# Patient Record
Sex: MALE | Race: BLACK OR AFRICAN AMERICAN | NOT HISPANIC OR LATINO | Employment: OTHER | URBAN - METROPOLITAN AREA
[De-identification: names, ages, dates, MRNs, and addresses within clinical notes are randomized per-mention and may not be internally consistent; named-entity substitution may affect disease eponyms.]

---

## 2017-03-11 ENCOUNTER — HOSPITAL ENCOUNTER (EMERGENCY)
Facility: HOSPITAL | Age: 52
Discharge: HOME | End: 2017-03-11
Attending: EMERGENCY MEDICINE
Payer: MEDICARE

## 2017-03-11 VITALS
RESPIRATION RATE: 16 BRPM | WEIGHT: 185 LBS | OXYGEN SATURATION: 99 % | DIASTOLIC BLOOD PRESSURE: 87 MMHG | SYSTOLIC BLOOD PRESSURE: 165 MMHG | TEMPERATURE: 98 F | HEIGHT: 69 IN | HEART RATE: 78 BPM | BODY MASS INDEX: 27.4 KG/M2

## 2017-03-11 DIAGNOSIS — L03.011 PARONYCHIA, RIGHT: Primary | ICD-10-CM

## 2017-03-11 PROCEDURE — 10060 I&D ABSCESS SIMPLE/SINGLE: CPT | Mod: F6

## 2017-03-11 PROCEDURE — 25000003 PHARM REV CODE 250: Performed by: PHYSICIAN ASSISTANT

## 2017-03-11 PROCEDURE — 99283 EMERGENCY DEPT VISIT LOW MDM: CPT | Mod: 25

## 2017-03-11 RX ORDER — DOXYCYCLINE 100 MG/1
100 CAPSULE ORAL 2 TIMES DAILY
Qty: 20 CAPSULE | Refills: 0 | Status: SHIPPED | OUTPATIENT
Start: 2017-03-11 | End: 2017-03-21

## 2017-03-11 RX ORDER — LIDOCAINE HYDROCHLORIDE 10 MG/ML
INJECTION, SOLUTION EPIDURAL; INFILTRATION; INTRACAUDAL; PERINEURAL
Status: DISCONTINUED
Start: 2017-03-11 | End: 2017-03-11 | Stop reason: HOSPADM

## 2017-03-11 RX ORDER — LIDOCAINE HYDROCHLORIDE 10 MG/ML
10 INJECTION, SOLUTION EPIDURAL; INFILTRATION; INTRACAUDAL; PERINEURAL
Status: COMPLETED | OUTPATIENT
Start: 2017-03-11 | End: 2017-03-11

## 2017-03-11 RX ADMIN — LIDOCAINE HYDROCHLORIDE 100 MG: 10 INJECTION, SOLUTION EPIDURAL; INFILTRATION; INTRACAUDAL; PERINEURAL at 07:03

## 2017-03-11 NOTE — ED AVS SNAPSHOT
[facility] [address] [place] [address]               Hernan Badillo   3/11/2017  6:29 PM   ED    Description:  Male : 1965   Department:  [facility]           Your Care was Coordinated By:     Provider Role From To    Chet Emanuel MD Attending Provider 17 --    Suzanne Stout PA-C Physician Assistant 17 --      Reason for Visit     paronychia           Diagnoses this Visit        Comments    Paronychia, right    -  Primary       ED Disposition     ED Disposition Condition Comment    Discharge             To Do List           Follow-up Information     Follow up with Twyla Dodd MD In 1 week.    Specialty:  Family Medicine    Contact information:    [facility]  [facility] [id number] [phone]          Follow up with [facility].    Specialty:  Emergency Medicine    Why:  As needed    Contact information:    [address] [place] [id number] [phone]       These Medications        Disp Refills Start End    doxycycline (VIBRAMYCIN) 100 MG Cap 20 capsule 0 3/11/2017 3/21/2017    Take 1 capsule (100 mg total) by mouth 2 (two) times daily. - Oral    Pharmacy: [place] [address] [address]. Ph #: [phone]         [facility]     [facility] -  Assistance  Registered nurses in the [facility] provide clinical advisement, health education, appointment booking, and other advisory services.  Call for this free service at [phone].             Medications           Message regarding Medications     Verify the changes and/or additions to your medication regime listed below are the same as discussed with your clinician today.  If any of these changes or additions are incorrect, please notify your healthcare provider.        START taking these NEW medications        Refills    doxycycline  "(VIBRAMYCIN) 100 MG Cap 0    Sig: Take 1 capsule (100 mg total) by mouth 2 (two) times daily.    Class: Print    Route: Oral      These medications were administered today        Dose Freq    lidocaine (PF) 10 mg/ml (1%) injection 100 mg 10 mL ED 1 Time    Sig: 10 mLs (100 mg total) by Infiltration route ED 1 Time.    Class: Normal    Route: Infiltration    lidocaine (PF) 10 mg/ml (1%) 10 mg/mL (1 %) injection      Notes to Pharmacy: Created by cabinet override           Verify that the below list of medications is an accurate representation of the medications you are currently taking.  If none reported, the list may be blank. If incorrect, please contact your healthcare provider. Carry this list with you in case of emergency.           Current Medications     albuterol-ipratropium 2.5mg-0.5mg/3mL (DUO-NEB) 0.5 mg-3 mg(2.5 mg base)/3 mL nebulizer solution Take 3 mLs by nebulization every 6 (six) hours as needed for Wheezing or Shortness of Breath.    doxycycline (VIBRAMYCIN) 100 MG Cap Take 1 capsule (100 mg total) by mouth 2 (two) times daily.    insulin glargine (LANTUS) 100 unit/mL injection Inject 20 Units into the skin every evening.    lidocaine (PF) 10 mg/ml (1%) 10 mg/mL (1 %) injection     lisinopril (PRINIVIL,ZESTRIL) 20 MG tablet Take 1 tablet (20 mg total) by mouth once daily.    nystatin-triamcinolone (MYCOLOG II) cream Apply topically 2 (two) times daily. Apply thin film to perigenital daily           Clinical Reference Information           Your Vitals Were     BP Pulse Temp Resp Height Weight    192/106 88 98.4 °F (36.9 °C) (Oral) 20 5' 9" (1.753 m) 83.9 kg (185 lb)    SpO2 BMI             96% 27.32 kg/m2         Allergies as of 3/11/2017     No Known Allergies      Immunizations Administered on Date of Encounter - 3/11/2017     None      ED Micro, Lab, POCT     None      ED Imaging Orders     None      Discharge References/Attachments     PARONYCHIA OF THE FINGER OR TOE (ENGLISH)      [facility] " Sign-Up     Activating your [facility] account is as easy as 1-2-3!     1) Visit [email or web address], select Sign Up Now, enter this activation code and your date of birth, then select Next.  [email or web address]  Expires: 4/25/2017  7:36 PM      2) Create a username and password to use when you visit [facility] in the future and select a security question in case you lose your password and select Next.    3) Enter your e-mail address and click Sign Up!    Additional Information  If you have questions, please e-mail [email or web address] or call [phone] to talk to our [facility] staff. Remember, [facility] is NOT to be used for urgent needs. For medical emergencies, dial 911.          [facility] complies with applicable Federal civil rights laws and does not discriminate on the basis of race, color, national origin, age, disability, or sex.        Language Assistance Services     ATTENTION: Language assistance services are available, free of charge. Please call [phone].      ATENCIÓN: Si habla español, tiene a schulte disposición servicios gratuitos de asistencia lingüística. Llame al [phone].     CHÚ Ý: N?u b?n nói Ti?ng Vi?t, có các d?ch v? h? tr? ngôn ng? mi?n phí dành cho b?n. G?i s? [phone].

## 2017-03-12 NOTE — ED PROVIDER NOTES
"Encounter Date: 3/11/2017       History     Chief Complaint   Patient presents with    paronychia     Review of patient's allergies indicates:  No Known Allergies  HPI Comments: Patient is a 51 year old male with complaint of "finger infection". Patient reports PMH significant for COPD, hypertension and DM-2. He reports two week history of swelling and drainage from right fourth digit. He reports he tried to drain it by poking a hole and reports pus expressed. He reports associated tenderness. He reports moderate and constant symptoms. He denied fever, chills, nausea, vomiting or redness to finger.     The history is provided by the patient.     Past Medical History:   Diagnosis Date    COPD (chronic obstructive pulmonary disease)     Diabetes mellitus, type 2     Hypertension      Past Surgical History:   Procedure Laterality Date    DENTAL SURGERY      HERNIA REPAIR      left inguinal     Family History   Problem Relation Age of Onset    Heart attack Father 80    Heart disease Father     Drug abuse Father     Cancer Maternal Grandmother      colon     Social History   Substance Use Topics    Smoking status: Current Every Day Smoker     Packs/day: 2.00     Years: 30.00     Types: Cigarettes    Smokeless tobacco: Never Used    Alcohol use 0.6 oz/week     1 Shots of liquor per week      Comment: 1/2 pint 2 x per week      Review of Systems   Constitutional: Negative for chills and fever.   HENT: Negative for congestion and sore throat.    Respiratory: Negative for cough and shortness of breath.    Cardiovascular: Negative for chest pain.   Gastrointestinal: Negative for abdominal pain, diarrhea, nausea and vomiting.   Genitourinary: Negative for dysuria.   Musculoskeletal: Negative for back pain.   Skin: Positive for wound. Negative for color change and rash.   Neurological: Negative for dizziness, weakness and headaches.   Hematological: Does not bruise/bleed easily.       Physical Exam   Initial Vitals "   BP Pulse Resp Temp SpO2   03/11/17 1800 03/11/17 1800 03/11/17 1800 03/11/17 1800 03/11/17 1800   192/106 88 20 98.4 °F (36.9 °C) 96 %     Physical Exam    Nursing note and vitals reviewed.  Constitutional: He appears well-developed and well-nourished.   HENT:   Head: Normocephalic and atraumatic.   Right Ear: External ear normal.   Left Ear: External ear normal.   Nose: Nose normal.   Eyes: Conjunctivae are normal. Right eye exhibits no discharge. Left eye exhibits no discharge. No scleral icterus.   Neck: Normal range of motion. Neck supple.   Cardiovascular: Normal rate, regular rhythm and normal heart sounds. Exam reveals no gallop and no friction rub.    No murmur heard.  Pulmonary/Chest: Breath sounds normal. He has no wheezes. He has no rhonchi. He has no rales.   Abdominal: Soft. Bowel sounds are normal. He exhibits no distension. There is no tenderness.   Musculoskeletal: Normal range of motion.        Right hand: He exhibits tenderness. He exhibits normal range of motion, no bony tenderness and normal capillary refill. Normal sensation noted. Normal strength noted.   Neurological: He is oriented to person, place, and time.   Skin: Skin is warm and dry. No rash noted. There is erythema.   Erythema, tenderness and induration noted to the nail bed on the right fourth digit. No cellulitic changes noted. No warmth. Full ROM.         ED Course   I & D - Incision and Drainage  Date/Time: 3/11/2017 7:43 PM  Performed by: XIOMARA HWITE  Authorized by: JOSE LUIS STAUFFER   Type: abscess  Body area: upper extremity  Location details: right index finger  Anesthesia: digital block    Anesthesia:  Anesthesia: digital block  Local Anesthetic: lidocaine 1% without epinephrine   Anesthetic total: 7 mL  Scalpel size: 11  Incision type: single straight  Complexity: simple  Drainage: pus  Drainage amount: moderate  Patient tolerance: Patient tolerated the procedure well with no immediate  complications        Labs Reviewed - No data to display          Medical Decision Making:   History:   I obtained history from: someone other than patient.  Old Medical Records: I decided to obtain old medical records.       APC / Resident Notes:   This is an emergent evaluation of a 51 year old male with complaint of paronychia to right fourth digit. He reports attempted drainage 2-3 times. Patient denied fever. I&D performed, see procedure note. Patient tolerated well. Patient was given instructions on wound care. Antibiotics given. Follow up with primary care provider. Return precautions given. All questions answered. Case was discussed with Dr. Emanuel who has evaluated the patient and is in agreement with the plan of care.            Attending Attestation:     Physician Attestation Statement for NP/PA:   I have conducted a face to face encounter with this patient in addition to the NP/PA, due to Medical Complexity    Other NP/PA Attestation Additions:      Medical Decision Making: I provided a face to face evaluation of this patient.  I discussed the patient's care with Advanced Practice Clinician.  I reviewed their note and agree with the history, physical, assessment, diagnosis, treatment, and discharge plan provided by the Advanced Practice Clinician. My overall impression is paronychia.  The patient has been instructed to follow up with their physician or the one provided as well as specific return precautions.                    ED Course     Clinical Impression:   The encounter diagnosis was Paronychia, right.    Disposition:   Disposition: Discharged  Condition: Stable       Suzanne Stout PA-C  03/11/17 2128       Chet Emanuel MD  03/11/17 3053

## 2017-06-13 ENCOUNTER — HOSPITAL ENCOUNTER (EMERGENCY)
Facility: HOSPITAL | Age: 52
Discharge: HOME | End: 2017-06-13
Attending: EMERGENCY MEDICINE
Payer: MEDICARE

## 2017-06-13 VITALS
HEIGHT: 69 IN | HEART RATE: 85 BPM | DIASTOLIC BLOOD PRESSURE: 104 MMHG | RESPIRATION RATE: 16 BRPM | BODY MASS INDEX: 25.92 KG/M2 | TEMPERATURE: 98 F | OXYGEN SATURATION: 95 % | SYSTOLIC BLOOD PRESSURE: 173 MMHG | WEIGHT: 175 LBS

## 2017-06-13 DIAGNOSIS — M54.50 ACUTE RIGHT-SIDED LOW BACK PAIN WITHOUT SCIATICA: Primary | ICD-10-CM

## 2017-06-13 PROCEDURE — 99284 EMERGENCY DEPT VISIT MOD MDM: CPT | Mod: 25

## 2017-06-13 PROCEDURE — 96372 THER/PROPH/DIAG INJ SC/IM: CPT

## 2017-06-13 PROCEDURE — 63600175 PHARM REV CODE 636 W HCPCS: Performed by: NURSE PRACTITIONER

## 2017-06-13 RX ORDER — ORPHENADRINE CITRATE 30 MG/ML
60 INJECTION INTRAMUSCULAR; INTRAVENOUS
Status: COMPLETED | OUTPATIENT
Start: 2017-06-13 | End: 2017-06-13

## 2017-06-13 RX ORDER — DICLOFENAC SODIUM 50 MG/1
50 TABLET, DELAYED RELEASE ORAL 2 TIMES DAILY
Qty: 30 TABLET | Refills: 0 | Status: SHIPPED | OUTPATIENT
Start: 2017-06-13 | End: 2018-08-01

## 2017-06-13 RX ORDER — METHOCARBAMOL 750 MG/1
1500 TABLET, FILM COATED ORAL 3 TIMES DAILY
Qty: 30 TABLET | Refills: 0 | Status: SHIPPED | OUTPATIENT
Start: 2017-06-13 | End: 2017-06-18

## 2017-06-13 RX ADMIN — ORPHENADRINE CITRATE 60 MG: 30 INJECTION INTRAMUSCULAR; INTRAVENOUS at 08:06

## 2017-06-14 NOTE — ED NOTES
C/o lower back pain after a lina slipped on a car he was working on PTA able to ambulate well denies change in bowel or bladder. Alert calm family at beside. Aware to notify nurse of needs or concerns

## 2017-06-15 NOTE — ED PROVIDER NOTES
Encounter Date: 6/13/2017       History     Chief Complaint   Patient presents with    Back Pain     pt reports lower back pain, jacked slipped and pipe from car fell      Review of patient's allergies indicates:  No Known Allergies  Hernan Badillo is a 51 year old male with pmh COPD, DM, HTN presenting to the ED from [facility] with c/o right lower back pain. The patient states he was working on a vehicle when a muffler pipe fell onto his back. He has had no lower extremity weakness, saddle anesthesia, bowel/bladder incontinence. He was evaluated at urgent care and advised to come to the ED to r/o vertebral fracture or other acute injury. He received Toradol at urgent care.       The history is provided by the patient.     Past Medical History:   Diagnosis Date    COPD (chronic obstructive pulmonary disease)     Diabetes mellitus, type 2     Hypertension      Past Surgical History:   Procedure Laterality Date    DENTAL SURGERY      HERNIA REPAIR      left inguinal     Family History   Problem Relation Age of Onset    Heart attack Father 80    Heart disease Father     Drug abuse Father     Cancer Maternal Grandmother      colon     Social History   Substance Use Topics    Smoking status: Current Every Day Smoker     Packs/day: 2.00     Years: 30.00     Types: Cigarettes    Smokeless tobacco: Never Used    Alcohol use 0.6 oz/week     1 Shots of liquor per week      Comment: 1/2 pint 2 x per week      Review of Systems   Constitutional: Negative.    HENT: Negative.    Respiratory: Negative.    Cardiovascular: Negative.    Gastrointestinal: Negative.    Genitourinary: Negative.    Musculoskeletal: Positive for back pain.   Skin: Negative.    Neurological: Negative.        Physical Exam     Initial Vitals [06/13/17 1957]   BP Pulse Resp Temp SpO2   (!) 186/105 101 16 98 °F (36.7 °C) 95 %     Physical Exam    Nursing note and vitals reviewed.  Constitutional: He appears well-developed and  well-nourished. He is not diaphoretic. No distress.   HENT:   Head: Normocephalic and atraumatic.   Eyes: Conjunctivae are normal.   Neck: Normal range of motion.   Cardiovascular: Normal rate and regular rhythm.   Pulmonary/Chest: Breath sounds normal.   Musculoskeletal:        Lumbar back: He exhibits decreased range of motion, tenderness, swelling (right lower back) and pain.        Back:    Neurological: He is alert and oriented to person, place, and time. He has normal strength. No sensory deficit. Gait normal.   Skin: Skin is warm and dry. Capillary refill takes less than 2 seconds.         ED Course   Procedures  Labs Reviewed - No data to display                APC / Resident Notes:   Hernan Badillo is a 51 year old male presenting to the ED with c/o lower back pain. CT of lumbar spine performed with no acute findings. Patient was given IM Norflex with some relief of pain. I do not suspect vertebral fracture/subluxation or cauda equina. He was provided with prescriptions for NSAIDs and muscle relaxants at time of discharge and advised to follow up with PCP. I discussed specific return precautions with the patient and he verbalized understanding. Based on my clinical evaluation, I do not appreciate any immediate, emergent, or life threatening condition or etiology that warrants additional workup today and feel that the patient can be discharged with close follow up care.            Attending Attestation:     Physician Attestation Statement for NP/PA:   I have conducted a face to face encounter with this patient in addition to the NP/PA, due to Medical Complexity    Other NP/PA Attestation Additions:      Medical Decision Making: I provided a face to face evaluation of this patient.  I discussed the patient's care with Advanced Practice Clinician.  I reviewed their note and agree with the history, physical, assessment, diagnosis, treatment, and discharge plan provided by the Advanced Practice Clinician. My overall  impression is Right low back pain and swelling.  The patient has been instructed to follow up with their physician or the one provided as well as specific return precautions.                    ED Course   Comment By Time   IMPRESSION:  No acute findings.  Thank you for allowing us to participate in the care of your patient.  Dictated and Authenticated by: Lj Olson MD  06/13/2017 9:57 PM Central Time ([place] & [place]) Chet Emanuel MD 06/13 4247     Clinical Impression:   The encounter diagnosis was Acute right-sided low back pain without sciatica.    Disposition:   Condition: Stable       Mallory Santiago NP  06/14/17 0963       Chet Emanuel MD  06/17/17 0657

## 2017-06-26 ENCOUNTER — HOSPITAL ENCOUNTER (EMERGENCY)
Facility: HOSPITAL | Age: 52
Discharge: HOME | End: 2017-06-26
Attending: EMERGENCY MEDICINE
Payer: MEDICARE

## 2017-06-26 VITALS
WEIGHT: 175 LBS | HEART RATE: 89 BPM | TEMPERATURE: 98 F | DIASTOLIC BLOOD PRESSURE: 122 MMHG | OXYGEN SATURATION: 97 % | RESPIRATION RATE: 16 BRPM | HEIGHT: 69 IN | SYSTOLIC BLOOD PRESSURE: 196 MMHG | BODY MASS INDEX: 25.92 KG/M2

## 2017-06-26 DIAGNOSIS — M54.16 LUMBAR RADICULOPATHY, ACUTE: ICD-10-CM

## 2017-06-26 DIAGNOSIS — S30.0XXA TRAUMATIC HEMATOMA OF LOWER BACK, INITIAL ENCOUNTER: Primary | ICD-10-CM

## 2017-06-26 PROCEDURE — 99283 EMERGENCY DEPT VISIT LOW MDM: CPT | Mod: 25

## 2017-06-26 PROCEDURE — 96372 THER/PROPH/DIAG INJ SC/IM: CPT

## 2017-06-26 PROCEDURE — 63600175 PHARM REV CODE 636 W HCPCS: Performed by: EMERGENCY MEDICINE

## 2017-06-26 RX ORDER — KETOROLAC TROMETHAMINE 30 MG/ML
30 INJECTION, SOLUTION INTRAMUSCULAR; INTRAVENOUS
Status: COMPLETED | OUTPATIENT
Start: 2017-06-26 | End: 2017-06-26

## 2017-06-26 RX ORDER — TRAMADOL HYDROCHLORIDE 50 MG/1
50 TABLET ORAL EVERY 6 HOURS PRN
Qty: 20 TABLET | Refills: 0 | Status: SHIPPED | OUTPATIENT
Start: 2017-06-26 | End: 2017-06-28

## 2017-06-26 RX ADMIN — KETOROLAC TROMETHAMINE 30 MG: 30 INJECTION, SOLUTION INTRAMUSCULAR at 02:06

## 2017-06-26 NOTE — ED NOTES
Pt presents with complaints of swelling in right lower side of back. Pt says his daughter kicked him there 3 weeks ago, he came here and area swollen and painful. No relief with pain meds he was sent home with. Area tender to touch. Pt alert and oriented at this time

## 2017-06-26 NOTE — ED PROVIDER NOTES
Encounter Date: 6/26/2017       History     Chief Complaint   Patient presents with    Back Pain     Lower back pain x 3 weeks      Patient is a 51-year-old male who presents to emergency department for evaluation of bilateral low back pain on the right greater than left.  The patient was hit with a pipe in the lower back several weeks ago and came to this emergency department where he had a CT of the lumbar spine showing no acute fracture, but does have mild neuroforaminal stenosis and retrolisthesis of the lower lumbar spine.  Today he was playing with his 3-year-old granddaughter who accidentally kicked him in the back.  He has no focal weakness or numbness incontinence fevers but does have recurrence of the low back pain.  Voltaren gel and Robaxin are not working tonight.          Review of patient's allergies indicates:  No Known Allergies  Past Medical History:   Diagnosis Date    COPD (chronic obstructive pulmonary disease)     Diabetes mellitus, type 2     Hypertension      Past Surgical History:   Procedure Laterality Date    DENTAL SURGERY      HERNIA REPAIR      left inguinal     Family History   Problem Relation Age of Onset    Heart attack Father 80    Heart disease Father     Drug abuse Father     Cancer Maternal Grandmother      colon     Social History   Substance Use Topics    Smoking status: Current Every Day Smoker     Packs/day: 2.00     Years: 30.00     Types: Cigarettes    Smokeless tobacco: Never Used    Alcohol use 0.6 oz/week     1 Shots of liquor per week      Comment: 1/2 pint 2 x per week      Review of Systems   Constitutional: Negative for fever.   Genitourinary: Negative for difficulty urinating, dysuria, flank pain and hematuria.   Musculoskeletal: Positive for back pain. Negative for arthralgias, myalgias, neck pain and neck stiffness.   Skin: Negative for color change, rash and wound.   Neurological: Negative for weakness and numbness.   Psychiatric/Behavioral: The  patient is not nervous/anxious.        Physical Exam     Initial Vitals [06/26/17 0114]   BP Pulse Resp Temp SpO2   (!) 196/122 89 16 98.1 °F (36.7 °C) 97 %      MAP       146.67         Physical Exam    Nursing note and vitals reviewed.  Constitutional: He appears well-developed and well-nourished. No distress.   HENT:   Head: Normocephalic and atraumatic.   Abdominal: There is no tenderness.   Musculoskeletal: He exhibits tenderness (ttp w/ small hematoma over the right lower lumbar region. also mild ttp in the left lower lumbar region.  mild ttp in the right lumbar causes pain to radiate to the right buttocks. ).   Neurological: He is alert and oriented to person, place, and time. He has normal strength. No sensory deficit.   Skin: No rash noted.         ED Course   Procedures  Labs Reviewed - No data to display          Medical Decision Making:   Patient appears to have a hematoma on the right lumbosacral region.  This is likely the cause of his pain.  I reviewed his CT which shows retrolisthesis and mild neuroforaminal stenosis which is also potentially causing his pain.  He needs to follow-up with regular physician and I will give him a referral to Dr. Aguilar.  He does have uncontrolled hypertension here and I'm concerned that antiinflammatories will  I will given him a few pills of ultram.                    ED Course     Clinical Impression:   The primary encounter diagnosis was Traumatic hematoma of lower back, initial encounter. A diagnosis of Lumbar radiculopathy, acute was also pertinent to this visit.                           Quinn Sarmiento MD  06/26/17 5311

## 2017-06-28 ENCOUNTER — HOSPITAL ENCOUNTER (OUTPATIENT)
Dept: RADIOLOGY | Facility: CLINIC | Age: 52
Discharge: HOME | End: 2017-06-28
Attending: FAMILY MEDICINE
Payer: MEDICARE

## 2017-06-28 ENCOUNTER — OFFICE VISIT (OUTPATIENT)
Dept: FAMILY MEDICINE | Facility: CLINIC | Age: 52
End: 2017-06-28
Payer: MEDICARE

## 2017-06-28 ENCOUNTER — DOCUMENTATION ONLY (OUTPATIENT)
Dept: FAMILY MEDICINE | Facility: CLINIC | Age: 52
End: 2017-06-28

## 2017-06-28 ENCOUNTER — TELEPHONE (OUTPATIENT)
Dept: NEUROSURGERY | Facility: CLINIC | Age: 52
End: 2017-06-28

## 2017-06-28 VITALS
BODY MASS INDEX: 26.58 KG/M2 | WEIGHT: 179.44 LBS | SYSTOLIC BLOOD PRESSURE: 184 MMHG | HEART RATE: 88 BPM | HEIGHT: 69 IN | TEMPERATURE: 99 F | DIASTOLIC BLOOD PRESSURE: 98 MMHG

## 2017-06-28 DIAGNOSIS — S30.0XXD TRAUMATIC HEMATOMA OF LOWER BACK, SUBSEQUENT ENCOUNTER: ICD-10-CM

## 2017-06-28 DIAGNOSIS — M43.16 SPONDYLOLISTHESIS OF LUMBAR REGION: ICD-10-CM

## 2017-06-28 DIAGNOSIS — M51.36 DDD (DEGENERATIVE DISC DISEASE), LUMBAR: ICD-10-CM

## 2017-06-28 DIAGNOSIS — I10 ESSENTIAL HYPERTENSION: Primary | ICD-10-CM

## 2017-06-28 DIAGNOSIS — R60.9 SOFT TISSUE SWELLING OF BACK: ICD-10-CM

## 2017-06-28 DIAGNOSIS — E11.8 TYPE 2 DIABETES MELLITUS WITH COMPLICATION, WITHOUT LONG-TERM CURRENT USE OF INSULIN: ICD-10-CM

## 2017-06-28 PROCEDURE — 3046F HEMOGLOBIN A1C LEVEL >9.0%: CPT | Mod: ,,, | Performed by: PHYSICIAN ASSISTANT

## 2017-06-28 PROCEDURE — 4010F ACE/ARB THERAPY RXD/TAKEN: CPT | Mod: ,,, | Performed by: PHYSICIAN ASSISTANT

## 2017-06-28 PROCEDURE — 76705 ECHO EXAM OF ABDOMEN: CPT | Mod: TC,PO

## 2017-06-28 PROCEDURE — 99999 PR PBB SHADOW E&M-EST. PATIENT-LVL IV: CPT | Mod: PBBFAC,,, | Performed by: PHYSICIAN ASSISTANT

## 2017-06-28 PROCEDURE — 99214 OFFICE O/P EST MOD 30 MIN: CPT | Mod: S$PBB,,, | Performed by: PHYSICIAN ASSISTANT

## 2017-06-28 PROCEDURE — 76705 ECHO EXAM OF ABDOMEN: CPT | Mod: 26,,, | Performed by: RADIOLOGY

## 2017-06-28 RX ORDER — ACETAMINOPHEN AND CODEINE PHOSPHATE 300; 30 MG/1; MG/1
1 TABLET ORAL EVERY 6 HOURS PRN
Qty: 28 TABLET | Refills: 0 | Status: ON HOLD | OUTPATIENT
Start: 2017-06-28 | End: 2017-07-06 | Stop reason: HOSPADM

## 2017-06-28 RX ORDER — METFORMIN HYDROCHLORIDE 500 MG/1
500 TABLET ORAL 2 TIMES DAILY WITH MEALS
Qty: 60 TABLET | Refills: 0 | Status: SHIPPED | OUTPATIENT
Start: 2017-06-28 | End: 2019-08-30 | Stop reason: SDUPTHER

## 2017-06-28 RX ORDER — LISINOPRIL 20 MG/1
20 TABLET ORAL DAILY
Qty: 30 TABLET | Refills: 2 | Status: ON HOLD | OUTPATIENT
Start: 2017-06-28 | End: 2020-06-01

## 2017-06-28 NOTE — PROGRESS NOTES
Pre-Visit Chart Review  For Appointment Scheduled on 06/28/2017      Health Maintenance Due   Topic Date Due    Eye Exam  10/15/1975    TETANUS VACCINE  10/15/1983    Urine Microalbumin  03/24/2015    Colonoscopy  10/15/2015    Hemoglobin A1c  01/06/2017    Foot Exam  07/06/2017

## 2017-06-28 NOTE — PROGRESS NOTES
"Subjective:       Patient ID: Hernan Badillo is a 51 y.o. male.    Chief Complaint: No chief complaint on file.    Patient with uncontrolled hypertension and uncontrolled diabetes secondary to non compliance presents for evaluation of low back pain.  He has been to ER X 2 in the past 2-3 weeks for same pain.  Pain began when he was working on a vehicle and a muffler pipe fell onto his back.  He tried the medications given in ER but they made him feel "woozy" so he discontinued them.   The pain in located in the right lower back and does not radiate.  He is having swelling in the area of the pain.  He states that the swelling is unchanged since the injury.  He denies fever, abdominal pain, bowel or bladder incontinence.        Review of Systems   Constitutional: Negative for appetite change, chills, diaphoresis, fatigue and fever.   Gastrointestinal: Negative for abdominal pain, diarrhea and nausea.   Genitourinary: Negative for difficulty urinating and enuresis.   Musculoskeletal: Positive for arthralgias, back pain, gait problem and myalgias.   Skin: Negative for rash and wound.   Neurological: Negative for dizziness, light-headedness and headaches.       Objective:      Physical Exam   Constitutional: He appears well-developed and well-nourished. He is cooperative. No distress.   Eyes: Conjunctivae and EOM are normal. No scleral icterus.   Neck: Carotid bruit is not present.   Cardiovascular: Normal rate, regular rhythm and normal heart sounds.    Pulmonary/Chest: Effort normal and breath sounds normal.   Abdominal: Soft. Bowel sounds are normal. There is no tenderness.   Musculoskeletal:        Lumbar back: He exhibits decreased range of motion, tenderness and swelling.        Back:         Right lower leg: He exhibits no edema.        Left lower leg: He exhibits no edema.   Neurological: He is alert.   Vitals reviewed.      Assessment:       1. Essential hypertension    2. Type 2 diabetes mellitus with complication, " without long-term current use of insulin    3. Soft tissue swelling of back    4. Traumatic hematoma of lower back, subsequent encounter    5. DDD (degenerative disc disease), lumbar    6. Spondylolisthesis of lumbar region        Plan:       Diagnoses and all orders for this visit:    Essential hypertension  -     CBC auto differential; Future  -     Lipid panel; Future   resume  lisinopril (PRINIVIL,ZESTRIL) 20 MG tablet; Take 1 tablet (20 mg total) by mouth once daily.  1 week follow up    Type 2 diabetes mellitus with complication, without long-term current use of insulin  -     Comprehensive metabolic panel; Future  -     Hemoglobin A1c; Future  -     Lipid panel; Future   metformin (GLUCOPHAGE) 500 MG tablet; Take 1 tablet (500 mg total) by mouth 2 (two) times daily with meals.  Further recommendations will be made based on results   likely start Basaglar qhs  Soft tissue swelling of back  -     US Soft Tissue Lower Back; Future    Traumatic hematoma of lower back, subsequent encounter  -     US Soft Tissue Lower Back; Future    -     acetaminophen-codeine 300-30mg (TYLENOL #3) 300-30 mg Tab; Take 1 tablet by mouth every 6 (six) hours as needed.  -      Further recommendations will be made based on results '  DDD (degenerative disc disease), lumbar  -     Ambulatory Referral to [facility]    Spondylolisthesis of lumbar region  -     Ambulatory Referral to [facility]    Patient readiness: nonacceptance and barriers:uncleared at this visit what his barriers are     During the course of the visit the patient was educated and counseled about the following:     Diabetes:  Labs: fasting blood sugar, fasting lipid panel, hemoglobin A1C and start metformin and further recommendations will be made based on results.  Hypertension:   Medication: resume lisinopril.    Goals: Diabetes: Maintain Hemoglobin A1C below 7 and Hypertension: Reduce Blood Pressure    Did patient meet goals/outcomes: No    The  following self management tools provided: declined    Patient Instructions (the written plan) was given to the patient/family.     Time spent with patient: 45 minutes

## 2017-06-28 NOTE — TELEPHONE ENCOUNTER
Called pt to discuss below message. Appt date, time, and location given. Pt verbalized understanding.         ----- Message from Kristina Esteban sent at 6/28/2017 10:37 AM CDT -----  Pt was seen today and needs an appt for Soft tissue swelling of back  Traumatic hematoma of lower back, subsequent encounter  DDD (degenerative disc disease), lumbar  Spondylolisthesis of lumbar region  Please call him @ [phone]  Thanks !

## 2017-06-29 ENCOUNTER — TELEPHONE (OUTPATIENT)
Dept: FAMILY MEDICINE | Facility: CLINIC | Age: 52
End: 2017-06-29

## 2017-06-29 DIAGNOSIS — T14.8XXA HEMATOMA: Primary | ICD-10-CM

## 2017-06-29 NOTE — TELEPHONE ENCOUNTER
Please let patient know the ultrasound showed a large fluid filled area on the back (hematoma or seroma)   Lets have him see general surgery to see if they will drain the area   His diabetes is significantly uncontrolled- start the pills as given and refer to diabetic ed & follow up PCP/team

## 2017-06-29 NOTE — TELEPHONE ENCOUNTER
Reviewed with patient; voices understanding. Scheduled General Surgery,  7/5/17; has scheduled PCP team appointment he will keep.  Need diabetic education referral (7/24/17)

## 2017-07-05 ENCOUNTER — OFFICE VISIT (OUTPATIENT)
Dept: SURGERY | Facility: CLINIC | Age: 52
End: 2017-07-05
Payer: MEDICARE

## 2017-07-05 ENCOUNTER — ANESTHESIA EVENT (OUTPATIENT)
Dept: SURGERY | Facility: HOSPITAL | Age: 52
End: 2017-07-05
Payer: MEDICARE

## 2017-07-05 ENCOUNTER — TELEPHONE (OUTPATIENT)
Dept: SURGERY | Facility: CLINIC | Age: 52
End: 2017-07-05

## 2017-07-05 VITALS
HEIGHT: 69 IN | WEIGHT: 179.69 LBS | BODY MASS INDEX: 26.62 KG/M2 | SYSTOLIC BLOOD PRESSURE: 180 MMHG | TEMPERATURE: 98 F | DIASTOLIC BLOOD PRESSURE: 97 MMHG | HEART RATE: 80 BPM

## 2017-07-05 DIAGNOSIS — T14.8XXA HEMATOMA: Primary | ICD-10-CM

## 2017-07-05 PROCEDURE — 99203 OFFICE O/P NEW LOW 30 MIN: CPT | Mod: S$PBB,,, | Performed by: SURGERY

## 2017-07-05 PROCEDURE — 99213 OFFICE O/P EST LOW 20 MIN: CPT | Mod: PBBFAC,PO | Performed by: SURGERY

## 2017-07-05 PROCEDURE — 99999 PR PBB SHADOW E&M-EST. PATIENT-LVL III: CPT | Mod: PBBFAC,,, | Performed by: SURGERY

## 2017-07-05 RX ORDER — SODIUM CHLORIDE 9 MG/ML
INJECTION, SOLUTION INTRAVENOUS CONTINUOUS
Status: CANCELLED | OUTPATIENT
Start: 2017-07-05

## 2017-07-05 NOTE — TELEPHONE ENCOUNTER
----- Message from Mariya Ritchie sent at 7/5/2017 10:42 AM CDT -----  Contact: Patient  Patient states that he is to call about his appointment for tomorrow's procedure.  And it seems he was just at an appointment today.   Please call the patient back at [phone].  Thank you

## 2017-07-05 NOTE — LETTER
July 6, 2017      Coy Perez MD  [address]  [place] [address]           [facility] - General Surgery  [address]  [place] [address]  Phone: [phone]          Patient: Hernan Badillo   MR Number: [id number]   YOB: 1965   Date of Visit: 7/5/2017       Dear Dr. Coy Perez:    Thank you for referring Hernan Badillo to me for evaluation. Attached you will find relevant portions of my assessment and plan of care.    If you have questions, please do not hesitate to call me. I look forward to following Hernan Badillo along with you.    Sincerely,    Jozef Newman MD    Enclosure  CC:  No Recipients    If you would like to receive this communication electronically, please contact [email or web address] or [phone] to request more information on [employer] access.    For providers and/or their staff who would like to refer a patient to [facility], please contact us through our one-stop-shop provider referral line, [facility], at [phone].    If you feel you have received this communication in error or would no longer like to receive these types of communications, please e-mail [email or web address]

## 2017-07-06 ENCOUNTER — ANESTHESIA (OUTPATIENT)
Dept: SURGERY | Facility: HOSPITAL | Age: 52
End: 2017-07-06
Payer: MEDICARE

## 2017-07-06 ENCOUNTER — SURGERY (OUTPATIENT)
Age: 52
End: 2017-07-06

## 2017-07-06 ENCOUNTER — HOSPITAL ENCOUNTER (OUTPATIENT)
Facility: HOSPITAL | Age: 52
Discharge: HOME | End: 2017-07-06
Attending: SURGERY | Admitting: SURGERY
Payer: MEDICARE

## 2017-07-06 VITALS
HEART RATE: 85 BPM | WEIGHT: 179 LBS | TEMPERATURE: 99 F | BODY MASS INDEX: 26.51 KG/M2 | SYSTOLIC BLOOD PRESSURE: 172 MMHG | HEIGHT: 69 IN | OXYGEN SATURATION: 92 % | DIASTOLIC BLOOD PRESSURE: 91 MMHG | RESPIRATION RATE: 16 BRPM

## 2017-07-06 DIAGNOSIS — T14.8XXA HEMATOMA: Primary | ICD-10-CM

## 2017-07-06 PROCEDURE — 99900103 DSU ONLY-NO CHARGE-INITIAL HR (STAT): Performed by: SURGERY

## 2017-07-06 PROCEDURE — 37000009 HC ANESTHESIA EA ADD 15 MINS: Performed by: SURGERY

## 2017-07-06 PROCEDURE — C1729 CATH, DRAINAGE: HCPCS | Performed by: SURGERY

## 2017-07-06 PROCEDURE — 25000003 PHARM REV CODE 250: Performed by: ANESTHESIOLOGY

## 2017-07-06 PROCEDURE — 99900104 DSU ONLY-NO CHARGE-EA ADD'L HR (STAT): Performed by: SURGERY

## 2017-07-06 PROCEDURE — D9220A PRA ANESTHESIA: Mod: ANES,,, | Performed by: ANESTHESIOLOGY

## 2017-07-06 PROCEDURE — 71000033 HC RECOVERY, INTIAL HOUR: Performed by: SURGERY

## 2017-07-06 PROCEDURE — 63600175 PHARM REV CODE 636 W HCPCS: Performed by: ANESTHESIOLOGY

## 2017-07-06 PROCEDURE — 63600175 PHARM REV CODE 636 W HCPCS: Performed by: NURSE ANESTHETIST, CERTIFIED REGISTERED

## 2017-07-06 PROCEDURE — 37000008 HC ANESTHESIA 1ST 15 MINUTES: Performed by: SURGERY

## 2017-07-06 PROCEDURE — 36000705 HC OR TIME LEV I EA ADD 15 MIN: Performed by: SURGERY

## 2017-07-06 PROCEDURE — 63600175 PHARM REV CODE 636 W HCPCS: Performed by: SURGERY

## 2017-07-06 PROCEDURE — D9220A PRA ANESTHESIA: Mod: CRNA,,, | Performed by: NURSE ANESTHETIST, CERTIFIED REGISTERED

## 2017-07-06 PROCEDURE — 25000003 PHARM REV CODE 250: Performed by: SURGERY

## 2017-07-06 PROCEDURE — 25000003 PHARM REV CODE 250: Performed by: NURSE ANESTHETIST, CERTIFIED REGISTERED

## 2017-07-06 PROCEDURE — 10140 I&D HMTMA SEROMA/FLUID COLLJ: CPT | Mod: ,,, | Performed by: SURGERY

## 2017-07-06 PROCEDURE — 71000015 HC POSTOP RECOV 1ST HR: Performed by: SURGERY

## 2017-07-06 PROCEDURE — 36000704 HC OR TIME LEV I 1ST 15 MIN: Performed by: SURGERY

## 2017-07-06 PROCEDURE — 71000039 HC RECOVERY, EACH ADD'L HOUR: Performed by: SURGERY

## 2017-07-06 RX ORDER — ONDANSETRON 2 MG/ML
4 INJECTION INTRAMUSCULAR; INTRAVENOUS ONCE AS NEEDED
Status: DISCONTINUED | OUTPATIENT
Start: 2017-07-06 | End: 2017-07-06 | Stop reason: HOSPADM

## 2017-07-06 RX ORDER — KETOROLAC TROMETHAMINE 30 MG/ML
INJECTION, SOLUTION INTRAMUSCULAR; INTRAVENOUS
Status: DISCONTINUED | OUTPATIENT
Start: 2017-07-06 | End: 2017-07-06

## 2017-07-06 RX ORDER — FENTANYL CITRATE 50 UG/ML
25 INJECTION, SOLUTION INTRAMUSCULAR; INTRAVENOUS EVERY 5 MIN PRN
Status: DISCONTINUED | OUTPATIENT
Start: 2017-07-06 | End: 2017-07-06 | Stop reason: HOSPADM

## 2017-07-06 RX ORDER — SODIUM CHLORIDE, SODIUM LACTATE, POTASSIUM CHLORIDE, CALCIUM CHLORIDE 600; 310; 30; 20 MG/100ML; MG/100ML; MG/100ML; MG/100ML
1000 INJECTION, SOLUTION INTRAVENOUS ONCE
Status: DISCONTINUED | OUTPATIENT
Start: 2017-07-06 | End: 2017-07-06 | Stop reason: HOSPADM

## 2017-07-06 RX ORDER — SODIUM CHLORIDE, SODIUM LACTATE, POTASSIUM CHLORIDE, CALCIUM CHLORIDE 600; 310; 30; 20 MG/100ML; MG/100ML; MG/100ML; MG/100ML
10 INJECTION, SOLUTION INTRAVENOUS CONTINUOUS
Status: DISCONTINUED | OUTPATIENT
Start: 2017-07-07 | End: 2017-07-06 | Stop reason: HOSPADM

## 2017-07-06 RX ORDER — MIDAZOLAM HYDROCHLORIDE 1 MG/ML
INJECTION, SOLUTION INTRAMUSCULAR; INTRAVENOUS
Status: DISCONTINUED | OUTPATIENT
Start: 2017-07-06 | End: 2017-07-06

## 2017-07-06 RX ORDER — SODIUM CHLORIDE 9 MG/ML
INJECTION, SOLUTION INTRAVENOUS CONTINUOUS
Status: DISCONTINUED | OUTPATIENT
Start: 2017-07-06 | End: 2017-07-06

## 2017-07-06 RX ORDER — SODIUM CHLORIDE 9 MG/ML
INJECTION, SOLUTION INTRAVENOUS CONTINUOUS
Status: DISCONTINUED | OUTPATIENT
Start: 2017-07-06 | End: 2017-07-06 | Stop reason: HOSPADM

## 2017-07-06 RX ORDER — FENTANYL CITRATE 50 UG/ML
INJECTION, SOLUTION INTRAMUSCULAR; INTRAVENOUS
Status: DISCONTINUED | OUTPATIENT
Start: 2017-07-06 | End: 2017-07-06

## 2017-07-06 RX ORDER — DEXAMETHASONE SODIUM PHOSPHATE 4 MG/ML
INJECTION, SOLUTION INTRA-ARTICULAR; INTRALESIONAL; INTRAMUSCULAR; INTRAVENOUS; SOFT TISSUE
Status: DISCONTINUED | OUTPATIENT
Start: 2017-07-06 | End: 2017-07-06

## 2017-07-06 RX ORDER — BUPIVACAINE HYDROCHLORIDE AND EPINEPHRINE 2.5; 5 MG/ML; UG/ML
INJECTION, SOLUTION EPIDURAL; INFILTRATION; INTRACAUDAL; PERINEURAL
Status: DISCONTINUED | OUTPATIENT
Start: 2017-07-06 | End: 2017-07-06 | Stop reason: HOSPADM

## 2017-07-06 RX ORDER — LABETALOL HYDROCHLORIDE 5 MG/ML
10 INJECTION, SOLUTION INTRAVENOUS ONCE
Status: COMPLETED | OUTPATIENT
Start: 2017-07-06 | End: 2017-07-06

## 2017-07-06 RX ORDER — LIDOCAINE HYDROCHLORIDE 10 MG/ML
1 INJECTION, SOLUTION EPIDURAL; INFILTRATION; INTRACAUDAL; PERINEURAL
Status: DISCONTINUED | OUTPATIENT
Start: 2017-07-06 | End: 2017-07-06 | Stop reason: HOSPADM

## 2017-07-06 RX ORDER — CEFAZOLIN SODIUM 2 G/50ML
2 SOLUTION INTRAVENOUS
Status: COMPLETED | OUTPATIENT
Start: 2017-07-06 | End: 2017-07-06

## 2017-07-06 RX ORDER — HYDROCODONE BITARTRATE AND ACETAMINOPHEN 5; 325 MG/1; MG/1
1 TABLET ORAL EVERY 4 HOURS PRN
Status: DISCONTINUED | OUTPATIENT
Start: 2017-07-06 | End: 2017-07-06 | Stop reason: HOSPADM

## 2017-07-06 RX ORDER — LIDOCAINE HCL/PF 100 MG/5ML
SYRINGE (ML) INTRAVENOUS
Status: DISCONTINUED | OUTPATIENT
Start: 2017-07-06 | End: 2017-07-06

## 2017-07-06 RX ORDER — HYDROCODONE BITARTRATE AND ACETAMINOPHEN 7.5; 325 MG/1; MG/1
1 TABLET ORAL EVERY 4 HOURS PRN
Qty: 40 TABLET | Refills: 0 | Status: SHIPPED | OUTPATIENT
Start: 2017-07-06 | End: 2017-07-16

## 2017-07-06 RX ORDER — HYDRALAZINE HYDROCHLORIDE 20 MG/ML
10 INJECTION INTRAMUSCULAR; INTRAVENOUS ONCE
Status: COMPLETED | OUTPATIENT
Start: 2017-07-06 | End: 2017-07-06

## 2017-07-06 RX ORDER — PROPOFOL 10 MG/ML
VIAL (ML) INTRAVENOUS
Status: DISCONTINUED | OUTPATIENT
Start: 2017-07-06 | End: 2017-07-06

## 2017-07-06 RX ORDER — ONDANSETRON HYDROCHLORIDE 2 MG/ML
INJECTION, SOLUTION INTRAMUSCULAR; INTRAVENOUS
Status: DISCONTINUED | OUTPATIENT
Start: 2017-07-06 | End: 2017-07-06

## 2017-07-06 RX ADMIN — FENTANYL CITRATE 100 MCG: 50 INJECTION INTRAMUSCULAR; INTRAVENOUS at 11:07

## 2017-07-06 RX ADMIN — LIDOCAINE HYDROCHLORIDE 10 MG: 10 INJECTION, SOLUTION EPIDURAL; INFILTRATION; INTRACAUDAL; PERINEURAL at 09:07

## 2017-07-06 RX ADMIN — SODIUM CHLORIDE, SODIUM LACTATE, POTASSIUM CHLORIDE, AND CALCIUM CHLORIDE: .6; .31; .03; .02 INJECTION, SOLUTION INTRAVENOUS at 11:07

## 2017-07-06 RX ADMIN — HYDROCODONE BITARTRATE AND ACETAMINOPHEN 1 TABLET: 5; 325 TABLET ORAL at 01:07

## 2017-07-06 RX ADMIN — SODIUM CHLORIDE, SODIUM LACTATE, POTASSIUM CHLORIDE, AND CALCIUM CHLORIDE 10 ML/HR: .6; .31; .03; .02 INJECTION, SOLUTION INTRAVENOUS at 09:07

## 2017-07-06 RX ADMIN — LIDOCAINE HYDROCHLORIDE 50 MG: 20 INJECTION, SOLUTION INTRAVENOUS at 11:07

## 2017-07-06 RX ADMIN — CEFAZOLIN SODIUM 2 G: 2 SOLUTION INTRAVENOUS at 11:07

## 2017-07-06 RX ADMIN — ONDANSETRON 4 MG: 2 INJECTION, SOLUTION INTRAMUSCULAR; INTRAVENOUS at 11:07

## 2017-07-06 RX ADMIN — KETOROLAC TROMETHAMINE 30 MG: 30 INJECTION, SOLUTION INTRAMUSCULAR; INTRAVENOUS at 12:07

## 2017-07-06 RX ADMIN — BUPIVACAINE HYDROCHLORIDE AND EPINEPHRINE BITARTRATE 30 ML: 2.5; .0091 INJECTION, SOLUTION EPIDURAL; INFILTRATION; INTRACAUDAL; PERINEURAL at 12:07

## 2017-07-06 RX ADMIN — LABETALOL HYDROCHLORIDE 10 MG: 5 INJECTION, SOLUTION INTRAVENOUS at 10:07

## 2017-07-06 RX ADMIN — HYDRALAZINE HYDROCHLORIDE 10 MG: 20 INJECTION INTRAMUSCULAR; INTRAVENOUS at 01:07

## 2017-07-06 RX ADMIN — MIDAZOLAM 2 MG: 1 INJECTION INTRAMUSCULAR; INTRAVENOUS at 11:07

## 2017-07-06 RX ADMIN — PROPOFOL 150 MG: 10 INJECTION, EMULSION INTRAVENOUS at 11:07

## 2017-07-06 RX ADMIN — DEXAMETHASONE SODIUM PHOSPHATE 4 MG: 4 INJECTION, SOLUTION INTRAMUSCULAR; INTRAVENOUS at 11:07

## 2017-07-06 NOTE — PLAN OF CARE
Vss, rina po fluids, adequate pain control, ambulates easily.  Back dressing clean dry intact, bret drain charged, minimal serosang drainage.States ready to go home.  Discharged from facility with family.
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
## 2017-07-06 NOTE — OP NOTE
Patient: Hernan Badillo     Date of Procedure: 7/6/2017    Procedure: Drainage of back hematoma    Surgeon: Jozef Gonzalez MD    Assistant: None    Pre-op Diagnosis: Hematoma [T14.8]     Post-op Diagnosis: Hematoma [T14.8]    Findings: hematoma/seroma    Specimen: none    EBL: 10 cc    Complications: None      Procedure in detail:    After appropriate consent was obtained, consent forms signed and questions answered, the operative site was marked and the patient was taken to the operating room.  The patient was positioned and general anesthesia was induced. Pre-operative antibiotic was administered. Time out procedure was then performed with the members of the surgical team. The operative field was then prepped and draped in the usual fashion.     A skin incision was made and the fluid collections entered. Loculations were broken up. Cavity was irrigated. 1/4 inch EZ was placed and secured. Incision was closed with 3-0 vicryl and 4-0 monocryl.    Steri-Strips and dressings were  applied.  The patient was then awakened, extubated, and transferred to the recovery room in stable condition.  The procedure was tolerated without complication.

## 2017-07-06 NOTE — INTERVAL H&P NOTE
The patient has been examined and the H&P has been reviewed:    I concur with the findings and no changes have occurred since H&P was written.    Anesthesia/Surgery risks, benefits and alternative options discussed and understood by patient/family.          Active Hospital Problems    Diagnosis  POA    Hematoma [T14.8]  Yes      Resolved Hospital Problems    Diagnosis Date Resolved POA   No resolved problems to display.

## 2017-07-06 NOTE — TRANSFER OF CARE
"Anesthesia Transfer of Care Note    Patient: Hernna Badillo    Procedure(s) Performed: Procedure(s) (LRB):  INCISION-DRAINAGE-HEMATOMA (Right)    Patient location: PACU    Anesthesia Type: general    Transport from OR: Transported from OR on 2-3 L/min O2 by NC with adequate spontaneous ventilation    Post pain: adequate analgesia    Post assessment: no apparent anesthetic complications and tolerated procedure well    Post vital signs: stable    Level of consciousness: awake, alert and confused (confused after versed administration in pre op)    Nausea/Vomiting: no nausea/vomiting    Complications: none    Transfer of care protocol was followed      Last vitals:   Visit Vitals  BP (!) 178/101   Pulse 78   Temp 36.8 °C (98.2 °F) (Other (see comments))   Resp 16   Ht 5' 9" (1.753 m)   Wt 81.2 kg (179 lb)   SpO2 95%   BMI 26.43 kg/m²     "

## 2017-07-06 NOTE — ANESTHESIA PREPROCEDURE EVALUATION
07/06/2017  Hernan Badillo is a 51 y.o., male.    Anesthesia Evaluation    I have reviewed the Patient Summary Reports.    I have reviewed the Nursing Notes.   I have reviewed the Medications.     Review of Systems  Anesthesia Hx:  No problems with previous Anesthesia    Social:  Smoker    Hematology/Oncology:  Hematology Normal   Oncology Normal     EENT/Dental:EENT/Dental Normal   Cardiovascular:   Hypertension, poorly controlled    Pulmonary:   COPD, moderate    Renal/:  Renal/ Normal     Hepatic/GI:  Hepatic/GI Normal    Musculoskeletal:  Musculoskeletal Normal    Neurological:  Neurology Normal    Endocrine:   Diabetes, type 2    Dermatological:  Skin Normal    Psych:  Psychiatric Normal           Physical Exam  General:  Well nourished    Airway/Jaw/Neck:  Airway Findings: Mouth Opening: Normal Tongue: Normal  General Airway Assessment: Adult  Mallampati: II  TM Distance: Normal, at least 6 cm        Eyes/Ears/Nose:  EYES/EARS/NOSE FINDINGS: Normal   Dental:  DENTAL FINDINGS: Normal   Chest/Lungs:  Chest/Lungs Findings: Normal Respiratory Rate, Decreased Breath Sounds Bilateral     Heart/Vascular:  Heart Findings: Rate: Normal  Rhythm: Regular Rhythm  Sounds: Normal  Heart murmur: negative    Abdomen:  Abdomen Findings: Normal    Musculoskeletal:  Musculoskeletal Findings: Normal    Mental Status:  Mental Status Findings: Normal        Anesthesia Plan  Type of Anesthesia, risks & benefits discussed:  Anesthesia Type:  general  Patient's Preference:   Intra-op Monitoring Plan: standard ASA monitors  Intra-op Monitoring Plan Comments:   Post Op Pain Control Plan: IV/PO Opioids PRN  Post Op Pain Control Plan Comments:   Induction:   IV  Beta Blocker:  Patient is not currently on a Beta-Blocker (No further documentation required).       Informed Consent: Patient understands risks and agrees with Anesthesia  plan.  Questions answered. Anesthesia consent signed with patient.  ASA Score: 3     Day of Surgery Review of History & Physical:    H&P update referred to the surgeon.         Ready For Surgery From Anesthesia Perspective.

## 2017-07-06 NOTE — DISCHARGE SUMMARY
Discharge Summary  General Surgery      Admit Date: 7/6/2017    Discharge Date :7/6/2017    Attending Physician: Jozef Newman     Discharge Physician: Jozef Newman    Discharged Condition: good    Discharge Diagnosis: Hematoma [T14.8]    Treatments/Procedures: Procedure(s) (LRB):  INCISION-DRAINAGE-HEMATOMA (Right)    Hospital Course: Uneventful; Discharged home from Recovery    Significant Diagnostic Studies: none    Disposition: Home or Self Care    Diet: Regular    Follow up: Office 10-14 days    Activity: No heavy lifting till seen in office.    Patient Instructions:   Current Discharge Medication List      START taking these medications    Details   hydrocodone-acetaminophen 7.5-325mg (NORCO) 7.5-325 mg per tablet Take 1 tablet by mouth every 4 (four) hours as needed for Pain.  Qty: 40 tablet, Refills: 0         CONTINUE these medications which have NOT CHANGED    Details   lisinopril (PRINIVIL,ZESTRIL) 20 MG tablet Take 1 tablet (20 mg total) by mouth once daily.  Qty: 30 tablet, Refills: 2      metformin (GLUCOPHAGE) 500 MG tablet Take 1 tablet (500 mg total) by mouth 2 (two) times daily with meals.  Qty: 60 tablet, Refills: 0      albuterol-ipratropium 2.5mg-0.5mg/3mL (DUO-NEB) 0.5 mg-3 mg(2.5 mg base)/3 mL nebulizer solution Take 3 mLs by nebulization every 6 (six) hours as needed for Wheezing or Shortness of Breath.  Qty: 25 vial, Refills: 3      diclofenac (VOLTAREN) 50 MG EC tablet Take 1 tablet (50 mg total) by mouth 2 (two) times daily.  Qty: 30 tablet, Refills: 0      nystatin-triamcinolone (MYCOLOG II) cream Apply topically 2 (two) times daily. Apply thin film to perigenital daily  Qty: 15 g, Refills: 1    Associated Diagnoses: Rash of genital area         STOP taking these medications       acetaminophen-codeine 300-30mg (TYLENOL #3) 300-30 mg Tab Comments:   Reason for Stopping:                 Discharge Procedure Orders  Diet general     Activity as tolerated     Call MD for:  temperature  >100.4     Call MD for:  persistent nausea and vomiting     Call MD for:  severe uncontrolled pain     Remove dressing in 48 hours

## 2017-07-06 NOTE — DISCHARGE INSTRUCTIONS
General Post Operative Instructions:    · Do not drink alcoholic beverages including beer for 24 hours or as long as you are on pain medicine.  · Do not drive a motor vehicle, operate machinery or power tools, or sign legal papers for 24 hours or as long as you are on pain medication.  · You may experience light-headedness, dizziness and sleepiness following surgery.  Please do not stay alone.  A responsible adult should be with you for this 24 hour period.  · Go home and rest.  · Progress slowly to a normal diet unless instructed.  Otherwise, begin with liquids, soup and crackers, advance to solid foods.  · Certain anesthetics and pain medications may produce nausea and vomiting.  If nausea becomes a problem, call your doctor.  · A nurse will be calling you sometime after surgery. Do not be alarmed. This is our way of finding out how you are doing.  · Several times every hour while you are awake, take 2-3 deep breaths and cough.  If you have had abdominal surgery, support your stomach with a pillow firmly. This will prevent pneumonia.  · Several times every hour while you are awake, pump and flex your feet 5-6 times and do foot circles. This will help prevent blood clots.  · Call your doctor for severe pain, bleeding, fever, or signs of infection (pain, swelling, redness, foul odor, drainage).      Discharge Instructions: After Your Surgery/Procedure  Youve just had surgery. During surgery you were given medicine called anesthesia to keep you relaxed and free of pain. After surgery you may have some pain or nausea. This is common. Here are some tips for feeling better and getting well after surgery.     Stay on schedule with your medication.   Going home  Your doctor or nurse will show you how to take care of yourself when you go home. He or she will also answer your questions. Have an adult family member or friend drive you home.      For your safety we recommend these precaution for the first 24 hours after your  "procedure:  · Do not drive or use heavy equipment.  · Do not make important decisions or sign legal papers.  · Do not drink alcohol.  · Have someone stay with you, if needed. He or she can watch for problems and help keep you safe.  · Your concentration, balance, coordination, and judgement may be impaired for many hours after anesthesia.  Use caution when ambulating or standing up.     · You may feel weak and "washed out" after anesthesia and surgery.      Subtle residual effects of general anesthesia or sedation with regional / local anesthesia can last more than 24 hours.  Rest for the remainder of the day or longer if your Doctor/Surgeon has advised you to do so.  Although you may feel normal within the first 24 hours, your reflexes and mental ability may be impaired without you realizing it.  You may feel dizzy, lightheaded or sleepy for 24 hours or longer.      Be sure to go to all follow-up visits with your doctor. And rest after your surgery for as long as your doctor tells you to.  Coping with pain  If you have pain after surgery, pain medicine will help you feel better. Take it as told, before pain becomes severe. Also, ask your doctor or pharmacist about other ways to control pain. This might be with heat, ice, or relaxation. And follow any other instructions your surgeon or nurse gives you.  Tips for taking pain medicine  To get the best relief possible, remember these points:  · Pain medicines can upset your stomach. Taking them with a little food may help.  · Most pain relievers taken by mouth need at least 20 to 30 minutes to start to work.  · Taking medicine on a schedule can help you remember to take it. Try to time your medicine so that you can take it before starting an activity. This might be before you get dressed, go for a walk, or sit down for dinner.  · Constipation is a common side effect of pain medicines. Call your doctor before taking any medicines such as laxatives or stool softeners to " help ease constipation. Also ask if you should skip any foods. Drinking lots of fluids and eating foods such as fruits and vegetables that are high in fiber can also help. Remember, do not take laxatives unless your surgeon has prescribed them.  · Drinking alcohol and taking pain medicine can cause dizziness and slow your breathing. It can even be deadly. Do not drink alcohol while taking pain medicine.  · Pain medicine can make you react more slowly to things. Do not drive or run machinery while taking pain medicine.  Your health care provider may tell you to take acetaminophen to help ease your pain. Ask him or her how much you are supposed to take each day. Acetaminophen or other pain relievers may interact with your prescription medicines or other over-the-counter (OTC) drugs. Some prescription medicines have acetaminophen and other ingredients. Using both prescription and OTC acetaminophen for pain can cause you to overdose. Read the labels on your OTC medicines with care. This will help you to clearly know the list of ingredients, how much to take, and any warnings. It may also help you not take too much acetaminophen. If you have questions or do not understand the information, ask your pharmacist or health care provider to explain it to you before you take the OTC medicine.  Managing nausea  Some people have an upset stomach after surgery. This is often because of anesthesia, pain, or pain medicine, or the stress of surgery. These tips will help you handle nausea and eat healthy foods as you get better. If you were on a special food plan before surgery, ask your doctor if you should follow it while you get better. These tips may help:  · Do not push yourself to eat. Your body will tell you when to eat and how much.  · Start off with clear liquids and soup. They are easier to digest.  · Next try semi-solid foods, such as mashed potatoes, applesauce, and gelatin, as you feel ready.  · Slowly move to solid  foods. Dont eat fatty, rich, or spicy foods at first.  · Do not force yourself to have 3 large meals a day. Instead eat smaller amounts more often.  · Take pain medicines with a small amount of solid food, such as crackers or toast, to avoid nausea.     Call your surgeon if  · You still have pain an hour after taking medicine. The medicine may not be strong enough.  · You feel too sleepy, dizzy, or groggy. The medicine may be too strong.  · You have side effects like nausea, vomiting, or skin changes, such as rash, itching, or hives.       If you have obstructive sleep apnea  You were given anesthesia medicine during surgery to keep you comfortable and free of pain. After surgery, you may have more apnea spells because of this medicine and other medicines you were given. The spells may last longer than usual.   At home:  · Keep using the continuous positive airway pressure (CPAP) device when you sleep. Unless your health care provider tells you not to, use it when you sleep, day or night. CPAP is a common device used to treat obstructive sleep apnea.  · Talk with your provider before taking any pain medicine, muscle relaxants, or sedatives. Your provider will tell you about the possible dangers of taking these medicines.  © 3491-4883 [employer]. [address] [place] [address], [place] [address]. All rights reserved. This information is not intended as a substitute for professional medical care. Always follow your healthcare professional's instructions.      Using Opioids for Pain Management     Your doctor has given instructions for you to take an opioid.  This is a drug for bad pain.  It helps control pain without causing bleeding and kidney problems.  Common opioid names are morphine, hydromorphone, oxycodone, and methadone. These drugs are called narcotics.    There are several safety concerns you need to know.     · It is against the law to give or sell this drug to another person.  You must keep this  medicine safely locked.    · You may have side effects from taking this medication.  These include nausea, itching, sweating, sleepiness, a change in your ability to breathe, and depression.  · Do not take alcohol or sleeping pills opioids.    · Long-term opoid use may no longer giver you relief from pain.  It can cause you stomach pain, mental anxiety, and headaches.  Long-term opoid use can potentially lead to unlawful street drug abuse and reduce your ability to stay employed.    · Your body may become opioid tolerant if you need to take more to get relief.    · You must stop taking opioids if you begin having more pain as a result of the medicine.    · Opioid withdrawal occurs when you have to stop taking the drug.  It can cause you to have nausea, vomiting, diarrhea, stomach pain, anxiety, and dilated pupils in your eyes. This condition means you are opioid dependent.    · Addiction is a drug induced brain disease. It means there are changes in how your brain is working.  Children, teens, and young adults under 25 years old are more likely to get addicted to opioids.      · Addiction can happen with repeated opioid use.  It does not happen with short-term use of two weeks or less.       For more information, please speak with your doctor or pharmacist.      We hope your stay was comfortable as you heal now, mend and rest.    For we have enjoyed taking care of you by giving your our best.    And as you get better, by regaining your health and strength;   We count it as a privilege to have served you and hope your time at [facility] was well spent.      Thank  You!!!

## 2017-07-10 DIAGNOSIS — E11.9 TYPE 2 DIABETES, HBA1C GOAL < 7%: Primary | ICD-10-CM

## 2017-07-11 ENCOUNTER — DOCUMENTATION ONLY (OUTPATIENT)
Dept: FAMILY MEDICINE | Facility: CLINIC | Age: 52
End: 2017-07-11

## 2017-07-11 NOTE — PROGRESS NOTES
Pre-Visit Chart Review  For Appointment Scheduled on     7/12/2017  Health Maintenance Due   Topic Date Due    Eye Exam  10/15/1975    TETANUS VACCINE  10/15/1983    Colonoscopy  10/15/2015    Foot Exam  07/06/2017

## 2017-07-12 ENCOUNTER — OFFICE VISIT (OUTPATIENT)
Dept: FAMILY MEDICINE | Facility: CLINIC | Age: 52
End: 2017-07-12
Payer: MEDICARE

## 2017-07-12 VITALS
TEMPERATURE: 98 F | WEIGHT: 177.69 LBS | BODY MASS INDEX: 26.32 KG/M2 | HEIGHT: 69 IN | HEART RATE: 86 BPM | DIASTOLIC BLOOD PRESSURE: 110 MMHG | SYSTOLIC BLOOD PRESSURE: 180 MMHG

## 2017-07-12 DIAGNOSIS — Z91.199 NON-COMPLIANCE: ICD-10-CM

## 2017-07-12 DIAGNOSIS — F10.10 ALCOHOL ABUSE: ICD-10-CM

## 2017-07-12 DIAGNOSIS — S21.201D: ICD-10-CM

## 2017-07-12 DIAGNOSIS — I10 HYPERTENSION, UNCONTROLLED: Primary | ICD-10-CM

## 2017-07-12 PROCEDURE — 4010F ACE/ARB THERAPY RXD/TAKEN: CPT | Mod: ,,, | Performed by: PHYSICIAN ASSISTANT

## 2017-07-12 PROCEDURE — 99214 OFFICE O/P EST MOD 30 MIN: CPT | Mod: S$PBB,,, | Performed by: PHYSICIAN ASSISTANT

## 2017-07-12 PROCEDURE — 3046F HEMOGLOBIN A1C LEVEL >9.0%: CPT | Mod: ,,, | Performed by: PHYSICIAN ASSISTANT

## 2017-07-12 PROCEDURE — 99999 PR PBB SHADOW E&M-EST. PATIENT-LVL III: CPT | Mod: PBBFAC,,, | Performed by: PHYSICIAN ASSISTANT

## 2017-07-12 PROCEDURE — 99213 OFFICE O/P EST LOW 20 MIN: CPT | Mod: PBBFAC,PO | Performed by: PHYSICIAN ASSISTANT

## 2017-07-12 RX ORDER — CLONIDINE HYDROCHLORIDE 0.1 MG/1
0.1 TABLET ORAL
Status: COMPLETED | OUTPATIENT
Start: 2017-07-12 | End: 2017-07-12

## 2017-07-12 RX ADMIN — CLONIDINE HYDROCHLORIDE 0.1 MG: 0.1 TABLET ORAL at 09:07

## 2017-07-12 NOTE — PROGRESS NOTES
Subjective:       Patient ID: Hernan Badillo is a 51 y.o. male.    Chief Complaint: Follow-up    HPI   Patient is a 51 year old AA male presenting to the clinic for 1 week f/u from [facility] for several uncontrolled conditions for which patient has not done anything about. Patient was given refill on his lisinopril for which he reports he has not been taking & has been drinking. He reports he is drinking to help with his pain from recent I & D of hematoma per Dr. Newman. He still has a drain in place which is draining bright red blood. He does not have any follow-up scheduled. He reports hydrocodone pain medication is not working.     He also was found to have A1C 9.4. He is not taking metformin. He is not taking any of his medications except his pain medication occasionally .  Review of Systems   Constitutional: Negative for activity change, appetite change, chills, fatigue and fever.   HENT: Negative for congestion, ear pain, postnasal drip, rhinorrhea and sinus pressure.    Respiratory: Negative for cough, shortness of breath and wheezing.    Cardiovascular: Negative for chest pain and palpitations.   Gastrointestinal: Negative for abdominal pain, constipation, diarrhea, nausea and vomiting.   Genitourinary: Negative for frequency, hematuria and urgency.   Musculoskeletal: Negative for back pain and gait problem.   Skin: Positive for wound. Negative for rash.   Neurological: Negative for syncope, weakness and headaches.   Psychiatric/Behavioral: Negative for agitation and confusion.       Objective:      Physical Exam   Constitutional: Vital signs are normal. He appears well-developed and well-nourished. No distress.   Eyes: Lids are normal.   Cardiovascular: Normal rate, regular rhythm, S1 normal, S2 normal and normal heart sounds.  Exam reveals no gallop.    No murmur heard.  Pulses:       Radial pulses are 2+ on the right side, and 2+ on the left side.   <2sec cap refill fingers bilat     Pulmonary/Chest:  Effort normal and breath sounds normal. No respiratory distress. He has no wheezes. He has no rhonchi.   Skin: Skin is warm and dry. He is not diaphoretic.   Appropriate skin turgor   Psychiatric: He has a normal mood and affect. His speech is normal and behavior is normal. Judgment and thought content normal. Cognition and memory are normal.       Assessment:       1. Hypertension, uncontrolled    2. Wound, open, back, right, subsequent encounter    3. Uncontrolled type 2 diabetes mellitus with complication, without long-term current use of insulin    4. Non-compliance    5. Alcohol abuse        Plan:   Hernan was seen today for follow-up.    Diagnoses and all orders for this visit:    Hypertension, uncontrolled  -     cloNIDine tablet 0.1 mg; Take 1 tablet (0.1 mg total) by mouth one time.  Directed to start Lisinopril 20mg daily as he is not currently taking  2 week f/u extedneed scheduled       Wound, open, back, right, subsequent encounter  -     Ambulatory consult to General Surgery    Uncontrolled type 2 diabetes mellitus with complication, without long-term current use of insulin  -     Ambulatory Referral to Diabetes Education  He needs to take his metformin. We had a long discussion regarding effects of uncontrolled diabetes.  He states he will be compliant.    Non-compliance    Alcohol abuse    Patient readiness: acceptance and barriers:none    During the course of the visit the patient was educated and counseled about the following:     Diabetes:  Discussed general issues about diabetes pathophysiology and management.  Restarted metformin; see  medication orders.  Diabetes educator referral.  Hypertension:   Medication: continue lisinopril 20mg daily.    Goals: Diabetes: Maintain Hemoglobin A1C below 7 and Hypertension: Reduce Blood Pressure    Did patient meet goals/outcomes: No    The following self management tools provided: declined    Patient Instructions (the written plan) was given to the  patient/family.     Time spent with patient: 30 minutes     2 week f/u extended.

## 2017-07-13 ENCOUNTER — OFFICE VISIT (OUTPATIENT)
Dept: SURGERY | Facility: CLINIC | Age: 52
End: 2017-07-13
Payer: MEDICARE

## 2017-07-13 VITALS — BODY MASS INDEX: 26.57 KG/M2 | TEMPERATURE: 98 F | WEIGHT: 179.88 LBS

## 2017-07-13 DIAGNOSIS — T14.8XXA HEMATOMA: Primary | ICD-10-CM

## 2017-07-13 PROCEDURE — 99999 PR PBB SHADOW E&M-EST. PATIENT-LVL II: CPT | Mod: PBBFAC,,, | Performed by: SURGERY

## 2017-07-13 PROCEDURE — 99024 POSTOP FOLLOW-UP VISIT: CPT | Mod: ,,, | Performed by: SURGERY

## 2017-07-13 PROCEDURE — 99212 OFFICE O/P EST SF 10 MIN: CPT | Mod: PBBFAC,PO | Performed by: SURGERY

## 2017-07-13 NOTE — LETTER
July 13, 2017      Twyla Dodd MD  [address]  [place] [address]           [facility] - General Surgery  [address]  [place] [address]  Phone: [phone]          Patient: Hernan Badillo   MR Number: [id number]   YOB: 1965   Date of Visit: 7/13/2017       Dear Dr. Twyla Dodd:    Thank you for referring Hernan Badillo to me for evaluation. Attached you will find relevant portions of my assessment and plan of care.    If you have questions, please do not hesitate to call me. I look forward to following Hernan Badillo along with you.    Sincerely,    Jozef Newman MD    Enclosure  CC:  No Recipients    If you would like to receive this communication electronically, please contact [email or web address] or [phone] to request more information on [facility] access.    For providers and/or their staff who would like to refer a patient to [facility], please contact us through our one-stop-shop provider referral line, [facility] , at [phone].    If you feel you have received this communication in error or would no longer like to receive these types of communications, please e-mail [email or web address]

## 2017-07-13 NOTE — PROGRESS NOTES
POST-OP NOTE    PROCEDURE: Incision and drainage of flank hematoma.    COMPLAINTS: None.    EXAM: Incision well approximated. No drainage. No infection. Still > 100 cc of serous fluid / day drainage.      IMPRESSION: Doing well.    PLAN: FU next week for drain removal.

## 2017-07-19 ENCOUNTER — OFFICE VISIT (OUTPATIENT)
Dept: SURGERY | Facility: CLINIC | Age: 52
End: 2017-07-19
Payer: MEDICARE

## 2017-07-19 DIAGNOSIS — T14.8XXA HEMATOMA: Primary | ICD-10-CM

## 2017-07-19 PROCEDURE — 99024 POSTOP FOLLOW-UP VISIT: CPT | Mod: ,,, | Performed by: SURGERY

## 2017-07-19 PROCEDURE — 99999 PR PBB SHADOW E&M-EST. PATIENT-LVL I: CPT | Mod: PBBFAC,,, | Performed by: SURGERY

## 2017-07-19 PROCEDURE — 99211 OFF/OP EST MAY X REQ PHY/QHP: CPT | Mod: PBBFAC,PO | Performed by: SURGERY

## 2017-07-19 NOTE — PROGRESS NOTES
POST-OP NOTE    PROCEDURE: Drainage of right back hematoma    COMPLAINTS: None.    EXAM: Incision well approximated. No drainage. No infection.  Minimal EZ output and last 18 hours.      IMPRESSION: Seroma resolving.    PLAN: FU as needed.  Remove EZ.

## 2017-07-25 ENCOUNTER — DOCUMENTATION ONLY (OUTPATIENT)
Dept: FAMILY MEDICINE | Facility: CLINIC | Age: 52
End: 2017-07-25

## 2017-07-25 NOTE — PROGRESS NOTES
Pre-Visit Chart Review  For Appointment Scheduled on 07/26/17    Health Maintenance Due   Topic Date Due    Eye Exam  10/15/1975    TETANUS VACCINE  10/15/1983    Colonoscopy  10/15/2015    Foot Exam  07/06/2017

## 2017-07-26 ENCOUNTER — OFFICE VISIT (OUTPATIENT)
Dept: SURGERY | Facility: CLINIC | Age: 52
End: 2017-07-26
Payer: MEDICARE

## 2017-07-26 VITALS — BODY MASS INDEX: 20.9 KG/M2 | WEIGHT: 141.56 LBS

## 2017-07-26 DIAGNOSIS — T14.8XXA HEMATOMA: Primary | ICD-10-CM

## 2017-07-26 PROCEDURE — 99212 OFFICE O/P EST SF 10 MIN: CPT | Mod: PBBFAC,PO | Performed by: SURGERY

## 2017-07-26 PROCEDURE — 99999 PR PBB SHADOW E&M-EST. PATIENT-LVL II: CPT | Mod: PBBFAC,,, | Performed by: SURGERY

## 2017-07-26 PROCEDURE — 99024 POSTOP FOLLOW-UP VISIT: CPT | Mod: ,,, | Performed by: SURGERY

## 2017-08-02 ENCOUNTER — HOSPITAL ENCOUNTER (OUTPATIENT)
Dept: RADIOLOGY | Facility: HOSPITAL | Age: 52
Discharge: HOME | End: 2017-08-02
Attending: SURGERY
Payer: MEDICARE

## 2017-08-02 DIAGNOSIS — Z98.890 S/P EVACUATION OF HEMATOMA: Primary | ICD-10-CM

## 2017-08-02 DIAGNOSIS — Z98.890 S/P EVACUATION OF HEMATOMA: ICD-10-CM

## 2017-08-02 PROCEDURE — 25500020 PHARM REV CODE 255

## 2017-08-02 PROCEDURE — 74178 CT ABD&PLV WO CNTR FLWD CNTR: CPT | Mod: TC

## 2017-08-02 PROCEDURE — 25500020 PHARM REV CODE 255: Performed by: SURGERY

## 2017-08-02 PROCEDURE — 74178 CT ABD&PLV WO CNTR FLWD CNTR: CPT | Mod: 26,,, | Performed by: RADIOLOGY

## 2017-08-02 RX ADMIN — IOHEXOL 75 ML: 350 INJECTION, SOLUTION INTRAVENOUS at 07:08

## 2017-08-02 RX ADMIN — IOHEXOL 30 ML: 350 INJECTION, SOLUTION INTRAVENOUS at 07:08

## 2017-11-20 ENCOUNTER — OFFICE VISIT (OUTPATIENT)
Dept: SURGERY | Facility: CLINIC | Age: 52
End: 2017-11-20
Payer: MEDICARE

## 2017-11-20 VITALS
HEART RATE: 99 BPM | DIASTOLIC BLOOD PRESSURE: 111 MMHG | BODY MASS INDEX: 25.65 KG/M2 | SYSTOLIC BLOOD PRESSURE: 189 MMHG | HEIGHT: 69 IN | WEIGHT: 173.19 LBS | TEMPERATURE: 100 F

## 2017-11-20 DIAGNOSIS — T79.2XXA SEROMA, POST-TRAUMATIC: Primary | ICD-10-CM

## 2017-11-20 PROCEDURE — 99999 PR PBB SHADOW E&M-EST. PATIENT-LVL III: CPT | Mod: PBBFAC,,, | Performed by: SURGERY

## 2017-11-20 PROCEDURE — 99213 OFFICE O/P EST LOW 20 MIN: CPT | Mod: S$PBB,,, | Performed by: SURGERY

## 2017-11-20 PROCEDURE — 99213 OFFICE O/P EST LOW 20 MIN: CPT | Mod: PBBFAC,PO | Performed by: SURGERY

## 2017-11-20 NOTE — PROGRESS NOTES
Subjective:       Patient ID: Hernan Badillo is a 52 y.o. male.    Chief Complaint: Lymphadenopathy (Swelling on rt side)      HPI 52-year-old male known to me from her previous drainage of an abdominal wall hematoma.  That was done back in July.  He presents today as he has a few times in the past with complaints of swelling.  There is some swelling at the site.  It is much better than prior to the drainage.  There is no evidence of infection.  He denies any fever or chills.    Past Medical History:   Diagnosis Date    COPD (chronic obstructive pulmonary disease)     Diabetes mellitus, type 2     Hypertension      Past Surgical History:   Procedure Laterality Date    DENTAL SURGERY      HERNIA REPAIR      left inguinal    incision and drainiage           Current Outpatient Prescriptions:     albuterol-ipratropium 2.5mg-0.5mg/3mL (DUO-NEB) 0.5 mg-3 mg(2.5 mg base)/3 mL nebulizer solution, Take 3 mLs by nebulization every 6 (six) hours as needed for Wheezing or Shortness of Breath., Disp: 25 vial, Rfl: 3    diclofenac (VOLTAREN) 50 MG EC tablet, Take 1 tablet (50 mg total) by mouth 2 (two) times daily., Disp: 30 tablet, Rfl: 0    lisinopril (PRINIVIL,ZESTRIL) 20 MG tablet, Take 1 tablet (20 mg total) by mouth once daily., Disp: 30 tablet, Rfl: 2    metformin (GLUCOPHAGE) 500 MG tablet, Take 1 tablet (500 mg total) by mouth 2 (two) times daily with meals., Disp: 60 tablet, Rfl: 0    nystatin-triamcinolone (MYCOLOG II) cream, Apply topically 2 (two) times daily. Apply thin film to perigenital daily, Disp: 15 g, Rfl: 1    Review of patient's allergies indicates:  No Known Allergies    Family History   Problem Relation Age of Onset    Heart attack Father 80    Heart disease Father     Drug abuse Father     Cancer Maternal Grandmother      colon     Social History     Social History    Marital status: Single     Spouse name: N/A    Number of children: 1    Years of education: N/A     Occupational History     demolition      Social History Main Topics    Smoking status: Current Every Day Smoker     Packs/day: 2.00     Years: 30.00     Types: Cigarettes    Smokeless tobacco: Never Used    Alcohol use 0.6 oz/week     1 Shots of liquor per week      Comment: 1/2 pint 2 x per week     Drug use: No    Sexual activity: Yes     Partners: Female     Birth control/ protection: None      Comment: No history of STDs.     Other Topics Concern    Not on file     Social History Narrative    The patient does not exercise regularly ().    Rates diet as fair.    He is satisfied with weight.                   Review of Systems   Constitutional: Negative for chills, fatigue, fever and unexpected weight change.   HENT: Negative for congestion, sore throat, trouble swallowing and voice change.    Eyes: Negative for redness and visual disturbance.   Respiratory: Negative for cough, shortness of breath and wheezing.    Cardiovascular: Negative for chest pain and palpitations.   Gastrointestinal: Negative for abdominal pain, blood in stool, nausea and vomiting.        Flank pain   Genitourinary: Negative for dysuria, frequency, hematuria and urgency.   Musculoskeletal: Negative for arthralgias, myalgias and neck pain.   Skin: Negative for rash and wound.   Allergic/Immunologic: Negative.    Neurological: Negative for tremors, seizures, weakness and headaches.   Hematological: Does not bruise/bleed easily.   Psychiatric/Behavioral: Negative for confusion and dysphoric mood. The patient is not nervous/anxious.      Objective:     Physical Exam   Constitutional: He is oriented to person, place, and time. He appears well-developed and well-nourished. No distress.   HENT:   Head: Normocephalic and atraumatic.   Eyes: Conjunctivae and EOM are normal. Pupils are equal, round, and reactive to light.   Neck: Normal range of motion. Neck supple.   Cardiovascular: Normal rate, regular rhythm, normal heart sounds and intact distal pulses.    No  murmur heard.  Pulmonary/Chest: Effort normal and breath sounds normal. No respiratory distress. He has no wheezes. He has no rales.   Abdominal: Soft. Bowel sounds are normal. He exhibits no distension. There is no tenderness. No hernia.   The right posterior flank just above the pelvis there is an area of swelling.  It is slightly larger than the left side.  There is no evidence of infection.  There is no cellulitis.  The site is minimally tender.   Musculoskeletal: Normal range of motion. He exhibits no edema or tenderness.   Neurological: He is alert and oriented to person, place, and time. No cranial nerve deficit.   Skin: Skin is warm and dry. No rash noted. No erythema.   Psychiatric: He has a normal mood and affect. His behavior is normal. Judgment normal.     Assessment:     Encounter Diagnosis   Name Primary?    Seroma, post-traumatic Yes       Plan:      1.  We'll order ultrasound to evaluate for drainable fluid collection.

## 2017-11-22 ENCOUNTER — HOSPITAL ENCOUNTER (OUTPATIENT)
Dept: RADIOLOGY | Facility: HOSPITAL | Age: 52
Discharge: HOME | End: 2017-11-22
Attending: SURGERY
Payer: MEDICARE

## 2017-11-22 DIAGNOSIS — T79.2XXA SEROMA, POST-TRAUMATIC: ICD-10-CM

## 2017-11-22 PROCEDURE — 76705 ECHO EXAM OF ABDOMEN: CPT | Mod: 26,,, | Performed by: RADIOLOGY

## 2017-11-22 PROCEDURE — 76705 ECHO EXAM OF ABDOMEN: CPT | Mod: TC

## 2018-05-16 ENCOUNTER — HOSPITAL ENCOUNTER (EMERGENCY)
Facility: HOSPITAL | Age: 53
Discharge: HOME | End: 2018-05-16
Attending: EMERGENCY MEDICINE
Payer: MEDICARE

## 2018-05-16 VITALS
RESPIRATION RATE: 18 BRPM | SYSTOLIC BLOOD PRESSURE: 165 MMHG | HEART RATE: 97 BPM | BODY MASS INDEX: 25.44 KG/M2 | TEMPERATURE: 99 F | HEIGHT: 69 IN | OXYGEN SATURATION: 91 % | WEIGHT: 171.75 LBS | DIASTOLIC BLOOD PRESSURE: 95 MMHG

## 2018-05-16 DIAGNOSIS — J18.9 PNEUMONIA OF RIGHT LOWER LOBE DUE TO INFECTIOUS ORGANISM: Primary | ICD-10-CM

## 2018-05-16 DIAGNOSIS — R05.9 COUGH: ICD-10-CM

## 2018-05-16 PROCEDURE — 94640 AIRWAY INHALATION TREATMENT: CPT

## 2018-05-16 PROCEDURE — 25000003 PHARM REV CODE 250: Performed by: EMERGENCY MEDICINE

## 2018-05-16 PROCEDURE — 25000242 PHARM REV CODE 250 ALT 637 W/ HCPCS: Performed by: EMERGENCY MEDICINE

## 2018-05-16 PROCEDURE — 94761 N-INVAS EAR/PLS OXIMETRY MLT: CPT

## 2018-05-16 PROCEDURE — 99284 EMERGENCY DEPT VISIT MOD MDM: CPT | Mod: 25

## 2018-05-16 RX ORDER — IPRATROPIUM BROMIDE AND ALBUTEROL SULFATE 2.5; .5 MG/3ML; MG/3ML
3 SOLUTION RESPIRATORY (INHALATION)
Status: COMPLETED | OUTPATIENT
Start: 2018-05-16 | End: 2018-05-16

## 2018-05-16 RX ORDER — MOXIFLOXACIN HYDROCHLORIDE 400 MG/1
400 TABLET ORAL
Status: COMPLETED | OUTPATIENT
Start: 2018-05-16 | End: 2018-05-16

## 2018-05-16 RX ORDER — MOXIFLOXACIN HYDROCHLORIDE 400 MG/1
TABLET ORAL
Status: DISCONTINUED
Start: 2018-05-16 | End: 2018-05-16 | Stop reason: HOSPADM

## 2018-05-16 RX ORDER — LEVOFLOXACIN 500 MG/1
500 TABLET, FILM COATED ORAL DAILY
Qty: 5 TABLET | Refills: 0 | Status: SHIPPED | OUTPATIENT
Start: 2018-05-16 | End: 2018-05-21

## 2018-05-16 RX ADMIN — IPRATROPIUM BROMIDE AND ALBUTEROL SULFATE 3 ML: .5; 3 SOLUTION RESPIRATORY (INHALATION) at 06:05

## 2018-05-16 RX ADMIN — MOXIFLOXACIN HYDROCHLORIDE 400 MG: 400 TABLET, FILM COATED ORAL at 07:05

## 2018-05-16 NOTE — ED NOTES
Pt presents to ER for evaluation of chest congestion. Pt states he has been coughing up clear phlegm x2 days. Coarse lung sounds suyapa, 95% on RA.  Denies fever or SOB.

## 2018-05-16 NOTE — ED PROVIDER NOTES
Encounter Date: 5/16/2018       History     Chief Complaint   Patient presents with    Chest Congestion     Started 2 days ago      Chief complaint:  Cough    HPI:  52-year-old male with a history of COPD presents with a 3 day history of a productive cough.  He denies any fever or chest pain.  He continues to smoke 2 packs of cigarettes per day.  Past medical history is also significant for hypertension and diabetes.          Review of patient's allergies indicates:  No Known Allergies  Past Medical History:   Diagnosis Date    COPD (chronic obstructive pulmonary disease)     Diabetes mellitus, type 2     Hypertension      Past Surgical History:   Procedure Laterality Date    DENTAL SURGERY      HERNIA REPAIR      left inguinal    incision and drainiage       Family History   Problem Relation Age of Onset    Heart attack Father 80    Heart disease Father     Drug abuse Father     Cancer Maternal Grandmother         colon     Social History   Substance Use Topics    Smoking status: Current Every Day Smoker     Packs/day: 2.00     Years: 30.00     Types: Cigarettes    Smokeless tobacco: Never Used    Alcohol use 0.6 oz/week     1 Shots of liquor per week      Comment: 1/2 pint 2 x per week      Review of Systems   Constitutional: Negative for activity change, appetite change, chills, fatigue and fever.   Eyes: Negative for visual disturbance.   Respiratory: Positive for cough. Negative for apnea and shortness of breath.    Cardiovascular: Negative for chest pain and palpitations.   Gastrointestinal: Negative for abdominal distention and abdominal pain.   Genitourinary: Negative for difficulty urinating.   Musculoskeletal: Negative for neck pain.   Skin: Negative for pallor and rash.   Neurological: Negative for headaches.   Hematological: Does not bruise/bleed easily.   Psychiatric/Behavioral: Negative for agitation.       Physical Exam     Initial Vitals [05/16/18 0541]   BP Pulse Resp Temp SpO2   (!)  198/101 95 (!) 24 98.8 °F (37.1 °C) 95 %      MAP       133.33         Physical Exam    Nursing note and vitals reviewed.  Constitutional: He appears well-developed and well-nourished.   HENT:   Head: Normocephalic and atraumatic.   Eyes: Conjunctivae are normal.   Neck: Normal range of motion. Neck supple.   Cardiovascular: Normal rate, regular rhythm and normal heart sounds. Exam reveals no gallop and no friction rub.    No murmur heard.  Pulmonary/Chest: Breath sounds normal. No respiratory distress. He has no wheezes. He has no rhonchi. He has no rales.   Abdominal: Soft. He exhibits no distension. There is no tenderness.   Musculoskeletal: Normal range of motion.   Neurological: He is alert and oriented to person, place, and time.   Skin: Skin is warm and dry.   Psychiatric: He has a normal mood and affect.         ED Course   Procedures  Labs Reviewed - No data to display          Medical Decision Making:   Independently Interpreted Test(s):   I have ordered and independently interpreted X-rays - see summary below.       <> Summary of X-Ray Reading(s): Chest x-ray independently interpreted by me demonstrates a right lower lobe infiltrate.  ED Management:  52-year-old male with a history of COPD who continues to smoke presents with a 3 day history of productive cough.  X-ray reveals a right lower lobe infiltrate consistent with pneumonia.  He will be treated initially with moxifloxacin here and given a prescription for Levaquin.  He has no hypoxia or tachypnea and does not mandate admission at this time.                      Clinical Impression:   The primary encounter diagnosis was Pneumonia of right lower lobe due to infectious organism. A diagnosis of Cough was also pertinent to this visit.                           Joao Mead III, MD  05/16/18 0690

## 2018-08-01 ENCOUNTER — OFFICE VISIT (OUTPATIENT)
Dept: SURGERY | Facility: CLINIC | Age: 53
End: 2018-08-01
Payer: MEDICARE

## 2018-08-01 VITALS
HEART RATE: 79 BPM | BODY MASS INDEX: 26.48 KG/M2 | HEIGHT: 69 IN | DIASTOLIC BLOOD PRESSURE: 94 MMHG | WEIGHT: 178.81 LBS | SYSTOLIC BLOOD PRESSURE: 162 MMHG

## 2018-08-01 DIAGNOSIS — R10.84 GENERALIZED ABDOMINAL PAIN: ICD-10-CM

## 2018-08-01 DIAGNOSIS — R10.9 ABDOMINAL PAIN, UNSPECIFIED ABDOMINAL LOCATION: Primary | ICD-10-CM

## 2018-08-01 PROCEDURE — 99213 OFFICE O/P EST LOW 20 MIN: CPT | Mod: S$PBB,,, | Performed by: SURGERY

## 2018-08-01 PROCEDURE — 99213 OFFICE O/P EST LOW 20 MIN: CPT | Mod: PBBFAC,PO | Performed by: SURGERY

## 2018-08-01 PROCEDURE — 99999 PR PBB SHADOW E&M-EST. PATIENT-LVL III: CPT | Mod: PBBFAC,,, | Performed by: SURGERY

## 2018-08-01 NOTE — PROGRESS NOTES
Subjective:       Patient ID: Hernan Badillo is a 52 y.o. male.    Chief Complaint: Pain (right side pain, swelling)      HPI patient known to me from a previous drainage of a right posterior flank hematoma.  He complains of some swelling in the area. He says he is not having much pain. On follow-up last year we ultrasound of the area and there was no fluid collection.  There is some enlargement of the side of his back however.  He has not had any fever or chills.  There is no evidence of infection.    Past Medical History:   Diagnosis Date    COPD (chronic obstructive pulmonary disease)     Diabetes mellitus, type 2     Hypertension      Past Surgical History:   Procedure Laterality Date    DENTAL SURGERY      HERNIA REPAIR      left inguinal    incision and drainiage           Current Outpatient Prescriptions:     albuterol-ipratropium 2.5mg-0.5mg/3mL (DUO-NEB) 0.5 mg-3 mg(2.5 mg base)/3 mL nebulizer solution, Take 3 mLs by nebulization every 6 (six) hours as needed for Wheezing or Shortness of Breath., Disp: 25 vial, Rfl: 3    lisinopril (PRINIVIL,ZESTRIL) 20 MG tablet, Take 1 tablet (20 mg total) by mouth once daily., Disp: 30 tablet, Rfl: 2    metformin (GLUCOPHAGE) 500 MG tablet, Take 1 tablet (500 mg total) by mouth 2 (two) times daily with meals., Disp: 60 tablet, Rfl: 0    nystatin-triamcinolone (MYCOLOG II) cream, Apply topically 2 (two) times daily. Apply thin film to perigenital daily, Disp: 15 g, Rfl: 1    Review of patient's allergies indicates:  No Known Allergies    Family History   Problem Relation Age of Onset    Heart attack Father 80    Heart disease Father     Drug abuse Father     Cancer Maternal Grandmother         colon     Social History     Social History    Marital status: Single     Spouse name: N/A    Number of children: 1    Years of education: N/A     Occupational History    demolition      Social History Main Topics    Smoking status: Current Every Day Smoker      Packs/day: 2.00     Years: 30.00     Types: Cigarettes    Smokeless tobacco: Never Used    Alcohol use 0.6 oz/week     1 Shots of liquor per week      Comment: 1/2 pint 2 x per week     Drug use: No    Sexual activity: Yes     Partners: Female     Birth control/ protection: None      Comment: No history of STDs.     Other Topics Concern    Not on file     Social History Narrative    The patient does not exercise regularly ().    Rates diet as fair.    He is satisfied with weight.                   Review of Systems   Constitutional: Negative for chills, fatigue, fever and unexpected weight change.   HENT: Negative for congestion, sore throat, trouble swallowing and voice change.    Eyes: Negative for redness and visual disturbance.   Respiratory: Negative for cough, shortness of breath and wheezing.    Cardiovascular: Negative for chest pain and palpitations.   Gastrointestinal: Negative for abdominal pain, blood in stool, nausea and vomiting.   Genitourinary: Negative for dysuria, frequency, hematuria and urgency.   Musculoskeletal: Negative for arthralgias, myalgias and neck pain.        Swelling at right posterior flank   Skin: Negative for rash and wound.   Allergic/Immunologic: Negative.    Neurological: Negative for tremors, seizures, weakness and headaches.   Hematological: Does not bruise/bleed easily.   Psychiatric/Behavioral: Negative for confusion and dysphoric mood. The patient is not nervous/anxious.      Objective:     Physical Exam   Constitutional: He is oriented to person, place, and time. He appears well-developed and well-nourished. No distress.   HENT:   Head: Normocephalic and atraumatic.   Eyes: Conjunctivae and EOM are normal. Pupils are equal, round, and reactive to light. No scleral icterus.   Neck: Normal range of motion. Neck supple.   Cardiovascular: Normal rate, regular rhythm, normal heart sounds and intact distal pulses.    No murmur heard.  Pulmonary/Chest: Effort normal and  breath sounds normal. No respiratory distress. He has no wheezes. He has no rales.   Abdominal: Soft. Bowel sounds are normal. He exhibits no distension. There is no tenderness. No hernia.   At the right posterior flank there is some swelling.  There is no fluctuant mass.  There is no erythema.  The area is not particularly tender.   Musculoskeletal: Normal range of motion. He exhibits no edema or tenderness.   Neurological: He is alert and oriented to person, place, and time. No cranial nerve deficit.   Patient appears lethargic.  He has trouble keeping his eyes open during the exam and interview.   Skin: Skin is warm and dry. No rash noted. No erythema.   Psychiatric: He has a normal mood and affect. His behavior is normal. Judgment normal.     Assessment:     Encounter Diagnoses   Name Primary?    Abdominal pain, unspecified abdominal location Yes    Generalized abdominal pain        Plan:      1.  Will repeat CT scan to evaluate the nature of the enlargement of his right posterior flank.  If there is no undrained fluid collection did not think there is anything to do surgically at this time.  He says he is not having any significant pain.

## 2018-08-08 ENCOUNTER — HOSPITAL ENCOUNTER (OUTPATIENT)
Dept: RADIOLOGY | Facility: HOSPITAL | Age: 53
Discharge: HOME | End: 2018-08-08
Attending: SURGERY
Payer: MEDICARE

## 2018-08-08 DIAGNOSIS — R10.9 ABDOMINAL PAIN, UNSPECIFIED ABDOMINAL LOCATION: ICD-10-CM

## 2018-08-08 DIAGNOSIS — R10.84 GENERALIZED ABDOMINAL PAIN: ICD-10-CM

## 2018-08-08 PROCEDURE — 74177 CT ABD & PELVIS W/CONTRAST: CPT | Mod: TC

## 2018-08-08 PROCEDURE — 74177 CT ABD & PELVIS W/CONTRAST: CPT | Mod: 26,,, | Performed by: RADIOLOGY

## 2018-08-08 PROCEDURE — 25500020 PHARM REV CODE 255

## 2018-08-08 RX ORDER — SODIUM CHLORIDE 9 MG/ML
INJECTION, SOLUTION INTRAVENOUS
Status: DISCONTINUED
Start: 2018-08-08 | End: 2018-08-09 | Stop reason: HOSPADM

## 2018-08-08 RX ADMIN — IOHEXOL 30 ML: 350 INJECTION, SOLUTION INTRAVENOUS at 04:08

## 2018-08-08 RX ADMIN — IOHEXOL 75 ML: 350 INJECTION, SOLUTION INTRAVENOUS at 04:08

## 2018-08-14 NOTE — ANESTHESIA POSTPROCEDURE EVALUATION
"Anesthesia Post Evaluation    Patient: Hernan Badillo    Procedure(s) Performed: Procedure(s) (LRB):  INCISION-DRAINAGE-HEMATOMA (Right)    Final Anesthesia Type: general  Patient location during evaluation: PACU  Patient participation: Yes- Able to Participate  Level of consciousness: awake and alert  Post-procedure vital signs: reviewed and stable  Pain management: adequate  Airway patency: patent  PONV status at discharge: No PONV  Anesthetic complications: no      Cardiovascular status: hemodynamically stable  Respiratory status: unassisted and room air  Hydration status: euvolemic  Follow-up not needed.        Visit Vitals  BP (!) 172/91   Pulse 85   Temp 37.1 °C (98.8 °F) (Temporal)   Resp 16   Ht 5' 9" (1.753 m)   Wt 81.2 kg (179 lb)   SpO2 (!) 92%   BMI 26.43 kg/m²       Pain/Juan Antonio Score: Pain Assessment Performed: Yes (7/6/2017  1:36 PM)  Presence of Pain: complains of pain/discomfort (7/6/2017  1:36 PM)  Pain Rating Prior to Med Admin: 5 (7/6/2017  1:30 PM)  Juan Antonio Score: 10 (7/6/2017  1:36 PM)      "
The patient is a 55y Female complaining of pain, breast.

## 2018-08-21 ENCOUNTER — TELEPHONE (OUTPATIENT)
Dept: SURGERY | Facility: CLINIC | Age: 53
End: 2018-08-21

## 2018-08-21 NOTE — TELEPHONE ENCOUNTER
----- Message from Ana Tate sent at 8/21/2018 11:18 AM CDT -----  Contact: mother Mariann Chou [phone]  She is calling again to get the results of the CT scan that was performed on 8/8/18.  Thank you!

## 2018-08-21 NOTE — TELEPHONE ENCOUNTER
Pt mother stopped by office to get information on mri results advised mother that dr baez will return to clinic on Wednesday and will read results and I will call pt with findings

## 2018-11-26 ENCOUNTER — CLINICAL SUPPORT (OUTPATIENT)
Dept: URGENT CARE | Facility: CLINIC | Age: 53
End: 2018-11-26
Payer: MEDICARE

## 2018-11-26 VITALS
BODY MASS INDEX: 26.81 KG/M2 | RESPIRATION RATE: 18 BRPM | HEIGHT: 69 IN | HEART RATE: 89 BPM | TEMPERATURE: 97 F | WEIGHT: 181 LBS | OXYGEN SATURATION: 94 % | SYSTOLIC BLOOD PRESSURE: 185 MMHG | DIASTOLIC BLOOD PRESSURE: 104 MMHG

## 2018-11-26 DIAGNOSIS — R05.9 COUGH: Primary | ICD-10-CM

## 2018-11-26 DIAGNOSIS — J20.9 ACUTE BRONCHITIS, UNSPECIFIED ORGANISM: ICD-10-CM

## 2018-11-26 PROCEDURE — 96372 THER/PROPH/DIAG INJ SC/IM: CPT | Mod: S$GLB,,, | Performed by: NURSE PRACTITIONER

## 2018-11-26 PROCEDURE — 71046 X-RAY EXAM CHEST 2 VIEWS: CPT | Mod: S$GLB,,, | Performed by: NURSE PRACTITIONER

## 2018-11-26 PROCEDURE — 99214 OFFICE O/P EST MOD 30 MIN: CPT | Mod: 25,S$GLB,, | Performed by: NURSE PRACTITIONER

## 2018-11-26 RX ORDER — DEXAMETHASONE SODIUM PHOSPHATE 4 MG/ML
8 INJECTION, SOLUTION INTRA-ARTICULAR; INTRALESIONAL; INTRAMUSCULAR; INTRAVENOUS; SOFT TISSUE ONCE
Status: COMPLETED | OUTPATIENT
Start: 2018-11-26 | End: 2018-11-26

## 2018-11-26 RX ORDER — PREDNISONE 20 MG/1
40 TABLET ORAL DAILY
Qty: 10 TABLET | Refills: 0 | Status: SHIPPED | OUTPATIENT
Start: 2018-11-26 | End: 2018-12-01

## 2018-11-26 RX ORDER — LEVOFLOXACIN 750 MG/1
750 TABLET ORAL DAILY
Qty: 7 TABLET | Refills: 0 | Status: SHIPPED | OUTPATIENT
Start: 2018-11-26 | End: 2018-12-03

## 2018-11-26 RX ADMIN — DEXAMETHASONE SODIUM PHOSPHATE 8 MG: 4 INJECTION, SOLUTION INTRA-ARTICULAR; INTRALESIONAL; INTRAMUSCULAR; INTRAVENOUS; SOFT TISSUE at 08:11

## 2018-11-27 NOTE — PATIENT INSTRUCTIONS
COPD Flare    You have had a flare-up of your COPD.  COPD, or chronic obstructive pulmonary disease, is a common lung disease. It causes your airways to become irritated and narrower. This makes it harder for you to breathe. Emphysema and chronic bronchitis are both types of COPD. This is a chronic condition, which means you always have it. Sometimes it gets worse. When this happens, it is called a flare-up.  Symptoms of COPD  People with COPD may have symptoms most of the time. In a flare-up, your symptoms get worse. These symptoms may mean you are having a flare-up:  · Shortness of breath, shallow or rapid breathing, or wheezing that gets worse  · Lung infection  · Cough that gets worse  · More mucus, thicker mucus or mucus of a different color  · Tiredness, decreased energy, or trouble doing your usual activities  · Fever  · Chest tightness  · Your symptoms dont get better even when you use your usual medicines, inhalers, and nebulizer  · Trouble talking  · You feel confused  Causes of flare-ups  Unfortunately, a flare-up can happen even though you did everything right, and you followed your doctors instructions. Some causes of flare-ups are:  · Smoking or secondhand smoke  · Colds, the flu, or respiratory infections  · Air pollution  · Sudden change in the weather  · Dust, irritating chemicals, or strong fumes  · Not taking your medicines as prescribed  Home care  Here are some things you can do at home to treat a flare-up:  · Try not to panic. This makes it harder to breathe, and keeps you from doing the right things.  · Dont smoke or be around others who are smoking.  · Try to drink more fluids than usual during a flare-up, unless your doctor has told you not to because of heart and kidney problems. More fluids can help loosen the mucus.  · Use your inhalers and nebulizer, if you have one, as you have been told to.  · If you were given antibiotics, take them until they are used up or your doctor tells you  to stop. Its important to finish the antibiotics, even though you feel better. This will make sure the infection has cleared.  · If you were given prednisone or another steroid, finish it even if you feel better.  Preventing a flare-up  Even though flare-ups happen, the best way to treat one is to prevent it before it starts. Here are some pointers:  · Dont smoke or be around others who are smoking.  · Take your medicines as you have been told.  · Talk with your doctor about getting a flu shot every year. Also find out if you need a pneumonia shot.  · If there is a weather advisory warning to stay indoors, try to stay inside when possible.  · Try to eat healthy and get plenty of sleep.  · Try to avoid things that usually set you off, like dust, chemical fumes, hairsprays, or strong perfumes.  Follow-up care  Follow up with your healthcare provider, or as advised.  If a culture was done, you will be told if your treatment needs to be changed. You can call as directed for the results.  If X-rays were done, you will be notified of any new findings that may affect your care.  Call 911  Call 911 if any of these occur:  · You have trouble breathing  · You feel confused or its difficult to wake you up  · You faint or lose consciousness  · You have a rapid heart rate  · You have new pain in your chest, arm, shoulder, neck or upper back  When to seek medical advice  Call your healthcare provider right away if any of these occur:  · Wheezing or shortness of breath gets worse  · You need to use your inhalers more often than usual without relief  · Fever of 100.4°F (38ºC) or higher, or as directed by your healthcare provider  · Coughing up lots of dark-colored or bloody mucus (sputum)  · Chest pain with each breath  · You do not start to get better within 24 hours  · Swelling of your ankles gets worse  · Dizziness or weakness  Date Last Reviewed: 9/1/2016  © 2225-4328 [employer]. [address] [facility],  [place] [address]. All rights reserved. This information is not intended as a substitute for professional medical care. Always follow your healthcare professional's instructions.        What Is Acute Bronchitis?  Acute bronchitis is when the airways in your lungs (bronchial tubes) become red and swollen (inflamed). It is usually caused by a viral infection. But it can also occur because of a bacteria or allergen. Symptoms include a cough that produces yellow or greenish mucus and can last for days or sometimes weeks.  Inside healthy lungs    Air travels in and out of the lungs through the airways. The linings of these airways produce sticky mucus. This mucus traps particles that enter the lungs. Tiny structures called cilia then sweep the particles out of the airways.     Healthy airway: Airways are normally open. Air moves in and out easily.      Healthy cilia: Tiny, hairlike cilia sweep mucus and particles up and out of the airways.   Lungs with bronchitis  Bronchitis often occurs with a cold or the flu virus. The airways become inflamed (red and swollen). There is a deep hacking cough from the extra mucus. Other symptoms may include:  · Wheezing  · Chest discomfort  · Shortness of breath  · Mild fever  A second infection, this time due to bacteria, may then occur. And airways irritated by allergens or smoke are more likely to get infected.        Inflamed airway: Inflammation and extra mucus narrow the airway, causing shortness of breath.      Impaired cilia: Extra mucus impairs cilia, causing congestion and wheezing. Smoking makes the problem worse.   Making a diagnosis  A physical exam, health history, and certain tests help your healthcare provider make the diagnosis.  Health history  Your healthcare provider will ask you about your symptoms.  The exam  Your provider listens to your chest for signs of congestion. He or she may also check your ears, nose, and throat.  Possible tests  · A sputum test for  bacteria. This requires a sample of mucus from your lungs.  · A nasal or throat swab. This tests to see if you have a bacterial infection.  · A chest X-ray. This is done if your healthcare provider thinks you have pneumonia.  · Tests to check for an underlying condition. Other tests may be done to check for things such as allergies, asthma, or COPD (chronic obstructive pulmonary disease). You may need to see a specialist for more lung function testing.  Treating a cough  The main treatment for bronchitis is easing symptoms. Avoiding smoke, allergens, and other things that trigger coughing can often help. If the infection is bacterial, you may be given antibiotics. During the illness, it's important to get plenty of sleep. To ease symptoms:  · Dont smoke. Also avoid secondhand smoke.  · Use a humidifier. Or try breathing in steam from a hot shower. This may help loosen mucus.  · Drink a lot of water and juice. They can soothe the throat and may help thin mucus.  · Sit up or use extra pillows when in bed. This helps to lessen coughing and congestion.  · Ask your provider about using medicine. Ask about using cough medicine, pain and fever medicine, or a decongestant.  Antibiotics  Most cases of bronchitis are caused by cold or flu viruses. They dont need antibiotics to treat them, even if your mucus is thick and green or yellow. Antibiotics dont treat viral illness and antibiotics have not been shown to have any benefit in cases of acute bronchitis. Taking antibiotics when they are not needed increases your risk of getting an infection later that is antibiotic-resistant. Antibiotics can also cause severe cases of diarrhea that require other antibiotics to treat.  It is important that you accept your healthcare provider's opinion to not use antibiotics. Your provider will prescribe antibiotics if the infection is caused by bacteria. If they are prescribed:  · Take all of the medicine. Take the medicine until it is  used up, even if symptoms have improved. If you dont, the bronchitis may come back.  · Take the medicines as directed. For instance, some medicines should be taken with food.  · Ask about side effects. Ask your provider or pharmacist what side effects are common, and what to do about them.  Follow-up care  You should see your provider again in 2 to 3 weeks. By this time, symptoms should have improved. An infection that lasts longer may mean you have a more serious problem.  Prevention  · Avoid tobacco smoke. If you smoke, quit. Stay away from smoky places. Ask friends and family not to smoke around you, or in your home or car.  · Get checked for allergies.  · Ask your provider about getting a yearly flu shot. Also ask about pneumococcal or pneumonia shots.  · Wash your hands often. This helps reduce the chance of picking up viruses that cause colds and flu.  Call your healthcare provider if:  · Symptoms worsen, or you have new symptoms  · Breathing problems worsen or  become severe  · Symptoms dont get better within a week, or within 3 days of taking antibiotics   Date Last Reviewed: 2/1/2017  © 8034-4285 [employer]. [address] [place] [address], [place] [address]. All rights reserved. This information is not intended as a substitute for professional medical care. Always follow your healthcare professional's instructions.

## 2018-11-27 NOTE — PROGRESS NOTES
"Subjective:       Patient ID: Hernan Badillo is a 53 y.o. male.    Vitals:  height is 5' 9" (1.753 m) and weight is 82.1 kg (181 lb). His temperature is 97.1 °F (36.2 °C). His blood pressure is 185/104 (abnormal) and his pulse is 89. His respiration is 18 and oxygen saturation is 94 (abnormal)%.     Chief Complaint: Sinus pressure, Sinus drainage, cough x1wk.    Complains of productive cough with green sputum. Denies fever, chills or shortness of breath.     Constitution: Negative for chills, sweating, fatigue and fever.   HENT: Negative for ear pain, congestion, sinus pain, sinus pressure, sore throat and voice change.    Neck: Negative for painful lymph nodes.   Eyes: Negative for eye redness.   Respiratory: Positive for cough, sputum production and COPD. Negative for chest tightness, bloody sputum, shortness of breath, stridor, wheezing and asthma.    Gastrointestinal: Negative for nausea and vomiting.   Musculoskeletal: Negative for muscle ache.   Skin: Negative for rash.   Allergic/Immunologic: Negative for seasonal allergies and asthma.   Hematologic/Lymphatic: Negative for swollen lymph nodes.       Objective:      Physical Exam   Constitutional: He is oriented to person, place, and time. He appears well-developed and well-nourished. He is cooperative.  Non-toxic appearance. He does not appear ill. No distress.   HENT:   Head: Normocephalic and atraumatic.   Right Ear: Hearing, tympanic membrane, external ear and ear canal normal.   Left Ear: Hearing, tympanic membrane, external ear and ear canal normal.   Nose: Nose normal. No mucosal edema, rhinorrhea or nasal deformity. No epistaxis. Right sinus exhibits no maxillary sinus tenderness and no frontal sinus tenderness. Left sinus exhibits no maxillary sinus tenderness and no frontal sinus tenderness.   Mouth/Throat: Uvula is midline, oropharynx is clear and moist and mucous membranes are normal. No trismus in the jaw. Normal dentition. No uvula swelling. No " posterior oropharyngeal erythema.   Eyes: Conjunctivae, EOM and lids are normal. Pupils are equal, round, and reactive to light. No scleral icterus.   Sclera clear bilat   Neck: Trachea normal, normal range of motion, full passive range of motion without pain and phonation normal. Neck supple.   Cardiovascular: Normal rate, regular rhythm, normal heart sounds, intact distal pulses and normal pulses.   Pulmonary/Chest: Effort normal. No respiratory distress. He has wheezes. He exhibits no tenderness.   Abdominal: Soft. Normal appearance and bowel sounds are normal. He exhibits no distension. There is no tenderness.   Musculoskeletal: Normal range of motion. He exhibits no edema or deformity.   Neurological: He is alert and oriented to person, place, and time. He exhibits normal muscle tone. Coordination normal.   Skin: Skin is warm, dry and intact. Capillary refill takes less than 2 seconds. He is not diaphoretic. No pallor.   Psychiatric: He has a normal mood and affect. His speech is normal and behavior is normal. Judgment and thought content normal. Cognition and memory are normal.   Nursing note and vitals reviewed.      Assessment:       1. Cough    2. Acute bronchitis, unspecified organism        Plan:       CXR negative for infiltrate. Rx Levaquin and Prednisone. No hypoxia or respiratory distress.   Cough  -     XR CHEST PA AND LATERAL; Future; Expected date: 11/26/2018  -     levoFLOXacin (LEVAQUIN) 750 MG tablet; Take 1 tablet (750 mg total) by mouth once daily. for 7 days  Dispense: 7 tablet; Refill: 0  -     predniSONE (DELTASONE) 20 MG tablet; Take 2 tablets (40 mg total) by mouth once daily. for 5 days  Dispense: 10 tablet; Refill: 0    Acute bronchitis, unspecified organism  -     levoFLOXacin (LEVAQUIN) 750 MG tablet; Take 1 tablet (750 mg total) by mouth once daily. for 7 days  Dispense: 7 tablet; Refill: 0  -     predniSONE (DELTASONE) 20 MG tablet; Take 2 tablets (40 mg total) by mouth once daily.  for 5 days  Dispense: 10 tablet; Refill: 0

## 2019-05-14 DIAGNOSIS — Z12.11 COLON CANCER SCREENING: ICD-10-CM

## 2019-05-15 ENCOUNTER — HOSPITAL ENCOUNTER (EMERGENCY)
Facility: HOSPITAL | Age: 54
Discharge: HOME | End: 2019-05-15
Attending: EMERGENCY MEDICINE
Payer: MEDICARE

## 2019-05-15 VITALS
SYSTOLIC BLOOD PRESSURE: 152 MMHG | RESPIRATION RATE: 18 BRPM | HEART RATE: 89 BPM | BODY MASS INDEX: 26.81 KG/M2 | OXYGEN SATURATION: 92 % | WEIGHT: 181 LBS | TEMPERATURE: 99 F | DIASTOLIC BLOOD PRESSURE: 84 MMHG | HEIGHT: 69 IN

## 2019-05-15 DIAGNOSIS — J44.1 COPD EXACERBATION: Primary | ICD-10-CM

## 2019-05-15 DIAGNOSIS — R06.02 SOB (SHORTNESS OF BREATH): ICD-10-CM

## 2019-05-15 PROCEDURE — 99284 EMERGENCY DEPT VISIT MOD MDM: CPT | Mod: 25

## 2019-05-15 PROCEDURE — 25000242 PHARM REV CODE 250 ALT 637 W/ HCPCS: Performed by: EMERGENCY MEDICINE

## 2019-05-15 PROCEDURE — 94640 AIRWAY INHALATION TREATMENT: CPT

## 2019-05-15 PROCEDURE — 63600175 PHARM REV CODE 636 W HCPCS: Performed by: EMERGENCY MEDICINE

## 2019-05-15 PROCEDURE — 96372 THER/PROPH/DIAG INJ SC/IM: CPT

## 2019-05-15 RX ORDER — IPRATROPIUM BROMIDE AND ALBUTEROL SULFATE 2.5; .5 MG/3ML; MG/3ML
3 SOLUTION RESPIRATORY (INHALATION)
Status: COMPLETED | OUTPATIENT
Start: 2019-05-15 | End: 2019-05-15

## 2019-05-15 RX ORDER — TRIAMCINOLONE ACETONIDE 40 MG/ML
80 INJECTION, SUSPENSION INTRA-ARTICULAR; INTRAMUSCULAR
Status: COMPLETED | OUTPATIENT
Start: 2019-05-15 | End: 2019-05-15

## 2019-05-15 RX ADMIN — IPRATROPIUM BROMIDE AND ALBUTEROL SULFATE 3 ML: .5; 3 SOLUTION RESPIRATORY (INHALATION) at 01:05

## 2019-05-15 RX ADMIN — TRIAMCINOLONE ACETONIDE 80 MG: 40 INJECTION, SUSPENSION INTRA-ARTICULAR; INTRAMUSCULAR at 02:05

## 2019-05-15 NOTE — ED PROVIDER NOTES
"Encounter Date: 5/15/2019    SCRIBE #1 NOTE: I, Quinn Rubio, am scribing for, and in the presence of, Dr. Razo.       History     Chief Complaint   Patient presents with    Cough     productive cough with green sputum for several weeks     Time seen by provider: 1:08 PM on 05/15/2019      Hernan Badillo is a 53 y.o. male with a PMHx of COPD, HTN, and T2DM who presents to the ED for a productive cough that has been ongoing for months, but worsened 2 days ago. The patient states that he has COPD, so he typically has a productive cough. He states that the color of the mucous started to change 2 days ago into a greenish colored mucous. He states that he has some SOB associated with this cough. He does admit that he has been using his breathing treatments x 4 and his inhaler since onset. Patient does admit that he has been feeling warm, suggesting that "I got a fever." He denies chest pain, abdominal pain, headache, or any other complaint at this time. The patient has a PSHx of hernia repair and dental surgery. Patient does admit that he still "smokes like a train."     The history is provided by the patient.     Review of patient's allergies indicates:  No Known Allergies  Past Medical History:   Diagnosis Date    COPD (chronic obstructive pulmonary disease)     Diabetes mellitus, type 2     Hypertension      Past Surgical History:   Procedure Laterality Date    DENTAL SURGERY      HERNIA REPAIR      left inguinal    incision and drainiage      INCISION-DRAINAGE-HEMATOMA Right 7/6/2017    Performed by Jozef Newman MD at [facility] OR     Family History   Problem Relation Age of Onset    Heart attack Father 80    Heart disease Father     Drug abuse Father     Cancer Maternal Grandmother         colon     Social History     Tobacco Use    Smoking status: Current Every Day Smoker     Packs/day: 2.00     Years: 30.00     Pack years: 60.00     Types: Cigarettes    Smokeless tobacco: Never Used   Substance Use " Topics    Alcohol use: Yes     Alcohol/week: 0.6 oz     Types: 1 Shots of liquor per week     Comment: 1/2 pint 2 x per week     Drug use: No     Review of Systems   Constitutional: Positive for fever.   HENT: Negative for congestion.    Eyes: Negative for visual disturbance.   Respiratory: Positive for cough and shortness of breath.    Cardiovascular: Negative for chest pain.   Gastrointestinal: Negative for abdominal pain.   Genitourinary: Negative for dysuria.   Musculoskeletal: Negative for myalgias.   Skin: Negative for rash.   Neurological: Negative for headaches.       Physical Exam     Initial Vitals [05/15/19 1221]   BP Pulse Resp Temp SpO2   (!) 151/91 98 20 98.9 °F (37.2 °C) 95 %      MAP       --         Physical Exam    Nursing note and vitals reviewed.  Constitutional: He appears well-developed and well-nourished. He is not diaphoretic. No distress.   HENT:   Head: Normocephalic and atraumatic.   Eyes: EOM are normal. Pupils are equal, round, and reactive to light.   Neck: Normal range of motion. Neck supple.   Cardiovascular: Normal rate, regular rhythm, normal heart sounds and intact distal pulses. Exam reveals no gallop and no friction rub.    No murmur heard.  Pulmonary/Chest: No respiratory distress. He has wheezes. He has no rhonchi. He has no rales.   expiratory wheezing and decreased breathe sounds in all lung fields.   Abdominal: Soft. Bowel sounds are normal. There is no tenderness. There is no rebound and no guarding.   Musculoskeletal: Normal range of motion.   Neurological: He is alert and oriented to person, place, and time.   Skin: Skin is warm.   Psychiatric: He has a normal mood and affect. His behavior is normal. Judgment and thought content normal.         ED Course   Procedures  Labs Reviewed - No data to display       Imaging Results          X-Ray Chest PA And Lateral (Final result)  Result time 05/15/19 13:24:31    Final result by Luli Peterson MD (05/15/19 13:24:31)                  Impression:      No acute cardiopulmonary disease or significant change compared to the prior exam.      Electronically signed by: Luli Peterson MD  Date:    05/15/2019  Time:    13:24             Narrative:    EXAMINATION:  XR CHEST PA AND LATERAL    CLINICAL HISTORY:  Shortness of breath    TECHNIQUE:  PA and lateral views of the chest were performed.    COMPARISON:  5/16/2018    FINDINGS:  The heart is not enlarged.  There is mild chronic appearing interstitial change.  Symmetrical bibasilar nodular densities can be attributed to the nipple shadows.  There is mild hyperinflation the lungs.  The heart is not enlarged the lungs are clear of infiltrate.  There is no pleural effusion                                 Medical Decision Making:   History:   Old Medical Records: I decided to obtain old medical records.  Initial Assessment:   53-year-old male presented with a cough and shortness of breath.  Differential Diagnosis:   My initial differential diagnosis includes pneumonia, bronchitis, and COPD   Clinical Tests:   Radiological Study: Ordered and Reviewed  ED Management:  The patient was emergently evaluated in the emergency department, his evaluation was significant for a middle-age male with abnormal lung sounds.  The patient's chest x-ray showed no acute abnormalities per my independent interpretation.  The patient was treated with a respiratory nebulizer treatment, with improvement in his symptoms. The etiology of his symptoms is likely a COPD exacerbation.  The patient was also treated with a Kenalog injection, and is stable for discharge to home.  He is to continue his home medications as previously prescribed and he is referred to primary care for follow-up.            Scribe Attestation:   Scribe #1: I performed the above scribed service and the documentation accurately describes the services I performed. I attest to the accuracy of the note.        I, Dr. Michele Razo, personally performed  the services described in this documentation. All medical record entries made by the scribe were at my direction and in my presence.  I have reviewed the chart and agree that the record reflects my personal performance and is accurate and complete. Michele Razo MD.  2:42 PM 05/15/2019          Clinical Impression:       ICD-10-CM ICD-9-CM   1. COPD exacerbation J44.1 491.21   2. SOB (shortness of breath) R06.02 786.05         Disposition:   Disposition: Discharged  Condition: Stable                        Michele Razo MD  05/15/19 144

## 2019-07-30 ENCOUNTER — HOSPITAL ENCOUNTER (EMERGENCY)
Facility: HOSPITAL | Age: 54
Discharge: HOME | End: 2019-07-30
Attending: EMERGENCY MEDICINE
Payer: MEDICARE

## 2019-07-30 VITALS
OXYGEN SATURATION: 95 % | DIASTOLIC BLOOD PRESSURE: 108 MMHG | WEIGHT: 181 LBS | TEMPERATURE: 98 F | RESPIRATION RATE: 18 BRPM | SYSTOLIC BLOOD PRESSURE: 185 MMHG | BODY MASS INDEX: 26.81 KG/M2 | HEART RATE: 89 BPM | HEIGHT: 69 IN

## 2019-07-30 DIAGNOSIS — R05.9 COUGH: ICD-10-CM

## 2019-07-30 DIAGNOSIS — J44.1 ACUTE EXACERBATION OF CHRONIC OBSTRUCTIVE PULMONARY DISEASE (COPD): Primary | ICD-10-CM

## 2019-07-30 PROCEDURE — 99284 EMERGENCY DEPT VISIT MOD MDM: CPT | Mod: 25

## 2019-07-30 PROCEDURE — 25000242 PHARM REV CODE 250 ALT 637 W/ HCPCS: Performed by: PHYSICIAN ASSISTANT

## 2019-07-30 PROCEDURE — 94761 N-INVAS EAR/PLS OXIMETRY MLT: CPT

## 2019-07-30 PROCEDURE — 94640 AIRWAY INHALATION TREATMENT: CPT

## 2019-07-30 RX ORDER — MOXIFLOXACIN HYDROCHLORIDE 400 MG/1
400 TABLET ORAL DAILY
Qty: 7 TABLET | Refills: 0 | Status: SHIPPED | OUTPATIENT
Start: 2019-07-30 | End: 2019-08-06

## 2019-07-30 RX ORDER — IPRATROPIUM BROMIDE AND ALBUTEROL SULFATE 2.5; .5 MG/3ML; MG/3ML
3 SOLUTION RESPIRATORY (INHALATION)
Status: COMPLETED | OUTPATIENT
Start: 2019-07-30 | End: 2019-07-30

## 2019-07-30 RX ORDER — PREDNISONE 20 MG/1
40 TABLET ORAL DAILY
Qty: 10 TABLET | Refills: 0 | Status: SHIPPED | OUTPATIENT
Start: 2019-07-30 | End: 2019-08-04

## 2019-07-30 RX ORDER — ALBUTEROL SULFATE 90 UG/1
1-2 AEROSOL, METERED RESPIRATORY (INHALATION) EVERY 6 HOURS PRN
Qty: 1 INHALER | Refills: 0 | Status: SHIPPED | OUTPATIENT
Start: 2019-07-30 | End: 2019-08-30 | Stop reason: SDUPTHER

## 2019-07-30 RX ADMIN — IPRATROPIUM BROMIDE AND ALBUTEROL SULFATE 3 ML: .5; 3 SOLUTION RESPIRATORY (INHALATION) at 01:07

## 2019-07-30 NOTE — DISCHARGE INSTRUCTIONS
Take medications as prescribed.  Stop smoking.  Follow up with your primary care provider.  For worsening symptoms, chest pain, shortness of breath, increased abdominal pain, high grade fever, stroke or stroke like symptoms, immediately go to the nearest Emergency Room or call 911 as soon as possible.

## 2019-07-30 NOTE — ED PROVIDER NOTES
"Encounter Date: 7/30/2019    SCRIBE #1 NOTE: I, Angela Chaudhary, am scribing for, and in the presence of, Suzanne Stout PA-C.       History     Chief Complaint   Patient presents with    Cough     x 2 days     Nasal Congestion         Time seen by provider: 1:07 PM on 07/30/2019    Hernan Badillo is a 53 y.o. male who presents to the ED with an onset of intermittent, productive coughing and runny nose that began two days ago. The pt says, "I have a bad cold." He further endorses a sore throat and feeling "hot then cold". He took a cough medicine regimen in an attempt to improve his Sx, but he was unsuccessful. He says, "I always get like this when I act a fool. I was drinking under a fan" prior to onset of symptoms. "Usually, a steroid shot regimen, cough medicine, and abx improve my Sx." The patient denies nausea, vomiting, sick contact, or any other symptoms at this time. He has no cardiopulmonary PSHx noted. He also has a PMHx of COPD, HTN, and DM II. No known drug allergies are noted. He is a 2 ppd smoker.         The history is provided by the patient.     Review of patient's allergies indicates:  No Known Allergies  Past Medical History:   Diagnosis Date    COPD (chronic obstructive pulmonary disease)     Diabetes mellitus, type 2     Hypertension      Past Surgical History:   Procedure Laterality Date    DENTAL SURGERY      HERNIA REPAIR      left inguinal    incision and drainiage      INCISION-DRAINAGE-HEMATOMA Right 7/6/2017    Performed by Jozef Newman MD at [facility] OR     Family History   Problem Relation Age of Onset    Heart attack Father 80    Heart disease Father     Drug abuse Father     Cancer Maternal Grandmother         colon     Social History     Tobacco Use    Smoking status: Current Every Day Smoker     Packs/day: 2.00     Years: 30.00     Pack years: 60.00     Types: Cigarettes    Smokeless tobacco: Never Used   Substance Use Topics    Alcohol use: Yes     " Alcohol/week: 0.6 oz     Types: 1 Shots of liquor per week     Comment: 1/2 pint 2 x per week     Drug use: No     Review of Systems   Constitutional: Positive for fever. Negative for activity change, appetite change and chills.   HENT: Positive for sore throat. Negative for congestion and rhinorrhea.         Positive for runny nose.    Eyes: Negative for redness and visual disturbance.   Respiratory: Positive for cough (productive). Negative for chest tightness and shortness of breath.    Cardiovascular: Negative for chest pain.   Gastrointestinal: Negative for abdominal pain, diarrhea, nausea and vomiting.   Genitourinary: Negative for dysuria and frequency.   Musculoskeletal: Negative for back pain, neck pain and neck stiffness.   Skin: Negative for rash.   Neurological: Negative for dizziness, syncope, numbness and headaches.       Physical Exam     Initial Vitals [07/30/19 1240]   BP Pulse Resp Temp SpO2   (!) 173/104 91 20 98.4 °F (36.9 °C) 97 %      MAP       --         Physical Exam    Nursing note and vitals reviewed.  Constitutional: He appears well-developed and well-nourished. He is cooperative.  Non-toxic appearance. He does not have a sickly appearance.   HENT:   Head: Normocephalic and atraumatic.   Right Ear: Tympanic membrane and external ear normal.   Left Ear: Tympanic membrane and external ear normal.   Nose: Nose normal.   Mouth/Throat: Oropharynx is clear and moist.   Eyes: Conjunctivae and lids are normal. Pupils are equal, round, and reactive to light.   Neck: Normal range of motion and full passive range of motion without pain. Neck supple.   Cardiovascular: Normal rate, regular rhythm and normal heart sounds. Exam reveals no gallop and no friction rub.    No murmur heard.  Pulmonary/Chest: He has wheezes. He has no rhonchi. He has no rales.   Faint, expiratory wheezes and bilateral lung bases.    Abdominal: Soft. Normal appearance. There is no tenderness. There is no rigidity, no rebound  and no guarding.   Neurological: He is alert.   Skin: Skin is warm, dry and intact. No rash noted.         ED Course   Procedures  Labs Reviewed - No data to display       Imaging Results          X-Ray Chest PA And Lateral (Final result)  Result time 07/30/19 13:22:16    Final result by Iván Coronado MD (07/30/19 13:22:16)                 Narrative:    EXAMINATION:  XR CHEST PA AND LATERAL    CLINICAL HISTORY:  Cough    TECHNIQUE:  PA and lateral views of the chest were performed.    COMPARISON:  05/15/2019    FINDINGS:  Lungs are clear.Normal cardiomediastinal silhouette.Normal pulmonary vascular distribution.No pleural effusion or pneumothorax.No acute osseous abnormality.      Electronically signed by: Iván Coronado  Date:    07/30/2019  Time:    13:22                               Medical Decision Making:   History:   Old Medical Records: I decided to obtain old medical records.  Independently Interpreted Test(s):   I have ordered and independently interpreted X-rays - see prior notes.  Clinical Tests:   Radiological Study: Ordered and Reviewed       APC / Resident Notes:   Urgent evaluation of a 53 year old male with complaint of congestion, rhinorrhea, cough and sore throat. No abdominal pain, nausea or vomiting.  Vital signs are stable.  Patient is afebrile.  Abdomen is soft and nontender.  There is no rebound, rigidity or distention.  I doubt intra-abdominal process.  Bilateral TMs with no erythema, retraction or perforation.  There is no mastoid tenderness.  There is no movement tenderness to bilateral ears.  No tonsillar swelling or exudate noted.  Uvula is midline.  No concern for ludwigs angina. Expiratory wheezing with no increased work of breathing. He smokes 2 packs a day. Suspect acute COPD exacerbation. Due to risk of decompensation will treat for acute COPD exacerbation.  Discussed results with patient. Return precautions given. Patient is to follow up with their primary care provider.  Case was discussed with Dr. Mcguire who is in agreement with the plan of care. All questions answered.          Scribe Attestation:   Scribe #1: I performed the above scribed service and the documentation accurately describes the services I performed. I attest to the accuracy of the note.    I, Suzanne Stout PA-C, personally performed the services described in this documentation. All medical record entries made by the scribe were at my direction and in my presence.  I have reviewed the chart and agree that the record reflects my personal performance and is accurate and complete. Suzanne Stout PA-C.  4:29 PM 07/30/2019             Clinical Impression:       ICD-10-CM ICD-9-CM   1. Acute exacerbation of chronic obstructive pulmonary disease (COPD) J44.1 491.21   2. Cough R05 786.2         Disposition:   Disposition: Discharged  Condition: Stable                        Suzanne Stout PA-C  07/30/19 0378

## 2019-07-30 NOTE — CARE UPDATE
07/30/19 1333   Patient Assessment/Suction   Level of Consciousness (AVPU) alert   Respiratory Effort Normal;Unlabored   All Lung Fields Breath Sounds diminished   Cough Type good;nonproductive   PRE-TX-O2   O2 Device (Oxygen Therapy) room air   SpO2 95 %   Pulse Oximetry Type Intermittent   $ Pulse Oximetry - Multiple Charge Pulse Oximetry - Multiple   Pulse 85   Resp 18   Aerosol Therapy   $ Aerosol Therapy Charges Aerosol Treatment   Respiratory Treatment Status (SVN) given   Treatment Route (SVN) mask   Patient Position (SVN) sitting in chair   Post Treatment Assessment (SVN) breath sounds unchanged   Signs of Intolerance (SVN) none   Breath Sounds Post-Respiratory Treatment   Post-treatment Heart Rate (beats/min) 86   Post-treatment Resp Rate (breaths/min) 18

## 2019-08-27 ENCOUNTER — OFFICE VISIT (OUTPATIENT)
Dept: FAMILY MEDICINE | Facility: CLINIC | Age: 54
End: 2019-08-27
Payer: MEDICARE

## 2019-08-27 ENCOUNTER — LAB VISIT (OUTPATIENT)
Dept: LAB | Facility: HOSPITAL | Age: 54
End: 2019-08-27
Attending: NURSE PRACTITIONER
Payer: MEDICARE

## 2019-08-27 VITALS
HEIGHT: 69 IN | OXYGEN SATURATION: 96 % | RESPIRATION RATE: 18 BRPM | TEMPERATURE: 99 F | BODY MASS INDEX: 26.48 KG/M2 | SYSTOLIC BLOOD PRESSURE: 182 MMHG | HEART RATE: 87 BPM | DIASTOLIC BLOOD PRESSURE: 100 MMHG | WEIGHT: 178.81 LBS

## 2019-08-27 DIAGNOSIS — E08.65 DIABETES MELLITUS DUE TO UNDERLYING CONDITION, UNCONTROLLED, WITH HYPERGLYCEMIA: Primary | ICD-10-CM

## 2019-08-27 DIAGNOSIS — E08.65 DIABETES MELLITUS DUE TO UNDERLYING CONDITION, UNCONTROLLED, WITH HYPERGLYCEMIA: ICD-10-CM

## 2019-08-27 DIAGNOSIS — I10 UNCONTROLLED HYPERTENSION: ICD-10-CM

## 2019-08-27 DIAGNOSIS — Z72.0 TOBACCO ABUSE: ICD-10-CM

## 2019-08-27 LAB
ANION GAP SERPL CALC-SCNC: 7 MMOL/L (ref 8–16)
BASOPHILS # BLD AUTO: 0.01 K/UL (ref 0–0.2)
BASOPHILS NFR BLD: 0.2 % (ref 0–1.9)
BUN SERPL-MCNC: 8 MG/DL (ref 6–20)
CALCIUM SERPL-MCNC: 9.1 MG/DL (ref 8.7–10.5)
CHLORIDE SERPL-SCNC: 102 MMOL/L (ref 95–110)
CO2 SERPL-SCNC: 28 MMOL/L (ref 23–29)
CREAT SERPL-MCNC: 0.9 MG/DL (ref 0.5–1.4)
DIFFERENTIAL METHOD: ABNORMAL
EOSINOPHIL # BLD AUTO: 0.1 K/UL (ref 0–0.5)
EOSINOPHIL NFR BLD: 1.1 % (ref 0–8)
ERYTHROCYTE [DISTWIDTH] IN BLOOD BY AUTOMATED COUNT: 14.3 % (ref 11.5–14.5)
EST. GFR  (AFRICAN AMERICAN): >60 ML/MIN/1.73 M^2
EST. GFR  (NON AFRICAN AMERICAN): >60 ML/MIN/1.73 M^2
ESTIMATED AVG GLUCOSE: 166 MG/DL (ref 68–131)
GLUCOSE SERPL-MCNC: 185 MG/DL (ref 70–110)
HBA1C MFR BLD HPLC: 7.4 % (ref 4–5.6)
HCT VFR BLD AUTO: 38.2 % (ref 40–54)
HGB BLD-MCNC: 12.2 G/DL (ref 14–18)
IMM GRANULOCYTES # BLD AUTO: 0.01 K/UL (ref 0–0.04)
IMM GRANULOCYTES NFR BLD AUTO: 0.2 % (ref 0–0.5)
LYMPHOCYTES # BLD AUTO: 1.4 K/UL (ref 1–4.8)
LYMPHOCYTES NFR BLD: 31.1 % (ref 18–48)
MCH RBC QN AUTO: 30.1 PG (ref 27–31)
MCHC RBC AUTO-ENTMCNC: 31.9 G/DL (ref 32–36)
MCV RBC AUTO: 94 FL (ref 82–98)
MONOCYTES # BLD AUTO: 0.3 K/UL (ref 0.3–1)
MONOCYTES NFR BLD: 7.4 % (ref 4–15)
NEUTROPHILS # BLD AUTO: 2.7 K/UL (ref 1.8–7.7)
NEUTROPHILS NFR BLD: 60 % (ref 38–73)
NRBC BLD-RTO: 0 /100 WBC
PLATELET # BLD AUTO: 172 K/UL (ref 150–350)
PMV BLD AUTO: 11.1 FL (ref 9.2–12.9)
POTASSIUM SERPL-SCNC: 3.7 MMOL/L (ref 3.5–5.1)
RBC # BLD AUTO: 4.05 M/UL (ref 4.6–6.2)
SODIUM SERPL-SCNC: 137 MMOL/L (ref 136–145)
WBC # BLD AUTO: 4.44 K/UL (ref 3.9–12.7)

## 2019-08-27 PROCEDURE — 99214 PR OFFICE/OUTPT VISIT, EST, LEVL IV, 30-39 MIN: ICD-10-PCS | Mod: S$PBB,,, | Performed by: NURSE PRACTITIONER

## 2019-08-27 PROCEDURE — 85025 COMPLETE CBC W/AUTO DIFF WBC: CPT

## 2019-08-27 PROCEDURE — 99215 OFFICE O/P EST HI 40 MIN: CPT | Mod: PBBFAC,PO | Performed by: NURSE PRACTITIONER

## 2019-08-27 PROCEDURE — 99214 OFFICE O/P EST MOD 30 MIN: CPT | Mod: S$PBB,,, | Performed by: NURSE PRACTITIONER

## 2019-08-27 PROCEDURE — 83036 HEMOGLOBIN GLYCOSYLATED A1C: CPT

## 2019-08-27 PROCEDURE — 99999 PR PBB SHADOW E&M-EST. PATIENT-LVL V: CPT | Mod: PBBFAC,,, | Performed by: NURSE PRACTITIONER

## 2019-08-27 PROCEDURE — 99999 PR PBB SHADOW E&M-EST. PATIENT-LVL V: ICD-10-PCS | Mod: PBBFAC,,, | Performed by: NURSE PRACTITIONER

## 2019-08-27 PROCEDURE — 36415 COLL VENOUS BLD VENIPUNCTURE: CPT | Mod: PO

## 2019-08-27 PROCEDURE — 80048 BASIC METABOLIC PNL TOTAL CA: CPT

## 2019-08-27 RX ORDER — AMLODIPINE BESYLATE 5 MG/1
5 TABLET ORAL DAILY
Qty: 30 TABLET | Refills: 11 | Status: SHIPPED | OUTPATIENT
Start: 2019-08-27 | End: 2019-08-30

## 2019-08-27 NOTE — PATIENT INSTRUCTIONS
Established High Blood Pressure    High blood pressure (hypertension) is a chronic disease. Often, healthcare providers dont know what causes it. But it can be caused by certain health conditions and medicines.  If you have high blood pressure, you may not have any symptoms. If you do have symptoms, they may include headache, dizziness, changes in your vision, chest pain, and shortness of breath. But even without symptoms, high blood pressure thats not treated raises your risk for heart attack and stroke. High blood pressure is a serious health risk and shouldnt be ignored.  A blood pressure reading is made up of two numbers: a higher number over a lower number. The top number is the systolic pressure. The bottom number is the diastolic pressure. A normal blood pressure is a systolic pressure of  less than 120 over a diastolic pressure of less than 80. You will see your blood pressure readings written together. For example, a person with a systolic pressure of 188 and a diastolic pressure of 78 will have 118/78 written in the medical record.  High blood pressure is when either the top number is 140 or higher, or the bottom number is 90 or higher. This must be the result when taking your blood pressure a number of times. The blood pressures between normal and high are called prehypertension.  Home care  If you have high blood pressure, you should do what is listed below to lower your blood pressure. If you are taking medicines for high blood pressure, these methods may reduce or end your need for medicines in the future.  · Begin a weight-loss program if you are overweight.  · Cut back on how much salt you get in your diet. Heres how to do this:  ¨ Dont eat foods that have a lot of salt. These include olives, pickles, smoked meats, and salted potato chips.  ¨ Dont add salt to your food at the table.  ¨ Use only small amounts of salt when cooking.  · Start an exercise program. Talk with your healthcare  provider about the type of exercise program that would be best for you. It doesn't have to be hard. Even brisk walking for 20 minutes 3 times a week is a good form of exercise.  · Dont take medicines that stimulate the heart. This includes many over-the-counter cold and sinus decongestant pills and sprays, as well as diet pills. Check the warnings about hypertension on the label. Before buying any over-the-counter medicines or supplements, always ask the pharmacist about the product's potential interaction with your high blood pressure and your high blood pressure medicines.  · Stimulants such as amphetamine or cocaine could be deadly for someone with high blood pressure. Never take these.  · Limit how much caffeine you get in your diet. Switch to caffeine-free products.  · Stop smoking. If you are a long-time smoker, this can be hard. Talk to your healthcare provider about medicines and nicotine replacement options to help you. Also, enroll in a stop-smoking program to make it more likely that you will quit for good.  · Learn how to handle stress. This is an important part of any program to lower blood pressure. Learn about relaxation methods like meditation, yoga, or biofeedback.  · If your provider prescribed medicines, take them exactly as directed. Missing doses may cause your blood pressure get out of control.  · If you miss a dose or doses, check with your healthcare provider or pharmacist about what to do.  · Consider buying an automatic blood pressure machine. Ask your provider for a recommendation. You can get one of these at most pharmacies.     The American Heart Association recommends the following guidelines for home blood pressure monitoring:  · Don't smoke or drink coffee for 30 minutes before taking your blood pressure.  · Go to the bathroom before the test.  · Relax for 5 minutes before taking the measurement.  · Sit with your back supported (don't sit on a couch or soft chair); keep your feet on  the floor uncrossed. Place your arm on a solid flat surface (like a table) with the upper part of the arm at heart level. Place the middle of the cuff directly above the eye of the elbow. Check the monitor's instruction manual for an illustration.  · Take multiple readings. When you measure, take 2 to 3 readings one minute apart and record all of the results.  · Take your blood pressure at the same time every day, or as your healthcare provider recommends.  · Record the date, time, and blood pressure reading.  · Take the record with you to your next medical appointment. If your blood pressure monitor has a built-in memory, simply take the monitor with you to your next appointment.  · Call your provider if you have several high readings. Don't be frightened by a single high blood pressure reading, but if you get several high readings, check in with your healthcare provider.  · Note: When blood pressure reaches a systolic (top number) of 180 or higher OR diastolic (bottom number) of 110 or higher, seek emergency medical treatment.  Follow-up care  You will need to see your healthcare provider regularly. This is to check your blood pressure and to make changes to your medicines. Make a follow-up appointment as directed. Bring the record of your home blood pressure readings to the appointment.  When to seek medical advice  Call your healthcare provider right away if any of these occur:  · Blood pressure reaches a systolic (upper number) of 180 or higher OR a diastolic (bottom number) of 110 or higher  · Chest pain or shortness of breath  · Severe headache  · Throbbing or rushing sound in the ears  · Nosebleed  · Sudden severe pain in your belly (abdomen)  · Extreme drowsiness, confusion, or fainting  · Dizziness or spinning sensation (vertigo)  · Weakness of an arm or leg or one side of the face  · You have problems speaking or seeing   Date Last Reviewed: 12/1/2016  © 4644-0031 [employer]. [address] [place] [address]  [place] [address]. All rights reserved. This information is not intended as a substitute for professional medical care. Always follow your healthcare professional's instructions.

## 2019-08-27 NOTE — PROGRESS NOTES
"Subjective:       Patient ID: Hernan Badillo is a 53 y.o. male.    Chief Complaint: Hypertension    Patient who is new to me presents for hypertension. He is trying to get control of his life since quitting ETOH.     Hypertension   This is a recurrent problem. The current episode started more than 1 year ago. The problem has been rapidly worsening since onset. The problem is uncontrolled. Associated symptoms include shortness of breath (sometimes COPD). Pertinent negatives include no anxiety, chest pain, headaches, palpitations or peripheral edema. There are no associated agents to hypertension. Risk factors for coronary artery disease include male gender and smoking/tobacco exposure (currently smokes "like a train" 2 packs a day). Past treatments include nothing. Improvement on treatment: unsure of when he last took BP medications.     Review of Systems   Constitutional: Negative for chills, fatigue and fever.   HENT: Negative for congestion, ear pain, sinus pressure and sore throat.    Respiratory: Positive for shortness of breath (sometimes COPD). Negative for wheezing.    Cardiovascular: Negative for chest pain, palpitations and leg swelling.   Gastrointestinal: Negative for abdominal distention and abdominal pain.   Genitourinary: Negative for dysuria and flank pain.   Musculoskeletal: Positive for myalgias (aches all over).   Skin: Negative for color change and pallor.   Neurological: Negative for light-headedness, numbness and headaches.       Objective:      Physical Exam   Constitutional: He is oriented to person, place, and time. He appears well-developed and well-nourished.   HENT:   Head: Normocephalic and atraumatic.   Right Ear: External ear normal.   Left Ear: External ear normal.   Eyes: Pupils are equal, round, and reactive to light. Conjunctivae and EOM are normal.   Neck: Normal range of motion. Neck supple.   Cardiovascular: Normal rate and regular rhythm.   Pulmonary/Chest: Effort normal and breath " sounds normal.   Abdominal: Soft. Bowel sounds are normal.   Musculoskeletal: Normal range of motion.   Neurological: He is alert and oriented to person, place, and time.   Skin: Skin is warm and dry.   Nursing note and vitals reviewed.      Assessment:       1. Diabetes mellitus due to underlying condition, uncontrolled, with hyperglycemia    2. Uncontrolled hypertension    3. Tobacco abuse        Plan:       Hernan was seen today for hypertension.    Diagnoses and all orders for this visit:    Diabetes mellitus due to underlying condition, uncontrolled, with hyperglycemia  -     Basic metabolic panel; Future  -     Hemoglobin A1c; Future  -     amLODIPine (NORVASC) 5 MG tablet; Take 1 tablet (5 mg total) by mouth once daily.  -     CBC auto differential; Future    Uncontrolled hypertension  -     Basic metabolic panel; Future  -     Hemoglobin A1c; Future  -     CBC auto differential; Future    Tobacco abuse  -     Ambulatory referral to Smoking Cessation Program      Spoke with patient to keep follow up appointment. Discussed worsening signs/symptoms and when to return to clinic or go to ED.   Patient expresses understanding and agrees with treatment plan.

## 2019-08-30 ENCOUNTER — OFFICE VISIT (OUTPATIENT)
Dept: FAMILY MEDICINE | Facility: CLINIC | Age: 54
End: 2019-08-30
Payer: MEDICARE

## 2019-08-30 VITALS
SYSTOLIC BLOOD PRESSURE: 170 MMHG | HEIGHT: 69 IN | DIASTOLIC BLOOD PRESSURE: 96 MMHG | HEART RATE: 74 BPM | BODY MASS INDEX: 26.22 KG/M2 | TEMPERATURE: 98 F | WEIGHT: 177 LBS

## 2019-08-30 DIAGNOSIS — Z13.220 NEED FOR LIPID SCREENING: Primary | ICD-10-CM

## 2019-08-30 DIAGNOSIS — E08.65 DIABETES MELLITUS DUE TO UNDERLYING CONDITION, UNCONTROLLED, WITH HYPERGLYCEMIA: ICD-10-CM

## 2019-08-30 DIAGNOSIS — E08.8 DIABETES MELLITUS DUE TO UNDERLYING CONDITION WITH COMPLICATION, UNSPECIFIED WHETHER LONG TERM INSULIN USE: ICD-10-CM

## 2019-08-30 DIAGNOSIS — R60.9 SOFT TISSUE SWELLING OF BACK: ICD-10-CM

## 2019-08-30 DIAGNOSIS — I10 ESSENTIAL HYPERTENSION: ICD-10-CM

## 2019-08-30 DIAGNOSIS — J44.9 CHRONIC OBSTRUCTIVE PULMONARY DISEASE, UNSPECIFIED COPD TYPE: ICD-10-CM

## 2019-08-30 PROCEDURE — 99213 OFFICE O/P EST LOW 20 MIN: CPT | Mod: PBBFAC,PO | Performed by: NURSE PRACTITIONER

## 2019-08-30 PROCEDURE — 99214 OFFICE O/P EST MOD 30 MIN: CPT | Mod: S$PBB,,, | Performed by: NURSE PRACTITIONER

## 2019-08-30 PROCEDURE — 99999 PR PBB SHADOW E&M-EST. PATIENT-LVL III: CPT | Mod: PBBFAC,,, | Performed by: NURSE PRACTITIONER

## 2019-08-30 PROCEDURE — 99999 PR PBB SHADOW E&M-EST. PATIENT-LVL III: ICD-10-PCS | Mod: PBBFAC,,, | Performed by: NURSE PRACTITIONER

## 2019-08-30 PROCEDURE — 99214 PR OFFICE/OUTPT VISIT, EST, LEVL IV, 30-39 MIN: ICD-10-PCS | Mod: S$PBB,,, | Performed by: NURSE PRACTITIONER

## 2019-08-30 RX ORDER — ALBUTEROL SULFATE 90 UG/1
1-2 AEROSOL, METERED RESPIRATORY (INHALATION) EVERY 6 HOURS PRN
Qty: 1 INHALER | Refills: 0 | Status: SHIPPED | OUTPATIENT
Start: 2019-08-30 | End: 2020-08-29

## 2019-08-30 RX ORDER — AMLODIPINE BESYLATE 10 MG/1
10 TABLET ORAL DAILY
Qty: 30 TABLET | Refills: 11 | Status: SHIPPED | OUTPATIENT
Start: 2019-08-30 | End: 2020-10-02

## 2019-08-30 RX ORDER — METFORMIN HYDROCHLORIDE 500 MG/1
500 TABLET ORAL 2 TIMES DAILY WITH MEALS
Qty: 60 TABLET | Refills: 2 | Status: SHIPPED | OUTPATIENT
Start: 2019-08-30 | End: 2020-10-02

## 2019-08-30 NOTE — PATIENT INSTRUCTIONS
Diet: Diabetes  Food is an important tool that you can use to control diabetes and stay healthy. Eating well-balanced meals in the correct amounts will help you control your blood glucose levels and prevent low blood sugar reactions. It will also help you reduce the health risks of diabetes. There is no one specific diet that is right for everyone with diabetes. But there are general guidelines to follow. A registered dietitian (RD) will create a tailored diet approach thats just right for you. He or she will also help you plan healthy meals and snacks. If you have any questions, call your dietitian for advice.     Guidelines for success  Talk with your healthcare provider before starting a diabetes diet or weight loss program. If you haven't talked with a dietitian yet, ask your provider for a referral. The following guidelines can help you succeed:  · Select foods from the 6 food groups below. Your dietitian will help you find food choices within each group. He or she will also show you serving sizes and how many servings you can have at each meal.  ¨ Grains, beans, and starchy vegetables  ¨ Vegetables  ¨ Fruit  ¨ Milk or yogurt  ¨ Meat, poultry, fish, or tofu  ¨ Healthy fats  · Check your blood sugar levels as directed by your provider. Take any medicine as prescribed by your provider.  · Learn to read food labels and pick the right portion sizes.  · Eat only the amount of food in your meal plan. Eat about the same amount of food at regular times each day. Dont skip meals. Eat meals 4 to 5 hours apart, with snacks in between.  · Limit alcohol. It raises blood sugar levels. Drink water or calorie-free diet drinks that use safe sweeteners.  · Eat less fat to help lower your risk of heart disease. Use nonfat or low-fat dairy products and lean meats. Avoid fried foods. Use cooking oils that are unsaturated, such as olive, canola, or peanut oil.  · Talk with your dietitian about safe sugar substitutes.  · Avoid  added salt. It can contribute to high blood pressure, which can cause heart disease. People with diabetes already have a risk of high blood pressure and heart disease.  · Stay at a healthy weight. If you need to lose weight, cut down on your portion sizes. But dont skip meals. Exercise is an important part of any weight management program. Talk with your provider about an exercise program thats right for you.  · For more information about the best diet plan for you, talk with a registered dietitian (RD). To find an RD in your area, contact:  ¨ Academy of Nutrition and Dietetics www.eatright.org  ¨ The American Diabetes Association 009-223-4327 www.diabetes.org  Date Last Reviewed: 8/1/2016 © 2000-2017 [employer]. [address] [place] [address], [place] [address]. All rights reserved. This information is not intended as a substitute for professional medical care. Always follow your healthcare professional's instructions.        Established High Blood Pressure    High blood pressure (hypertension) is a chronic disease. Often, healthcare providers dont know what causes it. But it can be caused by certain health conditions and medicines.  If you have high blood pressure, you may not have any symptoms. If you do have symptoms, they may include headache, dizziness, changes in your vision, chest pain, and shortness of breath. But even without symptoms, high blood pressure thats not treated raises your risk for heart attack and stroke. High blood pressure is a serious health risk and shouldnt be ignored.  A blood pressure reading is made up of two numbers: a higher number over a lower number. The top number is the systolic pressure. The bottom number is the diastolic pressure. A normal blood pressure is a systolic pressure of  less than 120 over a diastolic pressure of less than 80. You will see your blood pressure readings written together. For example, a person with a systolic pressure of 188 and a diastolic  pressure of 78 will have 118/78 written in the medical record.  High blood pressure is when either the top number is 140 or higher, or the bottom number is 90 or higher. This must be the result when taking your blood pressure a number of times. The blood pressures between normal and high are called prehypertension.  Home care  If you have high blood pressure, you should do what is listed below to lower your blood pressure. If you are taking medicines for high blood pressure, these methods may reduce or end your need for medicines in the future.  · Begin a weight-loss program if you are overweight.  · Cut back on how much salt you get in your diet. Heres how to do this:  ¨ Dont eat foods that have a lot of salt. These include olives, pickles, smoked meats, and salted potato chips.  ¨ Dont add salt to your food at the table.  ¨ Use only small amounts of salt when cooking.  · Start an exercise program. Talk with your healthcare provider about the type of exercise program that would be best for you. It doesn't have to be hard. Even brisk walking for 20 minutes 3 times a week is a good form of exercise.  · Dont take medicines that stimulate the heart. This includes many over-the-counter cold and sinus decongestant pills and sprays, as well as diet pills. Check the warnings about hypertension on the label. Before buying any over-the-counter medicines or supplements, always ask the pharmacist about the product's potential interaction with your high blood pressure and your high blood pressure medicines.  · Stimulants such as amphetamine or cocaine could be deadly for someone with high blood pressure. Never take these.  · Limit how much caffeine you get in your diet. Switch to caffeine-free products.  · Stop smoking. If you are a long-time smoker, this can be hard. Talk to your healthcare provider about medicines and nicotine replacement options to help you. Also, enroll in a stop-smoking program to make it more likely  that you will quit for good.  · Learn how to handle stress. This is an important part of any program to lower blood pressure. Learn about relaxation methods like meditation, yoga, or biofeedback.  · If your provider prescribed medicines, take them exactly as directed. Missing doses may cause your blood pressure get out of control.  · If you miss a dose or doses, check with your healthcare provider or pharmacist about what to do.  · Consider buying an automatic blood pressure machine. Ask your provider for a recommendation. You can get one of these at most pharmacies.     The American Heart Association recommends the following guidelines for home blood pressure monitoring:  · Don't smoke or drink coffee for 30 minutes before taking your blood pressure.  · Go to the bathroom before the test.  · Relax for 5 minutes before taking the measurement.  · Sit with your back supported (don't sit on a couch or soft chair); keep your feet on the floor uncrossed. Place your arm on a solid flat surface (like a table) with the upper part of the arm at heart level. Place the middle of the cuff directly above the eye of the elbow. Check the monitor's instruction manual for an illustration.  · Take multiple readings. When you measure, take 2 to 3 readings one minute apart and record all of the results.  · Take your blood pressure at the same time every day, or as your healthcare provider recommends.  · Record the date, time, and blood pressure reading.  · Take the record with you to your next medical appointment. If your blood pressure monitor has a built-in memory, simply take the monitor with you to your next appointment.  · Call your provider if you have several high readings. Don't be frightened by a single high blood pressure reading, but if you get several high readings, check in with your healthcare provider.  · Note: When blood pressure reaches a systolic (top number) of 180 or higher OR diastolic (bottom number) of 110 or  higher, seek emergency medical treatment.  Follow-up care  You will need to see your healthcare provider regularly. This is to check your blood pressure and to make changes to your medicines. Make a follow-up appointment as directed. Bring the record of your home blood pressure readings to the appointment.  When to seek medical advice  Call your healthcare provider right away if any of these occur:  · Blood pressure reaches a systolic (upper number) of 180 or higher OR a diastolic (bottom number) of 110 or higher  · Chest pain or shortness of breath  · Severe headache  · Throbbing or rushing sound in the ears  · Nosebleed  · Sudden severe pain in your belly (abdomen)  · Extreme drowsiness, confusion, or fainting  · Dizziness or spinning sensation (vertigo)  · Weakness of an arm or leg or one side of the face  · You have problems speaking or seeing   Date Last Reviewed: 12/1/2016  © 3601-9420 [employer]. [address] [place] [address], [place] [address]. All rights reserved. This information is not intended as a substitute for professional medical care. Always follow your healthcare professional's instructions.

## 2019-09-04 ENCOUNTER — TELEPHONE (OUTPATIENT)
Dept: SMOKING CESSATION | Facility: CLINIC | Age: 54
End: 2019-09-04

## 2019-09-04 ENCOUNTER — HOSPITAL ENCOUNTER (OUTPATIENT)
Dept: RADIOLOGY | Facility: CLINIC | Age: 54
Discharge: HOME | End: 2019-09-04
Attending: NURSE PRACTITIONER
Payer: MEDICARE

## 2019-09-04 DIAGNOSIS — R60.9 SOFT TISSUE SWELLING OF BACK: ICD-10-CM

## 2019-09-04 PROCEDURE — 76705 US SOFT TISSUE LOWER BACK: ICD-10-PCS | Mod: 26,,, | Performed by: RADIOLOGY

## 2019-09-04 PROCEDURE — 76705 ECHO EXAM OF ABDOMEN: CPT | Mod: TC,PO

## 2019-09-04 PROCEDURE — 76705 ECHO EXAM OF ABDOMEN: CPT | Mod: 26,,, | Performed by: RADIOLOGY

## 2019-09-04 NOTE — TELEPHONE ENCOUNTER
Patient had multiple appointments at the clinic and he showed as present for tobacco cessation intake, however he did not come back for this appointment. I called to give him an opportunity to reschedule and he has rescheduled for 9/10/2019 at 2:00 pm.

## 2019-09-05 NOTE — PROGRESS NOTES
Please call the patient and let him know that the soft tissue fluid has not increased from the previous CT scan. I can refer him to surgery if he likes. Thanks.

## 2019-09-10 ENCOUNTER — TELEPHONE (OUTPATIENT)
Dept: SMOKING CESSATION | Facility: CLINIC | Age: 54
End: 2019-09-10

## 2019-09-10 NOTE — TELEPHONE ENCOUNTER
I called patient today to offer him an opportunity to reschedule the tobacco cessation appointment he missed today. He has agreed to reschedule for 9/18/2019 at 9:00 am.

## 2019-10-14 ENCOUNTER — HOSPITAL ENCOUNTER (EMERGENCY)
Facility: HOSPITAL | Age: 54
Discharge: HOME | End: 2019-10-14
Attending: EMERGENCY MEDICINE | Admitting: HOSPITALIST
Payer: MEDICARE

## 2019-10-14 VITALS
RESPIRATION RATE: 100 BRPM | BODY MASS INDEX: 26.22 KG/M2 | HEART RATE: 103 BPM | SYSTOLIC BLOOD PRESSURE: 185 MMHG | TEMPERATURE: 98 F | HEIGHT: 69 IN | DIASTOLIC BLOOD PRESSURE: 98 MMHG | OXYGEN SATURATION: 96 % | WEIGHT: 177 LBS

## 2019-10-14 DIAGNOSIS — J20.9 ACUTE BRONCHITIS, UNSPECIFIED ORGANISM: Primary | ICD-10-CM

## 2019-10-14 DIAGNOSIS — R05.9 COUGH: ICD-10-CM

## 2019-10-14 LAB
ALBUMIN SERPL BCP-MCNC: 3.4 G/DL (ref 3.5–5.2)
ALP SERPL-CCNC: 69 U/L (ref 55–135)
ALT SERPL W/O P-5'-P-CCNC: 12 U/L (ref 10–44)
ANION GAP SERPL CALC-SCNC: 10 MMOL/L (ref 8–16)
AST SERPL-CCNC: 14 U/L (ref 10–40)
BASOPHILS # BLD AUTO: 0.01 K/UL (ref 0–0.2)
BASOPHILS NFR BLD: 0.1 % (ref 0–1.9)
BILIRUB SERPL-MCNC: 1.7 MG/DL (ref 0.1–1)
BUN SERPL-MCNC: 9 MG/DL (ref 6–20)
CALCIUM SERPL-MCNC: 9.6 MG/DL (ref 8.7–10.5)
CHLORIDE SERPL-SCNC: 98 MMOL/L (ref 95–110)
CO2 SERPL-SCNC: 28 MMOL/L (ref 23–29)
CREAT SERPL-MCNC: 0.9 MG/DL (ref 0.5–1.4)
DIFFERENTIAL METHOD: ABNORMAL
EOSINOPHIL # BLD AUTO: 0 K/UL (ref 0–0.5)
EOSINOPHIL NFR BLD: 0.3 % (ref 0–8)
ERYTHROCYTE [DISTWIDTH] IN BLOOD BY AUTOMATED COUNT: 14 % (ref 11.5–14.5)
EST. GFR  (AFRICAN AMERICAN): >60 ML/MIN/1.73 M^2
EST. GFR  (NON AFRICAN AMERICAN): >60 ML/MIN/1.73 M^2
GLUCOSE SERPL-MCNC: 247 MG/DL (ref 70–110)
HCT VFR BLD AUTO: 40.2 % (ref 40–54)
HGB BLD-MCNC: 13.2 G/DL (ref 14–18)
IMM GRANULOCYTES # BLD AUTO: 0.06 K/UL (ref 0–0.04)
INFLUENZA A, MOLECULAR: NEGATIVE
INFLUENZA B, MOLECULAR: NEGATIVE
LACTATE SERPL-SCNC: 1.2 MMOL/L (ref 0.5–2.2)
LYMPHOCYTES # BLD AUTO: 1.1 K/UL (ref 1–4.8)
LYMPHOCYTES NFR BLD: 9.2 % (ref 18–48)
MCH RBC QN AUTO: 31.1 PG (ref 27–31)
MCHC RBC AUTO-ENTMCNC: 32.8 G/DL (ref 32–36)
MCV RBC AUTO: 95 FL (ref 82–98)
MONOCYTES # BLD AUTO: 0.9 K/UL (ref 0.3–1)
MONOCYTES NFR BLD: 7.6 % (ref 4–15)
NEUTROPHILS # BLD AUTO: 9.4 K/UL (ref 1.8–7.7)
NEUTROPHILS NFR BLD: 82.3 % (ref 38–73)
NRBC BLD-RTO: 0 /100 WBC
PLATELET # BLD AUTO: 153 K/UL (ref 150–350)
PMV BLD AUTO: 10.8 FL (ref 9.2–12.9)
POTASSIUM SERPL-SCNC: 3.9 MMOL/L (ref 3.5–5.1)
PROT SERPL-MCNC: 7.5 G/DL (ref 6–8.4)
RBC # BLD AUTO: 4.24 M/UL (ref 4.6–6.2)
SODIUM SERPL-SCNC: 136 MMOL/L (ref 136–145)
SPECIMEN SOURCE: NORMAL
WBC # BLD AUTO: 11.43 K/UL (ref 3.9–12.7)

## 2019-10-14 PROCEDURE — 25000003 PHARM REV CODE 250: Performed by: EMERGENCY MEDICINE

## 2019-10-14 PROCEDURE — 63600175 PHARM REV CODE 636 W HCPCS: Performed by: EMERGENCY MEDICINE

## 2019-10-14 PROCEDURE — 87502 INFLUENZA DNA AMP PROBE: CPT

## 2019-10-14 PROCEDURE — 99285 EMERGENCY DEPT VISIT HI MDM: CPT | Mod: 25

## 2019-10-14 PROCEDURE — 25000242 PHARM REV CODE 250 ALT 637 W/ HCPCS: Performed by: EMERGENCY MEDICINE

## 2019-10-14 PROCEDURE — 94640 AIRWAY INHALATION TREATMENT: CPT

## 2019-10-14 PROCEDURE — 83605 ASSAY OF LACTIC ACID: CPT

## 2019-10-14 PROCEDURE — 85025 COMPLETE CBC W/AUTO DIFF WBC: CPT

## 2019-10-14 PROCEDURE — 80053 COMPREHEN METABOLIC PANEL: CPT

## 2019-10-14 PROCEDURE — 96374 THER/PROPH/DIAG INJ IV PUSH: CPT

## 2019-10-14 RX ORDER — DOXYCYCLINE 100 MG/1
100 CAPSULE ORAL 2 TIMES DAILY
Qty: 20 CAPSULE | Refills: 0 | Status: SHIPPED | OUTPATIENT
Start: 2019-10-14 | End: 2019-10-24

## 2019-10-14 RX ORDER — PREDNISONE 20 MG/1
40 TABLET ORAL DAILY
Qty: 10 TABLET | Refills: 0 | Status: SHIPPED | OUTPATIENT
Start: 2019-10-14 | End: 2019-10-19

## 2019-10-14 RX ORDER — DOXYCYCLINE HYCLATE 100 MG
100 TABLET ORAL
Status: COMPLETED | OUTPATIENT
Start: 2019-10-14 | End: 2019-10-14

## 2019-10-14 RX ORDER — METHYLPREDNISOLONE SOD SUCC 125 MG
125 VIAL (EA) INJECTION
Status: COMPLETED | OUTPATIENT
Start: 2019-10-14 | End: 2019-10-14

## 2019-10-14 RX ORDER — ALBUTEROL SULFATE 2.5 MG/.5ML
5 SOLUTION RESPIRATORY (INHALATION)
Status: COMPLETED | OUTPATIENT
Start: 2019-10-14 | End: 2019-10-14

## 2019-10-14 RX ORDER — IPRATROPIUM BROMIDE 0.5 MG/2.5ML
0.5 SOLUTION RESPIRATORY (INHALATION) ONCE
Status: COMPLETED | OUTPATIENT
Start: 2019-10-14 | End: 2019-10-14

## 2019-10-14 RX ADMIN — METHYLPREDNISOLONE SODIUM SUCCINATE 125 MG: 125 INJECTION, POWDER, FOR SOLUTION INTRAMUSCULAR; INTRAVENOUS at 09:10

## 2019-10-14 RX ADMIN — DOXYCYCLINE HYCLATE 100 MG: 100 TABLET, COATED ORAL at 09:10

## 2019-10-14 RX ADMIN — IPRATROPIUM BROMIDE 0.5 MG: 0.5 SOLUTION RESPIRATORY (INHALATION) at 08:10

## 2019-10-14 RX ADMIN — ALBUTEROL SULFATE 5 MG: 2.5 SOLUTION RESPIRATORY (INHALATION) at 08:10

## 2019-10-14 NOTE — ED NOTES
Pt awake alert oriented reports productive cough for the past 4 days, denies n/v/d denies abd pain

## 2019-10-14 NOTE — ED PROVIDER NOTES
Encounter Date: 10/14/2019    SCRIBE #1 NOTE: ICaryn, am scribing for, and in the presence of, Dr. Emanuel.       History     Chief Complaint   Patient presents with    Shortness of Breath     worsening over several days     Cough       Time seen by provider: 7:50 AM on 10/14/2019    Hernan Badillo is a 53 y.o. male with a PMHx of COPD, HTN and DM II who presents to the ED with c/o a productive cough for 4 days. He c/o left lower rib pain from coughing and some SOB. He has no other complaints at this time.  He denies fevers and chills. PSHx includes hernia repair and dental surgery. Patient is a smoker. NKDA.     The history is provided by the patient.     Review of patient's allergies indicates:  No Known Allergies  Past Medical History:   Diagnosis Date    COPD (chronic obstructive pulmonary disease)     Diabetes mellitus, type 2     Hypertension      Past Surgical History:   Procedure Laterality Date    DENTAL SURGERY      HERNIA REPAIR      left inguinal    incision and drainiage       Family History   Problem Relation Age of Onset    Heart attack Father 80    Heart disease Father     Drug abuse Father     Cancer Maternal Grandmother         colon     Social History     Tobacco Use    Smoking status: Current Every Day Smoker     Packs/day: 2.00     Years: 30.00     Pack years: 60.00     Types: Cigarettes    Smokeless tobacco: Never Used   Substance Use Topics    Alcohol use: Yes     Alcohol/week: 1.0 standard drinks     Types: 1 Shots of liquor per week     Comment: 1/2 pint 2 x per week     Drug use: No     Review of Systems   Constitutional: Negative for chills and fever.   HENT: Negative for sore throat.    Respiratory: Positive for cough (productive) and shortness of breath.    Cardiovascular: Positive for chest pain (left lower).   Gastrointestinal: Negative for nausea.   Endocrine: Negative for polyuria.   Genitourinary: Negative for flank pain.   Musculoskeletal: Negative for back  pain.   Skin: Negative for rash.   Neurological: Negative for weakness.   All other systems reviewed and are negative.      Physical Exam     Initial Vitals [10/14/19 0724]   BP Pulse Resp Temp SpO2   (!) 154/81 102 (!) 24 98.1 °F (36.7 °C) (!) 91 %      MAP       --         Physical Exam    Nursing note and vitals reviewed.  Constitutional: He appears well-developed and well-nourished. He appears lethargic. He is not diaphoretic.  Non-toxic appearance. He does not have a sickly appearance. He appears ill. No distress.   HENT:   Head: Normocephalic and atraumatic.   Eyes: EOM are normal.   Neck: Normal range of motion. Neck supple. Normal range of motion present. No neck rigidity.   Cardiovascular: Normal rate, regular rhythm and normal heart sounds. Exam reveals no gallop and no friction rub.    No murmur heard.  Pulmonary/Chest: No respiratory distress. He has wheezes. He has no rhonchi. He has no rales. He exhibits tenderness.   Wheezing is audible without stethoscope. Left sided chest tenderness.    Abdominal: Soft. He exhibits no distension. There is no tenderness. There is no rebound.   Musculoskeletal: Normal range of motion.   Neurological: He is oriented to person, place, and time. He appears lethargic.   Skin: Skin is warm and dry. No rash noted.   Psychiatric: He has a normal mood and affect. His behavior is normal. Judgment and thought content normal.         ED Course   Procedures  Labs Reviewed   CBC W/ AUTO DIFFERENTIAL - Abnormal; Notable for the following components:       Result Value    RBC 4.24 (*)     Hemoglobin 13.2 (*)     Mean Corpuscular Hemoglobin 31.1 (*)     Gran # (ANC) 9.4 (*)     Immature Grans (Abs) 0.06 (*)     Gran% 82.3 (*)     Lymph% 9.2 (*)     All other components within normal limits   COMPREHENSIVE METABOLIC PANEL - Abnormal; Notable for the following components:    Glucose 247 (*)     Albumin 3.4 (*)     Total Bilirubin 1.7 (*)     All other components within normal limits    INFLUENZA A & B BY MOLECULAR   CULTURE, BLOOD   CULTURE, BLOOD   LACTIC ACID, PLASMA          Imaging Results          X-Ray Chest PA And Lateral (Final result)  Result time 10/14/19 07:52:47    Final result by Iván Coronado MD (10/14/19 07:52:47)                 Impression:      No airspace disease to suggest pneumonia.      Electronically signed by: Iván Coronado  Date:    10/14/2019  Time:    07:52             Narrative:    EXAMINATION:  XR CHEST PA AND LATERAL    CLINICAL HISTORY:  Cough    TECHNIQUE:  PA and lateral views of the chest were performed.    COMPARISON:  07/30/2019    FINDINGS:  Lungs are clear.Normal cardiomediastinal silhouette.Normal pulmonary vascular distribution.No pleural effusion or pneumothorax.Mild multilevel degenerative changes in the spine.                                 Medical Decision Making:   History:   Old Medical Records: I decided to obtain old medical records.  Clinical Tests:   Lab Tests: Ordered and Reviewed  Radiological Study: Ordered and Reviewed            Scribe Attestation:   Scribe #1: I performed the above scribed service and the documentation accurately describes the services I performed. I attest to the accuracy of the note.        I, Dr. Emanuel, personally performed the services described in this documentation. All medical record entries made by the scribe were at my direction and in my presence.  I have reviewed the chart and agree that the record reflects my personal performance and is accurate and complete.10:02 AM 10/14/2019         ED Course as of Oct 14 0933   Mon Oct 14, 2019   0730 BP(!): 154/81 [EF]   0731 Temp: 98.1 °F (36.7 °C) [EF]   0731 Temp src: Oral [EF]   0731 Pulse: 102 [EF]   0731 Resp(!): 24 [EF]   0731 SpO2(!): 91 % [EF]   0802 X-Ray Chest PA And Lateral [EF]   0826 WBC: 11.43 [EF]   0826 Hemoglobin(!): 13.2 [EF]   0826 Platelets: 153 [EF]   0906 Lactate, Iraj: 1.2 [EF]   0916 Influenza A, Molecular: Negative [EF]   0916 Influenza B,  Molecular: Negative [EF]   0932 Initially ill-appearing and short of breath on arrival but this improved greatly after albuterol nebs and Atrovent.  Chest x-ray does not demonstrate any pneumonia.  Patient is eager to be discharged home and did well ambulating around the emergency room.    [EF]      ED Course User Index  [EF] Chet Emanuel MD     Clinical Impression:       ICD-10-CM ICD-9-CM   1. Cough R05 786.2         Disposition:   Disposition: Discharged  Condition: Stable         53-year-old male with a history of COPD presents to the ER with 4 days of a productive cough wheezing and shortness of breath.  Initially slightly ill appearing and somewhat lethargic on arrival but this improved significantly in the emergency room after nebulizer treatments.  Patient is requesting to be discharged.  He was ambulated around the emergency room without any difficulty although his oxygen sats did drop into the upper 80s.  He never showed any signs of distress. Patient will be started on antibiotics and steroids.  He has nebulizers at home.  Return to the ER if symptoms worsen or change.  No distress on discharge.               Chet Emanuel MD  10/14/19 5725

## 2020-01-06 ENCOUNTER — DOCUMENTATION ONLY (OUTPATIENT)
Dept: FAMILY MEDICINE | Facility: CLINIC | Age: 55
End: 2020-01-06

## 2020-01-06 NOTE — PROGRESS NOTES
Pre-Visit Chart Review  For Appointment Scheduled on 1/6/2020    Health Maintenance Due   Topic Date Due    TETANUS VACCINE  10/15/1983    Colonoscopy  10/15/2015    Hemoglobin A1c  11/27/2019

## 2020-05-30 ENCOUNTER — HOSPITAL ENCOUNTER (OUTPATIENT)
Facility: HOSPITAL | Age: 55
Discharge: HOME | End: 2020-06-02
Attending: EMERGENCY MEDICINE | Admitting: INTERNAL MEDICINE
Payer: MEDICARE

## 2020-05-30 DIAGNOSIS — R07.9 CHEST PAIN: ICD-10-CM

## 2020-05-30 DIAGNOSIS — R10.84 GENERALIZED ABDOMINAL PAIN: ICD-10-CM

## 2020-05-30 DIAGNOSIS — Z91.89 AT RISK FOR CORONARY ARTERY DISEASE: ICD-10-CM

## 2020-05-30 DIAGNOSIS — R10.13 EPIGASTRIC PAIN: Primary | ICD-10-CM

## 2020-05-30 DIAGNOSIS — I20.9 ANGINA PECTORIS, UNSPECIFIED: ICD-10-CM

## 2020-05-30 DIAGNOSIS — J18.9 PNEUMONIA OF RIGHT LOWER LOBE DUE TO INFECTIOUS ORGANISM: ICD-10-CM

## 2020-05-30 PROBLEM — F10.21 HISTORY OF ALCOHOLISM: Status: ACTIVE | Noted: 2020-05-30

## 2020-05-30 PROBLEM — Z72.0 TOBACCO ABUSE: Status: ACTIVE | Noted: 2020-05-30

## 2020-05-30 PROBLEM — Z91.199 NONCOMPLIANCE: Status: ACTIVE | Noted: 2020-05-30

## 2020-05-30 LAB
ALBUMIN SERPL BCP-MCNC: 3.7 G/DL (ref 3.5–5.2)
ALP SERPL-CCNC: 78 U/L (ref 55–135)
ALT SERPL W/O P-5'-P-CCNC: 12 U/L (ref 10–44)
AMPHET+METHAMPHET UR QL: NEGATIVE
ANION GAP SERPL CALC-SCNC: 12 MMOL/L (ref 8–16)
AST SERPL-CCNC: 16 U/L (ref 10–40)
BARBITURATES UR QL SCN>200 NG/ML: NEGATIVE
BASOPHILS # BLD AUTO: 0.03 K/UL (ref 0–0.2)
BASOPHILS NFR BLD: 0.5 % (ref 0–1.9)
BENZODIAZ UR QL SCN>200 NG/ML: NEGATIVE
BILIRUB SERPL-MCNC: 0.7 MG/DL (ref 0.1–1)
BILIRUB UR QL STRIP: NEGATIVE
BNP SERPL-MCNC: 112 PG/ML (ref 0–99)
BUN SERPL-MCNC: 10 MG/DL (ref 6–20)
BZE UR QL SCN: NEGATIVE
CALCIUM SERPL-MCNC: 9.7 MG/DL (ref 8.7–10.5)
CANNABINOIDS UR QL SCN: NEGATIVE
CHLORIDE SERPL-SCNC: 97 MMOL/L (ref 95–110)
CLARITY UR: CLEAR
CO2 SERPL-SCNC: 29 MMOL/L (ref 23–29)
COLOR UR: YELLOW
CREAT SERPL-MCNC: 1 MG/DL (ref 0.5–1.4)
CREAT UR-MCNC: 219.8 MG/DL (ref 23–375)
DIFFERENTIAL METHOD: ABNORMAL
EOSINOPHIL # BLD AUTO: 0.1 K/UL (ref 0–0.5)
EOSINOPHIL NFR BLD: 2.1 % (ref 0–8)
ERYTHROCYTE [DISTWIDTH] IN BLOOD BY AUTOMATED COUNT: 13.3 % (ref 11.5–14.5)
EST. GFR  (AFRICAN AMERICAN): >60 ML/MIN/1.73 M^2
EST. GFR  (NON AFRICAN AMERICAN): >60 ML/MIN/1.73 M^2
ETHANOL SERPL-MCNC: <10 MG/DL
GLUCOSE SERPL-MCNC: 166 MG/DL (ref 70–110)
GLUCOSE UR QL STRIP: NEGATIVE
HCT VFR BLD AUTO: 42.3 % (ref 40–54)
HGB BLD-MCNC: 13.7 G/DL (ref 14–18)
HGB UR QL STRIP: NEGATIVE
IMM GRANULOCYTES # BLD AUTO: 0.02 K/UL (ref 0–0.04)
IMM GRANULOCYTES NFR BLD AUTO: 0.3 % (ref 0–0.5)
KETONES UR QL STRIP: NEGATIVE
LEUKOCYTE ESTERASE UR QL STRIP: NEGATIVE
LIPASE SERPL-CCNC: 15 U/L (ref 4–60)
LYMPHOCYTES # BLD AUTO: 1.1 K/UL (ref 1–4.8)
LYMPHOCYTES NFR BLD: 18.2 % (ref 18–48)
MCH RBC QN AUTO: 29.3 PG (ref 27–31)
MCHC RBC AUTO-ENTMCNC: 32.4 G/DL (ref 32–36)
MCV RBC AUTO: 90 FL (ref 82–98)
METHADONE UR QL SCN>300 NG/ML: NEGATIVE
MONOCYTES # BLD AUTO: 0.3 K/UL (ref 0.3–1)
MONOCYTES NFR BLD: 5.3 % (ref 4–15)
NEUTROPHILS # BLD AUTO: 4.6 K/UL (ref 1.8–7.7)
NEUTROPHILS NFR BLD: 73.6 % (ref 38–73)
NITRITE UR QL STRIP: NEGATIVE
NRBC BLD-RTO: 0 /100 WBC
OPIATES UR QL SCN: NEGATIVE
PCP UR QL SCN>25 NG/ML: NEGATIVE
PH UR STRIP: 6 [PH] (ref 5–8)
PLATELET # BLD AUTO: 181 K/UL (ref 150–350)
PMV BLD AUTO: 11.3 FL (ref 9.2–12.9)
POTASSIUM SERPL-SCNC: 3.6 MMOL/L (ref 3.5–5.1)
PROT SERPL-MCNC: 8 G/DL (ref 6–8.4)
PROT UR QL STRIP: NEGATIVE
RBC # BLD AUTO: 4.68 M/UL (ref 4.6–6.2)
SARS-COV-2 RDRP RESP QL NAA+PROBE: NEGATIVE
SODIUM SERPL-SCNC: 138 MMOL/L (ref 136–145)
SP GR UR STRIP: 1.02 (ref 1–1.03)
TOXICOLOGY INFORMATION: NORMAL
TROPONIN I SERPL DL<=0.01 NG/ML-MCNC: 0.02 NG/ML (ref 0–0.03)
TROPONIN I SERPL DL<=0.01 NG/ML-MCNC: 0.04 NG/ML (ref 0–0.03)
URN SPEC COLLECT METH UR: NORMAL
UROBILINOGEN UR STRIP-ACNC: NEGATIVE EU/DL
WBC # BLD AUTO: 6.26 K/UL (ref 3.9–12.7)

## 2020-05-30 PROCEDURE — 63600175 PHARM REV CODE 636 W HCPCS: Performed by: NURSE PRACTITIONER

## 2020-05-30 PROCEDURE — 63600175 PHARM REV CODE 636 W HCPCS: Performed by: EMERGENCY MEDICINE

## 2020-05-30 PROCEDURE — 96365 THER/PROPH/DIAG IV INF INIT: CPT | Mod: 59 | Performed by: EMERGENCY MEDICINE

## 2020-05-30 PROCEDURE — 85025 COMPLETE CBC W/AUTO DIFF WBC: CPT

## 2020-05-30 PROCEDURE — 80053 COMPREHEN METABOLIC PANEL: CPT

## 2020-05-30 PROCEDURE — 25500020 PHARM REV CODE 255

## 2020-05-30 PROCEDURE — 80307 DRUG TEST PRSMV CHEM ANLYZR: CPT

## 2020-05-30 PROCEDURE — G0378 HOSPITAL OBSERVATION PER HR: HCPCS

## 2020-05-30 PROCEDURE — 99900035 HC TECH TIME PER 15 MIN (STAT)

## 2020-05-30 PROCEDURE — 81003 URINALYSIS AUTO W/O SCOPE: CPT | Mod: 59

## 2020-05-30 PROCEDURE — 83690 ASSAY OF LIPASE: CPT

## 2020-05-30 PROCEDURE — 99285 EMERGENCY DEPT VISIT HI MDM: CPT | Mod: 25

## 2020-05-30 PROCEDURE — 25000003 PHARM REV CODE 250: Performed by: NURSE PRACTITIONER

## 2020-05-30 PROCEDURE — 84484 ASSAY OF TROPONIN QUANT: CPT | Mod: 91

## 2020-05-30 PROCEDURE — 25000003 PHARM REV CODE 250: Performed by: EMERGENCY MEDICINE

## 2020-05-30 PROCEDURE — C9113 INJ PANTOPRAZOLE SODIUM, VIA: HCPCS | Performed by: NURSE PRACTITIONER

## 2020-05-30 PROCEDURE — 83880 ASSAY OF NATRIURETIC PEPTIDE: CPT

## 2020-05-30 PROCEDURE — 84484 ASSAY OF TROPONIN QUANT: CPT

## 2020-05-30 PROCEDURE — 36415 COLL VENOUS BLD VENIPUNCTURE: CPT

## 2020-05-30 PROCEDURE — U0002 COVID-19 LAB TEST NON-CDC: HCPCS

## 2020-05-30 PROCEDURE — 96375 TX/PRO/DX INJ NEW DRUG ADDON: CPT | Performed by: EMERGENCY MEDICINE

## 2020-05-30 PROCEDURE — 80320 DRUG SCREEN QUANTALCOHOLS: CPT

## 2020-05-30 PROCEDURE — 93005 ELECTROCARDIOGRAM TRACING: CPT

## 2020-05-30 PROCEDURE — 94761 N-INVAS EAR/PLS OXIMETRY MLT: CPT

## 2020-05-30 PROCEDURE — 96367 TX/PROPH/DG ADDL SEQ IV INF: CPT | Performed by: EMERGENCY MEDICINE

## 2020-05-30 RX ORDER — INSULIN ASPART 100 [IU]/ML
0-5 INJECTION, SOLUTION INTRAVENOUS; SUBCUTANEOUS EVERY 6 HOURS
Status: DISCONTINUED | OUTPATIENT
Start: 2020-05-31 | End: 2020-06-02 | Stop reason: HOSPADM

## 2020-05-30 RX ORDER — AMLODIPINE BESYLATE 5 MG/1
10 TABLET ORAL DAILY
Status: DISCONTINUED | OUTPATIENT
Start: 2020-05-31 | End: 2020-06-02 | Stop reason: HOSPADM

## 2020-05-30 RX ORDER — MAG HYDROX/ALUMINUM HYD/SIMETH 200-200-20
30 SUSPENSION, ORAL (FINAL DOSE FORM) ORAL
Status: COMPLETED | OUTPATIENT
Start: 2020-05-30 | End: 2020-05-30

## 2020-05-30 RX ORDER — POTASSIUM CHLORIDE 20 MEQ/15ML
40 SOLUTION ORAL
Status: DISCONTINUED | OUTPATIENT
Start: 2020-05-30 | End: 2020-06-02 | Stop reason: HOSPADM

## 2020-05-30 RX ORDER — GLUCAGON 1 MG
1 KIT INJECTION
Status: DISCONTINUED | OUTPATIENT
Start: 2020-05-30 | End: 2020-06-02 | Stop reason: HOSPADM

## 2020-05-30 RX ORDER — LIDOCAINE HYDROCHLORIDE 20 MG/ML
10 SOLUTION OROPHARYNGEAL
Status: COMPLETED | OUTPATIENT
Start: 2020-05-30 | End: 2020-05-30

## 2020-05-30 RX ORDER — IBUPROFEN 200 MG
16 TABLET ORAL
Status: DISCONTINUED | OUTPATIENT
Start: 2020-05-30 | End: 2020-06-02 | Stop reason: HOSPADM

## 2020-05-30 RX ORDER — LANOLIN ALCOHOL/MO/W.PET/CERES
800 CREAM (GRAM) TOPICAL
Status: DISCONTINUED | OUTPATIENT
Start: 2020-05-30 | End: 2020-06-02 | Stop reason: HOSPADM

## 2020-05-30 RX ORDER — IBUPROFEN 200 MG
24 TABLET ORAL
Status: DISCONTINUED | OUTPATIENT
Start: 2020-05-30 | End: 2020-06-02 | Stop reason: HOSPADM

## 2020-05-30 RX ORDER — PANTOPRAZOLE SODIUM 40 MG/10ML
40 INJECTION, POWDER, LYOPHILIZED, FOR SOLUTION INTRAVENOUS DAILY
Status: DISCONTINUED | OUTPATIENT
Start: 2020-05-30 | End: 2020-06-02 | Stop reason: HOSPADM

## 2020-05-30 RX ORDER — LISINOPRIL 10 MG/1
20 TABLET ORAL DAILY
Status: DISCONTINUED | OUTPATIENT
Start: 2020-05-31 | End: 2020-06-02 | Stop reason: HOSPADM

## 2020-05-30 RX ORDER — SUCRALFATE 1 G/1
1 TABLET ORAL
Status: DISCONTINUED | OUTPATIENT
Start: 2020-05-30 | End: 2020-06-02 | Stop reason: HOSPADM

## 2020-05-30 RX ORDER — MORPHINE SULFATE 2 MG/ML
2 INJECTION, SOLUTION INTRAMUSCULAR; INTRAVENOUS EVERY 4 HOURS PRN
Status: DISCONTINUED | OUTPATIENT
Start: 2020-05-30 | End: 2020-05-31

## 2020-05-30 RX ORDER — ACETAMINOPHEN 325 MG/1
650 TABLET ORAL EVERY 4 HOURS PRN
Status: DISCONTINUED | OUTPATIENT
Start: 2020-05-30 | End: 2020-06-02 | Stop reason: HOSPADM

## 2020-05-30 RX ORDER — ONDANSETRON 2 MG/ML
4 INJECTION INTRAMUSCULAR; INTRAVENOUS EVERY 6 HOURS PRN
Status: DISCONTINUED | OUTPATIENT
Start: 2020-05-30 | End: 2020-06-02 | Stop reason: HOSPADM

## 2020-05-30 RX ORDER — IPRATROPIUM BROMIDE AND ALBUTEROL SULFATE 2.5; .5 MG/3ML; MG/3ML
3 SOLUTION RESPIRATORY (INHALATION) EVERY 6 HOURS PRN
Status: DISCONTINUED | OUTPATIENT
Start: 2020-05-30 | End: 2020-06-02 | Stop reason: HOSPADM

## 2020-05-30 RX ORDER — SODIUM CHLORIDE 0.9 % (FLUSH) 0.9 %
10 SYRINGE (ML) INJECTION
Status: DISCONTINUED | OUTPATIENT
Start: 2020-05-30 | End: 2020-06-02 | Stop reason: HOSPADM

## 2020-05-30 RX ADMIN — MORPHINE SULFATE 2 MG: 2 INJECTION, SOLUTION INTRAMUSCULAR; INTRAVENOUS at 10:05

## 2020-05-30 RX ADMIN — ALUMINUM HYDROXIDE, MAGNESIUM HYDROXIDE, AND SIMETHICONE 30 ML: 200; 200; 20 SUSPENSION ORAL at 08:05

## 2020-05-30 RX ADMIN — SUCRALFATE 1 G: 1 TABLET ORAL at 10:05

## 2020-05-30 RX ADMIN — PANTOPRAZOLE SODIUM 40 MG: 40 INJECTION, POWDER, LYOPHILIZED, FOR SOLUTION INTRAVENOUS at 10:05

## 2020-05-30 RX ADMIN — CEFTRIAXONE 1 G: 1 INJECTION, SOLUTION INTRAVENOUS at 08:05

## 2020-05-30 RX ADMIN — AZITHROMYCIN MONOHYDRATE 500 MG: 500 INJECTION, POWDER, LYOPHILIZED, FOR SOLUTION INTRAVENOUS at 08:05

## 2020-05-30 RX ADMIN — IOHEXOL 100 ML: 350 INJECTION, SOLUTION INTRAVENOUS at 07:05

## 2020-05-30 RX ADMIN — LIDOCAINE HYDROCHLORIDE 10 ML: 20 SOLUTION ORAL; TOPICAL at 08:05

## 2020-05-30 NOTE — ED PROVIDER NOTES
Encounter Date: 5/30/2020    SCRIBE #1 NOTE: I, Quinn Rubio, am scribing for, and in the presence of, Dr. Chaudhary.       History     Chief Complaint   Patient presents with    Chest Pain     started 2 days ago     Time seen by provider: 5:12 PM on 05/30/2020      Hernan Badillo is a 54 y.o. male with a PMHx of HTN, COPD, and T2DM who presents to the ED for multiple complaints including abdominal pain and chest pain that has been ongoing for 3 days. The patient is a poor historian. He states that he is having diffuse abdominal pain and right sided chest pain. The patient report that this abdominal pain is more of an achy cramping pain. He reports that this pain has been intermittent. Patient states he was an alcoholic but his last alcoholic beverage was 4 months ago. He denies SOB, fever, acute cough, vomiting, diarrhea, nausea, or any other complaints at this time. The patient has a PSHx of hernia repair, incision and drainage, and dental surgery.       The history is provided by the patient.     Review of patient's allergies indicates:  No Known Allergies  Past Medical History:   Diagnosis Date    COPD (chronic obstructive pulmonary disease)     Diabetes mellitus, type 2     Hypertension      Past Surgical History:   Procedure Laterality Date    DENTAL SURGERY      HERNIA REPAIR      left inguinal    incision and drainiage       Family History   Problem Relation Age of Onset    Heart attack Father 80    Heart disease Father     Drug abuse Father     Cancer Maternal Grandmother         colon     Social History     Tobacco Use    Smoking status: Current Every Day Smoker     Packs/day: 2.00     Years: 30.00     Pack years: 60.00     Types: Cigarettes    Smokeless tobacco: Never Used   Substance Use Topics    Alcohol use: Yes     Alcohol/week: 1.0 standard drinks     Types: 1 Shots of liquor per week     Comment: 1/2 pint 2 x per week - quit approximately 5 months ago    Drug use: No     Review of Systems    Constitutional: Negative for activity change, diaphoresis and fever.   HENT: Negative for drooling, rhinorrhea, sore throat and trouble swallowing.    Eyes: Negative for pain and visual disturbance.   Respiratory: Negative for cough, shortness of breath and stridor.    Cardiovascular: Positive for chest pain. Negative for leg swelling.   Gastrointestinal: Positive for abdominal pain. Negative for abdominal distention, constipation, diarrhea, nausea and vomiting.   Genitourinary: Negative for discharge, dysuria and hematuria.   Musculoskeletal: Negative for gait problem.   Skin: Negative for rash.   Neurological: Negative for seizures, facial asymmetry and headaches.   Psychiatric/Behavioral: Negative for hallucinations and suicidal ideas.       Physical Exam     Initial Vitals   BP Pulse Resp Temp SpO2   05/30/20 1639 05/30/20 1710 05/30/20 1639 05/30/20 1639 05/30/20 1639   (!) 175/121 69 (!) 21 97.9 °F (36.6 °C) 97 %      MAP       --                Physical Exam    Nursing note and vitals reviewed.  Constitutional: He appears well-developed. No distress.   HENT:   Head: Normocephalic and atraumatic.   Nose: Nose normal.   Eyes: EOM are normal.   Neck: Neck supple. No tracheal deviation present. No JVD present.   Cardiovascular: Normal rate, regular rhythm, normal heart sounds and intact distal pulses. Exam reveals no gallop and no friction rub.    No murmur heard.  Pulmonary/Chest: Breath sounds normal. No respiratory distress. He has no wheezes. He has no rhonchi. He has no rales.   Abdominal: Soft. Bowel sounds are normal. There is tenderness.   Musculoskeletal: Normal range of motion.   Neurological: He is alert and oriented to person, place, and time. No cranial nerve deficit.   Skin: Skin is warm and dry. Capillary refill takes less than 2 seconds. No rash noted.   Psychiatric: He has a normal mood and affect.         ED Course   Procedures  Labs Reviewed   CBC W/ AUTO DIFFERENTIAL - Abnormal; Notable for  the following components:       Result Value    Hemoglobin 13.7 (*)     Gran% 73.6 (*)     All other components within normal limits   COMPREHENSIVE METABOLIC PANEL - Abnormal; Notable for the following components:    Glucose 166 (*)     All other components within normal limits   LIPASE   URINALYSIS, REFLEX TO URINE CULTURE    Narrative:     Preferred Collection Type->Urine, Clean Catch   DRUG SCREEN PANEL, URINE EMERGENCY    Narrative:     Preferred Collection Type->Urine, Clean Catch   ALCOHOL,MEDICAL (ETHANOL)   TROPONIN I   SARS-COV-2 RNA AMPLIFICATION, QUAL          Imaging Results          X-Ray Chest AP Portable (Final result)  Result time 05/30/20 20:23:27    Final result by Jeremiah Manning MD (05/30/20 20:23:27)                 Impression:      No acute intrathoracic process.    Questionable 1.3 cm nodule in the right lung base.      Electronically signed by: Jeremiah Manning MD  Date:    05/30/2020  Time:    20:23             Narrative:    EXAMINATION:  XR CHEST AP PORTABLE    CLINICAL HISTORY:  Chest pain, unspecified    TECHNIQUE:  Single frontal view of the chest was performed.    COMPARISON:  10/14/2019.    FINDINGS:  Monitoring EKG leads are present.  The trachea is unremarkable.  The cardiomediastinal silhouette is within normal limits.  The hilar structures are unremarkable.  The hemidiaphragms are within normal limits.  There is no evidence of free air beneath the hemidiaphragms.  There are no pleural effusions.  There is no evidence of a pneumothorax.  There is no evidence of pneumomediastinum.  There is a questionable 1.3 cm nodule in the right lung base.  The osseous structures demonstrate degenerative changes.                               CT Abdomen Pelvis With Contrast (Final result)  Result time 05/31/20 12:31:32    Final result by Iván Coronado MD (05/31/20 12:31:32)                 Impression:      1. Airways disease or atypical infection in the right lower lobe.  2. Borderline prostate  enlargement.  Correlate with PSA.  3. Urinary bladder wall thickening is presumably from under distension.  Cystitis or outlet obstruction are also possible in the appropriate clinical setting.      Electronically signed by: Iván Coronado  Date:    05/31/2020  Time:    12:31             Narrative:    EXAMINATION:  CT ABDOMEN PELVIS WITH CONTRAST    CLINICAL HISTORY:  Infection, abdomen-pelvis;    TECHNIQUE:  Low dose axial images, sagittal and coronal reformations were obtained from the lung bases to the pubic symphysis following the IV administration of 100 mL of Omnipaque 350 .  Oral contrast was not given.    COMPARISON:  None.    FINDINGS:  Scattered clusters of micro nodular and reticular opacities in the right lower lobe.  Normal size heart.  Decompressed gallbladder.    There are two adjacent subcentimeter hypodense lesions in the right renal midpole cortex, too small to characterize.  There is pancreas atrophy.  Remaining solid abdominal organs are unremarkable.    There is no enteric contrast which limits bowel assessment.  Stomach is mildly distended.  No dilated bowel loops.  Normal appendix.  Mild degree of stool in the right colon.    Atherosclerosis prostate 5 cm with heterogeneous attenuation.  There is wall thickening of the urinary bladder which is not distended.    Degenerative disc disease mild at L4-5 and moderate at L5-S1.  L5-S1 facet degenerative changes.                                 Medical Decision Making:   History:   Old Medical Records: I decided to obtain old medical records.  Clinical Tests:   Lab Tests: Ordered and Reviewed  Radiological Study: Reviewed and Ordered  Medical Tests: Ordered and Reviewed            Scribe Attestation:   Scribe #1: I performed the above scribed service and the documentation accurately describes the services I performed. I attest to the accuracy of the note.      Attending Attestation:     Physician Attestation for Scribe:    I, Dr. Alpesh Chaudhary,  personally performed the services described in this documentation.   All medical record entries made by the scribe were at my direction and in my presence.   I have reviewed the chart and agree that the record is accurate and complete.   Alpesh Chaudhary MD  8:48 PM 05/31/2020     DISCLAIMER: This note was prepared with [employer] voice recognition transcription software. Garbled syntax, mangled pronouns, and other bizarre constructions may be attributed to that software system.          ED Course as of May 31 1306   Sat May 30, 2020   1708 53 y.o. male with a PMHx of COPD, HTN and DM II pw multiple complaints but mostly chest pain and diffuse abdominal pain.  Hypertensive and borderline tachypnea otherwise reassuring vital signs.  Chronically ill-appearing.  Patient with diffuse abdominal tenderness on exam.  Lungs diminished bilaterally.  EKG with normal sinus rhythm and LVH.  No STEMI.  Will get CT abdomen and cardiac workup.    [BD]   1710 EKG normal sinus rhythm with rate of 76.  LVH.  Nonspecific ST changes.  No STEMI.    [BD]   1936 IMPRESSION:  1. Findings at the right lung base favor mild infectious/inflammatory acute airspace disease.  2. Negative for acute abdominopelvic pathology.  Thank you for allowing us to participate in the care of your patient.  Dictated and Authenticated by: Braulio Wilkes MD  05/30/2020 7:10 PM Central Time ([place] & [place])    [BD]   2017 HEART Score For Major Cardiac Events  History (slightly-highly suspicious) = 1/2 pts  EKG: (NL, Nonspec, Sig ST changes) = 1/2pts  Age: (<45, 45-65, >65) = 1/2pts  RF (HTN, High Chol, DM, Cig, FH, Obesity): (0, 1-2, 3+) = 2/2pts  Troponin: (nl, 1-3x nl limit, >3x nl) = 0/2pts    TOTAL PTS: 5    Low (0-3pts) = risk of MACE 0.9-1.7% - supports immediate discharge  Moderate (4-7pts) = risk of MACE 12-16.6% - supports admission for clinical observation  High (8-10pts) = MACE 50-65% - supports early invasive strategies       [BD]   2045 Labs reassuring with negative troponin.  CT showed no acute abdominal pathology but possible right lower lobe pneumonia.  Will treat with broad-spectrum antibiotics and admit for further workup and management of chest pain as patient has heart score of 5.    [BD]      ED Course User Index  [BD] Alpesh Chaudhary MD                Clinical Impression:       ICD-10-CM ICD-9-CM   1. Chest pain R07.9 786.50   2. Pneumonia of right lower lobe due to infectious organism J18.1 486             ED Disposition Condition    Observation                           Alpesh Chaudhary MD  05/31/20 9511

## 2020-05-31 PROBLEM — R10.13 EPIGASTRIC PAIN: Status: ACTIVE | Noted: 2020-05-30

## 2020-05-31 LAB
ALBUMIN SERPL BCP-MCNC: 3.3 G/DL (ref 3.5–5.2)
ALP SERPL-CCNC: 74 U/L (ref 55–135)
ALT SERPL W/O P-5'-P-CCNC: 10 U/L (ref 10–44)
ANION GAP SERPL CALC-SCNC: 7 MMOL/L (ref 8–16)
AST SERPL-CCNC: 12 U/L (ref 10–40)
BASOPHILS # BLD AUTO: 0.02 K/UL (ref 0–0.2)
BASOPHILS NFR BLD: 0.3 % (ref 0–1.9)
BILIRUB SERPL-MCNC: 0.6 MG/DL (ref 0.1–1)
BUN SERPL-MCNC: 10 MG/DL (ref 6–20)
CALCIUM SERPL-MCNC: 9.2 MG/DL (ref 8.7–10.5)
CHLORIDE SERPL-SCNC: 100 MMOL/L (ref 95–110)
CHOLEST SERPL-MCNC: 147 MG/DL (ref 120–199)
CHOLEST/HDLC SERPL: 4.3 {RATIO} (ref 2–5)
CO2 SERPL-SCNC: 29 MMOL/L (ref 23–29)
CREAT SERPL-MCNC: 1 MG/DL (ref 0.5–1.4)
DIFFERENTIAL METHOD: ABNORMAL
EOSINOPHIL # BLD AUTO: 0.1 K/UL (ref 0–0.5)
EOSINOPHIL NFR BLD: 2.3 % (ref 0–8)
ERYTHROCYTE [DISTWIDTH] IN BLOOD BY AUTOMATED COUNT: 13.2 % (ref 11.5–14.5)
EST. GFR  (AFRICAN AMERICAN): >60 ML/MIN/1.73 M^2
EST. GFR  (NON AFRICAN AMERICAN): >60 ML/MIN/1.73 M^2
ESTIMATED AVG GLUCOSE: 180 MG/DL (ref 68–131)
GLUCOSE SERPL-MCNC: 181 MG/DL (ref 70–110)
HBA1C MFR BLD HPLC: 7.9 % (ref 4–5.6)
HCT VFR BLD AUTO: 40.2 % (ref 40–54)
HDLC SERPL-MCNC: 34 MG/DL (ref 40–75)
HDLC SERPL: 23.1 % (ref 20–50)
HGB BLD-MCNC: 13 G/DL (ref 14–18)
IMM GRANULOCYTES # BLD AUTO: 0.01 K/UL (ref 0–0.04)
IMM GRANULOCYTES NFR BLD AUTO: 0.2 % (ref 0–0.5)
LDLC SERPL CALC-MCNC: 85.8 MG/DL (ref 63–159)
LIPASE SERPL-CCNC: 27 U/L (ref 4–60)
LYMPHOCYTES # BLD AUTO: 1.6 K/UL (ref 1–4.8)
LYMPHOCYTES NFR BLD: 25.4 % (ref 18–48)
MAGNESIUM SERPL-MCNC: 1.6 MG/DL (ref 1.6–2.6)
MCH RBC QN AUTO: 29.5 PG (ref 27–31)
MCHC RBC AUTO-ENTMCNC: 32.3 G/DL (ref 32–36)
MCV RBC AUTO: 91 FL (ref 82–98)
MONOCYTES # BLD AUTO: 0.4 K/UL (ref 0.3–1)
MONOCYTES NFR BLD: 5.6 % (ref 4–15)
NEUTROPHILS # BLD AUTO: 4.1 K/UL (ref 1.8–7.7)
NEUTROPHILS NFR BLD: 66.2 % (ref 38–73)
NONHDLC SERPL-MCNC: 113 MG/DL
NRBC BLD-RTO: 0 /100 WBC
PLATELET # BLD AUTO: 169 K/UL (ref 150–350)
PMV BLD AUTO: 10.9 FL (ref 9.2–12.9)
POCT GLUCOSE: 109 MG/DL (ref 70–110)
POCT GLUCOSE: 99 MG/DL (ref 70–110)
POTASSIUM SERPL-SCNC: 3.5 MMOL/L (ref 3.5–5.1)
PROT SERPL-MCNC: 7.1 G/DL (ref 6–8.4)
RBC # BLD AUTO: 4.41 M/UL (ref 4.6–6.2)
SODIUM SERPL-SCNC: 136 MMOL/L (ref 136–145)
TRIGL SERPL-MCNC: 136 MG/DL (ref 30–150)
TROPONIN I SERPL DL<=0.01 NG/ML-MCNC: 0.02 NG/ML (ref 0–0.03)
TSH SERPL DL<=0.005 MIU/L-ACNC: 1.05 UIU/ML (ref 0.4–4)
WBC # BLD AUTO: 6.21 K/UL (ref 3.9–12.7)

## 2020-05-31 PROCEDURE — 80053 COMPREHEN METABOLIC PANEL: CPT

## 2020-05-31 PROCEDURE — 94640 AIRWAY INHALATION TREATMENT: CPT

## 2020-05-31 PROCEDURE — 99204 PR OFFICE/OUTPT VISIT, NEW, LEVL IV, 45-59 MIN: ICD-10-PCS | Mod: ,,, | Performed by: INTERNAL MEDICINE

## 2020-05-31 PROCEDURE — 93005 ELECTROCARDIOGRAM TRACING: CPT

## 2020-05-31 PROCEDURE — 94761 N-INVAS EAR/PLS OXIMETRY MLT: CPT

## 2020-05-31 PROCEDURE — 83036 HEMOGLOBIN GLYCOSYLATED A1C: CPT

## 2020-05-31 PROCEDURE — G0378 HOSPITAL OBSERVATION PER HR: HCPCS

## 2020-05-31 PROCEDURE — 84443 ASSAY THYROID STIM HORMONE: CPT

## 2020-05-31 PROCEDURE — 63600175 PHARM REV CODE 636 W HCPCS: Performed by: NURSE PRACTITIONER

## 2020-05-31 PROCEDURE — C9113 INJ PANTOPRAZOLE SODIUM, VIA: HCPCS | Performed by: NURSE PRACTITIONER

## 2020-05-31 PROCEDURE — 36415 COLL VENOUS BLD VENIPUNCTURE: CPT

## 2020-05-31 PROCEDURE — 85025 COMPLETE CBC W/AUTO DIFF WBC: CPT

## 2020-05-31 PROCEDURE — 96376 TX/PRO/DX INJ SAME DRUG ADON: CPT | Performed by: EMERGENCY MEDICINE

## 2020-05-31 PROCEDURE — 80061 LIPID PANEL: CPT

## 2020-05-31 PROCEDURE — 84484 ASSAY OF TROPONIN QUANT: CPT

## 2020-05-31 PROCEDURE — 99900035 HC TECH TIME PER 15 MIN (STAT)

## 2020-05-31 PROCEDURE — 83690 ASSAY OF LIPASE: CPT

## 2020-05-31 PROCEDURE — 99204 OFFICE O/P NEW MOD 45 MIN: CPT | Mod: ,,, | Performed by: INTERNAL MEDICINE

## 2020-05-31 PROCEDURE — 25000003 PHARM REV CODE 250: Performed by: NURSE PRACTITIONER

## 2020-05-31 PROCEDURE — 83735 ASSAY OF MAGNESIUM: CPT

## 2020-05-31 PROCEDURE — 25000242 PHARM REV CODE 250 ALT 637 W/ HCPCS: Performed by: NURSE PRACTITIONER

## 2020-05-31 RX ORDER — IPRATROPIUM BROMIDE AND ALBUTEROL SULFATE 2.5; .5 MG/3ML; MG/3ML
3 SOLUTION RESPIRATORY (INHALATION) EVERY 6 HOURS
Status: DISCONTINUED | OUTPATIENT
Start: 2020-05-31 | End: 2020-06-02 | Stop reason: HOSPADM

## 2020-05-31 RX ADMIN — SUCRALFATE 1 G: 1 TABLET ORAL at 05:05

## 2020-05-31 RX ADMIN — PANTOPRAZOLE SODIUM 40 MG: 40 INJECTION, POWDER, LYOPHILIZED, FOR SOLUTION INTRAVENOUS at 08:05

## 2020-05-31 RX ADMIN — IPRATROPIUM BROMIDE AND ALBUTEROL SULFATE 3 ML: .5; 2.5 SOLUTION RESPIRATORY (INHALATION) at 07:05

## 2020-05-31 RX ADMIN — SUCRALFATE 1 G: 1 TABLET ORAL at 08:05

## 2020-05-31 RX ADMIN — AZITHROMYCIN MONOHYDRATE 500 MG: 500 INJECTION, POWDER, LYOPHILIZED, FOR SOLUTION INTRAVENOUS at 10:05

## 2020-05-31 RX ADMIN — ACETAMINOPHEN 650 MG: 325 TABLET ORAL at 08:05

## 2020-05-31 RX ADMIN — CEFTRIAXONE 1 G: 1 INJECTION, SOLUTION INTRAVENOUS at 10:05

## 2020-05-31 RX ADMIN — SUCRALFATE 1 G: 1 TABLET ORAL at 11:05

## 2020-05-31 NOTE — ASSESSMENT & PLAN NOTE
Chronic, uncontrolled.  Latest blood pressure and vitals reviewed-   Temp:  [97.2 °F (36.2 °C)-98.6 °F (37 °C)]   Pulse:  [69-88]   Resp:  [18-21]   BP: (108-182)/()   SpO2:  [94 %-100 %] .   Home meds for hypertension were reviewed and noted below. Hospital anti-hypertensive changes were made as shown below.  Hypertension Medications             amLODIPine (NORVASC) 10 MG tablet Take 1 tablet (10 mg total) by mouth once daily. Start medication September 3    lisinopril (PRINIVIL,ZESTRIL) 20 MG tablet Take 1 tablet (20 mg total) by mouth once daily.      Hospital Medications             amLODIPine tablet 10 mg Starting on 5/31/2020. 10 mg, Oral, Daily    lisinopriL tablet 20 mg Starting on 5/31/2020. 20 mg, Oral, Daily        Will treat with PRN antihypertensives, as needed, to maintain BP less than 180/110 or if patient becomes symptomatic. Will continue home medication regimen as prescribed, patient educated on importance of medication compliance.  Continuous telemetry monitoring.

## 2020-05-31 NOTE — HPI
"Hernan Badillo is a 54-year-old male with a past medical history of COPD, non-insulin-dependent type 2 diabetes, former alcoholism, and hypertension who presents to the emergency room tonight with chest pain and abdominal pain.  Patient states pain began approximately 1 week ago, has been increasing in severity, not relieved by over-the-counter Tums.  Patient states chest pain is located to the mid anterior chest wall, described as burning.  Patient states abdominal pain is located to the entire abdomen, described as burning, worse with eating.  Patient denies nausea, vomiting, or diarrhea.  Patient states he experienced this pain in the past, prompting him to stop alcohol use approximately 5 months ago.  Patient also endorses intermittent shortness of breath with productive cough with green sputum production.  Denies orthopnea or dyspnea on exertion.  Patient states he is noncompliant with his home medication, states he will take his blood pressure medicine "every once in a while" and states he did take a blood pressure pill this morning.  Patient denies history of MI - states he has never followed with a cardiologist or gastroenterologist prior.  Patient states he has never had an EGD or colonoscopy before.  In the emergency room workup revealing right lower lobe pneumonia, patient was placed on IV antibiotic therapy.  Patient placed in observation on 05/30/2020 at approximately 8:00 p.m..  Patient states he resides at home with his girlfriend, denies use of supplemental oxygen or ambulatory assist devices.  "

## 2020-05-31 NOTE — PROGRESS NOTES
"[facility] Hospital Medicine  Progress Note    Patient Name: Hernan Badillo  MRN: [id number]  Patient Class: OP- Observation   Admission Date: 5/30/2020  Length of Stay: 0 days  Attending Physician: Percy Smiley MD  Primary Care Provider: Primary Doctor No        Subjective:     Principal Problem:Community acquired pneumonia of right lower lobe of lung        HPI:  Hernan Badillo is a 54-year-old male with a past medical history of COPD, non-insulin-dependent type 2 diabetes, former alcoholism, and hypertension who presents to the emergency room tonight with chest pain and abdominal pain.  Patient states pain began approximately 1 week ago, has been increasing in severity, not relieved by over-the-counter Tums.  Patient states chest pain is located to the mid anterior chest wall, described as burning.  Patient states abdominal pain is located to the entire abdomen, described as burning, worse with eating.  Patient denies nausea, vomiting, or diarrhea.  Patient states he experienced this pain in the past, prompting him to stop alcohol use approximately 5 months ago.  Patient also endorses intermittent shortness of breath with productive cough with green sputum production.  Denies orthopnea or dyspnea on exertion.  Patient states he is noncompliant with his home medication, states he will take his blood pressure medicine "every once in a while" and states he did take a blood pressure pill this morning.  Patient denies history of MI - states he has never followed with a cardiologist or gastroenterologist prior.  Patient states he has never had an EGD or colonoscopy before.  In the emergency room workup revealing right lower lobe pneumonia, patient was placed on IV antibiotic therapy.  Patient placed in observation on 05/30/2020 at approximately 8:00 p.m..  Patient states he resides at home with his girlfriend, denies use of supplemental oxygen or ambulatory assist devices.    Overview/Hospital " "Course:  No notes on file    Interval History: no acute events overnight. Pt still experiencing int cp. He describes it as right ant cw location, radiates to his midsternum and across left chest, assoc with epigastric burning and sob, worsens with laying down, improves with sitting up and pain medication, mod severity. He denies palpitations. He denies having cardiologist or stress test in the past. Pt is very poor historian and noncompliant. He states he may not stay in the hospital because he does not want to wait to have any test done and wants everything done today. I advised pt that he just came into the hospital last night and will need to stay a couple days to get further testing and monitoring. Pt states "I will stay for now but I might change my mind later". Pt updated on plan of care including cardiology consult and plan for EGD tomorrow. I offered to call pts family with update and pt declined stating he would let them know.     Review of Systems   Constitutional: Negative for chills, fatigue and fever.   HENT: Negative for congestion and trouble swallowing.    Eyes: Negative for photophobia and visual disturbance.   Respiratory: Positive for cough and shortness of breath.    Cardiovascular: Positive for chest pain. Negative for palpitations.   Gastrointestinal: Positive for abdominal pain. Negative for diarrhea, nausea and vomiting.   Genitourinary: Negative for difficulty urinating and dysuria.   Musculoskeletal: Negative for arthralgias and gait problem.   Skin: Negative for rash and wound.   Neurological: Negative for dizziness, weakness and light-headedness.   Psychiatric/Behavioral: Negative for agitation and confusion.   All other systems reviewed and are negative.    Objective:     Vital Signs (Most Recent):  Temp: 98.6 °F (37 °C) (05/31/20 0718)  Pulse: 82 (05/31/20 0718)  Resp: 18 (05/31/20 0718)  BP: 108/75 (05/31/20 0718)  SpO2: 98 % (05/31/20 0746) Vital Signs (24h Range):  Temp:  [97.2 °F " (36.2 °C)-98.6 °F (37 °C)] 98.6 °F (37 °C)  Pulse:  [69-88] 82  Resp:  [18-21] 18  SpO2:  [94 %-100 %] 98 %  BP: (108-182)/() 108/75     Weight: 81.2 kg (179 lb 0.2 oz)  Body mass index is 26.44 kg/m².  No intake or output data in the 24 hours ending 05/31/20 1223   Physical Exam   Constitutional: He is oriented to person, place, and time. He appears well-developed and well-nourished. No distress.   HENT:   Head: Normocephalic and atraumatic.   Right Ear: External ear normal.   Left Ear: External ear normal.   Mouth/Throat: Oropharynx is clear and moist.   Eyes: Pupils are equal, round, and reactive to light. Conjunctivae and EOM are normal.   Neck: Normal range of motion. Neck supple. No JVD present.   Cardiovascular: Normal rate, regular rhythm, normal heart sounds and intact distal pulses.   No murmur heard.  Pulmonary/Chest: Effort normal and breath sounds normal. No stridor. No respiratory distress. He has no wheezes. He has no rales.   +wet cough   Abdominal: Soft. Bowel sounds are normal. He exhibits no distension and no mass. There is tenderness. There is no guarding.   Mild epigastric tenderness   Musculoskeletal: Normal range of motion. He exhibits no edema.   Neurological: He is alert and oriented to person, place, and time. No sensory deficit.   Skin: Skin is warm and dry. Capillary refill takes 2 to 3 seconds. No rash noted. He is not diaphoretic. No erythema.   Psychiatric: He has a normal mood and affect. His behavior is normal. Thought content normal.   Nursing note and vitals reviewed.      Significant Labs:   CBC:   Recent Labs   Lab 05/30/20  1733 05/31/20  0446   WBC 6.26 6.21   HGB 13.7* 13.0*   HCT 42.3 40.2    169     CMP:   Recent Labs   Lab 05/30/20  1733 05/31/20  0446    136   K 3.6 3.5   CL 97 100   CO2 29 29   * 181*   BUN 10 10   CREATININE 1.0 1.0   CALCIUM 9.7 9.2   PROT 8.0 7.1   ALBUMIN 3.7 3.3*   BILITOT 0.7 0.6   ALKPHOS 78 74   AST 16 12   ALT 12 10    ANIONGAP 12 7*   EGFRNONAA >60 >60     Cardiac Markers:   Recent Labs   Lab 05/30/20  2250   *     All pertinent labs within the past 24 hours have been reviewed.    Significant Imaging: I have reviewed all pertinent imaging results/findings within the past 24 hours.      Assessment/Plan:      * Community acquired pneumonia of right lower lobe of lung  IV Rocephin and azithromycin  Cultures obtained and pending    Chest pain  Patient with chest pain and abdominal pain, burning in characteristic.    Initial troponin positive, trended and improved.    EKG reviewed - no acute ST elevation, PVCs noted  Continuous telemetry monitoring.    Patient was given GI cocktail in emergency room, states made his pain worse.    Will utilize p.r.n. IV morphine and sublingual nitro for pain control.   Heart score 5 upon admission.  Cardiology consulted        Noncompliance  Patient educated on the importance compliance of medication regimen and regular doctor appts and health screenings      History of alcoholism  States his last alcoholic intake was approximately 4 months ago.  Will monitor for DTs      Epigastric pain  Previous etoh use - last reported drink was approx 4 months ago  Possible PUD  No reported hematemesis or melena  GI consulted - plan is for possible EGD tomorrow, clear liquid diet today  Start PPI          Tobacco abuse  Assistance with smoking cessation was offered, including:  []  Medications  [x]  Counseling  []  Printed Information on Smoking Cessation  []  Referral to a Smoking Cessation Program    Patient was counseled regarding smoking for 3-10 minutes.          COPD (chronic obstructive pulmonary disease)  Patient with a history of COPD, will utilize doing nebs as needed.  Utilize supplemental oxygen as needed to maintain SpO2 greater than 90%.  Patient on IV azithromycin and Rocephin for right lower lobe pneumonia.      Diabetes type 2, uncontrolled  Lab Results   Component Value Date    LABA1C  9.8 (H) 07/06/2016    HGBA1C 7.4 (H) 08/27/2019     Chronic, uncontrolled.  Patient states he is not compliant with his metformin at home.  Will utilize low-dose sliding scale insulin upon admission to the hospital, Accu-Cheks.  Check HgbA1c      Uncontrolled hypertension  Chronic, uncontrolled.  Latest blood pressure and vitals reviewed-   Temp:  [97.2 °F (36.2 °C)-98.6 °F (37 °C)]   Pulse:  [69-88]   Resp:  [18-21]   BP: (108-182)/()   SpO2:  [94 %-100 %] .   Home meds for hypertension were reviewed and noted below. Hospital anti-hypertensive changes were made as shown below.  Hypertension Medications             amLODIPine (NORVASC) 10 MG tablet Take 1 tablet (10 mg total) by mouth once daily. Start medication September 3    lisinopril (PRINIVIL,ZESTRIL) 20 MG tablet Take 1 tablet (20 mg total) by mouth once daily.      Hospital Medications             amLODIPine tablet 10 mg Starting on 5/31/2020. 10 mg, Oral, Daily    lisinopriL tablet 20 mg Starting on 5/31/2020. 20 mg, Oral, Daily        Will treat with PRN antihypertensives, as needed, to maintain BP less than 180/110 or if patient becomes symptomatic. Will continue home medication regimen as prescribed, patient educated on importance of medication compliance.  Continuous telemetry monitoring.            VTE Risk Mitigation (From admission, onward)         Ordered     IP VTE LOW RISK PATIENT  Once      05/30/20 2236     Place sequential compression device  Until discontinued      05/30/20 2236     Place MINOR hose  Until discontinued      05/30/20 2236                      Christine Dotson NP  Department of Hospital Medicine   [facility]

## 2020-05-31 NOTE — ASSESSMENT & PLAN NOTE
Previous etoh use - last reported drink was approx 4 months ago  Possible PUD  No reported hematemesis or melena  GI consulted - plan is for possible EGD tomorrow, clear liquid diet today  Start PPI

## 2020-05-31 NOTE — ASSESSMENT & PLAN NOTE
Patient with a history of COPD, will utilize doing nebs as needed.  Utilize supplemental oxygen as needed to maintain SpO2 greater than 90%.  Patient on IV azithromycin and Rocephin for right lower lobe pneumonia.

## 2020-05-31 NOTE — ASSESSMENT & PLAN NOTE
Assistance with smoking cessation was offered, including:  []  Medications  [x]  Counseling  []  Printed Information on Smoking Cessation  []  Referral to a Smoking Cessation Program    Patient was counseled regarding smoking for 3-10 minutes.

## 2020-05-31 NOTE — CONSULTS
CC: abdominal pain    HPI 54 y.o. male with COPD, DMII, HTN who has diffuse intermittent crampy abdominal pain ongoing for several years that is worsened with alcohol and smoking. He denies fevers, chills. He states this pain has been ongoing for several years. He denies hematemesis, melena or hematochezia. He has never had endoscopy prior. No history of colonoscopy as well. He denies NSAID use. He denies constipation or diarrhea. He is currently admitted for pneumonia. He is COVID-19 negative. Hemodynamically stable since admission. H/H on admission 13.7/42.3 which has been close to baseline since August 2019.     Medical records reviewed. Additional history supplemented by nursing.     Past Medical History:   Diagnosis Date    COPD (chronic obstructive pulmonary disease)     Diabetes mellitus, type 2     Hypertension      Past Surgical History:   Procedure Laterality Date    DENTAL SURGERY      HERNIA REPAIR      left inguinal    incision and drainiage       Social History  Social History     Tobacco Use    Smoking status: Current Every Day Smoker     Packs/day: 2.00     Years: 30.00     Pack years: 60.00     Types: Cigarettes    Smokeless tobacco: Never Used   Substance Use Topics    Alcohol use: Yes     Alcohol/week: 1.0 standard drinks     Types: 1 Shots of liquor per week     Comment: 1/2 pint 2 x per week - quit approximately 5 months ago    Drug use: No     Family History   Problem Relation Age of Onset    Heart attack Father 80    Heart disease Father     Drug abuse Father     Cancer Maternal Grandmother         colon     Review of Systems  General ROS: negative for chills, fever or weight loss  Psychological ROS: negative for hallucination, depression or suicidal ideation  Ophthalmic ROS: negative for blurry vision, photophobia or eye pain  ENT ROS: negative for epistaxis, sore throat or rhinorrhea  Respiratory ROS: no cough, shortness of breath, or wheezing  Cardiovascular ROS: no chest pain  "or dyspnea on exertion  Gastrointestinal ROS: +abdominal pain, intermittent change in bowel habits with constipation at times, no black/bloody stools  Genito-Urinary ROS: no dysuria, trouble voiding, or hematuria  Musculoskeletal ROS: negative for gait disturbance or muscular weakness  Neurological ROS: no syncope or seizures; no ataxia  Dermatological ROS: negative for pruritis, rash and jaundice    Physical Examination  /75   Pulse 82   Temp 98.6 °F (37 °C)   Resp 18   Ht 5' 9" (1.753 m)   Wt 81.2 kg (179 lb 0.2 oz)   SpO2 98%   BMI 26.44 kg/m²   General appearance: alert, cooperative, no distress  HENT: Normocephalic, atraumatic, neck symmetrical, no nasal discharge   Eyes: conjunctivae/corneas clear, PERRL, EOM's intact  Lungs: clear to auscultation bilaterally, symmetric chest wall expansion bilaterally  Heart: regular rate and rhythm without rub; no displacement of the PMI   Abdomen: soft, mild tenderness diffusely but no rebound or guarding, bowel sounds normoactive; no organomegaly  Extremities: extremities symmetric; no clubbing, cyanosis, or edema  Integument: Skin color, texture, turgor normal; no rashes; hair distrubution normal  Neurologic: Alert and oriented X 3, normal strength, normal coordination and gait  Psychiatric: no pressured speech; normal affect; no evidence of impaired cognition     Labs:  Lab Results   Component Value Date    WBC 6.21 05/31/2020    HGB 13.0 (L) 05/31/2020    HCT 40.2 05/31/2020    MCV 91 05/31/2020     05/31/2020     Imaging:  CXR: No acute intrathoracic process.    Questionable 1.3 cm nodule in the right lung base.    CT A/P:  -Airways disease or atypical infection in the right lower lobe  -Borderline prostate enlargement  -Urinary bladder wall thickening    Independently reviewed    Assessment:   54 y.o. male with COPD, DMII, HTN who has diffuse intermittent crampy abdominal pain ongoing for several years that is worsened with alcohol and smoking. H/H " on admission 13.7/42.3 which has been close to baseline since August 2019. No signs of GI blood loss, but with borderline anemia need evaluation of persistent pain. Will also check lipase to evaluate for any underlying pancreatic inflammation.     Plan:   -Diet as tolerates now  -NPO after midnight for EGD tomorrow  -Tylenol as needed for abdominal pain   -Miralax 17 g PO daily for management of constipation  -Patient needs colonoscopy for screening purposes which can be performed as an outpatient   -Discussed with patient at bedside plan and discussed with primary team    Terrell May MD  [facility]  [address] [place] [address]  Office: [phone]  Fax: [phone]

## 2020-05-31 NOTE — SUBJECTIVE & OBJECTIVE
"Interval History: no acute events overnight. Pt still experiencing int cp. He describes it as right ant cw location, radiates to his midsternum and across left chest, assoc with epigastric burning and sob, worsens with laying down, improves with sitting up and pain medication, mod severity. He denies palpitations. He denies having cardiologist or stress test in the past. Pt is very poor historian and noncompliant. He states he may not stay in the hospital because he does not want to wait to have any test done and wants everything done today. I advised pt that he just came into the hospital last night and will need to stay a couple days to get further testing and monitoring. Pt states "I will stay for now but I might change my mind later". Pt updated on plan of care including cardiology consult and plan for EGD tomorrow. I offered to call pts family with update and pt declined stating he would let them know.     Review of Systems   Constitutional: Negative for chills, fatigue and fever.   HENT: Negative for congestion and trouble swallowing.    Eyes: Negative for photophobia and visual disturbance.   Respiratory: Positive for cough and shortness of breath.    Cardiovascular: Positive for chest pain. Negative for palpitations.   Gastrointestinal: Positive for abdominal pain. Negative for diarrhea, nausea and vomiting.   Genitourinary: Negative for difficulty urinating and dysuria.   Musculoskeletal: Negative for arthralgias and gait problem.   Skin: Negative for rash and wound.   Neurological: Negative for dizziness, weakness and light-headedness.   Psychiatric/Behavioral: Negative for agitation and confusion.   All other systems reviewed and are negative.    Objective:     Vital Signs (Most Recent):  Temp: 98.6 °F (37 °C) (05/31/20 0718)  Pulse: 82 (05/31/20 0718)  Resp: 18 (05/31/20 0718)  BP: 108/75 (05/31/20 0718)  SpO2: 98 % (05/31/20 0746) Vital Signs (24h Range):  Temp:  [97.2 °F (36.2 °C)-98.6 °F (37 °C)] 98.6 " °F (37 °C)  Pulse:  [69-88] 82  Resp:  [18-21] 18  SpO2:  [94 %-100 %] 98 %  BP: (108-182)/() 108/75     Weight: 81.2 kg (179 lb 0.2 oz)  Body mass index is 26.44 kg/m².  No intake or output data in the 24 hours ending 05/31/20 1223   Physical Exam   Constitutional: He is oriented to person, place, and time. He appears well-developed and well-nourished. No distress.   HENT:   Head: Normocephalic and atraumatic.   Right Ear: External ear normal.   Left Ear: External ear normal.   Mouth/Throat: Oropharynx is clear and moist.   Eyes: Pupils are equal, round, and reactive to light. Conjunctivae and EOM are normal.   Neck: Normal range of motion. Neck supple. No JVD present.   Cardiovascular: Normal rate, regular rhythm, normal heart sounds and intact distal pulses.   No murmur heard.  Pulmonary/Chest: Effort normal and breath sounds normal. No stridor. No respiratory distress. He has no wheezes. He has no rales.   +wet cough   Abdominal: Soft. Bowel sounds are normal. He exhibits no distension and no mass. There is tenderness. There is no guarding.   Mild epigastric tenderness   Musculoskeletal: Normal range of motion. He exhibits no edema.   Neurological: He is alert and oriented to person, place, and time. No sensory deficit.   Skin: Skin is warm and dry. Capillary refill takes 2 to 3 seconds. No rash noted. He is not diaphoretic. No erythema.   Psychiatric: He has a normal mood and affect. His behavior is normal. Thought content normal.   Nursing note and vitals reviewed.      Significant Labs:   CBC:   Recent Labs   Lab 05/30/20  1733 05/31/20  0446   WBC 6.26 6.21   HGB 13.7* 13.0*   HCT 42.3 40.2    169     CMP:   Recent Labs   Lab 05/30/20  1733 05/31/20  0446    136   K 3.6 3.5   CL 97 100   CO2 29 29   * 181*   BUN 10 10   CREATININE 1.0 1.0   CALCIUM 9.7 9.2   PROT 8.0 7.1   ALBUMIN 3.7 3.3*   BILITOT 0.7 0.6   ALKPHOS 78 74   AST 16 12   ALT 12 10   ANIONGAP 12 7*   EGFRNONAA >60 >60      Cardiac Markers:   Recent Labs   Lab 05/30/20  2250   *     All pertinent labs within the past 24 hours have been reviewed.    Significant Imaging: I have reviewed all pertinent imaging results/findings within the past 24 hours.

## 2020-05-31 NOTE — PLAN OF CARE
Pt resting quietly at this time. Able to make needs known. Cont b/b. Complaint of pain x1. Medicated. Bed low and locked. Call light in reach.

## 2020-05-31 NOTE — RESPIRATORY THERAPY
05/30/20 8924   Patient Assessment/Suction   Level of Consciousness (AVPU) alert   Respiratory Effort Unlabored   Expansion/Accessory Muscles/Retractions no use of accessory muscles   All Lung Fields Breath Sounds diminished   Rhythm/Pattern, Respiratory unlabored   PRE-TX-O2   O2 Device (Oxygen Therapy) room air   SpO2 98 %   Pulse Oximetry Type Intermittent   $ Pulse Oximetry - Multiple Charge Pulse Oximetry - Multiple   Aerosol Therapy   $ Aerosol Therapy Charges PRN treatment not required

## 2020-05-31 NOTE — ASSESSMENT & PLAN NOTE
Patient with generalized abdominal pain, described as burning. Given patient's history of alcoholism, patient may be experiencing pain s/t PUD, states he has not had a EGD or colonoscopy in the past. No reports of hematemesis or melana, VS stable, CBC stable. Will consult GI as patient has suspicion for low promise of compliance with outpatient follow-up.

## 2020-05-31 NOTE — UM SECONDARY REVIEW
Physician Advisor External    Level of Care Issue     to ehr for admit review. Outpt/obs appropriate for 5/30/2020 and 5/31/2020 per ehr md review.

## 2020-05-31 NOTE — SUBJECTIVE & OBJECTIVE
Past Medical History:   Diagnosis Date    COPD (chronic obstructive pulmonary disease)     Diabetes mellitus, type 2     Hypertension        Past Surgical History:   Procedure Laterality Date    DENTAL SURGERY      HERNIA REPAIR      left inguinal    incision and drainiage         Review of patient's allergies indicates:  No Known Allergies    No current facility-administered medications on file prior to encounter.      Current Outpatient Medications on File Prior to Encounter   Medication Sig    albuterol (PROVENTIL/VENTOLIN HFA) 90 mcg/actuation inhaler Inhale 1-2 puffs into the lungs every 6 (six) hours as needed for Wheezing or Shortness of Breath. Rescue    albuterol-ipratropium 2.5mg-0.5mg/3mL (DUO-NEB) 0.5 mg-3 mg(2.5 mg base)/3 mL nebulizer solution Take 3 mLs by nebulization every 6 (six) hours as needed for Wheezing or Shortness of Breath.    amLODIPine (NORVASC) 10 MG tablet Take 1 tablet (10 mg total) by mouth once daily. Start medication September 3    lisinopril (PRINIVIL,ZESTRIL) 20 MG tablet Take 1 tablet (20 mg total) by mouth once daily.    metFORMIN (GLUCOPHAGE) 500 MG tablet Take 1 tablet (500 mg total) by mouth 2 (two) times daily with meals.    nystatin-triamcinolone (MYCOLOG II) cream Apply topically 2 (two) times daily. Apply thin film to perigenital daily     Family History     Problem Relation (Age of Onset)    Cancer Maternal Grandmother    Drug abuse Father    Heart attack Father (80)    Heart disease Father        Tobacco Use    Smoking status: Current Every Day Smoker     Packs/day: 2.00     Years: 30.00     Pack years: 60.00     Types: Cigarettes    Smokeless tobacco: Never Used   Substance and Sexual Activity    Alcohol use: Yes     Alcohol/week: 1.0 standard drinks     Types: 1 Shots of liquor per week     Comment: 1/2 pint 2 x per week     Drug use: No    Sexual activity: Yes     Partners: Female     Birth control/protection: None     Comment: No history of STDs.      Review of Systems   Constitutional: Negative for activity change, appetite change, chills, fatigue and fever.   HENT: Negative for congestion, sinus pressure, sinus pain and sore throat.    Eyes: Negative for photophobia and visual disturbance.   Respiratory: Positive for cough and shortness of breath. Negative for choking, chest tightness and wheezing.    Cardiovascular: Positive for chest pain. Negative for palpitations and leg swelling.   Gastrointestinal: Positive for abdominal distention and abdominal pain. Negative for blood in stool, constipation, diarrhea, nausea and vomiting.   Genitourinary: Negative for difficulty urinating, dysuria, flank pain and hematuria.   Musculoskeletal: Negative for back pain, gait problem and joint swelling.   Skin: Negative for rash and wound.   Neurological: Negative for dizziness, syncope, weakness, light-headedness, numbness and headaches.   Psychiatric/Behavioral: Positive for sleep disturbance. Negative for confusion. The patient is not nervous/anxious.      Objective:     Vital Signs (Most Recent):  Temp: 97.9 °F (36.6 °C) (05/30/20 1639)  Pulse: 69 (05/30/20 1710)  Resp: (!) 21 (05/30/20 1639)  BP: (!) 182/107 (05/30/20 1710)  SpO2: 96 % (05/30/20 1710) Vital Signs (24h Range):  Temp:  [97.9 °F (36.6 °C)] 97.9 °F (36.6 °C)  Pulse:  [69] 69  Resp:  [21] 21  SpO2:  [96 %-97 %] 96 %  BP: (175-182)/(107-121) 182/107     Weight: 79.4 kg (175 lb)  Body mass index is 25.84 kg/m².    Physical Exam   Constitutional: He is oriented to person, place, and time. He appears well-developed and well-nourished. No distress.   HENT:   Head: Normocephalic and atraumatic.   Poor dentition   Eyes: Pupils are equal, round, and reactive to light. EOM are normal. Right eye exhibits no discharge. Left eye exhibits no discharge.   Neck: Normal range of motion. No JVD present.   Cardiovascular: Normal rate, regular rhythm, normal heart sounds and intact distal pulses.   No murmur  heard.  Pulmonary/Chest: Effort normal and breath sounds normal. No respiratory distress. He has no wheezes. He has no rales. He exhibits no tenderness.   Patient on room air during my initial interview and physical exam, patient in no acute respiratory distress.   Abdominal: Soft. Bowel sounds are normal. He exhibits no distension. There is tenderness. There is no rebound and no guarding.   Generalized abdominal tenderness noted upon palpation of anterior abdominal wall.   Genitourinary:   Genitourinary Comments: Exam Deferred      Musculoskeletal: Normal range of motion. He exhibits no edema, tenderness or deformity.   Neurological: He is alert and oriented to person, place, and time. No cranial nerve deficit or sensory deficit.   Skin: Skin is warm and dry. Capillary refill takes 2 to 3 seconds. No rash noted. He is not diaphoretic. No erythema.   Psychiatric: He has a normal mood and affect. His behavior is normal. Judgment and thought content normal.   Nursing note and vitals reviewed.        CRANIAL NERVES     CN III, IV, VI   Pupils are equal, round, and reactive to light.  Extraocular motions are normal.        Significant Labs:   BMP:   Recent Labs   Lab 05/30/20  1733   *      K 3.6   CL 97   CO2 29   BUN 10   CREATININE 1.0   CALCIUM 9.7     CBC:   Recent Labs   Lab 05/30/20  1733   WBC 6.26   HGB 13.7*   HCT 42.3        CMP:   Recent Labs   Lab 05/30/20  1733      K 3.6   CL 97   CO2 29   *   BUN 10   CREATININE 1.0   CALCIUM 9.7   PROT 8.0   ALBUMIN 3.7   BILITOT 0.7   ALKPHOS 78   AST 16   ALT 12   ANIONGAP 12   EGFRNONAA >60     Lipase:   Recent Labs   Lab 05/30/20  1733   LIPASE 15     Troponin:   Recent Labs   Lab 05/30/20  1733   TROPONINI 0.020     Urine Studies:   Recent Labs   Lab 05/30/20 1942   COLORU Yellow   APPEARANCEUA Clear   PHUR 6.0   SPECGRAV 1.025   PROTEINUA Negative   GLUCUA Negative   KETONESU Negative   BILIRUBINUA Negative   OCCULTUA Negative    NITRITE Negative   UROBILINOGEN Negative   LEUKOCYTESUR Negative     UDS - Negative    COVID - Negative     Alcohol - <10    All pertinent labs within the past 24 hours have been reviewed.    Significant Imaging: I have reviewed all pertinent imaging results/findings within the past 24 hours.     Chest Xray:  Impression       No acute intrathoracic process.    Questionable 1.3 cm nodule in the right lung base.     CT Abd Pelvis with Contrast:  IMPRESSION: 1. Findings at the right lung base favor mild infectious/inflammatory acute airspace disease. 2. Negative for acute abdominopelvic pathology    EKG performed on 05/30/2020, impression as below:  Normal sinus rhythm  Moderate voltage criteria for LVH, may be normal variant  Borderline Abnormal ECG  When compared with ECG of 11-OCT-2016 09:40,  T wave inversion now evident in Inferior leads

## 2020-05-31 NOTE — ASSESSMENT & PLAN NOTE
Patient with chest pain and abdominal pain, burning in characteristic.  Initial troponin negative, will trend.  EKG not indicative of acute ischemic event.  Continuous telemetry monitoring.  Patient was given GI cocktail in emergency room, states made his pain worse.  Will utilize p.r.n. IV morphine and sublingual nitro for pain control.  Repeat EKG in the morning. Heart score 5 upon admission.

## 2020-05-31 NOTE — ASSESSMENT & PLAN NOTE
Noted on chest xray performed in the ER, patient does not meet septic criteria upon admission hospital.  Sputum culture pending.  Patient was initiated on IV Rocephin and azithromycin in emergency room, will continue upon admission hospital.  Continuous telemetry monitoring.  Utilize supplemental oxygen as needed to maintain SpO2 greater than 90%.

## 2020-05-31 NOTE — RESPIRATORY THERAPY
05/31/20 0746   Patient Assessment/Suction   Level of Consciousness (AVPU) alert   All Lung Fields Breath Sounds clear;diminished   PRE-TX-O2   O2 Device (Oxygen Therapy) room air   SpO2 98 %   Pulse Oximetry Type Intermittent   $ Pulse Oximetry - Multiple Charge Pulse Oximetry - Multiple   Aerosol Therapy   $ Aerosol Therapy Charges PRN treatment not required

## 2020-05-31 NOTE — RESPIRATORY THERAPY
05/31/20 1300   Patient Assessment/Suction   Level of Consciousness (AVPU) alert   All Lung Fields Breath Sounds clear   PRE-TX-O2   O2 Device (Oxygen Therapy) room air   SpO2 98 %   Aerosol Therapy   $ Aerosol Therapy Charges Refused

## 2020-05-31 NOTE — ASSESSMENT & PLAN NOTE
Patient with chest pain and abdominal pain, burning in characteristic.    Initial troponin positive, trended and improved.    EKG reviewed - no acute ST elevation, PVCs noted  Continuous telemetry monitoring.    Patient was given GI cocktail in emergency room, states made his pain worse.    Will utilize p.r.n. IV morphine and sublingual nitro for pain control.   Heart score 5 upon admission.  Cardiology consulted

## 2020-05-31 NOTE — NURSING
Notified Dr. Quintanilla of GI consult. Stated pt can have a clear liquid diet and tentative plan is EGD tomorrow.

## 2020-05-31 NOTE — PLAN OF CARE
POC reviewed with patient and patient's family, understanding verbalized. AAOX4.  Room air. Glucose monitoring/coverage ACHS as needed. Clear liquid diet.Telemetry monitoring maintained. VS stable throughout shift. Safety maintained. Will continue to monitor.

## 2020-05-31 NOTE — H&P
"[facility] Medicine  History & Physical    Patient Name: Hernan Badillo  MRN: [id number]  Admission Date: 5/30/2020  Attending Physician: Percy Smiley MD   Primary Care Provider: Primary Doctor No         Patient information was obtained from patient, past medical records and ER records.     Subjective:     Principal Problem:Community acquired pneumonia of right lower lobe of lung    Chief Complaint:   Chief Complaint   Patient presents with    Chest Pain     started 2 days ago        HPI: Hernan Badillo is a 54-year-old male with a past medical history of COPD, non-insulin-dependent type 2 diabetes, former alcoholism, and hypertension who presents to the emergency room tonight with chest pain and abdominal pain.  Patient states pain began approximately 1 week ago, has been increasing in severity, not relieved by over-the-counter Tums.  Patient states chest pain is located to the mid anterior chest wall, described as burning.  Patient states abdominal pain is located to the entire abdomen, described as burning, worse with eating.  Patient denies nausea, vomiting, or diarrhea.  Patient states he experienced this pain in the past, prompting him to stop alcohol use approximately 5 months ago.  Patient also endorses intermittent shortness of breath with productive cough with green sputum production.  Denies orthopnea or dyspnea on exertion.  Patient states he is noncompliant with his home medication, states he will take his blood pressure medicine "every once in a while" and states he did take a blood pressure pill this morning.  Patient denies history of MI - states he has never followed with a cardiologist or gastroenterologist prior.  Patient states he has never had an EGD or colonoscopy before.  In the emergency room workup revealing right lower lobe pneumonia, patient was placed on IV antibiotic therapy.  Patient placed in observation on 05/30/2020 at approximately 8:00 p.m..  Patient " states he resides at home with his girlfriend, denies use of supplemental oxygen or ambulatory assist devices.    Past Medical History:   Diagnosis Date    COPD (chronic obstructive pulmonary disease)     Diabetes mellitus, type 2     Hypertension        Past Surgical History:   Procedure Laterality Date    DENTAL SURGERY      HERNIA REPAIR      left inguinal    incision and drainiage         Review of patient's allergies indicates:  No Known Allergies    No current facility-administered medications on file prior to encounter.      Current Outpatient Medications on File Prior to Encounter   Medication Sig    albuterol (PROVENTIL/VENTOLIN HFA) 90 mcg/actuation inhaler Inhale 1-2 puffs into the lungs every 6 (six) hours as needed for Wheezing or Shortness of Breath. Rescue    albuterol-ipratropium 2.5mg-0.5mg/3mL (DUO-NEB) 0.5 mg-3 mg(2.5 mg base)/3 mL nebulizer solution Take 3 mLs by nebulization every 6 (six) hours as needed for Wheezing or Shortness of Breath.    amLODIPine (NORVASC) 10 MG tablet Take 1 tablet (10 mg total) by mouth once daily. Start medication September 3    lisinopril (PRINIVIL,ZESTRIL) 20 MG tablet Take 1 tablet (20 mg total) by mouth once daily.    metFORMIN (GLUCOPHAGE) 500 MG tablet Take 1 tablet (500 mg total) by mouth 2 (two) times daily with meals.    nystatin-triamcinolone (MYCOLOG II) cream Apply topically 2 (two) times daily. Apply thin film to perigenital daily     Family History     Problem Relation (Age of Onset)    Cancer Maternal Grandmother    Drug abuse Father    Heart attack Father (80)    Heart disease Father        Tobacco Use    Smoking status: Current Every Day Smoker     Packs/day: 2.00     Years: 30.00     Pack years: 60.00     Types: Cigarettes    Smokeless tobacco: Never Used   Substance and Sexual Activity    Alcohol use: Yes     Alcohol/week: 1.0 standard drinks     Types: 1 Shots of liquor per week     Comment: 1/2 pint 2 x per week     Drug use: No     Sexual activity: Yes     Partners: Female     Birth control/protection: None     Comment: No history of STDs.     Review of Systems   Constitutional: Negative for activity change, appetite change, chills, fatigue and fever.   HENT: Negative for congestion, sinus pressure, sinus pain and sore throat.    Eyes: Negative for photophobia and visual disturbance.   Respiratory: Positive for cough and shortness of breath. Negative for choking, chest tightness and wheezing.    Cardiovascular: Positive for chest pain. Negative for palpitations and leg swelling.   Gastrointestinal: Positive for abdominal distention and abdominal pain. Negative for blood in stool, constipation, diarrhea, nausea and vomiting.   Genitourinary: Negative for difficulty urinating, dysuria, flank pain and hematuria.   Musculoskeletal: Negative for back pain, gait problem and joint swelling.   Skin: Negative for rash and wound.   Neurological: Negative for dizziness, syncope, weakness, light-headedness, numbness and headaches.   Psychiatric/Behavioral: Positive for sleep disturbance. Negative for confusion. The patient is not nervous/anxious.      Objective:     Vital Signs (Most Recent):  Temp: 97.9 °F (36.6 °C) (05/30/20 1639)  Pulse: 69 (05/30/20 1710)  Resp: (!) 21 (05/30/20 1639)  BP: (!) 182/107 (05/30/20 1710)  SpO2: 96 % (05/30/20 1710) Vital Signs (24h Range):  Temp:  [97.9 °F (36.6 °C)] 97.9 °F (36.6 °C)  Pulse:  [69] 69  Resp:  [21] 21  SpO2:  [96 %-97 %] 96 %  BP: (175-182)/(107-121) 182/107     Weight: 79.4 kg (175 lb)  Body mass index is 25.84 kg/m².    Physical Exam   Constitutional: He is oriented to person, place, and time. He appears well-developed and well-nourished. No distress.   HENT:   Head: Normocephalic and atraumatic.   Poor dentition   Eyes: Pupils are equal, round, and reactive to light. EOM are normal. Right eye exhibits no discharge. Left eye exhibits no discharge.   Neck: Normal range of motion. No JVD present.    Cardiovascular: Normal rate, regular rhythm, normal heart sounds and intact distal pulses.   No murmur heard.  Pulmonary/Chest: Effort normal and breath sounds normal. No respiratory distress. He has no wheezes. He has no rales. He exhibits no tenderness.   Patient on room air during my initial interview and physical exam, patient in no acute respiratory distress.   Abdominal: Soft. Bowel sounds are normal. He exhibits no distension. There is tenderness. There is no rebound and no guarding.   Generalized abdominal tenderness noted upon palpation of anterior abdominal wall.   Genitourinary:   Genitourinary Comments: Exam Deferred      Musculoskeletal: Normal range of motion. He exhibits no edema, tenderness or deformity.   Neurological: He is alert and oriented to person, place, and time. No cranial nerve deficit or sensory deficit.   Skin: Skin is warm and dry. Capillary refill takes 2 to 3 seconds. No rash noted. He is not diaphoretic. No erythema.   Psychiatric: He has a normal mood and affect. His behavior is normal. Judgment and thought content normal.   Nursing note and vitals reviewed.        CRANIAL NERVES     CN III, IV, VI   Pupils are equal, round, and reactive to light.  Extraocular motions are normal.        Significant Labs:   BMP:   Recent Labs   Lab 05/30/20  1733   *      K 3.6   CL 97   CO2 29   BUN 10   CREATININE 1.0   CALCIUM 9.7     CBC:   Recent Labs   Lab 05/30/20  1733   WBC 6.26   HGB 13.7*   HCT 42.3        CMP:   Recent Labs   Lab 05/30/20  1733      K 3.6   CL 97   CO2 29   *   BUN 10   CREATININE 1.0   CALCIUM 9.7   PROT 8.0   ALBUMIN 3.7   BILITOT 0.7   ALKPHOS 78   AST 16   ALT 12   ANIONGAP 12   EGFRNONAA >60     Lipase:   Recent Labs   Lab 05/30/20  1733   LIPASE 15     Troponin:   Recent Labs   Lab 05/30/20  1733   TROPONINI 0.020     Urine Studies:   Recent Labs   Lab 05/30/20 1942   COLORU Yellow   APPEARANCEUA Clear   PHUR 6.0   SPECGRAV 1.025    PROTEINUA Negative   GLUCUA Negative   KETONESU Negative   BILIRUBINUA Negative   OCCULTUA Negative   NITRITE Negative   UROBILINOGEN Negative   LEUKOCYTESUR Negative     UDS - Negative    COVID - Negative     Alcohol - <10    All pertinent labs within the past 24 hours have been reviewed.    Significant Imaging: I have reviewed all pertinent imaging results/findings within the past 24 hours.     Chest Xray:  Impression       No acute intrathoracic process.    Questionable 1.3 cm nodule in the right lung base.     CT Abd Pelvis with Contrast:  IMPRESSION: 1. Findings at the right lung base favor mild infectious/inflammatory acute airspace disease. 2. Negative for acute abdominopelvic pathology    EKG performed on 05/30/2020, impression as below:  Normal sinus rhythm  Moderate voltage criteria for LVH, may be normal variant  Borderline Abnormal ECG  When compared with ECG of 11-OCT-2016 09:40,  T wave inversion now evident in Inferior leads      Assessment/Plan:     * Community acquired pneumonia of right lower lobe of lung  Noted on chest xray performed in the ER, patient does not meet septic criteria upon admission hospital.  Sputum culture pending.  Patient was initiated on IV Rocephin and azithromycin in emergency room, will continue upon admission hospital.  Continuous telemetry monitoring.  Utilize supplemental oxygen as needed to maintain SpO2 greater than 90%.      Chest pain  Patient with chest pain and abdominal pain, burning in characteristic.  Initial troponin negative, will trend.  EKG not indicative of acute ischemic event.  Continuous telemetry monitoring.  Patient was given GI cocktail in emergency room, states made his pain worse.  Will utilize p.r.n. IV morphine and sublingual nitro for pain control.  Repeat EKG in the morning. Heart score 5 upon admission.        Generalized abdominal pain  Patient with generalized abdominal pain, described as burning. Given patient's history of alcoholism,  patient may be experiencing pain s/t PUD, states he has not had a EGD or colonoscopy in the past. No reports of hematemesis or melana, VS stable, CBC stable. Will consult GI as patient has suspicion for low promise of compliance with outpatient follow-up.          Noncompliance  Patient educated on the importance compliance of medication regimen.      History of alcoholism  States his last alcoholic intake was approximately 5 months ago.      Tobacco abuse  Assistance with smoking cessation was offered, including:  []  Medications  [x]  Counseling  []  Printed Information on Smoking Cessation  []  Referral to a Smoking Cessation Program    Patient was counseled regarding smoking for 3-10 minutes.          COPD (chronic obstructive pulmonary disease)  Patient with a history of COPD, will utilize doing nebs as needed.  Utilize supplemental oxygen as needed to maintain SpO2 greater than 90%.  Patient on IV azithromycin and Rocephin for right lower lobe pneumonia.      Diabetes type 2, uncontrolled  Lab Results   Component Value Date    LABA1C 9.8 (H) 07/06/2016    HGBA1C 7.4 (H) 08/27/2019     Chronic, uncontrolled.  Patient states he is not compliant with his metformin at home.  Will utilize low-dose sliding scale insulin upon admission to the hospital, Accu-Cheks.      Uncontrolled hypertension  Chronic, uncontrolled.  Latest blood pressure and vitals reviewed-   Temp:  [97.8 °F (36.6 °C)-97.9 °F (36.6 °C)]   Pulse:  [69-88]   Resp:  [18-21]   BP: (175-182)/()   SpO2:  [96 %-100 %] .   Home meds for hypertension were reviewed and noted below. Hospital anti-hypertensive changes were made as shown below.  Hypertension Medications             amLODIPine (NORVASC) 10 MG tablet Take 1 tablet (10 mg total) by mouth once daily. Start medication September 3    lisinopril (PRINIVIL,ZESTRIL) 20 MG tablet Take 1 tablet (20 mg total) by mouth once daily.      Hospital Medications             amLODIPine tablet 10 mg  Starting on 5/31/2020. 10 mg, Oral, Daily    lisinopriL tablet 20 mg Starting on 5/31/2020. 20 mg, Oral, Daily        Will treat with PRN antihypertensives, as needed, to maintain BP less than 180/110 or if patient becomes symptomatic. Will continue home medication regimen as prescribed, patient educated on importance of medication compliance.  Continuous telemetry monitoring.            VTE Risk Mitigation (From admission, onward)         Ordered     IP VTE LOW RISK PATIENT  Once      05/30/20 2236     Place sequential compression device  Until discontinued      05/30/20 2236     Place MINOR hose  Until discontinued      05/30/20 2236               Time spent seeing patient( greater than 1/2 spent in direct contact) : 65 minutes     Erin Baron NP  Department of Hospital Medicine   [facility]

## 2020-05-31 NOTE — ASSESSMENT & PLAN NOTE
Patient educated on the importance compliance of medication regimen and regular doctor appts and health screenings

## 2020-05-31 NOTE — ASSESSMENT & PLAN NOTE
Chronic, uncontrolled.  Latest blood pressure and vitals reviewed-   Temp:  [97.8 °F (36.6 °C)-97.9 °F (36.6 °C)]   Pulse:  [69-88]   Resp:  [18-21]   BP: (175-182)/()   SpO2:  [96 %-100 %] .   Home meds for hypertension were reviewed and noted below. Hospital anti-hypertensive changes were made as shown below.  Hypertension Medications             amLODIPine (NORVASC) 10 MG tablet Take 1 tablet (10 mg total) by mouth once daily. Start medication September 3    lisinopril (PRINIVIL,ZESTRIL) 20 MG tablet Take 1 tablet (20 mg total) by mouth once daily.      Hospital Medications             amLODIPine tablet 10 mg Starting on 5/31/2020. 10 mg, Oral, Daily    lisinopriL tablet 20 mg Starting on 5/31/2020. 20 mg, Oral, Daily        Will treat with PRN antihypertensives, as needed, to maintain BP less than 180/110 or if patient becomes symptomatic. Will continue home medication regimen as prescribed, patient educated on importance of medication compliance.  Continuous telemetry monitoring.

## 2020-05-31 NOTE — ASSESSMENT & PLAN NOTE
Lab Results   Component Value Date    LABA1C 9.8 (H) 07/06/2016    HGBA1C 7.4 (H) 08/27/2019     Chronic, uncontrolled.  Patient states he is not compliant with his metformin at home.  Will utilize low-dose sliding scale insulin upon admission to the hospital, Accu-Cheks.  Check HgbA1c

## 2020-05-31 NOTE — ASSESSMENT & PLAN NOTE
Lab Results   Component Value Date    LABA1C 9.8 (H) 07/06/2016    HGBA1C 7.4 (H) 08/27/2019     Chronic, uncontrolled.  Patient states he is not compliant with his metformin at home.  Will utilize low-dose sliding scale insulin upon admission to the hospital, Accu-Cheks.

## 2020-06-01 ENCOUNTER — ANESTHESIA (OUTPATIENT)
Dept: ENDOSCOPY | Facility: HOSPITAL | Age: 55
End: 2020-06-01
Payer: MEDICARE

## 2020-06-01 ENCOUNTER — ANESTHESIA EVENT (OUTPATIENT)
Dept: ENDOSCOPY | Facility: HOSPITAL | Age: 55
End: 2020-06-01
Payer: MEDICARE

## 2020-06-01 PROBLEM — F17.200 TOBACCO DEPENDENCY: Status: ACTIVE | Noted: 2020-05-30

## 2020-06-01 PROBLEM — R10.84 GENERALIZED ABDOMINAL PAIN: Status: ACTIVE | Noted: 2020-06-01

## 2020-06-01 LAB
ALBUMIN SERPL BCP-MCNC: 3.2 G/DL (ref 3.5–5.2)
ALP SERPL-CCNC: 64 U/L (ref 55–135)
ALT SERPL W/O P-5'-P-CCNC: 10 U/L (ref 10–44)
ANION GAP SERPL CALC-SCNC: 9 MMOL/L (ref 8–16)
AST SERPL-CCNC: 11 U/L (ref 10–40)
BASOPHILS # BLD AUTO: 0.01 K/UL (ref 0–0.2)
BASOPHILS NFR BLD: 0.3 % (ref 0–1.9)
BILIRUB SERPL-MCNC: 0.6 MG/DL (ref 0.1–1)
BUN SERPL-MCNC: 15 MG/DL (ref 6–20)
CALCIUM SERPL-MCNC: 8.7 MG/DL (ref 8.7–10.5)
CHLORIDE SERPL-SCNC: 101 MMOL/L (ref 95–110)
CO2 SERPL-SCNC: 28 MMOL/L (ref 23–29)
CREAT SERPL-MCNC: 1 MG/DL (ref 0.5–1.4)
DIFFERENTIAL METHOD: ABNORMAL
EOSINOPHIL # BLD AUTO: 0.2 K/UL (ref 0–0.5)
EOSINOPHIL NFR BLD: 4.4 % (ref 0–8)
ERYTHROCYTE [DISTWIDTH] IN BLOOD BY AUTOMATED COUNT: 13.3 % (ref 11.5–14.5)
EST. GFR  (AFRICAN AMERICAN): >60 ML/MIN/1.73 M^2
EST. GFR  (NON AFRICAN AMERICAN): >60 ML/MIN/1.73 M^2
GLUCOSE SERPL-MCNC: 98 MG/DL (ref 70–110)
HCT VFR BLD AUTO: 38.5 % (ref 40–54)
HGB BLD-MCNC: 12.3 G/DL (ref 14–18)
IMM GRANULOCYTES # BLD AUTO: 0.01 K/UL (ref 0–0.04)
IMM GRANULOCYTES NFR BLD AUTO: 0.3 % (ref 0–0.5)
LYMPHOCYTES # BLD AUTO: 1.6 K/UL (ref 1–4.8)
LYMPHOCYTES NFR BLD: 43.6 % (ref 18–48)
MAGNESIUM SERPL-MCNC: 1.6 MG/DL (ref 1.6–2.6)
MCH RBC QN AUTO: 29.2 PG (ref 27–31)
MCHC RBC AUTO-ENTMCNC: 31.9 G/DL (ref 32–36)
MCV RBC AUTO: 91 FL (ref 82–98)
MONOCYTES # BLD AUTO: 0.3 K/UL (ref 0.3–1)
MONOCYTES NFR BLD: 8.6 % (ref 4–15)
NEUTROPHILS # BLD AUTO: 1.5 K/UL (ref 1.8–7.7)
NEUTROPHILS NFR BLD: 42.8 % (ref 38–73)
NRBC BLD-RTO: 0 /100 WBC
PLATELET # BLD AUTO: 159 K/UL (ref 150–350)
PMV BLD AUTO: 11 FL (ref 9.2–12.9)
POCT GLUCOSE: 101 MG/DL (ref 70–110)
POCT GLUCOSE: 158 MG/DL (ref 70–110)
POCT GLUCOSE: 192 MG/DL (ref 70–110)
POCT GLUCOSE: 99 MG/DL (ref 70–110)
POTASSIUM SERPL-SCNC: 3.4 MMOL/L (ref 3.5–5.1)
PROT SERPL-MCNC: 6.7 G/DL (ref 6–8.4)
RBC # BLD AUTO: 4.21 M/UL (ref 4.6–6.2)
SODIUM SERPL-SCNC: 138 MMOL/L (ref 136–145)
WBC # BLD AUTO: 3.6 K/UL (ref 3.9–12.7)

## 2020-06-01 PROCEDURE — 96376 TX/PRO/DX INJ SAME DRUG ADON: CPT | Mod: 59 | Performed by: EMERGENCY MEDICINE

## 2020-06-01 PROCEDURE — 85025 COMPLETE CBC W/AUTO DIFF WBC: CPT

## 2020-06-01 PROCEDURE — 99900035 HC TECH TIME PER 15 MIN (STAT)

## 2020-06-01 PROCEDURE — 37000009 HC ANESTHESIA EA ADD 15 MINS: Performed by: INTERNAL MEDICINE

## 2020-06-01 PROCEDURE — 88342 IMHCHEM/IMCYTCHM 1ST ANTB: CPT | Performed by: PATHOLOGY

## 2020-06-01 PROCEDURE — 88305 TISSUE EXAM BY PATHOLOGIST: ICD-10-PCS | Mod: 26,,, | Performed by: PATHOLOGY

## 2020-06-01 PROCEDURE — D9220A PRA ANESTHESIA: ICD-10-PCS | Mod: ANES,,, | Performed by: ANESTHESIOLOGY

## 2020-06-01 PROCEDURE — 27201012 HC FORCEPS, HOT/COLD, DISP: Performed by: INTERNAL MEDICINE

## 2020-06-01 PROCEDURE — 25000003 PHARM REV CODE 250: Performed by: NURSE PRACTITIONER

## 2020-06-01 PROCEDURE — 25000003 PHARM REV CODE 250: Performed by: NURSE ANESTHETIST, CERTIFIED REGISTERED

## 2020-06-01 PROCEDURE — G0378 HOSPITAL OBSERVATION PER HR: HCPCS

## 2020-06-01 PROCEDURE — 83735 ASSAY OF MAGNESIUM: CPT

## 2020-06-01 PROCEDURE — 88342 CHG IMMUNOCYTOCHEMISTRY: ICD-10-PCS | Mod: 26,,, | Performed by: PATHOLOGY

## 2020-06-01 PROCEDURE — D9220A PRA ANESTHESIA: ICD-10-PCS | Mod: CRNA,,, | Performed by: NURSE ANESTHETIST, CERTIFIED REGISTERED

## 2020-06-01 PROCEDURE — 80053 COMPREHEN METABOLIC PANEL: CPT

## 2020-06-01 PROCEDURE — C9113 INJ PANTOPRAZOLE SODIUM, VIA: HCPCS | Performed by: NURSE PRACTITIONER

## 2020-06-01 PROCEDURE — 94761 N-INVAS EAR/PLS OXIMETRY MLT: CPT

## 2020-06-01 PROCEDURE — 96374 THER/PROPH/DIAG INJ IV PUSH: CPT | Mod: 59 | Performed by: EMERGENCY MEDICINE

## 2020-06-01 PROCEDURE — 43239 EGD BIOPSY SINGLE/MULTIPLE: CPT | Mod: ,,, | Performed by: INTERNAL MEDICINE

## 2020-06-01 PROCEDURE — 63700000 PHARM REV CODE 250 ALT 637 W/O HCPCS: Performed by: NURSE PRACTITIONER

## 2020-06-01 PROCEDURE — 96375 TX/PRO/DX INJ NEW DRUG ADDON: CPT | Mod: 59 | Performed by: EMERGENCY MEDICINE

## 2020-06-01 PROCEDURE — 88305 TISSUE EXAM BY PATHOLOGIST: CPT | Mod: 26,,, | Performed by: PATHOLOGY

## 2020-06-01 PROCEDURE — D9220A PRA ANESTHESIA: Mod: ANES,,, | Performed by: ANESTHESIOLOGY

## 2020-06-01 PROCEDURE — 88305 TISSUE EXAM BY PATHOLOGIST: CPT | Performed by: PATHOLOGY

## 2020-06-01 PROCEDURE — 88342 IMHCHEM/IMCYTCHM 1ST ANTB: CPT | Mod: 26,,, | Performed by: PATHOLOGY

## 2020-06-01 PROCEDURE — 25000242 PHARM REV CODE 250 ALT 637 W/ HCPCS: Performed by: NURSE PRACTITIONER

## 2020-06-01 PROCEDURE — 37000008 HC ANESTHESIA 1ST 15 MINUTES: Performed by: INTERNAL MEDICINE

## 2020-06-01 PROCEDURE — 63600175 PHARM REV CODE 636 W HCPCS: Performed by: NURSE PRACTITIONER

## 2020-06-01 PROCEDURE — D9220A PRA ANESTHESIA: Mod: CRNA,,, | Performed by: NURSE ANESTHETIST, CERTIFIED REGISTERED

## 2020-06-01 PROCEDURE — 43239 PR EGD, FLEX, W/BIOPSY, SGL/MULTI: ICD-10-PCS | Mod: ,,, | Performed by: INTERNAL MEDICINE

## 2020-06-01 PROCEDURE — 43239 EGD BIOPSY SINGLE/MULTIPLE: CPT | Performed by: INTERNAL MEDICINE

## 2020-06-01 PROCEDURE — 36415 COLL VENOUS BLD VENIPUNCTURE: CPT

## 2020-06-01 PROCEDURE — 63600175 PHARM REV CODE 636 W HCPCS: Performed by: NURSE ANESTHETIST, CERTIFIED REGISTERED

## 2020-06-01 PROCEDURE — 94640 AIRWAY INHALATION TREATMENT: CPT

## 2020-06-01 RX ORDER — SODIUM CHLORIDE 9 MG/ML
INJECTION, SOLUTION INTRAVENOUS CONTINUOUS PRN
Status: DISCONTINUED | OUTPATIENT
Start: 2020-06-01 | End: 2020-06-01

## 2020-06-01 RX ORDER — POTASSIUM CHLORIDE 7.45 MG/ML
10 INJECTION INTRAVENOUS
Status: DISCONTINUED | OUTPATIENT
Start: 2020-06-01 | End: 2020-06-01

## 2020-06-01 RX ORDER — AZITHROMYCIN 500 MG/1
500 TABLET, FILM COATED ORAL DAILY
Qty: 3 TABLET | Refills: 0 | Status: SHIPPED | OUTPATIENT
Start: 2020-06-01 | End: 2020-06-09

## 2020-06-01 RX ORDER — PROPOFOL 10 MG/ML
VIAL (ML) INTRAVENOUS
Status: DISCONTINUED | OUTPATIENT
Start: 2020-06-01 | End: 2020-06-01

## 2020-06-01 RX ORDER — LIDOCAINE HCL/PF 100 MG/5ML
SYRINGE (ML) INTRAVENOUS
Status: DISCONTINUED | OUTPATIENT
Start: 2020-06-01 | End: 2020-06-01

## 2020-06-01 RX ORDER — GLYCOPYRROLATE 0.2 MG/ML
INJECTION INTRAMUSCULAR; INTRAVENOUS
Status: DISCONTINUED | OUTPATIENT
Start: 2020-06-01 | End: 2020-06-01

## 2020-06-01 RX ADMIN — CEFTRIAXONE 1 G: 1 INJECTION, SOLUTION INTRAVENOUS at 11:06

## 2020-06-01 RX ADMIN — SODIUM CHLORIDE: 0.9 INJECTION, SOLUTION INTRAVENOUS at 09:06

## 2020-06-01 RX ADMIN — IPRATROPIUM BROMIDE AND ALBUTEROL SULFATE 3 ML: .5; 2.5 SOLUTION RESPIRATORY (INHALATION) at 08:06

## 2020-06-01 RX ADMIN — PROPOFOL 100 MG: 10 INJECTION, EMULSION INTRAVENOUS at 09:06

## 2020-06-01 RX ADMIN — POTASSIUM CHLORIDE 10 MEQ: 7.46 INJECTION, SOLUTION INTRAVENOUS at 06:06

## 2020-06-01 RX ADMIN — POTASSIUM CHLORIDE 40 MEQ: 20 SOLUTION ORAL at 12:06

## 2020-06-01 RX ADMIN — AZITHROMYCIN MONOHYDRATE 500 MG: 500 INJECTION, POWDER, LYOPHILIZED, FOR SOLUTION INTRAVENOUS at 09:06

## 2020-06-01 RX ADMIN — SUCRALFATE 1 G: 1 TABLET ORAL at 09:06

## 2020-06-01 RX ADMIN — SUCRALFATE 1 G: 1 TABLET ORAL at 04:06

## 2020-06-01 RX ADMIN — AMLODIPINE BESYLATE 10 MG: 5 TABLET ORAL at 11:06

## 2020-06-01 RX ADMIN — GLYCOPYRROLATE 0.2 MG: 0.2 INJECTION, SOLUTION INTRAMUSCULAR; INTRAVENOUS at 09:06

## 2020-06-01 RX ADMIN — PANTOPRAZOLE SODIUM 40 MG: 40 INJECTION, POWDER, LYOPHILIZED, FOR SOLUTION INTRAVENOUS at 10:06

## 2020-06-01 RX ADMIN — POTASSIUM CHLORIDE 40 MEQ: 20 SOLUTION ORAL at 02:06

## 2020-06-01 RX ADMIN — LIDOCAINE HYDROCHLORIDE 100 MG: 20 INJECTION INTRAVENOUS at 09:06

## 2020-06-01 RX ADMIN — SUCRALFATE 1 G: 1 TABLET ORAL at 10:06

## 2020-06-01 NOTE — PLAN OF CARE
Problem: Adult Inpatient Plan of Care  Goal: Plan of Care Review  Outcome: Ongoing, Progressing   NAD noted. POC reviewed w pt and they verbalized understanding. Tele- SR    Pt free from falls, Pt ambulates to the bathroom. Bed in low position, side rails up x 2. Call light in reach, bed alarm on and wheels locked. Will continue to monitor.   Pt sched for stress test in am

## 2020-06-01 NOTE — [employer] PATIENT INSTRUCTIONS
Discharge Summary/Instructions after an Endoscopic Procedure  Patient Name: Hernan Badillo  Patient MRN: [id number]  Patient YOB: 1965  Monday, June 1, 2020  Terrell May MD  RESTRICTIONS:  During your procedure today, you received medications for sedation.  These   medications may affect your judgment, balance and coordination.  Therefore,   for 24 hours, you have the following restrictions:   - DO NOT drive a car, operate machinery, make legal/financial decisions,   sign important papers or drink alcohol.    ACTIVITY:  Today: no heavy lifting, straining or running due to procedural   sedation/anesthesia.  The following day: return to full activity including work.  DIET:  Eat and drink normally unless instructed otherwise.     TREATMENT FOR COMMON SIDE EFFECTS:  - Mild abdominal pain, nausea, belching, bloating or excessive gas:  rest,   eat lightly and use a heating pad.  - Sore Throat: treat with throat lozenges and/or gargle with warm salt   water.  - Because air was used during the procedure, expelling large amounts of air   from your rectum or belching is normal.  - If a bowel prep was taken, you may not have a bowel movement for 1-3 days.    This is normal.  SYMPTOMS TO WATCH FOR AND REPORT TO YOUR PHYSICIAN:  1. Abdominal pain or bloating, other than gas cramps.  2. Chest pain.  3. Back pain.  4. Signs of infection such as: chills or fever occurring within 24 hours   after the procedure.  5. Rectal bleeding, which would show as bright red, maroon, or black stools.   (A tablespoon of blood from the rectum is not serious, especially if   hemorrhoids are present.)  6. Vomiting.  7. Weakness or dizziness.  GO DIRECTLY TO THE NEAREST EMERGENCY ROOM IF YOU HAVE ANY OF THE FOLLOWING:      Difficulty breathing              Chills and/or fever over 101 F   Persistent vomiting and/or vomiting blood   Severe abdominal pain   Severe chest pain   Black, tarry stools   Bleeding- more than one tablespoon   Any  other symptom or condition that you feel may need urgent attention  Your doctor recommends these additional instructions:  If any biopsies were taken, your doctors clinic will contact you in 1 to 2   weeks with any results.  - Return patient to [facility] for [facility] care.   - Advance diet as tolerated.   - Continue present medications, including Protonix 40 mg PO daily  - Await pathology results.   - Procedure was aborted due to patient's oxygen desaturation, therefore   repeat upper endoscopy in 2 months.  For questions, problems or results please call your physician - Terrell May MD at Work:  [phone].  [facility], EMERGENCY ROOM PHONE NUMBER: [phone]  IF A COMPLICATION OR EMERGENCY SITUATION ARISES AND YOU ARE UNABLE TO REACH   YOUR PHYSICIAN - GO DIRECTLY TO THE EMERGENCY ROOM.  Terrell May MD  6/1/2020 9:48:19 AM  This report has been verified and signed electronically.  [employer]

## 2020-06-01 NOTE — PLAN OF CARE
POC updated and reviewed with pt, monitor lab results, vitals Q4hr, NPO after midnight maintained, EGD scheduled for today, maintain IV access, PRN tylenol administered for back pain, antibiotics administered per order,blood glucose monitoring, weigh daily, strict I&Os, safety maintained throughout shift, pt remain free from falls and injury, bed low with wheels locked, bed alarm activated, call light in reach, will continue to monitor.

## 2020-06-01 NOTE — ASSESSMENT & PLAN NOTE
Lab Results   Component Value Date    LABA1C 9.8 (H) 07/06/2016    HGBA1C 7.9 (H) 05/31/2020     Chronic, uncontrolled.  Patient states he is not compliant with his metformin at home.  Will utilize low-dose sliding scale insulin upon admission to the hospital, Accu-Cheks.  Check HgbA1c

## 2020-06-01 NOTE — CARE UPDATE
06/01/20 0700   Patient Assessment/Suction   Level of Consciousness (AVPU) alert   Respiratory Effort Normal;Unlabored   Expansion/Accessory Muscles/Retractions expansion symmetric;no retractions;no use of accessory muscles   All Lung Fields Breath Sounds clear;equal bilaterally   Rhythm/Pattern, Respiratory depth regular;pattern regular;unlabored   Cough Frequency infrequent   Cough Type good;nonproductive   PRE-TX-O2   O2 Device (Oxygen Therapy) room air   SpO2 96 %   Pulse Oximetry Type Intermittent   $ Pulse Oximetry - Multiple Charge Pulse Oximetry - Multiple   Pulse 69   Resp 18   Positioning HOB elevated 30 degrees   Aerosol Therapy   $ Aerosol Therapy Charges Refused   Patient resting comfortably, no distress noted. Will continue to monitor

## 2020-06-01 NOTE — PROGRESS NOTES
"[facility] Medicine  Progress Note    Patient Name: Hernan Badillo  MRN: [id number]  Patient Class: OP- Observation   Admission Date: 5/30/2020  Length of Stay: 0 days  Attending Physician: Gil Baer MD  Primary Care Provider: Primary Doctor No        Subjective:     Principal Problem:Community acquired pneumonia of right lower lobe of lung        HPI:  Hernan Badillo is a 54-year-old male with a past medical history of COPD, non-insulin-dependent type 2 diabetes, former alcoholism, and hypertension who presents to the emergency room tonight with chest pain and abdominal pain.  Patient states pain began approximately 1 week ago, has been increasing in severity, not relieved by over-the-counter Tums.  Patient states chest pain is located to the mid anterior chest wall, described as burning.  Patient states abdominal pain is located to the entire abdomen, described as burning, worse with eating.  Patient denies nausea, vomiting, or diarrhea.  Patient states he experienced this pain in the past, prompting him to stop alcohol use approximately 5 months ago.  Patient also endorses intermittent shortness of breath with productive cough with green sputum production.  Denies orthopnea or dyspnea on exertion.  Patient states he is noncompliant with his home medication, states he will take his blood pressure medicine "every once in a while" and states he did take a blood pressure pill this morning.  Patient denies history of MI - states he has never followed with a cardiologist or gastroenterologist prior.  Patient states he has never had an EGD or colonoscopy before.  In the emergency room workup revealing right lower lobe pneumonia, patient was placed on IV antibiotic therapy.  Patient placed in observation on 05/30/2020 at approximately 8:00 p.m..  Patient states he resides at home with his girlfriend, denies use of supplemental oxygen or ambulatory assist devices.    Overview/Hospital " Course:  Pt admitted for chest pain and epigastric pain. Initial troponin elevated and trended with resolution. Pts epigastric pain did not improve with GI cocktail and required IV morphine for relief. Pt experienced int chest pain while in hospital and cardiology consulted. Pt placed on clear liquid diet and EGD to be done 6/1/20. Pt with history of noncompliance. He is status post upper GI on 06/01/2020 which demonstrated A non-obstructing Schatzki ring was found in the lower third of the esophagus. Patchy moderately erythematous mucosa without bleeding was found in the gastric antrum.     Interval History: no acute events overnight.  He is status post upper GI this morning which demonstrated A non-obstructing Schatzki ring was found in the lower third of the esophagus. Patchy moderately erythematous mucosa without bleeding was found in the gastric antrum.   He presently complains of right lower back pain I have a big bump back there, .  It is painful.  That is why can in the hospital  Denies shortness of breath.  Reports intermittent chest pain.  Denies presently      Review of Systems   Constitutional: Negative for chills, fatigue and fever.   HENT: Negative for congestion and trouble swallowing.    Eyes: Negative for photophobia and visual disturbance.   Respiratory: Positive for cough. Negative for shortness of breath.    Cardiovascular: Positive for chest pain. Negative for palpitations.   Gastrointestinal: Positive for abdominal pain. Negative for diarrhea, nausea and vomiting.   Genitourinary: Negative for difficulty urinating and dysuria.   Musculoskeletal: Positive for arthralgias and back pain. Negative for gait problem.   Skin: Negative for rash and wound.   Neurological: Negative for dizziness, weakness and light-headedness.   Psychiatric/Behavioral: Positive for agitation. Negative for confusion.   All other systems reviewed and are negative.    Objective:     Vital Signs (Most Recent):  Temp: 98.5  °F (36.9 °C) (06/01/20 1112)  Pulse: 79 (06/01/20 1112)  Resp: 20 (06/01/20 1112)  BP: (!) 160/96 (06/01/20 1112)  SpO2: 96 % (06/01/20 1112) Vital Signs (24h Range):  Temp:  [96.5 °F (35.8 °C)-98.5 °F (36.9 °C)] 98.5 °F (36.9 °C)  Pulse:  [] 79  Resp:  [16-22] 20  SpO2:  [93 %-98 %] 96 %  BP: (129-181)/(74-98) 160/96     Weight: 79.8 kg (176 lb)  Body mass index is 25.99 kg/m².    Intake/Output Summary (Last 24 hours) at 6/1/2020 1223  Last data filed at 6/1/2020 0940  Gross per 24 hour   Intake 640 ml   Output 251 ml   Net 389 ml      Physical Exam   Constitutional: He is oriented to person, place, and time. He appears well-developed and well-nourished. No distress.   HENT:   Head: Normocephalic and atraumatic.   Right Ear: External ear normal.   Left Ear: External ear normal.   Mouth/Throat: Oropharynx is clear and moist.   Eyes: Pupils are equal, round, and reactive to light. Conjunctivae and EOM are normal.   Neck: Normal range of motion. Neck supple. No JVD present.   Cardiovascular: Normal rate, regular rhythm, normal heart sounds and intact distal pulses.   No murmur heard.  Pulmonary/Chest: Effort normal and breath sounds normal. No stridor. No respiratory distress. He has no wheezes.   Abdominal: Soft. Bowel sounds are normal. He exhibits no distension and no mass. There is tenderness. There is no guarding.   Mild epigastric tenderness   Musculoskeletal: Normal range of motion. He exhibits no edema.   Neurological: He is alert and oriented to person, place, and time. No sensory deficit.   Skin: Skin is warm and dry. Capillary refill takes 2 to 3 seconds. No rash noted. He is not diaphoretic. No erythema.   Soft tissue swelling to right lower back with palpation mild tenderness   Psychiatric: He has a normal mood and affect. His behavior is normal. Thought content normal.   Nursing note and vitals reviewed.      Significant Labs:   CBC:   Recent Labs   Lab 05/30/20  1733 05/31/20  0446 06/01/20  0510    WBC 6.26 6.21 3.60*   HGB 13.7* 13.0* 12.3*   HCT 42.3 40.2 38.5*    169 159     CMP:   Recent Labs   Lab 05/30/20  1733 05/31/20  0446 06/01/20  0510    136 138   K 3.6 3.5 3.4*   CL 97 100 101   CO2 29 29 28   * 181* 98   BUN 10 10 15   CREATININE 1.0 1.0 1.0   CALCIUM 9.7 9.2 8.7   PROT 8.0 7.1 6.7   ALBUMIN 3.7 3.3* 3.2*   BILITOT 0.7 0.6 0.6   ALKPHOS 78 74 64   AST 16 12 11   ALT 12 10 10   ANIONGAP 12 7* 9   EGFRNONAA >60 >60 >60     Cardiac Markers:   Recent Labs   Lab 05/30/20  2250   *     All pertinent labs within the past 24 hours have been reviewed.    Significant Imaging: I have reviewed all pertinent imaging results/findings within the past 24 hours.      Assessment/Plan:      * Community acquired pneumonia of right lower lobe of lung  IV Rocephin and azithromycin  Cultures obtained and pending    Noncompliance  Patient educated on the importance compliance of medication regimen and regular doctor appts and health screenings      History of alcoholism  States his last alcoholic intake was approximately 4 months ago.  Will monitor for DTs      Epigastric pain  Previous etoh use - last reported drink was approx 4 months ago  Possible PUD  No reported hematemesis or melena  GI consulted - plan is for possible EGD tomorrow, clear liquid diet today  Start PPI          Tobacco dependency  Assistance with smoking cessation was offered, including:  [x]  Medications  [x]  Counseling  []  Printed Information on Smoking Cessation  []  Referral to a Smoking Cessation Program    Patient was counseled regarding smoking for 3-10 minutes.          Chest pain  Patient with chest pain and abdominal pain, burning in characteristic.    Initial troponin positive, trended and improved.    EKG reviewed - no acute ST elevation, PVCs noted  Continuous telemetry monitoring.    Patient was given GI cocktail in emergency room, states made his pain worse.    Will utilize p.r.n. IV morphine and  sublingual nitro for pain control.   Heart score 5 upon admission.  Cardiology consulted        Hematoma  Right lower back. Previous soft tissue US. Required I&D in the past    COPD (chronic obstructive pulmonary disease)  Patient with a history of COPD, will utilize doing nebs as needed.  Utilize supplemental oxygen as needed to maintain SpO2 greater than 90%.  Patient on IV azithromycin and Rocephin for right lower lobe pneumonia.      Diabetes type 2, uncontrolled  Lab Results   Component Value Date    LABA1C 9.8 (H) 07/06/2016    HGBA1C 7.9 (H) 05/31/2020     Chronic, uncontrolled.  Patient states he is not compliant with his metformin at home.  Will utilize low-dose sliding scale insulin upon admission to the hospital, Accu-Cheks.  Check HgbA1c      Hypertension associated with diabetes  Chronic, uncontrolled.  Latest blood pressure and vitals reviewed-   Temp:  [96.5 °F (35.8 °C)-98.5 °F (36.9 °C)]   Pulse:  []   Resp:  [16-22]   BP: (129-181)/(74-98)   SpO2:  [93 %-98 %] .   Home meds for hypertension were reviewed and noted below. Hospital anti-hypertensive changes were made as shown below.  Hypertension Medications             amLODIPine (NORVASC) 10 MG tablet Take 1 tablet (10 mg total) by mouth once daily. Start medication September 3    lisinopril (PRINIVIL,ZESTRIL) 20 MG tablet Take 1 tablet (20 mg total) by mouth once daily.      Hospital Medications             amLODIPine tablet 10 mg Starting on 5/31/2020. 10 mg, Oral, Daily    lisinopriL tablet 20 mg Starting on 5/31/2020. 20 mg, Oral, Daily        Will treat with PRN antihypertensives, as needed, to maintain BP less than 180/110 or if patient becomes symptomatic. Will continue home medication regimen as prescribed, patient educated on importance of medication compliance.  Continuous telemetry monitoring.            VTE Risk Mitigation (From admission, onward)         Ordered     IP VTE LOW RISK PATIENT  Once      05/30/20 0866     Place  sequential compression device  Until discontinued      05/30/20 2236     Place MINOR hose  Until discontinued      05/30/20 2236                      Christine Oliveira NP  Department of Hospital Medicine   [facility]

## 2020-06-01 NOTE — SUBJECTIVE & OBJECTIVE
Interval History: no acute events overnight.  He is status post upper GI this morning which demonstrated A non-obstructing Schatzki ring was found in the lower third of the esophagus. Patchy moderately erythematous mucosa without bleeding was found in the gastric antrum.   He presently complains of right lower back pain I have a big bump back there, .  It is painful.  That is why can in the hospital  Denies shortness of breath.  Reports intermittent chest pain.  Denies presently      Review of Systems   Constitutional: Negative for chills, fatigue and fever.   HENT: Negative for congestion and trouble swallowing.    Eyes: Negative for photophobia and visual disturbance.   Respiratory: Positive for cough. Negative for shortness of breath.    Cardiovascular: Positive for chest pain. Negative for palpitations.   Gastrointestinal: Positive for abdominal pain. Negative for diarrhea, nausea and vomiting.   Genitourinary: Negative for difficulty urinating and dysuria.   Musculoskeletal: Positive for arthralgias and back pain. Negative for gait problem.   Skin: Negative for rash and wound.   Neurological: Negative for dizziness, weakness and light-headedness.   Psychiatric/Behavioral: Positive for agitation. Negative for confusion.   All other systems reviewed and are negative.    Objective:     Vital Signs (Most Recent):  Temp: 98.5 °F (36.9 °C) (06/01/20 1112)  Pulse: 79 (06/01/20 1112)  Resp: 20 (06/01/20 1112)  BP: (!) 160/96 (06/01/20 1112)  SpO2: 96 % (06/01/20 1112) Vital Signs (24h Range):  Temp:  [96.5 °F (35.8 °C)-98.5 °F (36.9 °C)] 98.5 °F (36.9 °C)  Pulse:  [] 79  Resp:  [16-22] 20  SpO2:  [93 %-98 %] 96 %  BP: (129-181)/(74-98) 160/96     Weight: 79.8 kg (176 lb)  Body mass index is 25.99 kg/m².    Intake/Output Summary (Last 24 hours) at 6/1/2020 1223  Last data filed at 6/1/2020 0940  Gross per 24 hour   Intake 640 ml   Output 251 ml   Net 389 ml      Physical Exam   Constitutional: He is oriented to  person, place, and time. He appears well-developed and well-nourished. No distress.   HENT:   Head: Normocephalic and atraumatic.   Right Ear: External ear normal.   Left Ear: External ear normal.   Mouth/Throat: Oropharynx is clear and moist.   Eyes: Pupils are equal, round, and reactive to light. Conjunctivae and EOM are normal.   Neck: Normal range of motion. Neck supple. No JVD present.   Cardiovascular: Normal rate, regular rhythm, normal heart sounds and intact distal pulses.   No murmur heard.  Pulmonary/Chest: Effort normal and breath sounds normal. No stridor. No respiratory distress. He has no wheezes.   Abdominal: Soft. Bowel sounds are normal. He exhibits no distension and no mass. There is tenderness. There is no guarding.   Mild epigastric tenderness   Musculoskeletal: Normal range of motion. He exhibits no edema.   Neurological: He is alert and oriented to person, place, and time. No sensory deficit.   Skin: Skin is warm and dry. Capillary refill takes 2 to 3 seconds. No rash noted. He is not diaphoretic. No erythema.   Soft tissue swelling to right lower back with palpation mild tenderness   Psychiatric: He has a normal mood and affect. His behavior is normal. Thought content normal.   Nursing note and vitals reviewed.      Significant Labs:   CBC:   Recent Labs   Lab 05/30/20  1733 05/31/20  0446 06/01/20  0510   WBC 6.26 6.21 3.60*   HGB 13.7* 13.0* 12.3*   HCT 42.3 40.2 38.5*    169 159     CMP:   Recent Labs   Lab 05/30/20  1733 05/31/20  0446 06/01/20  0510    136 138   K 3.6 3.5 3.4*   CL 97 100 101   CO2 29 29 28   * 181* 98   BUN 10 10 15   CREATININE 1.0 1.0 1.0   CALCIUM 9.7 9.2 8.7   PROT 8.0 7.1 6.7   ALBUMIN 3.7 3.3* 3.2*   BILITOT 0.7 0.6 0.6   ALKPHOS 78 74 64   AST 16 12 11   ALT 12 10 10   ANIONGAP 12 7* 9   EGFRNONAA >60 >60 >60     Cardiac Markers:   Recent Labs   Lab 05/30/20  2250   *     All pertinent labs within the past 24 hours have been  reviewed.    Significant Imaging: I have reviewed all pertinent imaging results/findings within the past 24 hours.

## 2020-06-01 NOTE — UM SECONDARY REVIEW
Physician Advisor External    Level of Care Issue    6/1 @ 1:28pm Case submitted online to EHR for review. Kv  6/1 @ 3:29pm Per voicemail from Dr Recinos EHR 6/1 recommendation is non acute services. [email or web address]

## 2020-06-01 NOTE — PLAN OF CARE
CM attempted to complete discharge assessment with pt bedside. Pt currently out of room for EGD. CM will try again at later time.     06/01/20 0969   Discharge Assessment   Assessment Type Discharge Planning Assessment

## 2020-06-01 NOTE — ASSESSMENT & PLAN NOTE
Chronic, uncontrolled.  Latest blood pressure and vitals reviewed-   Temp:  [96.5 °F (35.8 °C)-98.5 °F (36.9 °C)]   Pulse:  []   Resp:  [16-22]   BP: (129-181)/(74-98)   SpO2:  [93 %-98 %] .   Home meds for hypertension were reviewed and noted below. Hospital anti-hypertensive changes were made as shown below.  Hypertension Medications             amLODIPine (NORVASC) 10 MG tablet Take 1 tablet (10 mg total) by mouth once daily. Start medication September 3    lisinopril (PRINIVIL,ZESTRIL) 20 MG tablet Take 1 tablet (20 mg total) by mouth once daily.      Hospital Medications             amLODIPine tablet 10 mg Starting on 5/31/2020. 10 mg, Oral, Daily    lisinopriL tablet 20 mg Starting on 5/31/2020. 20 mg, Oral, Daily        Will treat with PRN antihypertensives, as needed, to maintain BP less than 180/110 or if patient becomes symptomatic. Will continue home medication regimen as prescribed, patient educated on importance of medication compliance.  Continuous telemetry monitoring.

## 2020-06-01 NOTE — ANESTHESIA PREPROCEDURE EVALUATION
06/01/2020  Hernan Badillo is a 54 y.o., male.    Anesthesia Evaluation    I have reviewed the Patient Summary Reports.    I have reviewed the Nursing Notes. I have reviewed the NPO Status.   I have reviewed the Medications.     Review of Systems  Social:  Smoker Smoking Status: Current Every Day Smoker - 60 pack years  Smokeless Tobacco Status: Never Used  Alcohol use: Yes; 1.0 standard drinks per week  Drug use: No       Cardiovascular:   Hypertension    Pulmonary:   Pneumonia COPD, moderate    Endocrine:   Diabetes, poorly controlled, type 2        Physical Exam  General:  Well nourished    Airway/Jaw/Neck:  Airway Findings: Mouth Opening: Normal Tongue: Normal  General Airway Assessment: Adult, Good  Mallampati: II  Improves to II with phonation.  TM Distance: 4-6 cm      Dental:  Dental Findings: In tact   Chest/Lungs:  Chest/Lungs Findings: Clear to auscultation, Normal Respiratory Rate     Heart/Vascular:  Heart Findings: Rate: Normal  Rhythm: Regular Rhythm  Sounds: Normal  Heart murmur: negative       Mental Status:  Mental Status Findings:  Cooperative, Alert and Oriented         Anesthesia Plan  Type of Anesthesia, risks & benefits discussed:  Anesthesia Type:  general  Patient's Preference:   Intra-op Monitoring Plan: standard ASA monitors  Intra-op Monitoring Plan Comments:   Post Op Pain Control Plan:   Post Op Pain Control Plan Comments:   Induction:   IV  Beta Blocker:  Patient is not currently on a Beta-Blocker (No further documentation required).       Informed Consent: Patient understands risks and agrees with Anesthesia plan.  Questions answered. Anesthesia consent signed with patient.  ASA Score: 4     Day of Surgery Review of History & Physical: I have interviewed and examined the patient. I have reviewed the patient's H&P dated:  There are no significant changes.  H&P update referred to the  surgeon.  H&P completed by Anesthesiologist.       Ready For Surgery From Anesthesia Perspective.

## 2020-06-01 NOTE — RESPIRATORY THERAPY
05/31/20 1917   Patient Assessment/Suction   Level of Consciousness (AVPU) alert   Respiratory Effort Unlabored   Expansion/Accessory Muscles/Retractions no use of accessory muscles   All Lung Fields Breath Sounds clear   Rhythm/Pattern, Respiratory unlabored   Cough Frequency no cough   PRE-TX-O2   O2 Device (Oxygen Therapy) room air   SpO2 (!) 94 %   Pulse Oximetry Type Intermittent   $ Pulse Oximetry - Multiple Charge Pulse Oximetry - Multiple   Pulse 74   Resp 16   Aerosol Therapy   $ Aerosol Therapy Charges Aerosol Treatment   Respiratory Treatment Status (SVN) given   Treatment Route (SVN) mask   Patient Position (SVN) HOB elevated   Post Treatment Assessment (SVN) breath sounds unchanged   Signs of Intolerance (SVN) none   Breath Sounds Post-Respiratory Treatment   Throughout All Fields Post-Treatment All Fields   Throughout All Fields Post-Treatment no change   Post-treatment Heart Rate (beats/min) 75   Post-treatment Resp Rate (breaths/min) 16

## 2020-06-01 NOTE — OR NURSING
Patient returned to his room via wheelchair, Patient reluctantly got into his bed, Report given to Dianelys

## 2020-06-01 NOTE — TRANSFER OF CARE
"Anesthesia Transfer of Care Note    Patient: Hernan Badillo    Procedure(s) Performed: Procedure(s) (LRB):  EGD (ESOPHAGOGASTRODUODENOSCOPY) (N/A)    Patient location: GI    Anesthesia Type: general    Transport from OR: Transported from OR on room air with adequate spontaneous ventilation    Post pain: adequate analgesia    Post assessment: no apparent anesthetic complications    Post vital signs: stable    Level of consciousness: awake    Nausea/Vomiting: no nausea/vomiting    Complications: none    Transfer of care protocol was followed      Last vitals:   Visit Vitals  BP (!) 154/98 (BP Location: Right arm, Patient Position: Lying)   Pulse 87   Temp 36.7 °C (98.1 °F) (Skin)   Resp 16   Ht 5' 9" (1.753 m)   Wt 79.8 kg (176 lb)   SpO2 96%   BMI 25.99 kg/m²     "

## 2020-06-01 NOTE — ANESTHESIA POSTPROCEDURE EVALUATION
Anesthesia Post Evaluation    Patient: Hernan Badillo    Procedure(s) Performed: Procedure(s) (LRB):  EGD (ESOPHAGOGASTRODUODENOSCOPY) (N/A)    Final Anesthesia Type: general    Patient location during evaluation: PACU  Patient participation: Yes- Able to Participate  Level of consciousness: awake and alert and oriented  Post-procedure vital signs: reviewed and stable  Pain management: adequate  Airway patency: patent    PONV status at discharge: No PONV  Anesthetic complications: no      Cardiovascular status: blood pressure returned to baseline  Respiratory status: unassisted, spontaneous ventilation and room air  Hydration status: euvolemic  Follow-up not needed.          Vitals Value Taken Time   /79 6/1/2020 10:09 AM   Temp 36.7 °C (98.1 °F) 6/1/2020  9:55 AM   Pulse 88 6/1/2020 10:14 AM   Resp 18 6/1/2020 10:00 AM   SpO2 95 % 6/1/2020 10:14 AM   Vitals shown include unvalidated device data.      Event Time     Out of Recovery 06/01/2020 10:27:50          Pain/Juan Antonio Score: Pain Rating Prior to Med Admin: 0 (6/1/2020 10:00 AM)  Pain Rating Post Med Admin: 6 (5/31/2020  9:20 PM)  Juan Antonio Score: 10 (6/1/2020 10:00 AM)

## 2020-06-02 VITALS
HEIGHT: 69 IN | BODY MASS INDEX: 26.07 KG/M2 | WEIGHT: 176 LBS | OXYGEN SATURATION: 95 % | TEMPERATURE: 98 F | SYSTOLIC BLOOD PRESSURE: 142 MMHG | RESPIRATION RATE: 16 BRPM | DIASTOLIC BLOOD PRESSURE: 89 MMHG | HEART RATE: 72 BPM

## 2020-06-02 LAB
ALBUMIN SERPL BCP-MCNC: 3.2 G/DL (ref 3.5–5.2)
ALP SERPL-CCNC: 65 U/L (ref 55–135)
ALT SERPL W/O P-5'-P-CCNC: 11 U/L (ref 10–44)
ANION GAP SERPL CALC-SCNC: 7 MMOL/L (ref 8–16)
AST SERPL-CCNC: 14 U/L (ref 10–40)
BASOPHILS # BLD AUTO: 0.02 K/UL (ref 0–0.2)
BASOPHILS NFR BLD: 0.5 % (ref 0–1.9)
BILIRUB SERPL-MCNC: 0.5 MG/DL (ref 0.1–1)
BUN SERPL-MCNC: 17 MG/DL (ref 6–20)
CALCIUM SERPL-MCNC: 8.8 MG/DL (ref 8.7–10.5)
CHLORIDE SERPL-SCNC: 104 MMOL/L (ref 95–110)
CO2 SERPL-SCNC: 29 MMOL/L (ref 23–29)
CREAT SERPL-MCNC: 1 MG/DL (ref 0.5–1.4)
DIFFERENTIAL METHOD: ABNORMAL
EOSINOPHIL # BLD AUTO: 0.2 K/UL (ref 0–0.5)
EOSINOPHIL NFR BLD: 4.1 % (ref 0–8)
ERYTHROCYTE [DISTWIDTH] IN BLOOD BY AUTOMATED COUNT: 13.2 % (ref 11.5–14.5)
EST. GFR  (AFRICAN AMERICAN): >60 ML/MIN/1.73 M^2
EST. GFR  (NON AFRICAN AMERICAN): >60 ML/MIN/1.73 M^2
GLUCOSE SERPL-MCNC: 120 MG/DL (ref 70–110)
HCT VFR BLD AUTO: 36.7 % (ref 40–54)
HGB BLD-MCNC: 11.8 G/DL (ref 14–18)
IMM GRANULOCYTES # BLD AUTO: 0 K/UL (ref 0–0.04)
IMM GRANULOCYTES NFR BLD AUTO: 0 % (ref 0–0.5)
LYMPHOCYTES # BLD AUTO: 1.3 K/UL (ref 1–4.8)
LYMPHOCYTES NFR BLD: 33.8 % (ref 18–48)
MAGNESIUM SERPL-MCNC: 1.7 MG/DL (ref 1.6–2.6)
MCH RBC QN AUTO: 29.6 PG (ref 27–31)
MCHC RBC AUTO-ENTMCNC: 32.2 G/DL (ref 32–36)
MCV RBC AUTO: 92 FL (ref 82–98)
MONOCYTES # BLD AUTO: 0.3 K/UL (ref 0.3–1)
MONOCYTES NFR BLD: 8.1 % (ref 4–15)
NEUTROPHILS # BLD AUTO: 2.1 K/UL (ref 1.8–7.7)
NEUTROPHILS NFR BLD: 53.5 % (ref 38–73)
NRBC BLD-RTO: 0 /100 WBC
PLATELET # BLD AUTO: 161 K/UL (ref 150–350)
PMV BLD AUTO: 10.7 FL (ref 9.2–12.9)
POCT GLUCOSE: 112 MG/DL (ref 70–110)
POTASSIUM SERPL-SCNC: 4.5 MMOL/L (ref 3.5–5.1)
PROT SERPL-MCNC: 6.9 G/DL (ref 6–8.4)
RBC # BLD AUTO: 3.99 M/UL (ref 4.6–6.2)
SODIUM SERPL-SCNC: 140 MMOL/L (ref 136–145)
WBC # BLD AUTO: 3.94 K/UL (ref 3.9–12.7)

## 2020-06-02 PROCEDURE — C9113 INJ PANTOPRAZOLE SODIUM, VIA: HCPCS | Performed by: NURSE PRACTITIONER

## 2020-06-02 PROCEDURE — 63600175 PHARM REV CODE 636 W HCPCS: Performed by: NURSE PRACTITIONER

## 2020-06-02 PROCEDURE — 25000242 PHARM REV CODE 250 ALT 637 W/ HCPCS: Performed by: NURSE PRACTITIONER

## 2020-06-02 PROCEDURE — 83735 ASSAY OF MAGNESIUM: CPT

## 2020-06-02 PROCEDURE — 99214 PR OFFICE/OUTPT VISIT, EST, LEVL IV, 30-39 MIN: ICD-10-PCS | Mod: ,,, | Performed by: INTERNAL MEDICINE

## 2020-06-02 PROCEDURE — 96376 TX/PRO/DX INJ SAME DRUG ADON: CPT | Mod: 59 | Performed by: EMERGENCY MEDICINE

## 2020-06-02 PROCEDURE — G0378 HOSPITAL OBSERVATION PER HR: HCPCS

## 2020-06-02 PROCEDURE — 80053 COMPREHEN METABOLIC PANEL: CPT

## 2020-06-02 PROCEDURE — 85025 COMPLETE CBC W/AUTO DIFF WBC: CPT

## 2020-06-02 PROCEDURE — 99214 OFFICE O/P EST MOD 30 MIN: CPT | Mod: ,,, | Performed by: INTERNAL MEDICINE

## 2020-06-02 PROCEDURE — 94761 N-INVAS EAR/PLS OXIMETRY MLT: CPT

## 2020-06-02 PROCEDURE — 94640 AIRWAY INHALATION TREATMENT: CPT

## 2020-06-02 PROCEDURE — 25000003 PHARM REV CODE 250: Performed by: NURSE PRACTITIONER

## 2020-06-02 PROCEDURE — 36415 COLL VENOUS BLD VENIPUNCTURE: CPT

## 2020-06-02 RX ORDER — TALC
6 POWDER (GRAM) TOPICAL NIGHTLY PRN
Status: DISCONTINUED | OUTPATIENT
Start: 2020-06-02 | End: 2020-06-02 | Stop reason: HOSPADM

## 2020-06-02 RX ADMIN — IPRATROPIUM BROMIDE AND ALBUTEROL SULFATE 3 ML: .5; 2.5 SOLUTION RESPIRATORY (INHALATION) at 12:06

## 2020-06-02 RX ADMIN — SUCRALFATE 1 G: 1 TABLET ORAL at 05:06

## 2020-06-02 RX ADMIN — AMLODIPINE BESYLATE 10 MG: 5 TABLET ORAL at 08:06

## 2020-06-02 RX ADMIN — Medication 6 MG: at 12:06

## 2020-06-02 RX ADMIN — IPRATROPIUM BROMIDE AND ALBUTEROL SULFATE 3 ML: .5; 2.5 SOLUTION RESPIRATORY (INHALATION) at 06:06

## 2020-06-02 RX ADMIN — PANTOPRAZOLE SODIUM 40 MG: 40 INJECTION, POWDER, LYOPHILIZED, FOR SOLUTION INTRAVENOUS at 08:06

## 2020-06-02 RX ADMIN — LISINOPRIL 20 MG: 10 TABLET ORAL at 08:06

## 2020-06-02 NOTE — PROGRESS NOTES
"[facility] Note    CC: Abdominal pain    HPI 54 y.o. male with COPD, DMII, HTN who has diffuse intermittent crampy abdominal pain ongoing for several years that is worsened with alcohol and smoking. He denies fevers, chills. He states this pain has been ongoing for several years. He denies hematemesis, melena or hematochezia. He has never had endoscopy prior. No history of colonoscopy as well. He denies NSAID use. He denies constipation or diarrhea. He is currently admitted for pneumonia. He is COVID-19 negative. Hemodynamically stable since admission. H/H on admission 13.7/42.3 which has been close to baseline since August 2019.     INTERVAL HISTORY:  Patient tolerating PO.  Abdominal pain improved.  Notes from EGD with Dr. May reviewed.  No SOB observed today.    Past Medical History:   Diagnosis Date    COPD (chronic obstructive pulmonary disease)     Diabetes mellitus, type 2     Hypertension          Review of Systems  General ROS: negative for - chills, fever or weight loss  Cardiovascular ROS: no chest pain or dyspnea on exertion  Gastrointestinal ROS: as above.    Physical Examination  BP (!) 142/89 (BP Location: Right arm, Patient Position: Lying)   Pulse 72   Temp 97.6 °F (36.4 °C) (Oral)   Resp 16   Ht 5' 9" (1.753 m)   Wt 79.8 kg (176 lb)   SpO2 95%   BMI 25.99 kg/m²   General appearance: alert, cooperative, no distress  HENT: Normocephalic, atraumatic, neck symmetrical, no nasal discharge, sclera anicteric   Lungs: clear to auscultation bilaterally, symmetric chest wall expansion bilaterally  Heart: regular rate and rhythm without rub; no displacement of the PMI   Abdomen: soft, NT  Extremities: extremities symmetric; no clubbing, cyanosis, or edema  Neurologic: Alert and oriented X 3, no sensory or motor neurologic deficits      Labs:  Lab Results   Component Value Date    WBC 3.94 06/02/2020    HGB 11.8 (L) 06/02/2020    HCT 36.7 (L) 06/02/2020    MCV 92 06/02/2020    "  06/02/2020         CMP  Sodium   Date Value Ref Range Status   06/02/2020 140 136 - 145 mmol/L Final     Potassium   Date Value Ref Range Status   06/02/2020 4.5 3.5 - 5.1 mmol/L Final     Chloride   Date Value Ref Range Status   06/02/2020 104 95 - 110 mmol/L Final     CO2   Date Value Ref Range Status   06/02/2020 29 23 - 29 mmol/L Final     Glucose   Date Value Ref Range Status   06/02/2020 120 (H) 70 - 110 mg/dL Final     BUN, Bld   Date Value Ref Range Status   06/02/2020 17 6 - 20 mg/dL Final     Creatinine   Date Value Ref Range Status   06/02/2020 1.0 0.5 - 1.4 mg/dL Final     Calcium   Date Value Ref Range Status   06/02/2020 8.8 8.7 - 10.5 mg/dL Final     Total Protein   Date Value Ref Range Status   06/02/2020 6.9 6.0 - 8.4 g/dL Final     Albumin   Date Value Ref Range Status   06/02/2020 3.2 (L) 3.5 - 5.2 g/dL Final     Total Bilirubin   Date Value Ref Range Status   06/02/2020 0.5 0.1 - 1.0 mg/dL Final     Comment:     For infants and newborns, interpretation of results should be based  on gestational age, weight and in agreement with clinical  observations.  Premature Infant recommended reference ranges:  Up to 24 hours.............<8.0 mg/dL  Up to 48 hours............<12.0 mg/dL  3-5 days..................<15.0 mg/dL  6-29 days.................<15.0 mg/dL       Alkaline Phosphatase   Date Value Ref Range Status   06/02/2020 65 55 - 135 U/L Final     AST   Date Value Ref Range Status   06/02/2020 14 10 - 40 U/L Final     ALT   Date Value Ref Range Status   06/02/2020 11 10 - 44 U/L Final     Anion Gap   Date Value Ref Range Status   06/02/2020 7 (L) 8 - 16 mmol/L Final     eGFR if    Date Value Ref Range Status   06/02/2020 >60 >60 mL/min/1.73 m^2 Final     eGFR if non    Date Value Ref Range Status   06/02/2020 >60 >60 mL/min/1.73 m^2 Final     Comment:     Calculation used to obtain the estimated glomerular filtration  rate (eGFR) is the CKD-EPI equation.               Assessment:   1.  Abdominal pain  2.  COPD  3.  DM2  4.  HTN    Plan:  1.  Continue current medications  2.  Avoid NSAIDs  3.  OK to discharge home with outpatient GI follow up with Dr. May for repeat EGD as previously recommended.  GI to sign off.  Call for questions.    Natalio Najera MD  [facility]  [address], [address]  [place] [address]  Office: [phone]  Fax: [phone]

## 2020-06-02 NOTE — PLAN OF CARE
Patient alert and oriented resting in bed. NAD. Denies pain or SOB. VSS. Urinal at bedside. NPO after MN. Bed alarm active. Plan of care reviewed with patient. Verbalizes understanding.Call light in reach. Pt free from fall or injury. Will monitor.

## 2020-06-02 NOTE — RESPIRATORY THERAPY
06/01/20 2028   Patient Assessment/Suction   Level of Consciousness (AVPU) alert   Respiratory Effort Unlabored;Normal   Expansion/Accessory Muscles/Retractions no use of accessory muscles;no retractions;expansion symmetric   All Lung Fields Breath Sounds Anterior:;clear;equal bilaterally   Rhythm/Pattern, Respiratory unlabored;pattern regular;depth regular   Cough Frequency infrequent   PRE-TX-O2   O2 Device (Oxygen Therapy) room air   SpO2 98 %   Pulse Oximetry Type Intermittent   $ Pulse Oximetry - Multiple Charge Pulse Oximetry - Multiple   Pulse 67   Resp 16   Positioning Left side   Aerosol Therapy   $ Aerosol Therapy Charges Aerosol Treatment   Daily Review of Necessity (SVN) completed   Respiratory Treatment Status (SVN) given   Treatment Route (SVN) mask   Patient Position (SVN) HOB elevated   Post Treatment Assessment (SVN) breath sounds unchanged   Signs of Intolerance (SVN) none   Breath Sounds Post-Respiratory Treatment   Throughout All Fields Post-Treatment All Fields   Throughout All Fields Post-Treatment no change   Post-treatment Heart Rate (beats/min) 72   Post-treatment Resp Rate (breaths/min) 16

## 2020-06-02 NOTE — PLAN OF CARE
CM met with pt bedside to complete discharge assessment. Pt verified information as correct on facesheet. Pt denies POA/LW. Pt reports living at listed address with his son and SO, Gia Jimenez. Denies having PCP. Pharm is [facility]. Pt denies any dme/hd/hh. Pt reports being independent with all ADLs. DC plan is home with no needs. CM following to assist in any DC needs.      06/01/20 1500   Discharge Assessment   Assessment Type Discharge Planning Assessment   Confirmed/corrected address and phone number on facesheet? Yes   Assessment information obtained from? Patient   Communicated expected length of stay with patient/caregiver yes   Prior to hospitilization cognitive status: Alert/Oriented   Prior to hospitalization functional status: Independent   Current cognitive status: Alert/Oriented   Current Functional Status: Independent   Lives With child(annamarie), adult   Able to Return to Prior Arrangements yes   Is patient able to care for self after discharge? Yes   Patient's perception of discharge disposition home or selfcare   Readmission Within the Last 30 Days no previous admission in last 30 days   Patient currently being followed by outpatient case management? No   Patient currently receives any other outside agency services? No   Equipment Currently Used at Home none   Do you have any problems affording any of your prescribed medications? No   Is the patient taking medications as prescribed? yes   Does the patient have transportation home? Yes   Transportation Anticipated family or friend will provide   Does the patient receive services at the [facility]? No   Discharge Plan A Home with family   Discharge Plan B Home   DME Needed Upon Discharge  none   Patient/Family in Agreement with Plan yes

## 2020-06-02 NOTE — NURSING
Notified cardiac stress test will not be performed today by per cardiology nurse. [facility] nuclear medicine will not be in until afternoon and test was cancelled per Dr. Escobar. Will continue to monitor.

## 2020-06-02 NOTE — RESPIRATORY THERAPY
06/02/20 0652   Patient Assessment/Suction   Level of Consciousness (AVPU) alert   Respiratory Effort Normal;Unlabored   Expansion/Accessory Muscles/Retractions no use of accessory muscles;no retractions;expansion symmetric   All Lung Fields Breath Sounds Anterior:;clear;equal bilaterally   Rhythm/Pattern, Respiratory unlabored;pattern regular;depth regular   Cough Frequency infrequent   Cough Type nonproductive;good   PRE-TX-O2   O2 Device (Oxygen Therapy) room air   SpO2 98 %   Pulse Oximetry Type Intermittent   $ Pulse Oximetry - Multiple Charge Pulse Oximetry - Multiple   Pulse 75   Resp 18   Aerosol Therapy   $ Aerosol Therapy Charges Aerosol Treatment   Respiratory Treatment Status (SVN) given   Treatment Route (SVN) mask   Patient Position (SVN) HOB elevated   Post Treatment Assessment (SVN) breath sounds unchanged   Signs of Intolerance (SVN) none   Breath Sounds Post-Respiratory Treatment   Throughout All Fields Post-Treatment All Fields   Throughout All Fields Post-Treatment no change   Post-treatment Heart Rate (beats/min) 72   Post-treatment Resp Rate (breaths/min) 17

## 2020-06-02 NOTE — PLAN OF CARE
06/02/20 1409   Final Note   Assessment Type Final Discharge Note   Anticipated Discharge Disposition Home   Hospital Follow Up  Appt(s) scheduled? Yes

## 2020-06-02 NOTE — DISCHARGE INSTRUCTIONS
Schedule appointment for outpatient Cardiac Stress Test  Call [facility] Stress Test scheduling at [phone]    Thank you for choosing [facility] for your medical care. The primary doctor who is taking care of you at the time of your discharge is Gil Baer MD.     You were admitted to the hospital with Community acquired pneumonia of right lower lobe of lung.     Please note your discharge instructions, including diet/activity restrictions, follow-up appointments, and medication changes.  If you have any questions about your medical issues, prescriptions, or any other questions, please feel free to contact the [facility] Medicine Dept at [phone] and we will help.    If you are previously with Home health, outpatient PT/OT or under a therapy program, you are cleared to return to those programs.    Please direct all long term medication refills and follow up to your primary care provider, Primary Doctor No. Thank you again for letting us take care of your health care needs.    Please note the following discharge instructions per your discharging physician-  Eulogio FALLON

## 2020-06-02 NOTE — RESPIRATORY THERAPY
Spoke with patient about smoking cessation with him stating that he quit already. No Nicotine patch needed.

## 2020-06-03 NOTE — DISCHARGE SUMMARY
"[facility] Hospital Medicine  Discharge Summary      Patient Name: Hernan Badillo  MRN: [id number]  Admission Date: 5/30/2020  Hospital Length of Stay: 0 days  Discharge Date and Time: 6/2/2020 11:45 AM  Attending Physician: Vivienne att. providers found   Discharging Provider: Christine Oliveira NP  Primary Care Provider: Primary Doctor Vivienne      HPI:   Hernan Badillo is a 54-year-old male with a past medical history of COPD, non-insulin-dependent type 2 diabetes, former alcoholism, and hypertension who presents to the emergency room tonight with chest pain and abdominal pain.  Patient states pain began approximately 1 week ago, has been increasing in severity, not relieved by over-the-counter Tums.  Patient states chest pain is located to the mid anterior chest wall, described as burning.  Patient states abdominal pain is located to the entire abdomen, described as burning, worse with eating.  Patient denies nausea, vomiting, or diarrhea.  Patient states he experienced this pain in the past, prompting him to stop alcohol use approximately 5 months ago.  Patient also endorses intermittent shortness of breath with productive cough with green sputum production.  Denies orthopnea or dyspnea on exertion.  Patient states he is noncompliant with his home medication, states he will take his blood pressure medicine "every once in a while" and states he did take a blood pressure pill this morning.  Patient denies history of MI - states he has never followed with a cardiologist or gastroenterologist prior.  Patient states he has never had an EGD or colonoscopy before.  In the emergency room workup revealing right lower lobe pneumonia, patient was placed on IV antibiotic therapy.  Patient placed in observation on 05/30/2020 at approximately 8:00 p.m..  Patient states he resides at home with his girlfriend, denies use of supplemental oxygen or ambulatory assist devices.    Procedure(s) (LRB):  EGD " (ESOPHAGOGASTRODUODENOSCOPY) (N/A)      Hospital Course:   Pt admitted for chest pain and epigastric pain. Initial troponin elevated and trended with resolution. Pts epigastric pain did not improve with GI cocktail and required IV morphine for relief. Pt experienced int chest pain while in hospital and cardiology consulted. Pt placed on clear liquid diet and EGD to be done 6/1/20. Pt with history of noncompliance. He is status post upper GI on 06/01/2020 which demonstrated A non-obstructing Schatzki ring was found in the lower third of the esophagus. Patchy moderately erythematous mucosa without bleeding was found in the gastric antrum. Cardiology recommended cardiac stress test. Due to issues with NM, his stress test was scheduled to be completed outpatient. He reported issues with chronic swelling to his right lower back. US soft tissue demonstrated unchanged fluid collection from 2017. He is stable for DC from GI and cards standpoint.   PE. Chest CTA heart RRR. Ambulating in room without complaints.        Consults:   Consults (From admission, onward)        Status Ordering Provider     Inpatient consult to Gastroenterology  Once     Provider:  Terrell May MD    Completed NATIVIDAD KRISHNAMURTHY new Assessment & Plan notes have been filed under this hospital service since the last note was generated.  Service: Hospital Medicine    Final Active Diagnoses:    Diagnosis Date Noted POA    PRINCIPAL PROBLEM:  Community acquired pneumonia of right lower lobe of lung [J18.1] 05/30/2020 Yes    Generalized abdominal pain [R10.84] 06/01/2020 Yes    Chest pain [R07.9] 05/30/2020 Yes    Tobacco dependency [F17.200] 05/30/2020 Yes    Epigastric pain [R10.13] 05/30/2020 Yes    History of alcoholism [F10.21] 05/30/2020 Not Applicable    Noncompliance [Z91.19] 05/30/2020 Not Applicable    Hematoma [T14.8XXA] 07/06/2017 Yes    COPD (chronic obstructive pulmonary disease) [J44.9]  Yes    Diabetes type 2,  uncontrolled [E11.65] 03/24/2014 Yes     Chronic    Hypertension associated with diabetes [E11.59, I10] 03/24/2014 Yes      Problems Resolved During this Admission:       Discharged Condition: stable    Disposition: Home or Self Care    Follow Up:  Follow-up Information     [facility] In 1 week.    Contact information:  [address]   [place] [address]  [phone]             Adrien Escobar MD In 1 week.    Specialties:  Cardiovascular Disease, Cardiology, INTERVENTIONAL CARDIOLOGY  Why:  Cardiac stress test  Contact information:  [address] [facility]  [address]  [place] [address]  [phone]             Quinn Aguilar MD.    Specialty:  Pain Medicine  Why:  pain management   Contact information:  [address] [place] [address]  [address]  [place] [address]  [phone]                 Patient Instructions:      NM Myocardial Perfusion Spect Multi Exer   Standing Status: Future Standing Exp. Date: 06/02/21     Order Specific Question Answer Comments   May the Radiologist modify the order per protocol to meet the clinical needs of the patient? Yes    Will a Cardiologist read this study? No      Diet diabetic     Diet Cardiac     Notify your health care provider if you experience any of the following:  increased confusion or weakness     Notify your health care provider if you experience any of the following:  redness, tenderness, or signs of infection (pain, swelling, redness, odor or green/yellow discharge around incision site)     Notify your health care provider if you experience any of the following:  severe uncontrolled pain     Notify your health care provider if you experience any of the following:  temperature >100.4     Notify your health care provider if you experience any of the following:  worsening rash     Activity as tolerated       Significant Diagnostic Studies: Labs:   BMP:   Recent Labs   Lab 06/02/20  0536   *      K 4.5      CO2 29   BUN 17   CREATININE 1.0   CALCIUM  8.8   MG 1.7    and CMP   Recent Labs   Lab 06/02/20  0536      K 4.5      CO2 29   *   BUN 17   CREATININE 1.0   CALCIUM 8.8   PROT 6.9   ALBUMIN 3.2*   BILITOT 0.5   ALKPHOS 65   AST 14   ALT 11   ANIONGAP 7*   ESTGFRAFRICA >60   EGFRNONAA >60       Pending Diagnostic Studies:     Procedure Component Value Units Date/Time    Specimen to Pathology, Surgery Gastrointestinal tract [[id number]] Collected:  06/01/20 0942    Order Status:  Sent Lab Status:  In process Updated:  06/01/20 1111         Medications:  Reconciled Home Medications:      Medication List      START taking these medications    azithromycin 500 MG tablet  Commonly known as:  ZITHROMAX  Take 1 tablet (500 mg total) by mouth once daily.        CONTINUE taking these medications    albuterol 90 mcg/actuation inhaler  Commonly known as:  PROVENTIL/VENTOLIN HFA  Inhale 1-2 puffs into the lungs every 6 (six) hours as needed for Wheezing or Shortness of Breath. Rescue     albuterol-ipratropium 2.5 mg-0.5 mg/3 mL nebulizer solution  Commonly known as:  DUO-NEB  Take 3 mLs by nebulization every 6 (six) hours as needed for Wheezing or Shortness of Breath.     amLODIPine 10 MG tablet  Commonly known as:  NORVASC  Take 1 tablet (10 mg total) by mouth once daily. Start medication September 3     metFORMIN 500 MG tablet  Commonly known as:  GLUCOPHAGE  Take 1 tablet (500 mg total) by mouth 2 (two) times daily with meals.     nystatin-triamcinolone cream  Commonly known as:  MYCOLOG II  Apply topically 2 (two) times daily. Apply thin film to perigenital daily        STOP taking these medications    lisinopriL 20 MG tablet  Commonly known as:  PRINIVIL,ZESTRIL            Indwelling Lines/Drains at time of discharge:   Lines/Drains/Airways     None                 Time spent on the discharge of patient: 60 minutes  Patient was seen and examined on the date of discharge and determined to be suitable for discharge.         Christine Oliveira,  NP  Department of Hospital Medicine  [facility]

## 2020-06-05 LAB
FINAL PATHOLOGIC DIAGNOSIS: NORMAL
GROSS: NORMAL
MICROSCOPIC EXAM: NORMAL

## 2020-06-08 LAB — POCT GLUCOSE: 142 MG/DL (ref 70–110)

## 2020-06-09 ENCOUNTER — OFFICE VISIT (OUTPATIENT)
Dept: FAMILY MEDICINE | Facility: CLINIC | Age: 55
End: 2020-06-09
Payer: MEDICARE

## 2020-06-09 VITALS
DIASTOLIC BLOOD PRESSURE: 82 MMHG | SYSTOLIC BLOOD PRESSURE: 136 MMHG | BODY MASS INDEX: 26.32 KG/M2 | TEMPERATURE: 98 F | HEART RATE: 95 BPM | HEIGHT: 69 IN | OXYGEN SATURATION: 95 % | WEIGHT: 177.69 LBS

## 2020-06-09 DIAGNOSIS — G47.00 INSOMNIA, UNSPECIFIED TYPE: ICD-10-CM

## 2020-06-09 DIAGNOSIS — K22.2 SCHATZKI'S RING: Primary | ICD-10-CM

## 2020-06-09 DIAGNOSIS — R07.9 CHEST PAIN, UNSPECIFIED TYPE: ICD-10-CM

## 2020-06-09 PROCEDURE — 99999 PR PBB SHADOW E&M-EST. PATIENT-LVL III: ICD-10-PCS | Mod: PBBFAC,,, | Performed by: NURSE PRACTITIONER

## 2020-06-09 PROCEDURE — 99999 PR PBB SHADOW E&M-EST. PATIENT-LVL III: CPT | Mod: PBBFAC,,, | Performed by: NURSE PRACTITIONER

## 2020-06-09 PROCEDURE — 99213 OFFICE O/P EST LOW 20 MIN: CPT | Mod: PBBFAC,PO | Performed by: NURSE PRACTITIONER

## 2020-06-09 PROCEDURE — 99214 OFFICE O/P EST MOD 30 MIN: CPT | Mod: S$PBB,,, | Performed by: NURSE PRACTITIONER

## 2020-06-09 PROCEDURE — 99214 PR OFFICE/OUTPT VISIT, EST, LEVL IV, 30-39 MIN: ICD-10-PCS | Mod: S$PBB,,, | Performed by: NURSE PRACTITIONER

## 2020-06-09 RX ORDER — AMITRIPTYLINE HYDROCHLORIDE 10 MG/1
20 TABLET, FILM COATED ORAL NIGHTLY PRN
Qty: 60 TABLET | Refills: 0 | Status: SHIPPED | OUTPATIENT
Start: 2020-06-09 | End: 2022-12-15

## 2020-06-11 RX ORDER — PANTOPRAZOLE SODIUM 40 MG/1
40 TABLET, DELAYED RELEASE ORAL DAILY
Qty: 30 TABLET | Refills: 11 | Status: SHIPPED | OUTPATIENT
Start: 2020-06-11 | End: 2022-07-25 | Stop reason: SDUPTHER

## 2020-06-11 NOTE — TELEPHONE ENCOUNTER
----- Message from Deanna Haskins LPN sent at 6/5/2020 11:57 AM CDT -----      ----- Message -----  From: Terrell May MD  Sent: 6/5/2020   8:25 AM CDT  To: [facility] Staff    Please notify patient that biopsies returned inflammation of his stomach but no bacteria. He should continue taking the Protonix 40 mg daily and we will see him for repeat EGD for evaluation. He will also need to be scheduled for colonoscopy for screening purposes.

## 2020-06-11 NOTE — TELEPHONE ENCOUNTER
Spoke with patient he was notified of results and recommendations. He decline to scheduled EGD/CSCOPE at this time and states that he will call back.

## 2020-06-14 ENCOUNTER — NURSE TRIAGE (OUTPATIENT)
Dept: ADMINISTRATIVE | Facility: CLINIC | Age: 55
End: 2020-06-14

## 2020-06-14 NOTE — TELEPHONE ENCOUNTER
Post Procedural Symptom Tracker. Pt had an office visit on 6/9/20. Per symptom tracker protocol, no contact made. No follow up needed.    Reason for Disposition   Caller has already spoken with the PCP and has no further questions.     OV 6/9/20    Additional Information   Negative: Caller is angry or rude (e.g., hangs up, verbally abusive, yelling)   Negative: Caller hangs up    Protocols used: NO CONTACT OR DUPLICATE CONTACT CALL-A-AH

## 2020-06-17 ENCOUNTER — OFFICE VISIT (OUTPATIENT)
Dept: PAIN MEDICINE | Facility: CLINIC | Age: 55
End: 2020-06-17
Payer: MEDICARE

## 2020-06-17 VITALS
DIASTOLIC BLOOD PRESSURE: 99 MMHG | HEART RATE: 80 BPM | WEIGHT: 177 LBS | BODY MASS INDEX: 26.22 KG/M2 | SYSTOLIC BLOOD PRESSURE: 162 MMHG | HEIGHT: 69 IN

## 2020-06-17 DIAGNOSIS — M47.896 OTHER SPONDYLOSIS, LUMBAR REGION: ICD-10-CM

## 2020-06-17 DIAGNOSIS — M51.36 DDD (DEGENERATIVE DISC DISEASE), LUMBAR: ICD-10-CM

## 2020-06-17 DIAGNOSIS — M79.10 MYALGIA: Primary | ICD-10-CM

## 2020-06-17 PROCEDURE — 99204 OFFICE O/P NEW MOD 45 MIN: CPT | Mod: S$PBB,,, | Performed by: ANESTHESIOLOGY

## 2020-06-17 PROCEDURE — 99999 PR PBB SHADOW E&M-EST. PATIENT-LVL III: ICD-10-PCS | Mod: PBBFAC,,, | Performed by: ANESTHESIOLOGY

## 2020-06-17 PROCEDURE — 99204 PR OFFICE/OUTPT VISIT, NEW, LEVL IV, 45-59 MIN: ICD-10-PCS | Mod: S$PBB,,, | Performed by: ANESTHESIOLOGY

## 2020-06-17 PROCEDURE — 99999 PR PBB SHADOW E&M-EST. PATIENT-LVL III: CPT | Mod: PBBFAC,,, | Performed by: ANESTHESIOLOGY

## 2020-06-17 PROCEDURE — 99213 OFFICE O/P EST LOW 20 MIN: CPT | Mod: PBBFAC,PN | Performed by: ANESTHESIOLOGY
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
## 2020-07-20 DIAGNOSIS — I20.89 STABLE ANGINA PECTORIS: Primary | ICD-10-CM

## 2020-09-05 NOTE — PROGRESS NOTES
Pt arrives to ED with the complaint of a headache, non-productive cough and abdominal pain x1 week.  Denies chest pain.  Pt currently on menstrual cycle.     This dictation has been generated using Modal Fluency Dictation some phonetic errors may occur. Please contact author for clarification if needed.     Problem List Items Addressed This Visit     Chest pain    Relevant Orders    Ambulatory referral/consult to Cardiology      Other Visit Diagnoses     Schatzki's ring    -  Primary    Relevant Orders    Ambulatory referral/consult to Gastroenterology    Insomnia, unspecified type        Relevant Medications    amitriptyline (ELAVIL) 10 MG tablet          Orders Placed This Encounter    Ambulatory referral/consult to Cardiology    Ambulatory referral/consult to Gastroenterology    amitriptyline (ELAVIL) 10 MG tablet     Chest pain needs to follow-up with cardiology for outpatient stress testing.  Schatzki's ring some dysphagia follow-up with gastroenterology  Insomnia amitriptyline 10-20 mg.  Avoid caffeine.  Sleep hygiene.    Follow up in about 3 months (around 9/9/2020).    ________________________________________________________________  ________________________________________________________________      Chief Complaint   Patient presents with    Hospital Follow Up     no sleep     History of present illness  This 54 y.o. presents today for complaint of following up from hospital.  Recent admit May 30th for chest pain and GI issues.  EGD noted ring.  Troponin trended normal.  Discharged recommended outpatient follow-up for stress testing.  Has not seen cardiology.  Patient did not see gastroenterology.  He does have some dysphagia issues.  Notes difficulty swallowing feels them getting stuff canis esophagus.  Admission Date: 5/30/2020  Hospital Length of Stay: 0 days  Discharge Date and Time: 6/2/2020 11:45 AM  Attending Physician: No att. providers found   Discharging Provider: Christine Oliveira NP  Primary Care Provider: Primary Doctor No        HPI:   Hernan Badillo is a 54-year-old male with a past medical history of COPD, non-insulin-dependent type 2  "diabetes, former alcoholism, and hypertension who presents to the emergency room tonight with chest pain and abdominal pain.  Patient states pain began approximately 1 week ago, has been increasing in severity, not relieved by over-the-counter Tums.  Patient states chest pain is located to the mid anterior chest wall, described as burning.  Patient states abdominal pain is located to the entire abdomen, described as burning, worse with eating.  Patient denies nausea, vomiting, or diarrhea.  Patient states he experienced this pain in the past, prompting him to stop alcohol use approximately 5 months ago.  Patient also endorses intermittent shortness of breath with productive cough with green sputum production.  Denies orthopnea or dyspnea on exertion.  Patient states he is noncompliant with his home medication, states he will take his blood pressure medicine "every once in a while" and states he did take a blood pressure pill this morning.  Patient denies history of MI - states he has never followed with a cardiologist or gastroenterologist prior.  Patient states he has never had an EGD or colonoscopy before.  In the emergency room workup revealing right lower lobe pneumonia, patient was placed on IV antibiotic therapy.  Patient placed in observation on 05/30/2020 at approximately 8:00 p.m..  Patient states he resides at home with his girlfriend, denies use of supplemental oxygen or ambulatory assist devices.     Procedure(s) (LRB):  EGD (ESOPHAGOGASTRODUODENOSCOPY) (N/A)       Hospital Course:   Pt admitted for chest pain and epigastric pain. Initial troponin elevated and trended with resolution. Pts epigastric pain did not improve with GI cocktail and required IV morphine for relief. Pt experienced int chest pain while in hospital and cardiology consulted. Pt placed on clear liquid diet and EGD to be done 6/1/20. Pt with history of noncompliance. He is status post upper GI on 06/01/2020 which demonstrated A " non-obstructing Schatzki ring was found in the lower third of the esophagus. Patchy moderately erythematous mucosa without bleeding was found in the gastric antrum. Cardiology recommended cardiac stress test. Due to issues with NM, his stress test was scheduled to be completed outpatient. He reported issues with chronic swelling to his right lower back. US soft tissue demonstrated unchanged fluid collection from 2017. He is stable for DC from GI and cards standpoint.     Limited review of systems negative  Past medical social surgical history reviewed.  Patient new to me.  Follows with in the clinic  Past Medical History:   Diagnosis Date    COPD (chronic obstructive pulmonary disease)     Diabetes mellitus, type 2     Hypertension        Past Surgical History:   Procedure Laterality Date    DENTAL SURGERY      ESOPHAGOGASTRODUODENOSCOPY N/A 6/1/2020    Procedure: EGD (ESOPHAGOGASTRODUODENOSCOPY);  Surgeon: Terrell May MD;  Location: [facility];  Service: Endoscopy;  Laterality: N/A;    HERNIA REPAIR      left inguinal    incision and drainiage         Family History   Problem Relation Age of Onset    Heart attack Father 80    Heart disease Father     Drug abuse Father     Cancer Maternal Grandmother         colon       Social History     Socioeconomic History    Marital status: Single     Spouse name: Not on file    Number of children: 1    Years of education: Not on file    Highest education level: Not on file   Occupational History    Occupation: demolition   Social Needs    Financial resource strain: Not on file    Food insecurity:     Worry: Not on file     Inability: Not on file    Transportation needs:     Medical: Not on file     Non-medical: Not on file   Tobacco Use    Smoking status: Current Every Day Smoker     Packs/day: 2.00     Years: 30.00     Pack years: 60.00     Types: Cigarettes    Smokeless tobacco: Never Used   Substance and Sexual Activity    Alcohol use: Yes      Alcohol/week: 1.0 standard drinks     Types: 1 Shots of liquor per week     Comment: 1/2 pint 2 x per week - quit approximately 5 months ago    Drug use: No    Sexual activity: Yes     Partners: Female     Birth control/protection: None     Comment: No history of STDs.   Lifestyle    Physical activity:     Days per week: Not on file     Minutes per session: Not on file    Stress: Not on file   Relationships    Social connections:     Talks on phone: Not on file     Gets together: Not on file     Attends Spiritism service: Not on file     Active member of club or organization: Not on file     Attends meetings of clubs or organizations: Not on file     Relationship status: Not on file   Other Topics Concern    Not on file   Social History Narrative    The patient does not exercise regularly ().    Rates diet as fair.    He is satisfied with weight.               Current Outpatient Medications   Medication Sig Dispense Refill    albuterol (PROVENTIL/VENTOLIN HFA) 90 mcg/actuation inhaler Inhale 1-2 puffs into the lungs every 6 (six) hours as needed for Wheezing or Shortness of Breath. Rescue 1 Inhaler 0    amLODIPine (NORVASC) 10 MG tablet Take 1 tablet (10 mg total) by mouth once daily. Start medication September 3 30 tablet 11    metFORMIN (GLUCOPHAGE) 500 MG tablet Take 1 tablet (500 mg total) by mouth 2 (two) times daily with meals. 60 tablet 2    albuterol-ipratropium 2.5mg-0.5mg/3mL (DUO-NEB) 0.5 mg-3 mg(2.5 mg base)/3 mL nebulizer solution Take 3 mLs by nebulization every 6 (six) hours as needed for Wheezing or Shortness of Breath. 25 vial 3    amitriptyline (ELAVIL) 10 MG tablet Take 2 tablets (20 mg total) by mouth nightly as needed for Insomnia. 60 tablet 0     No current facility-administered medications for this visit.        Review of patient's allergies indicates:  No Known Allergies    Physical examination  Vitals Reviewed  Gen. Well-dressed well-nourished   Skin warm dry and intact.  No  rashes noted.  Chest.  Respirations are even unlabored.     Neuro. Awake alert oriented x4.  Normal judgment and cognition noted.  Extremities no clubbing cyanosis or edema noted.   Psych.  Unusual affect.    Call or return to clinic prn if these symptoms worsen or fail to improve as anticipated.    In excessive of 25 minutes spent with patient with greater than 50% of time dedicated to education on symptoms, diagnosis, treatments, and coordination of care.\

## 2021-11-07 ENCOUNTER — OFFICE VISIT (OUTPATIENT)
Dept: URGENT CARE | Facility: CLINIC | Age: 56
End: 2021-11-07
Payer: MEDICARE

## 2021-11-07 VITALS
BODY MASS INDEX: 25.48 KG/M2 | DIASTOLIC BLOOD PRESSURE: 90 MMHG | SYSTOLIC BLOOD PRESSURE: 151 MMHG | HEART RATE: 86 BPM | WEIGHT: 172 LBS | OXYGEN SATURATION: 93 % | HEIGHT: 69 IN | RESPIRATION RATE: 20 BRPM | TEMPERATURE: 97 F

## 2021-11-07 DIAGNOSIS — Z87.09 HISTORY OF COPD: ICD-10-CM

## 2021-11-07 DIAGNOSIS — J06.9 VIRAL URI: ICD-10-CM

## 2021-11-07 DIAGNOSIS — R05.9 COUGH: Primary | ICD-10-CM

## 2021-11-07 DIAGNOSIS — R09.81 NASAL CONGESTION: ICD-10-CM

## 2021-11-07 LAB
CTP QC/QA: YES
SARS-COV-2 RDRP RESP QL NAA+PROBE: NEGATIVE

## 2021-11-07 PROCEDURE — 99214 OFFICE O/P EST MOD 30 MIN: CPT | Mod: S$GLB,,, | Performed by: PHYSICIAN ASSISTANT

## 2021-11-07 PROCEDURE — U0002 COVID-19 LAB TEST NON-CDC: HCPCS | Mod: QW,CR,S$GLB, | Performed by: PHYSICIAN ASSISTANT

## 2021-11-07 PROCEDURE — 99214 PR OFFICE/OUTPT VISIT, EST, LEVL IV, 30-39 MIN: ICD-10-PCS | Mod: S$GLB,,, | Performed by: PHYSICIAN ASSISTANT

## 2021-11-07 PROCEDURE — U0002: ICD-10-PCS | Mod: QW,CR,S$GLB, | Performed by: PHYSICIAN ASSISTANT

## 2021-11-07 RX ORDER — BENZONATATE 100 MG/1
100 CAPSULE ORAL 3 TIMES DAILY PRN
Qty: 20 CAPSULE | Refills: 0 | Status: SHIPPED | OUTPATIENT
Start: 2021-11-07 | End: 2022-12-15 | Stop reason: ALTCHOICE

## 2021-11-07 RX ORDER — MOMETASONE FUROATE 50 UG/1
2 SPRAY, METERED NASAL DAILY
Qty: 17 G | Refills: 0 | Status: SHIPPED | OUTPATIENT
Start: 2021-11-07 | End: 2022-12-15

## 2021-11-07 RX ORDER — PREDNISONE 50 MG/1
50 TABLET ORAL DAILY
Qty: 5 TABLET | Refills: 0 | Status: SHIPPED | OUTPATIENT
Start: 2021-11-07 | End: 2021-11-12

## 2021-11-07 RX ORDER — DEXAMETHASONE SODIUM PHOSPHATE 4 MG/ML
4 INJECTION, SOLUTION INTRA-ARTICULAR; INTRALESIONAL; INTRAMUSCULAR; INTRAVENOUS; SOFT TISSUE
Status: DISCONTINUED | OUTPATIENT
Start: 2021-11-07 | End: 2023-09-28

## 2022-02-24 ENCOUNTER — OFFICE VISIT (OUTPATIENT)
Dept: URGENT CARE | Facility: CLINIC | Age: 57
End: 2022-02-24
Payer: MEDICARE

## 2022-02-24 VITALS
SYSTOLIC BLOOD PRESSURE: 176 MMHG | OXYGEN SATURATION: 93 % | RESPIRATION RATE: 20 BRPM | BODY MASS INDEX: 25.77 KG/M2 | HEART RATE: 83 BPM | WEIGHT: 174 LBS | DIASTOLIC BLOOD PRESSURE: 92 MMHG | HEIGHT: 69 IN | TEMPERATURE: 98 F

## 2022-02-24 DIAGNOSIS — R05.9 COUGH: Primary | ICD-10-CM

## 2022-02-24 DIAGNOSIS — J44.1 ACUTE EXACERBATION OF CHRONIC OBSTRUCTIVE PULMONARY DISEASE (COPD): ICD-10-CM

## 2022-02-24 LAB
CTP QC/QA: YES
CTP QC/QA: YES
FLUAV AG NPH QL: NEGATIVE
FLUBV AG NPH QL: NEGATIVE
SARS-COV-2 AG RESP QL IA.RAPID: NEGATIVE

## 2022-02-24 PROCEDURE — 71046 X-RAY EXAM CHEST 2 VIEWS: CPT | Mod: S$GLB,,, | Performed by: RADIOLOGY

## 2022-02-24 PROCEDURE — 71046 XR CHEST PA AND LATERAL: ICD-10-PCS | Mod: S$GLB,,, | Performed by: RADIOLOGY

## 2022-02-24 PROCEDURE — 87804 INFLUENZA ASSAY W/OPTIC: CPT | Mod: QW,ICN,, | Performed by: PHYSICIAN ASSISTANT

## 2022-02-24 PROCEDURE — 99214 PR OFFICE/OUTPT VISIT, EST, LEVL IV, 30-39 MIN: ICD-10-PCS | Mod: 25,CR,S$GLB,ICN | Performed by: PHYSICIAN ASSISTANT

## 2022-02-24 PROCEDURE — 87811 SARS-COV-2 COVID19 W/OPTIC: CPT | Mod: CR,QW,S$GLB,ICN | Performed by: PHYSICIAN ASSISTANT

## 2022-02-24 PROCEDURE — 96372 PR INJECTION,THERAP/PROPH/DIAG2ST, IM OR SUBCUT: ICD-10-PCS | Mod: S$GLB,ICN,, | Performed by: PHYSICIAN ASSISTANT

## 2022-02-24 PROCEDURE — 87804 POCT INFLUENZA A/B: ICD-10-PCS | Mod: QW,ICN,, | Performed by: PHYSICIAN ASSISTANT

## 2022-02-24 PROCEDURE — 87811 SARS CORONAVIRUS 2 ANTIGEN POCT, MANUAL READ: ICD-10-PCS | Mod: CR,QW,S$GLB,ICN | Performed by: PHYSICIAN ASSISTANT

## 2022-02-24 PROCEDURE — 96372 THER/PROPH/DIAG INJ SC/IM: CPT | Mod: S$GLB,ICN,, | Performed by: PHYSICIAN ASSISTANT

## 2022-02-24 PROCEDURE — 99214 OFFICE O/P EST MOD 30 MIN: CPT | Mod: 25,CR,S$GLB,ICN | Performed by: PHYSICIAN ASSISTANT

## 2022-02-24 RX ORDER — BENZONATATE 100 MG/1
100 CAPSULE ORAL 3 TIMES DAILY PRN
Qty: 15 CAPSULE | Refills: 0 | Status: SHIPPED | OUTPATIENT
Start: 2022-02-24 | End: 2022-03-06

## 2022-02-24 RX ORDER — DEXAMETHASONE SODIUM PHOSPHATE 4 MG/ML
8 INJECTION, SOLUTION INTRA-ARTICULAR; INTRALESIONAL; INTRAMUSCULAR; INTRAVENOUS; SOFT TISSUE
Status: COMPLETED | OUTPATIENT
Start: 2022-02-24 | End: 2022-02-24

## 2022-02-24 RX ORDER — DEXAMETHASONE SODIUM PHOSPHATE 4 MG/ML
8 INJECTION, SOLUTION INTRA-ARTICULAR; INTRALESIONAL; INTRAMUSCULAR; INTRAVENOUS; SOFT TISSUE
Status: DISCONTINUED | OUTPATIENT
Start: 2022-02-24 | End: 2022-02-24

## 2022-02-24 RX ORDER — ALBUTEROL SULFATE 90 UG/1
2 AEROSOL, METERED RESPIRATORY (INHALATION) EVERY 6 HOURS PRN
Qty: 18 G | Refills: 0 | Status: SHIPPED | OUTPATIENT
Start: 2022-02-24 | End: 2022-07-25 | Stop reason: SDUPTHER

## 2022-02-24 RX ADMIN — DEXAMETHASONE SODIUM PHOSPHATE 8 MG: 4 INJECTION, SOLUTION INTRA-ARTICULAR; INTRALESIONAL; INTRAMUSCULAR; INTRAVENOUS; SOFT TISSUE at 01:02

## 2022-02-24 NOTE — PROGRESS NOTES
"Subjective:       Patient ID: Hernan Badillo is a 56 y.o. male.    Vitals:  height is 5' 9" (1.753 m) and weight is 78.9 kg (174 lb). His oral temperature is 97.8 °F (36.6 °C). His blood pressure is 176/92 (abnormal) and his pulse is 83. His respiration is 20 and oxygen saturation is 93% (abnormal).     Chief Complaint: Sinus Problem    Patient is a 56-year-old male who presents with cough and wheezing for the past few days.  He has past medical history significant for COPD, hypertension, diabetes and tobacco dependency.  He reports he smokes "like a train, all day".  He has inhalers at home that he does not use regularly.  He denies fever.  He reports coughing up white sputum.  He denies chest pain. He denied any recent sick contact.      Constitution: Negative for chills and fever.   HENT: Negative for ear pain, ear discharge, congestion, sinus pain, sinus pressure and sore throat.    Neck: Negative for neck stiffness.   Cardiovascular: Negative for chest pain, leg swelling and palpitations.   Eyes: Negative for eye discharge.   Respiratory: Positive for cough, sputum production, COPD, shortness of breath and wheezing. Negative for chest tightness, bloody sputum and stridor.    Gastrointestinal: Negative for abdominal pain, nausea, vomiting and diarrhea.   Genitourinary: Negative for dysuria and frequency.   Skin: Negative for rash.       Objective:      Physical Exam   Constitutional: He is oriented to person, place, and time. He appears well-developed. He is cooperative.  Non-toxic appearance. He does not appear ill. No distress.   HENT:   Head: Normocephalic and atraumatic.   Ears:   Right Ear: Tympanic membrane, external ear and ear canal normal.   Left Ear: Tympanic membrane, external ear and ear canal normal.   Nose: Nose normal. No rhinorrhea.   Mouth/Throat: Uvula is midline, oropharynx is clear and moist and mucous membranes are normal. No trismus in the jaw.   Eyes: Conjunctivae and lids are normal. No " scleral icterus.   Neck: Phonation normal.   Cardiovascular: Normal rate, regular rhythm and normal heart sounds.   Pulmonary/Chest: Effort normal. No respiratory distress. He has no decreased breath sounds. He has wheezes. He has no rhonchi.   Faint expiratory wheezing in lung bases. No increased work of breathing.     Comments: Faint expiratory wheezing in lung bases. No increased work of breathing.     Abdominal: Normal appearance.   Musculoskeletal: Normal range of motion.         General: No deformity. Normal range of motion.   Neurological: He is alert and oriented to person, place, and time. He exhibits normal muscle tone. Coordination normal.   Skin: Skin is warm, dry, intact, not diaphoretic and not pale.   Psychiatric: His speech is normal and behavior is normal. Judgment and thought content normal.   Nursing note and vitals reviewed.        Assessment:       1. Cough    2. Acute exacerbation of chronic obstructive pulmonary disease (COPD)          Plan:         Cough  -     SARS Coronavirus 2 Antigen, POCT Manual Read  -     POCT Influenza A/B  -     XR CHEST PA AND LATERAL; Future; Expected date: 02/24/2022    Acute exacerbation of chronic obstructive pulmonary disease (COPD)  -     albuterol (VENTOLIN HFA) 90 mcg/actuation inhaler; Inhale 2 puffs into the lungs every 6 (six) hours as needed for Wheezing. Rescue  Dispense: 18 g; Refill: 0  -     benzonatate (TESSALON) 100 MG capsule; Take 1 capsule (100 mg total) by mouth 3 (three) times daily as needed for Cough.  Dispense: 15 capsule; Refill: 0  -     dexamethasone injection 8 mg    Other orders  -     Discontinue: dexamethasone injection 8 mg           Medical Decision Making:   History:   Old Medical Records: I decided to obtain old medical records.  Clinical Tests:   Lab Tests: Ordered and Reviewed  Radiological Study: Ordered and Reviewed  Urgent Care Management:  Urgent evaluation of a 56-year-old male who presents with cough and wheezing.  He  reports smoking a significant amount of cigarettes a day.  Oxygen saturation is noted to be 93%.  He denies chest pain.  No lower extremity swelling.  He reports once this happened in the past he gets a steroid shot with significant improvement.  Viral testing is negative.  He has some expiratory wheezing with no increased work of breathing.  Chest x-ray shows no sign of pneumonia.  Discussed his decreased oxygen saturation.  He voiced understanding of the content the further workup.  Will give 1 dose of IM steroids.  Discussed this may elevate his blood sugar and blood pressure and recommend he take his medications as prescribed.  Recommend  that he follow up very closely with his primary care provider. Return precautions given.    Additional MDM:     Heart Failure Score:   COPD = Yes

## 2022-02-24 NOTE — PROGRESS NOTES
"Subjective:       Patient ID: Hernan Badillo is a 56 y.o. male.    Vitals:  height is 5' 9" (1.753 m) and weight is 78.9 kg (174 lb). His oral temperature is 97.8 °F (36.6 °C). His blood pressure is 176/92 (abnormal) and his pulse is 83. His respiration is 20 and oxygen saturation is 93% (abnormal).     Chief Complaint: Sinus Problem    Sinus Problem  This is a new problem. The current episode started in the past 7 days. The problem has been gradually worsening since onset. Associated symptoms include coughing, shortness of breath and sneezing. (Runny nose  Wheezing  ) Treatments tried: OTC cough suppressant. The treatment provided no relief.       Respiratory: Positive for cough and shortness of breath.    Allergic/Immunologic: Positive for sneezing.       Objective:      Physical Exam      Assessment:       No diagnosis found.      Plan:         There are no diagnoses linked to this encounter.               "

## 2022-02-24 NOTE — PATIENT INSTRUCTIONS
You need to stop smoking. Use your inhalers as prescribed. Steroids can temporarily increase your blood pressure and blood sugar. Make sure you take your medications as prescribed. You need to follow up with your primary care provider for management of your chronic lung disease. Your oxygen level was low today and this needs further evaluation.

## 2022-07-25 ENCOUNTER — OFFICE VISIT (OUTPATIENT)
Dept: FAMILY MEDICINE | Facility: CLINIC | Age: 57
End: 2022-07-25
Payer: MEDICARE

## 2022-07-25 VITALS
TEMPERATURE: 99 F | WEIGHT: 169.06 LBS | HEART RATE: 83 BPM | HEIGHT: 69 IN | BODY MASS INDEX: 25.04 KG/M2 | OXYGEN SATURATION: 95 % | DIASTOLIC BLOOD PRESSURE: 80 MMHG | SYSTOLIC BLOOD PRESSURE: 130 MMHG

## 2022-07-25 DIAGNOSIS — E11.65 UNCONTROLLED TYPE 2 DIABETES MELLITUS WITH HYPERGLYCEMIA: ICD-10-CM

## 2022-07-25 DIAGNOSIS — E78.5 HYPERLIPIDEMIA, UNSPECIFIED HYPERLIPIDEMIA TYPE: ICD-10-CM

## 2022-07-25 DIAGNOSIS — I15.2 HYPERTENSION ASSOCIATED WITH DIABETES: Primary | ICD-10-CM

## 2022-07-25 DIAGNOSIS — E66.3 OVERWEIGHT (BMI 25.0-29.9): ICD-10-CM

## 2022-07-25 DIAGNOSIS — E11.59 HYPERTENSION ASSOCIATED WITH DIABETES: Primary | ICD-10-CM

## 2022-07-25 DIAGNOSIS — J44.1 ACUTE EXACERBATION OF CHRONIC OBSTRUCTIVE PULMONARY DISEASE (COPD): ICD-10-CM

## 2022-07-25 DIAGNOSIS — E11.69 HYPERLIPIDEMIA ASSOCIATED WITH TYPE 2 DIABETES MELLITUS: ICD-10-CM

## 2022-07-25 DIAGNOSIS — E78.5 HYPERLIPIDEMIA ASSOCIATED WITH TYPE 2 DIABETES MELLITUS: ICD-10-CM

## 2022-07-25 DIAGNOSIS — K21.9 GASTROESOPHAGEAL REFLUX DISEASE WITHOUT ESOPHAGITIS: ICD-10-CM

## 2022-07-25 DIAGNOSIS — Z87.891 PERSONAL HISTORY OF NICOTINE DEPENDENCE: ICD-10-CM

## 2022-07-25 PROCEDURE — 99999 PR PBB SHADOW E&M-EST. PATIENT-LVL IV: CPT | Mod: PBBFAC,,, | Performed by: NURSE PRACTITIONER

## 2022-07-25 PROCEDURE — 99999 PR PBB SHADOW E&M-EST. PATIENT-LVL IV: ICD-10-PCS | Mod: PBBFAC,,, | Performed by: NURSE PRACTITIONER

## 2022-07-25 PROCEDURE — 99214 OFFICE O/P EST MOD 30 MIN: CPT | Mod: PBBFAC,PO | Performed by: NURSE PRACTITIONER

## 2022-07-25 PROCEDURE — 99214 PR OFFICE/OUTPT VISIT, EST, LEVL IV, 30-39 MIN: ICD-10-PCS | Mod: S$PBB,,, | Performed by: NURSE PRACTITIONER

## 2022-07-25 PROCEDURE — 99214 OFFICE O/P EST MOD 30 MIN: CPT | Mod: S$PBB,,, | Performed by: NURSE PRACTITIONER

## 2022-07-25 RX ORDER — AMLODIPINE BESYLATE 10 MG/1
10 TABLET ORAL DAILY
Qty: 30 TABLET | Refills: 11 | Status: SHIPPED | OUTPATIENT
Start: 2022-07-25 | End: 2022-07-25

## 2022-07-25 RX ORDER — ALBUTEROL SULFATE 90 UG/1
2 AEROSOL, METERED RESPIRATORY (INHALATION) EVERY 6 HOURS PRN
Qty: 18 G | Refills: 0 | Status: SHIPPED | OUTPATIENT
Start: 2022-07-25 | End: 2023-09-02

## 2022-07-25 RX ORDER — PANTOPRAZOLE SODIUM 40 MG/1
40 TABLET, DELAYED RELEASE ORAL DAILY
Qty: 30 TABLET | Refills: 11 | Status: SHIPPED | OUTPATIENT
Start: 2022-07-25 | End: 2023-09-20 | Stop reason: SDUPTHER

## 2022-07-25 RX ORDER — AMLODIPINE BESYLATE 10 MG/1
10 TABLET ORAL DAILY
Qty: 30 TABLET | Refills: 11 | Status: SHIPPED | OUTPATIENT
Start: 2022-07-25 | End: 2023-09-20 | Stop reason: SDUPTHER

## 2022-07-25 RX ORDER — IPRATROPIUM BROMIDE AND ALBUTEROL SULFATE 2.5; .5 MG/3ML; MG/3ML
3 SOLUTION RESPIRATORY (INHALATION) EVERY 6 HOURS PRN
Qty: 25 EACH | Refills: 3 | OUTPATIENT
Start: 2022-07-25 | End: 2024-04-02

## 2022-07-25 RX ORDER — METFORMIN HYDROCHLORIDE 500 MG/1
500 TABLET ORAL 2 TIMES DAILY WITH MEALS
Qty: 30 TABLET | Refills: 2 | Status: SHIPPED | OUTPATIENT
Start: 2022-07-25 | End: 2023-11-01

## 2022-07-25 NOTE — PROGRESS NOTES
Subjective:       Patient ID: Hernan Badillo is a 56 y.o. male.    Chief Complaint: Medication Refill    HPI    Patient presents today for chronic conditions follow up.He is new to me. He is due for labs and medication refills.    6/2020 Pain Medicine  for low back pain  6/2020 Fortino Garcia NP for hospital follow up  Past Medical History:   Diagnosis Date    COPD (chronic obstructive pulmonary disease)     Diabetes mellitus, type 2     Hypertension        Review of patient's allergies indicates:  No Known Allergies      Current Outpatient Medications:     benzonatate (TESSALON) 100 MG capsule, Take 1 capsule (100 mg total) by mouth 3 (three) times daily as needed for Cough., Disp: 20 capsule, Rfl: 0    mometasone (NASONEX) 50 mcg/actuation nasal spray, 2 sprays by Nasal route once daily., Disp: 17 g, Rfl: 0    albuterol (VENTOLIN HFA) 90 mcg/actuation inhaler, Inhale 2 puffs into the lungs every 6 (six) hours as needed for Wheezing. Rescue, Disp: 18 g, Rfl: 0    albuterol-ipratropium (DUO-NEB) 2.5 mg-0.5 mg/3 mL nebulizer solution, Take 3 mLs by nebulization every 6 (six) hours as needed for Wheezing or Shortness of Breath., Disp: 25 each, Rfl: 3    amitriptyline (ELAVIL) 10 MG tablet, Take 2 tablets (20 mg total) by mouth nightly as needed for Insomnia., Disp: 60 tablet, Rfl: 0    amLODIPine (NORVASC) 10 MG tablet, Take 1 tablet (10 mg total) by mouth once daily., Disp: 30 tablet, Rfl: 11    LIDOcaine (LIDODERM) 5 %, Place 1 patch onto the skin once daily. Remove & Discard patch within 12 hours or as directed by MD for 7 days, Disp: 15 patch, Rfl: 0    metFORMIN (GLUCOPHAGE) 500 MG tablet, Take 1 tablet (500 mg total) by mouth 2 (two) times daily with meals., Disp: 30 tablet, Rfl: 2    orphenadrine (NORFLEX) 100 mg tablet, Take 1 tablet (100 mg total) by mouth 2 (two) times daily as needed for Muscle spasms., Disp: 10 tablet, Rfl: 0    pantoprazole (PROTONIX) 40 MG tablet, Take 1 tablet (40 mg  "total) by mouth once daily., Disp: 30 tablet, Rfl: 11    Current Facility-Administered Medications:     dexamethasone injection 4 mg, 4 mg, Intramuscular, 1 time in Clinic/HOD, Aiyana Muhammad PA-C    Review of Systems   Constitutional: Negative for unexpected weight change.   HENT: Negative for trouble swallowing.    Eyes: Negative for visual disturbance.   Respiratory: Negative for shortness of breath.    Cardiovascular: Negative for chest pain, palpitations and leg swelling.   Gastrointestinal: Negative for blood in stool.   Genitourinary: Negative for hematuria.   Skin: Negative for rash.   Allergic/Immunologic: Negative for immunocompromised state.   Neurological: Negative for headaches.   Hematological: Does not bruise/bleed easily.   Psychiatric/Behavioral: Negative for agitation. The patient is not nervous/anxious.        Objective:      /80 (BP Location: Right arm, Patient Position: Sitting, BP Method: Small (Manual))   Pulse 83   Temp 98.5 °F (36.9 °C) (Oral)   Ht 5' 9" (1.753 m)   Wt 76.7 kg (169 lb 1.5 oz)   SpO2 95%   BMI 24.97 kg/m²   Physical Exam  Constitutional:       Appearance: He is well-developed.   Eyes:      Conjunctiva/sclera: Conjunctivae normal.      Pupils: Pupils are equal, round, and reactive to light.   Cardiovascular:      Rate and Rhythm: Normal rate and regular rhythm.      Heart sounds: Normal heart sounds.   Pulmonary:      Effort: Pulmonary effort is normal.      Breath sounds: Normal breath sounds.   Abdominal:      General: Bowel sounds are normal.      Palpations: Abdomen is soft.   Musculoskeletal:         General: Normal range of motion.      Cervical back: Normal range of motion and neck supple.   Skin:     General: Skin is warm and dry.   Neurological:      Mental Status: He is alert and oriented to person, place, and time.   Psychiatric:         Behavior: Behavior normal.         Thought Content: Thought content normal.         Judgment: Judgment normal. "         Assessment:       1. Hypertension associated with diabetes    2. Uncontrolled type 2 diabetes mellitus with hyperglycemia    3. Acute exacerbation of chronic obstructive pulmonary disease (COPD)    4. Gastroesophageal reflux disease without esophagitis    5. Hyperlipidemia associated with type 2 diabetes mellitus    6. Hyperlipidemia, unspecified hyperlipidemia type    7. Personal history of nicotine dependence     8. Overweight (BMI 25.0-29.9)        Plan:       Hypertension associated with diabetes  -     COMPREHENSIVE METABOLIC PANEL; Future; Expected date: 07/25/2022  -     amLODIPine (NORVASC) 10 MG tablet; Take 1 tablet (10 mg total) by mouth once daily.  Dispense: 30 tablet; Refill: 11  Stable reading today, continue medication  Low sodium diet  BP Readings from Last 3 Encounters:       07/25/22 130/80   07/24/22 (!) 180/102     Type 2 diabetes mellitus with hyperglycemia  -     metFORMIN (GLUCOPHAGE) 500 MG tablet; Take 1 tablet (500 mg total) by mouth 2 (two) times daily with meals.  Dispense: 30 tablet; Refill: 2  -     Hemoglobin A1C; Future; Expected date: 07/25/2022  Stable, continue medication  Follow the ADA diet  Hemoglobin A1C   Date Value Ref Range Status   07/26/2022 6.6 (H) 4.0 - 5.6 % Final     Comment:     ADA Screening Guidelines:  5.7-6.4%  Consistent with prediabetes  >or=6.5%  Consistent with diabetes    High levels of fetal hemoglobin interfere with the HbA1C  assay. Heterozygous hemoglobin variants (HbS, HgC, etc)do  not significantly interfere with this assay.   However, presence of multiple variants may affect accuracy.     05/31/2020 7.9 (H) 4.0 - 5.6 % Final     Comment:     ADA Screening Guidelines:  5.7-6.4%  Consistent with prediabetes  >or=6.5%  Consistent with diabetes  High levels of fetal hemoglobin interfere with the HbA1C  assay. Heterozygous hemoglobin variants (HbS, HgC, etc)do  not significantly interfere with this assay.   However, presence of multiple variants  "may affect accuracy.     08/27/2019 7.4 (H) 4.0 - 5.6 % Final     Comment:     ADA Screening Guidelines:  5.7-6.4%  Consistent with prediabetes  >or=6.5%  Consistent with diabetes  High levels of fetal hemoglobin interfere with the HbA1C  assay. Heterozygous hemoglobin variants (HbS, HgC, etc)do  not significantly interfere with this assay.   However, presence of multiple variants may affect accuracy.       Acute exacerbation of chronic obstructive pulmonary disease (COPD)  -     albuterol (VENTOLIN HFA) 90 mcg/actuation inhaler; Inhale 2 puffs into the lungs every 6 (six) hours as needed for Wheezing. Rescue  Dispense: 18 g; Refill: 0  -     albuterol-ipratropium (DUO-NEB) 2.5 mg-0.5 mg/3 mL nebulizer solution; Take 3 mLs by nebulization every 6 (six) hours as needed for Wheezing or Shortness of Breath.  Dispense: 25 each; Refill: 3  -     CT Chest Lung Screening Low Dose; Future; Expected date: 07/25/2022    Gastroesophageal reflux disease without esophagitis  -     pantoprazole (PROTONIX) 40 MG tablet; Take 1 tablet (40 mg total) by mouth once daily.  Dispense: 30 tablet; Refill: 11  Stable, continue medication  Hyperlipidemia associated with type 2 diabetes mellitus  -     Lipid Panel; Future; Expected date: 07/25/2022  Stable, continue management  Personal history of nicotine dependence   -     CT Chest Lung Screening Low Dose; Future; Expected date: 07/25/2022    Overweight (BMI 25.0-29.9)  Counseled patient on his ideal body weight, health consequences of being obese and current recommendations including weekly exercise and a heart healthy diet.  Current BMI is:Estimated body mass index is 24.97 kg/m² as calculated from the following:    Height as of this encounter: 5' 9" (1.753 m).    Weight as of this encounter: 76.7 kg (169 lb 1.5 oz)..  Patient is aware that ideal BMI < 25 or Weight in (lb) to have BMI = 25: 168.9.              Patient readiness: acceptance and barriers:none    During the course of the " visit the patient was educated and counseled about the following:     Diabetes:  Discussed general issues about diabetes pathophysiology and management.  Addressed ADA diet.  Suggested low cholesterol diet.  Encouraged aerobic exercise.  Hypertension:   Dietary sodium restriction.  Regular aerobic exercise.    Goals: Diabetes: Maintain Hemoglobin A1C below 7 and Hypertension: Reduce Blood Pressure    Did patient meet goals/outcomes: Yes    The following self management tools provided: declined    Patient Instructions (the written plan) was given to the patient/family.     Time spent with patient: 15 minutes    Barriers to medications present (no )    Adverse reactions to current medications (no)    Over the counter medications reviewed (Yes)

## 2022-07-26 ENCOUNTER — HOSPITAL ENCOUNTER (EMERGENCY)
Facility: HOSPITAL | Age: 57
Discharge: HOME | End: 2022-07-27
Attending: EMERGENCY MEDICINE
Payer: MEDICARE

## 2022-07-26 ENCOUNTER — LAB VISIT (OUTPATIENT)
Dept: LAB | Facility: HOSPITAL | Age: 57
End: 2022-07-26
Attending: NURSE PRACTITIONER
Payer: MEDICARE

## 2022-07-26 DIAGNOSIS — E78.5 HYPERLIPIDEMIA, UNSPECIFIED HYPERLIPIDEMIA TYPE: ICD-10-CM

## 2022-07-26 DIAGNOSIS — E11.65 UNCONTROLLED TYPE 2 DIABETES MELLITUS WITH HYPERGLYCEMIA: ICD-10-CM

## 2022-07-26 DIAGNOSIS — M54.2 NECK PAIN ON RIGHT SIDE: ICD-10-CM

## 2022-07-26 DIAGNOSIS — I15.2 HYPERTENSION ASSOCIATED WITH DIABETES: ICD-10-CM

## 2022-07-26 DIAGNOSIS — E11.59 HYPERTENSION ASSOCIATED WITH DIABETES: ICD-10-CM

## 2022-07-26 LAB
ALBUMIN SERPL BCP-MCNC: 3.6 G/DL (ref 3.5–5.2)
ALP SERPL-CCNC: 62 U/L (ref 55–135)
ALT SERPL W/O P-5'-P-CCNC: 10 U/L (ref 10–44)
ANION GAP SERPL CALC-SCNC: 7 MMOL/L (ref 8–16)
AST SERPL-CCNC: 13 U/L (ref 10–40)
BILIRUB SERPL-MCNC: 0.6 MG/DL (ref 0.1–1)
BUN SERPL-MCNC: 7 MG/DL (ref 6–20)
CALCIUM SERPL-MCNC: 9.1 MG/DL (ref 8.7–10.5)
CHLORIDE SERPL-SCNC: 101 MMOL/L (ref 95–110)
CHOLEST SERPL-MCNC: 149 MG/DL (ref 120–199)
CHOLEST/HDLC SERPL: 3.3 {RATIO} (ref 2–5)
CO2 SERPL-SCNC: 28 MMOL/L (ref 23–29)
CREAT SERPL-MCNC: 0.8 MG/DL (ref 0.5–1.4)
EST. GFR  (AFRICAN AMERICAN): >60 ML/MIN/1.73 M^2
EST. GFR  (NON AFRICAN AMERICAN): >60 ML/MIN/1.73 M^2
ESTIMATED AVG GLUCOSE: 143 MG/DL (ref 68–131)
GLUCOSE SERPL-MCNC: 139 MG/DL (ref 70–110)
HBA1C MFR BLD: 6.6 % (ref 4–5.6)
HDLC SERPL-MCNC: 45 MG/DL (ref 40–75)
HDLC SERPL: 30.2 % (ref 20–50)
LDLC SERPL CALC-MCNC: 91.6 MG/DL (ref 63–159)
NONHDLC SERPL-MCNC: 104 MG/DL
POTASSIUM SERPL-SCNC: 3.6 MMOL/L (ref 3.5–5.1)
PROT SERPL-MCNC: 7.2 G/DL (ref 6–8.4)
SODIUM SERPL-SCNC: 136 MMOL/L (ref 136–145)
TRIGL SERPL-MCNC: 62 MG/DL (ref 30–150)

## 2022-07-26 PROCEDURE — 80053 COMPREHEN METABOLIC PANEL: CPT | Performed by: NURSE PRACTITIONER

## 2022-07-26 PROCEDURE — 36415 COLL VENOUS BLD VENIPUNCTURE: CPT | Mod: PO | Performed by: NURSE PRACTITIONER

## 2022-07-26 PROCEDURE — 99284 EMERGENCY DEPT VISIT MOD MDM: CPT | Mod: 25

## 2022-07-26 PROCEDURE — 80061 LIPID PANEL: CPT | Performed by: NURSE PRACTITIONER

## 2022-07-26 PROCEDURE — 83036 HEMOGLOBIN GLYCOSYLATED A1C: CPT | Performed by: NURSE PRACTITIONER

## 2022-07-26 RX ORDER — LIDOCAINE 50 MG/G
1 PATCH TOPICAL
Status: DISCONTINUED | OUTPATIENT
Start: 2022-07-27 | End: 2022-07-27 | Stop reason: HOSPADM

## 2022-07-27 VITALS
WEIGHT: 170 LBS | OXYGEN SATURATION: 95 % | SYSTOLIC BLOOD PRESSURE: 158 MMHG | HEART RATE: 75 BPM | DIASTOLIC BLOOD PRESSURE: 89 MMHG | RESPIRATION RATE: 16 BRPM | TEMPERATURE: 98 F | BODY MASS INDEX: 25.1 KG/M2

## 2022-07-27 PROCEDURE — 25000003 PHARM REV CODE 250: Performed by: EMERGENCY MEDICINE

## 2022-07-27 RX ORDER — LIDOCAINE 50 MG/G
1 PATCH TOPICAL DAILY
Qty: 15 PATCH | Refills: 0 | Status: SHIPPED | OUTPATIENT
Start: 2022-07-27 | End: 2022-08-03

## 2022-07-27 RX ORDER — ORPHENADRINE CITRATE 100 MG/1
100 TABLET, EXTENDED RELEASE ORAL 2 TIMES DAILY PRN
Qty: 10 TABLET | Refills: 0 | Status: SHIPPED | OUTPATIENT
Start: 2022-07-27 | End: 2022-08-01

## 2022-07-27 RX ADMIN — LIDOCAINE 1 PATCH: 50 PATCH TOPICAL at 12:07

## 2022-07-27 NOTE — ED PROVIDER NOTES
Encounter Date: 7/26/2022    SCRIBE #1 NOTE: I, Prabhaaileen Sanchez, am scribing for, and in the presence of, Lauro Mcnally MD.       History     Chief Complaint   Patient presents with    Neck Pain     No injury noted. Started 3 days - worse yesterday      Time seen by provider: 11:53 PM on 07/26/2022    Hernan Badillo is a 56 y.o. male who presents to the ED with an onset of neck pain, neck stiffness, and headache. The patient thought it might have something to do with sleeping wrong but he is not sure. He reports he noticed it upon awakening 3 days ago. He states his pain is worse with turning his head and has trouble getting out of bed. He took Tylenol with some relief. The patient denies injury or any other symptoms at this time. He states he did not drink alcohol today. The patient has a PMHx of diabetes, HTN, and COPD. He has a PSHx of hernia repair.      The history is provided by the patient.     Review of patient's allergies indicates:  No Known Allergies  Past Medical History:   Diagnosis Date    COPD (chronic obstructive pulmonary disease)     Diabetes mellitus, type 2     Hypertension      Past Surgical History:   Procedure Laterality Date    DENTAL SURGERY      ESOPHAGOGASTRODUODENOSCOPY N/A 6/1/2020    Procedure: EGD (ESOPHAGOGASTRODUODENOSCOPY);  Surgeon: Terrell May MD;  Location: [facility];  Service: Endoscopy;  Laterality: N/A;    HERNIA REPAIR      left inguinal    incision and drainiage       Family History   Problem Relation Age of Onset    Heart attack Father 80    Heart disease Father     Drug abuse Father     Cancer Maternal Grandmother         colon     Social History     Tobacco Use    Smoking status: Current Every Day Smoker     Packs/day: 2.00     Years: 30.00     Pack years: 60.00     Types: Cigarettes    Smokeless tobacco: Never Used   Substance Use Topics    Alcohol use: Yes     Alcohol/week: 1.0 standard drink     Types: 1 Shots of liquor per week     Comment: 1/2  pint 2 x per week - quit approximately 5 months ago    Drug use: No     Review of Systems   Constitutional: Negative for fever.   HENT: Negative for congestion.    Eyes: Negative for visual disturbance.   Respiratory: Negative for wheezing.    Cardiovascular: Negative for chest pain.   Gastrointestinal: Negative for abdominal pain.   Genitourinary: Negative for dysuria.   Musculoskeletal: Positive for neck pain and neck stiffness. Negative for joint swelling.   Skin: Negative for rash.   Neurological: Positive for headaches. Negative for syncope.   Hematological: Does not bruise/bleed easily.   Psychiatric/Behavioral: Negative for confusion.       Physical Exam     Initial Vitals   BP Pulse Resp Temp SpO2   07/26/22 2321 07/26/22 2321 07/26/22 2321 07/26/22 2321 07/26/22 2323   (!) 184/100 84 20 97.7 °F (36.5 °C) 100 %      MAP       --                Physical Exam    Nursing note and vitals reviewed.  Constitutional: Vital signs are normal. He appears well-nourished.   HENT:   Head: Normocephalic and atraumatic.   Eyes: Conjunctivae and EOM are normal.   Neck: Neck supple. No thyroid mass present.   The patient has tenderness upon palpation of right proximal trapezius that is worse with head rotation.   Normal range of motion.  Cardiovascular: Normal rate, regular rhythm, normal heart sounds and intact distal pulses. Exam reveals no gallop and no friction rub.    No murmur heard.  Pulmonary/Chest: Breath sounds normal. He has no wheezes. He has no rhonchi. He has no rales.   Abdominal: Abdomen is soft. Bowel sounds are normal. There is no abdominal tenderness.   Musculoskeletal:      Cervical back: Normal range of motion and neck supple.     Neurological: He is alert and oriented to person, place, and time. He has normal strength. No cranial nerve deficit or sensory deficit.   Skin: Skin is warm and dry. No rash noted. No erythema.   Psychiatric: He has a normal mood and affect. His speech is normal. Cognition and  memory are normal.         ED Course   Procedures  Labs Reviewed - No data to display       Imaging Results          X-Ray Cervical Spine AP And Lateral (In process)                  Medications - No data to display  Medical Decision Making:   History:   Old Medical Records: I decided to obtain old medical records.  Independently Interpreted Test(s):   I have ordered and independently interpreted X-rays - see prior notes.  Clinical Tests:   Radiological Study: Ordered and Reviewed  ED Management:  This patient was emergently assessed shortly after arrival.  Initial vital signs are stable.  The patient has bilateral intact upper extremity distal pulses.  There is no bruit or overlying cutaneous findings in the patient's area of pain which the right lateral neck that worsens with palpation and rotation.  Radiographs of the cervical spine due indicate some degenerative changes.  Patient provided a Lidoderm patch and took Tylenol prior to arrival.  On reassessment, he is seen sleeping comfortably and is educated about supportive care for suspected musculoskeletal pain.  I have low suspicion is symptoms are associated with thromboembolic disease, dissection of the great vessel, atypical ACS, CNS infection, including diskitis or epidural abscess, other sepsis.  He will be discharged with multiple medications for pain control at home but asked to follow-up with a primary care doctor as soon as possible. He is asked to return to the emergency department immediately for any new, concerning, or worsening symptoms including worsening pain.  Patient was agreeable with this plan and was discharged stable condition.          Scribe Attestation:   Scribe #1: I performed the above scribed service and the documentation accurately describes the services I performed. I attest to the accuracy of the note.               I, Dr. Lauro Mcnally, personally performed the services described in this documentation. All medical record entries  made by the scribe were at my direction and in my presence.  I have reviewed the chart and agree that the record reflects my personal performance and is accurate and complete. Lauro Mcnally MD.  5:33 AM 07/27/2022    Clinical Impression:   Final diagnoses:  [M54.2] Neck pain on right side          ED Disposition Condition    Discharge Stable        ED Prescriptions     Medication Sig Dispense Start Date End Date Auth. Provider    orphenadrine (NORFLEX) 100 mg tablet Take 1 tablet (100 mg total) by mouth 2 (two) times daily as needed for Muscle spasms. 10 tablet 7/27/2022 8/1/2022 Lauro Mcnally MD    LIDOcaine (LIDODERM) 5 % Place 1 patch onto the skin once daily. Remove & Discard patch within 12 hours or as directed by MD for 7 days 15 patch 7/27/2022 8/3/2022 Lauro Mcnally MD        Follow-up Information     Follow up With Specialties Details Why Contact Info Additional Information    [facility] Schedule an appointment as soon as possible for a visit   [address]  [facility] [address]  [phone] [facility] [address]    [employer] [facility] Primary [facility] Schedule an appointment as soon as possible for a visit   [address] [address]  [phone]            Lauro Mcnally MD  07/27/22 0537

## 2022-07-29 ENCOUNTER — TELEPHONE (OUTPATIENT)
Dept: FAMILY MEDICINE | Facility: CLINIC | Age: 57
End: 2022-07-29
Payer: MEDICARE

## 2022-08-11 ENCOUNTER — HOSPITAL ENCOUNTER (EMERGENCY)
Facility: HOSPITAL | Age: 57
Discharge: HOME | End: 2022-08-11
Attending: EMERGENCY MEDICINE
Payer: MEDICARE

## 2022-08-11 VITALS
BODY MASS INDEX: 25.18 KG/M2 | TEMPERATURE: 99 F | WEIGHT: 170 LBS | HEIGHT: 69 IN | DIASTOLIC BLOOD PRESSURE: 87 MMHG | HEART RATE: 79 BPM | OXYGEN SATURATION: 96 % | SYSTOLIC BLOOD PRESSURE: 139 MMHG | RESPIRATION RATE: 20 BRPM

## 2022-08-11 DIAGNOSIS — U07.1 COVID-19 VIRUS DETECTED: ICD-10-CM

## 2022-08-11 DIAGNOSIS — U07.1 COVID: Primary | ICD-10-CM

## 2022-08-11 DIAGNOSIS — R06.02 SOB (SHORTNESS OF BREATH): ICD-10-CM

## 2022-08-11 LAB — SARS-COV-2 RDRP RESP QL NAA+PROBE: POSITIVE

## 2022-08-11 PROCEDURE — 99284 EMERGENCY DEPT VISIT MOD MDM: CPT | Mod: 25

## 2022-08-11 PROCEDURE — U0002 COVID-19 LAB TEST NON-CDC: HCPCS | Performed by: EMERGENCY MEDICINE

## 2022-08-11 PROCEDURE — 96372 THER/PROPH/DIAG INJ SC/IM: CPT | Performed by: EMERGENCY MEDICINE

## 2022-08-11 PROCEDURE — 63600175 PHARM REV CODE 636 W HCPCS: Performed by: EMERGENCY MEDICINE

## 2022-08-11 RX ORDER — TRIAMCINOLONE ACETONIDE 40 MG/ML
80 INJECTION, SUSPENSION INTRA-ARTICULAR; INTRAMUSCULAR
Status: COMPLETED | OUTPATIENT
Start: 2022-08-11 | End: 2022-08-11

## 2022-08-11 RX ADMIN — TRIAMCINOLONE ACETONIDE 80 MG: 40 INJECTION, SUSPENSION INTRA-ARTICULAR; INTRAMUSCULAR at 07:08

## 2022-08-11 NOTE — ED PROVIDER NOTES
Encounter Date: 8/11/2022    SCRIBE #1 NOTE: IKateryna, am scribing for, and in the presence of, Michele Razo MD.       History     Chief Complaint   Patient presents with    Cough     Cough with congestion x 3 days      Time seen by provider: 7:14 AM on 08/11/2022    Hernan Badillo is a 56 y.o. male with a PMHx of DM II, HTN and COPD who presents to the ED for evaluation of general malaise with accompanying productive cough (green phlegm) that onset 1 week ago.  The cough presents with episodic SOB and constant chest congestion.  Patient has done a nebulizer Tx at home this morning as well as utilize his rescue albuterol inhaler with mild improvement to Sx.  He expresses great relief for similar Sx in the past with steroid injections.  There is no CP, abdominal pain, body aches, sore throat, HA, or any other symptoms at this time.  No pulmonary PSHx.  He is a current everyday cigarette smoker.      The history is provided by the patient.     Review of patient's allergies indicates:  No Known Allergies  Past Medical History:   Diagnosis Date    COPD (chronic obstructive pulmonary disease)     Diabetes mellitus, type 2     Hypertension      Past Surgical History:   Procedure Laterality Date    DENTAL SURGERY      ESOPHAGOGASTRODUODENOSCOPY N/A 6/1/2020    Procedure: EGD (ESOPHAGOGASTRODUODENOSCOPY);  Surgeon: Terrell May MD;  Location: [facility];  Service: Endoscopy;  Laterality: N/A;    HERNIA REPAIR      left inguinal    incision and drainiage       Family History   Problem Relation Age of Onset    Heart attack Father 80    Heart disease Father     Drug abuse Father     Cancer Maternal Grandmother         colon     Social History     Tobacco Use    Smoking status: Current Every Day Smoker     Packs/day: 2.00     Years: 30.00     Pack years: 60.00     Types: Cigarettes    Smokeless tobacco: Never Used   Substance Use Topics    Alcohol use: Yes     Alcohol/week: 1.0 standard drink      Types: 1 Shots of liquor per week     Comment: 1/2 pint 2 x per week - quit approximately 5 months ago    Drug use: No     Review of Systems   Constitutional: Negative for fever.   HENT: Positive for congestion (chest). Negative for sore throat.    Respiratory: Positive for cough and shortness of breath.    Cardiovascular: Negative for chest pain.   Gastrointestinal: Negative for abdominal pain and nausea.   Genitourinary: Negative for dysuria.   Musculoskeletal: Negative for back pain and myalgias.   Skin: Negative for rash.   Neurological: Negative for weakness and headaches.   Hematological: Does not bruise/bleed easily.       Physical Exam     Initial Vitals [08/11/22 0643]   BP Pulse Resp Temp SpO2   (!) 134/92 74 20 98.5 °F (36.9 °C) 95 %      MAP       --         Physical Exam    Nursing note and vitals reviewed.  Constitutional: He appears well-developed and well-nourished. He is not diaphoretic. No distress.   HENT:   Head: Normocephalic and atraumatic.   Eyes: EOM are normal. Pupils are equal, round, and reactive to light.   Neck: Neck supple.   Normal range of motion.  Cardiovascular: Normal rate, regular rhythm, normal heart sounds and intact distal pulses. Exam reveals no gallop and no friction rub.    No murmur heard.  Pulmonary/Chest: Tachypnea noted. No respiratory distress. He has wheezes (expiratory). He has no rhonchi. He has no rales.   Abdominal: Abdomen is soft. Bowel sounds are normal. There is no abdominal tenderness. There is no rebound and no guarding.   Musculoskeletal:         General: Normal range of motion.      Cervical back: Normal range of motion and neck supple.     Neurological: He is alert and oriented to person, place, and time.   Skin: Skin is warm.   Psychiatric: He has a normal mood and affect. His behavior is normal. Judgment and thought content normal.         ED Course   Procedures  Labs Reviewed   SARS-COV-2 RNA AMPLIFICATION, QUAL - Abnormal; Notable for the following  components:       Result Value    SARS-CoV-2 RNA, Amplification, Qual Positive (*)     All other components within normal limits          Imaging Results          X-Ray Chest 1 View (Final result)  Result time 08/11/22 08:09:05    Final result by Alicia Martin MD (08/11/22 08:09:05)                 Impression:      There is no evidence acute pulmonary disease.      Electronically signed by: Alicia Martin MD  Date:    08/11/2022  Time:    08:09             Narrative:    EXAMINATION:  XR CHEST 1 VIEW    CLINICAL HISTORY:  Shortness of breath    TECHNIQUE:  Single frontal view of the chest was performed.    COMPARISON:  None    FINDINGS:  There is no pneumothorax, pleural effusion or focal consolidation.  The cardiac silhouette is unchanged from prior exam.  The osseous structures are unremarkable.                                 Medications   triamcinolone acetonide injection 80 mg (80 mg Intramuscular Given 8/11/22 0751)     Medical Decision Making:   History:   Old Medical Records: I decided to obtain old medical records.  Initial Assessment:   56-year-old male presented with multiple complaints.  Differential Diagnosis:   Initial differential diagnosis included but not limited to COPD exacerbation, pneumonia, and viral illness.  Independently Interpreted Test(s):   I have ordered and independently interpreted X-rays - see prior notes.  Clinical Tests:   Lab Tests: Ordered and Reviewed  Radiological Study: Ordered and Reviewed  ED Management:  The patient was emergently evaluated in the emergency department, his evaluation was significant for a middle-age male with mild wheezing noted.  The patient's chest x-ray showed no acute abnormalities.  The patient was treated with a dose of parental Kenalog.  The patient's COVID test was noted to be positive.  This is likely the etiology of his symptoms.  The patient's symptoms have been going on for approximately 1 week.  The patient is not.  Patient is stable for  discharge to home.  He will be discharged home take over-the-counter medications as needed for symptomatic relief.  He is referred to primary care for follow-up.          Scribe Attestation:   Scribe #1: I performed the above scribed service and the documentation accurately describes the services I performed. I attest to the accuracy of the note.              I, Dr. Michele Razo, personally performed the services described in this documentation. All medical record entries made by the scribe were at my direction and in my presence.  I have reviewed the chart and agree that the record reflects my personal performance and is accurate and complete. Michele Razo MD.  3:57 PM 08/11/2022      Clinical Impression:   Final diagnoses:  [R06.02] SOB (shortness of breath)  [U07.1] COVID (Primary)          ED Disposition Condition    Discharge Stable        ED Prescriptions     None        Follow-up Information     Follow up With Specialties Details Why Contact [facility]  Schedule an appointment as soon as possible for a visit   [address] [place] [address]  [phone]             Michele Razo MD  08/11/22 5350

## 2022-10-07 ENCOUNTER — OFFICE VISIT (OUTPATIENT)
Dept: FAMILY MEDICINE | Facility: CLINIC | Age: 57
End: 2022-10-07
Payer: MEDICARE

## 2022-10-07 VITALS
WEIGHT: 166.69 LBS | TEMPERATURE: 98 F | BODY MASS INDEX: 24.69 KG/M2 | HEIGHT: 69 IN | SYSTOLIC BLOOD PRESSURE: 140 MMHG | HEART RATE: 82 BPM | OXYGEN SATURATION: 95 % | DIASTOLIC BLOOD PRESSURE: 82 MMHG

## 2022-10-07 DIAGNOSIS — I73.9 INTERMITTENT CLAUDICATION: Primary | ICD-10-CM

## 2022-10-07 DIAGNOSIS — R52 PAIN AGGRAVATED BY WALKING: ICD-10-CM

## 2022-10-07 PROCEDURE — 99214 OFFICE O/P EST MOD 30 MIN: CPT | Mod: PBBFAC,PO | Performed by: NURSE PRACTITIONER

## 2022-10-07 PROCEDURE — 99213 PR OFFICE/OUTPT VISIT, EST, LEVL III, 20-29 MIN: ICD-10-PCS | Mod: S$PBB,,, | Performed by: NURSE PRACTITIONER

## 2022-10-07 PROCEDURE — 99999 PR PBB SHADOW E&M-EST. PATIENT-LVL IV: ICD-10-PCS | Mod: PBBFAC,,, | Performed by: NURSE PRACTITIONER

## 2022-10-07 PROCEDURE — 99999 PR PBB SHADOW E&M-EST. PATIENT-LVL IV: CPT | Mod: PBBFAC,,, | Performed by: NURSE PRACTITIONER

## 2022-10-07 PROCEDURE — 99213 OFFICE O/P EST LOW 20 MIN: CPT | Mod: S$PBB,,, | Performed by: NURSE PRACTITIONER

## 2022-10-07 NOTE — PROGRESS NOTES
Subjective:       Patient ID: Hernan Badillo is a 56 y.o. male.    Chief Complaint: Referral    Leg Pain   The incident occurred more than 1 week ago. The incident occurred at home. There was no injury mechanism. The pain is present in the right leg and left leg. The quality of the pain is described as aching. The pain is at a severity of 10/10. Associated symptoms include muscle weakness. Pertinent negatives include no numbness or tingling. He reports no foreign bodies present. The symptoms are aggravated by movement.         8/11/22 ER: COVID-hx of COPD-triamcinolone injection    7/26/22  ER: Neck Pain-orphenadrine and lidocaine  6/1/20 endoscope Schatzki ring  Past Medical History:   Diagnosis Date    COPD (chronic obstructive pulmonary disease)     Diabetes mellitus, type 2     Hypertension        Review of patient's allergies indicates:  No Known Allergies      Current Outpatient Medications:     albuterol (VENTOLIN HFA) 90 mcg/actuation inhaler, Inhale 2 puffs into the lungs every 6 (six) hours as needed for Wheezing. Rescue, Disp: 18 g, Rfl: 0    albuterol-ipratropium (DUO-NEB) 2.5 mg-0.5 mg/3 mL nebulizer solution, Take 3 mLs by nebulization every 6 (six) hours as needed for Wheezing or Shortness of Breath., Disp: 25 each, Rfl: 3    amLODIPine (NORVASC) 10 MG tablet, Take 1 tablet (10 mg total) by mouth once daily., Disp: 30 tablet, Rfl: 11    benzonatate (TESSALON) 100 MG capsule, Take 1 capsule (100 mg total) by mouth 3 (three) times daily as needed for Cough., Disp: 20 capsule, Rfl: 0    metFORMIN (GLUCOPHAGE) 500 MG tablet, Take 1 tablet (500 mg total) by mouth 2 (two) times daily with meals., Disp: 30 tablet, Rfl: 2    mometasone (NASONEX) 50 mcg/actuation nasal spray, 2 sprays by Nasal route once daily., Disp: 17 g, Rfl: 0    pantoprazole (PROTONIX) 40 MG tablet, Take 1 tablet (40 mg total) by mouth once daily., Disp: 30 tablet, Rfl: 11    amitriptyline (ELAVIL) 10 MG tablet, Take 2 tablets (20 mg total)  "by mouth nightly as needed for Insomnia., Disp: 60 tablet, Rfl: 0    Current Facility-Administered Medications:     dexamethasone injection 4 mg, 4 mg, Intramuscular, 1 time in Clinic/HOD, Aiyana Muhammad PA-C    Review of Systems   Constitutional:  Negative for unexpected weight change.   Eyes:  Negative for visual disturbance.   Respiratory:  Negative for shortness of breath.    Cardiovascular:  Negative for chest pain, palpitations and leg swelling.   Genitourinary:  Negative for hematuria.   Skin:  Negative for rash.   Neurological:  Negative for tingling, numbness and headaches.   Psychiatric/Behavioral:  Negative for agitation. The patient is not nervous/anxious.      Objective:      BP (!) 140/82   Pulse 82   Temp 98.2 °F (36.8 °C) (Oral)   Ht 5' 9" (1.753 m)   Wt 75.6 kg (166 lb 10.7 oz)   SpO2 95%   BMI 24.61 kg/m²   Physical Exam  Constitutional:       Appearance: He is well-developed.   Eyes:      Conjunctiva/sclera: Conjunctivae normal.      Pupils: Pupils are equal, round, and reactive to light.   Cardiovascular:      Rate and Rhythm: Normal rate and regular rhythm.      Heart sounds: Normal heart sounds.   Pulmonary:      Effort: Pulmonary effort is normal.   Musculoskeletal:         General: Normal range of motion.   Neurological:      Mental Status: He is alert and oriented to person, place, and time.   Psychiatric:         Behavior: Behavior normal.         Thought Content: Thought content normal.         Judgment: Judgment normal.       Assessment:       1. Intermittent claudication    2. Pain aggravated by walking        Plan:       Intermittent claudication  -    US Lower Extremity Arteries Bilateral; Future; Expected date: 10/07/2022      Pain aggravated by walking  -      US Lower Extremity Arteries Bilateral; Future; Expected date: 10/07/2022  -       Time spent with patient: 20 minutes    Patient with be reevaluated in 6 months or sooner prn    Greater than 50% of this visit was " spent counseling as described in above documentation:Yes

## 2022-10-11 ENCOUNTER — HOSPITAL ENCOUNTER (OUTPATIENT)
Dept: RADIOLOGY | Facility: HOSPITAL | Age: 57
Discharge: HOME | End: 2022-10-11
Attending: NURSE PRACTITIONER
Payer: MEDICARE

## 2022-10-11 DIAGNOSIS — R52 PAIN AGGRAVATED BY WALKING: ICD-10-CM

## 2022-10-11 DIAGNOSIS — I73.9 INTERMITTENT CLAUDICATION: ICD-10-CM

## 2022-10-11 PROCEDURE — 93922 US ARTERIAL LOWER EXTREMITY BILAT WITH ABI (XPD): ICD-10-PCS | Mod: 26,,, | Performed by: RADIOLOGY

## 2022-10-11 PROCEDURE — 93925 US ARTERIAL LOWER EXTREMITY BILAT WITH ABI (XPD): ICD-10-PCS | Mod: 26,,, | Performed by: RADIOLOGY

## 2022-10-11 PROCEDURE — 93925 LOWER EXTREMITY STUDY: CPT | Mod: TC

## 2022-10-11 PROCEDURE — 93922 UPR/L XTREMITY ART 2 LEVELS: CPT | Mod: 26,,, | Performed by: RADIOLOGY

## 2022-10-11 PROCEDURE — 93925 LOWER EXTREMITY STUDY: CPT | Mod: 26,,, | Performed by: RADIOLOGY

## 2022-10-13 DIAGNOSIS — I73.9 INTERMITTENT CLAUDICATION: Primary | ICD-10-CM

## 2022-10-13 DIAGNOSIS — R93.89 ULTRASOUND SCAN ABNORMAL: ICD-10-CM

## 2022-10-14 ENCOUNTER — TELEPHONE (OUTPATIENT)
Dept: FAMILY MEDICINE | Facility: CLINIC | Age: 57
End: 2022-10-14

## 2022-10-14 NOTE — TELEPHONE ENCOUNTER
----- Message from Giovanna Narvaez NP sent at 10/13/2022  5:52 PM CDT -----  Please inform patient that the US shows moderate peripheral and arteries vascular disease-this is wants causing his leg pain. he needs to see a vascular MD ASAP-referral placed

## 2022-10-24 ENCOUNTER — TELEPHONE (OUTPATIENT)
Dept: FAMILY MEDICINE | Facility: CLINIC | Age: 57
End: 2022-10-24
Payer: MEDICARE

## 2022-10-24 NOTE — TELEPHONE ENCOUNTER
----- Message from Jordyn Ramsey sent at 10/21/2022  1:44 PM CDT -----  Who Called:dr. Estrada office       What is the reqeust in detail: is requesting referral , clinicals , last office visit notes , imaging reports test results to complete jv scheduling . Please advise     This can be sent via fax [phone]       Can the clinic reply by [facility]?no       Best Call Back Number:[phone]      Additional Information:

## 2022-11-22 ENCOUNTER — HOSPITAL ENCOUNTER (INPATIENT)
Facility: HOSPITAL | Age: 57
LOS: 3 days | Discharge: NURSING, REHAB OR LONG-TERM CARE | DRG: 064 | End: 2022-11-25
Attending: PSYCHIATRY & NEUROLOGY | Admitting: PSYCHIATRY & NEUROLOGY
Payer: MEDICARE

## 2022-11-22 ENCOUNTER — HOSPITAL ENCOUNTER (EMERGENCY)
Facility: HOSPITAL | Age: 57
Discharge: STILL ADMITTED OR ADMINISTRATIVE | End: 2022-11-22
Attending: EMERGENCY MEDICINE
Payer: MEDICARE

## 2022-11-22 VITALS
HEART RATE: 81 BPM | WEIGHT: 166 LBS | RESPIRATION RATE: 16 BRPM | HEIGHT: 69 IN | DIASTOLIC BLOOD PRESSURE: 81 MMHG | OXYGEN SATURATION: 96 % | SYSTOLIC BLOOD PRESSURE: 145 MMHG | TEMPERATURE: 98 F | BODY MASS INDEX: 24.59 KG/M2

## 2022-11-22 DIAGNOSIS — I61.9 ICH (INTRACEREBRAL HEMORRHAGE): ICD-10-CM

## 2022-11-22 DIAGNOSIS — I61.3 PONTINE HEMORRHAGE: Primary | ICD-10-CM

## 2022-11-22 DIAGNOSIS — I63.9 STROKE: ICD-10-CM

## 2022-11-22 DIAGNOSIS — R09.02 HYPOXIA: ICD-10-CM

## 2022-11-22 DIAGNOSIS — I61.3 PONTINE HEMORRHAGE: ICD-10-CM

## 2022-11-22 PROBLEM — G93.6 VASOGENIC BRAIN EDEMA: Status: ACTIVE | Noted: 2022-11-22

## 2022-11-22 PROBLEM — H91.93 HEARING DIFFICULTY OF BOTH EARS: Status: ACTIVE | Noted: 2022-11-22

## 2022-11-22 LAB
ABO + RH BLD: NORMAL
ALBUMIN SERPL BCP-MCNC: 3.6 G/DL (ref 3.5–5.2)
ALP SERPL-CCNC: 63 U/L (ref 55–135)
ALT SERPL W/O P-5'-P-CCNC: 12 U/L (ref 10–44)
AMPHET+METHAMPHET UR QL: NEGATIVE
ANION GAP SERPL CALC-SCNC: 11 MMOL/L (ref 8–16)
APTT BLDCRRT: 29.2 SEC (ref 21–32)
AST SERPL-CCNC: 14 U/L (ref 10–40)
BARBITURATES UR QL SCN>200 NG/ML: NEGATIVE
BASOPHILS # BLD AUTO: 0.02 K/UL (ref 0–0.2)
BASOPHILS NFR BLD: 0.3 % (ref 0–1.9)
BENZODIAZ UR QL SCN>200 NG/ML: NEGATIVE
BILIRUB SERPL-MCNC: 0.8 MG/DL (ref 0.1–1)
BILIRUB UR QL STRIP: NEGATIVE
BLD GP AB SCN CELLS X3 SERPL QL: NORMAL
BNP SERPL-MCNC: 69 PG/ML (ref 0–99)
BUN SERPL-MCNC: 11 MG/DL (ref 6–20)
BZE UR QL SCN: NEGATIVE
CALCIUM SERPL-MCNC: 9.2 MG/DL (ref 8.7–10.5)
CANNABINOIDS UR QL SCN: NEGATIVE
CHLORIDE SERPL-SCNC: 103 MMOL/L (ref 95–110)
CHOLEST SERPL-MCNC: 144 MG/DL (ref 120–199)
CHOLEST/HDLC SERPL: 3.5 {RATIO} (ref 2–5)
CLARITY UR REFRACT.AUTO: CLEAR
CO2 SERPL-SCNC: 24 MMOL/L (ref 23–29)
COLOR UR AUTO: YELLOW
CREAT SERPL-MCNC: 0.9 MG/DL (ref 0.5–1.4)
CREAT UR-MCNC: 193.6 MG/DL (ref 23–375)
DIFFERENTIAL METHOD: ABNORMAL
EOSINOPHIL # BLD AUTO: 0.1 K/UL (ref 0–0.5)
EOSINOPHIL NFR BLD: 1.5 % (ref 0–8)
ERYTHROCYTE [DISTWIDTH] IN BLOOD BY AUTOMATED COUNT: 13.3 % (ref 11.5–14.5)
EST. GFR  (NO RACE VARIABLE): >60 ML/MIN/1.73 M^2
ESTIMATED AVG GLUCOSE: 143 MG/DL (ref 68–131)
GLUCOSE SERPL-MCNC: 155 MG/DL (ref 70–110)
GLUCOSE UR QL STRIP: NEGATIVE
HBA1C MFR BLD: 6.6 % (ref 4–5.6)
HCT VFR BLD AUTO: 38.2 % (ref 40–54)
HCV AB SERPL QL IA: NORMAL
HDLC SERPL-MCNC: 41 MG/DL (ref 40–75)
HDLC SERPL: 28.5 % (ref 20–50)
HGB BLD-MCNC: 12.3 G/DL (ref 14–18)
HGB UR QL STRIP: ABNORMAL
HIV 1+2 AB+HIV1 P24 AG SERPL QL IA: NORMAL
HYALINE CASTS UR QL AUTO: 1 /LPF
IMM GRANULOCYTES # BLD AUTO: 0.02 K/UL (ref 0–0.04)
IMM GRANULOCYTES NFR BLD AUTO: 0.3 % (ref 0–0.5)
INR PPP: 1 (ref 0.8–1.2)
INR PPP: 1.1 (ref 0.8–1.2)
KETONES UR QL STRIP: NEGATIVE
LDLC SERPL CALC-MCNC: 92 MG/DL (ref 63–159)
LEUKOCYTE ESTERASE UR QL STRIP: ABNORMAL
LYMPHOCYTES # BLD AUTO: 2.6 K/UL (ref 1–4.8)
LYMPHOCYTES NFR BLD: 38.4 % (ref 18–48)
MCH RBC QN AUTO: 30.3 PG (ref 27–31)
MCHC RBC AUTO-ENTMCNC: 32.2 G/DL (ref 32–36)
MCV RBC AUTO: 94 FL (ref 82–98)
METHADONE UR QL SCN>300 NG/ML: NEGATIVE
MICROSCOPIC COMMENT: ABNORMAL
MONOCYTES # BLD AUTO: 0.6 K/UL (ref 0.3–1)
MONOCYTES NFR BLD: 8.3 % (ref 4–15)
NEUTROPHILS # BLD AUTO: 3.4 K/UL (ref 1.8–7.7)
NEUTROPHILS NFR BLD: 51.2 % (ref 38–73)
NITRITE UR QL STRIP: NEGATIVE
NONHDLC SERPL-MCNC: 103 MG/DL
NRBC BLD-RTO: 0 /100 WBC
OPIATES UR QL SCN: NEGATIVE
PCP UR QL SCN>25 NG/ML: NEGATIVE
PH UR STRIP: 7 [PH] (ref 5–8)
PLATELET # BLD AUTO: 179 K/UL (ref 150–450)
PMV BLD AUTO: 10.7 FL (ref 9.2–12.9)
POC PTINR: 1.1 (ref 0.9–1.2)
POC PTWBT: 13.5 SEC (ref 9.7–14.3)
POCT GLUCOSE: 141 MG/DL (ref 70–110)
POCT GLUCOSE: 171 MG/DL (ref 70–110)
POTASSIUM SERPL-SCNC: 3.4 MMOL/L (ref 3.5–5.1)
PROT SERPL-MCNC: 7.1 G/DL (ref 6–8.4)
PROT UR QL STRIP: NEGATIVE
PROTHROMBIN TIME: 10.9 SEC (ref 9–12.5)
PROTHROMBIN TIME: 11 SEC (ref 9–12.5)
RBC # BLD AUTO: 4.06 M/UL (ref 4.6–6.2)
RBC #/AREA URNS AUTO: >100 /HPF (ref 0–4)
SAMPLE: NORMAL
SODIUM SERPL-SCNC: 138 MMOL/L (ref 136–145)
SP GR UR STRIP: 1.01 (ref 1–1.03)
SQUAMOUS #/AREA URNS AUTO: 0 /HPF
TOXICOLOGY INFORMATION: NORMAL
TRIGL SERPL-MCNC: 55 MG/DL (ref 30–150)
TSH SERPL DL<=0.005 MIU/L-ACNC: 0.54 UIU/ML (ref 0.4–4)
URN SPEC COLLECT METH UR: ABNORMAL
WBC # BLD AUTO: 6.64 K/UL (ref 3.9–12.7)
WBC #/AREA URNS AUTO: 6 /HPF (ref 0–5)

## 2022-11-22 PROCEDURE — 99291 CRITICAL CARE FIRST HOUR: CPT | Mod: ,,,

## 2022-11-22 PROCEDURE — 25000003 PHARM REV CODE 250

## 2022-11-22 PROCEDURE — 93005 ELECTROCARDIOGRAM TRACING: CPT

## 2022-11-22 PROCEDURE — 99900035 HC TECH TIME PER 15 MIN (STAT)

## 2022-11-22 PROCEDURE — 99291 CRITICAL CARE FIRST HOUR: CPT | Mod: 25

## 2022-11-22 PROCEDURE — 36415 COLL VENOUS BLD VENIPUNCTURE: CPT | Performed by: EMERGENCY MEDICINE

## 2022-11-22 PROCEDURE — 93010 ELECTROCARDIOGRAM REPORT: CPT | Mod: ,,, | Performed by: SPECIALIST

## 2022-11-22 PROCEDURE — 85730 THROMBOPLASTIN TIME PARTIAL: CPT

## 2022-11-22 PROCEDURE — 94640 AIRWAY INHALATION TREATMENT: CPT

## 2022-11-22 PROCEDURE — 81001 URINALYSIS AUTO W/SCOPE: CPT

## 2022-11-22 PROCEDURE — 83880 ASSAY OF NATRIURETIC PEPTIDE: CPT | Performed by: EMERGENCY MEDICINE

## 2022-11-22 PROCEDURE — 87389 HIV-1 AG W/HIV-1&-2 AB AG IA: CPT | Performed by: EMERGENCY MEDICINE

## 2022-11-22 PROCEDURE — 25500020 PHARM REV CODE 255

## 2022-11-22 PROCEDURE — 20000000 HC ICU ROOM

## 2022-11-22 PROCEDURE — 86901 BLOOD TYPING SEROLOGIC RH(D): CPT

## 2022-11-22 PROCEDURE — 63600175 PHARM REV CODE 636 W HCPCS: Performed by: EMERGENCY MEDICINE

## 2022-11-22 PROCEDURE — 36415 COLL VENOUS BLD VENIPUNCTURE: CPT | Performed by: PSYCHIATRY & NEUROLOGY

## 2022-11-22 PROCEDURE — 80307 DRUG TEST PRSMV CHEM ANLYZR: CPT | Performed by: EMERGENCY MEDICINE

## 2022-11-22 PROCEDURE — G0508 CRIT CARE TELEHEA CONSULT 60: HCPCS | Mod: 95,,, | Performed by: STUDENT IN AN ORGANIZED HEALTH CARE EDUCATION/TRAINING PROGRAM

## 2022-11-22 PROCEDURE — 83036 HEMOGLOBIN GLYCOSYLATED A1C: CPT

## 2022-11-22 PROCEDURE — 85610 PROTHROMBIN TIME: CPT | Performed by: EMERGENCY MEDICINE

## 2022-11-22 PROCEDURE — 94761 N-INVAS EAR/PLS OXIMETRY MLT: CPT

## 2022-11-22 PROCEDURE — 99291 PR CRITICAL CARE, E/M 30-74 MINUTES: ICD-10-PCS | Mod: ,,,

## 2022-11-22 PROCEDURE — 99223 1ST HOSP IP/OBS HIGH 75: CPT | Mod: GC,,, | Performed by: PSYCHIATRY & NEUROLOGY

## 2022-11-22 PROCEDURE — 94760 N-INVAS EAR/PLS OXIMETRY 1: CPT

## 2022-11-22 PROCEDURE — 93010 EKG 12-LEAD: ICD-10-PCS | Mod: ,,, | Performed by: SPECIALIST

## 2022-11-22 PROCEDURE — 85610 PROTHROMBIN TIME: CPT | Mod: 91

## 2022-11-22 PROCEDURE — G0508 PR CRITICAL CARE TELEHLTH INITIAL CONSULT 60MIN: ICD-10-PCS | Mod: 95,,, | Performed by: STUDENT IN AN ORGANIZED HEALTH CARE EDUCATION/TRAINING PROGRAM

## 2022-11-22 PROCEDURE — S4991 NICOTINE PATCH NONLEGEND: HCPCS

## 2022-11-22 PROCEDURE — 96375 TX/PRO/DX INJ NEW DRUG ADDON: CPT

## 2022-11-22 PROCEDURE — 80053 COMPREHEN METABOLIC PANEL: CPT | Performed by: EMERGENCY MEDICINE

## 2022-11-22 PROCEDURE — 85025 COMPLETE CBC W/AUTO DIFF WBC: CPT | Performed by: EMERGENCY MEDICINE

## 2022-11-22 PROCEDURE — 27000221 HC OXYGEN, UP TO 24 HOURS

## 2022-11-22 PROCEDURE — 99292 CRITICAL CARE ADDL 30 MIN: CPT | Mod: 25

## 2022-11-22 PROCEDURE — 36416 COLLJ CAPILLARY BLOOD SPEC: CPT

## 2022-11-22 PROCEDURE — 84443 ASSAY THYROID STIM HORMONE: CPT

## 2022-11-22 PROCEDURE — 80061 LIPID PANEL: CPT

## 2022-11-22 PROCEDURE — 63600175 PHARM REV CODE 636 W HCPCS: Performed by: PSYCHIATRY & NEUROLOGY

## 2022-11-22 PROCEDURE — 85610 PROTHROMBIN TIME: CPT

## 2022-11-22 PROCEDURE — 86803 HEPATITIS C AB TEST: CPT | Performed by: EMERGENCY MEDICINE

## 2022-11-22 PROCEDURE — 96374 THER/PROPH/DIAG INJ IV PUSH: CPT

## 2022-11-22 PROCEDURE — 99223 PR INITIAL HOSPITAL CARE,LEVL III: ICD-10-PCS | Mod: GC,,, | Performed by: PSYCHIATRY & NEUROLOGY

## 2022-11-22 PROCEDURE — 25000003 PHARM REV CODE 250: Performed by: EMERGENCY MEDICINE

## 2022-11-22 PROCEDURE — 25000242 PHARM REV CODE 250 ALT 637 W/ HCPCS

## 2022-11-22 RX ORDER — HYDRALAZINE HYDROCHLORIDE 20 MG/ML
10 INJECTION INTRAMUSCULAR; INTRAVENOUS EVERY 4 HOURS PRN
Status: DISCONTINUED | OUTPATIENT
Start: 2022-11-22 | End: 2022-11-23

## 2022-11-22 RX ORDER — INSULIN ASPART 100 [IU]/ML
0-5 INJECTION, SOLUTION INTRAVENOUS; SUBCUTANEOUS EVERY 6 HOURS PRN
Status: DISCONTINUED | OUTPATIENT
Start: 2022-11-22 | End: 2022-11-22

## 2022-11-22 RX ORDER — LABETALOL HYDROCHLORIDE 5 MG/ML
INJECTION, SOLUTION INTRAVENOUS
Status: COMPLETED
Start: 2022-11-22 | End: 2022-11-22

## 2022-11-22 RX ORDER — BENZONATATE 100 MG/1
100 CAPSULE ORAL 3 TIMES DAILY PRN
Status: DISCONTINUED | OUTPATIENT
Start: 2022-11-22 | End: 2022-11-25 | Stop reason: HOSPADM

## 2022-11-22 RX ORDER — AMOXICILLIN 250 MG
1 CAPSULE ORAL 2 TIMES DAILY
Status: DISCONTINUED | OUTPATIENT
Start: 2022-11-22 | End: 2022-11-25 | Stop reason: HOSPADM

## 2022-11-22 RX ORDER — NICARDIPINE HYDROCHLORIDE 0.2 MG/ML
INJECTION INTRAVENOUS
Status: COMPLETED
Start: 2022-11-22 | End: 2022-11-22

## 2022-11-22 RX ORDER — IPRATROPIUM BROMIDE AND ALBUTEROL SULFATE 2.5; .5 MG/3ML; MG/3ML
3 SOLUTION RESPIRATORY (INHALATION) EVERY 4 HOURS PRN
Status: DISCONTINUED | OUTPATIENT
Start: 2022-11-22 | End: 2022-11-24

## 2022-11-22 RX ORDER — NICARDIPINE HYDROCHLORIDE 0.2 MG/ML
0-15 INJECTION INTRAVENOUS CONTINUOUS
Status: DISCONTINUED | OUTPATIENT
Start: 2022-11-22 | End: 2022-11-23

## 2022-11-22 RX ORDER — LABETALOL HCL 20 MG/4 ML
10 SYRINGE (ML) INTRAVENOUS EVERY 6 HOURS PRN
Status: DISCONTINUED | OUTPATIENT
Start: 2022-11-22 | End: 2022-11-25 | Stop reason: HOSPADM

## 2022-11-22 RX ORDER — METOCLOPRAMIDE HYDROCHLORIDE 5 MG/ML
10 INJECTION INTRAMUSCULAR; INTRAVENOUS
Status: COMPLETED | OUTPATIENT
Start: 2022-11-22 | End: 2022-11-22

## 2022-11-22 RX ORDER — INSULIN ASPART 100 [IU]/ML
1-10 INJECTION, SOLUTION INTRAVENOUS; SUBCUTANEOUS EVERY 6 HOURS PRN
Status: DISCONTINUED | OUTPATIENT
Start: 2022-11-22 | End: 2022-11-23

## 2022-11-22 RX ORDER — NICARDIPINE HYDROCHLORIDE 0.2 MG/ML
5 INJECTION INTRAVENOUS CONTINUOUS
Status: DISCONTINUED | OUTPATIENT
Start: 2022-11-22 | End: 2022-11-22 | Stop reason: HOSPADM

## 2022-11-22 RX ORDER — AMLODIPINE BESYLATE 10 MG/1
10 TABLET ORAL DAILY
Status: DISCONTINUED | OUTPATIENT
Start: 2022-11-22 | End: 2022-11-25 | Stop reason: HOSPADM

## 2022-11-22 RX ORDER — LABETALOL HYDROCHLORIDE 5 MG/ML
20 INJECTION, SOLUTION INTRAVENOUS
Status: COMPLETED | OUTPATIENT
Start: 2022-11-22 | End: 2022-11-22

## 2022-11-22 RX ORDER — IBUPROFEN 200 MG
1 TABLET ORAL DAILY
Status: DISCONTINUED | OUTPATIENT
Start: 2022-11-22 | End: 2022-11-23

## 2022-11-22 RX ORDER — GLUCAGON 1 MG
1 KIT INJECTION
Status: DISCONTINUED | OUTPATIENT
Start: 2022-11-22 | End: 2022-11-25 | Stop reason: HOSPADM

## 2022-11-22 RX ORDER — SODIUM CHLORIDE 0.9 % (FLUSH) 0.9 %
10 SYRINGE (ML) INJECTION
Status: DISCONTINUED | OUTPATIENT
Start: 2022-11-22 | End: 2022-11-25 | Stop reason: HOSPADM

## 2022-11-22 RX ADMIN — NICARDIPINE HYDROCHLORIDE 5 MG/HR: 0.2 INJECTION, SOLUTION INTRAVENOUS at 04:11

## 2022-11-22 RX ADMIN — NICARDIPINE HYDROCHLORIDE 5 MG/HR: 0.2 INJECTION, SOLUTION INTRAVENOUS at 11:11

## 2022-11-22 RX ADMIN — INSULIN ASPART 2 UNITS: 100 INJECTION, SOLUTION INTRAVENOUS; SUBCUTANEOUS at 06:11

## 2022-11-22 RX ADMIN — LABETALOL HYDROCHLORIDE 20 MG: 5 INJECTION INTRAVENOUS at 03:11

## 2022-11-22 RX ADMIN — NICARDIPINE HYDROCHLORIDE 5 MG/HR: 0.2 INJECTION, SOLUTION INTRAVENOUS at 03:11

## 2022-11-22 RX ADMIN — NICARDIPINE HYDROCHLORIDE 5 MG/HR: 0.2 INJECTION, SOLUTION INTRAVENOUS at 09:11

## 2022-11-22 RX ADMIN — METOCLOPRAMIDE 10 MG: 5 INJECTION, SOLUTION INTRAMUSCULAR; INTRAVENOUS at 03:11

## 2022-11-22 RX ADMIN — IPRATROPIUM BROMIDE AND ALBUTEROL SULFATE 3 ML: 2.5; .5 SOLUTION RESPIRATORY (INHALATION) at 11:11

## 2022-11-22 RX ADMIN — NICARDIPINE HYDROCHLORIDE 10 MG/HR: 0.2 INJECTION, SOLUTION INTRAVENOUS at 09:11

## 2022-11-22 RX ADMIN — LABETALOL HYDROCHLORIDE 20 MG: 5 INJECTION, SOLUTION INTRAVENOUS at 03:11

## 2022-11-22 RX ADMIN — AMLODIPINE BESYLATE 10 MG: 10 TABLET ORAL at 11:11

## 2022-11-22 RX ADMIN — NICARDIPINE HYDROCHLORIDE 7.5 MG/HR: 0.2 INJECTION, SOLUTION INTRAVENOUS at 05:11

## 2022-11-22 RX ADMIN — Medication 1 PATCH: at 11:11

## 2022-11-22 NOTE — SUBJECTIVE & OBJECTIVE
Past Medical History:   Diagnosis Date    COPD (chronic obstructive pulmonary disease)     Diabetes mellitus, type 2     Hypertension      Past Surgical History:   Procedure Laterality Date    DENTAL SURGERY      ESOPHAGOGASTRODUODENOSCOPY N/A 6/1/2020    Procedure: EGD (ESOPHAGOGASTRODUODENOSCOPY);  Surgeon: Terrell May MD;  Location: [facility];  Service: Endoscopy;  Laterality: N/A;    HERNIA REPAIR      left inguinal    incision and drainiage        Current Facility-Administered Medications on File Prior to Encounter   Medication Dose Route Frequency Provider Last Rate Last Admin    [COMPLETED] labetaloL injection 20 mg  20 mg Intravenous ED 1 Time Joao Mead III, MD   20 mg at 11/22/22 0348    [COMPLETED] metoclopramide HCl injection 10 mg  10 mg Intravenous ED 1 Time Joao Mead III, MD   10 mg at 11/22/22 0350    [DISCONTINUED] iohexoL (OMNIPAQUE 350) 350 mg iodine/mL injection             [DISCONTINUED] niCARdipine 40 mg/200 mL (0.2 mg/mL) infusion  5 mg/hr Intravenous Continuous Joao Mead III, MD 25 mL/hr at 11/22/22 0924 5 mg/hr at 11/22/22 0924     Current Outpatient Medications on File Prior to Encounter   Medication Sig Dispense Refill    albuterol (VENTOLIN HFA) 90 mcg/actuation inhaler Inhale 2 puffs into the lungs every 6 (six) hours as needed for Wheezing. Rescue 18 g 0    albuterol-ipratropium (DUO-NEB) 2.5 mg-0.5 mg/3 mL nebulizer solution Take 3 mLs by nebulization every 6 (six) hours as needed for Wheezing or Shortness of Breath. 25 each 3    amitriptyline (ELAVIL) 10 MG tablet Take 2 tablets (20 mg total) by mouth nightly as needed for Insomnia. 60 tablet 0    amLODIPine (NORVASC) 10 MG tablet Take 1 tablet (10 mg total) by mouth once daily. 30 tablet 11    benzonatate (TESSALON) 100 MG capsule Take 1 capsule (100 mg total) by mouth 3 (three) times daily as needed for Cough. 20 capsule 0    metFORMIN (GLUCOPHAGE) 500 MG tablet Take 1 tablet (500 mg total) by mouth 2  (two) times daily with meals. 30 tablet 2    mometasone (NASONEX) 50 mcg/actuation nasal spray 2 sprays by Nasal route once daily. 17 g 0    pantoprazole (PROTONIX) 40 MG tablet Take 1 tablet (40 mg total) by mouth once daily. 30 tablet 11      Allergies: Patient has no known allergies.    Family History   Problem Relation Age of Onset    Heart attack Father 80    Heart disease Father     Drug abuse Father     Cancer Maternal Grandmother         colon     Social History     Tobacco Use    Smoking status: Every Day     Packs/day: 2.00     Years: 30.00     Pack years: 60.00     Types: Cigarettes    Smokeless tobacco: Never   Substance Use Topics    Alcohol use: Yes     Alcohol/week: 1.0 standard drink     Types: 1 Shots of liquor per week     Comment: 1/2 pint 2 x per week - quit approximately 5 months ago    Drug use: No     Review of Systems   Constitutional:  Negative for chills and fever.   HENT:  Positive for hearing loss (R sided). Negative for voice change.    Eyes:  Negative for photophobia and visual disturbance.   Respiratory:  Negative for cough and shortness of breath.    Cardiovascular:  Negative for chest pain and palpitations.   Gastrointestinal:  Negative for abdominal pain, nausea and vomiting.   Genitourinary:  Negative for difficulty urinating and dysuria.   Skin:  Negative for rash and wound.   Neurological:  Positive for weakness (rsw) and numbness (R sided). Negative for speech difficulty.   Psychiatric/Behavioral:  Negative for agitation and behavioral problems.    Objective:     Vitals:    Temp: 97.4 °F (36.3 °C)  Pulse: 75  BP: (!) 146/75  MAP (mmHg): 106  Resp: (!) 21  SpO2: (!) 92 %  O2 Device (Oxygen Therapy): nasal cannula    Temp  Min: 97.4 °F (36.3 °C)  Max: 98.7 °F (37.1 °C)  Pulse  Min: 69  Max: 84  BP  Min: 122/69  Max: 229/107  MAP (mmHg)  Min: 89  Max: 156  Resp  Min: 16  Max: 21  SpO2  Min: 87 %  Max: 100 %    No intake/output data recorded.         Physical Exam    Constitutional:  "Well-nourished, well-developed. Speaking very loudly and has obvious difficulty hearing questions being asked, but responds appropriately and intermittently humorously during exam.   No obvious distress. Patient lying in bed comfortably w/ NC in place.     Eyes: Clear conjunctiva. Anicteric. No discharge.   Lids without lesions.    HEENT: Normocephalic, atraumatic.   Nose and external ears atraumatic.   Mucus membranes are moist.     Cardio: Regular rate and rhythm on telemetry monitor    Respiratory: Regular effort, no respiratory distress. Wheezing throughout bilateral lung fields.     GI:Soft, non-distended, non-tender.    Skin: No erythema, rash, or wounds to exposed skin.     Neuro: E4 V5 M6    Awake, alert, and oriented to self, time, place, and situation. Patient is answering all questions appropriately and following all commands briskly when requests are voice loudly.   No facial droop. EOMI sans mild weakness of L eye adduction.     R hearing greatly diminished. Reportedly states loudly " it sounds like an airplane. Like an airplane taking off."     Motor:   No pronator drift  WILD spontaneously and against gravity L >R   RUE: 5/5  LUE: 5/5  RLE: 5/5  LLE: 5/5    Sensory:  Sensation grossly intact to LUE, LLE, and bilateral face. Diminished sensation to RUE and RLE.       Today I personally reviewed pertinent medications, lines/drains/airways, imaging, cardiology results, laboratory results, notably: CTH, SBP, CXR   "

## 2022-11-22 NOTE — ASSESSMENT & PLAN NOTE
-SBP goal <160   -cardene gtt, wean as able  -PRN labetalol, hydralzine  -restart home amlodipine

## 2022-11-22 NOTE — CONSULTS
[facility]  Vascular Neurology  Comprehensive Stroke Center  TeleVascular Neurology Acute Consultation Note      Consult to Telemedicine - Acute Stroke  Consult performed by: Twyla Temple MD  Consult ordered by: Alysia Mead III, MD        Consulting Provider: ALYSIA MEAD III  Current Providers  No providers found    Patient Location:  [facility] EMERGENCY DEPARTMENT Emergency Department  Spoke hospital nurse at bedside with patient assisting consultant.     Patient information was obtained from patient, spouse/SO, past medical records, and primary team.         Assessment/Plan:       Diagnoses:   * Left-sided nontraumatic intracerebral hemorrhage of brainstem  56 yo male w/ PMHx of HTN, PAD, DM, Tobacco abuse, COPD who presents w/ sudden onset ringing, dysarthria and RSW. Patient woke up to go to the bathroom at around 2:55 AM and when he returned he started to complain of the above mentioned deficits to his wife.   Denies previous episodes such as this.   Wife is at bedside providing information. No reported antiplatelets nor AC.     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   Not a tPA/TNK candidate.   Recommend aggressive BP control as below w/ low threshold to intubate.     Will require transfer to [facility] for VN, NCC and NSGY evaluation and treatment.   Suspect HTNsive etiology based on initial assessment and imaging, pending further w/u.     Antithrombotics for secondary stroke prevention: Antiplatelets: None: Intracerebral Hemorrhage    Statins for secondary stroke prevention and hyperlipidemia, if present:   Statins: None: Reason: ICH    Aggressive risk factor modification: HTN, Smoking, DM, Diet, Exercise     Rehab efforts: The patient has been evaluated by a stroke team provider and the therapy needs have been fully considered based off the presenting complaints and exam findings. The following therapy evaluations are needed: PT evaluate and treat, OT  evaluate and treat, SLP evaluate and treat, PM&R evaluate for appropriate placement    Diagnostics ordered/pending: CT scan of head without contrast to asses brain parenchyma, MRI head without contrast to assess brain parenchyma, TTE to assess cardiac function/status , TSH to assess thyroid function    VTE prophylaxis: None: Reason for No Pharmacological VTE Prophylaxis: ICH    BP parameters: ICH: SBP <140  SBP <160            STROKE DOCUMENTATION     Acute Stroke Times:   Acute Stroke Times   Last Known Normal Date: 11/22/22  Last Known Normal Time: 0255  Symptom Onset Date: 11/22/22  Symptom Onset Time: 0255  Stroke Team Called Date: 11/22/22  Stroke Team Called Time: 0342  Stroke Team Arrival Date: 11/22/22  Stroke Team Arrival Time: 0349  CT Interpretation Time: 0350  Thrombolytic Therapy Recommended: No    NIH Scale:  1a. Level of Consciousness: 0-->Alert, keenly responsive  1b. LOC Questions: 2-->Answers neither question correctly  1c. LOC Commands: 2-->Performs neither task correctly  2. Best Gaze: 0-->Normal  3. Visual: 0-->No visual loss  4. Facial Palsy: 0-->Normal symmetrical movements  5a. Motor Arm, Left: 1-->Drift, limb holds 90 (or 45) degrees, but drifts down before full 10 seconds, does not hit bed or other support  5b. Motor Arm, Right: 2-->Some effort against gravity, limb cannot get to or maintain (if cued) 90 (or 45) degrees, drifts down to bed, but has some effort against gravity  6a. Motor Leg, Left: 2-->Some effort against gravity, leg falls to bed by 5 secs, but has some effort against gravity  6b. Motor Leg, Right: 2-->Some effort against gravity, leg falls to bed by 5 secs, but has some effort against gravity  7. Limb Ataxia: 0-->Absent  8. Sensory: 0-->Normal, no sensory loss  9. Best Language: 2-->Severe aphasia, all communication is through fragmentary expression, great need for inference, questioning, and guessing by the listener. Range of information that can be exchanged is limited,  "listener carries burden of. . . (see row details)  10. Dysarthria: 2-->Severe dysarthria, patients speech is so slurred as to be unintelligible in the absence of or out of proportion to any dysphasia, or is mute/anarthric  11. Extinction and Inattention (formerly Neglect): 0-->No abnormality  Total (NIH Stroke Scale): 15     Modified Shannon Score: 0  Newberry Coma Scale:12   ABCD2 Score:    [id number] Score:   HAS -BLED Score:   ICH Score:   Hunt & Cho Classification:       Blood pressure (!) 205/104, pulse 70, resp. rate 20, height 5' 9" (1.753 m), weight 75.3 kg (166 lb), SpO2 (!) 92 %.  Eligible for thrombolytic therapy?: No  Thrombolytic therapy recomended: Thrombolytic therapy not recommended due to Hemorrhage on CT   Possible Interventional Revascularization Candidate?  ICH    Disposition Recommendation: transfer to [facility] by  air  stat    Subjective:     History of Present Illness:  56 yo male w/ PMHx of HTN, PAD, DM, Tobacco abuse, COPD who presents w/ sudden onset ringing, dysarthria and RSW. Patient woke up to go to the bathroom at around 2:55 AM and when he returned he started to complain of the above mentioned deficits to his wife.   Denies previous episodes such as this.   Wife is at bedside providing information. No reported antiplatelets nor AC.       Woke up with symptoms?: no    Recent bleeding noted: no  Does the patient take any Blood Thinners? no  Medications: No relevant medications      Past Medical History: hypertension, diabetes, hyperlipidemia and PAD    Past Surgical History: no major surgeries within the last 2 weeks    Family History: no relevant history    Social History: smoker (active)    Allergies: No Known Allergies No known drug allergies    Review of Systems   Constitutional: Positive for activity change. Negative for diaphoresis and fatigue.   HENT: Positive for trouble swallowing and voice change.    Eyes: Positive for visual disturbance. Negative for pain. " "  Respiratory: Negative.  Negative for shortness of breath.    Cardiovascular: Negative.    Gastrointestinal: Negative.  Negative for nausea and vomiting.   Endocrine: Negative.    Genitourinary: Negative.    Musculoskeletal: Positive for gait problem.   Skin: Negative.    Allergic/Immunologic: Negative.    Neurological: Positive for speech difficulty and weakness.   Hematological: Negative.    Psychiatric/Behavioral: Negative.      Objective:   Vitals: Blood pressure (!) 229/107, pulse 70, resp. rate 20, height 5' 9" (1.753 m), weight 75.3 kg (166 lb), SpO2 (!) 92 %.     CT READ: Yes  Abnormal CT Ponto-medullar junction ICJ, L>R.     Physical Exam  Constitutional:       Appearance: Normal appearance. He is obese. He is ill-appearing.   HENT:      Head: Atraumatic.   Eyes:      Extraocular Movements: Extraocular movements intact.   Pulmonary:      Effort: Pulmonary effort is normal.   Musculoskeletal:      Cervical back: Normal range of motion.   Neurological:      Mental Status: He is disoriented.      Motor: Weakness present.      Comments: Severely dysarthric, unable to answer questions bit able to follow some commands, although difficulty w/ audio on the monitor at the requesting site  Able to move all 4 extremities spontaneously, but noted RSW when compared to the L.              Recommended the emergency room physician to have a brief discussion with the patient and/or family if available regarding the  risks and benefits of treatment, and to briefly document the occurrence of that discussion in his clinical encounter note.     The attending portion of this evaluation, treatment, and documentation was performed per Twyla Temple MD via audiovisual.    Billing code:  (moderate to severe stroke, large areas of edema, some mimics)      This patient has a critical neurological condition/illness, with high morbidity and mortality.  There is a high probability for acute neurological change leading to clinical " and possibly life-threatening deterioration requiring highest level of physician preparedness for urgent intervention.  Care was coordinated with other physicians involved in the patient's care.  Radiologic studies and laboratory data were reviewed and interpreted, and plan of care was re-assessed based on the results.  Diagnosis, treatment options and prognosis may have been discussed with the patient and/or family members or caregiver.  Further advanced medical management and further evaluation is warranted for his care.    In your opinion, this was a: Tier 1 Van Negative    Consult End Time: 4:10 AM     Twyla Temple MD  Comprehensive Stroke Center  Vascular Neurology   [facility]

## 2022-11-22 NOTE — HPI
Hernan Badilol is a pleasant 58 yo male with PMHx of HTN, PAD, DM, smoking, COPD presenting as a transfer for ponto-medullary, posterior ICH L>R identified on CTH after symptoms of sudden onset ringing, dysarthria and RSW that began at 02:55 on 11/22/22. Patient woke up to go to the bathroom at around 2:55 AM and when he returned, he complained of the above deficits to his wife. He denies previous episodes. No reported antiplatelets nor AC. Telestroke called, was considered not a tPA/TNK candidate. Recommend aggressive BP control, and transfer to [facility] for neurocritical care needs as well as assessment by ANDRA, PANCHITO.

## 2022-11-22 NOTE — ED NOTES
Pt easier to arouse at this time. Pt speech is clearer at this time. Pt states he slid off of the bed and hit his head PTA. Pt and family updated on POC

## 2022-11-22 NOTE — HPI
56 yo male w/ PMHx of HTN, PAD, DM, Tobacco abuse, COPD who presents w/ sudden onset ringing, dysarthria and RSW. Patient woke up to go to the bathroom at around 2:55 AM and when he returned he started to complain of the above mentioned deficits to his wife.   Denies previous episodes such as this.   Wife is at bedside providing information. No reported antiplatelets nor AC.

## 2022-11-22 NOTE — HPI
Mr. Badillo is a 56 y/o m w/ pmhx of HTN, PAD, DM, Tobacco abuse, and COPD who presents as transfer for acute pontine IPH.     He initially presented to the OSH w/ sudden onset ringing in his R ear, dysarthria, RSW that started after he got up approximately 2:55 AM to use the restroom. At OSH Cth was performed 0350 revealing an acute pontine IPH without significant mass effect or IVH.     No AC/AP reported, no reversal at OSH.

## 2022-11-22 NOTE — SUBJECTIVE & OBJECTIVE
"  Woke up with symptoms?: no    Recent bleeding noted: no  Does the patient take any Blood Thinners? no  Medications: No relevant medications      Past Medical History: hypertension, diabetes, hyperlipidemia and PAD    Past Surgical History: no major surgeries within the last 2 weeks    Family History: no relevant history    Social History: smoker (active)    Allergies: No Known Allergies No known drug allergies    Review of Systems   Constitutional: Positive for activity change. Negative for diaphoresis and fatigue.   HENT: Positive for trouble swallowing and voice change.    Eyes: Positive for visual disturbance. Negative for pain.   Respiratory: Negative.  Negative for shortness of breath.    Cardiovascular: Negative.    Gastrointestinal: Negative.  Negative for nausea and vomiting.   Endocrine: Negative.    Genitourinary: Negative.    Musculoskeletal: Positive for gait problem.   Skin: Negative.    Allergic/Immunologic: Negative.    Neurological: Positive for speech difficulty and weakness.   Hematological: Negative.    Psychiatric/Behavioral: Negative.      Objective:   Vitals: Blood pressure (!) 229/107, pulse 70, resp. rate 20, height 5' 9" (1.753 m), weight 75.3 kg (166 lb), SpO2 (!) 92 %.     CT READ: Yes  Abnormal CT Ponto-medullar junction ICJ, L>R.     Physical Exam  Constitutional:       Appearance: Normal appearance. He is obese. He is ill-appearing.   HENT:      Head: Atraumatic.   Eyes:      Extraocular Movements: Extraocular movements intact.   Pulmonary:      Effort: Pulmonary effort is normal.   Musculoskeletal:      Cervical back: Normal range of motion.   Neurological:      Mental Status: He is disoriented.      Motor: Weakness present.      Comments: Severely dysarthric, unable to answer questions bit able to follow some commands, although difficulty w/ audio on the monitor at the requesting site  Able to move all 4 extremities spontaneously, but noted RSW when compared to the L.            "

## 2022-11-22 NOTE — CONSULTS
Inpatient consult to Physical Medicine Rehab  Consult performed by: Viky Queen NP  Consult ordered by: Austin Arenas MD  Reason for consult: Assess rehab needs    Reviewed patient history and current admission.  Rehab team following.  Full consult to follow.    YAIR Dolan, FNP-C  Physical Medicine & Rehabilitation   11/22/2022

## 2022-11-22 NOTE — ASSESSMENT & PLAN NOTE
56 yo male with PMHx of HTN, PAD, DM, smoking, COPD presenting as a transfer for ponto-medullary, posterior ICH L>R identified on CTH after symptoms of sudden onset ringing, dysarthria and RSW that began at 02:55 on 11/22/22. 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 However, a cavernoma is also on the differential given that patient's symptoms are less severe than would otherwise be expected from a finding of this size on imaging.        Antithrombotics for secondary stroke prevention: Antiplatelets: Aspirin: 81 mg daily  Clopidogrel: 75 mg daily hold in the setting of likely bleed    Statins for secondary stroke prevention and hyperlipidemia, if present:   Statins: None: Reason: in the setting of likely bleed at the moment    Aggressive risk factor modification: HTN, Smoking, DM     Rehab efforts: The patient has been evaluated by a stroke team provider and the therapy needs have been fully considered based off the presenting complaints and exam findings. The following therapy evaluations are needed: PT evaluate and treat, OT evaluate and treat, SLP evaluate and treat    Diagnostics ordered/pending: CT scan of head without contrast to asses brain parenchyma 6 hour interval    VTE prophylaxis: Mechanical prophylaxis: Place SCDs, can begin heparin after 24 hrs    BP parameters: ICH: SBP <140

## 2022-11-22 NOTE — H&P
[facility]  Neurocritical Care  History & Physical    Admit Date: 11/22/2022  Service Date: 11/22/2022  Length of Stay: 0    Subjective:     Chief Complaint: Left-sided nontraumatic intracerebral hemorrhage of brainstem    History of Present Illness: Mr. Badillo is a 58 y/o m w/ pmhx of HTN, PAD, DM, Tobacco abuse, and COPD who presents as transfer for acute pontine IPH.     He initially presented to the OSH w/ sudden onset ringing in his R ear, dysarthria, RSW that started after he got up approximately 2:55 AM to use the restroom. At OSH Cth was performed 0350 revealing an acute pontine IPH without significant mass effect or IVH.     No AC/AP reported, no reversal at OSH.       Past Medical History:   Diagnosis Date    COPD (chronic obstructive pulmonary disease)     Diabetes mellitus, type 2     Hypertension      Past Surgical History:   Procedure Laterality Date    DENTAL SURGERY      ESOPHAGOGASTRODUODENOSCOPY N/A 6/1/2020    Procedure: EGD (ESOPHAGOGASTRODUODENOSCOPY);  Surgeon: Terrell May MD;  Location: [facility];  Service: Endoscopy;  Laterality: N/A;    HERNIA REPAIR      left inguinal    incision and drainiage        Current Facility-Administered Medications on File Prior to Encounter   Medication Dose Route Frequency Provider Last Rate Last Admin    [COMPLETED] labetaloL injection 20 mg  20 mg Intravenous ED 1 Time Joao Mead III, MD   20 mg at 11/22/22 0348    [COMPLETED] metoclopramide HCl injection 10 mg  10 mg Intravenous ED 1 Time Joao Mead III, MD   10 mg at 11/22/22 0350    [DISCONTINUED] iohexoL (OMNIPAQUE 350) 350 mg iodine/mL injection             [DISCONTINUED] niCARdipine 40 mg/200 mL (0.2 mg/mL) infusion  5 mg/hr Intravenous Continuous Joao Mead III, MD 25 mL/hr at 11/22/22 0924 5 mg/hr at 11/22/22 0924     Current Outpatient Medications on File Prior to Encounter   Medication Sig Dispense Refill    albuterol (VENTOLIN HFA) 90  mcg/actuation inhaler Inhale 2 puffs into the lungs every 6 (six) hours as needed for Wheezing. Rescue 18 g 0    albuterol-ipratropium (DUO-NEB) 2.5 mg-0.5 mg/3 mL nebulizer solution Take 3 mLs by nebulization every 6 (six) hours as needed for Wheezing or Shortness of Breath. 25 each 3    amitriptyline (ELAVIL) 10 MG tablet Take 2 tablets (20 mg total) by mouth nightly as needed for Insomnia. 60 tablet 0    amLODIPine (NORVASC) 10 MG tablet Take 1 tablet (10 mg total) by mouth once daily. 30 tablet 11    benzonatate (TESSALON) 100 MG capsule Take 1 capsule (100 mg total) by mouth 3 (three) times daily as needed for Cough. 20 capsule 0    metFORMIN (GLUCOPHAGE) 500 MG tablet Take 1 tablet (500 mg total) by mouth 2 (two) times daily with meals. 30 tablet 2    mometasone (NASONEX) 50 mcg/actuation nasal spray 2 sprays by Nasal route once daily. 17 g 0    pantoprazole (PROTONIX) 40 MG tablet Take 1 tablet (40 mg total) by mouth once daily. 30 tablet 11      Allergies: Patient has no known allergies.    Family History   Problem Relation Age of Onset    Heart attack Father 80    Heart disease Father     Drug abuse Father     Cancer Maternal Grandmother         colon     Social History     Tobacco Use    Smoking status: Every Day     Packs/day: 2.00     Years: 30.00     Pack years: 60.00     Types: Cigarettes    Smokeless tobacco: Never   Substance Use Topics    Alcohol use: Yes     Alcohol/week: 1.0 standard drink     Types: 1 Shots of liquor per week     Comment: 1/2 pint 2 x per week - quit approximately 5 months ago    Drug use: No     Review of Systems   Constitutional:  Negative for chills and fever.   HENT:  Positive for hearing loss (R sided). Negative for voice change.    Eyes:  Negative for photophobia and visual disturbance.   Respiratory:  Negative for cough and shortness of breath.    Cardiovascular:  Negative for chest pain and palpitations.   Gastrointestinal:  Negative for abdominal pain,  "nausea and vomiting.   Genitourinary:  Negative for difficulty urinating and dysuria.   Skin:  Negative for rash and wound.   Neurological:  Positive for weakness (rsw) and numbness (R sided). Negative for speech difficulty.   Psychiatric/Behavioral:  Negative for agitation and behavioral problems.    Objective:     Vitals:    Temp: 97.4 °F (36.3 °C)  Pulse: 75  BP: (!) 146/75  MAP (mmHg): 106  Resp: (!) 21  SpO2: (!) 92 %  O2 Device (Oxygen Therapy): nasal cannula    Temp  Min: 97.4 °F (36.3 °C)  Max: 98.7 °F (37.1 °C)  Pulse  Min: 69  Max: 84  BP  Min: 122/69  Max: 229/107  MAP (mmHg)  Min: 89  Max: 156  Resp  Min: 16  Max: 21  SpO2  Min: 87 %  Max: 100 %    No intake/output data recorded.         Physical Exam    Constitutional: Well-nourished, well-developed. Speaking very loudly and has obvious difficulty hearing questions being asked, but responds appropriately and intermittently humorously during exam.   No obvious distress. Patient lying in bed comfortably w/ NC in place.     Eyes: Clear conjunctiva. Anicteric. No discharge.   Lids without lesions.    HEENT: Normocephalic, atraumatic.   Nose and external ears atraumatic.   Mucus membranes are moist.     Cardio: Regular rate and rhythm on telemetry monitor    Respiratory: Regular effort, no respiratory distress. Wheezing throughout bilateral lung fields.     GI:Soft, non-distended, non-tender.    Skin: No erythema, rash, or wounds to exposed skin.     Neuro: E4 V5 M6    Awake, alert, and oriented to self, time, place, and situation. Patient is answering all questions appropriately and following all commands briskly when requests are voice loudly.   No facial droop. EOMI sans mild weakness of L eye adduction.     R hearing greatly diminished. Reportedly states loudly " it sounds like an airplane. Like an airplane taking off."     Motor:   No pronator drift  WILD spontaneously and against gravity L >R   RUE: 5/5  LUE: 5/5  RLE: 5/5  LLE: " 5/5    Sensory:  Sensation grossly intact to LUE, LLE, and bilateral face. Diminished sensation to RUE and RLE.       Today I personally reviewed pertinent medications, lines/drains/airways, imaging, cardiology results, laboratory results, notably: CTH, SBP, CXR     Assessment/Plan:     Neuro  * Left-sided nontraumatic intracerebral hemorrhage of brainstem  Patient is a 57 y.o. [unfilled] w/ pmhx of HTN, PAD, DM, tobacco use, COPD who presents as transfer for L pontine ICH. Initially presented w/ sudden onset ringing dysarthria and RSW approximately 0255 11/22. CTH initially revealed 9.1mm x 15.6 mm pontine hemorrhage likely hypertensive etiology given location, patient history, and document SBP of 207 at OSH prior to starting cardene gtt.     -Admit to NCC for closer monitoring   -Hourly Neuro checks and VS  -Vascular Neurology   -Denies any AC/AP use    -ICH Score: 1  -SBP < 160   - cardene gtt, wean as able   -PRN labetalol and hydralazine  -SCDs, hold DVT chemoppx in setting of acute bleed  -Repeat CTH at 1100 11/22 revealed stable hyperdense central and left paramedian pontine ICH   -EKG and Echo pending   -A1c, TSH, lipid panel pending    -aPTT, PT/INR pending  -Daily CBC, CMP, mag, phos  -PT/OT/SLP      Vasogenic brain edema  -secondary to primary problem  -q1hr neuro checks     ENT  Hearing difficulty of both ears  -likely secondary to primary problem  -OT     Pulmonary  COPD (chronic obstructive pulmonary disease)  -PRN duo nebs  -O2 sat goal >92%    Cardiac/Vascular  Hypertension associated with diabetes  -SBP goal <160   -cardene gtt, wean as able  -PRN labetalol, hydralzine  -restart home amlodipine     Endocrine  Type 2 diabetes mellitus  -Hemoglobin A1c pending  -SSI     Other  Tobacco dependency  -PRN nicotine patch   -Counseling on cessation importance         The patient is being Prophylaxed for:  Venous Thromboembolism with: Mechanical  Stress Ulcer with: Not Applicable   Ventilator Pneumonia with:  not applicable    Activity Orders          Turn patient starting at 11/22 1200    Elevate HOB starting at 11/22 1032    Diet NPO: NPO starting at 11/22 1032        Full Code     Critical condition in that Patient has a condition that poses threat to life and bodily function: Acute pontine hemorrhage, vasogenic edema, HTN requiring cardene gtt     60 minutes of Critical care time was spent personally by me on the following activities: development of treatment plan with patient or surrogate and bedside caregivers, discussions with consultants, evaluation of patient's response to treatment, examination of patient, ordering and performing treatments and interventions, ordering and review of laboratory studies, ordering and review of radiographic studies, pulse oximetry, antibiotic titration if applicable, vasopressor titration if applicable, re-evaluation of patient's condition. This critical care time did not overlap with that of any other provider or involve time for any procedures. There is high probability for acute neurological change leading to clinical and possibly life-threatening deterioration requiring highest level of physician preparedness for urgent intervention.      Rocio Olson PA-C  Neurocritical Care  [facility]

## 2022-11-22 NOTE — NURSING
Patient arrived to [facility] from [facility] by [id number]    Report received from:  RN    Type of stroke/diagnosis:  L ICH    Thrombectomy start and end time N/A    Current symptoms: slurred/dysarthric, disoriented to year, LUE &LLE-spont & no drift, RUE & RLE- spont movement, drift.     Skin assessment done: Y    Wounds noted: None    *If wounds noted, was Wound Care consulted? Y/N    Awad Completed? Pending    Patient Belongings on Admit: None    NCC notified: JANELLE Chan    BS:[id number]

## 2022-11-22 NOTE — ASSESSMENT & PLAN NOTE
Patient is a 57 y.o. [unfilled] w/ pmhx of HTN, PAD, DM, tobacco use, COPD who presents as transfer for L pontine ICH. Initially presented w/ sudden onset ringing dysarthria and RSW approximately 0255 11/22. CTH initially revealed 9.1mm x 15.6 mm pontine hemorrhage likely hypertensive etiology given location, patient history, and document SBP of 207 at OSH prior to starting cardene gtt.     -Admit to NCC for closer monitoring   -Hourly Neuro checks and VS  -Vascular Neurology   -Denies any AC/AP use    -ICH Score: 1  -SBP < 160   - cardene gtt, wean as able   -PRN labetalol and hydralazine  -SCDs, hold DVT chemoppx in setting of acute bleed  -Repeat CTH at 1100 11/22 revealed stable hyperdense central and left paramedian pontine ICH   -EKG and Echo pending   -A1c, TSH, lipid panel pending    -aPTT, PT/INR pending  -Daily CBC, CMP, mag, phos  -PT/OT/SLP

## 2022-11-22 NOTE — CONSULTS
[facility]  Vascular Neurology  [facility]  Consult Note    Inpatient consult to Vascular (Stroke) Neurology  Consult performed by: Guillermo Barnhart MD  Consult ordered by: Austin Arenas MD        Assessment/Plan:     Patient is a 57 y.o. year old male with:    * Left-sided nontraumatic intracerebral hemorrhage of brainstem  56 yo male with PMHx of HTN, PAD, DM, smoking, COPD presenting as a transfer for ponto-medullary, posterior ICH L>R identified on CTH after symptoms of sudden onset ringing, dysarthria and RSW that began at 02:55 on 11/22/22. 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 However, a cavernoma is also on the differential given that patient's symptoms are less severe than would otherwise be expected from a finding of this size on imaging.        Antithrombotics for secondary stroke prevention: Antiplatelets: Aspirin: 81 mg daily  Clopidogrel: 75 mg daily hold in the setting of likely bleed    Statins for secondary stroke prevention and hyperlipidemia, if present:   Statins: None: Reason: in the setting of likely bleed at the moment    Aggressive risk factor modification: HTN, Smoking, DM     Rehab efforts: The patient has been evaluated by a stroke team provider and the therapy needs have been fully considered based off the presenting complaints and exam findings. The following therapy evaluations are needed: PT evaluate and treat, OT evaluate and treat, SLP evaluate and treat    Diagnostics ordered/pending: CT scan of head without contrast to asses brain parenchyma 6 hour interval    VTE prophylaxis: Mechanical prophylaxis: Place SCDs, can begin heparin after 24 hrs    BP parameters: ICH: SBP <140        Tobacco dependency  Since age of 15, 3 PPD    COPD (chronic obstructive pulmonary disease)  History of.    Type 2 diabetes mellitus  Recent Labs     11/22/22  1040    HGBA1C 6.6*     Chronic, sliding scale as needed    Hypertension associated with diabetes  BP Readings from Last 5 Encounters:   11/22/22 (!) 156/81   11/22/22 (!) 145/81   10/07/22 (!) 140/82   08/11/22 139/87   07/27/22 (!) 158/89     On amlodipine 10mg QD    Tight BP control: SBP < 140 in setting of ICH        STROKE DOCUMENTATION          NIH Scale:  1a. Level of Consciousness: 0-->Alert, keenly responsive  1b. LOC Questions: 0-->Answers both questions correctly  1c. LOC Commands: 0-->Performs both tasks correctly  2. Best Gaze: 1-->Partial gaze palsy, gaze is abnormal in one or both eyes, but forced deviation or total gaze paresis is not present  3. Visual: 0-->No visual loss  4. Facial Palsy: 1-->Minor paralysis (flattened nasolabial fold, asymmetry on smiling)  5a. Motor Arm, Left: 0-->No drift, limb holds 90 (or 45) degrees for full 10 secs  5b. Motor Arm, Right: 2-->Some effort against gravity, limb cannot get to or maintain (if cued) 90 (or 45) degrees, drifts down to bed, but has some effort against gravity  6a. Motor Leg, Left: 0-->No drift, leg holds 30 degree position for full 5 secs  6b. Motor Leg, Right: 3-->No effort against gravity, leg falls to bed immediately  7. Limb Ataxia: 0-->Absent  8. Sensory: 1-->Mild-to-moderate sensory loss, patient feels pinprick is less sharp or is dull on the affected side, or there is a loss of superficial pain with pinprick, but patient is aware of being touched  9. Best Language: 0-->No aphasia, normal  10. Dysarthria: 1-->Mild-to-moderate dysarthria, patient slurs at least some words and, at worst, can be understood with some difficulty  11. Extinction and Inattention (formerly Neglect): 0-->No abnormality  Total (NIH Stroke Scale): 9    Modified [place]    [place] Coma Scale:    ABCD2 Score:    [id number] Score:   HAS -BLED Score:   ICH Score:   Hunt & Cho Classification:       Thrombolysis Candidate? No, CT findings (ICH, SAH, Large core infarct) , Recent  intracranial hemorrhage     Delays to Thrombolysis?  Not Applicable    Interventional Revascularization Candidate?   Is the patient eligible for mechanical endovascular reperfusion (KAMRON)?  No; at this time symptoms not suggestive of large vessel occlusion    Delays to Thrombectomy? Not Applicable    Hemorrhagic change of an Ischemic Stroke: Does this patient have an ischemic stroke with hemorrhagic changes? No     Subjective:     History of Present Illness:  Hernan Badillo is a pleasant 56 yo male with PMHx of HTN, PAD, DM, smoking, COPD presenting as a transfer for ponto-medullary, posterior ICH L>R identified on CTH after symptoms of sudden onset ringing, dysarthria and RSW that began at 02:55 on 11/22/22. Patient woke up to go to the bathroom at around 2:55 AM and when he returned, he complained of the above deficits to his wife. He denies previous episodes. No reported antiplatelets nor AC. Telestroke called, was considered not a tPA/TNK candidate. Recommend aggressive BP control, and transfer to [facility] for neurocritical care needs as well as assessment by ANDRA, PANCHITO.          Past Medical History:   Diagnosis Date    COPD (chronic obstructive pulmonary disease)     Diabetes mellitus, type 2     Hypertension      Past Surgical History:   Procedure Laterality Date    DENTAL SURGERY      ESOPHAGOGASTRODUODENOSCOPY N/A 6/1/2020    Procedure: EGD (ESOPHAGOGASTRODUODENOSCOPY);  Surgeon: Terrell May MD;  Location: [facility];  Service: Endoscopy;  Laterality: N/A;    HERNIA REPAIR      left inguinal    incision and drainiage       Family History   Problem Relation Age of Onset    Heart attack Father 80    Heart disease Father     Drug abuse Father     Cancer Maternal Grandmother         colon     Social History     Tobacco Use    Smoking status: Every Day     Packs/day: 2.00     Years: 30.00     Pack years: 60.00     Types: Cigarettes    Smokeless tobacco: Never   Substance Use Topics    Alcohol use: Yes      Alcohol/week: 1.0 standard drink     Types: 1 Shots of liquor per week     Comment: 1/2 pint 2 x per week - quit approximately 5 months ago    Drug use: No     Review of patient's allergies indicates:  No Known Allergies    Medications: I have reviewed the current medication administration record.    Facility-Administered Medications Prior to Admission   Medication Dose Route Frequency Provider Last Rate Last Admin    dexamethasone injection 4 mg  4 mg Intramuscular 1 time in Clinic/HOD Aiyana Muhammad PA-C         Medications Prior to Admission   Medication Sig Dispense Refill Last Dose    albuterol (VENTOLIN HFA) 90 mcg/actuation inhaler Inhale 2 puffs into the lungs every 6 (six) hours as needed for Wheezing. Rescue 18 g 0     albuterol-ipratropium (DUO-NEB) 2.5 mg-0.5 mg/3 mL nebulizer solution Take 3 mLs by nebulization every 6 (six) hours as needed for Wheezing or Shortness of Breath. 25 each 3     amitriptyline (ELAVIL) 10 MG tablet Take 2 tablets (20 mg total) by mouth nightly as needed for Insomnia. 60 tablet 0     amLODIPine (NORVASC) 10 MG tablet Take 1 tablet (10 mg total) by mouth once daily. 30 tablet 11     benzonatate (TESSALON) 100 MG capsule Take 1 capsule (100 mg total) by mouth 3 (three) times daily as needed for Cough. 20 capsule 0     metFORMIN (GLUCOPHAGE) 500 MG tablet Take 1 tablet (500 mg total) by mouth 2 (two) times daily with meals. 30 tablet 2     mometasone (NASONEX) 50 mcg/actuation nasal spray 2 sprays by Nasal route once daily. 17 g 0     pantoprazole (PROTONIX) 40 MG tablet Take 1 tablet (40 mg total) by mouth once daily. 30 tablet 11        Review of Systems   Constitutional:  Positive for activity change. Negative for diaphoresis and fatigue.   HENT:  Positive for trouble swallowing.    Eyes:  Negative for pain.   Respiratory: Negative.  Negative for shortness of breath.    Cardiovascular: Negative.    Gastrointestinal: Negative.  Negative for nausea and vomiting.    Endocrine: Negative.    Genitourinary: Negative.    Musculoskeletal:  Positive for gait problem.   Skin: Negative.    Allergic/Immunologic: Negative.    Neurological:  Positive for speech difficulty and weakness.   Hematological: Negative.    Psychiatric/Behavioral: Negative.     Objective:     Vital Signs (Most Recent):  Temp: 97.4 °F (36.3 °C) (11/22/22 1115)  Pulse: 70 (11/22/22 1205)  Resp: 17 (11/22/22 1205)  BP: (!) 156/81 (11/22/22 1205)  SpO2: (!) 94 % (11/22/22 1205)    Vital Signs Range (Last 24H):  Temp:  [97.4 °F (36.3 °C)-98.7 °F (37.1 °C)]   Pulse:  [67-84]   Resp:  [15-21]   BP: (122-229)/()   SpO2:  [87 %-100 %]     Physical Exam  Constitutional:       Appearance: Normal appearance. He is obese. He is ill-appearing.   HENT:      Head: Atraumatic.      Mouth/Throat:      Mouth: Mucous membranes are moist.   Eyes:      Extraocular Movements: Extraocular movements intact.   Cardiovascular:      Rate and Rhythm: Normal rate.   Pulmonary:      Effort: Pulmonary effort is normal.   Abdominal:      General: Abdomen is flat.      Tenderness: There is no abdominal tenderness.   Musculoskeletal:      Cervical back: Normal range of motion.   Skin:     General: Skin is warm.   Neurological:      Motor: Weakness present.       Neurological Exam:   LOC: alert  Attention Span: Good   Language: No aphasia  Articulation: Dysarthria  Orientation: Person, Place, Time   Visual Fields: Full  EOM (CN III, IV, VI): Horizontal gaze paralysis (L eye does not move past midline on L gaze)  Pupils (CN II, III): PERRL  Facial Sensation (CN V): Normal  VII: R lower facial palsy  Reflexes: 2+ throughout  Motor: Arm left  Normal 5/5  Leg left  Normal 5/5  Arm right  Paresis: 3/5  Leg right Paresis: 1/5  Cerebellum: No evidence of appendicular or axial ataxia  Sensation: numbness on RUE & RLE  Tone: Normal tone throughout      Laboratory:  CMP:   Recent Labs   Lab 11/22/22  0335   CALCIUM 9.2   ALBUMIN 3.6   PROT 7.1   NA  138   K 3.4*   CO2 24      BUN 11   CREATININE 0.9   ALKPHOS 63   ALT 12   AST 14   BILITOT 0.8     CBC:   Recent Labs   Lab 11/22/22  0335   WBC 6.64   RBC 4.06*   HGB 12.3*   HCT 38.2*      MCV 94   MCH 30.3   MCHC 32.2     Lipid Panel:   Recent Labs   Lab 11/22/22  1045   CHOL 144   LDLCALC 92.0   HDL 41   TRIG 55     Coagulation:   Recent Labs   Lab 11/22/22  1045   INR 1.1   APTT 29.2     Hgb A1C:   Recent Labs   Lab 11/22/22  1045   HGBA1C 6.6*     TSH:   Recent Labs   Lab 11/22/22  1045   TSH 0.536       Diagnostic Results:      Brain imaging:  CT 11/22/2022 03:50  1. Acute pontine intraparenchymal hemorrhage.  No significant mass effect or intraventricular hemorrhage.  2.  Mild generalized cerebral volume loss and scattered supratentorial white matter hypoattenuation, nonspecific and may reflect sequelae of chronic microvascular ischemic change.  3. Bilateral basal ganglia and right paramedian pontine chronic lacunar infarcts versus perivascular spaces.    CTH 11/22/2022 11:44  Allowing for distortion by motion artifact relatively stable hyperdense collection within the central and left paramedian alf concerning for acute pontine hemorrhage.  No definite acute extra-axial hemorrhage allowing for limitation by artifacts.  Clinical correlation and further evaluation with MRI recommended if patient compatible     Small regions of hypoattenuation left caudate and bilateral basal ganglia most compatible with remote lacunar-type infarcts.              Guillermo Barnhart MD  [facility]  Department of Vascular Neurology   [facility]

## 2022-11-22 NOTE — ASSESSMENT & PLAN NOTE
58 yo male w/ PMHx of HTN, PAD, DM, Tobacco abuse, COPD who presents w/ sudden onset ringing, dysarthria and RSW. Patient woke up to go to the bathroom at around 2:55 AM and when he returned he started to complain of the above mentioned deficits to his wife.   Denies previous episodes such as this.   Wife is at bedside providing information. No reported antiplatelets nor AC.     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   Not a tPA/TNK candidate.   Recommend aggressive BP control as below w/ low threshold to intubate.     Will require transfer to [facility] for VN, NCC and NSGY evaluation and treatment.   Suspect HTNsive etiology based on initial assessment and imaging, pending further w/u.     Antithrombotics for secondary stroke prevention: Antiplatelets: None: Intracerebral Hemorrhage    Statins for secondary stroke prevention and hyperlipidemia, if present:   Statins: None: Reason: ICH    Aggressive risk factor modification: HTN, Smoking, DM, Diet, Exercise     Rehab efforts: The patient has been evaluated by a stroke team provider and the therapy needs have been fully considered based off the presenting complaints and exam findings. The following therapy evaluations are needed: PT evaluate and treat, OT evaluate and treat, SLP evaluate and treat, PM&R evaluate for appropriate placement    Diagnostics ordered/pending: CT scan of head without contrast to asses brain parenchyma, MRI head without contrast to assess brain parenchyma, TTE to assess cardiac function/status , TSH to assess thyroid function    VTE prophylaxis: None: Reason for No Pharmacological VTE Prophylaxis: ICH    BP parameters: ICH: SBP <140  SBP <160

## 2022-11-22 NOTE — ED TRIAGE NOTES
Hernan Badillo is here with right sided weakness and slurred speech that started @ 0245. Tried to stand at home with EMS arrival and fell to right side  and hit head on hightstand.

## 2022-11-22 NOTE — ED PROVIDER NOTES
"Encounter Date: 11/22/2022       History     Chief Complaint   Patient presents with    Extremity Weakness     Right sided weakens and slurred speech at 0245     Chief complaint:  Slurred speech and weakness    HPI:  57-year-old male presents via ambulance with abrupt onset of slurred speech and right-sided weakness that began 30 minutes prior to arrival.  Family reports that he complained of "swelling in his face".  Due to his slurred speech further history is limited.  Past medical history is significant for peripheral vascular disease, COPD, diabetes and hypertension.    Review of patient's allergies indicates:  No Known Allergies  Past Medical History:   Diagnosis Date    COPD (chronic obstructive pulmonary disease)     Diabetes mellitus, type 2     Hypertension      Past Surgical History:   Procedure Laterality Date    DENTAL SURGERY      ESOPHAGOGASTRODUODENOSCOPY N/A 6/1/2020    Procedure: EGD (ESOPHAGOGASTRODUODENOSCOPY);  Surgeon: Terrell May MD;  Location: [facility];  Service: Endoscopy;  Laterality: N/A;    HERNIA REPAIR      left inguinal    incision and drainiage       Family History   Problem Relation Age of Onset    Heart attack Father 80    Heart disease Father     Drug abuse Father     Cancer Maternal Grandmother         colon     Social History     Tobacco Use    Smoking status: Every Day     Packs/day: 2.00     Years: 30.00     Pack years: 60.00     Types: Cigarettes    Smokeless tobacco: Never   Substance Use Topics    Alcohol use: Yes     Alcohol/week: 1.0 standard drink     Types: 1 Shots of liquor per week     Comment: 1/2 pint 2 x per week - quit approximately 5 months ago    Drug use: No     Review of Systems   Unable to perform ROS: Intubated     Physical Exam     Initial Vitals   BP Pulse Resp Temp SpO2   -- -- -- -- --      MAP       --         Physical Exam    Nursing note and vitals reviewed.  Constitutional: He appears well-developed and well-nourished.   HENT:   Head: " Normocephalic and atraumatic.   Eyes: Conjunctivae are normal.   Neck: Neck supple.   Normal range of motion.  Cardiovascular:  Normal rate, regular rhythm and normal heart sounds.     Exam reveals no gallop and no friction rub.       No murmur heard.  Pulmonary/Chest: Breath sounds normal. No respiratory distress. He has no wheezes. He has no rhonchi. He has no rales.   Abdominal: Abdomen is soft. He exhibits no distension. There is no abdominal tenderness.   Musculoskeletal:         General: Normal range of motion.      Cervical back: Normal range of motion and neck supple.     Neurological:   Confused with slurred speech.  Right upper extremity paralysis  GCS 14   Skin: Skin is warm and dry. Capillary refill takes less than 2 seconds.   Psychiatric: He has a normal mood and affect.       ED Course   Critical Care    Date/Time: 11/22/2022 4:18 AM  Performed by: Joao Mead III, MD  Authorized by: Joao Mead III, MD   Direct patient critical care time: 90 minutes  Total critical care time (exclusive of procedural time) : 90 minutes  Critical care was necessary to treat or prevent imminent or life-threatening deterioration of the following conditions: CNS failure or compromise.  Critical care was time spent personally by me on the following activities: discussions with consultants, evaluation of patient's response to treatment, examination of patient, obtaining history from patient or surrogate, ordering and performing treatments and interventions, ordering and review of laboratory studies, ordering and review of radiographic studies, pulse oximetry, re-evaluation of patient's condition and review of old charts.      Labs Reviewed   CBC W/ AUTO DIFFERENTIAL   COMPREHENSIVE METABOLIC PANEL   PROTIME-INR   HIV 1 / 2 ANTIBODY   HEPATITIS C ANTIBODY   DRUG SCREEN PANEL, URINE EMERGENCY   POCT GLUCOSE, HAND-HELD DEVICE   POCT PROTIME-INR     EKG Readings: (Independently Interpreted)   Rhythm: Normal Sinus  Rhythm. Heart Rate: 79. Ectopy: No Ectopy. Conduction: Normal. ST Segments: Normal ST Segments. ST Segment Depression: II, III, AVF, V6 and V5. T Waves: Normal. Axis: Normal. Clinical Impression: Normal Sinus Rhythm Other Impression: LVH     Imaging Results    None          Medications   labetaloL injection 20 mg (has no administration in time range)   metoclopramide HCl injection 10 mg (has no administration in time range)   labetaloL (NORMODYNE,TRANDATE) 5 mg/mL injection (has no administration in time range)   iohexoL (OMNIPAQUE 350) 350 mg iodine/mL injection (has no administration in time range)     Medical Decision Making:   ED Management:  57-year-old male presents with abrupt onset of confusion, slurred speech and right-sided weakness.  He is found to have a pontine bleed.  Pressure is managed with a Cardene drip.  He will be transferred to [facility].  GCS is 14.  He is currently adequately protecting his airway.           ED Course as of 11/22/22 1907 Tue Nov 22, 2022   0751 Pontine hemorrhage, on Cardene drip.  Signed over from Dr. Mead awaiting placement at [facility].  I did check with the transfer center they are expecting a bed to become available shortly. [EF]   0824 Examined at the bedside.  The patient is doing well his blood pressure is well controlled but he still has a dysarthria.  According to the patient and his family his extremity weakness is improving.  Still pending transfer to [place]. [EF]      ED Course User Index  [EF] Chet Emanuel MD                 Clinical Impression:   Final diagnoses:  [I63.9] Stroke               Joao Mead III, MD  11/22/22 1908

## 2022-11-22 NOTE — SUBJECTIVE & OBJECTIVE
Past Medical History:   Diagnosis Date    COPD (chronic obstructive pulmonary disease)     Diabetes mellitus, type 2     Hypertension      Past Surgical History:   Procedure Laterality Date    DENTAL SURGERY      ESOPHAGOGASTRODUODENOSCOPY N/A 6/1/2020    Procedure: EGD (ESOPHAGOGASTRODUODENOSCOPY);  Surgeon: Terrell May MD;  Location: [facility];  Service: Endoscopy;  Laterality: N/A;    HERNIA REPAIR      left inguinal    incision and drainiage       Family History   Problem Relation Age of Onset    Heart attack Father 80    Heart disease Father     Drug abuse Father     Cancer Maternal Grandmother         colon     Social History     Tobacco Use    Smoking status: Every Day     Packs/day: 2.00     Years: 30.00     Pack years: 60.00     Types: Cigarettes    Smokeless tobacco: Never   Substance Use Topics    Alcohol use: Yes     Alcohol/week: 1.0 standard drink     Types: 1 Shots of liquor per week     Comment: 1/2 pint 2 x per week - quit approximately 5 months ago    Drug use: No     Review of patient's allergies indicates:  No Known Allergies    Medications: I have reviewed the current medication administration record.    Facility-Administered Medications Prior to Admission   Medication Dose Route Frequency Provider Last Rate Last Admin    dexamethasone injection 4 mg  4 mg Intramuscular 1 time in Clinic/HOD Aiyana Muhammad PA-C         Medications Prior to Admission   Medication Sig Dispense Refill Last Dose    albuterol (VENTOLIN HFA) 90 mcg/actuation inhaler Inhale 2 puffs into the lungs every 6 (six) hours as needed for Wheezing. Rescue 18 g 0     albuterol-ipratropium (DUO-NEB) 2.5 mg-0.5 mg/3 mL nebulizer solution Take 3 mLs by nebulization every 6 (six) hours as needed for Wheezing or Shortness of Breath. 25 each 3     amitriptyline (ELAVIL) 10 MG tablet Take 2 tablets (20 mg total) by mouth nightly as needed for Insomnia. 60 tablet 0     amLODIPine (NORVASC) 10 MG tablet Take 1 tablet (10  mg total) by mouth once daily. 30 tablet 11     benzonatate (TESSALON) 100 MG capsule Take 1 capsule (100 mg total) by mouth 3 (three) times daily as needed for Cough. 20 capsule 0     metFORMIN (GLUCOPHAGE) 500 MG tablet Take 1 tablet (500 mg total) by mouth 2 (two) times daily with meals. 30 tablet 2     mometasone (NASONEX) 50 mcg/actuation nasal spray 2 sprays by Nasal route once daily. 17 g 0     pantoprazole (PROTONIX) 40 MG tablet Take 1 tablet (40 mg total) by mouth once daily. 30 tablet 11        Review of Systems   Constitutional:  Positive for activity change. Negative for diaphoresis and fatigue.   HENT:  Positive for trouble swallowing.    Eyes:  Negative for pain.   Respiratory: Negative.  Negative for shortness of breath.    Cardiovascular: Negative.    Gastrointestinal: Negative.  Negative for nausea and vomiting.   Endocrine: Negative.    Genitourinary: Negative.    Musculoskeletal:  Positive for gait problem.   Skin: Negative.    Allergic/Immunologic: Negative.    Neurological:  Positive for speech difficulty and weakness.   Hematological: Negative.    Psychiatric/Behavioral: Negative.     Objective:     Vital Signs (Most Recent):  Temp: 97.4 °F (36.3 °C) (11/22/22 1115)  Pulse: 70 (11/22/22 1205)  Resp: 17 (11/22/22 1205)  BP: (!) 156/81 (11/22/22 1205)  SpO2: (!) 94 % (11/22/22 1205)    Vital Signs Range (Last 24H):  Temp:  [97.4 °F (36.3 °C)-98.7 °F (37.1 °C)]   Pulse:  [67-84]   Resp:  [15-21]   BP: (122-229)/()   SpO2:  [87 %-100 %]     Physical Exam  Constitutional:       Appearance: Normal appearance. He is obese. He is ill-appearing.   HENT:      Head: Atraumatic.      Mouth/Throat:      Mouth: Mucous membranes are moist.   Eyes:      Extraocular Movements: Extraocular movements intact.   Cardiovascular:      Rate and Rhythm: Normal rate.   Pulmonary:      Effort: Pulmonary effort is normal.   Abdominal:      General: Abdomen is flat.      Tenderness: There is no abdominal tenderness.    Musculoskeletal:      Cervical back: Normal range of motion.   Skin:     General: Skin is warm.   Neurological:      Motor: Weakness present.       Neurological Exam:   LOC: alert  Attention Span: Good   Language: No aphasia  Articulation: Dysarthria  Orientation: Person, Place, Time   Visual Fields: Full  EOM (CN III, IV, VI): Horizontal gaze paralysis (L eye does not move past midline on L gaze)  Pupils (CN II, III): PERRL  Facial Sensation (CN V): Normal  VII: R lower facial palsy  Reflexes: 2+ throughout  Motor: Arm left  Normal 5/5  Leg left  Normal 5/5  Arm right  Paresis: 3/5  Leg right Paresis: 1/5  Cerebellum: No evidence of appendicular or axial ataxia  Sensation: numbness on RUE & RLE  Tone: Normal tone throughout      Laboratory:  CMP:   Recent Labs   Lab 11/22/22  0335   CALCIUM 9.2   ALBUMIN 3.6   PROT 7.1      K 3.4*   CO2 24      BUN 11   CREATININE 0.9   ALKPHOS 63   ALT 12   AST 14   BILITOT 0.8     CBC:   Recent Labs   Lab 11/22/22  0335   WBC 6.64   RBC 4.06*   HGB 12.3*   HCT 38.2*      MCV 94   MCH 30.3   MCHC 32.2     Lipid Panel:   Recent Labs   Lab 11/22/22  1045   CHOL 144   LDLCALC 92.0   HDL 41   TRIG 55     Coagulation:   Recent Labs   Lab 11/22/22  1045   INR 1.1   APTT 29.2     Hgb A1C:   Recent Labs   Lab 11/22/22  1045   HGBA1C 6.6*     TSH:   Recent Labs   Lab 11/22/22  1045   TSH 0.536       Diagnostic Results:      Brain imaging:  [facility] 11/22/2022 03:50  1. Acute pontine intraparenchymal hemorrhage.  No significant mass effect or intraventricular hemorrhage.  2.  Mild generalized cerebral volume loss and scattered supratentorial white matter hypoattenuation, nonspecific and may reflect sequelae of chronic microvascular ischemic change.  3. Bilateral basal ganglia and right paramedian pontine chronic lacunar infarcts versus perivascular spaces.    [facility] 11/22/2022 11:44  Allowing for distortion by motion artifact relatively stable hyperdense collection within the  central and left paramedian alf concerning for acute pontine hemorrhage.  No definite acute extra-axial hemorrhage allowing for limitation by artifacts.  Clinical correlation and further evaluation with MRI recommended if patient compatible     Small regions of hypoattenuation left caudate and bilateral basal ganglia most compatible with remote lacunar-type infarcts.

## 2022-11-22 NOTE — PLAN OF CARE
[facility] Care Plan    POC reviewed with Hernan Badillo and family at 1400. Pt and family verbalized understanding. Questions and concerns addressed. No acute events today. Pt progressing toward goals. Will continue to monitor. See below and flowsheets for full assessment and VS info.   - CTH completed   - Cardene gtt titrated           Is this a stroke patient? yes- Stroke booklet reviewed with patient and family, risk factors identified for patient and stroke booklet remains at bedside for ongoing education.     Neuro:  Joseph Coma Scale  Best Eye Response: 4-->(E4) spontaneous  Best Motor Response: 6-->(M6) obeys commands  Best Verbal Response: 5-->(V5) oriented  Joseph Coma Scale Score: 15  Assessment Qualifiers: patient not sedated/intubated        24 hr Temp:  [97.4 °F (36.3 °C)-98.7 °F (37.1 °C)]     CV:   Rhythm: normal sinus rhythm  BP goals:   SBP < 160  MAP > 65    Resp:   O2 Device (Oxygen Therapy): nasal cannula       Plan: N/A    GI/:     Diet/Nutrition Received: NPO  Last Bowel Movement: 11/21/22  Voiding Characteristics: external catheter    Intake/Output Summary (Last 24 hours) at 11/22/2022 1622  Last data filed at 11/22/2022 1505  Gross per 24 hour   Intake 54.81 ml   Output 350 ml   Net -295.19 ml          Labs/Accuchecks:  Recent Labs   Lab 11/22/22  0335   WBC 6.64   RBC 4.06*   HGB 12.3*   HCT 38.2*         Recent Labs   Lab 11/22/22  0335      K 3.4*   CO2 24      BUN 11   CREATININE 0.9   ALKPHOS 63   ALT 12   AST 14   BILITOT 0.8      Recent Labs   Lab 11/22/22  1045   INR 1.1   APTT 29.2    No results for input(s): CPK, CPKMB, TROPONINI, MB in the last 168 hours.    Electrolytes: N/A - electrolytes WDL  Accuchecks: Q6H    Gtts:   niCARdipine 2.5 mg/hr (11/22/22 1124)       LDA/Wounds:  Lines/Drains/Airways       Drain  Duration             Male External Urinary Catheter 11/22/22 1115 <1 day              Peripheral Intravenous Line  Duration                  Peripheral IV -  Single Lumen 11/22/22 18 G Anterior;Left Forearm <1 day         Peripheral IV - Single Lumen 11/22/22 18 G Anterior;Proximal;Right Forearm <1 day                  Wounds: No  Wound care consulted: No

## 2022-11-22 NOTE — ED NOTES
Latest Reference Range & Units 11/22/22 03:38   Sample  unknown   POC PTWBT 9.7 - 14.3 sec 13.5   POC PTINR 0.9 - 1.2  1.1     Results reported to Dr. Mead

## 2022-11-22 NOTE — ASSESSMENT & PLAN NOTE
BP Readings from Last 5 Encounters:   11/22/22 (!) 156/81   11/22/22 (!) 145/81   10/07/22 (!) 140/82   08/11/22 139/87   07/27/22 (!) 158/89     On amlodipine 10mg QD    Tight BP control: SBP < 140 in setting of ICH

## 2022-11-23 LAB
ANION GAP SERPL CALC-SCNC: 13 MMOL/L (ref 8–16)
ASCENDING AORTA: 3.43 CM
AV INDEX (PROSTH): 0.69
AV MEAN GRADIENT: 4 MMHG
AV PEAK GRADIENT: 7 MMHG
AV VALVE AREA: 2.25 CM2
AV VELOCITY RATIO: 0.8
BASOPHILS # BLD AUTO: 0.01 K/UL (ref 0–0.2)
BASOPHILS NFR BLD: 0.1 % (ref 0–1.9)
BSA FOR ECHO PROCEDURE: 1.91 M2
BUN SERPL-MCNC: 7 MG/DL (ref 6–20)
CALCIUM SERPL-MCNC: 8.9 MG/DL (ref 8.7–10.5)
CHLORIDE SERPL-SCNC: 103 MMOL/L (ref 95–110)
CO2 SERPL-SCNC: 24 MMOL/L (ref 23–29)
CREAT SERPL-MCNC: 0.7 MG/DL (ref 0.5–1.4)
CV ECHO LV RWT: 0.71 CM
DIFFERENTIAL METHOD: ABNORMAL
DOP CALC AO PEAK VEL: 1.34 M/S
DOP CALC AO VTI: 22.51 CM
DOP CALC LVOT AREA: 3.3 CM2
DOP CALC LVOT DIAMETER: 2.04 CM
DOP CALC LVOT PEAK VEL: 1.07 M/S
DOP CALC LVOT STROKE VOLUME: 50.67 CM3
DOP CALCLVOT PEAK VEL VTI: 15.51 CM
E/E' RATIO: 7.69 M/S
ECHO LV POSTERIOR WALL: 1.41 CM (ref 0.6–1.1)
EJECTION FRACTION: 45 %
EOSINOPHIL # BLD AUTO: 0.1 K/UL (ref 0–0.5)
EOSINOPHIL NFR BLD: 0.7 % (ref 0–8)
ERYTHROCYTE [DISTWIDTH] IN BLOOD BY AUTOMATED COUNT: 13.2 % (ref 11.5–14.5)
EST. GFR  (NO RACE VARIABLE): >60 ML/MIN/1.73 M^2
FRACTIONAL SHORTENING: 21 % (ref 28–44)
GLUCOSE SERPL-MCNC: 123 MG/DL (ref 70–110)
HCT VFR BLD AUTO: 38.2 % (ref 40–54)
HGB BLD-MCNC: 12.5 G/DL (ref 14–18)
IMM GRANULOCYTES # BLD AUTO: 0.02 K/UL (ref 0–0.04)
IMM GRANULOCYTES NFR BLD AUTO: 0.3 % (ref 0–0.5)
INTERVENTRICULAR SEPTUM: 1.34 CM (ref 0.6–1.1)
LA MAJOR: 5.17 CM
LA MINOR: 4.89 CM
LA WIDTH: 2.7 CM
LEFT ATRIUM SIZE: 3.62 CM
LEFT ATRIUM VOLUME INDEX: 21.9 ML/M2
LEFT ATRIUM VOLUME: 41.76 CM3
LEFT INTERNAL DIMENSION IN SYSTOLE: 3.13 CM (ref 2.1–4)
LEFT VENTRICLE DIASTOLIC VOLUME INDEX: 35.65 ML/M2
LEFT VENTRICLE DIASTOLIC VOLUME: 68.09 ML
LEFT VENTRICLE MASS INDEX: 104 G/M2
LEFT VENTRICLE SYSTOLIC VOLUME INDEX: 20.3 ML/M2
LEFT VENTRICLE SYSTOLIC VOLUME: 38.77 ML
LEFT VENTRICULAR INTERNAL DIMENSION IN DIASTOLE: 3.95 CM (ref 3.5–6)
LEFT VENTRICULAR MASS: 199.56 G
LV LATERAL E/E' RATIO: 7.14 M/S
LV SEPTAL E/E' RATIO: 8.33 M/S
LYMPHOCYTES # BLD AUTO: 1.3 K/UL (ref 1–4.8)
LYMPHOCYTES NFR BLD: 18.5 % (ref 18–48)
MAGNESIUM SERPL-MCNC: 1.6 MG/DL (ref 1.6–2.6)
MCH RBC QN AUTO: 30.4 PG (ref 27–31)
MCHC RBC AUTO-ENTMCNC: 32.7 G/DL (ref 32–36)
MCV RBC AUTO: 93 FL (ref 82–98)
MONOCYTES # BLD AUTO: 0.4 K/UL (ref 0.3–1)
MONOCYTES NFR BLD: 5.3 % (ref 4–15)
MV PEAK E VEL: 0.5 M/S
NEUTROPHILS # BLD AUTO: 5.2 K/UL (ref 1.8–7.7)
NEUTROPHILS NFR BLD: 75.1 % (ref 38–73)
NRBC BLD-RTO: 0 /100 WBC
PHOSPHATE SERPL-MCNC: 3.1 MG/DL (ref 2.7–4.5)
PLATELET # BLD AUTO: 163 K/UL (ref 150–450)
PMV BLD AUTO: 10.6 FL (ref 9.2–12.9)
POCT GLUCOSE: 159 MG/DL (ref 70–110)
POTASSIUM SERPL-SCNC: 3.3 MMOL/L (ref 3.5–5.1)
QEF: 45 %
RA MAJOR: 4.58 CM
RA PRESSURE: 3 MMHG
RA WIDTH: 3.26 CM
RBC # BLD AUTO: 4.11 M/UL (ref 4.6–6.2)
RIGHT VENTRICULAR END-DIASTOLIC DIMENSION: 3.46 CM
SINUS: 3.39 CM
SODIUM SERPL-SCNC: 140 MMOL/L (ref 136–145)
STJ: 3.33 CM
TDI LATERAL: 0.07 M/S
TDI SEPTAL: 0.06 M/S
TDI: 0.07 M/S
TRICUSPID ANNULAR PLANE SYSTOLIC EXCURSION: 2.34 CM
WBC # BLD AUTO: 6.92 K/UL (ref 3.9–12.7)

## 2022-11-23 PROCEDURE — 97162 PT EVAL MOD COMPLEX 30 MIN: CPT

## 2022-11-23 PROCEDURE — 84100 ASSAY OF PHOSPHORUS: CPT

## 2022-11-23 PROCEDURE — 63600175 PHARM REV CODE 636 W HCPCS

## 2022-11-23 PROCEDURE — 97166 OT EVAL MOD COMPLEX 45 MIN: CPT

## 2022-11-23 PROCEDURE — 83735 ASSAY OF MAGNESIUM: CPT

## 2022-11-23 PROCEDURE — 63600175 PHARM REV CODE 636 W HCPCS: Performed by: PSYCHIATRY & NEUROLOGY

## 2022-11-23 PROCEDURE — S4991 NICOTINE PATCH NONLEGEND: HCPCS

## 2022-11-23 PROCEDURE — 99233 SBSQ HOSP IP/OBS HIGH 50: CPT | Mod: FS,,,

## 2022-11-23 PROCEDURE — 94761 N-INVAS EAR/PLS OXIMETRY MLT: CPT

## 2022-11-23 PROCEDURE — 11000001 HC ACUTE MED/SURG PRIVATE ROOM

## 2022-11-23 PROCEDURE — 94799 UNLISTED PULMONARY SVC/PX: CPT

## 2022-11-23 PROCEDURE — 80048 BASIC METABOLIC PNL TOTAL CA: CPT

## 2022-11-23 PROCEDURE — 97535 SELF CARE MNGMENT TRAINING: CPT

## 2022-11-23 PROCEDURE — 25000003 PHARM REV CODE 250

## 2022-11-23 PROCEDURE — 25000003 PHARM REV CODE 250: Performed by: PHYSICIAN ASSISTANT

## 2022-11-23 PROCEDURE — 99900035 HC TECH TIME PER 15 MIN (STAT)

## 2022-11-23 PROCEDURE — 97112 NEUROMUSCULAR REEDUCATION: CPT

## 2022-11-23 PROCEDURE — 99233 PR SUBSEQUENT HOSPITAL CARE,LEVL III: ICD-10-PCS | Mod: ,,, | Performed by: STUDENT IN AN ORGANIZED HEALTH CARE EDUCATION/TRAINING PROGRAM

## 2022-11-23 PROCEDURE — 99233 PR SUBSEQUENT HOSPITAL CARE,LEVL III: ICD-10-PCS | Mod: FS,,,

## 2022-11-23 PROCEDURE — 97530 THERAPEUTIC ACTIVITIES: CPT

## 2022-11-23 PROCEDURE — 92610 EVALUATE SWALLOWING FUNCTION: CPT

## 2022-11-23 PROCEDURE — 99233 SBSQ HOSP IP/OBS HIGH 50: CPT | Mod: ,,, | Performed by: STUDENT IN AN ORGANIZED HEALTH CARE EDUCATION/TRAINING PROGRAM

## 2022-11-23 PROCEDURE — 85025 COMPLETE CBC W/AUTO DIFF WBC: CPT

## 2022-11-23 PROCEDURE — 27000221 HC OXYGEN, UP TO 24 HOURS

## 2022-11-23 RX ORDER — HYDRALAZINE HYDROCHLORIDE 20 MG/ML
10 INJECTION INTRAMUSCULAR; INTRAVENOUS EVERY 4 HOURS PRN
Status: DISCONTINUED | OUTPATIENT
Start: 2022-11-23 | End: 2022-11-25 | Stop reason: HOSPADM

## 2022-11-23 RX ORDER — MUPIROCIN 20 MG/G
OINTMENT TOPICAL 2 TIMES DAILY
Status: DISCONTINUED | OUTPATIENT
Start: 2022-11-23 | End: 2022-11-23

## 2022-11-23 RX ORDER — SODIUM,POTASSIUM PHOSPHATES 280-250MG
2 POWDER IN PACKET (EA) ORAL
Status: DISCONTINUED | OUTPATIENT
Start: 2022-11-23 | End: 2022-11-25 | Stop reason: HOSPADM

## 2022-11-23 RX ORDER — ENOXAPARIN SODIUM 100 MG/ML
40 INJECTION SUBCUTANEOUS EVERY 24 HOURS
Status: DISCONTINUED | OUTPATIENT
Start: 2022-11-23 | End: 2022-11-25 | Stop reason: HOSPADM

## 2022-11-23 RX ORDER — LANOLIN ALCOHOL/MO/W.PET/CERES
800 CREAM (GRAM) TOPICAL
Status: DISCONTINUED | OUTPATIENT
Start: 2022-11-23 | End: 2022-11-25 | Stop reason: HOSPADM

## 2022-11-23 RX ORDER — LISINOPRIL 5 MG/1
10 TABLET ORAL DAILY
Status: DISCONTINUED | OUTPATIENT
Start: 2022-11-23 | End: 2022-11-25 | Stop reason: HOSPADM

## 2022-11-23 RX ORDER — INSULIN ASPART 100 [IU]/ML
1-10 INJECTION, SOLUTION INTRAVENOUS; SUBCUTANEOUS
Status: DISCONTINUED | OUTPATIENT
Start: 2022-11-23 | End: 2022-11-25 | Stop reason: HOSPADM

## 2022-11-23 RX ORDER — IBUPROFEN 200 MG
1 TABLET ORAL DAILY
Status: DISCONTINUED | OUTPATIENT
Start: 2022-11-23 | End: 2022-11-25 | Stop reason: HOSPADM

## 2022-11-23 RX ADMIN — LISINOPRIL 10 MG: 5 TABLET ORAL at 11:11

## 2022-11-23 RX ADMIN — ENOXAPARIN SODIUM 40 MG: 100 INJECTION SUBCUTANEOUS at 05:11

## 2022-11-23 RX ADMIN — POTASSIUM BICARBONATE 35 MEQ: 391 TABLET, EFFERVESCENT ORAL at 05:11

## 2022-11-23 RX ADMIN — AMLODIPINE BESYLATE 10 MG: 10 TABLET ORAL at 08:11

## 2022-11-23 RX ADMIN — Medication 1 PATCH: at 08:11

## 2022-11-23 RX ADMIN — POTASSIUM BICARBONATE 35 MEQ: 391 TABLET, EFFERVESCENT ORAL at 11:11

## 2022-11-23 RX ADMIN — MUPIROCIN: 20 OINTMENT TOPICAL at 08:11

## 2022-11-23 RX ADMIN — NICOTINE 1 PATCH: 14 PATCH, EXTENDED RELEASE TRANSDERMAL at 10:11

## 2022-11-23 RX ADMIN — INSULIN ASPART 1 UNITS: 100 INJECTION, SOLUTION INTRAVENOUS; SUBCUTANEOUS at 09:11

## 2022-11-23 RX ADMIN — NICARDIPINE HYDROCHLORIDE 7.5 MG/HR: 0.2 INJECTION, SOLUTION INTRAVENOUS at 02:11

## 2022-11-23 NOTE — PT/OT/SLP EVAL
"Physical Therapy Co-Evaluation/Treatment    Patient Name:  Hernan Badillo   MRN:  [id number]    Recommendations:     Discharge Recommendations:  rehabilitation facility   Discharge Equipment Recommendations:  (TBD at next level of care)   Barriers to discharge: Inaccessible home and Decreased caregiver support    Assessment:     Hernan Badillo is a 57 y.o. male admitted with a medical diagnosis of Left-sided nontraumatic intracerebral hemorrhage of brainstem.  He presents with the following impairments/functional limitations:  weakness, impaired endurance, impaired self care skills, impaired sensation, impaired functional mobility, gait instability, impaired balance, impaired cognition, decreased coordination, decreased safety awareness, abnormal tone. Pt presents motivated and eager to participate in therapy. Pt verbalizing excitement to get OOB stating, "I have been waiting for y'all." Pt presents with poor insight, impulsivity, BLE ataxic movements, decreased command following, poor safety awareness, and easy distractibility. Pt requiring min-CGA assist for bed mobility at this time and Masood with transfers. Pt able to ambulate ~2 lateral steps ea direction with modA x 2 persons and HHA. Pt demo'ing decreased activity tolerance, gait deficits, decreased coordination, impaired proprioception, and impaired postural stability. Further ambulation deferred 2* poor standing balance and decreased command following to maintain pt safety. Pt unsafe to return home at this time 2* inaccessible home, increased risk of falls, level of assist required, and decreased caregiver support. Given pt's age, prior level of independence, high level of motivation, and potential for progress pt an excellent candidate for inpatient rehabilitation. Pt most appropriate for inpatient rehabilitation due to requiring intensive multidisciplinary care that is best be provided in that setting. Pt would continue to benefit from skilled acute PT in order to " "address current deficits and progress functional mobility.     Rehab Prognosis: Good; patient would benefit from acute skilled PT services to address these deficits and reach maximum level of function.    Recent Surgery: * No surgery found *      Plan:     During this hospitalization, patient to be seen 4 x/week to address the identified rehab impairments via gait training, therapeutic activities, therapeutic exercises, neuromuscular re-education and progress toward the following goals:    Plan of Care Expires:  12/22/22    Subjective     Chief Complaint: Wanting to get OOB  Patient/Family Comments/goals: "My whole body moves perfectly."  Pain/Comfort:  Pain Rating 1: 0/10    Patients cultural, spiritual, [employer] conflicts given the current situation: no    Living Environment:  Pt lives with son and brother in [facility] with 3 SHYLA and B HR. Home has a WIS. Pt drives, but does not work. Pt reports 1 recent fall (Mon.) when he fell OOB at home.   Prior to admission, patients level of function was independent with all ADLs and mobility.  Equipment used at home: none.  DME owned (not currently used): none.  Upon discharge, patient will have assistance from family, but limited.    Objective:     Communicated with RN prior to session.  Patient found HOB elevated with bed alarm, pulse ox (continuous), blood pressure cuff, SCD, telemetry, peripheral IV  upon PT entry to room.    General Precautions: Standard, fall   Orthopedic Precautions:N/A   Braces: N/A  Respiratory Status: Nasal cannula, flow 2 L/min    Exams:  Cognitive Exam:  Patient is oriented to Person, Place, Time, and Situation  Postural Exam:  Patient presented with the following abnormalities:    -       Rounded shoulders  -       Forward head  -       Posterior pelvic tilt  Sensation:    -       Impaired  light/touch RLE  RLE ROM: WFL  RLE Strength: WFL except hip flexion 4-/5  LLE ROM: WFL  LLE Strength: WFL    Functional Mobility:  Bed Mobility:     Supine to " Sit: minimum assistance with HOB elevated  Verbal cues for technique  Sit to Supine: contact guard assistance with HOB flat  Verbal cues for technique  Transfers:     Sit to Stand:  minimum assistance with hand-held assist   Pt with increased trunk and cervical flexion, narrow JILLIAN, and decreased hip extension  Gait:   Pt able to take 2 lateral steps ea dir with moderate assistance of 2 persons and hand-held assist  Pt with decreased pierce, impaired weight shifting ability, decreased step length, BLE ataxia (R>L), increased trunk flexion, and decreased foot-floor clearance.   Verbal cues for symptom assessment, upright posture, proper weight shifts, and increasing step length  Balance:  Static sitting: CGA at EOB  Dynamic sitting: Masood at EOB as demo'd during UE and LE testing  Static standing: Masood with HHA  Dynamic standing: modA x 2 with HHA      AM-PAC 6 CLICK MOBILITY  Total Score:14       Treatment & Education:  Pt educated on PT role, goals of session, and POC. Pt verbalized understanding.  Pt educated on current deficits and maintenance of safety due to current impairments. Pt verbalized understanding.  Answered all questions from pt/pt's brother within PT scope.     RN notified of pt position, level of assist required, and session outcomes.    Patient left HOB elevated with all lines intact, call button in reach, bed alarm on, RN notified, and brother present.    GOALS:   Multidisciplinary Problems       Physical Therapy Goals          Problem: Physical Therapy    Goal Priority Disciplines Outcome Goal Variances Interventions   Physical Therapy Goal     PT, PT/OT Ongoing, Progressing     Description: Goals to be met by: 2022     Patient will increase functional independence with mobility by performin. Supine to sit with [place]  2. Sit to stand transfer with Stand-by Assistance  3. Gait  x 25 feet with Minimal Assistance using LRAD.   4. Ascend/descend 3 stair with bilateral Handrails  Minimal Assistance using LRAD.   5. Lower extremity exercise program x15 reps per handout, with supervision                         History:     Past Medical History:   Diagnosis Date    COPD (chronic obstructive pulmonary disease)     Diabetes mellitus, type 2     Hypertension        Past Surgical History:   Procedure Laterality Date    DENTAL SURGERY      ESOPHAGOGASTRODUODENOSCOPY N/A 6/1/2020    Procedure: EGD (ESOPHAGOGASTRODUODENOSCOPY);  Surgeon: Terrell May MD;  Location: [facility];  Service: Endoscopy;  Laterality: N/A;    HERNIA REPAIR      left inguinal    incision and drainiage         Time Tracking:     PT Received On: 11/23/22  PT Start Time: 1036     PT Stop Time: 1106  PT Total Time (min): 30 min     Billable Minutes: Evaluation 15 and Neuromuscular Re-education 15  co-evaluation/treatment performed this date due to need for 2 skilled therapists in order to ensure pt safety, accomodate for pt activity tolerance, and maximize functional potential      11/23/2022

## 2022-11-23 NOTE — PLAN OF CARE
[facility] Care Plan    POC reviewed with Hernan Badillo and family at 0400. Pt verbalized and family understanding. Questions and concerns addressed. No acute events overnight. Pt progressing toward goals. Will continue to monitor. See below and flowsheets for full assessment and VS info.     - Cardene gtt titrated to maintain SBP <160  - High flow NC applied for desatting during the night between 85-87.        Is this a stroke patient? yes- Stroke booklet reviewed with patient and family, risk factors identified for patient and stroke booklet remains at bedside for ongoing education.     Neuro:  [place] Coma Scale  Best Eye Response: 4-->(E4) spontaneous  Best Motor Response: 6-->(M6) obeys commands  Best Verbal Response: 5-->(V5) oriented  Joseph Coma Scale Score: 15  Assessment Qualifiers: patient not sedated/intubated  Pupil PERRLA: yes     24hr Temp:  [97.4 °F (36.3 °C)-98.3 °F (36.8 °C)]     CV:   Rhythm: normal sinus rhythm  BP goals:   SBP < 160  MAP > 65    Resp:   O2 Device (Oxygen Therapy): High Flow nasal Cannula       Plan: N/A    GI/:     Diet/Nutrition Received: NPO, sips of water  Last Bowel Movement: 11/21/22  Voiding Characteristics: external catheter    Intake/Output Summary (Last 24 hours) at 11/23/2022 0624  Last data filed at 11/23/2022 0605  Gross per 24 hour   Intake 910.85 ml   Output 975 ml   Net -64.15 ml     Unmeasured Output  Stool Occurrence: 0    Labs/Accuchecks:  Recent Labs   Lab 11/23/22  0329   WBC 6.92   RBC 4.11*   HGB 12.5*   HCT 38.2*         Recent Labs   Lab 11/22/22  0335 11/23/22  0329    140   K 3.4* 3.3*   CO2 24 24    103   BUN 11 7   CREATININE 0.9 0.7   ALKPHOS 63  --    ALT 12  --    AST 14  --    BILITOT 0.8  --       Recent Labs   Lab 11/22/22  1045   INR 1.1   APTT 29.2    No results for input(s): CPK, CPKMB, TROPONINI, MB in the last 168 hours.    Electrolytes: No replacement orders  Accuchecks: Q6H    Gtts:   niCARdipine 7.5 mg/hr (11/23/22 0204)        LDA/Wounds:  Lines/Drains/Airways       Drain  Duration             Male External Urinary Catheter 11/22/22 1115 <1 day              Peripheral Intravenous Line  Duration                  Peripheral IV - Single Lumen 11/22/22 18 G Anterior;Left Forearm 1 day         Peripheral IV - Single Lumen 11/22/22 18 G Anterior;Proximal;Right Forearm 1 day                  Wounds: No  Wound care consulted: No

## 2022-11-23 NOTE — SUBJECTIVE & OBJECTIVE
Neurologic Chief Complaint: Left Pontine ICH     Subjective:     Interval History: Patient is seen for follow-up neurological assessment and treatment recommendations:     Patient in NCC. Neuro exam is stable; still with dysarthria, mild right sided weakness and numbness. Echo with EF 45%, no LAE. Etiology likely HTN. Dispo recommendations from therapy for IPR. Patient currently off of Cardene, once stable without IV anti-HTN medications may step down to floor. Possible step down this evening pending BP    HPI, Past Medical, Family, and Social History remains the same as documented in the initial encounter.     Review of Systems   Constitutional:  Negative for fever.   HENT:  Negative for trouble swallowing.    Eyes:  Negative for visual disturbance.   Respiratory:  Negative for cough.    Cardiovascular:  Negative for leg swelling.   Gastrointestinal:  Negative for vomiting.   Neurological:  Positive for speech difficulty, weakness and numbness. Negative for facial asymmetry and headaches.   Psychiatric/Behavioral:  Negative for agitation.    Scheduled Meds:   amLODIPine  10 mg Oral Daily    enoxaparin  40 mg Subcutaneous Daily    lisinopriL  10 mg Oral Daily    nicotine  1 patch Transdermal Daily    senna-docusate 8.6-50 mg  1 tablet Oral BID     Continuous Infusions:   niCARdipine Stopped (11/23/22 1205)     PRN Meds:albuterol-ipratropium, benzonatate, dextrose 10%, dextrose 10%, glucagon (human recombinant), hydrALAZINE, insulin aspart U-100, labetalol, magnesium oxide, magnesium oxide, potassium bicarbonate, potassium bicarbonate, potassium bicarbonate, potassium, sodium phosphates, potassium, sodium phosphates, potassium, sodium phosphates, sodium chloride 0.9%    Objective:     Vital Signs (Most Recent):  Temp: 98.2 °F (36.8 °C) (11/23/22 1105)  Pulse: 77 (11/23/22 1405)  Resp: 16 (11/23/22 1405)  BP: (!) 162/77 (11/23/22 1405)  SpO2: 99 % (11/23/22 1405)  BP Location: Right arm    Vital Signs Range (Last  "24H):  Temp:  [98 °F (36.7 °C)-98.2 °F (36.8 °C)]   Pulse:  [71-94]   Resp:  [15-36]   BP: (132-175)/(63-90)   SpO2:  [88 %-99 %]   BP Location: Right arm    Physical Exam  Vitals reviewed.   Constitutional:       General: He is not in acute distress.     Appearance: Normal appearance.   HENT:      Head: Normocephalic and atraumatic.      Mouth/Throat:      Mouth: Mucous membranes are moist.   Eyes:      Extraocular Movements: Extraocular movements intact.      Pupils: Pupils are equal, round, and reactive to light.   Cardiovascular:      Rate and Rhythm: Normal rate.   Pulmonary:      Effort: Pulmonary effort is normal. No respiratory distress.   Skin:     General: Skin is warm and dry.   Neurological:      Mental Status: He is alert and oriented to person, place, and time.      GCS: GCS eye subscore is 4. GCS verbal subscore is 5. GCS motor subscore is 6.      Cranial Nerves: Dysarthria present. No facial asymmetry.      Sensory: Sensory deficit present.      Motor: Weakness present.   Psychiatric:         Mood and Affect: Mood normal.         Behavior: Behavior normal.       Neurological Exam:   LOC: alert  Attention Span: Good   Language: No aphasia  Articulation: Dysarthria  Orientation: Person, Place, Time   Facial Movement (CN VII): Symmetric facial expression    Motor: Arm left  Normal 5/5  Leg left  Normal 5/5  Arm right  Paresis: 4/5  Leg right Paresis: 4/5  Sensation: Decreased sensation on right side; patient reports arm and leg have "tight" sensation    Laboratory:  CMP:   Recent Labs   Lab 11/23/22  0329   CALCIUM 8.9      K 3.3*   CO2 24      BUN 7   CREATININE 0.7     BMP:   Recent Labs   Lab 11/23/22  0329      K 3.3*      CO2 24   BUN 7   CREATININE 0.7   CALCIUM 8.9     CBC:   Recent Labs   Lab 11/23/22  0329   WBC 6.92   RBC 4.11*   HGB 12.5*   HCT 38.2*      MCV 93   MCH 30.4   MCHC 32.7     Lipid Panel:   Recent Labs   Lab 11/22/22  1045   CHOL 144   LDLCALC 92.0 "   HDL 41   TRIG 55     Coagulation:   Recent Labs   Lab 11/22/22  1045   INR 1.1   APTT 29.2     Platelet Aggregation Study: No results for input(s): PLTAGG, PLTAGINTERP, PLTAGREGLACO, ADPPLTAGGREG in the last 168 hours.  Hgb A1C:   Recent Labs   Lab 11/22/22  1045   HGBA1C 6.6*     TSH:   Recent Labs   Lab 11/22/22  1045   TSH 0.536       Diagnostic Results     Brain Imaging   CTH Without Contrast 11/22/2022  -Allowing for distortion by motion artifact relatively stable hyperdense collection within the central and left paramedian alf concerning for acute pontine hemorrhage.  No definite acute extra-axial hemorrhage allowing for limitation by artifacts.  Clinical correlation and further evaluation with MRI recommended if patient compatible   -Small regions of hypoattenuation left caudate and bilateral basal ganglia most compatible with remote lacunar-type infarcts.     CTH Without Contrast 11/22/2022  1. Acute pontine intraparenchymal hemorrhage.  No significant mass effect or intraventricular hemorrhage.  2.  Mild generalized cerebral volume loss and scattered supratentorial white matter hypoattenuation, nonspecific and may reflect sequelae of chronic microvascular ischemic change.  3. Bilateral basal ganglia and right paramedian pontine chronic lacunar infarcts versus perivascular spaces.    Vessel Imaging   None    Cardiac Imaging   TTE 11/23/2022  The left ventricle is normal in size with concentric hypertrophy and mildly decreased systolic function.  The estimated ejection fraction is 45%.  Grade I left ventricular diastolic dysfunction.  Normal right ventricular size with mildly reduced right ventricular systolic function.  The quantitatively derived ejection fraction is 45%.  Normal central venous pressure (3 mmHg).

## 2022-11-23 NOTE — PT/OT/SLP EVAL
"Occupational Therapy  Co Evaluation    Name: Hernan Badillo  MRN: [id number]  Admitting Diagnosis:  Left-sided nontraumatic intracerebral hemorrhage of brainstem  Recent Surgery: * No surgery found *      Recommendations:     Discharge Recommendations: rehabilitation facility  Discharge Equipment Recommendations:  walker, rolling, wheelchair, bedside commode  Barriers to discharge:  None  Co-evaluation/treatment performed due to patient's multiple deficits requiring two skilled therapists to appropriately and safely assess patient's strength and endurance while facilitating functional tasks in addition to accommodating for patient's activity tolerance.     Assessment:     Hernan Badillo is a 57 y.o. male with a medical diagnosis of Left-sided nontraumatic intracerebral hemorrhage of brainstem.  He presents with [place]q, confusion, requires redirection to stay on task, poor safety, poor insight, impulsive, sweetly uses humor during session. Performance deficits affecting function: weakness, gait instability, decreased upper extremity function, impaired endurance, impaired balance, impaired self care skills, impaired functional mobility, decreased coordination, impaired cognition, decreased safety awareness, impaired sensation.  Follows one step commands 50% of time affected by Alutiiq and need for frequent redirection.     Rehab Prognosis: Good; patient would benefit from acute skilled OT services to address these deficits and reach maximum level of function.       Plan:     Patient to be seen 4 x/week to address the above listed problems via self-care/home management, therapeutic activities, therapeutic exercises, neuromuscular re-education  Plan of Care Expires: 12/23/22  Plan of Care Reviewed with: patient, sibling    Subjective     Chief Complaint: "I can move everything" "My right arm and leg are tingly."   Patient/Family Comments/goals: not stated    Occupational Profile:  Living Environment: [facility], 3 SHY[address], bilateral hand railing, " "tub/shower  Previous level of function: independent in home and community including driving.   Equipment Used at Home:  none  Assistance upon Discharge: son 13 year old and brother    Pain/Comfort:  Pain Rating 1: 0/10  Pain Rating Post-Intervention 1: 0/10    Patients cultural, spiritual, Jewish conflicts given the current situation: no    Objective:     Communicated with: RN prior to session.  Patient found HOB elevated with bed alarm, blood pressure cuff, pulse ox (continuous), SCD, telemetry, peripheral IV upon OT entry to room.    General Precautions: Standard, fall   Orthopedic Precautions:N/A   Braces: N/A  Respiratory Status: Room air    Occupational Performance:    Bed Mobility:    Patient completed Supine to Sit with minimum assistance  Patient completed Sit to Supine with contact guard assistance  EOB sit with close CGA for safety, patient very impulsive with movements.    Functional Mobility/Transfers:  Patient completed Sit <> Stand Transfer with minimum assistance  with  hand-held assist   Functional Mobility: very impulsive, stomping right foot to increase sensation, ataxic movement in BLE, unable to safely side step mod (A) of two    Activities of Daily Living:  Feeding:  contact guard assistance cues to initiate, spillage with left UE  Grooming: contact guard assistance cues for safety HOB elevated  Upper Body Dressing: moderate assistance to redirect, don, sequence  Lower Body Dressing: maximal assistance with socks "I wear slippers at homE"   Toileting: contact guard assistance with urinal    Cognitive/Visual Perceptual:  Cognitive/Psychosocial Skills:     -       Oriented to: Person, Place, Time, and Situation   -       Follows Commands/attention:Easily distracted  -       Memory: No Deficits noted  -       Safety awareness/insight to disability: impaired     Physical Exam:  Upper Extremity Range of Motion:     -       Right Upper Extremity: WFL  -       Left Upper Extremity: WFL  Upper " Extremity Strength:    -       Right Upper Extremity: WFL  -       Left Upper Extremity: WFL    AMPAC 6 Click ADL:  AMPAC Total Score: 15    Treatment & Education:  Pt educated on role of OT, POC, and goals for therapy.    POC was dicussed with patient/caregiver, who was included in its development and is in agreement with the identified goals and treatment plan.   Patient and family aware of patient's deficits and therapy progression.   Time provided for therapeutic counseling and discussion of health disposition.   Educated on importance of EOB/OOB mobility, maintaining routine, sitting up in chair, and maximizing independence with ADLs during admission   Pt completed ADLs and functional mobility for treatment session as noted above   Pt/caregiver verbalized understanding and expressed no further concerns/questions.        Patient left HOB elevated with all lines intact, call button in reach, bed alarm on, RN notified, and family present    GOALS:   Multidisciplinary Problems       Occupational Therapy Goals          Problem: Occupational Therapy    Goal Priority Disciplines Outcome Interventions   Occupational Therapy Goal     OT, PT/OT Ongoing, Progressing    Description: Goals to be met by: 12/23/22     Patient will increase functional independence with ADLs by performing:    Feeding with Set-up Assistance.  UE Dressing with Contact Guard Assistance.  LE Dressing with Minimal Assistance.  Grooming while standing at sink with Minimal Assistance.  Toileting from bedside commode with Minimal Assistance for hygiene and clothing management.   Toilet transfer to bedside commode with Moderate Assistance.                         History:     Past Medical History:   Diagnosis Date    COPD (chronic obstructive pulmonary disease)     Diabetes mellitus, type 2     Hypertension          Past Surgical History:   Procedure Laterality Date    DENTAL SURGERY      ESOPHAGOGASTRODUODENOSCOPY N/A 6/1/2020    Procedure: EGD  (ESOPHAGOGASTRODUODENOSCOPY);  Surgeon: Terrell May MD;  [facility];  Service: Endoscopy;  Laterality: N/A;    HERNIA REPAIR      left inguinal    incision and drainiage         Time Tracking:     OT Date of Treatment: 11/23/22  OT Start Time: 1035  OT Stop Time: 1106  OT Total Time (min): 31 min    Billable Minutes:Evaluation 5  Self Care/Home Management 13  Therapeutic Activity 13    11/23/2022

## 2022-11-23 NOTE — PLAN OF CARE
Recommendations     1. Continue current mechanical soft diet with diabetic restrictions- encourage adequate PO intake.   2. If PO intake <50%, add Boost Glucose Control TID.   3. RD following.     Goals: Will meet % EEN/EPN by next RD f/u.  Nutrition Goal Status: new  Communication of RD Recs:  (POC)    Nydia Guidry, Registration Eligible, Provisional LDN

## 2022-11-23 NOTE — PLAN OF CARE
Dental soft diet with thin liquids recommended  Problem: SLP  Goal: SLP Goal  Description: Goals due 11/30  1.  Tolerate dental soft diet with thin liquids with no s/s of aspiration  2.  Participate in speech language evaluation  Outcome: Ongoing, Progressing

## 2022-11-23 NOTE — HOSPITAL COURSE
11/23/2022 Patient in NCC. Neuro exam is stable; still with dysarthria, mild right sided weakness and numbness. Echo with EF 45%, no LAE. Etiology likely HTN. Dispo recommendations from therapy for IPR. Patient currently off of Cardene, once stable without IV anti-HTN medications may step down to floor. Possible step down this evening pending BP.     11/24/2022 - no acute events.  Patient drowsy.  Some shortness of breath will schedule breathing treatments.  Chest x-ray with subtly increased interstitial opacities    11/25/2022 -  had complaints overnight of headache and diplopia.  Was given magnesium for headache management with resolution of complaints.  This morning no complaints.  Awaiting IPR placement

## 2022-11-23 NOTE — ASSESSMENT & PLAN NOTE
56 yo male with PMHx of HTN, PAD, DM, smoking, COPD presenting as a transfer for ponto-medullary, posterior ICH L>R identified on CTH after symptoms of sudden onset ringing, dysarthria and RSW that began at 02:55 on 11/22/22. 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 However, a cavernoma is also on the differential given that patient's symptoms are less severe than would otherwise be expected from a finding of this size on imaging.    Patient in NCC. Neuro exam is stable; still with dysarthria, mild right sided weakness and numbness. Echo with EF 45%, no LAE. Etiology likely HTN. Dispo recommendations from therapy for IPR. Patient currently off of Cardene, once stable without IV anti-HTN medications for minimum of six hours patient may step down to floor. Possible step down this evening pending BP      Antithrombotics for secondary stroke prevention: Antiplatelets: Aspirin: 81 mg daily  Clopidogrel: 75 mg daily hold in the setting of likely bleed    Statins for secondary stroke prevention and hyperlipidemia, if present:   Statins: None: Reason: in the setting of likely bleed at the moment    Aggressive risk factor modification: HTN, Smoking, DM     Rehab efforts: The patient has been evaluated by a stroke team provider and the therapy needs have been fully considered based off the presenting complaints and exam findings. The following therapy evaluations are needed: PT evaluate and treat, OT evaluate and treat, SLP evaluate and treat    Diagnostics ordered/pending: None    VTE prophylaxis: Enoxaparin 40 mg SQ every 24 hours  Mechanical prophylaxis: Place SCDs     BP parameters: ICH: SBP <140

## 2022-11-23 NOTE — PLAN OF CARE
Problem: Physical Therapy  Goal: Physical Therapy Goal  Description: Goals to be met by: 2022     Patient will increase functional independence with mobility by performin. Supine to sit with [place]  2. Sit to stand transfer with Stand-by Assistance  3. Gait  x 25 feet with Minimal Assistance using LRAD.   4. Ascend/descend 3 stair with bilateral Handrails Minimal Assistance using LRAD.   5. Lower extremity exercise program x15 reps per handout, with supervision    Outcome: Ongoing, Progressing   Goals established following initial evaluation.    Eleanor Sung, SPT  2022

## 2022-11-23 NOTE — PROGRESS NOTES
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 However, a cavernoma is also on the differential given that patient's symptoms are less severe than would otherwise be expected from a finding of this size on imaging.    Patient in NCC. Neuro exam is stable; still with dysarthria, mild right sided weakness and numbness. Echo with EF 45%, no LAE. Etiology likely HTN. Dispo recommendations from therapy for IPR. Patient currently off of Cardene, once stable without IV anti-HTN medications for minimum of six hours patient may step down to floor. Possible step down this evening pending BP      Antithrombotics for secondary stroke prevention: Antiplatelets: Aspirin: 81 mg daily  Clopidogrel: 75 mg daily hold in the setting of likely bleed    Statins for secondary stroke prevention and hyperlipidemia, if present:   Statins: None: Reason: in the setting of likely bleed at the moment    Aggressive risk factor modification: HTN, Smoking, DM     Rehab efforts: The patient has been evaluated by a stroke team provider and the therapy needs have been fully considered based off the presenting complaints and exam findings. The following therapy evaluations are needed: PT evaluate and treat, OT evaluate and treat, SLP evaluate and treat    Diagnostics ordered/pending: None    VTE prophylaxis: Enoxaparin 40 mg SQ every 24 hours  Mechanical prophylaxis: Place SCDs     BP parameters: ICH: SBP <140        Tobacco dependency  Since age of 15, 3  PPD  Counseled on importance of smoking cessation moving forward    COPD (chronic obstructive pulmonary disease)  History of.    Type 2 diabetes mellitus  A1c 6.6  SSI  Inpatient BG Goal 140-180    Primary hypertension  -Cardene off  -Amlodipine 10, Lisinopril 10     Tight BP control: SBP < 140 in setting of ICH           11/23/2022 Patient in [facility]. Neuro exam is stable; still with dysarthria, mild right sided weakness and numbness. Echo with EF 45%, no LAE. Etiology likely HTN. Dispo recommendations from therapy for IPR. Patient currently off of Cardene, once stable without IV anti-HTN medications may step down to floor. Possible step down this evening pending BP.         STROKE DOCUMENTATION        NIH Scale:  1a. Level of Consciousness: 0-->Alert, keenly responsive  1b. LOC Questions: 0-->Answers both questions correctly  1c. LOC Commands: 0-->Performs both tasks correctly  2. Best Gaze: 1-->Partial gaze palsy, gaze is abnormal in one or both eyes, but forced deviation or total gaze paresis is not present  3. Visual: 0-->No visual loss  4. Facial Palsy: 0-->Normal symmetrical movements  5a. Motor Arm, Left: 0-->No drift, limb holds 90 (or 45) degrees for full 10 secs  5b. Motor Arm, Right: 1-->Drift, limb holds 90 (or 45) degrees, but drifts down before full 10 secs, does not hit bed or other support  6a. Motor Leg, Left: 0-->No drift, leg holds 30 degree position for full 5 secs  6b. Motor Leg, Right: 1-->Drift, leg falls by the end of the 5-sec period but does not hit bed  7. Limb Ataxia: 0-->Absent  8. Sensory: 1-->Mild-to-moderate sensory loss, patient feels pinprick is less sharp or is dull on the affected side, or there is a loss of superficial pain with pinprick, but patient is aware of being touched  9. Best Language: 0-->No aphasia, normal  10. Dysarthria: 1-->Mild-to-moderate dysarthria, patient slurs at least some words and, at worst, can be understood with some difficulty  11. Extinction and  Inattention (formerly Neglect): 0-->No abnormality  Total (NIH Stroke Scale): 5       Modified Yasmani    Joseph Coma Scale:    ABCD2 Score:    [id number] Score:   HAS -BLED Score:   ICH Score:1  Hunt & Cho Classification:      Hemorrhagic change of an Ischemic Stroke: Does this patient have an ischemic stroke with hemorrhagic changes? No     Neurologic Chief Complaint: Left Pontine ICH     Subjective:     Interval History: Patient is seen for follow-up neurological assessment and treatment recommendations:     Patient in NCC. Neuro exam is stable; still with dysarthria, mild right sided weakness and numbness. Echo with EF 45%, no LAE. Etiology likely HTN. Dispo recommendations from therapy for IPR. Patient currently off of Cardene, once stable without IV anti-HTN medications may step down to floor. Possible step down this evening pending BP    HPI, Past Medical, Family, and Social History remains the same as documented in the initial encounter.     Review of Systems   Constitutional:  Negative for fever.   HENT:  Negative for trouble swallowing.    Eyes:  Negative for visual disturbance.   Respiratory:  Negative for cough.    Cardiovascular:  Negative for leg swelling.   Gastrointestinal:  Negative for vomiting.   Neurological:  Positive for speech difficulty, weakness and numbness. Negative for facial asymmetry and headaches.   Psychiatric/Behavioral:  Negative for agitation.    Scheduled Meds:   amLODIPine  10 mg Oral Daily    enoxaparin  40 mg Subcutaneous Daily    lisinopriL  10 mg Oral Daily    nicotine  1 patch Transdermal Daily    senna-docusate 8.6-50 mg  1 tablet Oral BID     Continuous Infusions:   niCARdipine Stopped (11/23/22 1205)     PRN Meds:albuterol-ipratropium, benzonatate, dextrose 10%, dextrose 10%, glucagon (human recombinant), hydrALAZINE, insulin aspart U-100, labetalol, magnesium oxide, magnesium oxide, potassium bicarbonate, potassium bicarbonate, potassium bicarbonate, potassium,  "sodium phosphates, potassium, sodium phosphates, potassium, sodium phosphates, sodium chloride 0.9%    Objective:     Vital Signs (Most Recent):  Temp: 98.2 °F (36.8 °C) (11/23/22 1105)  Pulse: 77 (11/23/22 1405)  Resp: 16 (11/23/22 1405)  BP: (!) 162/77 (11/23/22 1405)  SpO2: 99 % (11/23/22 1405)  BP Location: Right arm    Vital Signs Range (Last 24H):  Temp:  [98 °F (36.7 °C)-98.2 °F (36.8 °C)]   Pulse:  [71-94]   Resp:  [15-36]   BP: (132-175)/(63-90)   SpO2:  [88 %-99 %]   BP Location: Right arm    Physical Exam  Vitals reviewed.   Constitutional:       General: He is not in acute distress.     Appearance: Normal appearance.   HENT:      Head: Normocephalic and atraumatic.      Mouth/Throat:      Mouth: Mucous membranes are moist.   Eyes:      Extraocular Movements: Extraocular movements intact.      Pupils: Pupils are equal, round, and reactive to light.   Cardiovascular:      Rate and Rhythm: Normal rate.   Pulmonary:      Effort: Pulmonary effort is normal. No respiratory distress.   Skin:     General: Skin is warm and dry.   Neurological:      Mental Status: He is alert and oriented to person, place, and time.      GCS: GCS eye subscore is 4. GCS verbal subscore is 5. GCS motor subscore is 6.      Cranial Nerves: Dysarthria present. No facial asymmetry.      Sensory: Sensory deficit present.      Motor: Weakness present.   Psychiatric:         Mood and Affect: Mood normal.         Behavior: Behavior normal.       Neurological Exam:   LOC: alert  Attention Span: Good   Language: No aphasia  Articulation: Dysarthria  Orientation: Person, Place, Time   Facial Movement (CN VII): Symmetric facial expression    Motor: Arm left  Normal 5/5  Leg left  Normal 5/5  Arm right  Paresis: 4/5  Leg right Paresis: 4/5  Sensation: Decreased sensation on right side; patient reports arm and leg have "tight" sensation    Laboratory:  CMP:   Recent Labs   Lab 11/23/22  0329   CALCIUM 8.9      K 3.3*   CO2 24      BUN " 7   CREATININE 0.7     BMP:   Recent Labs   Lab 11/23/22  0329      K 3.3*      CO2 24   BUN 7   CREATININE 0.7   CALCIUM 8.9     CBC:   Recent Labs   Lab 11/23/22  0329   WBC 6.92   RBC 4.11*   HGB 12.5*   HCT 38.2*      MCV 93   MCH 30.4   MCHC 32.7     Lipid Panel:   Recent Labs   Lab 11/22/22  1045   CHOL 144   LDLCALC 92.0   HDL 41   TRIG 55     Coagulation:   Recent Labs   Lab 11/22/22  1045   INR 1.1   APTT 29.2     Platelet Aggregation Study: No results for input(s): PLTAGG, PLTAGINTERP, PLTAGREGLACO, ADPPLTAGGREG in the last 168 hours.  Hgb A1C:   Recent Labs   Lab 11/22/22  1045   HGBA1C 6.6*     TSH:   Recent Labs   Lab 11/22/22  1045   TSH 0.536       Diagnostic Results     Brain Imaging   CTH Without Contrast 11/22/2022  -Allowing for distortion by motion artifact relatively stable hyperdense collection within the central and left paramedian alf concerning for acute pontine hemorrhage.  No definite acute extra-axial hemorrhage allowing for limitation by artifacts.  Clinical correlation and further evaluation with MRI recommended if patient compatible   -Small regions of hypoattenuation left caudate and bilateral basal ganglia most compatible with remote lacunar-type infarcts.     CTH Without Contrast 11/22/2022  1. Acute pontine intraparenchymal hemorrhage.  No significant mass effect or intraventricular hemorrhage.  2.  Mild generalized cerebral volume loss and scattered supratentorial white matter hypoattenuation, nonspecific and may reflect sequelae of chronic microvascular ischemic change.  3. Bilateral basal ganglia and right paramedian pontine chronic lacunar infarcts versus perivascular spaces.    Vessel Imaging   None    Cardiac Imaging   TTE 11/23/2022   The left ventricle is normal in size with concentric hypertrophy and mildly decreased systolic function.   The estimated ejection fraction is 45%.   Grade I left ventricular diastolic dysfunction.   Normal right  ventricular size with mildly reduced right ventricular systolic function.   The quantitatively derived ejection fraction is 45%.   Normal central venous pressure (3 mmHg).      Dang Isaac PA-C  [facility]  Department of Vascular Neurology   [facility] - Neuro Critical Care

## 2022-11-23 NOTE — CONSULTS
"[facility] - Neuro Critical Care  Adult Nutrition  Consult Note    SUMMARY     Recommendations    1. Continue current mechanical soft diet with diabetic restrictions- encourage adequate PO intake.   2. If PO intake <50%, add Boost Glucose Control TID.   3. RD following.    Goals: Will meet % EEN/EPN by next RD f/u.  Nutrition Goal Status: new  Communication of RD Recs:  (POC)    Assessment and Plan    No nutrition dxn at this time.    Reason for Assessment    Reason For Assessment: consult  Diagnosis:  (Left-sided nontraumatic intracerebral hemorrhage of brainstem)  Relevant Medical History: HTN, T2DM, COPD  Interdisciplinary Rounds: did not attend  General Information Comments: RD consulted to assess dietary needs. Unable to speak with pt 2/2 being unarousable to stimuli during RD visit attempts. Noted pt on mechanical soft diet with diabetic restrictions- unsure PO intake at this time. No issues with n/v/v/d. Wt on 8/11/22 170# but wt seems to have remained stable at 166# x 1 month. No s/s of malnutrition at this time- pt appears well-nourished. Will perform NFPE at next RD f/u if warranted. LBM 11/21.  Nutrition Discharge Planning: Diabetic diet with texture per SLP    Nutrition Risk Screen    Nutrition Risk Screen: dysphagia or difficulty swallowing    Nutrition/Diet History    Spiritual, Cultural Beliefs, Yarsani Practices, Values that Affect Care: no  Food Allergies: NKFA  Factors Affecting Nutritional Intake: None identified at this time    Anthropometrics    Temp: 98.1 °F (36.7 °C)  Height: 5' 9" (175.3 cm)  Height (inches): 69 in  Weight Method: Bed Scale  Weight: 75.3 kg (166 lb 0.1 oz)  Weight (lb): 166.01 lb  Ideal Body Weight (IBW), Male: 160 lb  % Ideal Body Weight, Male (lb): 103.76 %  BMI (Calculated): 24.5  BMI Grade: 18.5-24.9 - normal     Lab/Procedures/Meds    Pertinent Labs Comments: Glucose 123, A1C 6.6, Potassium  Pertinent Medications Comments: amlodipine, " senna-docusate    Estimated/Assessed Needs    Weight Used For Calorie Calculations: 75.3 kg (166 lb 0.1 oz)  Energy Calorie Requirements (kcal): 1883 kcals  Energy Need Method: Kcal/kg (25 kcal/kg)  Protein Requirements: 75 g  Weight Used For Protein Calculations: 75.3 kg (166 lb 0.1 oz)  Fluid Requirements (mL): 1 ml or fluid per MD  Estimated Fluid Requirement Method: RDA Method  RDA Method (mL): 1883  CHO Requirement: 235 g    Nutrition Prescription Ordered    Current Diet Order: Mechanical soft with DM restrictions    Evaluation of Received Nutrient/Fluid Intake    I/O: -64.2 ml since admit  Tolerance: tolerating    Nutrition Risk    Level of Risk/Frequency of Follow-up:  (1 time/week)     Monitor and Evaluation    Food and Nutrient Intake: energy intake, food and beverage intake  Food and Nutrient Adminstration: diet order  Knowledge/Beliefs/Attitudes: food and nutrition knowledge/skill, beliefs and attitudes  Physical Activity and Function: nutrition-related ADLs and IADLs  Anthropometric Measurements: height/length, weight, weight change, body mass index  Biochemical Data, Medical Tests and Procedures: electrolyte and renal panel, gastrointestinal profile, glucose/endocrine profile, inflammatory profile, lipid profile  Nutrition-Focused Physical Findings: overall appearance     Nutrition Follow-Up    RD Follow-up?: Yes    Nydia Guidry, Registration Eligible, Provisional LDN

## 2022-11-23 NOTE — PLAN OF CARE
11/23/22 1551   Post-Acute Status   Post-Acute Authorization Placement   Post-Acute Placement Status Referrals Sent  (rehab)     SW met with Pt and Pt brother at bedside. Discussed therapy recs for rehab and provided list. Addressed questions and reported need to send referrals to obtain accepting options. Pt and Pt brother agreeable and will review the list for preferences asap. Pt stated he had been to [facility] (Pt brother said not as Pt) and that is the preference. They also requested SSI disability assistance.    SW sent referrals via [facility] to [facility], [facility], [facility], and [facility].    Sent SSI referral via email.     Josie Salinas LCSW  Neurocritical Care   [facility]  [id number]

## 2022-11-23 NOTE — PLAN OF CARE
Eval and POC in place.   Problem: Occupational Therapy  Goal: Occupational Therapy Goal  Description: Goals to be met by: 12/23/22     Patient will increase functional independence with ADLs by performing:    Feeding with Set-up Assistance.  UE Dressing with Contact Guard Assistance.  LE Dressing with Minimal Assistance.  Grooming while standing at sink with Minimal Assistance.  Toileting from bedside commode with Minimal Assistance for hygiene and clothing management.   Toilet transfer to bedside commode with Moderate Assistance.    Outcome: Ongoing, Progressing

## 2022-11-23 NOTE — NURSING
Pt desatting between 85-87; notified JANELLE Talley and Desi RT; Pt put on high flow NC until 0700.

## 2022-11-23 NOTE — PT/OT/SLP EVAL
"Speech Language Pathology Evaluation  Bedside Swallow    Patient Name:  Hernan Badillo   MRN:  [id number]  Admitting Diagnosis: Left-sided nontraumatic intracerebral hemorrhage of brainstem    Recommendations:                 General Recommendations:  Dysphagia therapy and Cognitive-linguistic evaluation  Diet recommendations:  Dental Soft, Thin   Aspiration Precautions: Assistance with meals, HOB to 90 degrees, Meds crushed in puree, Small bites/sips, Standard aspiration precautions, and Wear oxygen during intake   General Precautions: Standard, aspiration, fall  Communication strategies:  none    History:     Past Medical History:   Diagnosis Date    COPD (chronic obstructive pulmonary disease)     Diabetes mellitus, type 2     Hypertension        Past Surgical History:   Procedure Laterality Date    DENTAL SURGERY      ESOPHAGOGASTRODUODENOSCOPY N/A 6/1/2020    Procedure: EGD (ESOPHAGOGASTRODUODENOSCOPY);  Surgeon: Terrell May MD;  Location: [facility];  Service: Endoscopy;  Laterality: N/A;    HERNIA REPAIR      left inguinal    incision and drainiage         Social History: Patient lives with family.    Prior diet: regular with thin; crushes meds      Subjective     "I can swallow' per pt  "He coughs throughout the day" per family  Patient goals: eat     Pain/Comfort:  Pain Rating 1: 0/10  Pain Rating Post-Intervention 1: 0/10    Respiratory Status: nasal cannula    Objective:     Oral Musculature Evaluation  Oral Musculature: right weakness  Dentition: scattered dentition  Secretion Management: adequate  Mucosal Quality: good  Mandibular Strength and Mobility: WFL  Oral Labial Strength and Mobility: WFL  Lingual Strength and Mobility: WFL  Velar Elevation: WFL  Buccal Strength and Mobility: WFL  Volitional Cough: fair-strong  Volitional Swallow: no delay  Voice Prior to PO Intake: wfl    Bedside Swallow Eval:   Consistencies Assessed:  Thin liquids 2 teaspoons water then self fed 1/2 cup water and 4 ounces of " jucie via stra  Puree 3 teaspoons pudding  Solids self fed 2 cracker      Oral Phase:   WFL    Pharyngeal Phase:   NO coughing noted on bolus'of thin liquid or pureed.  Pt. Noted to have coughing when swallowing one bite of a cracker(x1) and attempted to wash down with a water.  Pt. With cough prior to any po intake with chronic cough reported through out the day.  No change in vocal quality noted following the swallow.  Pt. Noted to have tendency to take multiple swallows and reported that he only drinks with a straw at home.  Compensatory Strategies  na    Treatment: education provided to pt. And family re safe swallow strategies and aspiration precautions .  Recommend that ALL meds be crushed in puree bolus and do not take crushed meds with liquids.     Assessment:     Hernan Badillo is a 57 y.o. male with an SLP diagnosis of Dysphagia.  .    Goals:   Multidisciplinary Problems       SLP Goals          Problem: SLP    Goal Priority Disciplines Outcome   SLP Goal     SLP Ongoing, Progressing   Description: Goals due 11/30  1.  Tolerate dental soft diet with thin liquids with no s/s of aspiration  2.  Participate in speech language evaluation                       Plan:     Patient to be seen:  4 x/week   Plan of Care expires:  12/21/22  Plan of Care reviewed with:  patient, family   SLP Follow-Up:  Yes       Discharge recommendations:  rehabilitation facility   Barriers to Discharge:  None    Time Tracking:     SLP Treatment Date:   11/23/22  Speech Start Time:  0720  Speech Stop Time:  0739     Speech Total Time (min):  19 min    Billable Minutes: Eval Swallow and Oral Function 10 and Self Care/Home Management Training 9    11/23/2022

## 2022-11-24 LAB
ANION GAP SERPL CALC-SCNC: 8 MMOL/L (ref 8–16)
BASOPHILS # BLD AUTO: 0.01 K/UL (ref 0–0.2)
BASOPHILS NFR BLD: 0.2 % (ref 0–1.9)
BUN SERPL-MCNC: 10 MG/DL (ref 6–20)
CALCIUM SERPL-MCNC: 9.5 MG/DL (ref 8.7–10.5)
CHLORIDE SERPL-SCNC: 103 MMOL/L (ref 95–110)
CO2 SERPL-SCNC: 25 MMOL/L (ref 23–29)
CREAT SERPL-MCNC: 0.7 MG/DL (ref 0.5–1.4)
DIFFERENTIAL METHOD: ABNORMAL
EOSINOPHIL # BLD AUTO: 0.1 K/UL (ref 0–0.5)
EOSINOPHIL NFR BLD: 1.8 % (ref 0–8)
ERYTHROCYTE [DISTWIDTH] IN BLOOD BY AUTOMATED COUNT: 13.3 % (ref 11.5–14.5)
EST. GFR  (NO RACE VARIABLE): >60 ML/MIN/1.73 M^2
GLUCOSE SERPL-MCNC: 92 MG/DL (ref 70–110)
HCT VFR BLD AUTO: 39.4 % (ref 40–54)
HGB BLD-MCNC: 12.5 G/DL (ref 14–18)
IMM GRANULOCYTES # BLD AUTO: 0.02 K/UL (ref 0–0.04)
IMM GRANULOCYTES NFR BLD AUTO: 0.4 % (ref 0–0.5)
LYMPHOCYTES # BLD AUTO: 1.2 K/UL (ref 1–4.8)
LYMPHOCYTES NFR BLD: 22.7 % (ref 18–48)
MAGNESIUM SERPL-MCNC: 1.8 MG/DL (ref 1.6–2.6)
MCH RBC QN AUTO: 30.3 PG (ref 27–31)
MCHC RBC AUTO-ENTMCNC: 31.7 G/DL (ref 32–36)
MCV RBC AUTO: 95 FL (ref 82–98)
MONOCYTES # BLD AUTO: 0.4 K/UL (ref 0.3–1)
MONOCYTES NFR BLD: 7.3 % (ref 4–15)
NEUTROPHILS # BLD AUTO: 3.7 K/UL (ref 1.8–7.7)
NEUTROPHILS NFR BLD: 67.6 % (ref 38–73)
NRBC BLD-RTO: 0 /100 WBC
PHOSPHATE SERPL-MCNC: 2.9 MG/DL (ref 2.7–4.5)
PLATELET # BLD AUTO: 169 K/UL (ref 150–450)
PMV BLD AUTO: 11.1 FL (ref 9.2–12.9)
POCT GLUCOSE: 141 MG/DL (ref 70–110)
POCT GLUCOSE: 173 MG/DL (ref 70–110)
POCT GLUCOSE: 83 MG/DL (ref 70–110)
POCT GLUCOSE: 94 MG/DL (ref 70–110)
POTASSIUM SERPL-SCNC: 4.3 MMOL/L (ref 3.5–5.1)
RBC # BLD AUTO: 4.13 M/UL (ref 4.6–6.2)
SODIUM SERPL-SCNC: 136 MMOL/L (ref 136–145)
WBC # BLD AUTO: 5.46 K/UL (ref 3.9–12.7)

## 2022-11-24 PROCEDURE — 36415 COLL VENOUS BLD VENIPUNCTURE: CPT

## 2022-11-24 PROCEDURE — 25000242 PHARM REV CODE 250 ALT 637 W/ HCPCS: Performed by: STUDENT IN AN ORGANIZED HEALTH CARE EDUCATION/TRAINING PROGRAM

## 2022-11-24 PROCEDURE — 85025 COMPLETE CBC W/AUTO DIFF WBC: CPT

## 2022-11-24 PROCEDURE — 99233 SBSQ HOSP IP/OBS HIGH 50: CPT | Mod: GC,,, | Performed by: PSYCHIATRY & NEUROLOGY

## 2022-11-24 PROCEDURE — 83735 ASSAY OF MAGNESIUM: CPT

## 2022-11-24 PROCEDURE — 84100 ASSAY OF PHOSPHORUS: CPT

## 2022-11-24 PROCEDURE — 25000003 PHARM REV CODE 250

## 2022-11-24 PROCEDURE — 80048 BASIC METABOLIC PNL TOTAL CA: CPT

## 2022-11-24 PROCEDURE — 94799 UNLISTED PULMONARY SVC/PX: CPT

## 2022-11-24 PROCEDURE — 94667 MNPJ CHEST WALL 1ST: CPT

## 2022-11-24 PROCEDURE — 94761 N-INVAS EAR/PLS OXIMETRY MLT: CPT

## 2022-11-24 PROCEDURE — 27200966 HC CLOSED SUCTION SYSTEM

## 2022-11-24 PROCEDURE — 94640 AIRWAY INHALATION TREATMENT: CPT

## 2022-11-24 PROCEDURE — 63600175 PHARM REV CODE 636 W HCPCS

## 2022-11-24 PROCEDURE — 27000221 HC OXYGEN, UP TO 24 HOURS

## 2022-11-24 PROCEDURE — 63600175 PHARM REV CODE 636 W HCPCS: Performed by: PHYSICIAN ASSISTANT

## 2022-11-24 PROCEDURE — 99900035 HC TECH TIME PER 15 MIN (STAT)

## 2022-11-24 PROCEDURE — 11000001 HC ACUTE MED/SURG PRIVATE ROOM

## 2022-11-24 PROCEDURE — 25000003 PHARM REV CODE 250: Performed by: PHYSICIAN ASSISTANT

## 2022-11-24 PROCEDURE — S4991 NICOTINE PATCH NONLEGEND: HCPCS

## 2022-11-24 PROCEDURE — 99233 PR SUBSEQUENT HOSPITAL CARE,LEVL III: ICD-10-PCS | Mod: GC,,, | Performed by: PSYCHIATRY & NEUROLOGY

## 2022-11-24 RX ORDER — MAGNESIUM SULFATE 1 G/100ML
1 INJECTION INTRAVENOUS ONCE
Status: COMPLETED | OUTPATIENT
Start: 2022-11-24 | End: 2022-11-24

## 2022-11-24 RX ORDER — IPRATROPIUM BROMIDE AND ALBUTEROL SULFATE 2.5; .5 MG/3ML; MG/3ML
3 SOLUTION RESPIRATORY (INHALATION)
Status: DISCONTINUED | OUTPATIENT
Start: 2022-11-24 | End: 2022-11-25 | Stop reason: HOSPADM

## 2022-11-24 RX ADMIN — LISINOPRIL 10 MG: 5 TABLET ORAL at 08:11

## 2022-11-24 RX ADMIN — MAGNESIUM SULFATE HEPTAHYDRATE 1 G: 500 INJECTION, SOLUTION INTRAMUSCULAR; INTRAVENOUS at 10:11

## 2022-11-24 RX ADMIN — NICOTINE 1 PATCH: 14 PATCH, EXTENDED RELEASE TRANSDERMAL at 09:11

## 2022-11-24 RX ADMIN — AMLODIPINE BESYLATE 10 MG: 10 TABLET ORAL at 08:11

## 2022-11-24 RX ADMIN — ENOXAPARIN SODIUM 40 MG: 100 INJECTION SUBCUTANEOUS at 05:11

## 2022-11-24 RX ADMIN — IPRATROPIUM BROMIDE AND ALBUTEROL SULFATE 3 ML: 2.5; .5 SOLUTION RESPIRATORY (INHALATION) at 07:11

## 2022-11-24 RX ADMIN — SENNOSIDES AND DOCUSATE SODIUM 1 TABLET: 50; 8.6 TABLET ORAL at 10:11

## 2022-11-24 RX ADMIN — SENNOSIDES AND DOCUSATE SODIUM 1 TABLET: 50; 8.6 TABLET ORAL at 08:11

## 2022-11-24 NOTE — SUBJECTIVE & OBJECTIVE
Interval History:  NAEON. Placed on high flow briefly overnight but weaned to 3L NC. Cardene weaned to off, dc'd. Replaced K. Mechanical soft diet started. Stable for transfer to floor with VN service.     Review of Systems   Constitutional:  Negative for fever.   HENT:  Positive for hearing loss (baseline). Negative for trouble swallowing.    Eyes:  Negative for visual disturbance.   Respiratory:  Negative for cough and shortness of breath.    Cardiovascular:  Negative for leg swelling.   Gastrointestinal:  Negative for nausea and vomiting.   Musculoskeletal:  Negative for neck pain.   Neurological:  Positive for weakness. Negative for facial asymmetry and headaches.   Psychiatric/Behavioral:  Negative for agitation and confusion.      Objective:     Vitals:  Temp: 98.4 °F (36.9 °C)  Pulse: 81  Rhythm: normal sinus rhythm  BP: (!) 159/88  MAP (mmHg): 117  Resp: 19  SpO2: 97 %  O2 Device (Oxygen Therapy): nasal cannula    Temp  Min: 97.3 °F (36.3 °C)  Max: 98.4 °F (36.9 °C)  Pulse  Min: 71  Max: 94  BP  Min: 132/66  Max: 165/84  MAP (mmHg)  Min: 90  Max: 117  Resp  Min: 15  Max: 52  SpO2  Min: 88 %  Max: 99 %    11/23 0701 - 11/24 0700  In: 175 [I.V.:175]  Out: 700 [Urine:700]   Unmeasured Output  Stool Occurrence: 0       Physical Exam  Vitals and nursing note reviewed.   Constitutional:       General: He is not in acute distress.     Appearance: Normal appearance.      Comments: Well developed. Well nourished. Resting comfortably in bed.   HENT:      Head: Normocephalic and atraumatic.      Right Ear: External ear normal.      Left Ear: External ear normal.      Nose:      Comments: NC in place.     Mouth/Throat:      Mouth: Mucous membranes are moist.      Pharynx: Oropharynx is clear.   Eyes:      Extraocular Movements: Extraocular movements intact.      Pupils: Pupils are equal, round, and reactive to light.   Cardiovascular:      Rate and Rhythm: Normal rate and regular rhythm.   Pulmonary:      Effort: No  respiratory distress.      Comments: On 3L supplemental oxygen via NC  Abdominal:      General: Abdomen is flat. There is no distension.   Musculoskeletal:      Right lower leg: No edema.      Left lower leg: No edema.   Skin:     General: Skin is warm and dry.   Neurological:      Mental Status: He is alert.      Comments: [id number]  AA&Ox4. Speech fluent. Answers questions appropriately. Follows commands briskly.  PERRL. EOMI save for mild weakness of left eye adduction. No facial asymmetry. Significant right-sided hearing loss at baseline.   MICHAEL & AG, RSW significantly improved. Strength 5/5 & SILT in all 4 extremities. Mild pronator drift & dysmetria in RUE.     Gait  exam deferred.    Medications:  Continuous ScheduledamLODIPine, 10 mg, Daily  enoxaparin, 40 mg, Daily  lisinopriL, 10 mg, Daily  nicotine, 1 patch, Daily  senna-docusate 8.6-50 mg, 1 tablet, BID    PRNalbuterol-ipratropium, 3 mL, Q4H PRN  benzonatate, 100 mg, TID PRN  dextrose 10%, 12.5 g, PRN  dextrose 10%, 25 g, PRN  glucagon (human recombinant), 1 mg, PRN  hydrALAZINE, 10 mg, Q4H PRN  insulin aspart U-100, 1-10 Units, QID (AC + HS) PRN  labetalol, 10 mg, Q6H PRN  magnesium oxide, 800 mg, PRN  magnesium oxide, 800 mg, PRN  potassium bicarbonate, 35 mEq, PRN  potassium bicarbonate, 50 mEq, PRN  potassium bicarbonate, 60 mEq, PRN  potassium, sodium phosphates, 2 packet, PRN  potassium, sodium phosphates, 2 packet, PRN  potassium, sodium phosphates, 2 packet, PRN  sodium chloride 0.9%, 10 mL, PRN      Today I personally reviewed pertinent medications, lines/drains/airways, imaging, cardiology results, laboratory results, notably:    Laboratory:  CBC:  Recent Labs   Lab 11/23/22 0329   WBC 6.92   RBC 4.11*   HGB 12.5*   HCT 38.2*      MCV 93   MCH 30.4   MCHC 32.7       CMP:  Recent Labs   Lab 11/23/22 0329   CALCIUM 8.9      K 3.3*   CO2 24      BUN 7   CREATININE 0.7       Endocrine:  Recent Labs     11/22/22  1045   HGBA1C 6.6*    TSH 0.536         Diet  Diet diabetic [facility]; 2000 Calorie; Dysphagia Mechanical Soft (IDDSI Level 5)  Diet diabetic [facility]; 2000 Calorie; Dysphagia Mechanical Soft (IDDSI Level 5)

## 2022-11-24 NOTE — ASSESSMENT & PLAN NOTE
Patient is a 57 y.o. male w/ pmhx of HTN, PAD, DM, tobacco use, COPD who presents as transfer for L pontine ICH. Initially presented w/ sudden onset ringing dysarthria and RSW approximately 0255 11/22. CTH initially revealed 9.1mm x 15.6 mm pontine hemorrhage likely hypertensive etiology given location, patient history, and document SBP of 207 at OSH prior to starting cardene gtt.   -Admit to NCC for closer monitoring   -Hourly Neuro checks and VS  -Vascular Neurology consult  -Denies any AC/AP use    -ICH Score: 1  -SBP < 160   -Cardene gtt weaned to off, dc'd  -Start amlodipine 10mg qd    -PRN labetalol and hydralazine  -Repeat CTH at 1100 11/22 revealed stable hyperdense central and left paramedian pontine ICH   -EKG and Echo pending   -A1c, TSH, lipid panel pending    -aPTT, PT/INR pending  -Daily CBC, CMP, mag, phos  -PT/OT/SLP  -VTE prophylaxis: mechanical, chemical    -Stable for transfer to floor with VN service

## 2022-11-24 NOTE — NURSING
Nursing Transfer Note       Transfer To room 957 from room 9082    Transfer via bed    Transfer with O2, cardiac monitoring, tele room notified of box number and pt. MRN for monitoring.    Transported by Nurse assist     Medicines sent: yes    Chart sent with patient: Yes    Belongings sent with patient: Brother at bedside states he has all belongings.     Notified: Brother at bedside    Bedside Neuro assessment performed: Yes    Bedside Handoff given to: RADHA Segundo    Upon arrival to floor: cardiac monitor applied, patient oriented to room, call bell in reach, and bed in lowest position.

## 2022-11-24 NOTE — PLAN OF CARE
Problem: Adult Inpatient Plan of Care  Goal: Plan of Care Review  Outcome: Ongoing, Progressing  Goal: Patient-Specific Goal (Individualized)  Description: Admit Date: 11/22/2022    Left-sided nontraumatic intracerebral hemorrhage of brainstem    Past Medical History:  No date: COPD (chronic obstructive pulmonary disease)  No date: Diabetes mellitus, type 2  No date: Hypertension    Past Surgical History:  No date: DENTAL SURGERY  6/1/2020: ESOPHAGOGASTRODUODENOSCOPY; N/A      Comment:  Procedure: EGD (ESOPHAGOGASTRODUODENOSCOPY);  Surgeon:                Terrell May MD;  Location: [facility];  Service:                Endoscopy;  Laterality: N/A;  No date: HERNIA REPAIR      Comment:  left inguinal  No date: incision and drainiage    Individualization:   1. Pt does not like bed to be seated high     Restraints:              Outcome: Ongoing, Progressing  Goal: Absence of Hospital-Acquired Illness or Injury  Outcome: Ongoing, Progressing  Goal: Optimal Comfort and Wellbeing  Outcome: Ongoing, Progressing  Goal: Readiness for Transition of Care  Outcome: Ongoing, Progressing     Problem: Diabetes Comorbidity  Goal: Blood Glucose Level Within Targeted Range  Outcome: Ongoing, Progressing     Problem: Infection (Pneumonia)  Goal: Resolution of Infection Signs and Symptoms  Outcome: Ongoing, Progressing     Problem: Respiratory Compromise (Pneumonia)  Goal: Effective Oxygenation and Ventilation  Outcome: Ongoing, Progressing     Problem: Communication Impairment (Stroke, Hemorrhagic)  Goal: Effective Communication Skills  Outcome: Ongoing, Progressing     Pt remained free from falls or injuries this shift. Pt independent in repositioning. No skin breakdown noticed. Pt rested well through the night, brother at bedside. Pt aaox4 however can be impulsive and both pt and brother need reinforcement in learning at times. Accuchecks ac/hs

## 2022-11-24 NOTE — SUBJECTIVE & OBJECTIVE
Neurologic Chief Complaint:   Left Pontine intraparenchymal haemorrhage     Subjective:     Interval History: Patient is seen for follow-up neurological assessment and treatment recommendations:   No acute events overnight    HPI, Past Medical, Family, and Social History remains the same as documented in the initial encounter.     Review of Systems   Constitutional:  Negative for chills and fever.   HENT:  Positive for voice change. Negative for tinnitus and trouble swallowing.    Eyes:  Negative for photophobia and visual disturbance.   Respiratory:  Positive for shortness of breath. Negative for cough.    Cardiovascular:  Negative for palpitations and leg swelling.   Gastrointestinal:  Negative for abdominal pain, nausea and vomiting.   Endocrine: Negative.    Genitourinary: Negative.    Musculoskeletal:  Negative for back pain.   Skin:  Negative for rash.   Neurological:  Positive for speech difficulty, weakness and numbness. Negative for facial asymmetry and headaches.   Psychiatric/Behavioral:  Negative for agitation.      Scheduled Meds:   albuterol-ipratropium  3 mL Nebulization Q4H WAKE    amLODIPine  10 mg Oral Daily    enoxaparin  40 mg Subcutaneous Daily    lisinopriL  10 mg Oral Daily    nicotine  1 patch Transdermal Daily    senna-docusate 8.6-50 mg  1 tablet Oral BID     PRN Meds:benzonatate, dextrose 10%, dextrose 10%, glucagon (human recombinant), hydrALAZINE, insulin aspart U-100, labetalol, magnesium oxide, magnesium oxide, potassium bicarbonate, potassium bicarbonate, potassium bicarbonate, potassium, sodium phosphates, potassium, sodium phosphates, potassium, sodium phosphates, sodium chloride 0.9%    Objective:     Vital Signs (Most Recent):  Temp: 98.5 °F (36.9 °C) (11/24/22 1127)  Pulse: 94 (11/24/22 1208)  Resp: 18 (11/24/22 1127)  BP: (!) 154/88 (11/24/22 1127)  SpO2: (!) 93 % (11/24/22 1127)  BP Location: Right arm    Vital Signs Range (Last 24H):  Temp:  [97.3 °F (36.3 °C)-98.5 °F (36.9  "°C)]   Pulse:  [73-94]   Resp:  [16-52]   BP: (134-159)/(65-89)   SpO2:  [90 %-98 %]   BP Location: Right arm    Physical Exam  Vitals and nursing note reviewed.   Constitutional:       General: He is not in acute distress.     Appearance: Normal appearance.   HENT:      Head: Normocephalic and atraumatic.      Nose: Nose normal.      Mouth/Throat:      Mouth: Mucous membranes are dry.   Eyes:      General: No visual field deficit.     Extraocular Movements: Extraocular movements intact.      Pupils: Pupils are equal, round, and reactive to light.   Cardiovascular:      Rate and Rhythm: Normal rate.      Pulses: Normal pulses.   Pulmonary:      Effort: Respiratory distress (mild distress) present.      Breath sounds: Stridor present.   Abdominal:      General: There is no distension.      Palpations: Abdomen is soft.      Tenderness: There is no abdominal tenderness. There is no guarding.   Musculoskeletal:         General: Normal range of motion.      Cervical back: Normal range of motion.   Skin:     General: Skin is warm and dry.   Neurological:      Mental Status: He is alert and oriented to person, place, and time.      GCS: GCS eye subscore is 4. GCS verbal subscore is 5. GCS motor subscore is 6.      Cranial Nerves: Dysarthria present. No cranial nerve deficit or facial asymmetry.      Sensory: Sensory deficit present.      Motor: Weakness present.   Psychiatric:         Mood and Affect: Mood normal.         Behavior: Behavior normal.     Neurological Exam:   LOC: alert  Attention Span: Good   Language: No aphasia  Articulation: Dysarthria  Orientation: Person, Place, Time   Facial Movement (CN VII): Symmetric facial expression    Motor: Arm left  Normal 5/5  Leg left  Normal 5/5  Arm right  Paresis: 4/5  Leg right Paresis: 4/5  Sensation: Decreased sensation on right side; patient reports arm and leg have "tight" sensation    Laboratory:  CMP:   Recent Labs   Lab 11/24/22  0225   CALCIUM 9.5      K 4.3 "   CO2 25      BUN 10   CREATININE 0.7     BMP:   Recent Labs   Lab 11/24/22  0225      K 4.3      CO2 25   BUN 10   CREATININE 0.7   CALCIUM 9.5     CBC:   Recent Labs   Lab 11/24/22  0225   WBC 5.46   RBC 4.13*   HGB 12.5*   HCT 39.4*      MCV 95   MCH 30.3   MCHC 31.7*     Lipid Panel:   Recent Labs   Lab 11/22/22  1045   CHOL 144   LDLCALC 92.0   HDL 41   TRIG 55     Coagulation:   Recent Labs   Lab 11/22/22  1045   INR 1.1   APTT 29.2     Platelet Aggregation Study: No results for input(s): PLTAGG, PLTAGINTERP, PLTAGREGLACO, ADPPLTAGGREG in the last 168 hours.  Hgb A1C:   Recent Labs   Lab 11/22/22  1045   HGBA1C 6.6*     TSH:   Recent Labs   Lab 11/22/22  1045   TSH 0.536     Brain Imaging   CTH Without Contrast 11/22/2022  -Allowing for distortion by motion artifact relatively stable hyperdense collection within the central and left paramedian alf concerning for acute pontine hemorrhage.  No definite acute extra-axial hemorrhage allowing for limitation by artifacts.  Clinical correlation and further evaluation with MRI recommended if patient compatible   -Small regions of hypoattenuation left caudate and bilateral basal ganglia most compatible with remote lacunar-type infarcts.     CTH Without Contrast 11/22/2022  1. Acute pontine intraparenchymal hemorrhage.  No significant mass effect or intraventricular hemorrhage.  2.  Mild generalized cerebral volume loss and scattered supratentorial white matter hypoattenuation, nonspecific and may reflect sequelae of chronic microvascular ischemic change.  3. Bilateral basal ganglia and right paramedian pontine chronic lacunar infarcts versus perivascular spaces.    Vessel Imaging   None    Cardiac Imaging   TTE 11/23/2022  The left ventricle is normal in size with concentric hypertrophy and mildly decreased systolic function.  The estimated ejection fraction is 45%.  Grade I left ventricular diastolic dysfunction.  Normal right ventricular  size with mildly reduced right ventricular systolic function.  The quantitatively derived ejection fraction is 45%.  Normal central venous pressure (3 mmHg).

## 2022-11-24 NOTE — PROGRESS NOTES
[facility] - Neurosurgery ([facility])  Neurocritical Care  Progress Note    Admit Date: 11/22/2022  Service Date: 11/23/2022  Length of Stay: 1    Subjective:     Chief Complaint: Left-sided nontraumatic intracerebral hemorrhage of brainstem    History of Present Illness: Mr. Badillo is a 56 y/o m w/ pmhx of HTN, PAD, DM, Tobacco abuse, and COPD who presents as transfer for acute pontine IPH.     He initially presented to the OSH w/ sudden onset ringing in his R ear, dysarthria, RSW that started after he got up approximately 2:55 AM to use the restroom. At OSH Cth was performed 0350 revealing an acute pontine IPH without significant mass effect or IVH.     No AC/AP reported, no reversal at OSH.       Hospital Course: 11/23/2022: NAEON. Cardene weaned to off. Stable for transfer to floor with VN service.      Interval History:  NAEON. Placed on high flow briefly overnight but weaned to 3L NC. Cardene weaned to off, dc'd. Replaced K. Mechanical soft diet started. Stable for transfer to floor with VN service.     Review of Systems   Constitutional:  Negative for fever.   HENT:  Positive for hearing loss (baseline). Negative for trouble swallowing.    Eyes:  Negative for visual disturbance.   Respiratory:  Negative for cough and shortness of breath.    Cardiovascular:  Negative for leg swelling.   Gastrointestinal:  Negative for nausea and vomiting.   Musculoskeletal:  Negative for neck pain.   Neurological:  Positive for weakness. Negative for facial asymmetry and headaches.   Psychiatric/Behavioral:  Negative for agitation and confusion.      Objective:     Vitals:  Temp: 98.4 °F (36.9 °C)  Pulse: 81  Rhythm: normal sinus rhythm  BP: (!) 159/88  MAP (mmHg): 117  Resp: 19  SpO2: 97 %  O2 Device (Oxygen Therapy): nasal cannula    Temp  Min: 97.3 °F (36.3 °C)  Max: 98.4 °F (36.9 °C)  Pulse  Min: 71  Max: 94  BP  Min: 132/66  Max: 165/84  MAP (mmHg)  Min: 90  Max: 117  Resp  Min: 15  Max: 52  SpO2  Min: 88 %  Max: 99 %    11/23  0701 - 11/24 0700  In: 175 [I.V.:175]  Out: 700 [Urine:700]   Unmeasured Output  Stool Occurrence: 0       Physical Exam  Vitals and nursing note reviewed.   Constitutional:       General: He is not in acute distress.     Appearance: Normal appearance.      Comments: Well developed. Well nourished. Resting comfortably in bed.   HENT:      Head: Normocephalic and atraumatic.      Right Ear: External ear normal.      Left Ear: External ear normal.      Nose:      Comments: NC in place.     Mouth/Throat:      Mouth: Mucous membranes are moist.      Pharynx: Oropharynx is clear.   Eyes:      Extraocular Movements: Extraocular movements intact.      Pupils: Pupils are equal, round, and reactive to light.   Cardiovascular:      Rate and Rhythm: Normal rate and regular rhythm.   Pulmonary:      Effort: No respiratory distress.      Comments: On 3L supplemental oxygen via NC  Abdominal:      General: Abdomen is flat. There is no distension.   Musculoskeletal:      Right lower leg: No edema.      Left lower leg: No edema.   Skin:     General: Skin is warm and dry.   Neurological:      Mental Status: He is alert.      Comments: [id number]  AA&Ox4. Speech fluent. Answers questions appropriately. Follows commands briskly.  PERRL. EOMI save for mild weakness of left eye adduction. No facial asymmetry. Significant right-sided hearing loss at baseline.   MICHAEL & AG, RSW significantly improved. Strength 5/5 & SILT in all 4 extremities. Mild pronator drift & dysmetria in RUE.     Gait  exam deferred.    Medications:  Continuous ScheduledamLODIPine, 10 mg, Daily  enoxaparin, 40 mg, Daily  lisinopriL, 10 mg, Daily  nicotine, 1 patch, Daily  senna-docusate 8.6-50 mg, 1 tablet, BID    PRNalbuterol-ipratropium, 3 mL, Q4H PRN  benzonatate, 100 mg, TID PRN  dextrose 10%, 12.5 g, PRN  dextrose 10%, 25 g, PRN  glucagon (human recombinant), 1 mg, PRN  hydrALAZINE, 10 mg, Q4H PRN  insulin aspart U-100, 1-10 Units, QID (AC + HS) PRN  labetalol, 10  mg, Q6H PRN  magnesium oxide, 800 mg, PRN  magnesium oxide, 800 mg, PRN  potassium bicarbonate, 35 mEq, PRN  potassium bicarbonate, 50 mEq, PRN  potassium bicarbonate, 60 mEq, PRN  potassium, sodium phosphates, 2 packet, PRN  potassium, sodium phosphates, 2 packet, PRN  potassium, sodium phosphates, 2 packet, PRN  sodium chloride 0.9%, 10 mL, PRN      Today I personally reviewed pertinent medications, lines/drains/airways, imaging, cardiology results, laboratory results, notably:    Laboratory:  CBC:  Recent Labs   Lab 11/23/22  0329   WBC 6.92   RBC 4.11*   HGB 12.5*   HCT 38.2*      MCV 93   MCH 30.4   MCHC 32.7       CMP:  Recent Labs   Lab 11/23/22  0329   CALCIUM 8.9      K 3.3*   CO2 24      BUN 7   CREATININE 0.7       Endocrine:  Recent Labs     11/22/22  1045   HGBA1C 6.6*   TSH 0.536         Diet  Diet diabetic [facility]; 2000 Calorie; Dysphagia Mechanical Soft (IDDSI Level 5)  Diet diabetic [facility]; 2000 Calorie; Dysphagia Mechanical Soft (IDDSI Level 5)        Assessment/Plan:     Neuro  * Left-sided nontraumatic intracerebral hemorrhage of brainstem  Patient is a 57 y.o. male w/ pmhx of HTN, PAD, DM, tobacco use, COPD who presents as transfer for L pontine ICH. Initially presented w/ sudden onset ringing dysarthria and RSW approximately 0255 11/22. CTH initially revealed 9.1mm x 15.6 mm pontine hemorrhage likely hypertensive etiology given location, patient history, and document SBP of 207 at OSH prior to starting cardene gtt.   -Admit to NCC for closer monitoring   -Hourly Neuro checks and VS  -Vascular Neurology consult  -Denies any AC/AP use    -ICH Score: 1  -SBP < 160   -Cardene gtt weaned to off, dc'd  -Start amlodipine 10mg qd    -PRN labetalol and hydralazine  -Repeat CTH at 1100 11/22 revealed stable hyperdense central and left paramedian pontine ICH   -EKG and Echo pending   -A1c, TSH, lipid panel pending    -aPTT, PT/INR pending  -Daily CBC, CMP, mag,  phos  -PT/OT/SLP  -VTE prophylaxis: mechanical, chemical    -Stable for transfer to floor with VN service    Vasogenic brain edema  -secondary to primary problem  -q1hr neuro checks     ENT  Hearing difficulty of both ears  -likely secondary to primary problem  -OT     Pulmonary  COPD (chronic obstructive pulmonary disease)  -PRN duo nebs  -O2 sat goal >88%  -IS    Cardiac/Vascular  Primary hypertension  -SBP goal <160   -Cardene gtt, weaned to off, dc'd    -PRN labetalol, hydralzine  -Continue amlodipine 10mg qd     Endocrine  Type 2 diabetes mellitus  -Hemoglobin A1c 6.6  -SSI   -Accuchecks  -Diabetic diet    Other  Tobacco dependency  -PRN nicotine patch   -Counseling on cessation importance           The patient is being Prophylaxed for:  Venous Thromboembolism with: Mechanical or Chemical  Stress Ulcer with: Not Applicable   Ventilator Pneumonia with: not applicable    Activity Orders          Diet diabetic [facility]; 2000 Calorie; Dysphagia Mechanical Soft (IDDSI Level 5): Diabetic starting at 11/23 0753    Turn patient starting at 11/22 1200    Elevate HOB starting at 11/22 1032        Full Code     Level III    DARCI CaldwellC  Neurocritical Care  [facility] - Neurosurgery ([facility])

## 2022-11-24 NOTE — PROGRESS NOTES
Dwayne Coe - Neurosurgery ([facility])  Vascular Neurology  Comprehensive Stroke Center  Progress Note    Assessment/Plan:     * Left-sided nontraumatic intracerebral hemorrhage of brainstem  56 yo male with PMHx of HTN, PAD, DM, smoking, COPD presenting as a transfer for ponto-medullary, posterior ICH L>R identified on CTH after symptoms of sudden onset ringing, dysarthria and RSW that began at 02:55 on 11/22/22. 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 However, a cavernoma is also on the differential given that patient's symptoms are less severe than would otherwise be expected from a finding of this size on imaging.    Patient in NCC. Neuro exam is stable; still with dysarthria, mild right sided weakness and numbness. Echo with EF 45%, no LAE. Etiology likely HTN. Dispo recommendations from therapy for IPR. Patient currently off of Cardene, once stable without IV anti-HTN medications for minimum of six hours patient may step down to floor. Possible step down this evening pending BP      Antithrombotics for secondary stroke prevention: Antiplatelets: Aspirin: 81 mg daily  Clopidogrel: 75 mg daily hold in the setting of likely bleed    Statins for secondary stroke prevention and hyperlipidemia, if present:   Statins: None: Reason: in the setting of likely bleed at the moment    Aggressive risk factor modification: HTN, Smoking, DM     Rehab efforts: The patient has been evaluated by a stroke team provider and the therapy needs have been fully considered based off the presenting complaints and exam findings. The following therapy evaluations are needed: PT evaluate and treat, OT evaluate and treat, SLP evaluate and treat    Diagnostics ordered/pending: None    VTE prophylaxis: Enoxaparin 40 mg SQ every 24 hours  Mechanical prophylaxis: Place SCDs     BP parameters: ICH: SBP <140        Tobacco dependency  Since age of 15, 3  PPD  Counseled on importance of smoking cessation moving forward    COPD (chronic obstructive pulmonary disease)  History of.    Type 2 diabetes mellitus  A1c 6.6  SSI  Inpatient BG Goal 140-180    Primary hypertension  -Cardene off  -Amlodipine 10, Lisinopril 10     Tight BP control: SBP < 140 in setting of ICH        11/23/2022 Patient in NCC. Neuro exam is stable; still with dysarthria, mild right sided weakness and numbness. Echo with EF 45%, no LAE. Etiology likely HTN. Dispo recommendations from therapy for IPR. Patient currently off of Cardene, once stable without IV anti-HTN medications may step down to floor. Possible step down this evening pending BP.     11/24/2022 - no acute events.  Patient drowsy.  Some shortness of breath will schedule breathing treatments.  Chest x-ray with subtly increased interstitial opacities    STROKE DOCUMENTATION    NIH Scale:  1a. Level of Consciousness: 0-->Alert, keenly responsive  1b. LOC Questions: 0-->Answers both questions correctly  1c. LOC Commands: 0-->Performs both tasks correctly  2. Best Gaze: 1-->Partial gaze palsy, gaze is abnormal in one or both eyes, but forced deviation or total gaze paresis is not present  3. Visual: 0-->No visual loss  4. Facial Palsy: 0-->Normal symmetrical movements  5a. Motor Arm, Left: 0-->No drift, limb holds 90 (or 45) degrees for full 10 secs  5b. Motor Arm, Right: 1-->Drift, limb holds 90 (or 45) degrees, but drifts down before full 10 secs, does not hit bed or other support  6a. Motor Leg, Left: 0-->No drift, leg holds 30 degree position for full 5 secs  6b. Motor Leg, Right: 1-->Drift, leg falls by the end of the 5-sec period but does not hit bed  7. Limb Ataxia: 0-->Absent  8. Sensory: 1-->Mild-to-moderate sensory loss, patient feels pinprick is less sharp or is dull on the affected side, or there is a loss of superficial pain with pinprick, but patient is aware of being touched  9. Best Language: 0-->No aphasia, normal  10.  Dysarthria: 1-->Mild-to-moderate dysarthria, patient slurs at least some words and, at worst, can be understood with some difficulty  11. Extinction and Inattention (formerly Neglect): 0-->No abnormality  Total (NIH Stroke Scale): 5     Modified [place]    Joseph Coma Scale:15   ABCD2 Score:    [id number] Score:   HAS -BLED Score:   ICH Score:1  Hunt & Cho Classification:      Hemorrhagic change of an Ischemic Stroke: Does this patient have an ischemic stroke with hemorrhagic changes? No     Neurologic Chief Complaint:   Left Pontine intraparenchymal haemorrhage     Subjective:     Interval History: Patient is seen for follow-up neurological assessment and treatment recommendations:   No acute events overnight    HPI, Past Medical, Family, and Social History remains the same as documented in the initial encounter.     Review of Systems   Constitutional:  Negative for chills and fever.   HENT:  Positive for voice change. Negative for tinnitus and trouble swallowing.    Eyes:  Negative for photophobia and visual disturbance.   Respiratory:  Positive for shortness of breath. Negative for cough.    Cardiovascular:  Negative for palpitations and leg swelling.   Gastrointestinal:  Negative for abdominal pain, nausea and vomiting.   Endocrine: Negative.    Genitourinary: Negative.    Musculoskeletal:  Negative for back pain.   Skin:  Negative for rash.   Neurological:  Positive for speech difficulty, weakness and numbness. Negative for facial asymmetry and headaches.   Psychiatric/Behavioral:  Negative for agitation.      Scheduled Meds:   albuterol-ipratropium  3 mL Nebulization Q4H WAKE    amLODIPine  10 mg Oral Daily    enoxaparin  40 mg Subcutaneous Daily    lisinopriL  10 mg Oral Daily    nicotine  1 patch Transdermal Daily    senna-docusate 8.6-50 mg  1 tablet Oral BID     PRN Meds:benzonatate, dextrose 10%, dextrose 10%, glucagon (human recombinant), hydrALAZINE, insulin aspart U-100, labetalol, magnesium  oxide, magnesium oxide, potassium bicarbonate, potassium bicarbonate, potassium bicarbonate, potassium, sodium phosphates, potassium, sodium phosphates, potassium, sodium phosphates, sodium chloride 0.9%    Objective:     Vital Signs (Most Recent):  Temp: 98.5 °F (36.9 °C) (11/24/22 1127)  Pulse: 94 (11/24/22 1208)  Resp: 18 (11/24/22 1127)  BP: (!) 154/88 (11/24/22 1127)  SpO2: (!) 93 % (11/24/22 1127)  BP Location: Right arm    Vital Signs Range (Last 24H):  Temp:  [97.3 °F (36.3 °C)-98.5 °F (36.9 °C)]   Pulse:  [73-94]   Resp:  [16-52]   BP: (134-159)/(65-89)   SpO2:  [90 %-98 %]   BP Location: Right arm    Physical Exam  Vitals and nursing note reviewed.   Constitutional:       General: He is not in acute distress.     Appearance: Normal appearance.   HENT:      Head: Normocephalic and atraumatic.      Nose: Nose normal.      Mouth/Throat:      Mouth: Mucous membranes are dry.   Eyes:      General: No visual field deficit.     Extraocular Movements: Extraocular movements intact.      Pupils: Pupils are equal, round, and reactive to light.   Cardiovascular:      Rate and Rhythm: Normal rate.      Pulses: Normal pulses.   Pulmonary:      Effort: Respiratory distress (mild distress) present.      Breath sounds: Stridor present.   Abdominal:      General: There is no distension.      Palpations: Abdomen is soft.      Tenderness: There is no abdominal tenderness. There is no guarding.   Musculoskeletal:         General: Normal range of motion.      Cervical back: Normal range of motion.   Skin:     General: Skin is warm and dry.   Neurological:      Mental Status: He is alert and oriented to person, place, and time.      GCS: GCS eye subscore is 4. GCS verbal subscore is 5. GCS motor subscore is 6.      Cranial Nerves: Dysarthria present. No cranial nerve deficit or facial asymmetry.      Sensory: Sensory deficit present.      Motor: Weakness present.   Psychiatric:         Mood and Affect: Mood normal.          "Behavior: Behavior normal.     Neurological Exam:   LOC: alert  Attention Span: Good   Language: No aphasia  Articulation: Dysarthria  Orientation: Person, Place, Time   Facial Movement (CN VII): Symmetric facial expression    Motor: Arm left  Normal 5/5  Leg left  Normal 5/5  Arm right  Paresis: 4/5  Leg right Paresis: 4/5  Sensation: Decreased sensation on right side; patient reports arm and leg have "tight" sensation    Laboratory:  CMP:   Recent Labs   Lab 11/24/22  0225   CALCIUM 9.5      K 4.3   CO2 25      BUN 10   CREATININE 0.7     BMP:   Recent Labs   Lab 11/24/22 0225      K 4.3      CO2 25   BUN 10   CREATININE 0.7   CALCIUM 9.5     CBC:   Recent Labs   Lab 11/24/22 0225   WBC 5.46   RBC 4.13*   HGB 12.5*   HCT 39.4*      MCV 95   MCH 30.3   MCHC 31.7*     Lipid Panel:   Recent Labs   Lab 11/22/22  1045   CHOL 144   LDLCALC 92.0   HDL 41   TRIG 55     Coagulation:   Recent Labs   Lab 11/22/22  1045   INR 1.1   APTT 29.2     Platelet Aggregation Study: No results for input(s): PLTAGG, PLTAGINTERP, PLTAGREGLACO, ADPPLTAGGREG in the last 168 hours.  Hgb A1C:   Recent Labs   Lab 11/22/22  1045   HGBA1C 6.6*     TSH:   Recent Labs   Lab 11/22/22  1045   TSH 0.536     Brain Imaging   CTH Without Contrast 11/22/2022  -Allowing for distortion by motion artifact relatively stable hyperdense collection within the central and left paramedian alf concerning for acute pontine hemorrhage.  No definite acute extra-axial hemorrhage allowing for limitation by artifacts.  Clinical correlation and further evaluation with MRI recommended if patient compatible   -Small regions of hypoattenuation left caudate and bilateral basal ganglia most compatible with remote lacunar-type infarcts.     CTH Without Contrast 11/22/2022  1. Acute pontine intraparenchymal hemorrhage.  No significant mass effect or intraventricular hemorrhage.  2.  Mild generalized cerebral volume loss and scattered " supratentorial white matter hypoattenuation, nonspecific and may reflect sequelae of chronic microvascular ischemic change.  3. Bilateral basal ganglia and right paramedian pontine chronic lacunar infarcts versus perivascular spaces.    Vessel Imaging   None    Cardiac Imaging   TTE 11/23/2022   The left ventricle is normal in size with concentric hypertrophy and mildly decreased systolic function.   The estimated ejection fraction is 45%.   Grade I left ventricular diastolic dysfunction.   Normal right ventricular size with mildly reduced right ventricular systolic function.   The quantitatively derived ejection fraction is 45%.   Normal central venous pressure (3 mmHg).    Finesse Oneal MD  Comprehensive Stroke Center  Department of Vascular Neurology   [facility])

## 2022-11-24 NOTE — PLAN OF CARE
Problem: Adult Inpatient Plan of Care  Goal: Plan of Care Review  Outcome: Ongoing, Progressing  Goal: Absence of Hospital-Acquired Illness or Injury  Outcome: Ongoing, Progressing  Goal: Optimal Comfort and Wellbeing  Outcome: Ongoing, Progressing  Goal: Readiness for Transition of Care  Outcome: Ongoing, Progressing     Problem: Diabetes Comorbidity  Goal: Blood Glucose Level Within Targeted Range  Outcome: Ongoing, Progressing     Problem: Fluid Imbalance (Pneumonia)  Goal: Fluid Balance  Outcome: Ongoing, Progressing     Problem: Infection (Pneumonia)  Goal: Resolution of Infection Signs and Symptoms  Outcome: Ongoing, Progressing     Problem: Adjustment to Illness (Stroke, Hemorrhagic)  Goal: Optimal Coping  Outcome: Ongoing, Progressing     Problem: Bowel Elimination Impaired (Stroke, Hemorrhagic)  Goal: Effective Bowel Elimination  Outcome: Ongoing, Progressing     Problem: Cerebral Tissue Perfusion (Stroke, Hemorrhagic)  Goal: Optimal Cerebral Tissue Perfusion  Outcome: Ongoing, Progressing     Problem: Communication Impairment (Stroke, Hemorrhagic)  Goal: Effective Communication Skills  Outcome: Ongoing, Progressing     Problem: Functional Ability Impaired (Stroke, Hemorrhagic)  Goal: Optimal Functional Ability  Outcome: Ongoing, Progressing     Problem: Respiratory Compromise (Stroke, Hemorrhagic)  Goal: Effective Oxygenation and Ventilation  Outcome: Ongoing, Progressing     Problem: Swallowing Impairment (Stroke, Hemorrhagic)  Goal: Optimal Eating and Swallowing Without Aspiration  Outcome: Ongoing, Progressing     Problem: Urinary Elimination Impaired (Stroke, Hemorrhagic)  Goal: Effective Urinary Elimination  Outcome: Ongoing, Progressing     Problem: Skin Injury Risk Increased  Goal: Skin Health and Integrity  Outcome: Ongoing, Progressing

## 2022-11-24 NOTE — NURSING
Pt arrived to room 957 via bed. Brother at bedside. Pt aaox4 however needs some redirecting. Skin intact. VSS. Pt SR on telemetry. Suction at bedside, educated pt and brother on diet modification of soft diet and aspiration precautions. Bed alarm activated. Call bell in reach. No c/o pain

## 2022-11-24 NOTE — PLAN OF CARE
[facility] Care Plan    POC reviewed with Hernan Badillo and family at 1000. Pt and family verbalized understanding. Questions and concerns addressed. No acute events today. Pt progressing toward goals. Will continue to monitor. See below and flowsheets for full assessment and VS info.   - Cardene gtt titrated off           Is this a stroke patient? yes- Stroke booklet reviewed with patient and family, risk factors identified for patient and stroke booklet remains at bedside for ongoing education.     Neuro:  Joseph Coma Scale  Best Eye Response: 4-->(E4) spontaneous  Best Motor Response: 6-->(M6) obeys commands  Best Verbal Response: 5-->(V5) oriented  Joseph Coma Scale Score: 15  Assessment Qualifiers: patient not sedated/intubated  Pupil PERRLA: yes     24 hr Temp:  [97.9 °F (36.6 °C)-98.2 °F (36.8 °C)]     CV:   Rhythm: normal sinus rhythm  BP goals:   SBP < 160  MAP > 65    Resp:   O2 Device (Oxygen Therapy): nasal cannula w/ humidification       Plan: N/A    GI/:     Diet/Nutrition Received: mechanical/dental soft  Last Bowel Movement: 11/21/22  Voiding Characteristics: voids spontaneously without difficulty    Intake/Output Summary (Last 24 hours) at 11/23/2022 1944  Last data filed at 11/23/2022 1800  Gross per 24 hour   Intake 640.62 ml   Output 1025 ml   Net -384.38 ml     Unmeasured Output  Stool Occurrence: 0    Labs/Accuchecks:  Recent Labs   Lab 11/23/22  0329   WBC 6.92   RBC 4.11*   HGB 12.5*   HCT 38.2*         Recent Labs   Lab 11/22/22  0335 11/23/22  0329    140   K 3.4* 3.3*   CO2 24 24    103   BUN 11 7   CREATININE 0.9 0.7   ALKPHOS 63  --    ALT 12  --    AST 14  --    BILITOT 0.8  --       Recent Labs   Lab 11/22/22  1045   INR 1.1   APTT 29.2    No results for input(s): CPK, CPKMB, TROPONINI, MB in the last 168 hours.    Electrolytes: Electrolytes replaced  Accuchecks: ACHS    Gtts:   niCARdipine Stopped (11/23/22 1205)       LDA/Wounds:  Lines/Drains/Airways       Peripheral  Intravenous Line  Duration                  Peripheral IV - Single Lumen 11/22/22 18 G Anterior;Left Forearm 1 day         Peripheral IV - Single Lumen 11/22/22 18 G Anterior;Proximal;Right Forearm 1 day                  Wounds: No  Wound care consulted: No

## 2022-11-24 NOTE — ASSESSMENT & PLAN NOTE
-SBP goal <160   -Cardene gtt, weaned to off, dc'd    -PRN labetalol, hydralzine  -Continue amlodipine 10mg qd

## 2022-11-25 VITALS
RESPIRATION RATE: 18 BRPM | HEART RATE: 71 BPM | DIASTOLIC BLOOD PRESSURE: 90 MMHG | HEIGHT: 69 IN | BODY MASS INDEX: 24.59 KG/M2 | OXYGEN SATURATION: 98 % | WEIGHT: 166 LBS | TEMPERATURE: 97 F | SYSTOLIC BLOOD PRESSURE: 149 MMHG

## 2022-11-25 LAB
ANION GAP SERPL CALC-SCNC: 6 MMOL/L (ref 8–16)
BASOPHILS # BLD AUTO: 0.01 K/UL (ref 0–0.2)
BASOPHILS NFR BLD: 0.2 % (ref 0–1.9)
BUN SERPL-MCNC: 12 MG/DL (ref 6–20)
CALCIUM SERPL-MCNC: 9.5 MG/DL (ref 8.7–10.5)
CHLORIDE SERPL-SCNC: 104 MMOL/L (ref 95–110)
CO2 SERPL-SCNC: 25 MMOL/L (ref 23–29)
CREAT SERPL-MCNC: 0.8 MG/DL (ref 0.5–1.4)
DIFFERENTIAL METHOD: ABNORMAL
EOSINOPHIL # BLD AUTO: 0.1 K/UL (ref 0–0.5)
EOSINOPHIL NFR BLD: 1.8 % (ref 0–8)
ERYTHROCYTE [DISTWIDTH] IN BLOOD BY AUTOMATED COUNT: 13.2 % (ref 11.5–14.5)
EST. GFR  (NO RACE VARIABLE): >60 ML/MIN/1.73 M^2
GLUCOSE SERPL-MCNC: 77 MG/DL (ref 70–110)
HCT VFR BLD AUTO: 39.3 % (ref 40–54)
HGB BLD-MCNC: 12.7 G/DL (ref 14–18)
IMM GRANULOCYTES # BLD AUTO: 0 K/UL (ref 0–0.04)
IMM GRANULOCYTES NFR BLD AUTO: 0 % (ref 0–0.5)
LYMPHOCYTES # BLD AUTO: 1.2 K/UL (ref 1–4.8)
LYMPHOCYTES NFR BLD: 27.9 % (ref 18–48)
MAGNESIUM SERPL-MCNC: 1.9 MG/DL (ref 1.6–2.6)
MCH RBC QN AUTO: 30.8 PG (ref 27–31)
MCHC RBC AUTO-ENTMCNC: 32.3 G/DL (ref 32–36)
MCV RBC AUTO: 95 FL (ref 82–98)
MONOCYTES # BLD AUTO: 0.4 K/UL (ref 0.3–1)
MONOCYTES NFR BLD: 8.6 % (ref 4–15)
NEUTROPHILS # BLD AUTO: 2.7 K/UL (ref 1.8–7.7)
NEUTROPHILS NFR BLD: 61.5 % (ref 38–73)
NRBC BLD-RTO: 0 /100 WBC
PHOSPHATE SERPL-MCNC: 3.6 MG/DL (ref 2.7–4.5)
PLATELET # BLD AUTO: 160 K/UL (ref 150–450)
PMV BLD AUTO: 10.4 FL (ref 9.2–12.9)
POCT GLUCOSE: 134 MG/DL (ref 70–110)
POCT GLUCOSE: 94 MG/DL (ref 70–110)
POTASSIUM SERPL-SCNC: 3.9 MMOL/L (ref 3.5–5.1)
RBC # BLD AUTO: 4.12 M/UL (ref 4.6–6.2)
SODIUM SERPL-SCNC: 135 MMOL/L (ref 136–145)
WBC # BLD AUTO: 4.44 K/UL (ref 3.9–12.7)

## 2022-11-25 PROCEDURE — 25000003 PHARM REV CODE 250

## 2022-11-25 PROCEDURE — 92523 SPEECH SOUND LANG COMPREHEN: CPT

## 2022-11-25 PROCEDURE — 84100 ASSAY OF PHOSPHORUS: CPT

## 2022-11-25 PROCEDURE — 85025 COMPLETE CBC W/AUTO DIFF WBC: CPT

## 2022-11-25 PROCEDURE — 99222 PR INITIAL HOSPITAL CARE,LEVL II: ICD-10-PCS | Mod: ,,, | Performed by: NURSE PRACTITIONER

## 2022-11-25 PROCEDURE — 36415 COLL VENOUS BLD VENIPUNCTURE: CPT

## 2022-11-25 PROCEDURE — 99238 HOSP IP/OBS DSCHRG MGMT 30/<: CPT | Mod: GC,,, | Performed by: PSYCHIATRY & NEUROLOGY

## 2022-11-25 PROCEDURE — 80048 BASIC METABOLIC PNL TOTAL CA: CPT

## 2022-11-25 PROCEDURE — 99222 1ST HOSP IP/OBS MODERATE 55: CPT | Mod: ,,, | Performed by: NURSE PRACTITIONER

## 2022-11-25 PROCEDURE — 99223 PR INITIAL HOSPITAL CARE,LEVL III: ICD-10-PCS | Mod: ,,, | Performed by: PHYSICAL MEDICINE & REHABILITATION

## 2022-11-25 PROCEDURE — S4991 NICOTINE PATCH NONLEGEND: HCPCS

## 2022-11-25 PROCEDURE — 97535 SELF CARE MNGMENT TRAINING: CPT

## 2022-11-25 PROCEDURE — 83735 ASSAY OF MAGNESIUM: CPT

## 2022-11-25 PROCEDURE — 99238 PR HOSPITAL DISCHARGE DAY,<30 MIN: ICD-10-PCS | Mod: GC,,, | Performed by: PSYCHIATRY & NEUROLOGY

## 2022-11-25 PROCEDURE — 99223 1ST HOSP IP/OBS HIGH 75: CPT | Mod: ,,, | Performed by: PHYSICAL MEDICINE & REHABILITATION

## 2022-11-25 RX ORDER — IBUPROFEN 200 MG
1 TABLET ORAL DAILY
Qty: 90 PATCH | Refills: 0 | Status: SHIPPED | OUTPATIENT
Start: 2022-11-26 | End: 2022-12-15

## 2022-11-25 RX ORDER — LISINOPRIL 10 MG/1
10 TABLET ORAL DAILY
Qty: 90 TABLET | Refills: 3 | Status: SHIPPED | OUTPATIENT
Start: 2022-11-26 | End: 2023-02-01 | Stop reason: SDUPTHER

## 2022-11-25 RX ORDER — AMOXICILLIN 250 MG
1 CAPSULE ORAL 2 TIMES DAILY
Qty: 60 TABLET | Refills: 0 | Status: SHIPPED | OUTPATIENT
Start: 2022-11-25 | End: 2022-12-25

## 2022-11-25 RX ADMIN — LISINOPRIL 10 MG: 5 TABLET ORAL at 09:11

## 2022-11-25 RX ADMIN — SENNOSIDES AND DOCUSATE SODIUM 1 TABLET: 50; 8.6 TABLET ORAL at 09:11

## 2022-11-25 RX ADMIN — AMLODIPINE BESYLATE 10 MG: 10 TABLET ORAL at 09:11

## 2022-11-25 RX ADMIN — NICOTINE 1 PATCH: 14 PATCH, EXTENDED RELEASE TRANSDERMAL at 09:11

## 2022-11-25 NOTE — DISCHARGE SUMMARY
Dwayne Coe - Neurosurgery ([facility])  Vascular Neurology  Comprehensive Stroke Center  Discharge Summary     Summary:     Admit Date: 11/22/2022 10:33 AM    Discharge Date and Time:  11/25/2022 1:37 PM    Attending Physician: Dereck Flores MD     Discharge Provider: Finesse Oneal MD    History of Present Illness: Hernan Badillo is a pleasant 58 yo male with PMHx of HTN, PAD, DM, smoking, COPD presenting as a transfer for ponto-medullary, posterior ICH L>R identified on CTH after symptoms of sudden onset ringing, dysarthria and RSW that began at 02:55 on 11/22/22. Patient woke up to go to the bathroom at around 2:55 AM and when he returned, he complained of the above deficits to his wife. He denies previous episodes. No reported antiplatelets nor AC. Telestroke called, was considered not a tPA/TNK candidate. Recommend aggressive BP control, and transfer to [facility] for neurocritical care needs as well as assessment by ANDRA, PANCHITO.    Hospital Course (synopsis of major diagnoses, care, treatment, and services provided during the course of the hospital stay): 11/23/2022 Patient in NCC. Neuro exam is stable; still with dysarthria, mild right sided weakness and numbness. Echo with EF 45%, no LAE. Etiology likely HTN. Dispo recommendations from therapy for IPR. Patient currently off of Cardene, once stable without IV anti-HTN medications may step down to floor. Possible step down this evening pending BP.     11/24/2022 - no acute events.  Patient drowsy.  Some shortness of breath will schedule breathing treatments.  Chest x-ray with subtly increased interstitial opacities    11/25/2022 -  had complaints overnight of headache and diplopia.  Was given magnesium for headache management with resolution of complaints.  This morning no complaints.  Awaiting IPR placement    Goals of Care Treatment Preferences:  Code Status: Full Code    Stroke Etiology: Hemorrhage ICH Deep Hypertension    STROKE DOCUMENTATION     NIH Scale:  Interval:  7 days or at discharge (whichever comes first)  1a. Level of Consciousness: 0-->Alert, keenly responsive  1b. LOC Questions: 0-->Answers both questions correctly  1c. LOC Commands: 0-->Performs both tasks correctly  2. Best Gaze: 1-->Partial gaze palsy, gaze is abnormal in one or both eyes, but forced deviation or total gaze paresis is not present  3. Visual: 0-->No visual loss  4. Facial Palsy: 0-->Normal symmetrical movements  5a. Motor Arm, Left: 0-->No drift, limb holds 90 (or 45) degrees for full 10 secs  5b. Motor Arm, Right: 1-->Drift, limb holds 90 (or 45) degrees, but drifts down before full 10 secs, does not hit bed or other support  6a. Motor Leg, Left: 0-->No drift, leg holds 30 degree position for full 5 secs  6b. Motor Leg, Right: 1-->Drift, leg falls by the end of the 5-sec period but does not hit bed  7. Limb Ataxia: 0-->Absent  8. Sensory: 1-->Mild-to-moderate sensory loss, patient feels pinprick is less sharp or is dull on the affected side, or there is a loss of superficial pain with pinprick, but patient is aware of being touched  9. Best Language: 0-->No aphasia, normal  10. Dysarthria: 1-->Mild-to-moderate dysarthria, patient slurs at least some words and, at worst, can be understood with some difficulty  11. Extinction and Inattention (formerly Neglect): 0-->No abnormality  Total (NIH Stroke Scale): 5    Modified [place] Score: 2  [place] Coma Scale:15   ABCD2 Score:    [id number] Score:   HAS -BLED Score:   ICH Score:1  Hunt & Cho Classification:     Assessment/Plan:     Brain Imaging   CTH Without Contrast 11/22/2022  -Allowing for distortion by motion artifact relatively stable hyperdense collection within the central and left paramedian alf concerning for acute pontine hemorrhage.  No definite acute extra-axial hemorrhage allowing for limitation by artifacts.  Clinical correlation and further evaluation with MRI recommended if patient compatible   -Small regions of hypoattenuation left  caudate and bilateral basal ganglia most compatible with remote lacunar-type infarcts.      CTH Without Contrast 11/22/2022  1. Acute pontine intraparenchymal hemorrhage.  No significant mass effect or intraventricular hemorrhage.  2.  Mild generalized cerebral volume loss and scattered supratentorial white matter hypoattenuation, nonspecific and may reflect sequelae of chronic microvascular ischemic change.  3. Bilateral basal ganglia and right paramedian pontine chronic lacunar infarcts versus perivascular spaces.     Vessel Imaging   None     Cardiac Imaging   TTE 11/23/2022  The left ventricle is normal in size with concentric hypertrophy and mildly decreased systolic function.  The estimated ejection fraction is 45%.  Grade I left ventricular diastolic dysfunction.  Normal right ventricular size with mildly reduced right ventricular systolic function.  The quantitatively derived ejection fraction is 45%.  Normal central venous pressure (3 mmHg).    Interventions: None    Complications: None    Disposition: Rehab Facility  Final Active Diagnoses:    Diagnosis Date Noted POA    PRINCIPAL PROBLEM:  Left-sided nontraumatic intracerebral hemorrhage of brainstem [I61.3] 11/22/2022 Yes    Hearing difficulty of both ears [H91.93] 11/22/2022 Yes    Vasogenic brain edema [G93.6] 11/22/2022 Yes    Tobacco dependency [F17.200] 05/30/2020 Yes    COPD (chronic obstructive pulmonary disease) [J44.9]  Yes    Type 2 diabetes mellitus [E11.9] 03/24/2014 Yes    Primary hypertension [I10] 03/24/2014 Yes      Problems Resolved During this Admission:     No new Assessment & Plan notes have been filed under this hospital service since the last note was generated.  Service: Vascular Neurology    Recommendations:     Post-discharge complication risks: Falls    Stroke Education given to: patient and family    Follow-up in Stroke Clinic in 4-6 weeks.     Discharge Plan:  Smoking Cessation  Aggresive risk factor modification:   Hypertension  Smoking  High Cholesterol  Diet  Exercise  Obesity    Follow Up:   Follow-up Information       Primary Doctor No Follow up.                           Patient Instructions:      Ambulatory referral/consult to Vascular Neurology   Standing Status: Future   Referral Priority: Routine Referral Type: Consultation   Referral Reason: Specialty Services Required   Requested Specialty: Vascular Neurology   Number of Visits Requested: 1     Medications:  Reconciled Home Medications:      Medication List        START taking these medications      lisinopriL 10 MG tablet  Take 1 tablet (10 mg total) by mouth once daily.  Start taking on: November 26, 2022     nicotine 14 mg/24 hr  Commonly known as: NICODERM CQ  Place 1 patch onto the skin once daily.  Start taking on: November 26, 2022     senna-docusate 8.6-50 mg 8.6-50 mg per tablet  Commonly known as: PERICOLACE  Take 1 tablet by mouth 2 (two) times daily.            CONTINUE taking these medications      albuterol 90 mcg/actuation inhaler  Commonly known as: VENTOLIN HFA  Inhale 2 puffs into the lungs every 6 (six) hours as needed for Wheezing. Rescue     albuterol-ipratropium 2.5 mg-0.5 mg/3 mL nebulizer solution  Commonly known as: DUO-NEB  Take 3 mLs by nebulization every 6 (six) hours as needed for Wheezing or Shortness of Breath.     amitriptyline 10 MG tablet  Commonly known as: ELAVIL  Take 2 tablets (20 mg total) by mouth nightly as needed for Insomnia.     amLODIPine 10 MG tablet  Commonly known as: NORVASC  Take 1 tablet (10 mg total) by mouth once daily.     benzonatate 100 MG capsule  Commonly known as: TESSALON  Take 1 capsule (100 mg total) by mouth 3 (three) times daily as needed for Cough.     metFORMIN 500 MG tablet  Commonly known as: GLUCOPHAGE  Take 1 tablet (500 mg total) by mouth 2 (two) times daily with meals.     mometasone 50 mcg/actuation nasal spray  Commonly known as: NASONEX  2 sprays by Nasal route once daily.     pantoprazole 40  MG tablet  Commonly known as: PROTONIX  Take 1 tablet (40 mg total) by mouth once daily.            Finesse Oneal MD  [facility] Stroke [facility]  Department of Vascular Neurology   [facility] - Neuro[facility])

## 2022-11-25 NOTE — SUBJECTIVE & OBJECTIVE
Past Medical History:   Diagnosis Date    COPD (chronic obstructive pulmonary disease)     Diabetes mellitus, type 2     Hypertension      Past Surgical History:   Procedure Laterality Date    DENTAL SURGERY      ESOPHAGOGASTRODUODENOSCOPY N/A 6/1/2020    Procedure: EGD (ESOPHAGOGASTRODUODENOSCOPY);  Surgeon: Terrell May MD;  Location: [facility];  Service: Endoscopy;  Laterality: N/A;    HERNIA REPAIR      left inguinal    incision and drainiage       Review of patient's allergies indicates:  No Known Allergies    Scheduled Medications:    albuterol-ipratropium  3 mL Nebulization Q4H WAKE    amLODIPine  10 mg Oral Daily    enoxaparin  40 mg Subcutaneous Daily    lisinopriL  10 mg Oral Daily    nicotine  1 patch Transdermal Daily    senna-docusate 8.6-50 mg  1 tablet Oral BID       PRN Medications: benzonatate, dextrose 10%, dextrose 10%, glucagon (human recombinant), hydrALAZINE, insulin aspart U-100, labetalol, magnesium oxide, magnesium oxide, potassium bicarbonate, potassium bicarbonate, potassium bicarbonate, potassium, sodium phosphates, potassium, sodium phosphates, potassium, sodium phosphates, sodium chloride 0.9%    Family History       Problem Relation (Age of Onset)    Cancer Maternal Grandmother    Drug abuse Father    Heart attack Father (80)    Heart disease Father          Tobacco Use    Smoking status: Every Day     Packs/day: 2.00     Years: 30.00     Pack years: 60.00     Types: Cigarettes    Smokeless tobacco: Never   Substance and Sexual Activity    Alcohol use: Yes     Alcohol/week: 1.0 standard drink     Types: 1 Shots of liquor per week     Comment: 1/2 pint 2 x per week - quit approximately 5 months ago    Drug use: No    Sexual activity: Yes     Partners: Female     Birth control/protection: None     Comment: No history of STDs.     Review of Systems   Constitutional:  Positive for activity change.   Respiratory:  Negative for cough and shortness of breath.    Cardiovascular:   Negative for chest pain and leg swelling.   Gastrointestinal:  Negative for abdominal distention.   Musculoskeletal:  Negative for gait problem.   Neurological:  Positive for speech difficulty. Negative for dizziness and headaches.   Psychiatric/Behavioral:  Positive for decreased concentration. Negative for agitation and behavioral problems.    Objective:     Vital Signs (Most Recent):  Temp: 97.1 °F (36.2 °C) (11/25/22 1505)  Pulse: 71 (11/25/22 1505)  Resp: 18 (11/25/22 1505)  BP: (!) 149/90 (11/25/22 1505)  SpO2: 98 % (11/25/22 1505)      Vital Signs (24h Range):  Temp:  [96.8 °F (36 °C)-98.8 °F (37.1 °C)] 97.1 °F (36.2 °C)  Pulse:  [71-90] 71  Resp:  [18-21] 18  SpO2:  [93 %-99 %] 98 %  BP: (141-162)/(67-93) 149/90     Body mass index is 24.51 kg/m².    Physical Exam  Vitals and nursing note reviewed.   Constitutional:       General: He is awake.      Appearance: Normal appearance.   HENT:      Head: Normocephalic and atraumatic.   Eyes:      Extraocular Movements: Extraocular movements intact.   Pulmonary:      Effort: Pulmonary effort is normal. No respiratory distress.   Abdominal:      General: There is no distension.   Musculoskeletal:         General: Normal range of motion.      Cervical back: Normal range of motion and neck supple.   Skin:     General: Skin is warm and dry.   Neurological:      General: No focal deficit present.      Mental Status: He is alert and oriented to person, place, and time.      GCS: GCS eye subscore is 4. GCS verbal subscore is 5. GCS motor subscore is 6.      Sensory: Sensation is intact.      Motor: Weakness present.      Gait: Gait abnormal.   Psychiatric:         Attention and Perception: He is inattentive.         Mood and Affect: Mood normal. Affect is labile.         Speech: Speech is slurred.         Behavior: Behavior normal. Behavior is cooperative.     NEUROLOGICAL EXAMINATION:     MENTAL STATUS   Oriented to person, place, and time.   Speech: slurred      Diagnostic Results: Labs: Reviewed

## 2022-11-25 NOTE — PLAN OF CARE
Problem: SLP  Goal: SLP Goal  Description: Goals due 12/2  1.  Tolerate dental soft diet with thin liquids with no s/s of aspiration  2.  Participate in speech language evaluation/met  3.  Pt. Will recall speech strategies with min cues  4.  Implement speech strategies on sentence repetition tasks with 80% accuracy  Outcome: Ongoing, Progressing

## 2022-11-25 NOTE — ASSESSMENT & PLAN NOTE
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  -TTE with EF of 45%.  -S/P Cardene gtt.  -SBP < 140.  -PT/OT rec for IPR. Will need aggressive SLP

## 2022-11-25 NOTE — NURSING
Report called to RADHA Membreno at [facility]. Pending transport at 1540.

## 2022-11-25 NOTE — HPI
Per chart review, 56 yo male with PMHx of HTN, PAD, DM, smoking, COPD presenting as a transfer for ponto-medullary, posterior ICH L>R identified on CTH after symptoms of sudden onset ringing, dysarthria and RSW that began at 02:55 on 11/22/22. 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 PM & R was consulted to evaluate pt IPR placement.     Functional History: Patient lives  with son  in a single  story home with 3 steps  to enter.  Prior to admission, pt was I with ADLs and mobility.  DME: None.

## 2022-11-25 NOTE — PT/OT/SLP EVAL
"Speech Language Pathology Evaluation  Cognitive Communication    Patient Name:  Hernan Badillo   MRN:  [id number]  Admitting Diagnosis: Left-sided nontraumatic intracerebral hemorrhage of brainstem    Recommendations:     Recommendations:                General Recommendations:  Speech/language therapydysphagia therapy  Diet recommendations:  Dental Soft, Thin   Aspiration Precautions: 1 bite/sip at a time, HOB to 90 degrees, Meds whole 1 at a time, Small bites/sips, and Strict aspiration precautions   General Precautions: Standard, fall  Communication strategies:  none    History:     Past Medical History:   Diagnosis Date    COPD (chronic obstructive pulmonary disease)     Diabetes mellitus, type 2     Hypertension        Past Surgical History:   Procedure Laterality Date    DENTAL SURGERY      ESOPHAGOGASTRODUODENOSCOPY N/A 6/1/2020    Procedure: EGD (ESOPHAGOGASTRODUODENOSCOPY);  Surgeon: Terrell May MD;  Location: [facility];  Service: Endoscopy;  Laterality: N/A;    HERNIA REPAIR      left inguinal    incision and drainiage         Social History: Patient lives with family/son.      Prior diet: regular with thin.        Subjective     "My memory is good" per pt  Patient goals: walk better     Pain/Comfort:  Pain Rating 1: 0/10  Pain Rating Post-Intervention 1: 0/10    Respiratory Status: nasal cannula    Objective:   Cognitive Status:    Pt. was oriented x3 with good recall of recent and remote temporal and general information.  Immediate/delayed verbal recall was wfl with pt. Repeating 7 digits and 5 words without difficulty using increased volulme.    Responses to hypothetical verbal problem solving tasks were accurate and complete.  Pt. Compared and contrasted objects and generated multiple solutions to problems.  Thought organization and categorization skills were wfl with pt. Naming 17 animals given one minute when 15-20 are wnl.  Pt. Responded to functional math and time calculations with 100% accuracy "    Receptive Language:   Comprehension:   Pt. Responded to complex yes/no questions and two step commands with 100% accuracy using increased vocal intensity due to decreased hearing acuity.    Pragmatics:    impulsive    Expressive Language:  Verbal:    Verbal language skills were wfl with no evidence of aphasia.  Pt. Expressed their thoughts in conversation with no evidence of confusion or word finding deficits          Motor Speech:  Speech intelligibility was fair in conversation with occasional need for repetition.  MOd dysarthria     Voice:   wfl    Visual-Spatial:  tba    Reading:   tba     Written Expression:   tba    Treatment: education provided to pt and son re role of slp and rehab plan of care    Assessment:   Hernan Badillo is a 57 y.o. male with an SLP diagnosis of Dysarthria.      Goals:   Multidisciplinary Problems       SLP Goals          Problem: SLP    Goal Priority Disciplines Outcome   SLP Goal     SLP Ongoing, Progressing   Description: Goals due 12/2  1.  Tolerate dental soft diet with thin liquids with no s/s of aspiration  2.  Participate in speech language evaluation/met  3.  Pt. Will recall speech strategies with min cues  4.  Implement speech strategies on sentence repetition tasks with 80% accuracy                       Plan:   Patient to be seen:  4 x/week   Plan of Care expires:  12/21/22  Plan of Care reviewed with:  patient, family   SLP Follow-Up:  Yes       Discharge recommendations:  Discharge Facility/Level of Care Needs: rehabilitation facility   Barriers to Discharge:  None    Time Tracking:   SLP Treatment Date:   11/25/22  Speech Start Time:  0847  Speech Stop Time:  0905     Speech Total Time (min):  18 min    Billable Minutes: Eval 10  and Self Care/Home Management Training 8    11/25/2022

## 2022-11-25 NOTE — CONSULTS
"[facility])  Physical Medicine & Rehab  Consult Note    Patient Name: Hernan Badillo  MRN: [id number]  Admission Date: 11/22/2022  Hospital Length of Stay: 3 days  Attending Physician: Dereck Flores MD  Consults  Subjective:     Principal Problem: Left-sided nontraumatic intracerebral hemorrhage of brainstem    HPI: Per chart review, 58 yo male with PMHx of HTN, PAD, DM, smoking, COPD presenting as a transfer for ponto-medullary, posterior ICH L>R identified on CTH after symptoms of sudden onset ringing, dysarthria and RSW that began at 02:55 on 11/22/22. 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 PM & R was consulted to evaluate pt IPR placement.     Functional History: Patient lives  with son  in a single  story home with 3 steps  to enter.  Prior to admission, pt was I with ADLs and mobility.  DME: None.       Hospital Course: OT-11/23    Functional Mobility/Transfers:   Patient completed Sit <> Stand Transfer with minimum assistance  with  hand-held assist    Functional Mobility: very impulsive, stomping right foot to increase sensation, ataxic movement in BLE, unable to safely side step mod (A) of two     Activities of Daily Living:   Feeding:  contact guard assistance cues to initiate, spillage with left UE   Grooming: contact guard assistance cues for safety HOB elevated   Upper Body Dressing: moderate assistance to redirect, don, sequence   Lower Body Dressing: maximal assistance with socks "I wear slippers at homE"    Toileting: contact.    PT-11/23    Functional Mobility:  1. Bed Mobility:     ? Supine to Sit: minimum assistance with HOB elevated  ? Verbal cues for technique  ? Sit to Supine: contact guard assistance with HOB flat  ? Verbal cues for technique  2. Transfers:     ? Sit to Stand:  minimum assistance with hand-held assist   ? Pt with increased trunk and cervical flexion, " narrow JILLIAN, and decreased hip extension  3. Gait:   ? Pt able to take 2 lateral steps ea dir with moderate assistance of 2 persons and hand-held assist              Past Medical History:   Diagnosis Date    COPD (chronic obstructive pulmonary disease)     Diabetes mellitus, type 2     Hypertension      Past Surgical History:   Procedure Laterality Date    DENTAL SURGERY      ESOPHAGOGASTRODUODENOSCOPY N/A 6/1/2020    Procedure: EGD (ESOPHAGOGASTRODUODENOSCOPY);  Surgeon: Terrell May MD;  Location: [facility];  Service: Endoscopy;  Laterality: N/A;    HERNIA REPAIR      left inguinal    incision and drainiage       Review of patient's allergies indicates:  No Known Allergies    Scheduled Medications:    albuterol-ipratropium  3 mL Nebulization Q4H WAKE    amLODIPine  10 mg Oral Daily    enoxaparin  40 mg Subcutaneous Daily    lisinopriL  10 mg Oral Daily    nicotine  1 patch Transdermal Daily    senna-docusate 8.6-50 mg  1 tablet Oral BID       PRN Medications: benzonatate, dextrose 10%, dextrose 10%, glucagon (human recombinant), hydrALAZINE, insulin aspart U-100, labetalol, magnesium oxide, magnesium oxide, potassium bicarbonate, potassium bicarbonate, potassium bicarbonate, potassium, sodium phosphates, potassium, sodium phosphates, potassium, sodium phosphates, sodium chloride 0.9%    Family History       Problem Relation (Age of Onset)    Cancer Maternal Grandmother    Drug abuse Father    Heart attack Father (80)    Heart disease Father          Tobacco Use    Smoking status: Every Day     Packs/day: 2.00     Years: 30.00     Pack years: 60.00     Types: Cigarettes    Smokeless tobacco: Never   Substance and Sexual Activity    Alcohol use: Yes     Alcohol/week: 1.0 standard drink     Types: 1 Shots of liquor per week     Comment: 1/2 pint 2 x per week - quit approximately 5 months ago    Drug use: No    Sexual activity: Yes     Partners: Female     Birth control/protection: None      Comment: No history of STDs.     Review of Systems   Constitutional:  Positive for activity change.   Respiratory:  Negative for cough and shortness of breath.    Cardiovascular:  Negative for chest pain and leg swelling.   Gastrointestinal:  Negative for abdominal distention.   Musculoskeletal:  Negative for gait problem.   Neurological:  Positive for speech difficulty. Negative for dizziness and headaches.   Psychiatric/Behavioral:  Positive for decreased concentration. Negative for agitation and behavioral problems.    Objective:     Vital Signs (Most Recent):  Temp: 97.1 °F (36.2 °C) (11/25/22 1505)  Pulse: 71 (11/25/22 1505)  Resp: 18 (11/25/22 1505)  BP: (!) 149/90 (11/25/22 1505)  SpO2: 98 % (11/25/22 1505)      Vital Signs (24h Range):  Temp:  [96.8 °F (36 °C)-98.8 °F (37.1 °C)] 97.1 °F (36.2 °C)  Pulse:  [71-90] 71  Resp:  [18-21] 18  SpO2:  [93 %-99 %] 98 %  BP: (141-162)/(67-93) 149/90     Body mass index is 24.51 kg/m².    Physical Exam  Vitals and nursing note reviewed.   Constitutional:       General: He is awake.      Appearance: Normal appearance.   HENT:      Head: Normocephalic and atraumatic.   Eyes:      Extraocular Movements: Extraocular movements intact.   Pulmonary:      Effort: Pulmonary effort is normal. No respiratory distress.   Abdominal:      General: There is no distension.   Musculoskeletal:         General: Normal range of motion.      Cervical back: Normal range of motion and neck supple.   Skin:     General: Skin is warm and dry.   Neurological:      General: No focal deficit present.      Mental Status: He is alert and oriented to person, place, and time.      GCS: GCS eye subscore is 4. GCS verbal subscore is 5. GCS motor subscore is 6.      Sensory: Sensation is intact.      Motor: Weakness present.      Gait: Gait abnormal.   Psychiatric:         Attention and Perception: He is inattentive.         Mood and Affect: Mood normal. Affect is labile.         Speech: Speech is  slurred.         Behavior: Behavior normal. Behavior is cooperative.     NEUROLOGICAL EXAMINATION:     MENTAL STATUS   Oriented to person, place, and time.   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  -TTE with EF of 45%.  -S/P Cardene gtt.  -SBP < 140.  -PT/OT rec for IPR. Will need aggressive SLP     COPD (chronic obstructive pulmonary disease)  -Monitor closely.  -Duo nebs as needed.     Primary hypertension  -S/P Cardene gtt.  -Continue Norvasc and Lisinopril.  -Monitor BP closely.    PM&R Recommendation:     At this time, the PM&R team has reviewed this patient's ongoing medical case including inpatient diagnosis, medical history, clinical examination, labs, vitals, current social and functional history to provide the post-acute recommendation as follows:     RECOMMENDATIONS:inpatient rehabilitation due to fair to good potential for improvement with therapies and need of physician oversight for management of ongoing active medical issues.       The patient will be admitted for comprehensive interdisciplinary inpatient rehabilitation to address the impairments due to his medical diagnosis of  Left sided non-traumatic intracerebral hemorrhage of brainstem . The patient will benefit from an inpatient rehabilitation program to promote functional recovery, implement compensatory strategies and will undergo assessment for needs for durable medical equipment for safe discharge to the community. This patient will benefit from a coordinated interdisciplinary rehabilitation program services that require close monitoring and treatment with 24-hour rehabilitative nursing and  physical/occupational/speech therapies for 3 hours/day for 5 days/week. This interdisciplinary program will be  performed under the direction of a physiatrist.        We will sign off.        Thank you for your consult.     Ernestine Medeiros NP  Department of Physical Medicine & Rehab  [facility])

## 2022-11-25 NOTE — PLAN OF CARE
CM attended team huddle. Patient is medically ready to discharge to inpatient rehab. CM [facility] with [facility] and can admit today. Orders requested and pending completion.    CM will update on transportation after reviewed by accepting facility. Notified charge nurse.      Gricelda Vazquez RN  Covering  - [facility] [place]  [id number]

## 2022-11-25 NOTE — CARE UPDATE
"Notified by nursing staff patient complaining of head pressure and diplopia, vital stable. Went to see patient and when asked about his headache he reported it wasn't a headache, just "head pressure" to bilateral frontal area. Reported "head pressure" was associated with feeling like needing ears to pop, and later reported ringing in the ears. Patient denied any other concerns or complaints when asked multiple times. No longer reporting any diplopia, otherwise feels same as he did earlier in the day. Stat CTH ordered to rule out acute pathology and IV Mag 1g ordered for HA.  "

## 2022-11-25 NOTE — SUBJECTIVE & OBJECTIVE
Neurologic Chief Complaint:   Left Pontine intraparenchymal haemorrhage     Subjective:     Interval History: Patient is seen for follow-up neurological assessment and treatment recommendations:   No acute events overnight    HPI, Past Medical, Family, and Social History remains the same as documented in the initial encounter.     Review of Systems   Constitutional:  Negative for chills and fever.   HENT:  Positive for voice change. Negative for tinnitus and trouble swallowing.    Eyes:  Negative for photophobia and visual disturbance.   Respiratory:  Positive for shortness of breath. Negative for cough.    Cardiovascular:  Negative for palpitations and leg swelling.   Gastrointestinal:  Negative for abdominal pain, nausea and vomiting.   Endocrine: Negative.    Genitourinary: Negative.    Musculoskeletal:  Negative for back pain.   Skin:  Negative for rash.   Neurological:  Positive for speech difficulty, weakness and numbness. Negative for facial asymmetry and headaches.   Psychiatric/Behavioral:  Negative for agitation.      Scheduled Meds:   albuterol-ipratropium  3 mL Nebulization Q4H WAKE    amLODIPine  10 mg Oral Daily    enoxaparin  40 mg Subcutaneous Daily    lisinopriL  10 mg Oral Daily    nicotine  1 patch Transdermal Daily    senna-docusate 8.6-50 mg  1 tablet Oral BID     PRN Meds:benzonatate, dextrose 10%, dextrose 10%, glucagon (human recombinant), hydrALAZINE, insulin aspart U-100, labetalol, magnesium oxide, magnesium oxide, potassium bicarbonate, potassium bicarbonate, potassium bicarbonate, potassium, sodium phosphates, potassium, sodium phosphates, potassium, sodium phosphates, sodium chloride 0.9%    Objective:     Vital Signs (Most Recent):  Temp: 98.3 °F (36.8 °C) (11/25/22 1201)  Pulse: 80 (11/25/22 1213)  Resp: 18 (11/25/22 1201)  BP: (!) 162/93 (11/25/22 1201)  SpO2: 97 % (11/25/22 1201)  BP Location: Left arm    Vital Signs Range (Last 24H):  Temp:  [96.8 °F (36 °C)-98.8 °F (37.1 °C)]  "  Pulse:  [71-90]   Resp:  [18-21]   BP: (141-162)/(67-93)   SpO2:  [93 %-99 %]   BP Location: Left arm    Physical Exam  Vitals and nursing note reviewed.   Constitutional:       General: He is not in acute distress.     Appearance: Normal appearance.   HENT:      Head: Normocephalic and atraumatic.      Nose: Nose normal.      Mouth/Throat:      Mouth: Mucous membranes are dry.   Eyes:      General: No visual field deficit.     Extraocular Movements: Extraocular movements intact.      Pupils: Pupils are equal, round, and reactive to light.   Cardiovascular:      Rate and Rhythm: Normal rate.      Pulses: Normal pulses.   Pulmonary:      Effort: Respiratory distress (mild distress) present.      Breath sounds: Stridor present.   Abdominal:      General: There is no distension.      Palpations: Abdomen is soft.      Tenderness: There is no abdominal tenderness. There is no guarding.   Musculoskeletal:         General: Normal range of motion.      Cervical back: Normal range of motion.   Skin:     General: Skin is warm and dry.   Neurological:      Mental Status: He is alert and oriented to person, place, and time.      GCS: GCS eye subscore is 4. GCS verbal subscore is 5. GCS motor subscore is 6.      Cranial Nerves: Dysarthria present. No cranial nerve deficit or facial asymmetry.      Sensory: Sensory deficit present.      Motor: Weakness present.   Psychiatric:         Mood and Affect: Mood normal.         Behavior: Behavior normal.     Neurological Exam:   LOC: alert  Attention Span: Good   Language: No aphasia  Articulation: Dysarthria  Orientation: Person, Place, Time   Facial Movement (CN VII): Symmetric facial expression    Motor: Arm left  Normal 5/5  Leg left  Normal 5/5  Arm right  Paresis: 4/5  Leg right Paresis: 4/5  Sensation: Decreased sensation on right side; patient reports arm and leg have "tight" sensation    Laboratory:  CMP:   Recent Labs   Lab 11/25/22  0421   CALCIUM 9.5   *   K 3.9 "   CO2 25      BUN 12   CREATININE 0.8     BMP:   Recent Labs   Lab 11/25/22  0421   *   K 3.9      CO2 25   BUN 12   CREATININE 0.8   CALCIUM 9.5     CBC:   Recent Labs   Lab 11/25/22  0421   WBC 4.44   RBC 4.12*   HGB 12.7*   HCT 39.3*      MCV 95   MCH 30.8   MCHC 32.3     Lipid Panel:   Recent Labs   Lab 11/22/22  1045   CHOL 144   LDLCALC 92.0   HDL 41   TRIG 55     Coagulation:   Recent Labs   Lab 11/22/22  1045   INR 1.1   APTT 29.2     Platelet Aggregation Study: No results for input(s): PLTAGG, PLTAGINTERP, PLTAGREGLACO, ADPPLTAGGREG in the last 168 hours.  Hgb A1C:   Recent Labs   Lab 11/22/22  1045   HGBA1C 6.6*     TSH:   Recent Labs   Lab 11/22/22  1045   TSH 0.536     Brain Imaging   CTH Without Contrast 11/22/2022  -Allowing for distortion by motion artifact relatively stable hyperdense collection within the central and left paramedian alf concerning for acute pontine hemorrhage.  No definite acute extra-axial hemorrhage allowing for limitation by artifacts.  Clinical correlation and further evaluation with MRI recommended if patient compatible   -Small regions of hypoattenuation left caudate and bilateral basal ganglia most compatible with remote lacunar-type infarcts.     CTH Without Contrast 11/22/2022  1. Acute pontine intraparenchymal hemorrhage.  No significant mass effect or intraventricular hemorrhage.  2.  Mild generalized cerebral volume loss and scattered supratentorial white matter hypoattenuation, nonspecific and may reflect sequelae of chronic microvascular ischemic change.  3. Bilateral basal ganglia and right paramedian pontine chronic lacunar infarcts versus perivascular spaces.    Vessel Imaging   None    Cardiac Imaging   TTE 11/23/2022  The left ventricle is normal in size with concentric hypertrophy and mildly decreased systolic function.  The estimated ejection fraction is 45%.  Grade I left ventricular diastolic dysfunction.  Normal right ventricular  size with mildly reduced right ventricular systolic function.  The quantitatively derived ejection fraction is 45%.  Normal central venous pressure (3 mmHg).

## 2022-11-25 NOTE — PLAN OF CARE
[facility] - Neurosurgery (Hospital)  Discharge Final Note    Primary Care Provider: Primary Doctor No    Expected Discharge Date: 11/25/2022    CM arranged wheelchair van transport via [employer], requested pickup time 3:40 PM. Team updated.    Final Discharge Note (most recent)       Final Note - 11/25/22 1456          Final Note    Assessment Type Final Discharge Note     Anticipated Discharge Disposition Rehab Facility     Hospital Resources/Appts/Education Provided Provided patient/caregiver with written discharge plan information;Appointments scheduled by Navigator/Coordinator        Post-Acute Status    Post-Acute Authorization Placement     Post-Acute Placement Status Set-up Complete/Auth obtained   [facility]                    Important Message from Medicare         Contact Info       No, Primary Doctor   Relationship: PCP - General        Next Steps: Follow up            Gricelda Vazquez RN  Covering  - [facility]  [id number]

## 2022-11-25 NOTE — PLAN OF CARE
Problem: Adult Inpatient Plan of Care  Goal: Plan of Care Review  Outcome: Met  Goal: Patient-Specific Goal (Individualized)  Description: Admit Date: 11/22/2022    Left-sided nontraumatic intracerebral hemorrhage of brainstem    Past Medical History:  No date: COPD (chronic obstructive pulmonary disease)  No date: Diabetes mellitus, type 2  No date: Hypertension    Past Surgical History:  No date: DENTAL SURGERY  6/1/2020: ESOPHAGOGASTRODUODENOSCOPY; N/A      Comment:  Procedure: EGD (ESOPHAGOGASTRODUODENOSCOPY);  Surgeon:                Terrell May MD;  Location: [facility];  Service:                Endoscopy;  Laterality: N/A;  No date: HERNIA REPAIR      Comment:  left inguinal  No date: incision and drainiage    Individualization:   1. Pt does not like bed to be seated high     Restraints:              Outcome: Met  Goal: Absence of Hospital-Acquired Illness or Injury  Outcome: Met  Goal: Optimal Comfort and Wellbeing  Outcome: Met  Goal: Readiness for Transition of Care  Outcome: Met     Problem: Diabetes Comorbidity  Goal: Blood Glucose Level Within Targeted Range  Outcome: Met     Problem: Fluid Imbalance (Pneumonia)  Goal: Fluid Balance  Outcome: Met     Problem: Infection (Pneumonia)  Goal: Resolution of Infection Signs and Symptoms  Outcome: Met     Problem: Respiratory Compromise (Pneumonia)  Goal: Effective Oxygenation and Ventilation  Outcome: Met     Problem: Adjustment to Illness (Stroke, Hemorrhagic)  Goal: Optimal Coping  Outcome: Met     Problem: Bowel Elimination Impaired (Stroke, Hemorrhagic)  Goal: Effective Bowel Elimination  Outcome: Met     Problem: Cerebral Tissue Perfusion (Stroke, Hemorrhagic)  Goal: Optimal Cerebral Tissue Perfusion  Outcome: Met     Problem: Cognitive Impairment (Stroke, Hemorrhagic)  Goal: Optimal Cognitive Function  Outcome: Met     Problem: Communication Impairment (Stroke, Hemorrhagic)  Goal: Effective Communication Skills  Outcome: Met     Problem:  Functional Ability Impaired (Stroke, Hemorrhagic)  Goal: Optimal Functional Ability  Outcome: Met     Problem: Pain (Stroke, Hemorrhagic)  Goal: Acceptable Pain Control  Outcome: Met     Problem: Respiratory Compromise (Stroke, Hemorrhagic)  Goal: Effective Oxygenation and Ventilation  Outcome: Met     Problem: Sensorimotor Impairment (Stroke, Hemorrhagic)  Goal: Improved Sensorimotor Function  Outcome: Met     Problem: Swallowing Impairment (Stroke, Hemorrhagic)  Goal: Optimal Eating and Swallowing Without Aspiration  Outcome: Met     Problem: Urinary Elimination Impaired (Stroke, Hemorrhagic)  Goal: Effective Urinary Elimination  Outcome: Met     Problem: Skin Injury Risk Increased  Goal: Skin Health and Integrity  Outcome: Met     [facility] Neurologically at baseline for this admission, see chart for full assessment. PIVs and telemetry removed. Report given to RADHA Membreno. Transported via  [employer] and brother accompanied.

## 2022-11-25 NOTE — PROGRESS NOTES
[facility] - Neurosurgery ([facility])  Vascular Neurology  Comprehensive Stroke Center  Progress Note    Assessment/Plan:     * Left-sided nontraumatic intracerebral hemorrhage of brainstem  58 yo male with PMHx of HTN, PAD, DM, smoking, COPD presenting as a transfer for ponto-medullary, posterior ICH L>R identified on CTH after symptoms of sudden onset ringing, dysarthria and RSW that began at 02:55 on 11/22/22. 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 However, a cavernoma is also on the differential given that patient's symptoms are less severe than would otherwise be expected from a finding of this size on imaging.    Patient in NCC. Neuro exam is stable; still with dysarthria, mild right sided weakness and numbness. Echo with EF 45%, no LAE. Etiology likely HTN. Dispo recommendations from therapy for IPR. Patient currently off of Cardene, once stable without IV anti-HTN medications for minimum of six hours patient may step down to floor. Possible step down this evening pending BP      Antithrombotics for secondary stroke prevention: Antiplatelets: Aspirin: 81 mg daily  Clopidogrel: 75 mg daily hold in the setting of likely bleed    Statins for secondary stroke prevention and hyperlipidemia, if present:   Statins: None: Reason: in the setting of likely bleed at the moment    Aggressive risk factor modification: HTN, Smoking, DM     Rehab efforts: The patient has been evaluated by a stroke team provider and the therapy needs have been fully considered based off the presenting complaints and exam findings. The following therapy evaluations are needed: PT evaluate and treat, OT evaluate and treat, SLP evaluate and treat    Diagnostics ordered/pending: None    VTE prophylaxis: Enoxaparin 40 mg SQ every 24 hours  Mechanical prophylaxis: Place SCDs     BP parameters: ICH: SBP <140        Tobacco dependency  Since age of 15, 3  PPD  Counseled on importance of smoking cessation moving forward    COPD (chronic obstructive pulmonary disease)  History of.    Type 2 diabetes mellitus  A1c 6.6  SSI  Inpatient BG Goal 140-180    Primary hypertension  -Cardene off  -Amlodipine 10, Lisinopril 10     Tight BP control: SBP < 140 in setting of ICH        11/23/2022 Patient in NCC. Neuro exam is stable; still with dysarthria, mild right sided weakness and numbness. Echo with EF 45%, no LAE. Etiology likely HTN. Dispo recommendations from therapy for IPR. Patient currently off of Cardene, once stable without IV anti-HTN medications may step down to floor. Possible step down this evening pending BP.     11/24/2022 - no acute events.  Patient drowsy.  Some shortness of breath will schedule breathing treatments.  Chest x-ray with subtly increased interstitial opacities    11/25/2022 -  had complaints overnight of headache and diplopia.  Was given magnesium for headache management with resolution of complaints.  This morning no complaints.  Awaiting IPR placement    STROKE DOCUMENTATION    NIH Scale:  1a. Level of Consciousness: 0-->Alert, keenly responsive  1b. LOC Questions: 0-->Answers both questions correctly  1c. LOC Commands: 0-->Performs both tasks correctly  2. Best Gaze: 1-->Partial gaze palsy, gaze is abnormal in one or both eyes, but forced deviation or total gaze paresis is not present  3. Visual: 0-->No visual loss  4. Facial Palsy: 0-->Normal symmetrical movements  5a. Motor Arm, Left: 0-->No drift, limb holds 90 (or 45) degrees for full 10 secs  5b. Motor Arm, Right: 1-->Drift, limb holds 90 (or 45) degrees, but drifts down before full 10 secs, does not hit bed or other support  6a. Motor Leg, Left: 0-->No drift, leg holds 30 degree position for full 5 secs  6b. Motor Leg, Right: 1-->Drift, leg falls by the end of the 5-sec period but does not hit bed  7. Limb Ataxia: 0-->Absent  8. Sensory: 1-->Mild-to-moderate sensory loss, patient feels  pinprick is less sharp or is dull on the affected side, or there is a loss of superficial pain with pinprick, but patient is aware of being touched  9. Best Language: 0-->No aphasia, normal  10. Dysarthria: 1-->Mild-to-moderate dysarthria, patient slurs at least some words and, at worst, can be understood with some difficulty  11. Extinction and Inattention (formerly Neglect): 0-->No abnormality  Total (NIH Stroke Scale): 5     Modified Yasmani Score: 1  Joseph Coma Scale:15   ABCD2 Score:    [id number] Score:   HAS -BLED Score:   ICH Score:1  Hunt & Cho Classification:      Hemorrhagic change of an Ischemic Stroke: Does this patient have an ischemic stroke with hemorrhagic changes? No     Neurologic Chief Complaint:   Left Pontine intraparenchymal haemorrhage     Subjective:     Interval History: Patient is seen for follow-up neurological assessment and treatment recommendations:   No acute events overnight    HPI, Past Medical, Family, and Social History remains the same as documented in the initial encounter.     Review of Systems   Constitutional:  Negative for chills and fever.   HENT:  Positive for voice change. Negative for tinnitus and trouble swallowing.    Eyes:  Negative for photophobia and visual disturbance.   Respiratory:  Positive for shortness of breath. Negative for cough.    Cardiovascular:  Negative for palpitations and leg swelling.   Gastrointestinal:  Negative for abdominal pain, nausea and vomiting.   Endocrine: Negative.    Genitourinary: Negative.    Musculoskeletal:  Negative for back pain.   Skin:  Negative for rash.   Neurological:  Positive for speech difficulty, weakness and numbness. Negative for facial asymmetry and headaches.   Psychiatric/Behavioral:  Negative for agitation.      Scheduled Meds:   albuterol-ipratropium  3 mL Nebulization Q4H WAKE    amLODIPine  10 mg Oral Daily    enoxaparin  40 mg Subcutaneous Daily    lisinopriL  10 mg Oral Daily    nicotine  1 patch  Transdermal Daily    senna-docusate 8.6-50 mg  1 tablet Oral BID     PRN Meds:benzonatate, dextrose 10%, dextrose 10%, glucagon (human recombinant), hydrALAZINE, insulin aspart U-100, labetalol, magnesium oxide, magnesium oxide, potassium bicarbonate, potassium bicarbonate, potassium bicarbonate, potassium, sodium phosphates, potassium, sodium phosphates, potassium, sodium phosphates, sodium chloride 0.9%    Objective:     Vital Signs (Most Recent):  Temp: 98.3 °F (36.8 °C) (11/25/22 1201)  Pulse: 80 (11/25/22 1213)  Resp: 18 (11/25/22 1201)  BP: (!) 162/93 (11/25/22 1201)  SpO2: 97 % (11/25/22 1201)  BP Location: Left arm    Vital Signs Range (Last 24H):  Temp:  [96.8 °F (36 °C)-98.8 °F (37.1 °C)]   Pulse:  [71-90]   Resp:  [18-21]   BP: (141-162)/(67-93)   SpO2:  [93 %-99 %]   BP Location: Left arm    Physical Exam  Vitals and nursing note reviewed.   Constitutional:       General: He is not in acute distress.     Appearance: Normal appearance.   HENT:      Head: Normocephalic and atraumatic.      Nose: Nose normal.      Mouth/Throat:      Mouth: Mucous membranes are dry.   Eyes:      General: No visual field deficit.     Extraocular Movements: Extraocular movements intact.      Pupils: Pupils are equal, round, and reactive to light.   Cardiovascular:      Rate and Rhythm: Normal rate.      Pulses: Normal pulses.   Pulmonary:      Effort: Respiratory distress (mild distress) present.      Breath sounds: Stridor present.   Abdominal:      General: There is no distension.      Palpations: Abdomen is soft.      Tenderness: There is no abdominal tenderness. There is no guarding.   Musculoskeletal:         General: Normal range of motion.      Cervical back: Normal range of motion.   Skin:     General: Skin is warm and dry.   Neurological:      Mental Status: He is alert and oriented to person, place, and time.      GCS: GCS eye subscore is 4. GCS verbal subscore is 5. GCS motor subscore is 6.      Cranial Nerves:  "Dysarthria present. No cranial nerve deficit or facial asymmetry.      Sensory: Sensory deficit present.      Motor: Weakness present.   Psychiatric:         Mood and Affect: Mood normal.         Behavior: Behavior normal.     Neurological Exam:   LOC: alert  Attention Span: Good   Language: No aphasia  Articulation: Dysarthria  Orientation: Person, Place, Time   Facial Movement (CN VII): Symmetric facial expression    Motor: Arm left  Normal 5/5  Leg left  Normal 5/5  Arm right  Paresis: 4/5  Leg right Paresis: 4/5  Sensation: Decreased sensation on right side; patient reports arm and leg have "tight" sensation    Laboratory:  CMP:   Recent Labs   Lab 11/25/22  0421   CALCIUM 9.5   *   K 3.9   CO2 25      BUN 12   CREATININE 0.8     BMP:   Recent Labs   Lab 11/25/22 0421   *   K 3.9      CO2 25   BUN 12   CREATININE 0.8   CALCIUM 9.5     CBC:   Recent Labs   Lab 11/25/22 0421   WBC 4.44   RBC 4.12*   HGB 12.7*   HCT 39.3*      MCV 95   MCH 30.8   MCHC 32.3     Lipid Panel:   Recent Labs   Lab 11/22/22  1045   CHOL 144   LDLCALC 92.0   HDL 41   TRIG 55     Coagulation:   Recent Labs   Lab 11/22/22  1045   INR 1.1   APTT 29.2     Platelet Aggregation Study: No results for input(s): PLTAGG, PLTAGINTERP, PLTAGREGLACO, ADPPLTAGGREG in the last 168 hours.  Hgb A1C:   Recent Labs   Lab 11/22/22  1045   HGBA1C 6.6*     TSH:   Recent Labs   Lab 11/22/22  1045   TSH 0.536     Brain Imaging   CTH Without Contrast 11/22/2022  -Allowing for distortion by motion artifact relatively stable hyperdense collection within the central and left paramedian alf concerning for acute pontine hemorrhage.  No definite acute extra-axial hemorrhage allowing for limitation by artifacts.  Clinical correlation and further evaluation with MRI recommended if patient compatible   -Small regions of hypoattenuation left caudate and bilateral basal ganglia most compatible with remote lacunar-type infarcts.     CTH " Without Contrast 11/22/2022  1. Acute pontine intraparenchymal hemorrhage.  No significant mass effect or intraventricular hemorrhage.  2.  Mild generalized cerebral volume loss and scattered supratentorial white matter hypoattenuation, nonspecific and may reflect sequelae of chronic microvascular ischemic change.  3. Bilateral basal ganglia and right paramedian pontine chronic lacunar infarcts versus perivascular spaces.    Vessel Imaging   None    Cardiac Imaging   TTE 11/23/2022   The left ventricle is normal in size with concentric hypertrophy and mildly decreased systolic function.   The estimated ejection fraction is 45%.   Grade I left ventricular diastolic dysfunction.   Normal right ventricular size with mildly reduced right ventricular systolic function.   The quantitatively derived ejection fraction is 45%.   Normal central venous pressure (3 mmHg).    Finesse Oneal MD  Comprehensive Stroke Center  Department of Vascular Neurology   [facility] - Neurosurgery [facility])

## 2022-11-25 NOTE — PT/OT/SLP PROGRESS
Occupational Therapy      Patient Name:  Hernan Badillo   MRN:  [id number]    Patient not seen today secondary to  (pt to d/c from the hospital today.). Will follow-up if pt remains in-house.    11/25/2022

## 2022-11-25 NOTE — HOSPITAL COURSE
"OT-11/23    Functional Mobility/Transfers:  Patient completed Sit <> Stand Transfer with minimum assistance  with  hand-held assist   Functional Mobility: very impulsive, stomping right foot to increase sensation, ataxic movement in BLE, unable to safely side step mod (A) of two     Activities of Daily Living:  Feeding:  contact guard assistance cues to initiate, spillage with left UE  Grooming: contact guard assistance cues for safety HOB elevated  Upper Body Dressing: moderate assistance to redirect, don, sequence  Lower Body Dressing: maximal assistance with socks "I wear slippers at homE"   Toileting: contact.    PT-11/23    Functional Mobility:  Bed Mobility:     Supine to Sit: minimum assistance with HOB elevated  Verbal cues for technique  Sit to Supine: contact guard assistance with HOB flat  Verbal cues for technique  Transfers:     Sit to Stand:  minimum assistance with hand-held assist   Pt with increased trunk and cervical flexion, narrow JILLIAN, and decreased hip extension  Gait:   Pt able to take 2 lateral steps ea dir with moderate assistance of 2 persons and hand-held assist          "

## 2022-11-25 NOTE — PLAN OF CARE
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     Allergies: Review of patient's allergies indicates:  No Known Allergies    Diet: cardiac diet    Activities: Activity as tolerated    Goals of Care Treatment Preferences:  Code Status: Full Code    Nursing: per facility protocol     Labs: per facility protocol     CONSULTS:    Physical Therapy to evaluate and treat. , Occupational Therapy to evaluate and treat., and Speech Therapy to evaluate and treat for Language, Swallowing, and Cognition.    MISCELLANEOUS CARE:  Per facility    WOUND CARE ORDERS  None    Medications: Review discharge medications with patient and family and provide education.      Current Discharge Medication List        START taking these medications    Details   lisinopriL 10 MG tablet Take 1 tablet (10 mg total) by mouth once daily.  Qty: 90 tablet, Refills: 3    Comments: .      nicotine (NICODERM CQ) 14 mg/24 hr Place 1 patch onto the skin once daily.  Qty: 90 patch, Refills: 0      senna-docusate 8.6-50 mg (PERICOLACE) 8.6-50 mg per tablet Take 1 tablet by mouth 2 (two) times daily.  Qty: 60 tablet, Refills: 0           CONTINUE these medications which have NOT CHANGED    Details   albuterol (VENTOLIN HFA) 90 mcg/actuation inhaler Inhale 2 puffs into the lungs every 6  (six) hours as needed for Wheezing. Rescue  Qty: 18 g, Refills: 0    Associated Diagnoses: Acute exacerbation of chronic obstructive pulmonary disease (COPD)      albuterol-ipratropium (DUO-NEB) 2.5 mg-0.5 mg/3 mL nebulizer solution Take 3 mLs by nebulization every 6 (six) hours as needed for Wheezing or Shortness of Breath.  Qty: 25 each, Refills: 3    Associated Diagnoses: Acute exacerbation of chronic obstructive pulmonary disease (COPD)      amitriptyline (ELAVIL) 10 MG tablet Take 2 tablets (20 mg total) by mouth nightly as needed for Insomnia.  Qty: 60 tablet, Refills: 0    Associated Diagnoses: Insomnia, unspecified type      amLODIPine (NORVASC) 10 MG tablet Take 1 tablet (10 mg total) by mouth once daily.  Qty: 30 tablet, Refills: 11    Comments: .  Associated Diagnoses: Hypertension associated with diabetes      benzonatate (TESSALON) 100 MG capsule Take 1 capsule (100 mg total) by mouth 3 (three) times daily as needed for Cough.  Qty: 20 capsule, Refills: 0    Associated Diagnoses: Cough      metFORMIN (GLUCOPHAGE) 500 MG tablet Take 1 tablet (500 mg total) by mouth 2 (two) times daily with meals.  Qty: 30 tablet, Refills: 2    Associated Diagnoses: Uncontrolled type 2 diabetes mellitus with hyperglycemia      mometasone (NASONEX) 50 mcg/actuation nasal spray 2 sprays by Nasal route once daily.  Qty: 17 g, Refills: 0    Associated Diagnoses: Nasal congestion      pantoprazole (PROTONIX) 40 MG tablet Take 1 tablet (40 mg total) by mouth once daily.  Qty: 30 tablet, Refills: 11    Associated Diagnoses: Gastroesophageal reflux disease without esophagitis            Immunizations Administered as of 11/25/2022       No immunizations on file.          Overdue for his boosters    Some patients may experience side effects after vaccination.  These may include fever, headache, muscle or joint aches.  Most symptoms resolve with 24-48 hours and do not require urgent medical evaluation unless they persist for more  than 72 hours or symptoms are concerning for an unrelated medical condition.      _________________________________  Finesse Oneal MD  11/25/2022

## 2022-11-29 ENCOUNTER — HOSPITAL ENCOUNTER (OUTPATIENT)
Dept: RADIOLOGY | Facility: HOSPITAL | Age: 57
Discharge: HOME | End: 2022-11-29
Attending: PHYSICAL MEDICINE & REHABILITATION
Payer: MEDICARE

## 2022-11-29 PROCEDURE — 70450 CT HEAD/BRAIN W/O DYE: CPT | Mod: 26,,, | Performed by: RADIOLOGY

## 2022-11-29 PROCEDURE — 70450 CT HEAD WITHOUT CONTRAST: ICD-10-PCS | Mod: 26,,, | Performed by: RADIOLOGY

## 2022-12-08 DIAGNOSIS — I63.9 CVA (CEREBRAL VASCULAR ACCIDENT): Primary | ICD-10-CM

## 2022-12-09 ENCOUNTER — TELEPHONE (OUTPATIENT)
Dept: NEUROLOGY | Facility: CLINIC | Age: 57
End: 2022-12-09
Payer: MEDICARE

## 2022-12-09 NOTE — TELEPHONE ENCOUNTER
Returned call from pt's mother asking for an appt in [place]. I explained that all our stroke specialists are located at the main campus in [place]. Pt was originally frustrated, and refused to make the risk, but after I explained that this appt was to reduce future risk of stroke, he agreed to come. Pt confirmed date/time of upcoming appt.

## 2022-12-13 ENCOUNTER — OFFICE VISIT (OUTPATIENT)
Dept: NEUROLOGY | Facility: CLINIC | Age: 57
End: 2022-12-13
Payer: MEDICARE

## 2022-12-13 VITALS
DIASTOLIC BLOOD PRESSURE: 83 MMHG | SYSTOLIC BLOOD PRESSURE: 128 MMHG | WEIGHT: 165 LBS | BODY MASS INDEX: 24.44 KG/M2 | HEART RATE: 62 BPM | HEIGHT: 69 IN

## 2022-12-13 DIAGNOSIS — Z86.73 HISTORY OF STROKE: Primary | ICD-10-CM

## 2022-12-13 PROCEDURE — 99999 PR PBB SHADOW E&M-EST. PATIENT-LVL III: CPT | Mod: PBBFAC,,, | Performed by: STUDENT IN AN ORGANIZED HEALTH CARE EDUCATION/TRAINING PROGRAM

## 2022-12-13 PROCEDURE — 99213 OFFICE O/P EST LOW 20 MIN: CPT | Mod: PBBFAC | Performed by: STUDENT IN AN ORGANIZED HEALTH CARE EDUCATION/TRAINING PROGRAM

## 2022-12-13 PROCEDURE — 99214 PR OFFICE/OUTPT VISIT, EST, LEVL IV, 30-39 MIN: ICD-10-PCS | Mod: S$PBB,,, | Performed by: STUDENT IN AN ORGANIZED HEALTH CARE EDUCATION/TRAINING PROGRAM

## 2022-12-13 PROCEDURE — 99999 PR PBB SHADOW E&M-EST. PATIENT-LVL III: ICD-10-PCS | Mod: PBBFAC,,, | Performed by: STUDENT IN AN ORGANIZED HEALTH CARE EDUCATION/TRAINING PROGRAM

## 2022-12-13 PROCEDURE — 99214 OFFICE O/P EST MOD 30 MIN: CPT | Mod: S$PBB,,, | Performed by: STUDENT IN AN ORGANIZED HEALTH CARE EDUCATION/TRAINING PROGRAM

## 2022-12-13 NOTE — PROGRESS NOTES
[facility] Neurology Clinic  Hospital Follow-up Clinic Visit    Patient Name: Hernan Badillo  MRN: [id number]  Date: 12/13/22  Referring Provider: No ref. provider found    CC: Hospital follow-up for ICH    SUBJECTIVE:      History of Present Illness: Hernan Badillo is a right-handed 57 y.o. male with a past medical history significant for HTN, PAD, DM, tobacco use, and COPD who presents to clinic for follow-up for a recent admission for stroke.     Patient was recently admitted to the Vascular Neurology service 11/22-11/25/22 after presenting with slurred speech and right-sided weakness. Imaging showed an acute pontine ICH. Blood pressure at presentation 229/107. No AC or AP medications. His hospital course was uncomplicated and his blood pressure remained controlled on oral BP medications. He was discharged to [facility]. Since returning home, he denies new or worsening symptoms concerning for stroke or TIA. His blood pressures have been better controlled at home. He lives with his brother (who accompanies him to his appointment today), son, and mother. No recent falls. He walks with a walker, but arrives to clinic in a manual wheelchair today.     Etiology: Pontine ICH  Intervention: None    Work-up:    Imaging:  - [facility]: Acute pontine intraparenchymal hemorrhage. No significant mass effect or intraventricular hemorrhage. Mild generalized cerebral volume loss and scattered supratentorial white matter hypoattenuation, nonspecific and may reflect sequelae of chronic microvascular ischemic change.  Bilateral basal ganglia and right paramedian pontine chronic lacunar infarcts versus perivascular spaces.        Cardiac Evaluation:  - TTE (11/23/2022)  The left ventricle is normal in size with concentric hypertrophy and mildly decreased systolic function.  The estimated ejection fraction is 45%.  Grade I left ventricular diastolic dysfunction.  Normal right ventricular size with mildly reduced right ventricular systolic function.  The  quantitatively derived ejection fraction is 45%.  Normal central venous pressure (3 mmHg).    Labs:  Recent Labs   Lab 11/22/22  1045   Hemoglobin A1C 6.6 H   LDL Cholesterol 92.0   HDL 41   Triglycerides 55   Cholesterol 144         Review of Systems: The following systems were reviewed with pertinent positives and negatives documented in the HPI: constitutional, eyes, CV, respiratory, GI, , musculoskeletal, skin, neurological, psychiatric    Past Medical History:  Past Medical History:   Diagnosis Date    COPD (chronic obstructive pulmonary disease)     Diabetes mellitus, type 2     Hypertension        Medications:    Current Outpatient Medications:     albuterol (VENTOLIN HFA) 90 mcg/actuation inhaler, Inhale 2 puffs into the lungs every 6 (six) hours as needed for Wheezing. Rescue, Disp: 18 g, Rfl: 0    albuterol-ipratropium (DUO-NEB) 2.5 mg-0.5 mg/3 mL nebulizer solution, Take 3 mLs by nebulization every 6 (six) hours as needed for Wheezing or Shortness of Breath., Disp: 25 each, Rfl: 3    amitriptyline (ELAVIL) 10 MG tablet, Take 2 tablets (20 mg total) by mouth nightly as needed for Insomnia., Disp: 60 tablet, Rfl: 0    amLODIPine (NORVASC) 10 MG tablet, Take 1 tablet (10 mg total) by mouth once daily., Disp: 30 tablet, Rfl: 11    benzonatate (TESSALON) 100 MG capsule, Take 1 capsule (100 mg total) by mouth 3 (three) times daily as needed for Cough., Disp: 20 capsule, Rfl: 0    lisinopriL 10 MG tablet, Take 1 tablet (10 mg total) by mouth once daily., Disp: 90 tablet, Rfl: 3    metFORMIN (GLUCOPHAGE) 500 MG tablet, Take 1 tablet (500 mg total) by mouth 2 (two) times daily with meals., Disp: 30 tablet, Rfl: 2    mometasone (NASONEX) 50 mcg/actuation nasal spray, 2 sprays by Nasal route once daily., Disp: 17 g, Rfl: 0    nicotine (NICODERM CQ) 14 mg/24 hr, Place 1 patch onto the skin once daily., Disp: 90 patch, Rfl: 0    pantoprazole (PROTONIX) 40 MG tablet, Take 1 tablet (40 mg total) by mouth once daily.,  Disp: 30 tablet, Rfl: 11    senna-docusate 8.6-50 mg (PERICOLACE) 8.6-50 mg per tablet, Take 1 tablet by mouth 2 (two) times daily., Disp: 60 tablet, Rfl: 0    Current Facility-Administered Medications:     dexamethasone injection 4 mg, 4 mg, Intramuscular, 1 time in Clinic/HOD, Aiyana Muhammad PA-C  Any other notable medications as documented in HPI.    Allergies:  Review of patient's allergies indicates:  No Known Allergies    Social History:  Social History     Socioeconomic History    Marital status: Single    Number of children: 1   Occupational History    Occupation: [employer]   Tobacco Use    Smoking status: Every Day     Packs/day: 2.00     Years: 30.00     Pack years: 60.00     Types: Cigarettes    Smokeless tobacco: Never   Substance and Sexual Activity    Alcohol use: Yes     Alcohol/week: 1.0 standard drink     Types: 1 Shots of liquor per week     Comment: 1/2 pint 2 x per week - quit approximately 5 months ago    Drug use: No    Sexual activity: Yes     Partners: Female     Birth control/protection: None     Comment: No history of STDs.   Social History Narrative    The patient does not exercise regularly ().    Rates diet as fair.    He is satisfied with weight.             Any other notable Social History as documented in HPI.    Family History:  Family History   Problem Relation Age of Onset    Heart attack Father 80    Heart disease Father     Drug abuse Father     Cancer Maternal Grandmother         colon     Any other notable FMH as documented in HPI.      OBJECTIVE:     Physical Exam:   Vitals:    12/13/22 1000   BP: 128/83   Pulse: 62       GEN: NAD, pleasant, cooperative  CV: RRR  PULM: No signs of resp distress, on room air  EXT: No cyanosis, clubbing, or edema.    NEURO:  Mental status: The patient is alert, attentive, and oriented to age, month, year.  Speech: Fluent speech with intact repetition, comprehension, and naming. Mild dysarthria.     Cranial nerves:  CN II: Visual fields  are full to confrontation. Pupils are 3mm and reactive to light OU.  CN III, IV, VI: EOMI, no nystagmus, no ptosis  CN V: Facial sensation is intact in all 3 divisions bilaterally.  CN VII: Right lower facial weakness   CN VIII: Hearing is normal bilaterally.  CN IX, X: Palate elevates symmetrically.   CN XI: Head turning and shoulder shrug are intact.  CN XII: Tongue is midline with normal movements and no atrophy.    Motor: Muscle bulk and tone are normal.   -- RUE 4/5  -- RLE 4/5  -- LUE 5/5  -- LLE 5/5    Reflexes: Reflexes are 2+ and symmetric at the biceps, triceps, knees.    Sensory: Light touch is intact in bilateral upper and lower extremities.    Coordination: Rapid alternating movements and fine finger movements are intact. There is no dysmetria on finger-to-nose and heel-knee-shin. There are no abnormal or extraneous movements.    Gait/Stance: Arrives to clinic in manual wheelchair.       ASSESSMENT/PLAN:     Diagnosis/Etiology: Left paramedian pontine ICH  Stroke Risk Factors: HTN, tobacco use, CHF  Effects of Stroke: Right hemiparesis, right facial weakness, dysarthria    Recommendations:   - Additional studies: No additional studies are indicated at this time.   - Antiplatelet/Anticoagulation: None  - Lipid Management: Per ACC/AHA guidelines.   - Hypertension: Target systolic BP <130mmHg. At goal today. Continue home BP medications.   - Therapy: Continue PT/OT/SLP  - Follow-up: RTC as needed. Recommend close follow-up with their PCP for monitoring and management of the above vascular risk factors as needed to meet goals.       Problem List Items Addressed This Visit    None  Visit Diagnoses       History of stroke    -  Primary              Dorothy Raymond MD, PhD  [facility]  Department of Vascular Neurology

## 2022-12-15 ENCOUNTER — OFFICE VISIT (OUTPATIENT)
Dept: FAMILY MEDICINE | Facility: CLINIC | Age: 57
End: 2022-12-15
Payer: MEDICARE

## 2022-12-15 VITALS
TEMPERATURE: 98 F | HEIGHT: 69 IN | WEIGHT: 162.94 LBS | DIASTOLIC BLOOD PRESSURE: 80 MMHG | SYSTOLIC BLOOD PRESSURE: 132 MMHG | BODY MASS INDEX: 24.13 KG/M2 | HEART RATE: 86 BPM | OXYGEN SATURATION: 95 %

## 2022-12-15 DIAGNOSIS — E11.59 HYPERTENSION ASSOCIATED WITH DIABETES: ICD-10-CM

## 2022-12-15 DIAGNOSIS — F17.200 TOBACCO DEPENDENCY: ICD-10-CM

## 2022-12-15 DIAGNOSIS — E78.5 HYPERLIPIDEMIA ASSOCIATED WITH TYPE 2 DIABETES MELLITUS: ICD-10-CM

## 2022-12-15 DIAGNOSIS — I15.2 HYPERTENSION ASSOCIATED WITH DIABETES: ICD-10-CM

## 2022-12-15 DIAGNOSIS — I61.3 LEFT-SIDED NONTRAUMATIC INTRACEREBRAL HEMORRHAGE OF BRAINSTEM: Primary | ICD-10-CM

## 2022-12-15 DIAGNOSIS — E11.69 HYPERLIPIDEMIA ASSOCIATED WITH TYPE 2 DIABETES MELLITUS: ICD-10-CM

## 2022-12-15 DIAGNOSIS — E11.65 TYPE 2 DIABETES MELLITUS WITH HYPERGLYCEMIA, WITHOUT LONG-TERM CURRENT USE OF INSULIN: ICD-10-CM

## 2022-12-15 PROCEDURE — 99999 PR PBB SHADOW E&M-EST. PATIENT-LVL IV: ICD-10-PCS | Mod: PBBFAC,,, | Performed by: NURSE PRACTITIONER

## 2022-12-15 PROCEDURE — 99214 OFFICE O/P EST MOD 30 MIN: CPT | Mod: S$PBB,,, | Performed by: NURSE PRACTITIONER

## 2022-12-15 PROCEDURE — 99214 PR OFFICE/OUTPT VISIT, EST, LEVL IV, 30-39 MIN: ICD-10-PCS | Mod: S$PBB,,, | Performed by: NURSE PRACTITIONER

## 2022-12-15 PROCEDURE — 99214 OFFICE O/P EST MOD 30 MIN: CPT | Mod: PBBFAC,PO | Performed by: NURSE PRACTITIONER

## 2022-12-15 PROCEDURE — 99999 PR PBB SHADOW E&M-EST. PATIENT-LVL IV: CPT | Mod: PBBFAC,,, | Performed by: NURSE PRACTITIONER

## 2022-12-15 RX ORDER — ESCITALOPRAM OXALATE 10 MG/1
10 TABLET ORAL
COMMUNITY
Start: 2022-12-09

## 2022-12-15 RX ORDER — ACETAMINOPHEN 500 MG
5 TABLET ORAL
COMMUNITY
Start: 2022-12-08

## 2022-12-15 RX ORDER — ACETAMINOPHEN 325 MG/1
650 TABLET ORAL 3 TIMES DAILY PRN
COMMUNITY
Start: 2022-12-08

## 2022-12-15 RX ORDER — MECLIZINE HYDROCHLORIDE 25 MG/1
25 TABLET ORAL
COMMUNITY
Start: 2022-12-09

## 2022-12-15 RX ORDER — ATORVASTATIN CALCIUM 20 MG/1
20 TABLET, FILM COATED ORAL DAILY
Qty: 90 TABLET | Refills: 3 | Status: SHIPPED | OUTPATIENT
Start: 2022-12-15 | End: 2023-12-15

## 2022-12-15 NOTE — PATIENT INSTRUCTIONS
Vimal Becerra,     If you are due for any health screening(s) below please notify me so we can arrange them to be ordered and scheduled to maintain your health. Most healthy patients complete it. Don't lose out on improving your health.     Tests to Keep You Healthy    Eye Exam: ORDERED BUT NOT SCHEDULED  Colon Cancer Screening: DUE  Last Blood Pressure <= 139/89 (12/15/2022): Yes  Last HbA1c < 8 (11/22/2022): Yes  Tobacco Cessation: NO                 Diabetic Retinal Eye Exam    Diabetes is the #1 cause of blindness in the [place] - early detection before signs or symptoms develop can prevent debilitating blindness.    Once-a-year screening is recommended for all diabetic patients. This exam can prevent and treat diabetes complications in the eye before developing symptoms. This can be done with a special camera is used to take photographs of the back of your eye without having to dilate them, or you can see an eye doctor for a full dilated exam.    Although you are still overdue for this important screening, due to the COVID-19 pandemic, we recommend scheduling it in the near future.

## 2022-12-15 NOTE — PROGRESS NOTES
Subjective:       Patient ID: Hernan Badillo is a 57 y.o. male.    Chief Complaint: Follow-up (Hospital Follow up)    HPI    Patient presents today for hospital follow up. Admitted for Acute pontine intraparenchymal hemorrhage with right hemiparesis and dysarthria. Labs reviewed. POCT glucose ; A1c 6.6    11/22/22 ER for Pontine hemorrhage, extremity weakness, slurred speech.Pressure is managed with a Cardene drip. was considered not a tPA/TNK candidate.     11/22/22 He will be transferred to [facility].  GCS is 14.-Etiology likely HTN    CTH Without Contrast 11/22/2022  1. Acute pontine intraparenchymal hemorrhage.  No significant mass effect or intraventricular hemorrhage.  2.  Mild generalized cerebral volume loss and scattered supratentorial white matter hypoattenuation, nonspecific and may reflect sequelae of chronic microvascular ischemic change.  3. Bilateral basal ganglia and right paramedian pontine chronic lacunar infarcts versus perivascular spaces    Cardiac Imaging   TTE 11/23/2022  The left ventricle is normal in size with concentric hypertrophy and mildly decreased systolic function.  The estimated ejection fraction is 45%.  Grade I left ventricular diastolic dysfunction.  Normal right ventricular size with mildly reduced right ventricular systolic function.  The quantitatively derived ejection fraction is 45%.  Normal central venous pressure (3 mmHg).  Follow-up in Stroke Clinic in 4-6 weeks.     Started on lisinopril 10 mg daily, nicotine patch, stool softer    11/25/22 [facility]-Admission Date: 11/25/2022  Discharge Date: 12/09/2022-Discharged on acetaminophen, lexapro, lisinopril , antivert. Stop taking metformin amitriptyline and the mometasone    12/13/22 Neurology --6 months follow up  Past Medical History:   Diagnosis Date    COPD (chronic obstructive pulmonary disease)     Diabetes mellitus, type 2     Hypertension        Review of patient's allergies indicates:  No Known  Allergies      Current Outpatient Medications:     albuterol (VENTOLIN HFA) 90 mcg/actuation inhaler, Inhale 2 puffs into the lungs every 6 (six) hours as needed for Wheezing. Rescue, Disp: 18 g, Rfl: 0    albuterol-ipratropium (DUO-NEB) 2.5 mg-0.5 mg/3 mL nebulizer solution, Take 3 mLs by nebulization every 6 (six) hours as needed for Wheezing or Shortness of Breath., Disp: 25 each, Rfl: 3    amLODIPine (NORVASC) 10 MG tablet, Take 1 tablet (10 mg total) by mouth once daily., Disp: 30 tablet, Rfl: 11    EScitalopram oxalate (LEXAPRO) 10 MG tablet, Take 10 mg by mouth., Disp: , Rfl:     lisinopriL 10 MG tablet, Take 1 tablet (10 mg total) by mouth once daily., Disp: 90 tablet, Rfl: 3    meclizine (ANTIVERT) 25 mg tablet, Take 25 mg by mouth., Disp: , Rfl:     melatonin (MELATIN) 5 mg, Take 5 mg by mouth., Disp: , Rfl:     metFORMIN (GLUCOPHAGE) 500 MG tablet, Take 1 tablet (500 mg total) by mouth 2 (two) times daily with meals., Disp: 30 tablet, Rfl: 2    pantoprazole (PROTONIX) 40 MG tablet, Take 1 tablet (40 mg total) by mouth once daily., Disp: 30 tablet, Rfl: 11    senna-docusate 8.6-50 mg (PERICOLACE) 8.6-50 mg per tablet, Take 1 tablet by mouth 2 (two) times daily., Disp: 60 tablet, Rfl: 0    acetaminophen (TYLENOL) 325 MG tablet, Take 650 mg by mouth 3 (three) times daily as needed., Disp: , Rfl:     atorvastatin (LIPITOR) 20 MG tablet, Take 1 tablet (20 mg total) by mouth once daily., Disp: 90 tablet, Rfl: 3    Current Facility-Administered Medications:     dexamethasone injection 4 mg, 4 mg, Intramuscular, 1 time in Clinic/HOD, Aiyana Muhammad PA-C    Review of Systems   Constitutional:  Negative for unexpected weight change.   HENT:  Negative for trouble swallowing.    Eyes:  Negative for visual disturbance.   Respiratory:  Negative for shortness of breath.    Cardiovascular:  Negative for chest pain, palpitations and leg swelling.   Gastrointestinal:  Negative for blood in stool.   Genitourinary:   "Negative for hematuria.   Skin:  Negative for rash.   Allergic/Immunologic: Negative for immunocompromised state.   Neurological:  Negative for headaches.   Hematological:  Does not bruise/bleed easily.   Psychiatric/Behavioral:  Negative for agitation. The patient is not nervous/anxious.      Objective:      /80 (BP Location: Right arm, Patient Position: Sitting, BP Method: Small (Manual))   Pulse 86   Temp 97.9 °F (36.6 °C) (Oral)   Ht 5' 9" (1.753 m)   Wt 73.9 kg (162 lb 14.7 oz)   SpO2 95%   BMI 24.06 kg/m²   Physical Exam  Constitutional:       Appearance: He is well-developed.   Eyes:      Conjunctiva/sclera: Conjunctivae normal.      Pupils: Pupils are equal, round, and reactive to light.   Cardiovascular:      Rate and Rhythm: Normal rate and regular rhythm.      Heart sounds: Normal heart sounds.   Pulmonary:      Effort: Pulmonary effort is normal.   Musculoskeletal:         General: Normal range of motion.      Comments: Ambulates with a walker   Neurological:      Mental Status: He is alert and oriented to person, place, and time.      Gait: Gait abnormal.      Comments: Neurological exam reveals alert, oriented,slurred speech, no focal findings or movement disorder noted, hemiparesis on right    Psychiatric:         Behavior: Behavior normal.         Thought Content: Thought content normal.         Judgment: Judgment normal.       Assessment:       1. Left-sided nontraumatic intracerebral hemorrhage of brainstem    2. Hypertension associated with diabetes    3. Hyperlipidemia associated with type 2 diabetes mellitus    4. Type 2 diabetes mellitus with hyperglycemia, without long-term current use of insulin    5. Tobacco dependency    6. BMI 24.0-24.9, adult        Plan:       Left-sided nontraumatic intracerebral hemorrhage of brainstem  Stable, patient has PT/OT  Continue Neurology follow up  Hypertension associated with diabetes  -     Hemoglobin A1C; Future; Expected date: 12/15/2022  -   "   COMPREHENSIVE METABOLIC PANEL; Future; Expected date: 12/15/2022  -     CBC W/ AUTO DIFFERENTIAL; Future; Expected date: 12/15/2022  -     Microalbumin/Creatinine Ratio, Urine; Future; Expected date: 12/15/2022  Stable, continue medication  Low sodium diet  BP Readings from Last 3 Encounters:   12/15/22 132/80   12/13/22 128/83   11/25/22 (!) 149/90      Hyperlipidemia associated with type 2 diabetes mellitus  -     Lipid Panel; Future; Expected date: 12/15/2022  -     atorvastatin (LIPITOR) 20 MG tablet; Take 1 tablet (20 mg total) by mouth once daily.  Dispense: 90 tablet; Refill: 3  Type 2 diabetes mellitus with hyperglycemia, without long-term current use of insulin  Stable, continue management  Follow the ADA diet  Hemoglobin A1C   Date Value Ref Range Status   11/22/2022 6.6 (H) 4.0 - 5.6 % Final     Comment:     ADA Screening Guidelines:  5.7-6.4%  Consistent with prediabetes  >or=6.5%  Consistent with diabetes    High levels of fetal hemoglobin interfere with the HbA1C  assay. Heterozygous hemoglobin variants (HbS, HgC, etc)do  not significantly interfere with this assay.   However, presence of multiple variants may affect accuracy.     07/26/2022 6.6 (H) 4.0 - 5.6 % Final     Comment:     ADA Screening Guidelines:  5.7-6.4%  Consistent with prediabetes  >or=6.5%  Consistent with diabetes    High levels of fetal hemoglobin interfere with the HbA1C  assay. Heterozygous hemoglobin variants (HbS, HgC, etc)do  not significantly interfere with this assay.   However, presence of multiple variants may affect accuracy.     05/31/2020 7.9 (H) 4.0 - 5.6 % Final     Comment:     ADA Screening Guidelines:  5.7-6.4%  Consistent with prediabetes  >or=6.5%  Consistent with diabetes  High levels of fetal hemoglobin interfere with the HbA1C  assay. Heterozygous hemoglobin variants (HbS, HgC, etc)do  not significantly interfere with this assay.   However, presence of multiple variants may affect accuracy.        Tobacco  dependency  Patient reports he only smokes 1-3 cigarettes daily-use to smoke 3 packs a day.  BMI 24.0-24.9, adult  Stable, continue medication        Patient readiness: acceptance and barriers:none    During the course of the visit the patient was educated and counseled about the following:     Diabetes:  Discussed general issues about diabetes pathophysiology and management.  Addressed ADA diet.  Suggested low cholesterol diet.  Hypertension:   Dietary sodium restriction.  Regular aerobic exercise.    Goals: Diabetes: Maintain Hemoglobin A1C below 7 and Hypertension: Reduce Blood Pressure    Did patient meet goals/outcomes: Yes    The following self management tools provided: declined    Patient Instructions (the written plan) was given to the patient/family.     Time spent with patient: 15 minutes    Barriers to medications present (no )    Adverse reactions to current medications (no)    Over the counter medications reviewed (Yes)

## 2023-01-09 ENCOUNTER — TELEPHONE (OUTPATIENT)
Dept: FAMILY MEDICINE | Facility: CLINIC | Age: 58
End: 2023-01-09
Payer: MEDICARE

## 2023-01-09 NOTE — TELEPHONE ENCOUNTER
----- Message from Judi Pak sent at 1/9/2023  3:37 PM CST -----  Who Called: Mother         What is the reqeust in detail: Requesting call back to discuss referral to another doctor that accept his insurance. Pt updated insurance primary is [facility]. Pt previously referred to Dr. Panfilo Estrada [facility] who does not accept [facility].   Pt also in need of est care appointment to be rescheduled from Dr. Linda to Dr. Block as soon as possible.  Please advise       Can the clinic reply by [facility]? no         Best Call Back Number:[phone]         Additional Information:

## 2023-01-25 ENCOUNTER — HOSPITAL ENCOUNTER (EMERGENCY)
Facility: HOSPITAL | Age: 58
Discharge: HOME | End: 2023-01-25
Attending: EMERGENCY MEDICINE
Payer: MEDICARE

## 2023-01-25 VITALS
TEMPERATURE: 98 F | DIASTOLIC BLOOD PRESSURE: 88 MMHG | HEART RATE: 85 BPM | RESPIRATION RATE: 20 BRPM | SYSTOLIC BLOOD PRESSURE: 145 MMHG | WEIGHT: 162 LBS | HEIGHT: 69 IN | OXYGEN SATURATION: 100 % | BODY MASS INDEX: 23.99 KG/M2

## 2023-01-25 DIAGNOSIS — R53.1 WEAKNESS: ICD-10-CM

## 2023-01-25 DIAGNOSIS — R20.2 PARESTHESIA: Primary | ICD-10-CM

## 2023-01-25 LAB
ALBUMIN SERPL BCP-MCNC: 3.4 G/DL (ref 3.5–5.2)
ALP SERPL-CCNC: 56 U/L (ref 55–135)
ALT SERPL W/O P-5'-P-CCNC: 6 U/L (ref 10–44)
AMPHET+METHAMPHET UR QL: NEGATIVE
ANION GAP SERPL CALC-SCNC: 10 MMOL/L (ref 8–16)
AST SERPL-CCNC: 9 U/L (ref 10–40)
BARBITURATES UR QL SCN>200 NG/ML: NEGATIVE
BASOPHILS # BLD AUTO: 0.01 K/UL (ref 0–0.2)
BASOPHILS NFR BLD: 0.2 % (ref 0–1.9)
BENZODIAZ UR QL SCN>200 NG/ML: NEGATIVE
BILIRUB SERPL-MCNC: 0.6 MG/DL (ref 0.1–1)
BUN SERPL-MCNC: 11 MG/DL (ref 6–20)
BZE UR QL SCN: NEGATIVE
CALCIUM SERPL-MCNC: 9.1 MG/DL (ref 8.7–10.5)
CANNABINOIDS UR QL SCN: NEGATIVE
CHLORIDE SERPL-SCNC: 101 MMOL/L (ref 95–110)
CO2 SERPL-SCNC: 26 MMOL/L (ref 23–29)
CREAT SERPL-MCNC: 0.9 MG/DL (ref 0.5–1.4)
CREAT UR-MCNC: 73.9 MG/DL (ref 23–375)
DIFFERENTIAL METHOD: ABNORMAL
EOSINOPHIL # BLD AUTO: 0.1 K/UL (ref 0–0.5)
EOSINOPHIL NFR BLD: 1.8 % (ref 0–8)
ERYTHROCYTE [DISTWIDTH] IN BLOOD BY AUTOMATED COUNT: 13.2 % (ref 11.5–14.5)
EST. GFR  (NO RACE VARIABLE): >60 ML/MIN/1.73 M^2
ETHANOL SERPL-MCNC: <10 MG/DL
GLUCOSE SERPL-MCNC: 205 MG/DL (ref 70–110)
HCT VFR BLD AUTO: 37.9 % (ref 40–54)
HGB BLD-MCNC: 12 G/DL (ref 14–18)
IMM GRANULOCYTES # BLD AUTO: 0.01 K/UL (ref 0–0.04)
IMM GRANULOCYTES NFR BLD AUTO: 0.2 % (ref 0–0.5)
LYMPHOCYTES # BLD AUTO: 1.4 K/UL (ref 1–4.8)
LYMPHOCYTES NFR BLD: 29.8 % (ref 18–48)
MAGNESIUM SERPL-MCNC: 1.7 MG/DL (ref 1.6–2.6)
MCH RBC QN AUTO: 29.5 PG (ref 27–31)
MCHC RBC AUTO-ENTMCNC: 31.7 G/DL (ref 32–36)
MCV RBC AUTO: 93 FL (ref 82–98)
METHADONE UR QL SCN>300 NG/ML: NEGATIVE
MONOCYTES # BLD AUTO: 0.3 K/UL (ref 0.3–1)
MONOCYTES NFR BLD: 7.5 % (ref 4–15)
NEUTROPHILS # BLD AUTO: 2.7 K/UL (ref 1.8–7.7)
NEUTROPHILS NFR BLD: 60.5 % (ref 38–73)
NRBC BLD-RTO: 0 /100 WBC
OPIATES UR QL SCN: ABNORMAL
PCP UR QL SCN>25 NG/ML: NEGATIVE
PHOSPHATE SERPL-MCNC: 3.2 MG/DL (ref 2.7–4.5)
PLATELET # BLD AUTO: 170 K/UL (ref 150–450)
PMV BLD AUTO: 10.5 FL (ref 9.2–12.9)
POTASSIUM SERPL-SCNC: 3.7 MMOL/L (ref 3.5–5.1)
PROT SERPL-MCNC: 6.9 G/DL (ref 6–8.4)
RBC # BLD AUTO: 4.07 M/UL (ref 4.6–6.2)
SODIUM SERPL-SCNC: 137 MMOL/L (ref 136–145)
TOXICOLOGY INFORMATION: ABNORMAL
WBC # BLD AUTO: 4.53 K/UL (ref 3.9–12.7)

## 2023-01-25 PROCEDURE — 94640 AIRWAY INHALATION TREATMENT: CPT

## 2023-01-25 PROCEDURE — 25000003 PHARM REV CODE 250: Performed by: EMERGENCY MEDICINE

## 2023-01-25 PROCEDURE — 25000242 PHARM REV CODE 250 ALT 637 W/ HCPCS: Performed by: EMERGENCY MEDICINE

## 2023-01-25 PROCEDURE — 82077 ASSAY SPEC XCP UR&BREATH IA: CPT | Performed by: EMERGENCY MEDICINE

## 2023-01-25 PROCEDURE — 80053 COMPREHEN METABOLIC PANEL: CPT | Performed by: EMERGENCY MEDICINE

## 2023-01-25 PROCEDURE — 94644 CONT INHLJ TX 1ST HOUR: CPT

## 2023-01-25 PROCEDURE — 63600175 PHARM REV CODE 636 W HCPCS: Performed by: EMERGENCY MEDICINE

## 2023-01-25 PROCEDURE — 96374 THER/PROPH/DIAG INJ IV PUSH: CPT

## 2023-01-25 PROCEDURE — 84100 ASSAY OF PHOSPHORUS: CPT | Performed by: EMERGENCY MEDICINE

## 2023-01-25 PROCEDURE — 80307 DRUG TEST PRSMV CHEM ANLYZR: CPT | Performed by: EMERGENCY MEDICINE

## 2023-01-25 PROCEDURE — 83735 ASSAY OF MAGNESIUM: CPT | Performed by: EMERGENCY MEDICINE

## 2023-01-25 PROCEDURE — 36415 COLL VENOUS BLD VENIPUNCTURE: CPT | Performed by: EMERGENCY MEDICINE

## 2023-01-25 PROCEDURE — 85025 COMPLETE CBC W/AUTO DIFF WBC: CPT | Performed by: EMERGENCY MEDICINE

## 2023-01-25 PROCEDURE — 99285 EMERGENCY DEPT VISIT HI MDM: CPT | Mod: 25

## 2023-01-25 RX ORDER — AMLODIPINE BESYLATE 5 MG/1
10 TABLET ORAL DAILY
Status: DISCONTINUED | OUTPATIENT
Start: 2023-01-25 | End: 2023-01-25 | Stop reason: HOSPADM

## 2023-01-25 RX ORDER — IPRATROPIUM BROMIDE AND ALBUTEROL SULFATE 2.5; .5 MG/3ML; MG/3ML
9 SOLUTION RESPIRATORY (INHALATION) CONTINUOUS
Status: DISCONTINUED | OUTPATIENT
Start: 2023-01-25 | End: 2023-01-25 | Stop reason: HOSPADM

## 2023-01-25 RX ORDER — LISINOPRIL 10 MG/1
10 TABLET ORAL DAILY
Status: DISCONTINUED | OUTPATIENT
Start: 2023-01-25 | End: 2023-01-25 | Stop reason: HOSPADM

## 2023-01-25 RX ORDER — METHYLPREDNISOLONE SOD SUCC 125 MG
125 VIAL (EA) INJECTION
Status: COMPLETED | OUTPATIENT
Start: 2023-01-25 | End: 2023-01-25

## 2023-01-25 RX ADMIN — METHYLPREDNISOLONE SODIUM SUCCINATE 125 MG: 125 INJECTION, POWDER, FOR SOLUTION INTRAMUSCULAR; INTRAVENOUS at 07:01

## 2023-01-25 RX ADMIN — LISINOPRIL 10 MG: 10 TABLET ORAL at 07:01

## 2023-01-25 RX ADMIN — IPRATROPIUM BROMIDE AND ALBUTEROL SULFATE 9 ML: .5; 3 SOLUTION RESPIRATORY (INHALATION) at 07:01

## 2023-01-25 RX ADMIN — AMLODIPINE BESYLATE 10 MG: 5 TABLET ORAL at 07:01

## 2023-01-25 NOTE — ED NOTES
RT at the bedside.  Pt provided urinal and instructed to provide urine sample when able to void. Pt family members remain at the bedside.

## 2023-01-25 NOTE — ED NOTES
"Pt identifiers checked and accurate with Hernan Badillo    Pt presents to ED with complaints of right arm and leg "tightness" and intermittent tingling x several days after physical therapy. Pt reports not taking BP medications x 2 days with hx stroke back in November 2022 with right sided weakness. Pt denies headaches, vision changes, speech changes, new on set numbness.     "

## 2023-01-25 NOTE — ED PROVIDER NOTES
"Encounter Date: 1/25/2023    SCRIBE #1 NOTE: I, Kateryna Gottlieb, am scribing for, and in the presence of,  Joao Mead III, MD.     History     Chief Complaint   Patient presents with    Tingling     Pt reports "needles sticking" and tightness to R arm and leg x 2 days -- notes hx of CVA with R sided deficits     Time seen by provider: 7:15 AM on 01/25/2023    Hernan Badillo is a 57 y.o. male who presents to the ED with his relative at bedside, for evaluation of constant R-sided paresthesias and "tightening" that onset ~2 months ago and is worsening today.  Patient reports exercising when the Sx began.  Patient states a previous Hx of CVA with R-sided deficits.  Review of external records performed showing:  The patient was evaluated for similar Sx on 11/22/2022 where he was ultimately diagnosed with a pontine hemorrhage.  After the stroke the patient reports corresponding R-sided tightness that began to resolve until 2 days ago.  Today, patient mentions not taking his antihypertensive medications secondary to believing his BP was "too high" at 146 systolic pressure.  History obtained from an independent historian:  Patient's relative reports the patient still "smokes like a train."     Patient also presents today c/o a "cold" sensation over his R hand that onset prior to the previous stroke.  He reports acute LLE numbness x 2 weeks that in greatest in the mornings, and progressively resolves throughout the day.  He mentions this affects his ability to ambulate normally, and he notes the LLE becomes painful as the numbness resolves.  Pain is greatest from the L knee distally and is exacerbated with ambulation, per patient.  He confirms chronic back pain and difficulty sleeping, but denies RUE pain, R-sided numbness or any other symptoms at this time.  He has a PMHx of noncompliance, COPD, DM II and HTN.  No pertinent PSHx.      The history is provided by the patient and a relative.   Review of patient's allergies " "indicates:  No Known Allergies  Past Medical History:   Diagnosis Date    COPD (chronic obstructive pulmonary disease)     Diabetes mellitus, type 2     Hypertension      Past Surgical History:   Procedure Laterality Date    DENTAL SURGERY      ESOPHAGOGASTRODUODENOSCOPY N/A 6/1/2020    Procedure: EGD (ESOPHAGOGASTRODUODENOSCOPY);  Surgeon: Terrell May MD;  Location: [facility];  Service: Endoscopy;  Laterality: N/A;    HERNIA REPAIR      left inguinal    incision and drainiage       Family History   Problem Relation Age of Onset    Heart attack Father 80    Heart disease Father     Drug abuse Father     Cancer Maternal Grandmother         colon     Social History     Tobacco Use    Smoking status: Every Day     Packs/day: 2.00     Years: 30.00     Pack years: 60.00     Types: Cigarettes    Smokeless tobacco: Never   Substance Use Topics    Alcohol use: Yes     Alcohol/week: 1.0 standard drink     Types: 1 Shots of liquor per week     Comment: 1/2 pint 2 x per week - quit approximately 5 months ago    Drug use: No     Review of Systems   Constitutional:  Negative for activity change, appetite change, chills, fatigue and fever.   Eyes:  Negative for visual disturbance.   Respiratory:  Negative for apnea and shortness of breath.    Cardiovascular:  Negative for chest pain and palpitations.   Gastrointestinal:  Negative for abdominal distention and abdominal pain.   Genitourinary:  Negative for difficulty urinating.   Musculoskeletal:  Positive for back pain (chronic), gait problem and myalgias (LLE). Negative for neck pain.        Negative for RUE pain.    Skin:  Negative for pallor and rash.   Neurological:  Positive for numbness (LLE). Negative for headaches.        Positive for R-sided paresthesias and "tightening."  Positive for a "cold" sensation over the R hand.  Negative for R-sided numbness.    Hematological:  Does not bruise/bleed easily.   Psychiatric/Behavioral:  Positive for sleep disturbance. " Negative for agitation.      Physical Exam     Initial Vitals [01/25/23 0709]   BP Pulse Resp Temp SpO2   (!) 199/96 81 17 98 °F (36.7 °C) 100 %      MAP       --         Physical Exam    Nursing note and vitals reviewed.  Constitutional: He appears well-developed and well-nourished.   HENT:   Head: Normocephalic and atraumatic.   Eyes: Conjunctivae are normal.   Neck: Neck supple.   Normal range of motion.  Cardiovascular:  Normal rate, regular rhythm and normal heart sounds.     Exam reveals no gallop and no friction rub.       No murmur heard.  Pulses:       Radial pulses are 2+ on the right side.        Dorsalis pedis pulses are 1+ on the left side.        Posterior tibial pulses are 1+ on the left side.   Pulmonary/Chest: No respiratory distress. He has no wheezes. He has rhonchi (scattered). He has no rales.   Abdominal: Abdomen is soft. He exhibits no distension. There is no abdominal tenderness.   Musculoskeletal:         General: Normal range of motion.      Cervical back: Normal range of motion and neck supple.     Neurological: He is alert and oriented to person, place, and time.   Skin: Skin is warm and dry.   Psychiatric: He has a normal mood and affect.       ED Course   Procedures  Labs Reviewed   CBC W/ AUTO DIFFERENTIAL - Abnormal; Notable for the following components:       Result Value    RBC 4.07 (*)     Hemoglobin 12.0 (*)     Hematocrit 37.9 (*)     MCHC 31.7 (*)     All other components within normal limits   COMPREHENSIVE METABOLIC PANEL - Abnormal; Notable for the following components:    Glucose 205 (*)     Albumin 3.4 (*)     AST 9 (*)     ALT 6 (*)     All other components within normal limits   DRUG SCREEN PANEL, URINE EMERGENCY - Abnormal; Notable for the following components:    Opiate Scrn, Ur Presumptive Positive (*)     All other components within normal limits   MAGNESIUM   PHOSPHORUS   ALCOHOL,MEDICAL (ETHANOL)          Imaging Results              CT Head Without Contrast (Final  result)  Result time 01/25/23 10:50:15      Final result by Delvin Wilson MD (01/25/23 10:50:15)                   Impression:      1. Expected temporal evolution of previously described small volume hemorrhage within the left alf.  No acute intracranial CT findings allowing for prominent motion degradation.  2. Similar pattern of chronic small vessel ischemic changes, mild.      Electronically signed by: Delvin Wilson  Date:    01/25/2023  Time:    10:50               Narrative:    EXAMINATION:  CT HEAD WITHOUT CONTRAST    CLINICAL HISTORY:  Neuro deficit, acute, stroke suspected; Weakness    TECHNIQUE:  Low dose axial CT images obtained throughout the head without intravenous contrast. Sagittal and coronal reconstructions were performed.    COMPARISON:  CT head 11/29/2022.    FINDINGS:  Motion limited exam.    Brain: Expected temporal evolution of small volume hemorrhage previously described within the left paramedian alf with minimal interspersed hyperdensity potentially representing a small amount of mineralization as well as regional artifact.  Small amount of encephalomalacia seen in the region.  No definite acute intracranial hemorrhage or CT evidence of an acute major vascular territory infarct.  Chronic small vessel ischemic changes including old lacunar-type infarcts within the bilateral basal ganglia and left caudate.  No space-occupying extra-axial fluid collections/mass effect.    Ventricles: The ventricles, sulci, and cisterns are within normal limits.    Skull: The osseous structures are unremarkable in appearance.    Extracranial soft tissues: Limited imaging is within normal limits.    Other: The visualized portions of the sinuses, orbits, and mastoid air cells are unremarkable.                                       Medications   methylPREDNISolone sodium succinate injection 125 mg (125 mg Intravenous Given 1/25/23 0747)     Medical Decision Making:   History:   Old Medical Records: I decided to obtain  "old medical records.  Clinical Tests:   Lab Tests: Ordered and Reviewed  ED Management:  57-year-old male presents with worsening "stiffness of his arms".  CT of the head is obtained with no evidence of CVA.  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  He is referred to physical therapy     APC / Resident Notes:   I, Dr. Joao Mead III, personally performed the services described in this documentation. All medical record entries made by the scribe were at my direction and in my presence.  I have reviewed the chart and agree that the record reflects my personal performance and is accurate and complete   Scribe Attestation:   Scribe #1: I performed the above scribed service and the documentation accurately describes the services I performed. I attest to the accuracy of the note.                   Clinical Impression:   Final diagnoses:  [R53.1] Weakness  [R20.2] Paresthesia (Primary)        ED Disposition Condition    Discharge Stable          ED Prescriptions    None       Follow-up Information       Follow up With Specialties Details Why Contact Info    Quintin Aguilera MD Vascular Neurology, Neurology In 3 days  [address] [facility] [address]  [phone]               Joao Mead III, MD  01/25/23 6320    "

## 2023-02-01 ENCOUNTER — OFFICE VISIT (OUTPATIENT)
Dept: FAMILY MEDICINE | Facility: CLINIC | Age: 58
End: 2023-02-01
Payer: MEDICARE

## 2023-02-01 VITALS
BODY MASS INDEX: 24.26 KG/M2 | DIASTOLIC BLOOD PRESSURE: 72 MMHG | HEART RATE: 85 BPM | TEMPERATURE: 98 F | HEIGHT: 69 IN | SYSTOLIC BLOOD PRESSURE: 130 MMHG | OXYGEN SATURATION: 95 % | WEIGHT: 163.81 LBS

## 2023-02-01 DIAGNOSIS — E11.69 HYPERLIPIDEMIA ASSOCIATED WITH TYPE 2 DIABETES MELLITUS: ICD-10-CM

## 2023-02-01 DIAGNOSIS — I15.2 HYPERTENSION ASSOCIATED WITH DIABETES: ICD-10-CM

## 2023-02-01 DIAGNOSIS — R53.1 RIGHT SIDED WEAKNESS: ICD-10-CM

## 2023-02-01 DIAGNOSIS — E78.5 HYPERLIPIDEMIA ASSOCIATED WITH TYPE 2 DIABETES MELLITUS: ICD-10-CM

## 2023-02-01 DIAGNOSIS — R53.1 RIGHT SIDED WEAKNESS: Primary | ICD-10-CM

## 2023-02-01 DIAGNOSIS — G81.11 SPASTIC HEMIPARESIS OF RIGHT DOMINANT SIDE: Primary | ICD-10-CM

## 2023-02-01 DIAGNOSIS — E11.65 TYPE 2 DIABETES MELLITUS WITH HYPERGLYCEMIA, WITHOUT LONG-TERM CURRENT USE OF INSULIN: ICD-10-CM

## 2023-02-01 DIAGNOSIS — E11.59 HYPERTENSION ASSOCIATED WITH DIABETES: ICD-10-CM

## 2023-02-01 DIAGNOSIS — I63.9 CEREBROVASCULAR ACCIDENT (CVA), UNSPECIFIED MECHANISM: Primary | ICD-10-CM

## 2023-02-01 PROCEDURE — 4010F ACE/ARB THERAPY RXD/TAKEN: CPT | Mod: CPTII,S$GLB,, | Performed by: NURSE PRACTITIONER

## 2023-02-01 PROCEDURE — 99214 PR OFFICE/OUTPT VISIT, EST, LEVL IV, 30-39 MIN: ICD-10-PCS | Mod: S$GLB,,, | Performed by: NURSE PRACTITIONER

## 2023-02-01 PROCEDURE — 3078F PR MOST RECENT DIASTOLIC BLOOD PRESSURE < 80 MM HG: ICD-10-PCS | Mod: CPTII,S$GLB,, | Performed by: NURSE PRACTITIONER

## 2023-02-01 PROCEDURE — 3075F PR MOST RECENT SYSTOLIC BLOOD PRESS GE 130-139MM HG: ICD-10-PCS | Mod: CPTII,S$GLB,, | Performed by: NURSE PRACTITIONER

## 2023-02-01 PROCEDURE — 99999 PR PBB SHADOW E&M-EST. PATIENT-LVL III: ICD-10-PCS | Mod: PBBFAC,,, | Performed by: NURSE PRACTITIONER

## 2023-02-01 PROCEDURE — 1159F PR MEDICATION LIST DOCUMENTED IN MEDICAL RECORD: ICD-10-PCS | Mod: CPTII,S$GLB,, | Performed by: NURSE PRACTITIONER

## 2023-02-01 PROCEDURE — 3008F PR BODY MASS INDEX (BMI) DOCUMENTED: ICD-10-PCS | Mod: CPTII,S$GLB,, | Performed by: NURSE PRACTITIONER

## 2023-02-01 PROCEDURE — 3075F SYST BP GE 130 - 139MM HG: CPT | Mod: CPTII,S$GLB,, | Performed by: NURSE PRACTITIONER

## 2023-02-01 PROCEDURE — 4010F PR ACE/ARB THEARPY RXD/TAKEN: ICD-10-PCS | Mod: CPTII,S$GLB,, | Performed by: NURSE PRACTITIONER

## 2023-02-01 PROCEDURE — 3078F DIAST BP <80 MM HG: CPT | Mod: CPTII,S$GLB,, | Performed by: NURSE PRACTITIONER

## 2023-02-01 PROCEDURE — 1159F MED LIST DOCD IN RCRD: CPT | Mod: CPTII,S$GLB,, | Performed by: NURSE PRACTITIONER

## 2023-02-01 PROCEDURE — 3008F BODY MASS INDEX DOCD: CPT | Mod: CPTII,S$GLB,, | Performed by: NURSE PRACTITIONER

## 2023-02-01 PROCEDURE — 99214 OFFICE O/P EST MOD 30 MIN: CPT | Mod: S$GLB,,, | Performed by: NURSE PRACTITIONER

## 2023-02-01 PROCEDURE — 1160F PR REVIEW ALL MEDS BY PRESCRIBER/CLIN PHARMACIST DOCUMENTED: ICD-10-PCS | Mod: CPTII,S$GLB,, | Performed by: NURSE PRACTITIONER

## 2023-02-01 PROCEDURE — 99999 PR PBB SHADOW E&M-EST. PATIENT-LVL III: CPT | Mod: PBBFAC,,, | Performed by: NURSE PRACTITIONER

## 2023-02-01 PROCEDURE — 1160F RVW MEDS BY RX/DR IN RCRD: CPT | Mod: CPTII,S$GLB,, | Performed by: NURSE PRACTITIONER

## 2023-02-01 RX ORDER — LISINOPRIL 10 MG/1
10 TABLET ORAL DAILY
Qty: 90 TABLET | Refills: 3 | Status: SHIPPED | OUTPATIENT
Start: 2023-02-01 | End: 2023-11-01 | Stop reason: SDUPTHER

## 2023-02-01 RX ORDER — TIZANIDINE 2 MG/1
4 TABLET ORAL EVERY 6 HOURS PRN
Qty: 30 TABLET | Refills: 3 | Status: SHIPPED | OUTPATIENT
Start: 2023-02-01 | End: 2023-02-11

## 2023-02-01 NOTE — PROGRESS NOTES
Subjective:       Patient ID: Hernan Badillo is a 57 y.o. male.    Chief Complaint: Leg Pain    HPI      Patient presents today in wheelchair with his mother and son with c/o right leg and right arm muscle spasms after CVA in 11/25/22. He is currently in PT/OT.  Past Medical History:   Diagnosis Date    COPD (chronic obstructive pulmonary disease)     Diabetes mellitus, type 2     Hypertension        Review of patient's allergies indicates:  No Known Allergies      Current Outpatient Medications:     acetaminophen (TYLENOL) 325 MG tablet, Take 650 mg by mouth 3 (three) times daily as needed., Disp: , Rfl:     albuterol (VENTOLIN HFA) 90 mcg/actuation inhaler, Inhale 2 puffs into the lungs every 6 (six) hours as needed for Wheezing. Rescue, Disp: 18 g, Rfl: 0    albuterol-ipratropium (DUO-NEB) 2.5 mg-0.5 mg/3 mL nebulizer solution, Take 3 mLs by nebulization every 6 (six) hours as needed for Wheezing or Shortness of Breath., Disp: 25 each, Rfl: 3    amLODIPine (NORVASC) 10 MG tablet, Take 1 tablet (10 mg total) by mouth once daily., Disp: 30 tablet, Rfl: 11    atorvastatin (LIPITOR) 20 MG tablet, Take 1 tablet (20 mg total) by mouth once daily., Disp: 90 tablet, Rfl: 3    EScitalopram oxalate (LEXAPRO) 10 MG tablet, Take 10 mg by mouth., Disp: , Rfl:     lisinopriL 10 MG tablet, Take 1 tablet (10 mg total) by mouth once daily., Disp: 90 tablet, Rfl: 3    meclizine (ANTIVERT) 25 mg tablet, Take 25 mg by mouth., Disp: , Rfl:     melatonin (MELATIN) 5 mg, Take 5 mg by mouth., Disp: , Rfl:     metFORMIN (GLUCOPHAGE) 500 MG tablet, Take 1 tablet (500 mg total) by mouth 2 (two) times daily with meals., Disp: 30 tablet, Rfl: 2    pantoprazole (PROTONIX) 40 MG tablet, Take 1 tablet (40 mg total) by mouth once daily., Disp: 30 tablet, Rfl: 11    tiZANidine (ZANAFLEX) 2 MG tablet, Take 2 tablets (4 mg total) by mouth every 6 (six) hours as needed (right sided muscle spasm)., Disp: 30 tablet, Rfl: 3    Current Facility-Administered  "Medications:     dexamethasone injection 4 mg, 4 mg, Intramuscular, 1 time in Clinic/HOD, Aiyana Muhammad PA-C    Review of Systems   Constitutional:  Negative for unexpected weight change.   HENT:  Negative for trouble swallowing.    Eyes:  Negative for visual disturbance.   Respiratory:  Negative for shortness of breath.    Cardiovascular:  Negative for chest pain, palpitations and leg swelling.   Gastrointestinal:  Negative for blood in stool.   Genitourinary:  Negative for hematuria.   Skin:  Negative for rash.   Allergic/Immunologic: Negative for immunocompromised state.   Neurological:  Negative for headaches.   Hematological:  Does not bruise/bleed easily.   Psychiatric/Behavioral:  Negative for agitation. The patient is not nervous/anxious.      Objective:      /72 (BP Location: Left arm, Patient Position: Sitting, BP Method: Small (Manual))   Pulse 85   Temp 97.7 °F (36.5 °C) (Oral)   Ht 5' 9" (1.753 m)   Wt 74.3 kg (163 lb 12.8 oz)   SpO2 95%   BMI 24.19 kg/m²   Physical Exam  Constitutional:       Appearance: He is well-developed.   Eyes:      Conjunctiva/sclera: Conjunctivae normal.      Pupils: Pupils are equal, round, and reactive to light.   Musculoskeletal:         General: Normal range of motion.      Comments: In wheelchair   Neurological:      Mental Status: He is alert and oriented to person, place, and time.      Motor: Weakness present.      Comments: Right sided Hemiparesis    Psychiatric:         Behavior: Behavior normal.         Thought Content: Thought content normal.         Judgment: Judgment normal.       Assessment:       1. Spastic hemiparesis of right dominant side    2. Hypertension associated with diabetes    3. Type 2 diabetes mellitus with hyperglycemia, without long-term current use of insulin    4. Right sided weakness    5. Hyperlipidemia associated with type 2 diabetes mellitus    6. BMI 24.0-24.9, adult          Plan:       Spastic hemiparesis of right " dominant side  -     tiZANidine (ZANAFLEX) 2 MG tablet; Take 2 tablets (4 mg total) by mouth every 6 (six) hours as needed (right sided muscle spasm).  Dispense: 30 tablet; Refill: 3    Hypertension associated with diabetes  -     lisinopriL 10 MG tablet; Take 1 tablet (10 mg total) by mouth once daily.  Dispense: 90 tablet; Refill: 3  Stable reading today  Continue medication  Low sodium diet  BP Readings from Last 3 Encounters:   02/01/23 130/72   01/25/23 (!) 145/88   12/15/22 132/80      Type 2 diabetes mellitus with hyperglycemia, without long-term current use of insulin  Stable, continue medication  Follow the ADA diet  Hemoglobin A1C   Date Value Ref Range Status   11/22/2022 6.6 (H) 4.0 - 5.6 % Final     Comment:     ADA Screening Guidelines:  5.7-6.4%  Consistent with prediabetes  >or=6.5%  Consistent with diabetes    High levels of fetal hemoglobin interfere with the HbA1C  assay. Heterozygous hemoglobin variants (HbS, HgC, etc)do  not significantly interfere with this assay.   However, presence of multiple variants may affect accuracy.     07/26/2022 6.6 (H) 4.0 - 5.6 % Final     Comment:     ADA Screening Guidelines:  5.7-6.4%  Consistent with prediabetes  >or=6.5%  Consistent with diabetes    High levels of fetal hemoglobin interfere with the HbA1C  assay. Heterozygous hemoglobin variants (HbS, HgC, etc)do  not significantly interfere with this assay.   However, presence of multiple variants may affect accuracy.     05/31/2020 7.9 (H) 4.0 - 5.6 % Final     Comment:     ADA Screening Guidelines:  5.7-6.4%  Consistent with prediabetes  >or=6.5%  Consistent with diabetes  High levels of fetal hemoglobin interfere with the HbA1C  assay. Heterozygous hemoglobin variants (HbS, HgC, etc)do  not significantly interfere with this assay.   However, presence of multiple variants may affect accuracy.        Right sided weakness  Stable, continue PT/OT follow up  Hyperlipidemia associated with type 2 diabetes  mellitus  Stable, on Lipitor  BMI 24.0-24.9, adult  Stable, continue management        Patient readiness: acceptance and barriers:none    During the course of the visit the patient was educated and counseled about the following:     Diabetes:  Addressed ADA diet.  Suggested low cholesterol diet.  Encouraged aerobic exercise.  Hypertension:   Dietary sodium restriction.  Regular aerobic exercise.    Goals: Diabetes: Maintain Hemoglobin A1C below 7 and Hypertension: Reduce Blood Pressure    Did patient meet goals/outcomes: Yes    The following self management tools provided: declined    Patient Instructions (the written plan) was given to the patient/family.     Time spent with patient: 15 minutes    Barriers to medications present (no )    Adverse reactions to current medications (no)    Over the counter medications reviewed (Yes)

## 2023-02-07 ENCOUNTER — PES CALL (OUTPATIENT)
Dept: ADMINISTRATIVE | Facility: CLINIC | Age: 58
End: 2023-02-07
Payer: MEDICARE

## 2023-02-13 ENCOUNTER — CLINICAL SUPPORT (OUTPATIENT)
Dept: REHABILITATION | Facility: HOSPITAL | Age: 58
End: 2023-02-13
Payer: MEDICARE

## 2023-02-13 DIAGNOSIS — R26.81 GAIT INSTABILITY: ICD-10-CM

## 2023-02-13 DIAGNOSIS — R29.898 RIGHT LEG WEAKNESS: ICD-10-CM

## 2023-02-13 DIAGNOSIS — R26.89 IMBALANCE: ICD-10-CM

## 2023-02-13 DIAGNOSIS — R53.1 RIGHT SIDED WEAKNESS: Primary | ICD-10-CM

## 2023-02-13 PROCEDURE — 97112 NEUROMUSCULAR REEDUCATION: CPT | Mod: PN

## 2023-02-13 PROCEDURE — 97162 PT EVAL MOD COMPLEX 30 MIN: CPT | Mod: PN

## 2023-02-13 NOTE — PLAN OF CARE
"[facility]  Physical Therapy Neurological Rehabilitation Initial Evaluation    Name: Hernan Badillo  Clinic Number: [id number]    Therapy Diagnosis:   Encounter Diagnoses   Name Primary?    Right sided weakness Yes    Imbalance     Gait instability     Right leg weakness      Physician: Eneida Hoang, NP    Physician Orders: PT Eval and Treat   Medical Diagnosis from Referral: right sided weakness  Evaluation Date: 2/13/2023  Authorization Period Expiration: 12/29/2023  Plan of Care Expiration: 04/01/23  Visit # / Visits authorized: 1/ 1    Time In: 1520  Time Out: 1605  Total Billable Time: 45 minutes    Precautions: Fall      Subjective     Date of onset: 02/01/23  History of Current Symptoms, Hernan reports: sudden onset right-sided paresthesia and "tightness" on 11/22/22 - diagnosed with a pontine hemorrhage; patient states his symptoms are constant and limit his ability to perform his activities of daily living.     Medical History:   Past Medical History:   Diagnosis Date    COPD (chronic obstructive pulmonary disease)     Diabetes mellitus, type 2     Hypertension      Surgical History:   Hernan Badillo  has a past surgical history that includes Hernia repair; Dental surgery; incision and drainiage; and Esophagogastroduodenoscopy (N/A, 6/1/2020).    Medications:   Hernan has a current medication list which includes the following prescription(s): acetaminophen, albuterol, albuterol-ipratropium, amlodipine, atorvastatin, escitalopram oxalate, lisinopril, meclizine, melatonin, metformin, and pantoprazole, and the following Facility-Administered Medications: dexamethasone.    Allergies:   Review of patient's allergies indicates:  No Known Allergies     Imaging (CT of head on 11/22/22):     1. Acute pontine intraparenchymal hemorrhage.  No significant mass effect or intraventricular hemorrhage.  2.  Mild generalized cerebral volume loss and scattered supratentorial white matter hypoattenuation, " nonspecific and may reflect sequelae of chronic microvascular ischemic change.  3. Bilateral basal ganglia and right paramedian pontine chronic lacunar infarcts versus perivascular spaces.    Prior Therapy: none  Social History: lives alone in 1-story raised home (2 steps)  Falls: none   Occupation:   Prior Level of Function: supervision needed  Current Level of Function: moderate difficulty with adls    Pain:  Current 5/10, worst 8/10, best 5/10   Location: right side of body  Description: Burning, Tingling, and Numb  Aggravating Factors: n/a = persistent  Easing Factors:  none    Pts goals: improve overall strength/mobility       Objective     - Follows commands: 75% of time   - Speech: mild dysarthria    Functional Mobility & ADLs:   Sit to stand:  stand by assist (slow pace)    Mental status: alert, oriented to person, place, and time, affect inappropriate   Appearance: Oddly dressed and Poorly groomed  Behavior:  inappropriate responses  Attention Span and Concentration:  Impaired to some degree    Posture Alignment in sitting:   Head: forward head     Sensation: Light Touch: Impaired: nerve pain throughout his right side          Proprioception: Intact, Kinesthesia Intact         Coordination:   - fine motor: impaired right hand  - UE coordination: impaired right side  - LE coordination:  impaired right side    UPPER EXTREMITY--AROM/PROM  (R) UE: WFLs  (L) UE: WFLs           RANGE OF MOTION--LOWER EXTREMITIES  (R) LE Hip: normal   Knee: normal   Ankle: normal    (L) LE: Hip: normal   Knee: normal   Ankle: normal    Strength: manual muscle test grades below     Lower Extremity Strength  Right LE  Left LE    Hip flexion:  3+/5 Hip flexion: 4/5   Knee extension: 4-/5 Knee extension: 4+/5   Ankle dorsiflexion:  4-/5 Ankle dorsiflexion: 4+/5       Functional Gait Assessment:   1. Gait on level surface =  1 (7.8 seconds for 20 feet)  2. Change in Gait Speed = 2  3. Gait with horizontal head turns  = 2  4.  Gait with vertical head turns = 2  5. Gait with pivot turns = 2  6. Step over obstacle = 1  7. Gait with Narrow JILLIAN = 0  8. Gait with eyes closed = 2  9. Ambulating Backwards = 2  10. Steps = 1     Score 15/30   Score:   <22/30 fall risk   <20/30 fall risk in older adults   <18/30 fall risk in Parkinsons       Gait Assessment:(if indicated)  - AD used: none  - Assistance: stand by assist   - Distance: 50 feet x 2    GAIT DEVIATIONS:  Hernan displays the following deviations with ambulation: minimal path deviation; unequal step length; discontinuity between steps    Impairments contributing to deviations: imbalance; right-sided weakness; right-sided nerve pain    Endurance Deficit: moderate fatigue         CMS Impairment/Limitation/Restriction for FOTO  NOC-Neuromuscular disorder Survey    Therapist reviewed FOTO scores for Hernan Badillo on 2/13/2023.   FOTO documents entered into [facility] - see Media section.    Limitation Score: 55%    Category: Mobility         TREATMENT     Treatment Time In: 1550  Treatment Time Out: 1605  Total Treatment time separate from Evaluation: 15 minutes    Hernan participated in neuromuscular re-education activities to assess: Balance for 15 minutes. The following activities were included:    X 20 feet ambulation with changing speeds (slow and fast)  X 20 feet ambulation with head turns  X 20 feet ambulation with head nods  X 12 feet ambulation with pivot turns  2 x 12 feet ambulation with stepping over obstacles (4 1/2 inch and then 9 inch)  X 20 feet ambulation with eyes closed  X 20 feet ambulation backwards  Up/down x 4 (6-inch) steps with supervision        Home Exercises and Patient Education Provided    Education provided:   - proper therapeutic exercise technique    Written Home Exercises Provided:  to be provided at future appointment .      Assessment     Hernan is a 57 y.o. male referred to outpatient Physical Therapy with a medical diagnosis of right sided weakness. Pt presents to  PT with the following impairments leading to his functional decline: imbalance, right-sided weakness, right-sided nerve pain.     Pt prognosis is Fair.   Pt will benefit from skilled outpatient Physical Therapy to address the deficits stated above and in the chart below, provide pt/family education, and to maximize pt's level of independence.     Plan of care discussed with patient: Yes  Pt's spiritual, cultural and educational needs considered and patient is agreeable to the plan of care and goals as stated below:     Anticipated Barriers for therapy: severity of symptoms    Medical Necessity is demonstrated by the following  History  Co-morbidities and personal factors that may impact the plan of care Co-morbidities:   COPD/asthma, diabetes, HTN, and prior abdominal surgery    Personal Factors:   character     moderate   Examination  Body Structures and Functions, activity limitations and participation restrictions that may impact the plan of care Body Regions:   head  lower extremities  upper extremities    Body Systems:    strength  balance  gait  transfers  motor control    Participation Restrictions:   none    Activity limitations:   Learning and applying knowledge  no deficits    General Tasks and Commands  no deficits    Communication  no deficits    Mobility  lifting and carrying objects  walking  driving (bike, car, motorcycle)    Self care  no deficits    Domestic Life  shopping  cooking  doing house work (cleaning house, washing dishes, laundry)    Interactions/Relationships  no deficits    Life Areas  no deficits    Community and Social Life  no deficits         high   Clinical Presentation evolving clinical presentation with changing clinical characteristics moderate   Decision Making/ Complexity Score: moderate     Goals:    New Short Term Goals (3 Weeks):   Patient to perform x 10 repetitions sit to stand so that he may rise with single upper extremity support.   Patient to ascend/descend 4 (6-inch)  steps with reciprocating gait pattern to demonstrate safe mobility in/out of his home.  Patient to perform x 45 seconds full Romberg stance, eyes open to improve upright stability.      New Long Term Goals (6 Weeks):   Patient to demonstrate competence with home exercise program to maintain therapeutic gains.   Patient to ambulate 20 feet in less than 7 seconds to improve pierce/symmetry.   Patient to ambulate 150 feet without rest breaks and stand by assist to improve household mobility      Plan     Plan of care Certification: 2/13/2023 to 04/01/2023.    Outpatient Physical Therapy evaluation, plus 2 times weekly for 6 weeks to include the following interventions (starting week of 02/20/23): Gait Training, Manual Therapy, Moist Heat/ Ice, Neuromuscular Re-ed, Patient Education, Self Care, Therapeutic Activities, Therapeutic Exercise, and home exercise program .     Steven Morrow, PT

## 2023-02-15 DIAGNOSIS — I63.9 CEREBROVASCULAR ACCIDENT (CVA), UNSPECIFIED MECHANISM: Primary | ICD-10-CM

## 2023-02-22 ENCOUNTER — HOSPITAL ENCOUNTER (OUTPATIENT)
Dept: RADIOLOGY | Facility: HOSPITAL | Age: 58
Discharge: HOME | End: 2023-02-22
Attending: NURSE PRACTITIONER
Payer: MEDICARE

## 2023-02-22 DIAGNOSIS — I63.9 CEREBROVASCULAR ACCIDENT (CVA), UNSPECIFIED MECHANISM: ICD-10-CM

## 2023-02-22 PROCEDURE — 70551 MRI BRAIN STEM W/O DYE: CPT | Mod: TC,PO

## 2023-02-22 PROCEDURE — 93880 EXTRACRANIAL BILAT STUDY: CPT | Mod: TC,PO

## 2023-02-22 PROCEDURE — 70544 MR ANGIOGRAPHY HEAD W/O DYE: CPT | Mod: TC,PO

## 2023-02-27 ENCOUNTER — CLINICAL SUPPORT (OUTPATIENT)
Dept: REHABILITATION | Facility: HOSPITAL | Age: 58
End: 2023-02-27
Payer: MEDICARE

## 2023-02-27 ENCOUNTER — TELEPHONE (OUTPATIENT)
Dept: FAMILY MEDICINE | Facility: CLINIC | Age: 58
End: 2023-02-27
Payer: MEDICARE

## 2023-02-27 DIAGNOSIS — R26.89 IMBALANCE: Primary | ICD-10-CM

## 2023-02-27 DIAGNOSIS — R26.81 GAIT INSTABILITY: ICD-10-CM

## 2023-02-27 DIAGNOSIS — R29.898 RIGHT LEG WEAKNESS: ICD-10-CM

## 2023-02-27 PROCEDURE — 97110 THERAPEUTIC EXERCISES: CPT | Mod: PN

## 2023-02-27 NOTE — PROGRESS NOTES
"[facility]   Physical Therapy Treatment Note     Name: Hernan Badillo  Clinic Number: [id number]    Therapy Diagnosis:   Encounter Diagnoses   Name Primary?    Imbalance Yes    Gait instability     Right leg weakness      Physician: Eneida Hoang NP    Visit Date: 2/27/2023    Physician Orders: PT Eval and Treat   Medical Diagnosis from Referral: right sided weakness  Evaluation Date: 2/13/2023  Authorization Period Expiration: 12/31/2023  Plan of Care Expiration: 04/01/23  Visit # / Visits authorized: 1/ 20       Precautions: Fall    PTA Visit #: 0/5     Time In: 1430  Time Out: 1508  Total Billable Time: 38 minutes    SUBJECTIVE     Pt reports: Patient reports increased right sided pain and tightness. States he is exercising at home but nothing is getting better. Requests that physical therapist reach out to referring provider to have an appointment scheduled to discuss pain management.       He has not yet been provided with a home exercise program. Reports he is riding his bike and stretching at home.   Response to previous treatment: No complaints with regards to previous treatment session   Functional change: ongoing     Pain: 10/10; "110 at night"   Location: right side of body    OBJECTIVE     Objective Measures updated at progress report unless specified.     Treatment     Hernan received the treatments listed below:      therapeutic exercises to develop strength, endurance, ROM, and flexibility for 38 minutes including:    SciFit level 2 x 5 minutes using bilateral upper/lower extremities   Physical therapist provided end range stretching to right lower extremity in the following joints/plane as follows:   Hamstrings (in supine) 3 x 30 seconds   Gastroc (in supine) 3 x 30 seconds  Hip flexors (in side-lying) 2 x 30 seconds   Open book stretching, right upper extremity only x 15 repetitions   Active-assisted range of motion shoulder flexion using wand x 10 repetitions " "  Active-assisted range of motion shoulder abduction using wand x 10 repetitions  Patient provided right latissimus dorsi stretch with stabilization of scapula, 3 x 30 seconds   Physical therapist provided right posterior shoulder capsule stretch with stabilization of scapula, 2 x 30 seconds  Patient performed "wall wipes" with right upper extremity for active assisted shoulder flexion and abduction, x 5 repetitions             Patient Education and Home Exercises     Home Exercises Provided and Patient Education Provided     Education provided:   - Patient provided with rationale for exercises performed today with focus on increasing flexibility  - Provided with instruction for new home exercise and continued performance of bike at home.     Written Home Exercises Provided: No written home exercise program provided today; however, patient was provided with verbal/demonstrative instruction for right shoulder AROM "wall wash" exercise     ASSESSMENT     Hernan provided fair participation and effort during session today with focus on right upper and lower extremity flexibility and range of motion to address complaints of pain and tightness. Patient tolerated activities well; however, at end of session stated, "I am more sore now than when I came in." Patient feels he has no right sided weakness, only pain and tightness, despite observed deficits in functional movement. He demonstrates decreased insight into his impairments at this time. He would benefit from right lower extremity strengthening, gait and balance training though he may be resistive to including this in his future treatment sessions.         Hernan Is progressing well towards his goals.   Pt prognosis is Fair.     Pt will continue to benefit from skilled outpatient physical therapy to address the deficits listed in the problem list box on initial evaluation, provide pt/family education and to maximize pt's level of independence in the home and community " environment.     Pt's spiritual, cultural and educational needs considered and pt agreeable to plan of care and goals.     Anticipated barriers to physical therapy: poor insight into impairments     Goals:   New Short Term Goals (3 Weeks):   Patient to perform x 10 repetitions sit to stand so that he may rise with single upper extremity support.   Patient to ascend/descend 4 (6-inch) steps with reciprocating gait pattern to demonstrate safe mobility in/out of his home.  Patient to perform x 45 seconds full Romberg stance, eyes open to improve upright stability.      New Long Term Goals (6 Weeks):   Patient to demonstrate competence with home exercise program to maintain therapeutic gains.   Patient to ambulate 20 feet in less than 7 seconds to improve pierce/symmetry.   Patient to ambulate 150 feet without rest breaks and stand by assist to improve household mobility    PLAN     Plan of care Certification: 2/13/2023 to 04/01/2023.     Outpatient Physical Therapy evaluation, plus 2 times weekly for 6 weeks to include the following interventions (starting week of 02/20/23): Gait Training, Manual Therapy, Moist Heat/ Ice, Neuromuscular Re-ed, Patient Education, Self Care, Therapeutic Activities, Therapeutic Exercise, and home exercise program .    Recommendations for next treatment session: right lower extremity strengthening; gait and balance training       JUAN R MARKS, PT

## 2023-02-27 NOTE — TELEPHONE ENCOUNTER
Returned patients call in regards to needing pain medicine. No answer, unable to leave voicemail.

## 2023-02-27 NOTE — TELEPHONE ENCOUNTER
----- Message from Sophia Rubin sent at 2/27/2023  8:21 AM CST -----  Regarding: PAIN MEDS  Contact: Mom  Type: Patient Call Back         Who called: Ms. Chou - Mother         What is the request in detail: calling [facility] with staff regarding needing pain medicine; please advise           Best call back number: [phone] or [phone]         Additional Information:   [facility] - [place] - [address]  [address] [place] [address]  Phone: [phone] Fax: [phone]    [id number] - [place] - [address]  [address]  [place] [address]  Phone: [phone] Fax: [phone]             Thank You

## 2023-03-02 ENCOUNTER — CLINICAL SUPPORT (OUTPATIENT)
Dept: REHABILITATION | Facility: HOSPITAL | Age: 58
End: 2023-03-02
Payer: MEDICARE

## 2023-03-02 DIAGNOSIS — R29.898 RIGHT LEG WEAKNESS: ICD-10-CM

## 2023-03-02 DIAGNOSIS — Z78.9 IMPAIRED INSTRUMENTAL ACTIVITIES OF DAILY LIVING (IADL): ICD-10-CM

## 2023-03-02 DIAGNOSIS — R53.1 RIGHT SIDED WEAKNESS: ICD-10-CM

## 2023-03-02 DIAGNOSIS — R26.89 IMBALANCE: Primary | ICD-10-CM

## 2023-03-02 DIAGNOSIS — R26.81 GAIT INSTABILITY: ICD-10-CM

## 2023-03-02 DIAGNOSIS — R27.8 COORDINATION IMPAIRMENT: ICD-10-CM

## 2023-03-02 PROCEDURE — 97110 THERAPEUTIC EXERCISES: CPT | Mod: PN

## 2023-03-02 PROCEDURE — 97165 OT EVAL LOW COMPLEX 30 MIN: CPT | Mod: PN

## 2023-03-02 NOTE — PROGRESS NOTES
"[facility]   Physical Therapy Treatment Note     Name: Hernan Badillo  Clinic Number: [id number]    Therapy Diagnosis:   Encounter Diagnoses   Name Primary?    Imbalance Yes    Gait instability     Right leg weakness        Physician: Eneida Hoang NP    Visit Date: 3/2/2023    Physician Orders: PT Eval and Treat   Medical Diagnosis from Referral: right sided weakness  Evaluation Date: 2/13/2023  Authorization Period Expiration: 12/31/2023  Plan of Care Expiration: 04/01/23  Visit # / Visits authorized: 1/ 20       Precautions: Fall    PTA Visit #: 0/5     Time In: 1543 (appt started early, patient checked in after arrival)  Time Out: 1426  Total Billable Time: 43 minutes    SUBJECTIVE     Pt reports: Patient's mother requested to speak with physical therapist at start of session. She asks, "why is he so tight?" Reports that patient has complaints of both right sided pain and tightness. States he was prescribed something for his "stiffness" but she reports he did not like the way it made him feel so he discontinued the medication.     Patient reports significant right sided pain affecting his sleep at night.       Reports he is riding his bike and stretching at home.   Response to previous treatment: Patient reports increased pain following last treatment session.   Functional change: ongoing     Pain: 10/10   Location: right side of body    OBJECTIVE     Objective Measures updated at progress report unless specified.     Treatment     Hernan received the treatments listed below:      therapeutic exercises to develop strength, endurance, ROM, and flexibility for 43 minutes including:    SciFit level 2 x 5 minutes using bilateral upper/lower extremities   Standing calf stretch in parallel bars over half foam roll, 3 x 30 seconds   Physical therapist provided end range stretching to right hamstrings (patient in supine) 3 x 30 seconds   Physical therapist provided lower trunk rotation with " "lower extremities on physioball x 20 repetitions  Standing hip abduction, 2 x 10 repetitions bilaterally with upper extremity support   Prone hamstring curl right lower extremity only, 2 x 10 repetitions; required minimal assistance from physical therapist for neutral knee flexion and eccentric control   Sit to stand from elevated edge of mat x 10 repetitions with no upper extremity support             Patient Education and Home Exercises     Home Exercises Provided and Patient Education Provided     Education provided:   - Patient provided with rationale for exercises performed today with focus on increasing flexibility  - Provided with instruction for new home exercise and continued performance of bike at home.     Written Home Exercises Provided: Provided with written home exercise program for prone hamstring curls. See "patient instructions" section for specifics.     ASSESSMENT     Hernan provided fair participation and effort during session today with focus on right lower extremity strengthening and flexibility. Patient reported pain with all activities perfored this date. With performance of SciFit, patient states, "Im not gonna be able to walk tonight." However, he was able to continue with session following completion of SciFit. Patient reports pain and stiffness which may be a result of increased spasticity. Physical therapist plans to more formally assess spasticity in next treatment session and report findings to his referring MD for management as needed.         Hernan Is progressing well towards his goals.   Pt prognosis is Fair.     Pt will continue to benefit from skilled outpatient physical therapy to address the deficits listed in the problem list box on initial evaluation, provide pt/family education and to maximize pt's level of independence in the home and community environment.     Pt's spiritual, cultural and educational needs considered and pt agreeable to plan of care and goals.   "   Anticipated barriers to physical therapy: poor insight into impairments     Goals:   New Short Term Goals (3 Weeks):   Patient to perform x 10 repetitions sit to stand so that he may rise with single upper extremity support.   Patient to ascend/descend 4 (6-inch) steps with reciprocating gait pattern to demonstrate safe mobility in/out of his home.  Patient to perform x 45 seconds full Romberg stance, eyes open to improve upright stability.      New Long Term Goals (6 Weeks):   Patient to demonstrate competence with home exercise program to maintain therapeutic gains.   Patient to ambulate 20 feet in less than 7 seconds to improve pierce/symmetry.   Patient to ambulate 150 feet without rest breaks and stand by assist to improve household mobility    PLAN     Plan of care Certification: 2/13/2023 to 04/01/2023.     Outpatient Physical Therapy evaluation, plus 2 times weekly for 6 weeks to include the following interventions (starting week of 02/20/23): Gait Training, Manual Therapy, Moist Heat/ Ice, Neuromuscular Re-ed, Patient Education, Self Care, Therapeutic Activities, Therapeutic Exercise, and home exercise program .    Recommendations for next treatment session: assess lower extremity spasticity       JUAN R MARKS, PT

## 2023-03-02 NOTE — PROGRESS NOTES
Occupational Therapy   Evaluation    Hrenan Badillo   MRN: [id number]     OT eval complete. Please see attached POC for details. Thank you for consult!    ASHLEY Alexis, MAIA   3/2/2023

## 2023-03-03 PROBLEM — R27.8 COORDINATION IMPAIRMENT: Status: ACTIVE | Noted: 2023-03-02

## 2023-03-03 PROBLEM — R53.1 RIGHT SIDED WEAKNESS: Status: ACTIVE | Noted: 2023-03-02

## 2023-03-03 PROBLEM — Z78.9 IMPAIRED INSTRUMENTAL ACTIVITIES OF DAILY LIVING (IADL): Status: ACTIVE | Noted: 2023-03-02

## 2023-03-03 NOTE — PLAN OF CARE
"[facility] Occupational Therapy  Initial Neurological Evaluation     Date: 3/2/2023  Patient: Hernna Badillo  Chart Number: [id number]    Therapy Diagnosis:   Encounter Diagnoses   Name Primary?    Impaired instrumental activities of daily living (IADL)     Coordination impairment     Right sided weakness       Physician: Eneida Hoang NP    Physician Orders: OT eval & treat  Medical Diagnosis:   Cerebrovascular accident (CVA), unspecified mechanism  - Primary     I63.9 434.91     Evaluation Date: 3/2/2023  Plan of Care Expiration Period: 5/26/2023  Insurance Authorization period Expiration: 3/23/2023  Date of Return to MD: tba  Visit # / Visits Authorized: 1 / 1  FOTO: not assessed     Time In:1435  Time Out: 1515  Total Billable (one on one) Time: 40 minutes    Precautions: Standard and Fall    Subjective     History of Current Condition: "There's nothing wrong with me, there's just tightness in me. There's nothing wrong with me, I'm in bad shape. It's hard to turn the key in the car."    Involved Side: right   Dominant Side: Right  Date of Onset: 11/22/22  Surgical Procedure: n/a  Imaging:    MRI Brain without Contrast 2/22/23   IMPRESSION:   Evidence of mild small vessel chronic ischemic change as described. There is a single tiny focus of slightly restricted diffusion associated with a single lesion in the left cerebral hemisphere that is compatible with a small subacute area of white matter infarction. Chronic areas of infarction and hemorrhage are evident in the alf as described. There is no evidence of acute infarct or hemorrhage. The MR angiography images are suboptimal due to patient motion but demonstrate no obvious significant intracranial stenoses or occlusions or aneurysms.    CT Head 11/22/22  Impression:  1. Acute pontine intraparenchymal hemorrhage.  No significant mass effect or intraventricular hemorrhage.  2.  Mild generalized cerebral volume loss and scattered supratentorial white " "matter hypoattenuation, nonspecific and may reflect sequelae of chronic microvascular ischemic change.  3. Bilateral basal ganglia and right paramedian pontine chronic lacunar infarcts versus perivascular spaces.    Previous Therapy: acute & inpatient rehab    Patient's Goals for Therapy: "get this arm to stop tingling."    Pain:  Pain Related Behaviors Observed: no     Occupation:  patient states he's a     Functional Limitations/Social History:    Prior Level of Function: patient indicated that he was independent prior to his CVA  Current Level of Function: patient reported independence with BADL. He lives with son, he cooks and cleans "when I can." He last fished 3, 4, 5 months ago. His explanations are unclear.     Home/Living environment :   Home Access: walk-in stall, he stands and holds the shower curtain juancarlos when showering  DME: none     Leisure: fishing    Driving: not currently driving    Past Medical History/Physical Systems Review:     Past Medical History:  Hernan Badillo  has a past medical history of COPD (chronic obstructive pulmonary disease), Diabetes mellitus, type 2, and Hypertension.    Past Surgical History:  Hernan Badillo  has a past surgical history that includes Hernia repair; Dental surgery; incision and drainiage; and Esophagogastroduodenoscopy (N/A, 6/1/2020).    Current Medications:  Hernan has a current medication list which includes the following prescription(s): acetaminophen, albuterol, albuterol-ipratropium, amlodipine, atorvastatin, escitalopram oxalate, lisinopril, meclizine, melatonin, metformin, and pantoprazole, and the following Facility-Administered Medications: dexamethasone.    Allergies:  Review of patient's allergies indicates:  No Known Allergies     Objective     Cognitive Exam:  Oriented: Person, general situation, 2003  Behaviors: cooperative, talkative, requiring redirection  Follows Commands/attention: Follows one-step commands  Communication: dysarthria  Memory: "  to be further assessed, but appears impaired  Safety awareness/insight to disability: impaired  Coping skills/emotional control: Appropriate to situation    Visual/Perceptual:  To be further assessed as necessary.     Physical Exam:  Postural examination/scapula alignment: to be   Joint integrity: to be assessed  Skin integrity: visible skin intact  Edema: no obvious edema   Palpation: to be assessed    AROM left upper extremity WFL.     Joint Evaluation  AROM  3/2/2023 PROM   3/2/2023    Right Right   Shoulder flex 0-180 82 wfl   Shoulder Abd 0-180 73 wfl   Wrist flex 0-80 wfl nt   Wrist ext 0-70 11 55   Supination 0-80 wfl wfl   Pronation 0-80 wfl wfl       Strength 3/2/2023 3/2/2023   **within available ROM** Left Right   Shoulder flex 4+/5 3-/5   Shoulder abd 4+/5 3-/5   Elbow flex 4+/5 5/5   Elbow ext 4+/5 4-/5   Wrist flex 4+/5 3+/5   Wrist ext 4+/5 3-/5   Supination 4+/5 3+/5   Pronation 4+/5 3+/5       Hand Strength (NATALI Dynamometer, Setting II)   (lbs) Left  3/2/2023  Right  3/2/2023    1 57 21   2 50 21   3 67 18   Avg 3/2/2023 58 20   [norms for men aged 55-59: R=101.1 +/-26.7; L=83.2 +/-23.4 (Mathiowetz et al, 1985)]    Pinch Left  3/2/2023  Right  3/2/2023    Lateral 18 12   Tip (2 point) 12 9   3 jaw jesse 15 9     Gross motor coordination:   HIRO (Rapid Alternating Movements): left upper extremity wfl to min decr; right upper extremity min decr  Finger to Nose (5 times): not assessed  Finger Flicks (coordination moving from digit flexion to digit extension): min decreased, slow    Box and Block Assessment Left Right   3/2/2023 43 40   [norms for men aged 55-59: R=75.2 +/-11.9; L=73.8 +/-10.5 (Mathiowetz et al, 1985)]    Fine motor coordination:   -   Thumb to Finger Opposition: min decreased on the r     9 Hole Peg Test (9HPT) Left Right   3/2/2023 35.3s 52.74s   [norms for men aged 56-60: R=20.90s +/-4.55s; L=21.64s +/-3.39s (Sadie et al, 2003)]       Tone:  Modified Nelson Scale: 0 - No  increase in muscle tone      Sensation:  Hernan reports tingling in the right upper extremity. To be further assessed.     Balance:   Static Sit - GOOD: Takes MODERATE challenges from all directions  Dynamic sit- GOOD-: Takes MODERATE challenges from all directions but inconsistently  Static Stand - GOOD-: Takes MODERATE challenges from all directions but inconsistently  Dynamic stand - see physical therapy notes. Patient with inconsistency in gait, but insistent that he wouldn't fall. He wouldn't let OT provide CGA. OT did position for SBA-supervision.     Endurance Deficit: moderate                    Functional Status      Functional Mobility:  Patient with inconsistency in gait, but insistent that he wouldn't fall. He wouldn't let OT provide CGA. OT did position for SBA-supervision.   Mod I sit<>stand.    ADL's:  Not formally assessed today. See subjective above.     IADL's:  Not formally assessed today. See subjective above.       Treatment     Treatment Time In: n/a  Treatment Time Out: n/a  Total Treatment time separate from Evaluation time: 0    Home Exercises and Patient Education Provided    Education provided:   -role of OT, goals for OT, scheduling/cancellations, insurance limitations with patient.    Written Home Exercises Provided: Verbal ed only - patient encouraged to stretch right wrist/ hand towards extension when it feels tight.   Exercises were reviewed and Hernan was able to demonstrate them prior to the end of the session.    Hernan demonstrated fair  understanding of the education provided.     Assessment     Hernan Badillo is a 57 y.o. male referred to outpatient occupational therapy and presents with a medical diagnosis of CVA, resulting in the performance deficits & functional limitations as described in the chart below. Following medical record review it is determined that patient will benefit from occupational therapy services in order to maximize pain free and/or functional use of the right  upper extremity, as well as improving safety, efficiency & independence with BADL & IADL. It is questionable whether OT can affect the tingling in patient's arm, but we can offer management strategies and education related to protection of the arm.     Patient prognosis is Fair due to family support.   Patient will benefit from skilled outpatient Occupational Therapy to address the deficits stated above and in the chart below, provide patient/family education, and to maximize patient's level of independence.     Plan of care discussed with patient: Yes  Patient's spiritual, cultural and educational needs considered and patient is agreeable to the plan of care and goals as stated below:     Anticipated Barriers for therapy: possibly transportation, memory/ attention & other cognitive deficits.     Medical Necessity is demonstrated by the following  Profile and History Assessment of Occupational Performance Level of Clinical Decision Making Complexity Score   Occupational Profile:   Hernan Badillo is a 57 y.o. male who lives with his son. He reports being a . Hernan Badillo has difficulty with shopping, housework/ household chores, and meal preparation affecting his daily functional abilities. His main goal for therapy is to improve the tingling in his right arm.     Comorbidities:    has a past medical history of COPD (chronic obstructive pulmonary disease), Diabetes mellitus, type 2, and Hypertension.     Medical and Therapy History Review:   Brief   Performance Deficits    Physical:  Muscle Power/Strength  Muscle Endurance   Strength  Pinch Strength  Gross Motor Coordination  Fine Motor Coordination  Tactile Functions  Postural Control    Cognitive:  Attention  Orientation  Memory  Safety Awareness/Insight to Disability    Psychosocial:    Habits  Routines Clinical Decision Making:  low    Assessment Process:  Problem-  Focused Assessments    Modification/  Need for Assistance:  Not Necessary    Intervention  Selection:  Limited Treatment Options       low  Based on PMHX, co morbidities , data from assessments and functional level of assistance required with task and clinical presentation directly impacting function.       The following goals were discussed with the patient and patient is in agreement with them as to be addressed in the treatment plan.     Goals:    Short Term Goals (4 weeks) Status When Last Assessed  Progressing/ Met   1) patient will be independent with initial HEP N/a progressing 3/2/2023    2) Hernan will demonstrate >90* active shoulder flexion right upper extremity to increase independence with overhead reaching. 82* on 3/2/23 progressing 3/2/2023    3) right  to improve to 25 psi 20 on eval progressing 3/2/2023        LongTerm Goals (12 weeks) Status When Last Assessed  Progressing/ Met   1) Hernan will be independent with strategies to protect the right arm given sensory deficits. N/a progressing 3/2/2023    2) Hernan will demonstrate WFL active shoulder flexion right upper extremity to increase independence with overhead reaching. 82* on 3/2/23 progressing 3/2/2023    3) right shoulder strength to improve to 3/5 or better to increase independence with IADL 3-/5 on 3/2/23 progressing 3/2/2023    4) right  to improve to 40 psi 20 on eval (progressing 3/2/2023)    5) right upper extremity fine motor coordination (FMC) to improve, as evidenced by a 9-Hole Peg Test time of 45s or better.  52.74s on eval (progressing 3/2/2023)        Plan   Certification Period/Plan of care expiration: 3/2/2023 to 5/26/2023.    Outpatient Occupational Therapy 2 times weekly for 12 weeks to include the following interventions: Manual Therapy, Moist Heat/ Ice, Neuromuscular Re-ed, Orthotic Management and Training, Patient Education, Self Care, Therapeutic Activities, and Therapeutic Exercise, and any other treatment modalities that will facilitate Hernan Badillo's ability to reach his/her goals.       Sandra  CHRISTY Rodriguez      I certify the need for these services furnished under this plan of treatment and while under my care.  ____________________________________ Physician/Referring Practitioner   Date of Signature

## 2023-03-06 ENCOUNTER — TELEPHONE (OUTPATIENT)
Dept: FAMILY MEDICINE | Facility: CLINIC | Age: 58
End: 2023-03-06
Payer: MEDICARE

## 2023-03-06 ENCOUNTER — CLINICAL SUPPORT (OUTPATIENT)
Dept: REHABILITATION | Facility: HOSPITAL | Age: 58
End: 2023-03-06
Payer: MEDICARE

## 2023-03-06 DIAGNOSIS — R29.898 RIGHT LEG WEAKNESS: ICD-10-CM

## 2023-03-06 DIAGNOSIS — R26.81 GAIT INSTABILITY: ICD-10-CM

## 2023-03-06 DIAGNOSIS — R26.89 IMBALANCE: Primary | ICD-10-CM

## 2023-03-06 PROCEDURE — 97530 THERAPEUTIC ACTIVITIES: CPT | Mod: PN,CQ

## 2023-03-06 PROCEDURE — 97110 THERAPEUTIC EXERCISES: CPT | Mod: PN,CQ

## 2023-03-06 NOTE — TELEPHONE ENCOUNTER
----- Message from Vee Alarcon sent at 3/6/2023 11:36 AM CST -----  Contact: pt spouse  Type:  Same Day Appointment Request    Caller is requesting a same day appointment.  Caller declined first available appointment listed below.      Name of Caller:  pt   When is the first available appointment?  03/17  Symptoms:  pain in muscles from stroke  Best Call Back Number:  [phone]    Additional Information:   pt would like to be seen today. Please advise.

## 2023-03-06 NOTE — PROGRESS NOTES
"[facility]   Physical Therapy Treatment Note     Name: Hernan Badillo  Clinic Number: [id number]    Therapy Diagnosis:   Encounter Diagnoses   Name Primary?    Imbalance Yes    Gait instability     Right leg weakness      Physician: Eneida Hoang NP    Visit Date: 3/6/2023    Physician Orders: PT Eval and Treat   Medical Diagnosis from Referral: right sided weakness  Evaluation Date: 2/13/2023  Authorization Period Expiration: 12/31/2023  Plan of Care Expiration: 04/01/23  Visit # / Visits authorized: 3/ 20    Precautions: Fall    PTA Visit #: 0/5     Time In: 1200  Time Out: 1238  Total Billable Time: 38 minutes    SUBJECTIVE     Pt reports: Hurts all over, is worse today. Refused stepper exercises and refused manual stretching, "not riding bike at home". "I can walk".  He reports was not compliant with a home exercise program.   Response to previous treatment: reporting increased pain  Functional change: ongoing     Pain: 100/10;   Location: back, Right hip, Right arm "tight"    OBJECTIVE     Objective Measures updated at progress report unless specified.     Treatment     Hernan received the treatments listed below:      therapeutic exercises to develop strength, endurance, range of motion, and flexibility for 23 minutes including:    Supine:  Ball squeeze 20 times with assist for Right lower extremity management  Lateral trunk rotation 8 times with assist for Left lower extremity management  Lateral trunk rotation 20 times Right Left  with verbal cues to decrease range  Bilateral shoulder push out with cane 10 times with verbal cues for increased range to verbal cues for decreased speed    Sit:  Bilateral Ankle pumps 20 times maximal verbal cues for participation  Long arc quads 15 times Right Left alternating maximal verbal cues for participation  March in sit 15 times Right Left alternating    therapeutic activities to improve functional performance for 15  minutes, including:     Sit " to supine standby assistance   Supine to sit moderate assistance     Sit to stand standby assistance with multiple attempts    Gait without assistive device with forward lean 2x50 feet, standby assistance     Patient Education and Home Exercises     Home Exercises Provided and Patient Education Provided     Education provided:   - Patient provided with verbal and demonstrative instruction for all activities performed in today's session.  - Purpose of exercises is to increase flexibility with slow movement     Written Home Exercises Provided: Yes. Exercises were reviewed and Henran was able to demonstrate them prior to the end of the session and demonstrated fair understanding of the education provided. See EMR under Patient Instructions for exercises provided during therapy sessions    ASSESSMENT     Hernan provided poor participation and effort during session today with focus on Bilateral lower extremity, trunk mobility, and shoulder range of motion. Hernan with poor tolerance to all activities expressing multiple complaints of pain and tightness. Hernan refused activities and was able to encourage some activities and then needed multiple cues for proper form with poor follow through. Hernan with improved upright with gait upon leaving session.    Hernan is not progressing towards his goals.   Pt prognosis is Fair.     Pt will continue to benefit from skilled outpatient physical therapy to address the deficits listed in the problem list box on initial evaluation, provide pt/family education and to maximize pt's level of independence in the home and community environment.     Pt's spiritual, cultural and educational needs considered and pt agreeable to plan of care and goals.     Anticipated barriers to physical therapy: poor insight into impairments     Goals:   New Short Term Goals (3 Weeks):   Patient to perform x 10 repetitions sit to stand so that he may rise with single upper extremity support. (Ongoing)  Patient to  ascend/descend 4 (6-inch) steps with reciprocating gait pattern to demonstrate safe mobility in/out of his home. (Ongoing)  Patient to perform x 45 seconds full Romberg stance, eyes open to improve upright stability. (Ongoing)     New Long Term Goals (6 Weeks):   Patient to demonstrate competence with home exercise program to maintain therapeutic gains. (Ongoing)  Patient to ambulate 20 feet in less than 7 seconds to improve pierce/symmetry. (Ongoing)  Patient to ambulate 150 feet without rest breaks and stand by assist to improve household mobility (ongoing)    PLAN     Plan of care Certification: 2/13/2023 to 04/01/2023.     Outpatient Physical Therapy evaluation, plus 2 times weekly for 6 weeks to include the following interventions (starting week of 02/20/23): Gait Training, Manual Therapy, Moist Heat/ Ice, Neuromuscular Re-ed, Patient Education, Self Care, Therapeutic Activities, Therapeutic Exercise, and home exercise program .    Beth Delaney, PTA

## 2023-03-08 ENCOUNTER — LAB VISIT (OUTPATIENT)
Dept: LAB | Facility: HOSPITAL | Age: 58
End: 2023-03-08
Payer: MEDICARE

## 2023-03-08 DIAGNOSIS — E78.5 HYPERLIPIDEMIA ASSOCIATED WITH TYPE 2 DIABETES MELLITUS: ICD-10-CM

## 2023-03-08 DIAGNOSIS — I15.2 HYPERTENSION ASSOCIATED WITH DIABETES: ICD-10-CM

## 2023-03-08 DIAGNOSIS — E11.59 HYPERTENSION ASSOCIATED WITH DIABETES: ICD-10-CM

## 2023-03-08 DIAGNOSIS — E11.69 HYPERLIPIDEMIA ASSOCIATED WITH TYPE 2 DIABETES MELLITUS: ICD-10-CM

## 2023-03-08 LAB
BASOPHILS # BLD AUTO: 0.01 K/UL (ref 0–0.2)
BASOPHILS NFR BLD: 0.2 % (ref 0–1.9)
DIFFERENTIAL METHOD: ABNORMAL
EOSINOPHIL # BLD AUTO: 0.1 K/UL (ref 0–0.5)
EOSINOPHIL NFR BLD: 1.9 % (ref 0–8)
ERYTHROCYTE [DISTWIDTH] IN BLOOD BY AUTOMATED COUNT: 13.9 % (ref 11.5–14.5)
ESTIMATED AVG GLUCOSE: 128 MG/DL (ref 68–131)
HBA1C MFR BLD: 6.1 % (ref 4–5.6)
HCT VFR BLD AUTO: 37 % (ref 40–54)
HGB BLD-MCNC: 11.8 G/DL (ref 14–18)
IMM GRANULOCYTES # BLD AUTO: 0.01 K/UL (ref 0–0.04)
IMM GRANULOCYTES NFR BLD AUTO: 0.2 % (ref 0–0.5)
LYMPHOCYTES # BLD AUTO: 1.3 K/UL (ref 1–4.8)
LYMPHOCYTES NFR BLD: 31.4 % (ref 18–48)
MCH RBC QN AUTO: 29.8 PG (ref 27–31)
MCHC RBC AUTO-ENTMCNC: 31.9 G/DL (ref 32–36)
MCV RBC AUTO: 93 FL (ref 82–98)
MONOCYTES # BLD AUTO: 0.3 K/UL (ref 0.3–1)
MONOCYTES NFR BLD: 6.5 % (ref 4–15)
NEUTROPHILS # BLD AUTO: 2.5 K/UL (ref 1.8–7.7)
NEUTROPHILS NFR BLD: 59.8 % (ref 38–73)
NRBC BLD-RTO: 0 /100 WBC
PLATELET # BLD AUTO: 185 K/UL (ref 150–450)
PMV BLD AUTO: 10.5 FL (ref 9.2–12.9)
RBC # BLD AUTO: 3.96 M/UL (ref 4.6–6.2)
WBC # BLD AUTO: 4.17 K/UL (ref 3.9–12.7)

## 2023-03-08 PROCEDURE — 85025 COMPLETE CBC W/AUTO DIFF WBC: CPT | Performed by: NURSE PRACTITIONER

## 2023-03-08 PROCEDURE — 36415 COLL VENOUS BLD VENIPUNCTURE: CPT | Mod: PO | Performed by: NURSE PRACTITIONER

## 2023-03-08 PROCEDURE — 83036 HEMOGLOBIN GLYCOSYLATED A1C: CPT | Performed by: NURSE PRACTITIONER

## 2023-03-08 PROCEDURE — 80053 COMPREHEN METABOLIC PANEL: CPT | Performed by: NURSE PRACTITIONER

## 2023-03-08 PROCEDURE — 80061 LIPID PANEL: CPT | Performed by: NURSE PRACTITIONER

## 2023-03-09 LAB
ALBUMIN SERPL BCP-MCNC: 3.5 G/DL (ref 3.5–5.2)
ALP SERPL-CCNC: 53 U/L (ref 55–135)
ALT SERPL W/O P-5'-P-CCNC: 9 U/L (ref 10–44)
ANION GAP SERPL CALC-SCNC: 7 MMOL/L (ref 8–16)
AST SERPL-CCNC: 15 U/L (ref 10–40)
BILIRUB SERPL-MCNC: 0.8 MG/DL (ref 0.1–1)
BUN SERPL-MCNC: 7 MG/DL (ref 6–20)
CALCIUM SERPL-MCNC: 9.2 MG/DL (ref 8.7–10.5)
CHLORIDE SERPL-SCNC: 104 MMOL/L (ref 95–110)
CHOLEST SERPL-MCNC: 150 MG/DL (ref 120–199)
CHOLEST/HDLC SERPL: 3.3 {RATIO} (ref 2–5)
CO2 SERPL-SCNC: 29 MMOL/L (ref 23–29)
CREAT SERPL-MCNC: 0.8 MG/DL (ref 0.5–1.4)
EST. GFR  (NO RACE VARIABLE): >60 ML/MIN/1.73 M^2
GLUCOSE SERPL-MCNC: 76 MG/DL (ref 70–110)
HDLC SERPL-MCNC: 46 MG/DL (ref 40–75)
HDLC SERPL: 30.7 % (ref 20–50)
LDLC SERPL CALC-MCNC: 94.2 MG/DL (ref 63–159)
NONHDLC SERPL-MCNC: 104 MG/DL
POTASSIUM SERPL-SCNC: 4 MMOL/L (ref 3.5–5.1)
PROT SERPL-MCNC: 6.8 G/DL (ref 6–8.4)
SODIUM SERPL-SCNC: 140 MMOL/L (ref 136–145)
TRIGL SERPL-MCNC: 49 MG/DL (ref 30–150)

## 2023-03-20 ENCOUNTER — TELEPHONE (OUTPATIENT)
Dept: REHABILITATION | Facility: HOSPITAL | Age: 58
End: 2023-03-20
Payer: MEDICARE

## 2023-03-20 NOTE — TELEPHONE ENCOUNTER
OT returned call from patient's mother. She reports that Hernan continues to have tightness/ discomfort in his arm and leg after working out. OT ed mom that it's difficult to address this over the phone as I have only seen Hernan for his initial evaluation. He needs to come to his therapy appointments. If he can come, show us what he's doing and then how he feels afterwards, we should be able to make some modifications to his routine/ add some stretches that may address the tightness he is feeling. She verbalized understanding and stated she would try and get him to come to his appointments.     Sandra Rodriguez, ASHLEY, LOTR   3/20/2023

## 2023-03-23 ENCOUNTER — TELEPHONE (OUTPATIENT)
Dept: REHABILITATION | Facility: HOSPITAL | Age: 58
End: 2023-03-23
Payer: MEDICARE

## 2023-03-23 NOTE — TELEPHONE ENCOUNTER
Spoke with patient's mother regarding his missed appointment today. She reports that she thought the appt was tomorrow. Verbally reviewed attendance policy and provided mother with time and date for patient's next appt. She voiced a plan to speak with patient and call back to either confirm or cancel the appt.

## 2023-04-20 DIAGNOSIS — I65.21 STENOSIS OF RIGHT INTERNAL CAROTID ARTERY: ICD-10-CM

## 2023-04-20 DIAGNOSIS — R53.1 RIGHT SIDED WEAKNESS: ICD-10-CM

## 2023-04-20 DIAGNOSIS — I63.9 CEREBROVASCULAR ACCIDENT (CVA): Primary | ICD-10-CM

## 2023-04-28 ENCOUNTER — HOSPITAL ENCOUNTER (OUTPATIENT)
Dept: RADIOLOGY | Facility: HOSPITAL | Age: 58
Discharge: HOME | End: 2023-04-28
Attending: RADIOLOGY
Payer: MEDICARE

## 2023-04-28 DIAGNOSIS — R53.1 RIGHT SIDED WEAKNESS: ICD-10-CM

## 2023-04-28 DIAGNOSIS — I65.21 STENOSIS OF RIGHT INTERNAL CAROTID ARTERY: ICD-10-CM

## 2023-04-28 DIAGNOSIS — I63.9 CEREBROVASCULAR ACCIDENT (CVA): ICD-10-CM

## 2023-04-28 LAB
CREAT SERPL-MCNC: 0.9 MG/DL (ref 0.5–1.4)
SAMPLE: NORMAL

## 2023-04-28 PROCEDURE — 25500020 PHARM REV CODE 255: Mod: PO | Performed by: RADIOLOGY

## 2023-04-28 PROCEDURE — 70496 CT ANGIOGRAPHY HEAD: CPT | Mod: TC,PO

## 2023-04-28 RX ADMIN — IOHEXOL 100 ML: 350 INJECTION, SOLUTION INTRAVENOUS at 02:04

## 2023-05-02 ENCOUNTER — TELEPHONE (OUTPATIENT)
Dept: FAMILY MEDICINE | Facility: CLINIC | Age: 58
End: 2023-05-02
Payer: MEDICARE

## 2023-05-02 DIAGNOSIS — G81.11 SPASTIC HEMIPARESIS OF RIGHT DOMINANT SIDE: Primary | ICD-10-CM

## 2023-05-02 NOTE — TELEPHONE ENCOUNTER
Pt mom came in and stated taht pt is experiencing stiffness and tightness and pain down right side. He was prescribed muscle relaxant but he does not like the way it makes him feel. I asked was he in pt and she stated he no longer doing pt because the pain after was extremely bad.    Also dropped off mobility impairment paper for vehicle.

## 2023-05-03 RX ORDER — BACLOFEN 10 MG/1
10 TABLET ORAL 3 TIMES DAILY
Qty: 90 TABLET | Refills: 0 | Status: SHIPPED | OUTPATIENT
Start: 2023-05-03 | End: 2023-06-15

## 2023-05-04 NOTE — TELEPHONE ENCOUNTER
Pt states he will not take the baclofen and hasn't been taking the tizanidine stated he would  handicap form in clinic.

## 2023-05-17 ENCOUNTER — TELEPHONE (OUTPATIENT)
Dept: FAMILY MEDICINE | Facility: CLINIC | Age: 58
End: 2023-05-17
Payer: MEDICARE

## 2023-05-17 DIAGNOSIS — G81.11 SPASTIC HEMIPARESIS OF RIGHT DOMINANT SIDE: Primary | ICD-10-CM

## 2023-05-17 DIAGNOSIS — I61.3 LEFT-SIDED NONTRAUMATIC INTRACEREBRAL HEMORRHAGE OF BRAINSTEM: ICD-10-CM

## 2023-05-17 NOTE — TELEPHONE ENCOUNTER
Spoke to Mariann patient's  mother.   She said he is complaining of worsening pain and tightness on right side of body.   She is asking for a referral to a provider for help    Please advise

## 2023-05-17 NOTE — TELEPHONE ENCOUNTER
----- Message from Gabriel Raymond sent at 5/17/2023  9:08 AM CDT -----  Contact: Mom  Type: Needs Medical Advice  Who Called:  Mom    Best Call Back Number: [phone]    Additional Information: Mom states she would like to speak with office regarding pt.Please call back

## 2023-05-23 ENCOUNTER — TELEPHONE (OUTPATIENT)
Dept: PHYSICAL MEDICINE AND REHAB | Facility: CLINIC | Age: 58
End: 2023-05-23
Payer: MEDICARE

## 2023-05-23 ENCOUNTER — TELEPHONE (OUTPATIENT)
Dept: NEUROLOGY | Facility: CLINIC | Age: 58
End: 2023-05-23
Payer: MEDICARE

## 2023-05-23 NOTE — TELEPHONE ENCOUNTER
Spoke to the, he is looking to be seen for spasticity.  Please call to get him scheduled with Dr. Guidry.  Referral in Epic-Spastic hemiparesis of right dominant side   Left-sided nontraumatic intracerebral hemorrhage of brainstem

## 2023-05-23 NOTE — TELEPHONE ENCOUNTER
Asked if patient wanted to schedule new patient appointment from referral with Dr. Guidry in [place]. Pt mother stated he did and appointment was made. Pt mother stated the stroke was in November and still has tightness. All questions answered.

## 2023-05-25 ENCOUNTER — TELEPHONE (OUTPATIENT)
Dept: PHYSICAL MEDICINE AND REHAB | Facility: CLINIC | Age: 58
End: 2023-05-25
Payer: MEDICARE

## 2023-05-25 NOTE — TELEPHONE ENCOUNTER
----- Message from León Solis sent at 5/25/2023  9:03 AM CDT -----  Regarding: appt  Contact: JESUSITA ALVARADO [[id number]]  Type:  Sooner Appointment Request    Caller is requesting a sooner appointment.  Caller declined first available appointment listed below.  Caller will not accept being placed on the waitlist and is requesting a message be sent to doctor.    Name of Caller:  ArlinAna    When is the first available appointment?  6/15    Symptoms:  spasms    Best Call Back Number:  [phone]    Additional Information:  Please call to advise.

## 2023-05-25 NOTE — TELEPHONE ENCOUNTER
Pt mother asked if there was any sooner availability due to the pt being in pain. I informed pt mother that Dr. Guidry does not have anything sooner. I added appointment to wait list. Pt mother understood.

## 2023-06-15 ENCOUNTER — OFFICE VISIT (OUTPATIENT)
Dept: PHYSICAL MEDICINE AND REHAB | Facility: CLINIC | Age: 58
End: 2023-06-15
Payer: MEDICARE

## 2023-06-15 VITALS
HEART RATE: 88 BPM | BODY MASS INDEX: 24.42 KG/M2 | SYSTOLIC BLOOD PRESSURE: 156 MMHG | HEIGHT: 69 IN | DIASTOLIC BLOOD PRESSURE: 96 MMHG | WEIGHT: 164.88 LBS

## 2023-06-15 DIAGNOSIS — Z78.9 IMPAIRED MOBILITY AND ACTIVITIES OF DAILY LIVING: ICD-10-CM

## 2023-06-15 DIAGNOSIS — G81.11 RIGHT SPASTIC HEMIPARESIS: Primary | ICD-10-CM

## 2023-06-15 DIAGNOSIS — Z74.09 IMPAIRED MOBILITY AND ACTIVITIES OF DAILY LIVING: ICD-10-CM

## 2023-06-15 DIAGNOSIS — I63.9 LEFT-SIDED CEREBROVASCULAR ACCIDENT (CVA): ICD-10-CM

## 2023-06-15 DIAGNOSIS — M62.838 MUSCLE SPASM OF RIGHT LOWER EXTREMITY: ICD-10-CM

## 2023-06-15 PROCEDURE — 1160F RVW MEDS BY RX/DR IN RCRD: CPT | Mod: CPTII,S$GLB,, | Performed by: PHYSICAL MEDICINE & REHABILITATION

## 2023-06-15 PROCEDURE — 99999 PR PBB SHADOW E&M-EST. PATIENT-LVL III: CPT | Mod: PBBFAC,,, | Performed by: PHYSICAL MEDICINE & REHABILITATION

## 2023-06-15 PROCEDURE — 3044F PR MOST RECENT HEMOGLOBIN A1C LEVEL <7.0%: ICD-10-PCS | Mod: CPTII,S$GLB,, | Performed by: PHYSICAL MEDICINE & REHABILITATION

## 2023-06-15 PROCEDURE — 1159F PR MEDICATION LIST DOCUMENTED IN MEDICAL RECORD: ICD-10-PCS | Mod: CPTII,S$GLB,, | Performed by: PHYSICAL MEDICINE & REHABILITATION

## 2023-06-15 PROCEDURE — 99999 PR PBB SHADOW E&M-EST. PATIENT-LVL III: ICD-10-PCS | Mod: PBBFAC,,, | Performed by: PHYSICAL MEDICINE & REHABILITATION

## 2023-06-15 PROCEDURE — 3008F PR BODY MASS INDEX (BMI) DOCUMENTED: ICD-10-PCS | Mod: CPTII,S$GLB,, | Performed by: PHYSICAL MEDICINE & REHABILITATION

## 2023-06-15 PROCEDURE — 3077F PR MOST RECENT SYSTOLIC BLOOD PRESSURE >= 140 MM HG: ICD-10-PCS | Mod: CPTII,S$GLB,, | Performed by: PHYSICAL MEDICINE & REHABILITATION

## 2023-06-15 PROCEDURE — 1159F MED LIST DOCD IN RCRD: CPT | Mod: CPTII,S$GLB,, | Performed by: PHYSICAL MEDICINE & REHABILITATION

## 2023-06-15 PROCEDURE — 3008F BODY MASS INDEX DOCD: CPT | Mod: CPTII,S$GLB,, | Performed by: PHYSICAL MEDICINE & REHABILITATION

## 2023-06-15 PROCEDURE — 3080F DIAST BP >= 90 MM HG: CPT | Mod: CPTII,S$GLB,, | Performed by: PHYSICAL MEDICINE & REHABILITATION

## 2023-06-15 PROCEDURE — 4010F PR ACE/ARB THEARPY RXD/TAKEN: ICD-10-PCS | Mod: CPTII,S$GLB,, | Performed by: PHYSICAL MEDICINE & REHABILITATION

## 2023-06-15 PROCEDURE — 3080F PR MOST RECENT DIASTOLIC BLOOD PRESSURE >= 90 MM HG: ICD-10-PCS | Mod: CPTII,S$GLB,, | Performed by: PHYSICAL MEDICINE & REHABILITATION

## 2023-06-15 PROCEDURE — 4010F ACE/ARB THERAPY RXD/TAKEN: CPT | Mod: CPTII,S$GLB,, | Performed by: PHYSICAL MEDICINE & REHABILITATION

## 2023-06-15 PROCEDURE — 1160F PR REVIEW ALL MEDS BY PRESCRIBER/CLIN PHARMACIST DOCUMENTED: ICD-10-PCS | Mod: CPTII,S$GLB,, | Performed by: PHYSICAL MEDICINE & REHABILITATION

## 2023-06-15 PROCEDURE — 99215 PR OFFICE/OUTPT VISIT, EST, LEVL V, 40-54 MIN: ICD-10-PCS | Mod: S$GLB,,, | Performed by: PHYSICAL MEDICINE & REHABILITATION

## 2023-06-15 PROCEDURE — 99215 OFFICE O/P EST HI 40 MIN: CPT | Mod: S$GLB,,, | Performed by: PHYSICAL MEDICINE & REHABILITATION

## 2023-06-15 PROCEDURE — 3077F SYST BP >= 140 MM HG: CPT | Mod: CPTII,S$GLB,, | Performed by: PHYSICAL MEDICINE & REHABILITATION

## 2023-06-15 PROCEDURE — 3044F HG A1C LEVEL LT 7.0%: CPT | Mod: CPTII,S$GLB,, | Performed by: PHYSICAL MEDICINE & REHABILITATION

## 2023-06-15 RX ORDER — BACLOFEN 20 MG/1
20 TABLET ORAL 3 TIMES DAILY
Qty: 90 TABLET | Refills: 0 | Status: SHIPPED | OUTPATIENT
Start: 2023-06-15 | End: 2023-09-20

## 2023-06-15 NOTE — PROGRESS NOTES
[facility] VISIT     Primary Care Provider: Primary Doctor No     CHIEF COMPLAINT:   1.   Chief Complaint   Patient presents with    Other Misc     Hemiparesis on right side     2. Spasticity management recommendations.     HISTORY OF PRESENT ILLNESS: Hernan Badillo is a 57 y.o.-year-old male with a history of left CVA (Nov 2022) with late effect right spastic hemiparesis who presents today for evaluation and recommendations regarding spasticity management.     They are here today with mom and son.     Reports, pain and tightness to the right side of his body which has been present since after a stroke.   started her on a few different medications without noted improvement in his pain or symptoms.  Endorses muscle spasms throughout the day and worse at night.  This tightness in his arms and legs is limiting his mobility and activities of daily living.    Medications:  Has tried tizanidine and baclofen  Injections:  None  Surgical procedures:  None    EQUIPMENT:  Braces: none  Wheelchair: MWC  Walker: rolling    MOBILITY/TRANSFERS:  Walking: Distance couple minutes but limited by pain     ACTIVITIES OF DAILY LIVING:  Upper extremity dressing: Masood  Lower extremity dressing: Masood  Bathe: ind  Groom: modA  Toilet: ind    THERAPY/LOCATION:  PT: [facility], on hold due to pain  OT: [facility], on hold due to pain  Speech: no needs    EDUCATION/VOCATION:  Highest level of education: elementary  Pre-morbid Occupation: construction  Occupation status: disabled    RECREATION: fishing    LIVING SITUATION: lives with son, single story home [address] dual split home, grab bars    PAST MEDICAL HISTORY:  Past Medical History:   Diagnosis Date    COPD (chronic obstructive pulmonary disease)     Diabetes mellitus, type 2     Hypertension         PAST SURGICAL HISTORY:   Past Surgical History:   Procedure Laterality Date    DENTAL SURGERY      ESOPHAGOGASTRODUODENOSCOPY N/A 6/1/2020     Procedure: EGD (ESOPHAGOGASTRODUODENOSCOPY);  Surgeon: Terrell May MD;  Location: [facility];  Service: Endoscopy;  Laterality: N/A;    HERNIA REPAIR      left inguinal    incision and drainiage          FAMILY HISTORY:   Family History   Problem Relation Age of Onset    Heart attack Father 80    Heart disease Father     Drug abuse Father     Cancer Maternal Grandmother         colon       SOCIAL HISTORY:    Social History     Socioeconomic History    Marital status: Single    Number of children: 1   Occupational History    Occupation: demolition   Tobacco Use    Smoking status: Every Day     Packs/day: 2.00     Years: 30.00     Pack years: 60.00     Types: Cigarettes    Smokeless tobacco: Never   Substance and Sexual Activity    Alcohol use: Yes     Alcohol/week: 1.0 standard drink     Types: 1 Shots of liquor per week     Comment: 1/2 pint 2 x per week - quit approximately 5 months ago    Drug use: No    Sexual activity: Yes     Partners: Female     Birth control/protection: None     Comment: No history of STDs.   Social History Narrative    The patient does not exercise regularly ().    Rates diet as fair.    He is satisfied with weight.               MEDICATIONS:     Current Outpatient Medications:     acetaminophen (TYLENOL) 325 MG tablet, Take 650 mg by mouth 3 (three) times daily as needed., Disp: , Rfl:     albuterol (VENTOLIN HFA) 90 mcg/actuation inhaler, Inhale 2 puffs into the lungs every 6 (six) hours as needed for Wheezing. Rescue, Disp: 18 g, Rfl: 0    albuterol-ipratropium (DUO-NEB) 2.5 mg-0.5 mg/3 mL nebulizer solution, Take 3 mLs by nebulization every 6 (six) hours as needed for Wheezing or Shortness of Breath., Disp: 25 each, Rfl: 3    amLODIPine (NORVASC) 10 MG tablet, Take 1 tablet (10 mg total) by mouth once daily., Disp: 30 tablet, Rfl: 11    atorvastatin (LIPITOR) 20 MG tablet, Take 1 tablet (20 mg total) by mouth once daily., Disp: 90 tablet, Rfl: 3    EScitalopram oxalate (LEXAPRO)  10 MG tablet, Take 10 mg by mouth., Disp: , Rfl:     lisinopriL 10 MG tablet, Take 1 tablet (10 mg total) by mouth once daily., Disp: 90 tablet, Rfl: 3    meclizine (ANTIVERT) 25 mg tablet, Take 25 mg by mouth., Disp: , Rfl:     melatonin (MELATIN) 5 mg, Take 5 mg by mouth., Disp: , Rfl:     metFORMIN (GLUCOPHAGE) 500 MG tablet, Take 1 tablet (500 mg total) by mouth 2 (two) times daily with meals., Disp: 30 tablet, Rfl: 2    pantoprazole (PROTONIX) 40 MG tablet, Take 1 tablet (40 mg total) by mouth once daily., Disp: 30 tablet, Rfl: 11    baclofen (LIORESAL) 20 MG tablet, Take 1 tablet (20 mg total) by mouth 3 (three) times daily., Disp: 90 tablet, Rfl: 0    Current Facility-Administered Medications:     dexamethasone injection 4 mg, 4 mg, Intramuscular, 1 time in Clinic/HOD, Aiyana Muhammad PA-C     ALLERGIES:   Review of patient's allergies indicates:  No Known Allergies    REVIEW OF SYSTEMS: Denies coughs, colds, fevers, chills. No constipation. Bowel movements are regular. No dysphagia. No weight, appetite or sleep concerns. No behavior concerns. No drooling or difficulty handling oral secretions. No skin lesions.       PHYSICAL EXAMINATION:   VITALS:   Vitals:    06/15/23 1254   BP: (!) 156/96   Pulse: 88     GENERAL: The patient is awake, alert, cooperative, and in no acute distress.   HEENT: Normocephalic, atraumatic. Tracking is in all 4 quadrants. No facial asymmetry. Uvula is midline.   NECK: Supple. No lymphadenopathy. No masses. Full range of motion. No torticollis.   HEART: Appears well perfused. No lower extremity edema.   LUNGS: No increased work of breath.   ABDOMEN: Benign.   NEURO: Cranial nerves II-XII are grossly intact by observation.   EXTREMITIES: Warm, capillary refill less than 2 seconds. No clubbing, cyanosis or edema.   MUSCULOSKELETAL: No focal muscular/limb atrophy/hypertrophy. No leg length discrepancy. No gross deformity.     NEUROMUSCULAR:     Modified Nelson Scale:  1:  Right  finger flexors, right knee flexors, right ankle plantar flexors  1+:  2:   3:  4:     Manual muscle testing:      Right  Left  Shoulder abduction  5/5  5/5   Elbow flexion   5/5  5/5  Elbow extension  5/5  5/5  Wrist extension  5/5  5/5  Finger flexion   5/5  5/5  Finger abduction  5/5  5/5    Hip flexion   5/5  5/5  Knee extension  5/5  5/5  Knee flexion   5/5  5/5  Ankle dorsiflexion  5/5  5/5  Ankle plantarflexion  5/5  5/5  Great toe extension  5/5  5/5    Noted motor planning difficulty to the right hand with slowed finger tapping compared to the left    Appropriate sitting balance.   No dyskinetic or dystonic movements appreciated.   Muscle stretch reflexes are 2+ throughout both upper and lower extremities.   No clonus was elicited at either ankle.    Transfers from supine to sit and sit to stand are independent.     ASSESSMENT:   1. Right spastic hemiparesis  baclofen (LIORESAL) 20 MG tablet      2. Impaired mobility and activities of daily living        3. Left-sided cerebrovascular accident (CVA)        4. Muscle spasm of right lower extremity             Hernan Badillo is a 57 y.o.-year-old male with a history of left CVA (Nov 2022) with late effect right spastic hemiparesis. The following recommendations and plan were discussed in depth with the patient who voiced understanding and was in agreement.     PLAN:   Spasticity:   - we will trial oral baclofen again and increase his dosing to 20 mg 3 times daily    Bracing:    - no needs at this time    Equipment:   - recommend use of walking stick/single-point cane with ambulation    Therapy:   - may consider need for formal PT/OT in the future    I would like to have him  return to clinic in 1 month.     50 minutes of total time spent on the encounter, which includes face to face time and non-face to face time preparing to see the patient (eg, review of tests), obtaining and/or reviewing separately obtained history, documenting clinical information in the  electronic or other health record, independently interpreting results (not separately reported), communicating results to the patient/family/caregiver, and/or care coordination (not separately reported).

## 2023-06-17 ENCOUNTER — NURSE TRIAGE (OUTPATIENT)
Dept: ADMINISTRATIVE | Facility: CLINIC | Age: 58
End: 2023-06-17
Payer: MEDICARE

## 2023-06-18 NOTE — TELEPHONE ENCOUNTER
Mother calling on behalf of patient. Patient is not with mother at this time and she is unable to get patient on phone. Advised to call back with patient.  Verbalized understanding.         Reason for Disposition   [1] Caller is not with the adult (patient) AND [2] probable NON-URGENT symptoms    Protocols used: Information Only Call - No Triage-A-

## 2023-06-19 ENCOUNTER — TELEPHONE (OUTPATIENT)
Dept: PHYSICAL MEDICINE AND REHAB | Facility: CLINIC | Age: 58
End: 2023-06-19
Payer: MEDICARE

## 2023-06-19 NOTE — TELEPHONE ENCOUNTER
Returned pt call. Pt stated that he is he is tighter on the right side and and his chest is tight after taking the baclofen. I informed pt that Dr. Guidry state to stop taking th baclofen and to follow up with neurology for the tightness. Pt asked what about that pain. I informed pt that I would let Dr. Guidry know. Pt understood.

## 2023-06-19 NOTE — TELEPHONE ENCOUNTER
----- Message from Ally Vail MA sent at 6/19/2023  8:52 AM CDT -----  Contact: pt mother  Type: Needs Medical Advice  Who Called:  pt mother Mariann  Symptoms (please be specific):  medication made stiffness worse pt has been in bed 3 days.  How long has patient had these symptoms:  since taking medication on Thursday  Pharmacy name and phone #:    [facility] - [place] - [address]  [address] [address]  Phone: [phone] Fax: [phone]  Best Call Back Number: [phone] Ms. Waite  Additional Information: please advise

## 2023-06-20 ENCOUNTER — TELEPHONE (OUTPATIENT)
Dept: FAMILY MEDICINE | Facility: CLINIC | Age: 58
End: 2023-06-20
Payer: MEDICARE

## 2023-06-20 NOTE — TELEPHONE ENCOUNTER
Returned patients call in regards to needing assessment for arm tightness. Patient has a referral and appointment with neurology. No answer, left voicemail to return call.

## 2023-06-20 NOTE — TELEPHONE ENCOUNTER
----- Message from Alvina Segundo sent at 6/19/2023  2:17 PM CDT -----  Contact: MotherMariann  Type:  Needs Medical Advice    Who Called:   Mariann Perez    Would the patient rather a call back or a response via [facility]?   Call back  Best Call Back Number:    [phone] Mariann Perez    Additional Information: States she would like doctor to call and speak with her - states patient is still having trouble with stiffness on right side from stroke - please call - thank you

## 2023-06-21 ENCOUNTER — TELEPHONE (OUTPATIENT)
Dept: PHYSICAL MEDICINE AND REHAB | Facility: CLINIC | Age: 58
End: 2023-06-21
Payer: MEDICARE

## 2023-06-21 ENCOUNTER — TELEPHONE (OUTPATIENT)
Dept: FAMILY MEDICINE | Facility: CLINIC | Age: 58
End: 2023-06-21
Payer: MEDICARE

## 2023-06-21 DIAGNOSIS — M62.838 MUSCLE SPASM OF RIGHT LOWER EXTREMITY: ICD-10-CM

## 2023-06-21 DIAGNOSIS — G89.4 CHRONIC PAIN SYNDROME: ICD-10-CM

## 2023-06-21 DIAGNOSIS — I63.9 LEFT-SIDED CEREBROVASCULAR ACCIDENT (CVA): ICD-10-CM

## 2023-06-21 DIAGNOSIS — M79.2 NEUROPATHIC PAIN: Primary | ICD-10-CM

## 2023-06-21 DIAGNOSIS — M79.2 NEUROPATHIC PAIN: ICD-10-CM

## 2023-06-21 DIAGNOSIS — M62.838 MUSCLE SPASM OF RIGHT LOWER EXTREMITY: Primary | ICD-10-CM

## 2023-06-21 NOTE — TELEPHONE ENCOUNTER
----- Message from Lauryn John sent at 6/21/2023  9:33 AM CDT -----  Regarding: Follow up about stroke(URGENT)  Good Morning,    Patients Mother came in really worried about Hernan. He previously came in about a stoke and now she is stating that he is really ill and she wants to start over and reassess his care. Can someone please give her a call as soon as possible please?          [phone] Mariann Chou(Mother)      Thank You ,  Lauryn

## 2023-06-21 NOTE — TELEPHONE ENCOUNTER
Informed pt mother that Dr. Guidry put in the order for aquatic therapy. Pt mother understood. Pt mother stated that they would like to see pain management. I informed pt that I would let Dr. Guidry know. Pt mother understood.

## 2023-06-21 NOTE — TELEPHONE ENCOUNTER
Left voicemail for pt in regards to inform pt mother that Dr. Guidry placed the order  for aquatic therapy.

## 2023-06-21 NOTE — TELEPHONE ENCOUNTER
----- Message from Noé Bravo sent at 6/21/2023  2:48 PM CDT -----  Type:  Patient Returning Call    Who Called:  Mariann Chou/ Mother   Who Left Message for Patient:  Nilda  Does the patient know what this is regarding?:    Best Call Back Number:  [phone]  Additional Information:

## 2023-06-21 NOTE — TELEPHONE ENCOUNTER
----- Message from Migdalia Campuzano, Patient Care Assistant sent at 6/21/2023 12:01 PM CDT -----  Contact: Mariann gaitan  Pt mom is calling to speak w/ nurse in regards to water therapy and unit to relax his muscles. Please call back to advise [phone]  thanks

## 2023-06-21 NOTE — TELEPHONE ENCOUNTER
Returned mothers call in regards to patient needing to be seen and not being able to swallow. Advised mother that with new onset of chest tightness and not being able to swallow , patient needs to go to the ED to be assessed immediately. Verbalized understanding.

## 2023-06-21 NOTE — TELEPHONE ENCOUNTER
Returned pt call. Pt mother asked if the patient could do water therapy and use a tense unit. I informed pt that I would ask Dr. Guidry and let her know what he advises. Pt stated that the PCP told them to contact us about the tightness and pain.

## 2023-06-26 ENCOUNTER — TELEPHONE (OUTPATIENT)
Dept: FAMILY MEDICINE | Facility: CLINIC | Age: 58
End: 2023-06-26
Payer: MEDICARE

## 2023-06-26 DIAGNOSIS — G81.11 SPASTIC HEMIPARESIS OF RIGHT DOMINANT SIDE: Primary | ICD-10-CM

## 2023-06-26 RX ORDER — ACETAMINOPHEN AND CODEINE PHOSPHATE 300; 30 MG/1; MG/1
1 TABLET ORAL EVERY 6 HOURS
Qty: 28 TABLET | Refills: 0 | Status: SHIPPED | OUTPATIENT
Start: 2023-06-26 | End: 2023-07-03

## 2023-06-26 NOTE — TELEPHONE ENCOUNTER
Patient was seen by Phys Med and prescribed  baclofen for this complaint- I will send over a short coarse of Tylenol #3 and refer him to neurology for possible Botox injection to help with the muscle spasm

## 2023-06-26 NOTE — TELEPHONE ENCOUNTER
Pt notified of neuro referral and tylenol 3 rx voiced understanding no further intervention needed at this time.

## 2023-06-26 NOTE — TELEPHONE ENCOUNTER
Pt mom came in to tell us that her son in experienceing tightness from doing therapy and he is in lots of pain. She says he either needs something called in called in for pain or be referred to pain medicine. Pt starts water therapy on July 19.

## 2023-06-29 ENCOUNTER — TELEPHONE (OUTPATIENT)
Dept: PHYSICAL MEDICINE AND REHAB | Facility: CLINIC | Age: 58
End: 2023-06-29
Payer: MEDICARE

## 2023-06-29 NOTE — TELEPHONE ENCOUNTER
----- Message from Hetal Kelly sent at 6/29/2023  8:10 AM CDT -----  Type: Patient Call Back         Who called: Pt Mother Arlin Chou         What is the request in detail: Pt mother called In regarding the consult for pain management and Botox injection . Pt is a lot of pain and nothing seems to help          Can the clinic reply by [facility]?no          Would the patient rather a call back or a response via [facility]? Call back          Best call back number:[phone]         Additional Information:           Thank You

## 2023-06-29 NOTE — TELEPHONE ENCOUNTER
Pt mother asked what to do because the patient is still having tightness. Pt mother stated that the pt starts in July for water therapy. Pt mother stated that the PCP gave the patient Tylenol 3 but doesn't help relax the muscles. I made pt appointment with pain management from referral. Pt mother asked what she can do till then, I informed her I would ask Dr. Guidry .

## 2023-07-06 ENCOUNTER — OFFICE VISIT (OUTPATIENT)
Dept: PAIN MEDICINE | Facility: CLINIC | Age: 58
End: 2023-07-06
Payer: MEDICARE

## 2023-07-06 VITALS
HEIGHT: 69 IN | BODY MASS INDEX: 23.43 KG/M2 | SYSTOLIC BLOOD PRESSURE: 149 MMHG | HEART RATE: 81 BPM | DIASTOLIC BLOOD PRESSURE: 87 MMHG | WEIGHT: 158.19 LBS

## 2023-07-06 DIAGNOSIS — I63.9 LEFT-SIDED CEREBROVASCULAR ACCIDENT (CVA): ICD-10-CM

## 2023-07-06 DIAGNOSIS — G89.0 CENTRAL PAIN SYNDROME: Primary | ICD-10-CM

## 2023-07-06 PROCEDURE — 1159F MED LIST DOCD IN RCRD: CPT | Mod: CPTII,S$GLB,, | Performed by: ANESTHESIOLOGY

## 2023-07-06 PROCEDURE — 3008F PR BODY MASS INDEX (BMI) DOCUMENTED: ICD-10-PCS | Mod: CPTII,S$GLB,, | Performed by: ANESTHESIOLOGY

## 2023-07-06 PROCEDURE — 3079F PR MOST RECENT DIASTOLIC BLOOD PRESSURE 80-89 MM HG: ICD-10-PCS | Mod: CPTII,S$GLB,, | Performed by: ANESTHESIOLOGY

## 2023-07-06 PROCEDURE — 3044F HG A1C LEVEL LT 7.0%: CPT | Mod: CPTII,S$GLB,, | Performed by: ANESTHESIOLOGY

## 2023-07-06 PROCEDURE — 99999 PR PBB SHADOW E&M-EST. PATIENT-LVL IV: ICD-10-PCS | Mod: PBBFAC,,, | Performed by: ANESTHESIOLOGY

## 2023-07-06 PROCEDURE — 3077F PR MOST RECENT SYSTOLIC BLOOD PRESSURE >= 140 MM HG: ICD-10-PCS | Mod: CPTII,S$GLB,, | Performed by: ANESTHESIOLOGY

## 2023-07-06 PROCEDURE — 99999 PR PBB SHADOW E&M-EST. PATIENT-LVL IV: CPT | Mod: PBBFAC,,, | Performed by: ANESTHESIOLOGY

## 2023-07-06 PROCEDURE — 99204 PR OFFICE/OUTPT VISIT, NEW, LEVL IV, 45-59 MIN: ICD-10-PCS | Mod: S$GLB,,, | Performed by: ANESTHESIOLOGY

## 2023-07-06 PROCEDURE — 3079F DIAST BP 80-89 MM HG: CPT | Mod: CPTII,S$GLB,, | Performed by: ANESTHESIOLOGY

## 2023-07-06 PROCEDURE — 3044F PR MOST RECENT HEMOGLOBIN A1C LEVEL <7.0%: ICD-10-PCS | Mod: CPTII,S$GLB,, | Performed by: ANESTHESIOLOGY

## 2023-07-06 PROCEDURE — 4010F ACE/ARB THERAPY RXD/TAKEN: CPT | Mod: CPTII,S$GLB,, | Performed by: ANESTHESIOLOGY

## 2023-07-06 PROCEDURE — 1159F PR MEDICATION LIST DOCUMENTED IN MEDICAL RECORD: ICD-10-PCS | Mod: CPTII,S$GLB,, | Performed by: ANESTHESIOLOGY

## 2023-07-06 PROCEDURE — 3077F SYST BP >= 140 MM HG: CPT | Mod: CPTII,S$GLB,, | Performed by: ANESTHESIOLOGY

## 2023-07-06 PROCEDURE — 99204 OFFICE O/P NEW MOD 45 MIN: CPT | Mod: S$GLB,,, | Performed by: ANESTHESIOLOGY

## 2023-07-06 PROCEDURE — 4010F PR ACE/ARB THEARPY RXD/TAKEN: ICD-10-PCS | Mod: CPTII,S$GLB,, | Performed by: ANESTHESIOLOGY

## 2023-07-06 PROCEDURE — 3008F BODY MASS INDEX DOCD: CPT | Mod: CPTII,S$GLB,, | Performed by: ANESTHESIOLOGY

## 2023-07-06 RX ORDER — PREGABALIN 75 MG/1
75 CAPSULE ORAL 2 TIMES DAILY
Qty: 60 CAPSULE | Refills: 1 | Status: SHIPPED | OUTPATIENT
Start: 2023-07-06 | End: 2023-08-02

## 2023-07-06 NOTE — PROGRESS NOTES
[facility] New Patient Evaluation      Referred by: Dr. Caryn Guidry    PCP:     CC:   Chief Complaint   Patient presents with    Back Pain    Neck Pain    Leg Pain    Hip Pain    Osteoarthritis    Knee Pain    Joint Pain    Shoulder Pain    Muscle Pain      Last 3 PDI Scores 6/17/2020   Pain Disability Index (PDI) 52         HPI:   Hernan Badlilo is a 57 y.o. male patient who has a past medical history of COPD (chronic obstructive pulmonary disease), Diabetes mellitus, type 2, and Hypertension. He presents with pain in his right arm and right leg.  He is with his mother today.  He reports this is been bothering him since CVA.  He reports the pain feel in his arm and leg feels like needles.      Pain Intervention History:      Past Spine Surgical History:      Past and current medications:  Antineuropathics:  NSAIDs:  Physical therapy:  Completed physical therapy and occupational therapy  Antidepressants: lexapro  Muscle relaxers: baclofen   Opioids: tylenol #3  Antiplatelets/Anticoagulants:    History:    Current Outpatient Medications:     acetaminophen (TYLENOL) 325 MG tablet, Take 650 mg by mouth 3 (three) times daily as needed., Disp: , Rfl:     albuterol (VENTOLIN HFA) 90 mcg/actuation inhaler, Inhale 2 puffs into the lungs every 6 (six) hours as needed for Wheezing. Rescue, Disp: 18 g, Rfl: 0    albuterol-ipratropium (DUO-NEB) 2.5 mg-0.5 mg/3 mL nebulizer solution, Take 3 mLs by nebulization every 6 (six) hours as needed for Wheezing or Shortness of Breath., Disp: 25 each, Rfl: 3    amLODIPine (NORVASC) 10 MG tablet, Take 1 tablet (10 mg total) by mouth once daily., Disp: 30 tablet, Rfl: 11    atorvastatin (LIPITOR) 20 MG tablet, Take 1 tablet (20 mg total) by mouth once daily., Disp: 90 tablet, Rfl: 3    baclofen (LIORESAL) 20 MG tablet, Take 1 tablet (20 mg total) by mouth 3 (three) times daily., Disp: 90 tablet, Rfl: 0    EScitalopram oxalate (LEXAPRO) 10 MG tablet, Take 10 mg by mouth., Disp: ,  Rfl:     lisinopriL 10 MG tablet, Take 1 tablet (10 mg total) by mouth once daily., Disp: 90 tablet, Rfl: 3    meclizine (ANTIVERT) 25 mg tablet, Take 25 mg by mouth., Disp: , Rfl:     melatonin (MELATIN) 5 mg, Take 5 mg by mouth., Disp: , Rfl:     metFORMIN (GLUCOPHAGE) 500 MG tablet, Take 1 tablet (500 mg total) by mouth 2 (two) times daily with meals., Disp: 30 tablet, Rfl: 2    pantoprazole (PROTONIX) 40 MG tablet, Take 1 tablet (40 mg total) by mouth once daily., Disp: 30 tablet, Rfl: 11    pregabalin (LYRICA) 75 MG capsule, Take 1 capsule (75 mg total) by mouth 2 (two) times daily., Disp: 60 capsule, Rfl: 1    Current Facility-Administered Medications:     dexamethasone injection 4 mg, 4 mg, Intramuscular, 1 time in Clinic/HOD, Aiyana Muhammad PA-C    Past Medical History:   Diagnosis Date    COPD (chronic obstructive pulmonary disease)     Diabetes mellitus, type 2     Hypertension        Past Surgical History:   Procedure Laterality Date    DENTAL SURGERY      ESOPHAGOGASTRODUODENOSCOPY N/A 6/1/2020    Procedure: EGD (ESOPHAGOGASTRODUODENOSCOPY);  Surgeon: Terrell May MD;  Location: [facility];  Service: Endoscopy;  Laterality: N/A;    HERNIA REPAIR      left inguinal    incision and drainiage         Family History   Problem Relation Age of Onset    Heart attack Father 80    Heart disease Father     Drug abuse Father     Cancer Maternal Grandmother         colon       Social History     Socioeconomic History    Marital status: Single    Number of children: 1   Occupational History    Occupation: demolition   Tobacco Use    Smoking status: Every Day     Packs/day: 2.00     Years: 30.00     Pack years: 60.00     Types: Cigarettes    Smokeless tobacco: Never   Substance and Sexual Activity    Alcohol use: Yes     Alcohol/week: 1.0 standard drink     Types: 1 Shots of liquor per week     Comment: 1/2 pint 2 x per week - quit approximately 5 months ago    Drug use: No    Sexual activity: Yes      "Partners: Female     Birth control/protection: None     Comment: No history of STDs.   Social History Narrative    The patient does not exercise regularly ().    Rates diet as fair.    He is satisfied with weight.               Review of patient's allergies indicates:  No Known Allergies    Review of Systems:  Positive for numbness and tingling.  Balance of review of systems is negative.    Physical Exam:  Vitals:    07/06/23 1330   BP: (!) 149/87   Pulse: 81   Weight: 71.7 kg (158 lb 2.9 oz)   Height: 5' 9" (1.753 m)   PainSc: 10-Worst pain ever   PainLoc: Back     Body mass index is 23.36 kg/m².    Gen: NAD  Psych: mood appropriate for given condition  HEENT: eyes anicteric   CV: RRR  HEENT: anicteric   Respiratory: non-labored, no signs of respiratory distress  Abd: non-distended  Skin: warm, dry and intact.  Gait: No antalgic gait.     Sensory:  Sensation to light touch decreased in a nondermatomal distribution in the right arm and leg    Motor:    Right Left   C4 Shoulder Abduction  5  5   C5 Elbow Flexion    5  5   C6 Wrist Extension  5  5   C7 Elbow Extension   5  5   C8/T1 Hand Intrinsics   5  5        Right Left   L2/3 Iliacus Hip flexion  5  5   L3/4 Qudratus Femoris Knee Extension  5  5   L4/5 Tib Anterior Ankle Dorsiflexion   5  5   L5/S1 Extensor Hallicus Longus Great toe extension  5  5   S1/S2 Gastroc/Soleus Plantar Flexion  5  5      Right Left   Triceps DTR 2+ 2+   Biceps DTR 2+ 2+        Patellar DTR 2+ 2+   Achilles DTR 2+ 2+   Ni Absent  Absent                 Labs:  Lab Results   Component Value Date    LABA1C 9.8 (H) 07/06/2016    HGBA1C 6.1 (H) 03/08/2023       Lab Results   Component Value Date    WBC 4.17 03/08/2023    HGB 11.8 (L) 03/08/2023    HCT 37.0 (L) 03/08/2023    MCV 93 03/08/2023     03/08/2023           Imaging:  noen    Assessment:   Problem List Items Addressed This Visit    None  Visit Diagnoses       Central pain syndrome    -  Primary    Relevant Medications    " pregabalin (LYRICA) 75 MG capsule    Left-sided cerebrovascular accident (CVA)                  Hernan Badillo is a 57 y.o. male patient who has a past medical history of COPD (chronic obstructive pulmonary disease), Diabetes mellitus, type 2, and Hypertension. He presents with pain in his right arm and right leg.  He is with his mother today.  He reports this is been bothering him since CVA.  He reports the pain feel in his arm and leg feels like needles.  Is exam is somewhat limited as he has giveaway when asked to hold arms and legs up full strength but on subsequent times asking him he is able to have full range of motion with full strength.  He does have decreased sensation to light touch in a nondermatomal distribution in the right arm and right leg.  I think he is suffering from central pain syndrome.  He has completed physical therapy and occupational therapy.  He has failed to get relief previously with Tylenol 3.  He has seen PM&R who trialed baclofen as the patient was complaining of muscle tightness however this did not provide him much relief.      At this time I think it is most appropriate to try an anti neuropathic.  I prescribed him Lyrica 75 mg twice take twice a day.  Moving forward if this works well for him I think this can be reasonably prescribed by his PCP.  He will follow up in a month.  In the future if this isn't working we can consider a dose adjustment.  He may also benefit from another anti neuropathic such as Cymbalta however we should see how he does on Lyrica before we consider this.  Also I am going to recommend that changes to antidepressants be made by his primary as he is already on Lexapro and I would recommend to either be on Lexapro or Cymbalta but not both      : Not applicable    Lee Maddox M.D.  Interventional Pain Medicine / Anesthesiology    This note was completed with dictation software and grammatical errors may exist.

## 2023-07-19 ENCOUNTER — CLINICAL SUPPORT (OUTPATIENT)
Dept: REHABILITATION | Facility: HOSPITAL | Age: 58
End: 2023-07-19
Payer: MEDICARE

## 2023-07-19 DIAGNOSIS — M62.838 MUSCLE SPASM OF RIGHT LOWER EXTREMITY: ICD-10-CM

## 2023-07-19 DIAGNOSIS — G89.4 CHRONIC PAIN SYNDROME: ICD-10-CM

## 2023-07-19 DIAGNOSIS — R25.2 SPASTICITY: ICD-10-CM

## 2023-07-19 DIAGNOSIS — M79.2 NEUROPATHIC PAIN: ICD-10-CM

## 2023-07-19 PROCEDURE — 97162 PT EVAL MOD COMPLEX 30 MIN: CPT | Mod: PO

## 2023-07-19 NOTE — PLAN OF CARE
"[facility]  Physical Therapy Initial Evaluation  Date: 7/19/2023   Name: Hernan Badillo  Clinic Number: [id number]    Therapy Diagnosis:   Encounter Diagnoses   Name Primary?    Muscle spasm of right lower extremity     Chronic pain syndrome     Neuropathic pain      Physician: Caryn Guidry DO    Physician Orders: PT Eval and Treat   Medical Diagnosis from Referral: : Muscle spasm of right lower extremity [M62.838], Chronic pain syndrome [G89.4], Neuropathic   Evaluation Date: 7/19/2023  Authorization Period Expiration: 12/31/2023  Plan of Care Expiration: 9/19/2023  Progress Note Due: 8/19/2023  Visit # / Visits authorized: 1/ 1   FOTO: 1/3    Precautions: Standard, Diabetes, and Fall     Time In: 0825  Time Out: 0900  Total Appointment Time (timed & untimed codes): 35 minutes      SUBJECTIVE   Date of onset: November 2022    History of current condition - Hernan reports: he suffered a left CVA in Nov of 2022. Had rehab for several months and was able to regain a lot of his PLOF. Patient primary complaint is right sided "pain and stiffness." Patient attempted to control his HTN and DMII with diet and movement. Patient can ambulate but only for short distances. He uses a WC for long distances.     Falls: -    Imaging, yes: see imaging     Prior Therapy: post CVA  Social History:  lives with their family  Occupation: not working   Prior Level of Function: independent with ADLs and ambulation   Current Level of Function: WC for longer distance    Pain:  Current 7/10, worst 10/10, best 7/10   Location: right side of body  Description: Aching, Dull, and Grabbing  Aggravating Factors: "everything "  Easing Factors: nothing    Patients goals: to get rid of pain     Medical History:   Past Medical History:   Diagnosis Date    COPD (chronic obstructive pulmonary disease)     Diabetes mellitus, type 2     Hypertension        Surgical History:   Hernan Badillo  has a past surgical history that includes " Hernia repair; Dental surgery; incision and drainiage; and Esophagogastroduodenoscopy (N/A, 6/1/2020).    Medications:   Hernan has a current medication list which includes the following prescription(s): acetaminophen, albuterol, albuterol-ipratropium, amlodipine, atorvastatin, baclofen, escitalopram oxalate, lisinopril, meclizine, melatonin, metformin, pantoprazole, and pregabalin, and the following Facility-Administered Medications: dexamethasone.    Allergies:   Review of patient's allergies indicates:  No Known Allergies     Objective     Observation: presents to clinic in WC      Gait: can ambulate ~50 ft before needing rest     Lower Extremity Strength  Right LE  Left LE    Knee extension: 3/5 Knee extension: 4/5   Knee flexion: 3/5 Knee flexion: 4/5   Hip flexion: 3/5 Hip flexion: 4/5   Hip Internal Rotation:  3/5    Hip Internal Rotation: 4/5      Hip External Rotation: 3+/5    Hip External Rotation: 4/5      Hip extension:  3/5 Hip extension: 4/5   Hip abduction: 3/5 Hip abduction: 4/5   Hip adduction: 3/5 Hip adduction 4/5   Ankle dorsiflexion: 3/5 Ankle dorsiflexion: 4/5   Ankle plantarflexion: 3/5 Ankle plantarflexion: 3/5       TUG: NT       Evaluation   Single Limb Stance R LE defer   Single Limb Stance L LE defer   30 second Chair Rise defer    5 times sit-stand Defer          Sensation: intact      CMS Impairment/Limitation/Restriction for FOTO lower leg Survey    Therapist reviewed FOTO scores for Hernan Badillo on 7/19/2023.   FOTO documents entered into [employer] - see Media section.    Limitation Score: 48%  Category: Mobility         TREATMENT       Home Exercises and Patient Education Provided    Education provided:   - Role of PT, PT POC    Written Home Exercises Provided: none    Assessment   Hernan is a 57 y.o. male referred to outpatient Physical Therapy with a medical diagnosis of Muscle spasm of right lower extremity [M62.838], Chronic pain syndrome [G89.4], Neuropathic pain . Physical exam is  consistent with referring dx. Primary impairments include AROM, PROM, joint mobility, strength, balance, soft tissue restrictions, and pain which limits functional mobility. This pt is a good candidate for skilled PT tx and stands to benefit from a combination of manual therapy including joint mobilizations with trigger point/myofacscial release, therapeutic exercise to establish core/joint stability, neuromuscular re-education, dry needling and modalities Prn. The pt has been educated on their dx/POC and consents to further PT tx.      Pt prognosis is Good.   Pt will benefit from skilled outpatient Physical Therapy to address the deficits stated above and in the chart below, provide pt/family education, and to maximize pt's level of independence.     Plan of care discussed with patient: Yes  Pt's spiritual, cultural and educational needs considered and patient is agreeable to the plan of care and goals as stated below:     Anticipated Barriers for therapy: CVA 9 months ago      Medical Necessity is demonstrated by the following  History  Co-morbidities and personal factors that may impact the plan of care [] LOW: no personal factors / co-morbidities  [x] MODERATE: 1-2 personal factors / co-morbidities  [] HIGH: 3+ personal factors / co-morbidities    Moderate / High Support Documentation:   Co-morbidities affecting plan of care: see subjective     Personal Factors:   coping style     Examination  Body Structures and Functions, activity limitations and participation restrictions that may impact the plan of care [x] LOW: addressing 1-2 elements  [] MODERATE: 3+ elements  [] HIGH: 4+ elements (please support below)    Moderate / High Support Documentation: n/a     Clinical Presentation [] LOW: stable  [x] MODERATE: Evolving  [] HIGH: Unstable     Decision Making/ Complexity Score: moderate       Goals:  Short-Term Goals: 4 weeks  - The patient will be independent with initial home exercise program.  - The patient  to  tolerate TUG, 5x STS, and Pen[place]za functional testing.     Long-Term Goals: 8 weeks  - Pt to achieve >50 % limitation as measured by the FOTO to demonstrate decreased disability.  - The patient will be independent with home exercise program and symptom management.  - The patient will be independent amb with no assistive device on all surfaces for community distances.  - The patient will increase right-sided lower extremity strength to at least 4/5 to perform functional mobility.  - Th      PLAN   Plan of care Certification: 7/19/2023 to 9/19/2023  Outpatient Physical Therapy 1 times weekly for 8 weeks to include the following interventions: Aquatic Therapy.     Braden Wilkinson, PT      I CERTIFY THE NEED FOR THESE SERVICES FURNISHED UNDER THIS PLAN OF TREATMENT AND WHILE UNDER MY CARE   Physician's comments:     Physician's Signature: ___________________________________________________

## 2023-07-20 PROBLEM — R25.2 SPASTICITY: Status: ACTIVE | Noted: 2023-07-20

## 2023-07-28 ENCOUNTER — TELEPHONE (OUTPATIENT)
Dept: PHYSICAL MEDICINE AND REHAB | Facility: CLINIC | Age: 58
End: 2023-07-28
Payer: MEDICARE

## 2023-07-28 NOTE — TELEPHONE ENCOUNTER
----- Message from Twyla Romano sent at 7/28/2023 11:53 AM CDT -----  Contact: pt  Type: Needs Medical Advice  Who Called:  pt  Best Call Back Number: [phone]    Additional Information: Pt is still having stiffness and pain can hardly walk today.Please call back and advise.

## 2023-07-28 NOTE — TELEPHONE ENCOUNTER
Returned pt call. Pt mother stated that the patiet is till having tightness on the right side. Pt mother stated that the Lyrica is not really helping. I informed pt mother that Dr. Maddox prescribed it. Pt mother stated that she wants the patient to go into a inpatient rehab facility for physical therapy in [place]. I informed pt mother that I would let Dr. Maddox's staff know.

## 2023-07-31 ENCOUNTER — PATIENT OUTREACH (OUTPATIENT)
Dept: ADMINISTRATIVE | Facility: HOSPITAL | Age: 58
End: 2023-07-31
Payer: MEDICARE

## 2023-08-01 NOTE — TELEPHONE ENCOUNTER
PRN Labetalol administered for elevated BP (181/68). Pt reports she took Amlodopine this AM, but not her Lisinopril or Lasix for her HTN regimen. BP WNL after administration. Pt desat to 86% on RA while sleeping. When awoken, pt immediately climbs back up to >92%. 2L NC applied while she sleeps. Please ask Dr. Guidry if referral for inpatient rehab is something he is comfortable doing/accessing b/c I'm not comfortable recommending that.

## 2023-08-02 ENCOUNTER — OFFICE VISIT (OUTPATIENT)
Dept: PAIN MEDICINE | Facility: CLINIC | Age: 58
End: 2023-08-02
Payer: MEDICARE

## 2023-08-02 VITALS
DIASTOLIC BLOOD PRESSURE: 106 MMHG | SYSTOLIC BLOOD PRESSURE: 172 MMHG | HEART RATE: 81 BPM | BODY MASS INDEX: 22.81 KG/M2 | WEIGHT: 154.44 LBS

## 2023-08-02 DIAGNOSIS — G89.0 CENTRAL PAIN SYNDROME: Primary | ICD-10-CM

## 2023-08-02 DIAGNOSIS — I63.9 LEFT-SIDED CEREBROVASCULAR ACCIDENT (CVA): ICD-10-CM

## 2023-08-02 PROCEDURE — 3080F PR MOST RECENT DIASTOLIC BLOOD PRESSURE >= 90 MM HG: ICD-10-PCS | Mod: CPTII,S$GLB,,

## 2023-08-02 PROCEDURE — 99215 OFFICE O/P EST HI 40 MIN: CPT | Mod: S$GLB,,,

## 2023-08-02 PROCEDURE — 4010F PR ACE/ARB THEARPY RXD/TAKEN: ICD-10-PCS | Mod: CPTII,S$GLB,,

## 2023-08-02 PROCEDURE — 99999 PR PBB SHADOW E&M-EST. PATIENT-LVL III: CPT | Mod: PBBFAC,,,

## 2023-08-02 PROCEDURE — 3008F PR BODY MASS INDEX (BMI) DOCUMENTED: ICD-10-PCS | Mod: CPTII,S$GLB,,

## 2023-08-02 PROCEDURE — 3044F PR MOST RECENT HEMOGLOBIN A1C LEVEL <7.0%: ICD-10-PCS | Mod: CPTII,S$GLB,,

## 2023-08-02 PROCEDURE — 99215 PR OFFICE/OUTPT VISIT, EST, LEVL V, 40-54 MIN: ICD-10-PCS | Mod: S$GLB,,,

## 2023-08-02 PROCEDURE — 3080F DIAST BP >= 90 MM HG: CPT | Mod: CPTII,S$GLB,,

## 2023-08-02 PROCEDURE — 80326 AMPHETAMINES 5 OR MORE: CPT

## 2023-08-02 PROCEDURE — 1159F PR MEDICATION LIST DOCUMENTED IN MEDICAL RECORD: ICD-10-PCS | Mod: CPTII,S$GLB,,

## 2023-08-02 PROCEDURE — 3008F BODY MASS INDEX DOCD: CPT | Mod: CPTII,S$GLB,,

## 2023-08-02 PROCEDURE — 3077F PR MOST RECENT SYSTOLIC BLOOD PRESSURE >= 140 MM HG: ICD-10-PCS | Mod: CPTII,S$GLB,,

## 2023-08-02 PROCEDURE — 4010F ACE/ARB THERAPY RXD/TAKEN: CPT | Mod: CPTII,S$GLB,,

## 2023-08-02 PROCEDURE — 3077F SYST BP >= 140 MM HG: CPT | Mod: CPTII,S$GLB,,

## 2023-08-02 PROCEDURE — 1159F MED LIST DOCD IN RCRD: CPT | Mod: CPTII,S$GLB,,

## 2023-08-02 PROCEDURE — 3044F HG A1C LEVEL LT 7.0%: CPT | Mod: CPTII,S$GLB,,

## 2023-08-02 PROCEDURE — 99999 PR PBB SHADOW E&M-EST. PATIENT-LVL III: ICD-10-PCS | Mod: PBBFAC,,,

## 2023-08-02 RX ORDER — HYDROCODONE BITARTRATE AND ACETAMINOPHEN 5; 325 MG/1; MG/1
1 TABLET ORAL EVERY 12 HOURS PRN
Qty: 60 TABLET | Refills: 0 | Status: SHIPPED | OUTPATIENT
Start: 2023-08-02 | End: 2023-09-01

## 2023-08-02 RX ORDER — PREGABALIN 100 MG/1
100 CAPSULE ORAL 2 TIMES DAILY
Qty: 60 CAPSULE | Refills: 1 | Status: SHIPPED | OUTPATIENT
Start: 2023-08-02 | End: 2023-09-12 | Stop reason: SDUPTHER

## 2023-08-02 NOTE — PROGRESS NOTES
[facility] Follow Up Evaluation      Referred by: No ref. provider found    PCP:     CC:   Chief Complaint   Patient presents with    Back Pain     Right side     Low-back Pain     Right side     Mid-back Pain     Right side     Muscle Pain     Right side     Hip Pain     Right side     Leg Pain     Right side     Neck Pain     Right side     Headache     Right side       Last 3 PDI Scores 6/17/2020   Pain Disability Index (PDI) 52       Interval HPI 8/2/2023: Hernan Badillo returns to the office for follow up.  Today he is reporting continued right-sided head pain, arm pain, back pain and leg pain.  He returns for follow-up after trial of Lyrica 75 mg b.i.d..  He reports no significant improvement in his pain but denied any ill side effects or adverse events with the Lyrica.  Overall, no significant changes to the presentation of his pain since last office visit.  He denies any new numbness new weakness or any new changes to his bowel or bladder function.      HPI:   Hernan Badillo is a 57 y.o. male patient who has a past medical history of COPD (chronic obstructive pulmonary disease), Diabetes mellitus, type 2, and Hypertension. He presents with pain in his right arm and right leg.  He is with his mother today.  He reports this is been bothering him since CVA.  He reports the pain feel in his arm and leg feels like needles.      Pain Intervention History:      Past Spine Surgical History:      Past and current medications:  Antineuropathics:  NSAIDs:  Physical therapy:  Completed physical therapy and occupational therapy  Antidepressants: lexapro  Muscle relaxers: baclofen   Opioids: tylenol #3  Antiplatelets/Anticoagulants:    History:    Current Outpatient Medications:     acetaminophen (TYLENOL) 325 MG tablet, Take 650 mg by mouth 3 (three) times daily as needed., Disp: , Rfl:     atorvastatin (LIPITOR) 20 MG tablet, Take 1 tablet (20 mg total) by mouth once daily., Disp: 90 tablet, Rfl: 3    EScitalopram  oxalate (LEXAPRO) 10 MG tablet, Take 10 mg by mouth., Disp: , Rfl:     lisinopriL 10 MG tablet, Take 1 tablet (10 mg total) by mouth once daily., Disp: 90 tablet, Rfl: 3    meclizine (ANTIVERT) 25 mg tablet, Take 25 mg by mouth., Disp: , Rfl:     melatonin (MELATIN) 5 mg, Take 5 mg by mouth., Disp: , Rfl:     metFORMIN (GLUCOPHAGE) 500 MG tablet, Take 1 tablet (500 mg total) by mouth 2 (two) times daily with meals., Disp: 30 tablet, Rfl: 2    pregabalin (LYRICA) 75 MG capsule, Take 1 capsule (75 mg total) by mouth 2 (two) times daily., Disp: 60 capsule, Rfl: 1    albuterol (VENTOLIN HFA) 90 mcg/actuation inhaler, Inhale 2 puffs into the lungs every 6 (six) hours as needed for Wheezing. Rescue, Disp: 18 g, Rfl: 0    albuterol-ipratropium (DUO-NEB) 2.5 mg-0.5 mg/3 mL nebulizer solution, Take 3 mLs by nebulization every 6 (six) hours as needed for Wheezing or Shortness of Breath., Disp: 25 each, Rfl: 3    amLODIPine (NORVASC) 10 MG tablet, Take 1 tablet (10 mg total) by mouth once daily., Disp: 30 tablet, Rfl: 11    baclofen (LIORESAL) 20 MG tablet, Take 1 tablet (20 mg total) by mouth 3 (three) times daily., Disp: 90 tablet, Rfl: 0    pantoprazole (PROTONIX) 40 MG tablet, Take 1 tablet (40 mg total) by mouth once daily., Disp: 30 tablet, Rfl: 11    Current Facility-Administered Medications:     dexamethasone injection 4 mg, 4 mg, Intramuscular, 1 time in Clinic/HOD, Aiyana Muhammad PA-C    Past Medical History:   Diagnosis Date    COPD (chronic obstructive pulmonary disease)     Diabetes mellitus, type 2     Hypertension        Past Surgical History:   Procedure Laterality Date    DENTAL SURGERY      ESOPHAGOGASTRODUODENOSCOPY N/A 6/1/2020    Procedure: EGD (ESOPHAGOGASTRODUODENOSCOPY);  Surgeon: Terrell May MD;  Location: [facility];  Service: Endoscopy;  Laterality: N/A;    HERNIA REPAIR      left inguinal    incision and drainiage         Family History   Problem Relation Age of Onset    Heart attack  Father 80    Heart disease Father     Drug abuse Father     Cancer Maternal Grandmother         colon       Social History     Socioeconomic History    Marital status: Single    Number of children: 1   Occupational History    Occupation: demolition   Tobacco Use    Smoking status: Every Day     Current packs/day: 2.00     Average packs/day: 2.0 packs/day for 30.0 years (60.0 ttl pk-yrs)     Types: Cigarettes    Smokeless tobacco: Never   Substance and Sexual Activity    Alcohol use: Yes     Alcohol/week: 1.0 standard drink of alcohol     Types: 1 Shots of liquor per week     Comment: 1/2 pint 2 x per week - quit approximately 5 months ago    Drug use: No    Sexual activity: Yes     Partners: Female     Birth control/protection: None     Comment: No history of STDs.   Social History Narrative    The patient does not exercise regularly ().    Rates diet as fair.    He is satisfied with weight.               Review of patient's allergies indicates:  No Known Allergies    Review of Systems:  Positive for numbness and tingling.  Balance of review of systems is negative.    Physical Exam:  Vitals:    08/02/23 0823   BP: (!) 172/106   Pulse: 81   Weight: 70.1 kg (154 lb 6.9 oz)   PainSc: 10-Worst pain ever   PainLoc: Back     Body mass index is 22.81 kg/m².    Gen: NAD  Psych: mood appropriate for given condition  HEENT: eyes anicteric   CV: RRR  HEENT: anicteric   Respiratory: non-labored, no signs of respiratory distress  Abd: non-distended  Skin: warm, dry and intact.  Gait: No antalgic gait.     Sensory:  Sensation to light touch decreased in a nondermatomal distribution in the right arm and leg    Motor:    Right Left   C4 Shoulder Abduction  5  5   C5 Elbow Flexion    5  5   C6 Wrist Extension  5  5   C7 Elbow Extension   4-  5   C8/T1 Hand Intrinsics   5  5        Right Left   L2/3 Iliacus Hip flexion  5  5   L3/4 Qudratus Femoris Knee Extension  4+  5   L4/5 Tib Anterior Ankle Dorsiflexion   4+  5   L5/S1 Extensor  Hallicus Longus Great toe extension  3  5   S1/S2 Gastroc/Soleus Plantar Flexion  5  5         Labs:  Lab Results   Component Value Date    LABA1C 9.8 (H) 07/06/2016    HGBA1C 6.1 (H) 03/08/2023       Lab Results   Component Value Date    WBC 4.17 03/08/2023    HGB 11.8 (L) 03/08/2023    HCT 37.0 (L) 03/08/2023    MCV 93 03/08/2023     03/08/2023           Imaging:  none    Assessment:   Problem List Items Addressed This Visit    None  Visit Diagnoses       Central pain syndrome    -  Primary    Relevant Orders    Pain Clinic Drug Screen    Left-sided cerebrovascular accident (CVA)                    Hernan Badillo is a 57 y.o. male patient who has a past medical history of COPD (chronic obstructive pulmonary disease), Diabetes mellitus, type 2, and Hypertension. He presents with pain in his right arm and right leg.  He is with his mother today.  He reports this is been bothering him since CVA.  He reports the pain feel in his arm and leg feels like needles.  Is exam is somewhat limited as he has giveaway when asked to hold arms and legs up full strength but on subsequent times asking him he is able to have full range of motion with full strength.  He does have decreased sensation to light touch in a nondermatomal distribution in the right arm and right leg.  I think he is suffering from central pain syndrome.  He has completed physical therapy and occupational therapy.  He has failed to get relief previously with Tylenol 3.  He has seen PM&R who trialed baclofen as the patient was complaining of muscle tightness however this did not provide him much relief.          8/2/2023: Hernan Badillo returns to the office for follow up.  Today he is reporting continued right-sided head pain, arm pain, back pain and leg pain.  He returns for follow-up after trial of Lyrica 75 mg b.i.d..  He reports no significant improvement in his pain but denied any ill side effects or adverse events with the Lyrica.  Overall, no significant  changes to the presentation of his pain since last office visit.  He denies any new numbness new weakness or any new changes to his bowel or bladder function.      - on exam he has some weakness with right arm extension and weakness with right foot dorsiflexion and right EHL.  - I believe he continues to suffer from his central pain syndrome  - he did not find significant changes to the presentation of his pain with Lyrica 75 mg b.i.d. but denies any ill side effects.  - I would like to increase his Lyrica to 100 mg b.i.d..  As I feel like an anti neuropathic is important for his pain and will continue to adjust dosages as indicated for some added relief.  - Dr. Maddox discussed with he and his mom in the past to potentially try Cymbalta but would defer this to his primary care as he is currently managing him on other antidepressants.  - for his continued pain I have discussed his case with Dr. Maddox and are going to trial low-dose hydrocodone 5/325 mg twice daily for his pain.  - patient signed pain contract today and UDS was collected.  We went over pain contract today.  Discussed no illicit drug use or any alcohol if we are going to prescribe his medication.  He verbalized understanding and states he has discontinued drinking years ago.  - discussed to only take the medication as prescribed.  Patient verbalized understanding.  - prescriptions for Lyrica and hydrocodone sent to Dr. Maddox today for approval.  - follow-up in 1 month to see how he is tolerating these medications.    : Reviewed       This note was completed with dictation software and grammatical errors may exist.    The total time spent for evaluation and management on 08/02/2023 including reviewing separately obtained history, performing a medically appropriate exam and evaluation, documenting clinical information in the health record, independently interpreting results and communicating them to the patient/family/caregiver, and ordering  medications/tests/procedures was between 40-54 minutes.

## 2023-08-09 LAB
6MAM UR QL: NOT DETECTED
7AMINOCLONAZEPAM UR QL: NOT DETECTED
A-OH ALPRAZ UR QL: NOT DETECTED
ALPHA-OH-MIDAZOLAM: NOT DETECTED
ALPRAZ UR QL: NOT DETECTED
AMPHET UR QL SCN: NOT DETECTED
ANNOTATION COMMENT IMP: NORMAL
ANNOTATION COMMENT IMP: NORMAL
BARBITURATES UR QL: NOT DETECTED
BUPRENORPHINE UR QL: NOT DETECTED
BZE UR QL: NOT DETECTED
CARBOXYTHC UR QL: NOT DETECTED
CARISOPRODOL UR QL: NOT DETECTED
CLONAZEPAM UR QL: NOT DETECTED
CODEINE UR QL: NOT DETECTED
CREAT UR-MCNC: 326.6 MG/DL (ref 20–400)
DIAZEPAM UR QL: NOT DETECTED
ETHYL GLUCURONIDE UR QL: NOT DETECTED
FENTANYL UR QL: NOT DETECTED
GABAPENTIN: NOT DETECTED
HYDROCODONE UR QL: PRESENT
HYDROMORPHONE UR QL: PRESENT
LORAZEPAM UR QL: NOT DETECTED
MDA UR QL: NOT DETECTED
MDEA UR QL: NOT DETECTED
MDMA UR QL: NOT DETECTED
ME-PHENIDATE UR QL: NOT DETECTED
METHADONE UR QL: NOT DETECTED
METHAMPHET UR QL: NOT DETECTED
MIDAZOLAM UR QL SCN: NOT DETECTED
MORPHINE UR QL: NOT DETECTED
NALOXONE: NOT DETECTED
NORBUPRENORPHINE UR QL CFM: NOT DETECTED
NORDIAZEPAM UR QL: NOT DETECTED
NORFENTANYL UR QL: NOT DETECTED
NORHYDROCODONE UR QL CFM: PRESENT
NORMEPERIDINE UR QL CFM: NOT DETECTED
NOROXYCODONE UR QL CFM: NOT DETECTED
NOROXYMORPHONE UR QL SCN: NOT DETECTED
OXAZEPAM UR QL: NOT DETECTED
OXYCODONE UR QL: NOT DETECTED
OXYMORPHONE UR QL: NOT DETECTED
PATHOLOGY STUDY: NORMAL
PCP UR QL: NOT DETECTED
PHENTERMINE UR QL: NOT DETECTED
PREGABALIN: NOT DETECTED
SERVICE CMNT-IMP: NORMAL
TAPENTADOL UR QL SCN: NOT DETECTED
TAPENTADOL UR QL SCN: NOT DETECTED
TEMAZEPAM UR QL: NOT DETECTED
TRAMADOL UR QL: NOT DETECTED
ZOLPIDEM METABOLITE: NOT DETECTED
ZOLPIDEM UR QL: NOT DETECTED

## 2023-08-10 ENCOUNTER — TELEPHONE (OUTPATIENT)
Dept: PAIN MEDICINE | Facility: CLINIC | Age: 58
End: 2023-08-10
Payer: MEDICARE

## 2023-08-10 NOTE — TELEPHONE ENCOUNTER
UDS positive for hydrocodone and its metabolites, patients most recent prescription for hydrocodone was 03/24/2022.    Otherwise, UDS consistent.

## 2023-08-31 ENCOUNTER — DOCUMENTATION ONLY (OUTPATIENT)
Dept: REHABILITATION | Facility: HOSPITAL | Age: 58
End: 2023-08-31
Payer: MEDICARE

## 2023-08-31 DIAGNOSIS — R27.8 COORDINATION IMPAIRMENT: ICD-10-CM

## 2023-08-31 DIAGNOSIS — R53.1 RIGHT SIDED WEAKNESS: ICD-10-CM

## 2023-08-31 DIAGNOSIS — Z78.9 IMPAIRED INSTRUMENTAL ACTIVITIES OF DAILY LIVING (IADL): Primary | ICD-10-CM

## 2023-08-31 NOTE — PROGRESS NOTES
[facility]  Occupational Therapy Discharge Note    Name: Hernan Badillo  Clinic Number: [id number]    Therapy Diagnosis:   Encounter Diagnoses   Name Primary?    Impaired instrumental activities of daily living (IADL) Yes    Coordination impairment     Right sided weakness      Physician: Eneida Hoang NP     Physician Orders: OT eval & treat  Medical Diagnosis:   Cerebrovascular accident (CVA), unspecified mechanism  - Primary     I63.9 434.91      Evaluation Date: 3/2/2023    Date of Last visit: 3/2/2023  Total Visits Received: 1    ASSESSMENT      Patient was only seen for evaluation as he either cancelled or did not show up for any of his appointments.     Discharge reason: Patient has not attended therapy since eval on 3/2/2023    Discharge FOTO Score: not assessed    Goals:   Short Term Goals (4 weeks) Status When Last Assessed  Progressing/ Met   1) patient will be independent with initial HEP N/a Not met   2) Hernan will demonstrate >90* active shoulder flexion right upper extremity to increase independence with overhead reaching. 82* on 3/2/23 Not met   3) right  to improve to 25 psi 20 on eval Not met         LongTerm Goals (12 weeks) Status When Last Assessed  Progressing/ Met   1) Hernan will be independent with strategies to protect the right arm given sensory deficits. N/a Not met   2) Hernan will demonstrate WFL active shoulder flexion right upper extremity to increase independence with overhead reaching. 82* on 3/2/23 Not met   3) right shoulder strength to improve to 3/5 or better to increase independence with IADL 3-/5 on 3/2/23 Not met   4) right  to improve to 40 psi 20 on eval Not met   5) right upper extremity fine motor coordination (FMC) to improve, as evidenced by a 9-Hole Peg Test time of 45s or better.  52.74s on eval Not met       PLAN   This patient is discharged from Occupational Therapy      Sandra Rodriguez OT

## 2023-09-02 ENCOUNTER — HOSPITAL ENCOUNTER (EMERGENCY)
Facility: HOSPITAL | Age: 58
Discharge: HOME | End: 2023-09-02
Attending: EMERGENCY MEDICINE
Payer: MEDICARE

## 2023-09-02 VITALS
SYSTOLIC BLOOD PRESSURE: 179 MMHG | TEMPERATURE: 98 F | OXYGEN SATURATION: 96 % | WEIGHT: 157 LBS | HEIGHT: 69 IN | BODY MASS INDEX: 23.25 KG/M2 | RESPIRATION RATE: 18 BRPM | HEART RATE: 69 BPM | DIASTOLIC BLOOD PRESSURE: 93 MMHG

## 2023-09-02 DIAGNOSIS — R06.2 WHEEZING: ICD-10-CM

## 2023-09-02 DIAGNOSIS — M79.10 MUSCLE PAIN: Primary | ICD-10-CM

## 2023-09-02 LAB — GLUCOSE SERPL-MCNC: 130 MG/DL (ref 70–110)

## 2023-09-02 PROCEDURE — 99284 EMERGENCY DEPT VISIT MOD MDM: CPT | Mod: 25

## 2023-09-02 PROCEDURE — 82962 GLUCOSE BLOOD TEST: CPT

## 2023-09-02 PROCEDURE — 99900035 HC TECH TIME PER 15 MIN (STAT)

## 2023-09-02 PROCEDURE — 94640 AIRWAY INHALATION TREATMENT: CPT

## 2023-09-02 PROCEDURE — 25000242 PHARM REV CODE 250 ALT 637 W/ HCPCS: Performed by: NURSE PRACTITIONER

## 2023-09-02 RX ORDER — ALBUTEROL SULFATE 90 UG/1
1-2 AEROSOL, METERED RESPIRATORY (INHALATION) EVERY 6 HOURS PRN
Qty: 1 G | Refills: 0 | Status: SHIPPED | OUTPATIENT
Start: 2023-09-02 | End: 2024-09-01

## 2023-09-02 RX ORDER — IPRATROPIUM BROMIDE AND ALBUTEROL SULFATE 2.5; .5 MG/3ML; MG/3ML
3 SOLUTION RESPIRATORY (INHALATION)
Status: COMPLETED | OUTPATIENT
Start: 2023-09-02 | End: 2023-09-02

## 2023-09-02 RX ADMIN — IPRATROPIUM BROMIDE AND ALBUTEROL SULFATE 3 ML: .5; 3 SOLUTION RESPIRATORY (INHALATION) at 09:09

## 2023-09-02 NOTE — ED PROVIDER NOTES
"Encounter Date: 9/2/2023       History     Chief Complaint   Patient presents with    hand swelling     States for a month     Presents with complaint of right hand swelling.  Onset 5-6 months ago.  He denies injury.  He reports it feels as if it is "locking up"  Patient had a stroke here ago.  On 08/02 he was seen a pain management specialist.  He has a history of spasticity and central pain syndrome.  He was given hydrocodone 5 mg.  He was given a 30 day supplies so he is now out of his medicines.  He states he has called the pain management doctor they could not see him.  This patient drove himself here.  He is driving in spite of his right lower leg weakness.  Patient reports his right hand pain at a 10/10.  Have gone over the pain scale with him and he continues to rated at a 10/10.      Review of patient's allergies indicates:  No Known Allergies  Past Medical History:   Diagnosis Date    COPD (chronic obstructive pulmonary disease)     Diabetes mellitus, type 2     Hypertension      Past Surgical History:   Procedure Laterality Date    DENTAL SURGERY      ESOPHAGOGASTRODUODENOSCOPY N/A 6/1/2020    Procedure: EGD (ESOPHAGOGASTRODUODENOSCOPY);  Surgeon: Terrell May MD;  Location: [facility];  Service: Endoscopy;  Laterality: N/A;    HERNIA REPAIR      left inguinal    incision and drainiage       Family History   Problem Relation Age of Onset    Heart attack Father 80    Heart disease Father     Drug abuse Father     Cancer Maternal Grandmother         colon     Social History     Tobacco Use    Smoking status: Every Day     Current packs/day: 2.00     Average packs/day: 2.0 packs/day for 30.0 years (60.0 ttl pk-yrs)     Types: Cigarettes    Smokeless tobacco: Never   Substance Use Topics    Alcohol use: Yes     Alcohol/week: 1.0 standard drink of alcohol     Types: 1 Shots of liquor per week     Comment: 1/2 pint 2 x per week - quit approximately 5 months ago    Drug use: No     Review of Systems "   Constitutional:  Negative for fever.   Respiratory:  Positive for wheezing. Negative for cough and shortness of breath.    Cardiovascular:  Negative for chest pain, palpitations and leg swelling.   Gastrointestinal:  Negative for abdominal pain, diarrhea, nausea and vomiting.   Musculoskeletal:  Negative for back pain.        Right hand pain and swelling.   Skin:  Negative for rash.   Neurological:  Negative for dizziness, speech difficulty, weakness, numbness and headaches.       Physical Exam     Initial Vitals [09/02/23 0807]   BP Pulse Resp Temp SpO2   (!) 166/91 74 20 97.7 °F (36.5 °C) 99 %      MAP       --         Physical Exam    Constitutional: He appears well-developed and well-nourished.   This patient is awake alert and oriented.   HENT:   Head: Normocephalic.   Mouth/Throat: Oropharynx is clear and moist.   Eyes: Conjunctivae are normal.   Neck: Neck supple.   Normal range of motion.  Cardiovascular:  Normal rate, regular rhythm and normal heart sounds.           Pulmonary/Chest: No respiratory distress. He has wheezes. He has no rhonchi.   Abdominal: Abdomen is soft. Bowel sounds are normal. He exhibits no distension. There is no abdominal tenderness.   Musculoskeletal:         General: Normal range of motion.      Cervical back: Normal range of motion and neck supple.      Comments: The right hand is not swollen.  The middle finger and right ring finger are slightly swollen.  He has full range of motion to this hand.  His good cap refill.  Radial pulses palpable.  There is no swelling of the arm.     Neurological: He is alert and oriented to person, place, and time.   Patient has equal strength to his bilateral upper extremities.  He is weakness to the right lower extremity.   Skin: Skin is warm. Capillary refill takes less than 2 seconds.   Psychiatric: He has a normal mood and affect.         ED Course   Procedures  Labs Reviewed   POCT GLUCOSE - Abnormal; Notable for the following components:      "  Result Value    POC Glucose 130 (*)     All other components within normal limits   POCT GLUCOSE MONITORING CONTINUOUS          Imaging Results              X-Ray Chest PA And Lateral (Final result)  Result time 09/02/23 09:38:10      Final result by Elizabeth Hughes MD (09/02/23 09:38:10)                   Narrative:    Reason: Coughing & wheezing    FINDINGS:  PA and lateral chest is compared to 11/24/2022 show normal cardiomediastinal silhouette.    Lungs are clear. Pulmonary vasculature is normal. Bones are normal.    IMPRESSION:  Normal chest.    Electronically signed by:  Elizabeth Hughes MD  9/2/2023 9:38 AM CDT Workstation: [id number]                                     X-Ray Hand 3 View Right (Final result)  Result time 09/02/23 09:38:35      Final result by Elizabeth Hughes MD (09/02/23 09:38:35)                   Narrative:    3 views right hand    Clinical history is pain    There are no fractures, dislocations or acute osseous abnormalities. The joint spaces are maintained. The soft tissues are normal.    IMPRESSION: Negative right hand    Electronically signed by:  Elizabeth Hughes MD  9/2/2023 9:38 AM CDT Workstation: [id number]                                     Medications   albuterol-ipratropium 2.5 mg-0.5 mg/3 mL nebulizer solution 3 mL (3 mLs Nebulization Given 9/2/23 0909)     Medical Decision Making  Patient is rating his hand pain at a 10/10.  He is being followed by a pain management doctor for his central pain syndrome.  He has a history of spasticity.  He was taking baclofen but reports he can not take this as it makes his "muscles lock up" he saw the pain management specialist on 08/02.  He was given hydrocodone 5 mg 30 day supply.  He is now out of his pain medication.    Amount and/or Complexity of Data Reviewed  Radiology: ordered.     Details: X-ray of the right hand is negative chest x-ray is negative.  Discussion of management or test interpretation with external " "provider(s): I have had a lengthy discussion regarding narcotic shoes with this patient.  I have informed him that when he sees pain management he signs a contract not to receive narcotics from any other institution.  He has been told to follow up with his pain management doctor along with his primary care doctor.  I went in to see this patient and he is asleep with his lying his face on his right hand.  This is the hand that he rates a 10/10.  This has also hand that when I gently touched it he thea it away quickly and said that it was too painful for me to touch.  Patient refused a chest x-ray stating to the radiology tech that "I came for something else".  I went in with an AMA form.  I discussed the ramifications of him leaving AMA.  He did decide to stay for treatment of the wheezing.  He received a breathing treatment a DuoNeb.  He is no longer wheezing.  I gave him an albuterol inhaler to use at home.    Risk  Prescription drug management.                               Clinical Impression:   Final diagnoses:  [R06.2] Wheezing  [M79.10] Muscle pain (Primary)        ED Disposition Condition    Discharge Stable          ED Prescriptions       Medication Sig Dispense Start Date End Date Auth. Provider    albuterol (PROVENTIL/VENTOLIN HFA) 90 mcg/actuation inhaler Inhale 1-2 puffs into the lungs every 6 (six) hours as needed. Rescue 1 g 9/2/2023 9/1/2024 Concetta Leon NP          Follow-up Information       Follow up With Specialties Details Why Contact Info      In 3 days  Make a follow-up appoint with your pain management doctor.             Concetta Leon NP  09/02/23 1000       Concetta Leon NP  09/02/23 1019    "

## 2023-09-02 NOTE — DISCHARGE INSTRUCTIONS
Make a follow-up appoint with your pain management doctor regarding the pain in your right hand.  Follow up with the primary care doctor regarding the fact that you can not take the baclofen.  He may put you on another muscle relaxer.  Please stops smoking.  Use the inhaler as demonstrated.  Return to the ED for any worsening of symptoms or any other concerns.

## 2023-09-12 ENCOUNTER — OFFICE VISIT (OUTPATIENT)
Dept: PAIN MEDICINE | Facility: CLINIC | Age: 58
End: 2023-09-12
Payer: MEDICARE

## 2023-09-12 VITALS
HEART RATE: 77 BPM | DIASTOLIC BLOOD PRESSURE: 81 MMHG | HEIGHT: 69 IN | SYSTOLIC BLOOD PRESSURE: 140 MMHG | BODY MASS INDEX: 23.18 KG/M2

## 2023-09-12 DIAGNOSIS — G89.0 CENTRAL PAIN SYNDROME: ICD-10-CM

## 2023-09-12 PROCEDURE — 4010F PR ACE/ARB THEARPY RXD/TAKEN: ICD-10-PCS | Mod: CPTII,S$GLB,, | Performed by: ANESTHESIOLOGY

## 2023-09-12 PROCEDURE — 1160F PR REVIEW ALL MEDS BY PRESCRIBER/CLIN PHARMACIST DOCUMENTED: ICD-10-PCS | Mod: CPTII,S$GLB,, | Performed by: ANESTHESIOLOGY

## 2023-09-12 PROCEDURE — 1160F RVW MEDS BY RX/DR IN RCRD: CPT | Mod: CPTII,S$GLB,, | Performed by: ANESTHESIOLOGY

## 2023-09-12 PROCEDURE — 99213 PR OFFICE/OUTPT VISIT, EST, LEVL III, 20-29 MIN: ICD-10-PCS | Mod: S$GLB,,, | Performed by: ANESTHESIOLOGY

## 2023-09-12 PROCEDURE — 3044F PR MOST RECENT HEMOGLOBIN A1C LEVEL <7.0%: ICD-10-PCS | Mod: CPTII,S$GLB,, | Performed by: ANESTHESIOLOGY

## 2023-09-12 PROCEDURE — 1159F MED LIST DOCD IN RCRD: CPT | Mod: CPTII,S$GLB,, | Performed by: ANESTHESIOLOGY

## 2023-09-12 PROCEDURE — 1159F PR MEDICATION LIST DOCUMENTED IN MEDICAL RECORD: ICD-10-PCS | Mod: CPTII,S$GLB,, | Performed by: ANESTHESIOLOGY

## 2023-09-12 PROCEDURE — 3008F PR BODY MASS INDEX (BMI) DOCUMENTED: ICD-10-PCS | Mod: CPTII,S$GLB,, | Performed by: ANESTHESIOLOGY

## 2023-09-12 PROCEDURE — 3077F SYST BP >= 140 MM HG: CPT | Mod: CPTII,S$GLB,, | Performed by: ANESTHESIOLOGY

## 2023-09-12 PROCEDURE — 3077F PR MOST RECENT SYSTOLIC BLOOD PRESSURE >= 140 MM HG: ICD-10-PCS | Mod: CPTII,S$GLB,, | Performed by: ANESTHESIOLOGY

## 2023-09-12 PROCEDURE — 3079F PR MOST RECENT DIASTOLIC BLOOD PRESSURE 80-89 MM HG: ICD-10-PCS | Mod: CPTII,S$GLB,, | Performed by: ANESTHESIOLOGY

## 2023-09-12 PROCEDURE — 3008F BODY MASS INDEX DOCD: CPT | Mod: CPTII,S$GLB,, | Performed by: ANESTHESIOLOGY

## 2023-09-12 PROCEDURE — 4010F ACE/ARB THERAPY RXD/TAKEN: CPT | Mod: CPTII,S$GLB,, | Performed by: ANESTHESIOLOGY

## 2023-09-12 PROCEDURE — 3079F DIAST BP 80-89 MM HG: CPT | Mod: CPTII,S$GLB,, | Performed by: ANESTHESIOLOGY

## 2023-09-12 PROCEDURE — 99213 OFFICE O/P EST LOW 20 MIN: CPT | Mod: S$GLB,,, | Performed by: ANESTHESIOLOGY

## 2023-09-12 PROCEDURE — 3044F HG A1C LEVEL LT 7.0%: CPT | Mod: CPTII,S$GLB,, | Performed by: ANESTHESIOLOGY

## 2023-09-12 PROCEDURE — 99999 PR PBB SHADOW E&M-EST. PATIENT-LVL III: CPT | Mod: PBBFAC,,, | Performed by: ANESTHESIOLOGY

## 2023-09-12 PROCEDURE — 99999 PR PBB SHADOW E&M-EST. PATIENT-LVL III: ICD-10-PCS | Mod: PBBFAC,,, | Performed by: ANESTHESIOLOGY

## 2023-09-12 RX ORDER — PREGABALIN 100 MG/1
100 CAPSULE ORAL 2 TIMES DAILY
Qty: 60 CAPSULE | Refills: 2 | Status: SHIPPED | OUTPATIENT
Start: 2023-09-12 | End: 2023-10-13 | Stop reason: SDUPTHER

## 2023-09-12 NOTE — PROGRESS NOTES
[facility] Follow Up Evaluation      Referred by: No ref. provider found    PCP:     CC:   Chief Complaint   Patient presents with    Back Pain    Low-back Pain    Arm Pain    Leg Pain          9/12/2023    11:20 AM 6/17/2020     9:02 AM   Last 3 PDI Scores   Pain Disability Index (PDI) 45 52         Interval HPI 9/12/2023: Hernan Badillo returns to the office for follow up.  He is with his mother.  Today he reports he continues to have his typical right-sided pain in the arm in the leg.  When I last saw him we had increased his Lyrica to 100 mg b.i.d..  He has been out of this for 2 weeks.      HPI:   Hernan Badillo is a 57 y.o. male patient who has a past medical history of COPD (chronic obstructive pulmonary disease), Diabetes mellitus, type 2, and Hypertension. He presents with pain in his right arm and right leg.  He is with his mother today.  He reports this is been bothering him since CVA.  He reports the pain feel in his arm and leg feels like needles.      Pain Intervention History:      Past Spine Surgical History:      Past and current medications:  Antineuropathics:  NSAIDs:  Physical therapy:  Completed physical therapy and occupational therapy  Antidepressants: lexapro  Muscle relaxers: baclofen   Opioids: tylenol #3  Antiplatelets/Anticoagulants:    History:    Current Outpatient Medications:     acetaminophen (TYLENOL) 325 MG tablet, Take 650 mg by mouth 3 (three) times daily as needed., Disp: , Rfl:     albuterol (PROVENTIL/VENTOLIN HFA) 90 mcg/actuation inhaler, Inhale 1-2 puffs into the lungs every 6 (six) hours as needed. Rescue, Disp: 1 g, Rfl: 0    atorvastatin (LIPITOR) 20 MG tablet, Take 1 tablet (20 mg total) by mouth once daily., Disp: 90 tablet, Rfl: 3    EScitalopram oxalate (LEXAPRO) 10 MG tablet, Take 10 mg by mouth., Disp: , Rfl:     lisinopriL 10 MG tablet, Take 1 tablet (10 mg total) by mouth once daily., Disp: 90 tablet, Rfl: 3    meclizine (ANTIVERT) 25 mg tablet, Take 25 mg by  mouth., Disp: , Rfl:     melatonin (MELATIN) 5 mg, Take 5 mg by mouth., Disp: , Rfl:     metFORMIN (GLUCOPHAGE) 500 MG tablet, Take 1 tablet (500 mg total) by mouth 2 (two) times daily with meals., Disp: 30 tablet, Rfl: 2    albuterol-ipratropium (DUO-NEB) 2.5 mg-0.5 mg/3 mL nebulizer solution, Take 3 mLs by nebulization every 6 (six) hours as needed for Wheezing or Shortness of Breath., Disp: 25 each, Rfl: 3    amLODIPine (NORVASC) 10 MG tablet, Take 1 tablet (10 mg total) by mouth once daily., Disp: 30 tablet, Rfl: 11    baclofen (LIORESAL) 20 MG tablet, Take 1 tablet (20 mg total) by mouth 3 (three) times daily., Disp: 90 tablet, Rfl: 0    pantoprazole (PROTONIX) 40 MG tablet, Take 1 tablet (40 mg total) by mouth once daily., Disp: 30 tablet, Rfl: 11    pregabalin (LYRICA) 100 MG capsule, Take 1 capsule (100 mg total) by mouth 2 (two) times daily., Disp: 60 capsule, Rfl: 2    Current Facility-Administered Medications:     dexamethasone injection 4 mg, 4 mg, Intramuscular, 1 time in Clinic/HOD, Aiyana Muhammad PA-C    Past Medical History:   Diagnosis Date    COPD (chronic obstructive pulmonary disease)     Diabetes mellitus, type 2     Hypertension        Past Surgical History:   Procedure Laterality Date    DENTAL SURGERY      ESOPHAGOGASTRODUODENOSCOPY N/A 6/1/2020    Procedure: EGD (ESOPHAGOGASTRODUODENOSCOPY);  Surgeon: Terrell May MD;  Location: [facility];  Service: Endoscopy;  Laterality: N/A;    HERNIA REPAIR      left inguinal    incision and drainiage         Family History   Problem Relation Age of Onset    Heart attack Father 80    Heart disease Father     Drug abuse Father     Cancer Maternal Grandmother         colon       Social History     Socioeconomic History    Marital status: Single    Number of children: 1   Occupational History    Occupation: demolition   Tobacco Use    Smoking status: Every Day     Current packs/day: 2.00     Average packs/day: 2.0 packs/day for 30.0 years (60.0  "ttl pk-yrs)     Types: Cigarettes    Smokeless tobacco: Never   Substance and Sexual Activity    Alcohol use: Yes     Alcohol/week: 1.0 standard drink of alcohol     Types: 1 Shots of liquor per week     Comment: 1/2 pint 2 x per week - quit approximately 5 months ago    Drug use: No    Sexual activity: Yes     Partners: Female     Birth control/protection: None     Comment: No history of STDs.   Social History Narrative    The patient does not exercise regularly ().    Rates diet as fair.    He is satisfied with weight.               Review of patient's allergies indicates:  No Known Allergies    Review of Systems:  Positive for numbness and tingling.  Balance of review of systems is negative.    Physical Exam:  Vitals:    09/12/23 1119   BP: (!) 140/81   Pulse: 77   Height: 5' 9" (1.753 m)   PainSc:   8   PainLoc: Back     Body mass index is 23.18 kg/m².    Gen: NAD  Psych: mood appropriate for given condition  HEENT: eyes anicteric   CV: RRR  HEENT: anicteric   Respiratory: non-labored, no signs of respiratory distress  Abd: non-distended  Skin: warm, dry and intact.  Gait: No antalgic gait.     Sensory:  Sensation to light touch decreased in a nondermatomal distribution in the right arm and leg    Motor:    Right Left   C4 Shoulder Abduction  5  5   C5 Elbow Flexion    5  5   C6 Wrist Extension  5  5   C7 Elbow Extension   4-  5   C8/T1 Hand Intrinsics   5  5        Right Left   L2/3 Iliacus Hip flexion  5  5   L3/4 Qudratus Femoris Knee Extension  4+  5   L4/5 Tib Anterior Ankle Dorsiflexion   4+  5   L5/S1 Extensor Hallicus Longus Great toe extension  3  5   S1/S2 Gastroc/Soleus Plantar Flexion  5  5       Labs:  Lab Results   Component Value Date    LABA1C 9.8 (H) 07/06/2016    HGBA1C 6.1 (H) 03/08/2023       Lab Results   Component Value Date    WBC 4.17 03/08/2023    HGB 11.8 (L) 03/08/2023    HCT 37.0 (L) 03/08/2023    MCV 93 03/08/2023     03/08/2023       Imaging:  none    Assessment:   Problem " List Items Addressed This Visit    None  Visit Diagnoses       Central pain syndrome        Relevant Medications    pregabalin (LYRICA) 100 MG capsule              Hernan Badillo is a 57 y.o. male patient who has a past medical history of COPD (chronic obstructive pulmonary disease), Diabetes mellitus, type 2, and Hypertension. He presents with pain in his right arm and right leg.  He is with his mother today.  He reports this is been bothering him since CVA.  He reports the pain feel in his arm and leg feels like needles.  Is exam is somewhat limited as he has giveaway when asked to hold arms and legs up full strength but on subsequent times asking him he is able to have full range of motion with full strength.  He does have decreased sensation to light touch in a nondermatomal distribution in the right arm and right leg.  I think he is suffering from central pain syndrome.  He has completed physical therapy and occupational therapy.  He has failed to get relief previously with Tylenol 3.  He has seen PM&R who trialed baclofen as the patient was complaining of muscle tightness however this did not provide him much relief.        9/12/23 - Hernan Badillo returns to the office for follow up.  He is with his mother.  Today he reports he continues to have his typical right-sided pain in the arm in the leg.  When I last saw him we had increased his Lyrica to 100 mg b.i.d..  He has been out of this for 2 weeks.  I refilled his Lyrica today with 2 refills to last 90 days.  I discussed that he is responsible for reaching out to the office when he needs a refill and that the medication works best when taking continuously without any gaps in usage.  He denies any side effects or adverse events while taking Lyrica.  The patient complains of tightness in his arm and leg.  I discussed that I am trying to treat the pain component of his central pain syndrome however he will need to follow up with PM&R to discuss potential treatments for  any spasticity/spasms he is feeling.  I had been prescribing him hydrocodone 5/325 mg to take twice a day as needed for his pain as well.  Today he reports that he does take it but is unsure of how well it works.  Sometimes he takes it twice a day and other times take it 1 time a day.  He says he forgets to take it.  I think if he forgets to take pain medication then perhaps we need to see how he does without it as we should be using the minimal amount of medication to treat his pain as we can.  I think at this point it is reasonable to continue with lyrica as an anti-neuropathic to see how he progresses.  He will continue to take Tylenol.  He will follow up in 1 month      : Reviewed       This note was completed with dictation software and grammatical errors may exist.

## 2023-09-20 ENCOUNTER — OFFICE VISIT (OUTPATIENT)
Dept: FAMILY MEDICINE | Facility: CLINIC | Age: 58
End: 2023-09-20
Payer: MEDICARE

## 2023-09-20 ENCOUNTER — TELEPHONE (OUTPATIENT)
Dept: FAMILY MEDICINE | Facility: CLINIC | Age: 58
End: 2023-09-20
Payer: MEDICARE

## 2023-09-20 VITALS
SYSTOLIC BLOOD PRESSURE: 154 MMHG | WEIGHT: 149.69 LBS | TEMPERATURE: 98 F | DIASTOLIC BLOOD PRESSURE: 82 MMHG | HEART RATE: 86 BPM | BODY MASS INDEX: 22.17 KG/M2 | HEIGHT: 69 IN | OXYGEN SATURATION: 90 %

## 2023-09-20 DIAGNOSIS — M62.838 MUSCLE SPASM: ICD-10-CM

## 2023-09-20 DIAGNOSIS — E11.65 TYPE 2 DIABETES MELLITUS WITH HYPERGLYCEMIA, WITHOUT LONG-TERM CURRENT USE OF INSULIN: ICD-10-CM

## 2023-09-20 DIAGNOSIS — F10.21 HISTORY OF ALCOHOLISM: ICD-10-CM

## 2023-09-20 DIAGNOSIS — E11.59 HYPERTENSION ASSOCIATED WITH DIABETES: ICD-10-CM

## 2023-09-20 DIAGNOSIS — Z76.89 ENCOUNTER TO ESTABLISH CARE: Primary | ICD-10-CM

## 2023-09-20 DIAGNOSIS — Z12.11 COLON CANCER SCREENING: ICD-10-CM

## 2023-09-20 DIAGNOSIS — K21.9 GASTROESOPHAGEAL REFLUX DISEASE WITHOUT ESOPHAGITIS: ICD-10-CM

## 2023-09-20 DIAGNOSIS — I15.2 HYPERTENSION ASSOCIATED WITH DIABETES: ICD-10-CM

## 2023-09-20 DIAGNOSIS — J44.9 CHRONIC OBSTRUCTIVE PULMONARY DISEASE, UNSPECIFIED COPD TYPE: ICD-10-CM

## 2023-09-20 DIAGNOSIS — I73.9 INTERMITTENT CLAUDICATION: ICD-10-CM

## 2023-09-20 DIAGNOSIS — Z87.891 PERSONAL HISTORY OF NICOTINE DEPENDENCE: ICD-10-CM

## 2023-09-20 PROCEDURE — 3008F PR BODY MASS INDEX (BMI) DOCUMENTED: ICD-10-PCS | Mod: CPTII,S$GLB,, | Performed by: STUDENT IN AN ORGANIZED HEALTH CARE EDUCATION/TRAINING PROGRAM

## 2023-09-20 PROCEDURE — 99999 PR PBB SHADOW E&M-EST. PATIENT-LVL V: ICD-10-PCS | Mod: PBBFAC,,, | Performed by: STUDENT IN AN ORGANIZED HEALTH CARE EDUCATION/TRAINING PROGRAM

## 2023-09-20 PROCEDURE — 3077F PR MOST RECENT SYSTOLIC BLOOD PRESSURE >= 140 MM HG: ICD-10-PCS | Mod: CPTII,S$GLB,, | Performed by: STUDENT IN AN ORGANIZED HEALTH CARE EDUCATION/TRAINING PROGRAM

## 2023-09-20 PROCEDURE — 4010F PR ACE/ARB THEARPY RXD/TAKEN: ICD-10-PCS | Mod: CPTII,S$GLB,, | Performed by: STUDENT IN AN ORGANIZED HEALTH CARE EDUCATION/TRAINING PROGRAM

## 2023-09-20 PROCEDURE — 99214 OFFICE O/P EST MOD 30 MIN: CPT | Mod: S$GLB,,, | Performed by: STUDENT IN AN ORGANIZED HEALTH CARE EDUCATION/TRAINING PROGRAM

## 2023-09-20 PROCEDURE — 3044F PR MOST RECENT HEMOGLOBIN A1C LEVEL <7.0%: ICD-10-PCS | Mod: CPTII,S$GLB,, | Performed by: STUDENT IN AN ORGANIZED HEALTH CARE EDUCATION/TRAINING PROGRAM

## 2023-09-20 PROCEDURE — 1160F PR REVIEW ALL MEDS BY PRESCRIBER/CLIN PHARMACIST DOCUMENTED: ICD-10-PCS | Mod: CPTII,S$GLB,, | Performed by: STUDENT IN AN ORGANIZED HEALTH CARE EDUCATION/TRAINING PROGRAM

## 2023-09-20 PROCEDURE — 1159F MED LIST DOCD IN RCRD: CPT | Mod: CPTII,S$GLB,, | Performed by: STUDENT IN AN ORGANIZED HEALTH CARE EDUCATION/TRAINING PROGRAM

## 2023-09-20 PROCEDURE — 4010F ACE/ARB THERAPY RXD/TAKEN: CPT | Mod: CPTII,S$GLB,, | Performed by: STUDENT IN AN ORGANIZED HEALTH CARE EDUCATION/TRAINING PROGRAM

## 2023-09-20 PROCEDURE — 99214 PR OFFICE/OUTPT VISIT, EST, LEVL IV, 30-39 MIN: ICD-10-PCS | Mod: S$GLB,,, | Performed by: STUDENT IN AN ORGANIZED HEALTH CARE EDUCATION/TRAINING PROGRAM

## 2023-09-20 PROCEDURE — 3044F HG A1C LEVEL LT 7.0%: CPT | Mod: CPTII,S$GLB,, | Performed by: STUDENT IN AN ORGANIZED HEALTH CARE EDUCATION/TRAINING PROGRAM

## 2023-09-20 PROCEDURE — 3077F SYST BP >= 140 MM HG: CPT | Mod: CPTII,S$GLB,, | Performed by: STUDENT IN AN ORGANIZED HEALTH CARE EDUCATION/TRAINING PROGRAM

## 2023-09-20 PROCEDURE — 3079F PR MOST RECENT DIASTOLIC BLOOD PRESSURE 80-89 MM HG: ICD-10-PCS | Mod: CPTII,S$GLB,, | Performed by: STUDENT IN AN ORGANIZED HEALTH CARE EDUCATION/TRAINING PROGRAM

## 2023-09-20 PROCEDURE — 1159F PR MEDICATION LIST DOCUMENTED IN MEDICAL RECORD: ICD-10-PCS | Mod: CPTII,S$GLB,, | Performed by: STUDENT IN AN ORGANIZED HEALTH CARE EDUCATION/TRAINING PROGRAM

## 2023-09-20 PROCEDURE — 3008F BODY MASS INDEX DOCD: CPT | Mod: CPTII,S$GLB,, | Performed by: STUDENT IN AN ORGANIZED HEALTH CARE EDUCATION/TRAINING PROGRAM

## 2023-09-20 PROCEDURE — 3079F DIAST BP 80-89 MM HG: CPT | Mod: CPTII,S$GLB,, | Performed by: STUDENT IN AN ORGANIZED HEALTH CARE EDUCATION/TRAINING PROGRAM

## 2023-09-20 PROCEDURE — 1160F RVW MEDS BY RX/DR IN RCRD: CPT | Mod: CPTII,S$GLB,, | Performed by: STUDENT IN AN ORGANIZED HEALTH CARE EDUCATION/TRAINING PROGRAM

## 2023-09-20 PROCEDURE — 99999 PR PBB SHADOW E&M-EST. PATIENT-LVL V: CPT | Mod: PBBFAC,,, | Performed by: STUDENT IN AN ORGANIZED HEALTH CARE EDUCATION/TRAINING PROGRAM

## 2023-09-20 RX ORDER — AMLODIPINE BESYLATE 10 MG/1
10 TABLET ORAL DAILY
Qty: 90 TABLET | Refills: 3 | Status: SHIPPED | OUTPATIENT
Start: 2023-09-20 | End: 2024-09-19

## 2023-09-20 RX ORDER — METHOCARBAMOL 500 MG/1
500 TABLET, FILM COATED ORAL 4 TIMES DAILY
Qty: 40 TABLET | Refills: 0 | Status: SHIPPED | OUTPATIENT
Start: 2023-09-20 | End: 2023-09-22

## 2023-09-20 RX ORDER — PANTOPRAZOLE SODIUM 40 MG/1
40 TABLET, DELAYED RELEASE ORAL DAILY
Qty: 90 TABLET | Refills: 3 | Status: SHIPPED | OUTPATIENT
Start: 2023-09-20 | End: 2024-09-19

## 2023-09-20 NOTE — PATIENT INSTRUCTIONS
Vimal Becerra,     If you are due for any health screening(s) below please notify me so we can arrange them to be ordered and scheduled to maintain your health. Most healthy patients complete it. Don't lose out on improving your health.     Tests to Keep You Healthy    Eye Exam: ORDERED BUT NOT SCHEDULED  Colon Cancer Screening: DUE  Last Blood Pressure <= 139/89 (9/20/2023): Yes  Last HbA1c < 8 (03/08/2023): Yes  Tobacco Cessation: NO         Colon Cancer Screening    Of cancers affecting both men and women, colorectal cancer is the third leading cancer killer in the [place]. But it doesnt have to be. Screening can prevent colorectal cancer or find it at an early stage when treatment often leads to a cure.    A colonoscopy is the preferred test for detecting colon cancer. It is needed only once every 10 years if results are negative. While sedated, a flexible, lighted tube with a tiny camera is inserted into the rectum and advanced through the colon to look for cancers. An alternative screening test that is used at home and returned to the lab may also be used. It detects hidden blood in bowel movements which could indicate cancer in the colon. If results are positive, you will need a colonoscopy to determine if the blood is a sign of cancer. This type of follow up (diagnostic) colonoscopy usually requires additional copays as required by your insurance provider. Please contact your PCP if you have any questions.         Diabetic Retinal Eye Exam    Diabetes is the #1 cause of blindness in the [place] - early detection before signs or symptoms develop can prevent debilitating blindness.    Once-a-year screening is recommended for all diabetic patients. This exam can prevent and treat diabetes complications in the eye before developing symptoms. This can be done with a special camera is used to take photographs of the back of your eye without having to dilate them, or you can see an eye doctor for a full dilated  "exam.    Diabetic A1c Testing    Your chart identifies you as having diabetes. It is recommended that all diabetes patients have an A1c test done at least once a year, but [facility] recommends twice a year for most patients. This helps your doctor to better help you manage your diabetes. This is a non-fasting lab test which can be completed at any time. Your result will be sent to your Primary Care Provider for review and that office will contact you with the results.      Patient Education       Checking Your Blood Pressure at Home   The Basics   Written by the doctors and editors at [facility]   How is blood pressure measured? -- Blood pressure is usually measured with a device that goes around your upper arm. This is often done in a doctor's office. But some people also check their blood pressure themselves, at home or at work.  Blood pressure is explained with 2 numbers. For instance, your blood pressure might be "140 over 90." The first (top) number is the pressure inside your arteries when your heart is deondre. The second (bottom) number is the pressure inside your arteries when your heart is relaxed. The table shows how doctors and nurses define high and normal blood pressure (table 1).  If your blood pressure gets too high, it puts you at risk for heart attack, stroke, and kidney disease. High blood pressure does not usually cause symptoms. But it can be serious.  What is a home blood pressure meter? -- A home blood pressure meter (or "monitor") is a device you can use to check your blood pressure yourself. It has a cuff that goes around your upper arm (figure 1). Some devices have a cuff that goes around your wrist instead. But doctors aren't sure if these work as well. The meter also has a small screen, or dial, that shows your blood pressure numbers.  There are also special meters you can wear for a day or 2. These are different because they automatically check your blood pressure throughout " the day and night, even while you are sleeping. If your doctor thinks you should use one of these devices, they will talk to you about how to wear it.  Why do I need to check my blood pressure at home? -- If your doctor knows or suspects that you have high blood pressure, they might want you to check it at home. There are a few reasons for this. Your doctor might want to look at:  Whether your blood pressure measures the same at home as it did in the doctor's office  How well your blood pressure medicines are working  Changes in your blood pressure, for example, if it goes up and down  People who check their own blood pressure at home usually do better at keeping it low.  How do I choose a home blood pressure meter? -- When choosing a home blood pressure meter, you will probably want to think about:  Cost - Some devices cost more than others. You should also check to see if your insurance will help pay for your device.  Size - It's important to make sure the cuff fits your arm comfortably. Your doctor or nurse can help you with this.  How easy it is to use - You should make sure you understand how to use the device. You also need to be able to read the numbers on the screen.  You do not need a prescription to buy a home blood pressure meter. You can buy them at most pharmacies or over the internet. Your doctor or nurse can help you choose the right device for you.  How do I check my blood pressure at home? -- Once you have a home blood pressure meter, your doctor or nurse should check it to make sure it fits you and works correctly.  When it's time to check your blood pressure:  Go to the bathroom and empty your bladder first. Having a full bladder can temporarily increase your blood pressure, making the results inaccurate.  Sit in a chair with your feet flat on the ground.  Try to breathe normally and stay calm.  Attach the cuff to your arm. Place the cuff directly on your skin, not over your clothing. The cuff  should be tight enough to not slip down, but not uncomfortably tight.  Sit and relax for about 3 to 5 minutes with the cuff on.  Follow the directions that came with your device to start measuring your blood pressure. This might involve squeezing the bulb at the end of the tube to inflate the cuff (fill it with air). With some monitors, you just need to press a button to inflate the cuff. When the cuff fills with air, it feels like someone is squeezing your arm, but it should not hurt. Then you will slowly deflate the cuff (let the air out of it), or it will deflate by itself. The screen or dial will show your blood pressure numbers.  Stay seated and relax for 1 minute, then measure your blood pressure again.  How often should I check my blood pressure? -- It depends. Different people need to follow different schedules. Your doctor or nurse will tell you how often to check your blood pressure, and when. Some people need to check their blood pressure twice a day, in the morning and evening.  Your doctor or nurse will probably tell you to keep track of your blood pressure for at least a few days (table 2). Then they will look at the numbers. The reason for this is that it's normal for your blood pressure to change a bit from day to day. For example, the numbers might change depending on whether you recently had caffeine, just exercised, or feel stressed. Checking your blood pressure over several days - or longer - will give your doctor or nurse a better idea of what is average for you.  How should I keep track of my blood pressure? -- Some blood pressure meters will record your numbers for you, or send them to your computer or smartphone. If yours does not do this, you will need to write them down. Your doctor or nurse can help you figure out the best way to keep track of the numbers.  What if my blood pressure is high? -- Your doctor or nurse will tell you what to do if your blood pressure is high when you check it  "at home. If you get a number that is higher than normal, measure it again to see if it is still high. If it is very high (above a certain number, which your doctor or nurse will tell you to watch out for), you should call your doctor right away.  If your blood pressure is only a little high, your doctor or nurse might tell you to keep checking it for a few more days or weeks, and then call if it does not go back down. Then they can help you decide what to do next.  All topics are updated as new evidence becomes available and our peer review process is complete.  This topic retrieved from [facility] on: Sep 21, 2021.  Topic 603391 Version 4.0  Release: 29.4.2 - C29.263  © 2021 UpToDate, Inc. and/or its affiliates. All rights reserved.  table 1: Definition of normal and high blood pressure  Level  Top number  Bottom number    High 130 or above 80 or above   Elevated 120 to 129 79 or below   Normal 119 or below 79 or below   These definitions are from the American College of Cardiology/American Heart Association. Other expert groups might use slightly different definitions.  "Elevated blood pressure" is a term doctor or nurses use as a warning. It means you do not yet have high blood pressure, but your blood pressure is not as low as it should be for good health.  Graphic 97477 Version 6.0  figure 1: Using a home blood pressure meter     This is an example of a person using a home blood pressure meter.  Graphic 376238 Version 1.0    table 2: 7-day diary for checking blood pressure at home  Day 1  Day 2  Day 3  Day 4  Day 5  Day 6  Day 7    Morning  1st read Morning  1st read Morning  1st read Morning  1st read Morning  1st read Morning  1st read Morning  1st read   Systolic: __________ Systolic: __________ Systolic: __________ Systolic: __________ Systolic: __________ Systolic: __________ Systolic: __________   Diastolic: __________ Diastolic: __________ Diastolic: __________ Diastolic: __________ Diastolic: __________ " Diastolic: __________ Diastolic: __________   Pulse: __________ Pulse: __________ Pulse: __________ Pulse: __________ Pulse: __________ Pulse: __________ Pulse: __________   Morning  2nd read Morning  2nd read Morning  2nd read Morning  2nd read Morning  2nd read Morning  2nd read Morning  2nd read   Systolic: __________ Systolic: __________ Systolic: __________ Systolic: __________ Systolic: __________ Systolic: __________ Systolic: __________   Diastolic: __________ Diastolic: __________ Diastolic: __________ Diastolic: __________ Diastolic: __________ Diastolic: __________ Diastolic: __________   Pulse: __________ Pulse: __________ Pulse: __________ Pulse: __________ Pulse: __________ Pulse: __________ Pulse: __________   Evening  1st read Evening  1st read Evening  1st read Evening  1st read Evening  1st read Evening  1st read Evening  1st read   Systolic: __________ Systolic: __________ Systolic: __________ Systolic: __________ Systolic: __________ Systolic: __________ Systolic: __________   Diastolic: __________ Diastolic: __________ Diastolic: __________ Diastolic: __________ Diastolic: __________ Diastolic: __________ Diastolic: __________   Pulse: __________ Pulse: __________ Pulse: __________ Pulse: __________ Pulse: __________ Pulse: __________ Pulse: __________   Evening  2nd read Evening  2nd read Evening  2nd read Evening  2nd read Evening  2nd read Evening  2nd read Evening  2nd read   Systolic: __________ Systolic: __________ Systolic: __________ Systolic: __________ Systolic: __________ Systolic: __________ Systolic: __________   Diastolic: __________ Diastolic: __________ Diastolic: __________ Diastolic: __________ Diastolic: __________ Diastolic: __________ Diastolic: __________   Pulse: __________ Pulse: __________ Pulse: __________ Pulse: __________ Pulse: __________ Pulse: __________ Pulse: __________   Notes    Notes    Notes    Notes    Notes    Notes    Notes      ____________________  ____________________ ____________________ ____________________ ____________________ ____________________ ____________________   ____________________ ____________________ ____________________ ____________________ ____________________ ____________________ ____________________   ____________________ ____________________ ____________________ ____________________ ____________________ ____________________ ____________________   Patient name: ______________________________     Patient ID: ________________________________    Primary care provider: _______________________    Average BP: _______________________________    Graphic 989710 Version 1.0  Consumer Information Use and Disclaimer   This information is not specific medical advice and does not replace information you receive from your health care provider. This is only a brief summary of general information. It does NOT include all information about conditions, illnesses, injuries, tests, procedures, treatments, therapies, discharge instructions or life-style choices that may apply to you. You must talk with your health care provider for complete information about your health and treatment options. This information should not be used to decide whether or not to accept your health care provider's advice, instructions or recommendations. Only your health care provider has the knowledge and training to provide advice that is right for you. The use of this information is governed by the [employer] End User License Agreement, available at https://www.[employer].55social/en/solutions/[employer]/about/kristofer.The use of [employer] content is governed by the [employer] Terms of Use. ©2021 UpToDate, Inc. All rights reserved.  Copyright   © 2021 UpToDate, Inc. and/or its affiliates. All rights reserved.

## 2023-09-20 NOTE — TELEPHONE ENCOUNTER
Spoke to pt. Since pt was late checking in for appointment  said the pt can come back at 4:30 to be seen. I just spoke to pt and pt agreed.

## 2023-09-20 NOTE — PROGRESS NOTES
SUBJECTIVE:    CHIEF COMPLAINT:   Chief Complaint   Patient presents with    Establish Care           274}    HISTORY OF PRESENT ILLNESS:  Hernan Badillo is a 57 y.o. male with a PMHx of COPD, Type 2 Diabetes Mellitus, hypertension and Hx of CVA (2022) presents to the clinic today to establish care.  Plans of continued right upper extremity and right lower extremity pain since his CVA.  He has been taking baclofen without much relief.  He would like to try an alternative medication.  He was recently seen in the ER for similar concern, as well as wheezing.  He was seen by pain management on 9/12 and started on increased dose of Lyrica. Denies any other concerns at this time. Denies any fevers, chills, chest pain, nausea vomiting or diarrhea        PAST MEDICAL HISTORY:     274}  Past Medical History:   Diagnosis Date    COPD (chronic obstructive pulmonary disease)     Diabetes mellitus, type 2     Hypertension        PAST SURGICAL HISTORY:  Past Surgical History:   Procedure Laterality Date    DENTAL SURGERY      ESOPHAGOGASTRODUODENOSCOPY N/A 6/1/2020    Procedure: EGD (ESOPHAGOGASTRODUODENOSCOPY);  Surgeon: Terrell May MD;  Location: [facility];  Service: Endoscopy;  Laterality: N/A;    HERNIA REPAIR      left inguinal    incision and drainiage         SOCIAL HISTORY:  Social History     Socioeconomic History    Marital status: Single    Number of children: 1   Occupational History    Occupation: demolition   Tobacco Use    Smoking status: Every Day     Current packs/day: 2.00     Average packs/day: 2.0 packs/day for 30.0 years (60.0 ttl pk-yrs)     Types: Cigarettes    Smokeless tobacco: Never   Substance and Sexual Activity    Alcohol use: Yes     Alcohol/week: 1.0 standard drink of alcohol     Types: 1 Shots of liquor per week     Comment: 1/2 pint 2 x per week - quit approximately 5 months ago    Drug use: No    Sexual activity: Yes     Partners: Female     Birth control/protection: None     Comment: No  history of STDs.   Social History Narrative    The patient does not exercise regularly ().    Rates diet as fair.    He is satisfied with weight.               FAMILY HISTORY:       Family History   Problem Relation Age of Onset    Heart attack Father 80    Heart disease Father     Drug abuse Father     Cancer Maternal Grandmother         colon       ALLERGIES AND MEDICATIONS: updated and reviewed.      274}  Review of patient's allergies indicates:  No Known Allergies  Medication List with Changes/Refills   New Medications    METHOCARBAMOL (ROBAXIN) 500 MG TAB    Take 1 tablet (500 mg total) by mouth 4 (four) times daily. for 10 days   Current Medications    ACETAMINOPHEN (TYLENOL) 325 MG TABLET    Take 650 mg by mouth 3 (three) times daily as needed.    ALBUTEROL (PROVENTIL/VENTOLIN HFA) 90 MCG/ACTUATION INHALER    Inhale 1-2 puffs into the lungs every 6 (six) hours as needed. Rescue    ALBUTEROL-IPRATROPIUM (DUO-NEB) 2.5 MG-0.5 MG/3 ML NEBULIZER SOLUTION    Take 3 mLs by nebulization every 6 (six) hours as needed for Wheezing or Shortness of Breath.    ATORVASTATIN (LIPITOR) 20 MG TABLET    Take 1 tablet (20 mg total) by mouth once daily.    ESCITALOPRAM OXALATE (LEXAPRO) 10 MG TABLET    Take 10 mg by mouth.    LISINOPRIL 10 MG TABLET    Take 1 tablet (10 mg total) by mouth once daily.    MECLIZINE (ANTIVERT) 25 MG TABLET    Take 25 mg by mouth.    MELATONIN (MELATIN) 5 MG    Take 5 mg by mouth.    METFORMIN (GLUCOPHAGE) 500 MG TABLET    Take 1 tablet (500 mg total) by mouth 2 (two) times daily with meals.    PREGABALIN (LYRICA) 100 MG CAPSULE    Take 1 capsule (100 mg total) by mouth 2 (two) times daily.   Changed and/or Refilled Medications    Modified Medication Previous Medication    AMLODIPINE (NORVASC) 10 MG TABLET amLODIPine (NORVASC) 10 MG tablet       Take 1 tablet (10 mg total) by mouth once daily.    Take 1 tablet (10 mg total) by mouth once daily.    PANTOPRAZOLE (PROTONIX) 40 MG TABLET pantoprazole  "(PROTONIX) 40 MG tablet       Take 1 tablet (40 mg total) by mouth once daily.    Take 1 tablet (40 mg total) by mouth once daily.   Discontinued Medications    BACLOFEN (LIORESAL) 20 MG TABLET    Take 1 tablet (20 mg total) by mouth 3 (three) times daily.       SCREENING HISTORY:    274}  Health Maintenance         Date Due Completion Date    Eye Exam Never done ---    TETANUS VACCINE Never done ---    Colorectal Cancer Screening Never done ---    Diabetes Urine Screening 03/24/2015 3/24/2014    LDCT Lung Screen Never done ---    Shingles Vaccine (1 of 2) Never done ---    Foot Exam 07/06/2017 7/6/2016    COVID-19 Vaccine (2 - Booster for Jimy series) 07/21/2021 5/26/2021    Hemoglobin A1c 09/08/2023 3/8/2023    Lipid Panel 03/08/2024 3/8/2023    Pneumococcal Vaccines (Age 0-64) (3 - PPSV23 or PCV20) 10/15/2030 8/29/2016            REVIEW OF SYSTEMS:   Review of Systems   All other systems reviewed and are negative.      PHYSICAL EXAM:      274}  BP (!) 154/82 (BP Location: Left arm, Patient Position: Sitting, BP Method: Small (Manual))   Pulse 86   Temp 97.7 °F (36.5 °C)   Ht 5' 9" (1.753 m)   Wt 67.9 kg (149 lb 11.1 oz)   SpO2 (!) 90%   BMI 22.11 kg/m²   Wt Readings from Last 3 Encounters:   09/20/23 67.9 kg (149 lb 11.1 oz)   09/02/23 71.2 kg (157 lb)   08/02/23 70.1 kg (154 lb 6.9 oz)     BP Readings from Last 3 Encounters:   09/20/23 (!) 154/82   09/12/23 (!) 140/81   09/02/23 (!) 179/93     Estimated body mass index is 22.11 kg/m² as calculated from the following:    Height as of this encounter: 5' 9" (1.753 m).    Weight as of this encounter: 67.9 kg (149 lb 11.1 oz).     Physical Exam  Constitutional:       Appearance: He is normal weight.   HENT:      Head: Normocephalic and atraumatic.      Right Ear: External ear normal.      Left Ear: External ear normal.      Nose: Nose normal. No congestion or rhinorrhea.   Eyes:      Extraocular Movements: Extraocular movements intact.      Pupils: Pupils " are equal, round, and reactive to light.   Cardiovascular:      Rate and Rhythm: Normal rate and regular rhythm.      Heart sounds: Normal heart sounds. No murmur heard.     No gallop.   Pulmonary:      Effort: No respiratory distress.      Breath sounds: Wheezing and rhonchi present.   Abdominal:      General: There is no distension.      Palpations: Abdomen is soft. There is no mass.      Tenderness: There is no abdominal tenderness.   Musculoskeletal:         General: Normal range of motion.      Cervical back: Normal range of motion. No tenderness.   Neurological:      Mental Status: He is alert.         LABS:   274}  I have reviewed old labs below:  Lab Results   Component Value Date    WBC 4.17 03/08/2023    HGB 11.8 (L) 03/08/2023    HCT 37.0 (L) 03/08/2023    MCV 93 03/08/2023     03/08/2023     03/08/2023    K 4.0 03/08/2023     03/08/2023    CALCIUM 9.2 03/08/2023    PHOS 3.2 01/25/2023    CO2 29 03/08/2023    GLU 76 03/08/2023    BUN 7 03/08/2023    CREATININE 0.8 03/08/2023    ANIONGAP 7 (L) 03/08/2023    ESTGFRAFRICA >60.0 07/26/2022    EGFRNONAA >60.0 07/26/2022    PROT 6.8 03/08/2023    ALBUMIN 3.5 03/08/2023    BILITOT 0.8 03/08/2023    ALKPHOS 53 (L) 03/08/2023    ALT 9 (L) 03/08/2023    AST 15 03/08/2023    INR 1.1 11/22/2022    CHOL 150 03/08/2023    TRIG 49 03/08/2023    HDL 46 03/08/2023    LDLCALC 94.2 03/08/2023    TSH 0.536 11/22/2022    HGBA1C 6.1 (H) 03/08/2023       ASSESSMENT AND PLAN:  274}  1. Encounter to establish care  -     CT Chest Lung Screening Low Dose; Future; Expected date: 10/20/2023  -     Hemoglobin A1C; Future; Expected date: 12/20/2023  -     Basic Metabolic Panel; Future; Expected date: 12/20/2023  -     Microalbumin/Creatinine Ratio, Urine; Future; Expected date: 12/20/2023  -     methocarbamoL (ROBAXIN) 500 MG Tab; Take 1 tablet (500 mg total) by mouth 4 (four) times daily. for 10 days  Dispense: 40 tablet; Refill: 0    2. Chronic obstructive  pulmonary disease, unspecified COPD type  Overview:  Patient has smoked cigarettes since age 15.  Continues to smoke at least 2-3 packs per day.  Intention to discontinue at this time.  Continues to have chronic cough not on controller medication.  Wheezing and rhonchi noted with inspirations.    Assessment & Plan:  We will refer to pulmonology for possible of percussion therapy, and further interventions for COPD.  Recommended smoking cessation.    LDCT ordered      Orders:  -     Ambulatory referral/consult to Pulmonology; Future; Expected date: 09/27/2023    3. Type 2 diabetes mellitus with hyperglycemia, without long-term current use of insulin  -     Ambulatory referral/consult to Podiatry; Future; Expected date: 09/27/2023  -     Hemoglobin A1C; Future; Expected date: 12/20/2023  -     Basic Metabolic Panel; Future; Expected date: 12/20/2023  -     Microalbumin/Creatinine Ratio, Urine; Future; Expected date: 12/20/2023    4. Hypertension associated with diabetes  -     amLODIPine (NORVASC) 10 MG tablet; Take 1 tablet (10 mg total) by mouth once daily.  Dispense: 90 tablet; Refill: 3    5. History of alcoholism    6. Gastroesophageal reflux disease without esophagitis  -     pantoprazole (PROTONIX) 40 MG tablet; Take 1 tablet (40 mg total) by mouth once daily.  Dispense: 90 tablet; Refill: 3    7. Intermittent claudication    8. Muscle spasm  -     methocarbamoL (ROBAXIN) 500 MG Tab; Take 1 tablet (500 mg total) by mouth 4 (four) times daily. for 10 days  Dispense: 40 tablet; Refill: 0    9. Personal history of nicotine dependence  -     CT Chest Lung Screening Low Dose; Future; Expected date: 10/20/2023    10. Colon cancer screening  -     Cologuard Screening (Multitarget Stool DNA); Future; Expected date: 09/20/2023           Orders Placed This Encounter   Procedures    Cologuard Screening (Multitarget Stool DNA)    CT Chest Lung Screening Low Dose    Hemoglobin A1C    Basic Metabolic Panel     Microalbumin/Creatinine Ratio, Urine    Ambulatory referral/consult to Podiatry    Ambulatory referral/consult to Pulmonology       Follow up in about 2 months (around 11/20/2023) for RTC in 2 mos f/u PJ. or sooner as needed.

## 2023-09-20 NOTE — ASSESSMENT & PLAN NOTE
We will refer to pulmonology for possible of percussion therapy, and further interventions for COPD.  Recommended smoking cessation.    LDCT ordered

## 2023-09-22 ENCOUNTER — TELEPHONE (OUTPATIENT)
Dept: FAMILY MEDICINE | Facility: CLINIC | Age: 58
End: 2023-09-22
Payer: MEDICARE

## 2023-09-22 DIAGNOSIS — M62.838 MUSCLE SPASM: Primary | ICD-10-CM

## 2023-09-22 DIAGNOSIS — R52 PAIN AGGRAVATED BY WALKING: ICD-10-CM

## 2023-09-22 RX ORDER — TIZANIDINE 4 MG/1
4 TABLET ORAL EVERY 8 HOURS
Qty: 30 TABLET | Refills: 0 | Status: SHIPPED | OUTPATIENT
Start: 2023-09-22 | End: 2023-10-02

## 2023-09-22 NOTE — TELEPHONE ENCOUNTER
Call placed to patient for notification.  Patient states he would like to discontinue the Robaxin.  Patient states she do not want to take another muscle relaxer.  Patient states she is agreeable to trying something else, just not a muscle relaxer. Please advise. Thank you.

## 2023-09-22 NOTE — TELEPHONE ENCOUNTER
Jeanette Asher Staff    ----- Message from Jeanette Asher sent at 9/22/2023  2:27 PM CDT -----  Type:  Needs Medical Advice    Who Called:  Pt's wife Mariann    Would the patient rather a call back or a response via [facility]?  Call back    Best Call Back Number:  [phone]    Additional Information:  The medication that was given to pt made it worse and stiffened him up. Mariann is asking if  can get pt oxycodone to help with the pain.   Please call back to advise. Thanks!

## 2023-09-22 NOTE — TELEPHONE ENCOUNTER
Tried calling pt about setting up Pulmonary appt p/Referral  No answer, No voicemail, No pt portal

## 2023-09-22 NOTE — TELEPHONE ENCOUNTER
"Patient/patient's mother (Mariann) states Methocarbamol did not relieve patient's symptoms.  Patient states Methocarbamol "causes my body to stiffen up, and I can hardly move."  Patient's mother is requesting to know if an order for Oxycodone can be given to "relax" patient's body.  Please advise. Thank you.   "

## 2023-09-25 NOTE — TELEPHONE ENCOUNTER
Call placed to patient.  Call answered by patient's family member (Mariann).  Writer reviewed information in attached message from . Ms. Waite verbalized understanding. Patient is already established with pain management.  Patient has an existing appointment scheduled with pain management (EVY Peacock) on 10-12-23.  Advised for patient to keep pain management appointment for follow up.           Toan Banks., DO  You 3 days ago     LB  Please inform them that there are many other options available with regard to controlling his discomfort.  Due to his medical history he really is unable to take NSAIDs.  I will send tizanidine if he would like to try this medication. It works a little bit differently than Robaxin it may be better for him.  If you have any difficulties with this medication we can consider pain management for referral

## 2023-09-26 ENCOUNTER — HOSPITAL ENCOUNTER (OUTPATIENT)
Dept: RADIOLOGY | Facility: HOSPITAL | Age: 58
Discharge: HOME | End: 2023-09-26
Attending: STUDENT IN AN ORGANIZED HEALTH CARE EDUCATION/TRAINING PROGRAM
Payer: MEDICARE

## 2023-09-26 DIAGNOSIS — Z87.891 PERSONAL HISTORY OF NICOTINE DEPENDENCE: ICD-10-CM

## 2023-09-26 DIAGNOSIS — Z76.89 ENCOUNTER TO ESTABLISH CARE: ICD-10-CM

## 2023-09-26 PROCEDURE — 71271 CT CHEST LUNG SCREENING LOW DOSE: ICD-10-PCS | Mod: 26,,, | Performed by: RADIOLOGY

## 2023-09-26 PROCEDURE — 71271 CT THORAX LUNG CANCER SCR C-: CPT | Mod: TC

## 2023-09-26 PROCEDURE — 71271 CT THORAX LUNG CANCER SCR C-: CPT | Mod: 26,,, | Performed by: RADIOLOGY

## 2023-09-27 ENCOUNTER — TELEPHONE (OUTPATIENT)
Dept: PULMONOLOGY | Facility: CLINIC | Age: 58
End: 2023-09-27
Payer: MEDICARE

## 2023-09-27 ENCOUNTER — TELEPHONE (OUTPATIENT)
Dept: FAMILY MEDICINE | Facility: CLINIC | Age: 58
End: 2023-09-27
Payer: MEDICARE

## 2023-09-27 NOTE — TELEPHONE ENCOUNTER
----- Message from Chloe Reyez sent at 9/27/2023 11:18 AM CDT -----  Regarding: results  Contact: mom  Type:  Test Results    Who Called: mom Mariann  Name of Test (Lab/Mammo/Etc): CT SCAN, non fasting labs  Date of Test:9/26/23  Ordering Provider:   Where the test was performed: [facility] lab  Would the patient rather a call back or a response via [facility]? Call back  Best Call Back Number: [phone]   Additional Information:  sts they would like his results from yesterday--please advise

## 2023-09-27 NOTE — TELEPHONE ENCOUNTER
----- Message from Chloe Reyez sent at 9/27/2023 11:15 AM CDT -----  Regarding: appt needed  Contact: Mariann  Type:  Sooner Apoointment Request    Caller is requesting a sooner appointment.  Caller declined first available appointment listed below.  Caller will not accept being placed on the waitlist and is requesting a message be sent to doctor.    Name of Caller:arnie ching  When is the first available appointment?10/6/23  Symptoms:stiffness, in a lot of pain, COPD is acting up, weak  Would the patient rather a call back or a response via [facility]? Call back  Best Call Back Number:[phone] or [phone]    Additional Information: sts the pt is not feeling well at all and needs an appt today if possible. She sts he has a lot of problems going on--please advise and thank you

## 2023-09-27 NOTE — TELEPHONE ENCOUNTER
"Please see attached messages.  Patient states he continues to experience "tightness" in arms, legs, his side, and feet.   Also states he is weak. Denies any sob or difficulty breathing at this time. Patient requesting to schedule an appointment today, also requesting results of labs and CT performed on yesterday.  Upon further assessment it was noted some of the labs performed on yesterday are still being processed. This was fully explained to patient. First available appointment noted for tomorrow's date 9-28-23 with LAURY Narvaez. Writer offered this appointment to patient who accepted appointment.  Writer advised patient to go to ER if symptoms worsen, if any sob occurs, or if he believes he needs to be seen today. Will send follow up message to  for notification.    "

## 2023-09-28 ENCOUNTER — OFFICE VISIT (OUTPATIENT)
Dept: FAMILY MEDICINE | Facility: CLINIC | Age: 58
End: 2023-09-28
Payer: MEDICARE

## 2023-09-28 VITALS
SYSTOLIC BLOOD PRESSURE: 152 MMHG | HEIGHT: 69 IN | TEMPERATURE: 98 F | BODY MASS INDEX: 22.04 KG/M2 | OXYGEN SATURATION: 97 % | DIASTOLIC BLOOD PRESSURE: 90 MMHG | WEIGHT: 148.81 LBS | HEART RATE: 84 BPM

## 2023-09-28 DIAGNOSIS — F17.200 TOBACCO DEPENDENCY: ICD-10-CM

## 2023-09-28 DIAGNOSIS — I15.2 HYPERTENSION ASSOCIATED WITH DIABETES: ICD-10-CM

## 2023-09-28 DIAGNOSIS — I10 PRIMARY HYPERTENSION: ICD-10-CM

## 2023-09-28 DIAGNOSIS — R91.1 LUNG NODULE: ICD-10-CM

## 2023-09-28 DIAGNOSIS — J44.9 CHRONIC OBSTRUCTIVE PULMONARY DISEASE, UNSPECIFIED COPD TYPE: ICD-10-CM

## 2023-09-28 DIAGNOSIS — G89.0 CENTRAL PAIN SYNDROME: ICD-10-CM

## 2023-09-28 DIAGNOSIS — G81.11 SPASTIC HEMIPARESIS OF RIGHT DOMINANT SIDE: ICD-10-CM

## 2023-09-28 DIAGNOSIS — E11.65 TYPE 2 DIABETES MELLITUS WITH HYPERGLYCEMIA, WITHOUT LONG-TERM CURRENT USE OF INSULIN: ICD-10-CM

## 2023-09-28 DIAGNOSIS — J18.9 COMMUNITY ACQUIRED PNEUMONIA OF RIGHT LUNG, UNSPECIFIED PART OF LUNG: Primary | ICD-10-CM

## 2023-09-28 DIAGNOSIS — E11.59 HYPERTENSION ASSOCIATED WITH DIABETES: ICD-10-CM

## 2023-09-28 PROCEDURE — 99214 PR OFFICE/OUTPT VISIT, EST, LEVL IV, 30-39 MIN: ICD-10-PCS | Mod: 25,S$GLB,, | Performed by: NURSE PRACTITIONER

## 2023-09-28 PROCEDURE — 3080F PR MOST RECENT DIASTOLIC BLOOD PRESSURE >= 90 MM HG: ICD-10-PCS | Mod: CPTII,S$GLB,, | Performed by: NURSE PRACTITIONER

## 2023-09-28 PROCEDURE — 3008F PR BODY MASS INDEX (BMI) DOCUMENTED: ICD-10-PCS | Mod: CPTII,S$GLB,, | Performed by: NURSE PRACTITIONER

## 2023-09-28 PROCEDURE — 96372 PR INJECTION,THERAP/PROPH/DIAG2ST, IM OR SUBCUT: ICD-10-PCS | Mod: S$GLB,,, | Performed by: NURSE PRACTITIONER

## 2023-09-28 PROCEDURE — 3044F PR MOST RECENT HEMOGLOBIN A1C LEVEL <7.0%: ICD-10-PCS | Mod: CPTII,S$GLB,, | Performed by: NURSE PRACTITIONER

## 2023-09-28 PROCEDURE — 1160F RVW MEDS BY RX/DR IN RCRD: CPT | Mod: CPTII,S$GLB,, | Performed by: NURSE PRACTITIONER

## 2023-09-28 PROCEDURE — 99214 OFFICE O/P EST MOD 30 MIN: CPT | Mod: 25,S$GLB,, | Performed by: NURSE PRACTITIONER

## 2023-09-28 PROCEDURE — 4010F ACE/ARB THERAPY RXD/TAKEN: CPT | Mod: CPTII,S$GLB,, | Performed by: NURSE PRACTITIONER

## 2023-09-28 PROCEDURE — 96372 THER/PROPH/DIAG INJ SC/IM: CPT | Mod: S$GLB,,, | Performed by: NURSE PRACTITIONER

## 2023-09-28 PROCEDURE — 3008F BODY MASS INDEX DOCD: CPT | Mod: CPTII,S$GLB,, | Performed by: NURSE PRACTITIONER

## 2023-09-28 PROCEDURE — 1160F PR REVIEW ALL MEDS BY PRESCRIBER/CLIN PHARMACIST DOCUMENTED: ICD-10-PCS | Mod: CPTII,S$GLB,, | Performed by: NURSE PRACTITIONER

## 2023-09-28 PROCEDURE — 3077F SYST BP >= 140 MM HG: CPT | Mod: CPTII,S$GLB,, | Performed by: NURSE PRACTITIONER

## 2023-09-28 PROCEDURE — 3080F DIAST BP >= 90 MM HG: CPT | Mod: CPTII,S$GLB,, | Performed by: NURSE PRACTITIONER

## 2023-09-28 PROCEDURE — 3077F PR MOST RECENT SYSTOLIC BLOOD PRESSURE >= 140 MM HG: ICD-10-PCS | Mod: CPTII,S$GLB,, | Performed by: NURSE PRACTITIONER

## 2023-09-28 PROCEDURE — 4010F PR ACE/ARB THEARPY RXD/TAKEN: ICD-10-PCS | Mod: CPTII,S$GLB,, | Performed by: NURSE PRACTITIONER

## 2023-09-28 PROCEDURE — 99999 PR PBB SHADOW E&M-EST. PATIENT-LVL IV: CPT | Mod: PBBFAC,,, | Performed by: NURSE PRACTITIONER

## 2023-09-28 PROCEDURE — 99999 PR PBB SHADOW E&M-EST. PATIENT-LVL IV: ICD-10-PCS | Mod: PBBFAC,,, | Performed by: NURSE PRACTITIONER

## 2023-09-28 PROCEDURE — 3044F HG A1C LEVEL LT 7.0%: CPT | Mod: CPTII,S$GLB,, | Performed by: NURSE PRACTITIONER

## 2023-09-28 PROCEDURE — 1159F PR MEDICATION LIST DOCUMENTED IN MEDICAL RECORD: ICD-10-PCS | Mod: CPTII,S$GLB,, | Performed by: NURSE PRACTITIONER

## 2023-09-28 PROCEDURE — 1159F MED LIST DOCD IN RCRD: CPT | Mod: CPTII,S$GLB,, | Performed by: NURSE PRACTITIONER

## 2023-09-28 RX ORDER — ACETAMINOPHEN AND CODEINE PHOSPHATE 300; 30 MG/1; MG/1
1 TABLET ORAL EVERY 6 HOURS PRN
Qty: 60 TABLET | Refills: 0 | Status: SHIPPED | OUTPATIENT
Start: 2023-09-28 | End: 2023-10-28

## 2023-09-28 RX ORDER — METHYLPREDNISOLONE ACETATE 40 MG/ML
40 INJECTION, SUSPENSION INTRA-ARTICULAR; INTRALESIONAL; INTRAMUSCULAR; SOFT TISSUE
Status: DISCONTINUED | OUTPATIENT
Start: 2023-09-28 | End: 2023-09-28

## 2023-09-28 RX ORDER — DEXAMETHASONE SODIUM PHOSPHATE 4 MG/ML
4 INJECTION, SOLUTION INTRA-ARTICULAR; INTRALESIONAL; INTRAMUSCULAR; INTRAVENOUS; SOFT TISSUE
Status: COMPLETED | OUTPATIENT
Start: 2023-09-28 | End: 2023-09-28

## 2023-09-28 RX ORDER — CEFTRIAXONE 1 G/1
1 INJECTION, POWDER, FOR SOLUTION INTRAMUSCULAR; INTRAVENOUS
Status: COMPLETED | OUTPATIENT
Start: 2023-09-28 | End: 2023-09-28

## 2023-09-28 RX ADMIN — CEFTRIAXONE 1 G: 1 INJECTION, POWDER, FOR SOLUTION INTRAMUSCULAR; INTRAVENOUS at 11:09

## 2023-09-28 RX ADMIN — DEXAMETHASONE SODIUM PHOSPHATE 4 MG: 4 INJECTION, SOLUTION INTRA-ARTICULAR; INTRALESIONAL; INTRAMUSCULAR; INTRAVENOUS; SOFT TISSUE at 11:09

## 2023-09-28 NOTE — PROGRESS NOTES
Subjective:       Patient ID: Hernan Badillo is a 57 y.o. male.    Chief Complaint: Follow-up    HPI    Patient presents today for follow up visit. He BP today 152/90-he reports occurrences when he forgets to take his medication. He c/o decrease quality of life due to continuous right sided numbness and pain since CVA on 11/22/22. He has had PT/OT and inpatient Rehab. Patient has tried several muscle relaxer without relief. He c/o of a productive cough-recent CT shows possible CAP-I will give antibiotic injection due to patient forgetting to take medication. He has a pulmonology appointment on 10/3/23.        9/26/23 CT Chest Impression:     Lung-RADS Category:  2 - Benign Appearance or Behavior - continue annual screening with LDCT in 12 months.     Clinically or potentially clinically significant non lung cancer finding:  S - Significant.     Prior Lung Cancer Modifier:  None     There is a right lower lobe reticulonodular infiltrate which may represent pneumonia.  This could obscure nodules in the right lower lobe.  Follow-up chest x-ray or CT and 4-6 weeks is recommended to assure clearance.  Follow-up of the 1.5 cm ground-glass opacity in the right upper lobe and a 3 mm nodule in the left lower lobe is recommended at 12 months.    Past Medical History:   Diagnosis Date    COPD (chronic obstructive pulmonary disease)     Diabetes mellitus, type 2     Hypertension        Review of patient's allergies indicates:  No Known Allergies      Current Outpatient Medications:     acetaminophen (TYLENOL) 325 MG tablet, Take 650 mg by mouth 3 (three) times daily as needed., Disp: , Rfl:     albuterol (PROVENTIL/VENTOLIN HFA) 90 mcg/actuation inhaler, Inhale 1-2 puffs into the lungs every 6 (six) hours as needed. Rescue, Disp: 1 g, Rfl: 0    amLODIPine (NORVASC) 10 MG tablet, Take 1 tablet (10 mg total) by mouth once daily., Disp: 90 tablet, Rfl: 3    atorvastatin (LIPITOR) 20 MG tablet, Take 1 tablet (20 mg total) by mouth once  daily., Disp: 90 tablet, Rfl: 3    EScitalopram oxalate (LEXAPRO) 10 MG tablet, Take 10 mg by mouth., Disp: , Rfl:     lisinopriL 10 MG tablet, Take 1 tablet (10 mg total) by mouth once daily., Disp: 90 tablet, Rfl: 3    meclizine (ANTIVERT) 25 mg tablet, Take 25 mg by mouth., Disp: , Rfl:     melatonin (MELATIN) 5 mg, Take 5 mg by mouth., Disp: , Rfl:     metFORMIN (GLUCOPHAGE) 500 MG tablet, Take 1 tablet (500 mg total) by mouth 2 (two) times daily with meals., Disp: 30 tablet, Rfl: 2    pantoprazole (PROTONIX) 40 MG tablet, Take 1 tablet (40 mg total) by mouth once daily., Disp: 90 tablet, Rfl: 3    pregabalin (LYRICA) 100 MG capsule, Take 1 capsule (100 mg total) by mouth 2 (two) times daily., Disp: 60 capsule, Rfl: 2    acetaminophen-codeine 300-30mg (TYLENOL #3) 300-30 mg Tab, Take 1 tablet by mouth every 6 (six) hours as needed (right sided pain)., Disp: 60 tablet, Rfl: 0    albuterol-ipratropium (DUO-NEB) 2.5 mg-0.5 mg/3 mL nebulizer solution, Take 3 mLs by nebulization every 6 (six) hours as needed for Wheezing or Shortness of Breath., Disp: 25 each, Rfl: 3    tiZANidine (ZANAFLEX) 4 MG tablet, Take 1 tablet (4 mg total) by mouth every 8 (eight) hours. for 10 days (Patient not taking: Reported on 9/28/2023), Disp: 30 tablet, Rfl: 0    Current Facility-Administered Medications:     cefTRIAXone injection 1 g, 1 g, Intramuscular, 1 time in Clinic/HODBrice Pleshetta D., NP    dexAMETHasone injection 4 mg, 4 mg, Intramuscular, 1 time in Clinic/HODBrice Pleshetta D., NP    Review of Systems   Constitutional:  Negative for unexpected weight change.   HENT:  Negative for trouble swallowing.    Eyes:  Negative for visual disturbance.   Respiratory:  Negative for shortness of breath.    Cardiovascular:  Negative for chest pain, palpitations and leg swelling.   Gastrointestinal:  Negative for blood in stool.   Genitourinary:  Negative for hematuria.   Skin:  Negative for rash.   Allergic/Immunologic:  "Negative for immunocompromised state.   Neurological:  Negative for headaches.   Hematological:  Does not bruise/bleed easily.   Psychiatric/Behavioral:  Negative for agitation. The patient is not nervous/anxious.        Objective:      BP (!) 152/90 (BP Location: Right arm, Patient Position: Sitting, BP Method: Small (Manual))   Pulse 84   Temp 98.1 °F (36.7 °C) (Oral)   Ht 5' 9" (1.753 m)   Wt 67.5 kg (148 lb 13 oz)   SpO2 97%   BMI 21.98 kg/m²   Physical Exam  Constitutional:       Appearance: He is well-developed.   Eyes:      Conjunctiva/sclera: Conjunctivae normal.      Pupils: Pupils are equal, round, and reactive to light.   Cardiovascular:      Rate and Rhythm: Normal rate and regular rhythm.      Heart sounds: Normal heart sounds.   Pulmonary:      Effort: Pulmonary effort is normal. Tachypnea present.      Breath sounds: Decreased air movement present.   Musculoskeletal:         General: Normal range of motion.   Neurological:      Mental Status: He is alert and oriented to person, place, and time.   Psychiatric:         Behavior: Behavior normal.         Thought Content: Thought content normal.         Judgment: Judgment normal.         Assessment:       1. Community acquired pneumonia of right lung, unspecified part of lung    2. Central pain syndrome    3. Spastic hemiparesis of right dominant side    4. Chronic obstructive pulmonary disease, unspecified COPD type    5. Lung nodule    6. Tobacco dependency    7. Primary hypertension    8. Type 2 diabetes mellitus with hyperglycemia, without long-term current use of insulin    9. Hypertension associated with diabetes    10. BMI 21.0-21.9, adult        Plan:       Community acquired pneumonia of right lung, unspecified part of lung  -     cefTRIAXone injection 1 g  -     Discontinue: methylPREDNISolone acetate injection 40 mg  -     dexAMETHasone injection 4 mg    Central pain syndrome  -     Discontinue: methylPREDNISolone acetate injection 40 " mg  -     dexAMETHasone injection 4 mg  -     acetaminophen-codeine 300-30mg (TYLENOL #3) 300-30 mg Tab; Take 1 tablet by mouth every 6 (six) hours as needed (right sided pain).  Dispense: 60 tablet; Refill: 0    Spastic hemiparesis of right dominant side  -     acetaminophen-codeine 300-30mg (TYLENOL #3) 300-30 mg Tab; Take 1 tablet by mouth every 6 (six) hours as needed (right sided pain).  Dispense: 60 tablet; Refill: 0    Chronic obstructive pulmonary disease, unspecified COPD type    Lung nodule  Stable, keep appointment with Pulmonology    Tobacco dependency  Educated on tobacco cessation    Type 2 diabetes mellitus with hyperglycemia, without long-term current use of insulin  Stable, continue management  Follow the ADA diet  Hemoglobin A1C   Date Value Ref Range Status   03/08/2023 6.1 (H) 4.0 - 5.6 % Final     Comment:     ADA Screening Guidelines:  5.7-6.4%  Consistent with prediabetes  >or=6.5%  Consistent with diabetes    High levels of fetal hemoglobin interfere with the HbA1C  assay. Heterozygous hemoglobin variants (HbS, HgC, etc)do  not significantly interfere with this assay.   However, presence of multiple variants may affect accuracy.     11/22/2022 6.6 (H) 4.0 - 5.6 % Final     Comment:     ADA Screening Guidelines:  5.7-6.4%  Consistent with prediabetes  >or=6.5%  Consistent with diabetes    High levels of fetal hemoglobin interfere with the HbA1C  assay. Heterozygous hemoglobin variants (HbS, HgC, etc)do  not significantly interfere with this assay.   However, presence of multiple variants may affect accuracy.     07/26/2022 6.6 (H) 4.0 - 5.6 % Final     Comment:     ADA Screening Guidelines:  5.7-6.4%  Consistent with prediabetes  >or=6.5%  Consistent with diabetes    High levels of fetal hemoglobin interfere with the HbA1C  assay. Heterozygous hemoglobin variants (HbS, HgC, etc)do  not significantly interfere with this assay.   However, presence of multiple variants may affect accuracy.         Hypertension associated with diabetes  One week nurse visit for BP check  We discuss purchasing  pill box to help him remember to take his medication  Low sodium diet   BP Readings from Last 3 Encounters:   09/28/23 (!) 152/90   09/20/23 (!) 154/82   09/12/23 (!) 140/81      BMI 21.0-21.9, adult  Stable, continue management        Patient readiness: acceptance and barriers:none    During the course of the visit the patient was educated and counseled about the following:     Diabetes:  Addressed ADA diet.  Suggested low cholesterol diet.  Hypertension:   Dietary sodium restriction.  Regular aerobic exercise.    Goals: Diabetes: Maintain Hemoglobin A1C below 7 and Hypertension: Reduce Blood Pressure    Did patient meet goals/outcomes: No    The following self management tools provided: blood pressure log    Patient Instructions (the written plan) was given to the patient/family.     Time spent with patient: 30 minutes    Barriers to medications present (yes )    Adverse reactions to current medications (no)    Over the counter medications reviewed (Yes)

## 2023-10-03 ENCOUNTER — TELEPHONE (OUTPATIENT)
Dept: FAMILY MEDICINE | Facility: CLINIC | Age: 58
End: 2023-10-03
Payer: MEDICARE

## 2023-10-03 NOTE — TELEPHONE ENCOUNTER
"Call placed to number in attached message below. Call answered by patient who states he did not know where his mother was at time of call. Patient states he would like assistance with the pain and stiffness. Patient requested antibiotics.  Upon further assessment it was noted patient recently was seen in clinic by LAURY Narvaez, antibiotic & steroid injection given in clinic.  Documentation states the following:    Patient presents today for follow up visit. He BP today 152/90-he reports occurrences when he forgets to take his medication. He c/o decrease quality of life due to continuous right sided numbness and pain since CVA on 11/22/22. He has had PT/OT and inpatient Rehab. Patient has tried several muscle relaxer without relief. He c/o of a productive cough-recent CT shows possible CAP-I will give antibiotic injection due to patient forgetting to take medication. He has a pulmonology appointment on 10/3/23.    Patient was given prescription for Tylenol #3 quantity of 60.  Patient states this medication does not help. Call placed to contact number listed on file for patient's mother (Mariann) as attached message states patient's mother was original person reaching out to the office.  Mrs. Waite states she would like to know if there is any additional options for treatment of pain and stiffness. States patient has been under the care of pain management, states pain management does not help.  Requesting to know if there is any research programs for patient's condition that patient may take part of.  Also requesting to know if patient may go to an inpatient rehab facility for "a few days" to see if they can "come up with treatment options that will help."   Please advise. Thank you.    "

## 2023-10-03 NOTE — TELEPHONE ENCOUNTER
----- Message from Ying Beaver sent at 10/2/2023  3:22 PM CDT -----  Regarding: rt call  Type:  Patient Returning Call    Who Called:mom Lizbeth    Who Left Message for Patient:unknown    Does the patient know what this is regarding?:yes    Best Call Back Number:[phone]      Additional Information: Lizbeth st that pt muscles are tight again in wants to know to do. She st that this been going on since his stroke in wants to know is this normal.  Please call to discuss.

## 2023-10-03 NOTE — TELEPHONE ENCOUNTER
Call placed to patient's mother (Mariann) for notification. Mrs. Waite verbalized understanding. Patient has an existing pain management provider JANELLE Peacock.  Writer recommended for patient to follow up with pain management provider. Mrs. Waite verbalized understanding.         Thank you for your inquiry,     I do agree that he should complete his antibiotic therapy and he should follow up with pulmonology as planned.     With regard to his pain I recommend that he be seen by pain management can provided referral to pain management for treatment options.  Unfortunately would not be possible for him to simply go into inpatient rehab without having a recent hospital visit.  Usually he would be discharged directly from the hospital into inpatient rehab.  At this time his options are limited to outpatient rehabilitation if needed.     If he would like I can place a referral to pain management for further options and management of his pain

## 2023-10-03 NOTE — TELEPHONE ENCOUNTER
"Pulm referral: appointment cancelled. Patient phone "not in service". Spoke with mom, Mariann. She will talk with patient and call back if he wants to reschedule. Verified by repeat she knows phone number to contact.  "

## 2023-10-04 DIAGNOSIS — E11.9 TYPE 2 DIABETES MELLITUS WITHOUT COMPLICATION: ICD-10-CM

## 2023-10-09 ENCOUNTER — TELEPHONE (OUTPATIENT)
Dept: FAMILY MEDICINE | Facility: CLINIC | Age: 58
End: 2023-10-09
Payer: MEDICARE

## 2023-10-09 NOTE — TELEPHONE ENCOUNTER
Spoke to pt mother Mariann who states pt informed her that the stiffness is moving from left to right in his throat. Pt had stroke on right side. Abdominal pain is being affected as well. Pt is not eating much at all. Feels like his stomach is so tight. Some times he don't eat at all. Pt states his whole right side is affected from the stroke and feels like it is moving to the left side. Pt has weakness in right leg and due to stiffness. Pt is having trouble walking. Pt denies headaches or chest pain. Pt complains of pain from the tightness in muscles. Pt is unable to check his BP at home. Pt declined ED. Informed pt mother that symptoms are concerning. Pt states he is concerned of another stroke, he is tired, hurting, and weak. Pt scheduled appt in clinic for tomorrow. Advised pt mother if anything changes or symptoms increase pt needs to go to ED for immediate evaluation. Pt mother verbalizes understanding

## 2023-10-09 NOTE — TELEPHONE ENCOUNTER
----- Message from Julia Harrison sent at 10/9/2023  9:55 AM CDT -----  Regarding: advice  Contact: Mariann odonnell  Type: Needs Medical Advice  Who Called:  Mariann odonnell  Symptoms (please be specific):  stiffness  How long has patient had these symptoms:    Pharmacy name and phone #:    [facility] [place] - [place] - [address]  [address] [place] [address]  Phone: [phone] Fax: [phone]    Best Call Back Number: [phone]  Additional Information: Mother states patient still has stiffness in his throat and stomach.  Hard to swallow.  Real difficult to walk.  Mother states please help she doesn't know what to do to help him.  Please call mother to advise.  Thanks!

## 2023-10-10 ENCOUNTER — OFFICE VISIT (OUTPATIENT)
Dept: FAMILY MEDICINE | Facility: CLINIC | Age: 58
End: 2023-10-10
Payer: MEDICARE

## 2023-10-10 VITALS
HEART RATE: 84 BPM | TEMPERATURE: 99 F | DIASTOLIC BLOOD PRESSURE: 74 MMHG | SYSTOLIC BLOOD PRESSURE: 142 MMHG | HEIGHT: 69 IN | OXYGEN SATURATION: 95 % | WEIGHT: 145.31 LBS | BODY MASS INDEX: 21.52 KG/M2

## 2023-10-10 DIAGNOSIS — R10.9 RIGHT SIDED ABDOMINAL PAIN: Primary | ICD-10-CM

## 2023-10-10 DIAGNOSIS — M79.18 MYALGIA, UPPER ARM: ICD-10-CM

## 2023-10-10 PROCEDURE — 3078F PR MOST RECENT DIASTOLIC BLOOD PRESSURE < 80 MM HG: ICD-10-PCS | Mod: CPTII,S$GLB,, | Performed by: STUDENT IN AN ORGANIZED HEALTH CARE EDUCATION/TRAINING PROGRAM

## 2023-10-10 PROCEDURE — 99999 PR PBB SHADOW E&M-EST. PATIENT-LVL IV: ICD-10-PCS | Mod: PBBFAC,,, | Performed by: STUDENT IN AN ORGANIZED HEALTH CARE EDUCATION/TRAINING PROGRAM

## 2023-10-10 PROCEDURE — 96372 THER/PROPH/DIAG INJ SC/IM: CPT | Mod: S$GLB,,, | Performed by: STUDENT IN AN ORGANIZED HEALTH CARE EDUCATION/TRAINING PROGRAM

## 2023-10-10 PROCEDURE — 3078F DIAST BP <80 MM HG: CPT | Mod: CPTII,S$GLB,, | Performed by: STUDENT IN AN ORGANIZED HEALTH CARE EDUCATION/TRAINING PROGRAM

## 2023-10-10 PROCEDURE — 3077F SYST BP >= 140 MM HG: CPT | Mod: CPTII,S$GLB,, | Performed by: STUDENT IN AN ORGANIZED HEALTH CARE EDUCATION/TRAINING PROGRAM

## 2023-10-10 PROCEDURE — 1160F RVW MEDS BY RX/DR IN RCRD: CPT | Mod: CPTII,S$GLB,, | Performed by: STUDENT IN AN ORGANIZED HEALTH CARE EDUCATION/TRAINING PROGRAM

## 2023-10-10 PROCEDURE — 99213 OFFICE O/P EST LOW 20 MIN: CPT | Mod: 25,S$GLB,, | Performed by: STUDENT IN AN ORGANIZED HEALTH CARE EDUCATION/TRAINING PROGRAM

## 2023-10-10 PROCEDURE — 3008F PR BODY MASS INDEX (BMI) DOCUMENTED: ICD-10-PCS | Mod: CPTII,S$GLB,, | Performed by: STUDENT IN AN ORGANIZED HEALTH CARE EDUCATION/TRAINING PROGRAM

## 2023-10-10 PROCEDURE — 1159F MED LIST DOCD IN RCRD: CPT | Mod: CPTII,S$GLB,, | Performed by: STUDENT IN AN ORGANIZED HEALTH CARE EDUCATION/TRAINING PROGRAM

## 2023-10-10 PROCEDURE — 99213 PR OFFICE/OUTPT VISIT, EST, LEVL III, 20-29 MIN: ICD-10-PCS | Mod: 25,S$GLB,, | Performed by: STUDENT IN AN ORGANIZED HEALTH CARE EDUCATION/TRAINING PROGRAM

## 2023-10-10 PROCEDURE — 99999 PR PBB SHADOW E&M-EST. PATIENT-LVL IV: CPT | Mod: PBBFAC,,, | Performed by: STUDENT IN AN ORGANIZED HEALTH CARE EDUCATION/TRAINING PROGRAM

## 2023-10-10 PROCEDURE — 3008F BODY MASS INDEX DOCD: CPT | Mod: CPTII,S$GLB,, | Performed by: STUDENT IN AN ORGANIZED HEALTH CARE EDUCATION/TRAINING PROGRAM

## 2023-10-10 PROCEDURE — 3044F PR MOST RECENT HEMOGLOBIN A1C LEVEL <7.0%: ICD-10-PCS | Mod: CPTII,S$GLB,, | Performed by: STUDENT IN AN ORGANIZED HEALTH CARE EDUCATION/TRAINING PROGRAM

## 2023-10-10 PROCEDURE — 4010F PR ACE/ARB THEARPY RXD/TAKEN: ICD-10-PCS | Mod: CPTII,S$GLB,, | Performed by: STUDENT IN AN ORGANIZED HEALTH CARE EDUCATION/TRAINING PROGRAM

## 2023-10-10 PROCEDURE — 4010F ACE/ARB THERAPY RXD/TAKEN: CPT | Mod: CPTII,S$GLB,, | Performed by: STUDENT IN AN ORGANIZED HEALTH CARE EDUCATION/TRAINING PROGRAM

## 2023-10-10 PROCEDURE — 96372 PR INJECTION,THERAP/PROPH/DIAG2ST, IM OR SUBCUT: ICD-10-PCS | Mod: S$GLB,,, | Performed by: STUDENT IN AN ORGANIZED HEALTH CARE EDUCATION/TRAINING PROGRAM

## 2023-10-10 PROCEDURE — 1160F PR REVIEW ALL MEDS BY PRESCRIBER/CLIN PHARMACIST DOCUMENTED: ICD-10-PCS | Mod: CPTII,S$GLB,, | Performed by: STUDENT IN AN ORGANIZED HEALTH CARE EDUCATION/TRAINING PROGRAM

## 2023-10-10 PROCEDURE — 3044F HG A1C LEVEL LT 7.0%: CPT | Mod: CPTII,S$GLB,, | Performed by: STUDENT IN AN ORGANIZED HEALTH CARE EDUCATION/TRAINING PROGRAM

## 2023-10-10 PROCEDURE — 3077F PR MOST RECENT SYSTOLIC BLOOD PRESSURE >= 140 MM HG: ICD-10-PCS | Mod: CPTII,S$GLB,, | Performed by: STUDENT IN AN ORGANIZED HEALTH CARE EDUCATION/TRAINING PROGRAM

## 2023-10-10 PROCEDURE — 1159F PR MEDICATION LIST DOCUMENTED IN MEDICAL RECORD: ICD-10-PCS | Mod: CPTII,S$GLB,, | Performed by: STUDENT IN AN ORGANIZED HEALTH CARE EDUCATION/TRAINING PROGRAM

## 2023-10-10 RX ORDER — DEXAMETHASONE SODIUM PHOSPHATE 4 MG/ML
4 INJECTION, SOLUTION INTRA-ARTICULAR; INTRALESIONAL; INTRAMUSCULAR; INTRAVENOUS; SOFT TISSUE
Status: COMPLETED | OUTPATIENT
Start: 2023-10-10 | End: 2023-10-10

## 2023-10-10 RX ADMIN — DEXAMETHASONE SODIUM PHOSPHATE 4 MG: 4 INJECTION, SOLUTION INTRA-ARTICULAR; INTRALESIONAL; INTRAMUSCULAR; INTRAVENOUS; SOFT TISSUE at 11:10

## 2023-10-10 NOTE — PROGRESS NOTES
"SUBJECTIVE:    CHIEF COMPLAINT:   Chief Complaint   Patient presents with    Abdominal Pain    Sore Throat           274}    HISTORY OF PRESENT ILLNESS:  Hernan Badillo is a 57 y.o. male who presents to the clinic today for evaluation of right-sided abdominal pain.  He reports that he has continued to have right-sided pain in his abdomen his head as well as right lower extremity since approximately 1 year ago after his CVA.  He has ran out of his Lyrica and muscle relaxers have caused him stiffness with inability to ambulate well.  He is followed by pain management for chronic pain also.    Lengthy discussion held with patient and recommended that if symptoms are not improved he should have repeat imaging of his head and also imaging of his abdomen to determine if there is acute pathology.  Patient declines any further imaging at this time, citing past imaging was not helpful.  He would like something at this time to "make discomfort go away.   Recommended he will refilled prescription for Lyrica discussed that he should avoid ibuprofen due to history of hemorrhagic CVA.  Further discussed same concerns with patient's brother in room at the time of visit.      PAST MEDICAL HISTORY:     274}  Past Medical History:   Diagnosis Date    COPD (chronic obstructive pulmonary disease)     Diabetes mellitus, type 2     Hypertension        PAST SURGICAL HISTORY:  Past Surgical History:   Procedure Laterality Date    DENTAL SURGERY      ESOPHAGOGASTRODUODENOSCOPY N/A 6/1/2020    Procedure: EGD (ESOPHAGOGASTRODUODENOSCOPY);  Surgeon: Terrell May MD;  Location: [facility];  Service: Endoscopy;  Laterality: N/A;    HERNIA REPAIR      left inguinal    incision and drainiage         SOCIAL HISTORY:  Social History     Socioeconomic History    Marital status: Single    Number of children: 1   Occupational History    Occupation: demolition   Tobacco Use    Smoking status: Every Day     Current packs/day: 2.00     Average " packs/day: 2.0 packs/day for 30.0 years (60.0 ttl pk-yrs)     Types: Cigarettes    Smokeless tobacco: Never   Substance and Sexual Activity    Alcohol use: Yes     Alcohol/week: 1.0 standard drink of alcohol     Types: 1 Shots of liquor per week     Comment: 1/2 pint 2 x per week - quit approximately 5 months ago    Drug use: No    Sexual activity: Yes     Partners: Female     Birth control/protection: None     Comment: No history of STDs.   Social History Narrative    The patient does not exercise regularly ().    Rates diet as fair.    He is satisfied with weight.               FAMILY HISTORY:       Family History   Problem Relation Age of Onset    Heart attack Father 80    Heart disease Father     Drug abuse Father     Cancer Maternal Grandmother         colon       ALLERGIES AND MEDICATIONS: updated and reviewed.      274}  Review of patient's allergies indicates:  No Known Allergies  Medication List with Changes/Refills   Current Medications    ACETAMINOPHEN (TYLENOL) 325 MG TABLET    Take 650 mg by mouth 3 (three) times daily as needed.    ACETAMINOPHEN-CODEINE 300-30MG (TYLENOL #3) 300-30 MG TAB    Take 1 tablet by mouth every 6 (six) hours as needed (right sided pain).    ALBUTEROL (PROVENTIL/VENTOLIN HFA) 90 MCG/ACTUATION INHALER    Inhale 1-2 puffs into the lungs every 6 (six) hours as needed. Rescue    ALBUTEROL-IPRATROPIUM (DUO-NEB) 2.5 MG-0.5 MG/3 ML NEBULIZER SOLUTION    Take 3 mLs by nebulization every 6 (six) hours as needed for Wheezing or Shortness of Breath.    AMLODIPINE (NORVASC) 10 MG TABLET    Take 1 tablet (10 mg total) by mouth once daily.    ATORVASTATIN (LIPITOR) 20 MG TABLET    Take 1 tablet (20 mg total) by mouth once daily.    ESCITALOPRAM OXALATE (LEXAPRO) 10 MG TABLET    Take 10 mg by mouth.    LISINOPRIL 10 MG TABLET    Take 1 tablet (10 mg total) by mouth once daily.    MECLIZINE (ANTIVERT) 25 MG TABLET    Take 25 mg by mouth.    MELATONIN (MELATIN) 5 MG    Take 5 mg by mouth.     "METFORMIN (GLUCOPHAGE) 500 MG TABLET    Take 1 tablet (500 mg total) by mouth 2 (two) times daily with meals.    PANTOPRAZOLE (PROTONIX) 40 MG TABLET    Take 1 tablet (40 mg total) by mouth once daily.    PREGABALIN (LYRICA) 100 MG CAPSULE    Take 1 capsule (100 mg total) by mouth 2 (two) times daily.       SCREENING HISTORY:    274}  Health Maintenance         Date Due Completion Date    Eye Exam Never done ---    TETANUS VACCINE Never done ---    Colorectal Cancer Screening Never done ---    Diabetes Urine Screening 03/24/2015 3/24/2014    Shingles Vaccine (1 of 2) Never done ---    Foot Exam 07/06/2017 7/6/2016    COVID-19 Vaccine (2 - 2023-24 season) 09/01/2023 5/26/2021    Lipid Panel 03/08/2024 3/8/2023    Hemoglobin A1c 03/26/2024 9/26/2023    Urine Drug Screen 08/02/2024 8/2/2023    LDCT Lung Screen 09/26/2024 9/26/2023    Pneumococcal Vaccines (Age 0-64) (3 - PPSV23 or PCV20) 10/15/2030 8/29/2016            REVIEW OF SYSTEMS:   Review of Systems   All other systems reviewed and are negative.      PHYSICAL EXAM:      274}  BP (!) 142/74 (BP Location: Right arm, Patient Position: Sitting, BP Method: Small (Manual))   Pulse 84   Temp 98.6 °F (37 °C)   Ht 5' 9" (1.753 m)   Wt 65.9 kg (145 lb 4.5 oz)   SpO2 95%   BMI 21.45 kg/m²   Wt Readings from Last 3 Encounters:   10/10/23 65.9 kg (145 lb 4.5 oz)   09/28/23 67.5 kg (148 lb 13 oz)   09/20/23 67.9 kg (149 lb 11.1 oz)     BP Readings from Last 3 Encounters:   10/10/23 (!) 142/74   09/28/23 (!) 152/90   09/20/23 (!) 154/82     Estimated body mass index is 21.45 kg/m² as calculated from the following:    Height as of this encounter: 5' 9" (1.753 m).    Weight as of this encounter: 65.9 kg (145 lb 4.5 oz).     Physical Exam  Constitutional:       Appearance: Normal appearance.   HENT:      Head: Normocephalic and atraumatic.   Eyes:      Extraocular Movements: Extraocular movements intact.      Pupils: Pupils are equal, round, and reactive to light. "   Cardiovascular:      Rate and Rhythm: Normal rate and regular rhythm.   Pulmonary:      Effort: Pulmonary effort is normal.      Breath sounds: Rhonchi present.   Abdominal:      Palpations: Abdomen is soft.      Tenderness: There is abdominal tenderness (Tenderness to palpation in right upper quadrant and epigastric quadrant).   Musculoskeletal:         General: Normal range of motion.   Neurological:      Mental Status: He is alert. Mental status is at baseline.      Gait: Gait abnormal.         LABS:   274}  I have reviewed old labs below:  Lab Results   Component Value Date    WBC 4.17 03/08/2023    HGB 11.8 (L) 03/08/2023    HCT 37.0 (L) 03/08/2023    MCV 93 03/08/2023     03/08/2023     09/26/2023    K 3.6 09/26/2023    CL 96 09/26/2023    CALCIUM 9.1 09/26/2023    PHOS 3.2 01/25/2023    CO2 31 (H) 09/26/2023     (H) 09/26/2023    BUN 9 09/26/2023    CREATININE 0.8 09/26/2023    ANIONGAP 9 09/26/2023    ESTGFRAFRICA >60.0 07/26/2022    EGFRNONAA >60.0 07/26/2022    PROT 6.8 03/08/2023    ALBUMIN 3.5 03/08/2023    BILITOT 0.8 03/08/2023    ALKPHOS 53 (L) 03/08/2023    ALT 9 (L) 03/08/2023    AST 15 03/08/2023    INR 1.1 11/22/2022    CHOL 150 03/08/2023    TRIG 49 03/08/2023    HDL 46 03/08/2023    LDLCALC 94.2 03/08/2023    TSH 0.536 11/22/2022    HGBA1C 6.1 (H) 03/08/2023       ASSESSMENT AND PLAN:  274}  1. Right sided abdominal pain  Comments:  Patient thinks pain is due to prior CVA.  Patient declined abdominal imaging at this time, no changes in BM.  continue Lyrica therapy refills existing.     2. Myalgia, upper arm  Comments:  Feels that pain has been ongoing since CVA 1 year ago.  Had great relief with dexamethasone. Injection on today. Declines CT head  Orders:  -     dexAMETHasone injection 4 mg           No orders of the defined types were placed in this encounter.      Follow up if symptoms worsen or fail to improve. or sooner as needed.

## 2023-10-12 ENCOUNTER — TELEPHONE (OUTPATIENT)
Dept: PAIN MEDICINE | Facility: CLINIC | Age: 58
End: 2023-10-12
Payer: MEDICARE

## 2023-10-12 NOTE — TELEPHONE ENCOUNTER
Spoke with patient, states he is still having pain and it is not controlled by tylenol. Based on the note from his last office visit on 9/12/23 Dr. Maddox prescribed lyrica, patient states he has not picked it up from the pharmacy yet. Patient advised to start taking the lyrica as prescribed and reach out with any other questions. Patient verbalized understanding.

## 2023-10-12 NOTE — TELEPHONE ENCOUNTER
----- Message from Migdalia Campuzano, Patient Care Assistant sent at 10/6/2023 12:53 PM CDT -----  Contact: Mariann arnie  Pt  needs something different for pain [phone]  thanks

## 2023-10-13 ENCOUNTER — OFFICE VISIT (OUTPATIENT)
Dept: PAIN MEDICINE | Facility: CLINIC | Age: 58
End: 2023-10-13
Payer: MEDICARE

## 2023-10-13 VITALS
HEIGHT: 69 IN | HEART RATE: 63 BPM | DIASTOLIC BLOOD PRESSURE: 82 MMHG | SYSTOLIC BLOOD PRESSURE: 157 MMHG | WEIGHT: 147.63 LBS | BODY MASS INDEX: 21.86 KG/M2

## 2023-10-13 DIAGNOSIS — G89.0 CENTRAL PAIN SYNDROME: Primary | ICD-10-CM

## 2023-10-13 DIAGNOSIS — G89.0 CENTRAL PAIN SYNDROME: ICD-10-CM

## 2023-10-13 DIAGNOSIS — I63.9 LEFT-SIDED CEREBROVASCULAR ACCIDENT (CVA): ICD-10-CM

## 2023-10-13 PROCEDURE — 3077F SYST BP >= 140 MM HG: CPT | Mod: CPTII,S$GLB,,

## 2023-10-13 PROCEDURE — 99999 PR PBB SHADOW E&M-EST. PATIENT-LVL III: ICD-10-PCS | Mod: PBBFAC,,,

## 2023-10-13 PROCEDURE — 1159F PR MEDICATION LIST DOCUMENTED IN MEDICAL RECORD: ICD-10-PCS | Mod: CPTII,S$GLB,,

## 2023-10-13 PROCEDURE — 99999 PR PBB SHADOW E&M-EST. PATIENT-LVL III: CPT | Mod: PBBFAC,,,

## 2023-10-13 PROCEDURE — 3079F DIAST BP 80-89 MM HG: CPT | Mod: CPTII,S$GLB,,

## 2023-10-13 PROCEDURE — 3044F HG A1C LEVEL LT 7.0%: CPT | Mod: CPTII,S$GLB,,

## 2023-10-13 PROCEDURE — 3077F PR MOST RECENT SYSTOLIC BLOOD PRESSURE >= 140 MM HG: ICD-10-PCS | Mod: CPTII,S$GLB,,

## 2023-10-13 PROCEDURE — 1159F MED LIST DOCD IN RCRD: CPT | Mod: CPTII,S$GLB,,

## 2023-10-13 PROCEDURE — 99214 PR OFFICE/OUTPT VISIT, EST, LEVL IV, 30-39 MIN: ICD-10-PCS | Mod: S$GLB,,,

## 2023-10-13 PROCEDURE — 3044F PR MOST RECENT HEMOGLOBIN A1C LEVEL <7.0%: ICD-10-PCS | Mod: CPTII,S$GLB,,

## 2023-10-13 PROCEDURE — 3079F PR MOST RECENT DIASTOLIC BLOOD PRESSURE 80-89 MM HG: ICD-10-PCS | Mod: CPTII,S$GLB,,

## 2023-10-13 PROCEDURE — 4010F PR ACE/ARB THEARPY RXD/TAKEN: ICD-10-PCS | Mod: CPTII,S$GLB,,

## 2023-10-13 PROCEDURE — 4010F ACE/ARB THERAPY RXD/TAKEN: CPT | Mod: CPTII,S$GLB,,

## 2023-10-13 PROCEDURE — 99214 OFFICE O/P EST MOD 30 MIN: CPT | Mod: S$GLB,,,

## 2023-10-13 PROCEDURE — 3008F BODY MASS INDEX DOCD: CPT | Mod: CPTII,S$GLB,,

## 2023-10-13 PROCEDURE — 3008F PR BODY MASS INDEX (BMI) DOCUMENTED: ICD-10-PCS | Mod: CPTII,S$GLB,,

## 2023-10-13 RX ORDER — BACLOFEN 10 MG/1
10 TABLET ORAL 2 TIMES DAILY
Qty: 120 TABLET | Refills: 0 | Status: SHIPPED | OUTPATIENT
Start: 2023-10-13 | End: 2023-12-12

## 2023-10-13 NOTE — PROGRESS NOTES
[facility] Follow Up Evaluation      Referred by: No ref. provider found    PCP:     CC:   Chief Complaint   Patient presents with    Low-back Pain    Muscle Pain     From head to toe           10/13/2023     9:04 AM 9/12/2023    11:20 AM 6/17/2020     9:02 AM   Last 3 PDI Scores   Pain Disability Index (PDI) 60 45 52       Interval HPI 10/13/2023: Hernan Badillo returns to the office for follow up.  He returns for follow-up with continued right-sided arm pain, chest pain and leg pain.  This is unchanged since previous office visit.  He denies any new or worsening pain, new numbness, weakness.  He reports some worsening muscle tension especially when stretching but no other significant changes.  He continues to take Lyrica 100 mg b.i.d. with some relief.  He denies any ill side effects or adverse events with his medication.      HPI:   Hernan Badillo is a 57 y.o. male patient who has a past medical history of COPD (chronic obstructive pulmonary disease), Diabetes mellitus, type 2, and Hypertension. He presents with pain in his right arm and right leg.  He is with his mother today.  He reports this is been bothering him since CVA.  He reports the pain feel in his arm and leg feels like needles.      Pain Intervention History:      Past Spine Surgical History:      Past and current medications:  Antineuropathics:  NSAIDs:  Physical therapy:  Completed physical therapy and occupational therapy  Antidepressants: lexapro  Muscle relaxers: baclofen   Opioids: tylenol #3  Antiplatelets/Anticoagulants:    History:    Current Outpatient Medications:     acetaminophen (TYLENOL) 325 MG tablet, Take 650 mg by mouth 3 (three) times daily as needed., Disp: , Rfl:     acetaminophen-codeine 300-30mg (TYLENOL #3) 300-30 mg Tab, Take 1 tablet by mouth every 6 (six) hours as needed (right sided pain)., Disp: 60 tablet, Rfl: 0    albuterol (PROVENTIL/VENTOLIN HFA) 90 mcg/actuation inhaler, Inhale 1-2 puffs into the lungs every 6  (six) hours as needed. Rescue, Disp: 1 g, Rfl: 0    amLODIPine (NORVASC) 10 MG tablet, Take 1 tablet (10 mg total) by mouth once daily., Disp: 90 tablet, Rfl: 3    atorvastatin (LIPITOR) 20 MG tablet, Take 1 tablet (20 mg total) by mouth once daily., Disp: 90 tablet, Rfl: 3    EScitalopram oxalate (LEXAPRO) 10 MG tablet, Take 10 mg by mouth., Disp: , Rfl:     lisinopriL 10 MG tablet, Take 1 tablet (10 mg total) by mouth once daily., Disp: 90 tablet, Rfl: 3    meclizine (ANTIVERT) 25 mg tablet, Take 25 mg by mouth., Disp: , Rfl:     melatonin (MELATIN) 5 mg, Take 5 mg by mouth., Disp: , Rfl:     metFORMIN (GLUCOPHAGE) 500 MG tablet, Take 1 tablet (500 mg total) by mouth 2 (two) times daily with meals., Disp: 30 tablet, Rfl: 2    pantoprazole (PROTONIX) 40 MG tablet, Take 1 tablet (40 mg total) by mouth once daily., Disp: 90 tablet, Rfl: 3    pregabalin (LYRICA) 100 MG capsule, Take 1 capsule (100 mg total) by mouth 2 (two) times daily., Disp: 60 capsule, Rfl: 2    albuterol-ipratropium (DUO-NEB) 2.5 mg-0.5 mg/3 mL nebulizer solution, Take 3 mLs by nebulization every 6 (six) hours as needed for Wheezing or Shortness of Breath., Disp: 25 each, Rfl: 3    Past Medical History:   Diagnosis Date    COPD (chronic obstructive pulmonary disease)     Diabetes mellitus, type 2     Hypertension        Past Surgical History:   Procedure Laterality Date    DENTAL SURGERY      ESOPHAGOGASTRODUODENOSCOPY N/A 6/1/2020    Procedure: EGD (ESOPHAGOGASTRODUODENOSCOPY);  Surgeon: Terrell May MD;  Location: [facility];  Service: Endoscopy;  Laterality: N/A;    HERNIA REPAIR      left inguinal    incision and drainiage         Family History   Problem Relation Age of Onset    Heart attack Father 80    Heart disease Father     Drug abuse Father     Cancer Maternal Grandmother         colon       Social History     Socioeconomic History    Marital status: Single    Number of children: 1   Occupational History    Occupation: [employer]  "  Tobacco Use    Smoking status: Every Day     Current packs/day: 2.00     Average packs/day: 2.0 packs/day for 30.0 years (60.0 ttl pk-yrs)     Types: Cigarettes    Smokeless tobacco: Never   Substance and Sexual Activity    Alcohol use: Yes     Alcohol/week: 1.0 standard drink of alcohol     Types: 1 Shots of liquor per week     Comment: 1/2 pint 2 x per week - quit approximately 5 months ago    Drug use: No    Sexual activity: Yes     Partners: Female     Birth control/protection: None     Comment: No history of STDs.   Social History Narrative    The patient does not exercise regularly ().    Rates diet as fair.    He is satisfied with weight.               Review of patient's allergies indicates:  No Known Allergies    Review of Systems:  Positive for numbness and tingling.  Balance of review of systems is negative.    Physical Exam:  Vitals:    10/13/23 0908   BP: (!) 157/82   Pulse: 63   Weight: 67 kg (147 lb 9.6 oz)   Height: 5' 9" (1.753 m)   PainSc: 10-Worst pain ever   PainLoc: Generalized     Body mass index is 21.8 kg/m².    Gen: NAD  Psych: mood appropriate for given condition  HEENT: eyes anicteric   CV: RRR  HEENT: anicteric   Respiratory: non-labored, no signs of respiratory distress  Abd: non-distended  Skin: warm, dry and intact.  Gait: antalgic gait.     Labs:  Lab Results   Component Value Date    LABA1C 9.8 (H) 07/06/2016    HGBA1C 6.1 (H) 03/08/2023       Lab Results   Component Value Date    WBC 4.17 03/08/2023    HGB 11.8 (L) 03/08/2023    HCT 37.0 (L) 03/08/2023    MCV 93 03/08/2023     03/08/2023       Imaging:  none    Assessment:   Problem List Items Addressed This Visit    None  Visit Diagnoses       Central pain syndrome    -  Primary    Relevant Medications    baclofen (LIORESAL) 10 MG tablet    Left-sided cerebrovascular accident (CVA)                    Hernan Badillo is a 57 y.o. male patient who has a past medical history of COPD (chronic obstructive pulmonary disease), " Diabetes mellitus, type 2, and Hypertension. He presents with pain in his right arm and right leg.  He is with his mother today.  He reports this is been bothering him since CVA.  He reports the pain feel in his arm and leg feels like needles.  Is exam is somewhat limited as he has giveaway when asked to hold arms and legs up full strength but on subsequent times asking him he is able to have full range of motion with full strength.  He does have decreased sensation to light touch in a nondermatomal distribution in the right arm and right leg.  I think he is suffering from central pain syndrome.  He has completed physical therapy and occupational therapy.  He has failed to get relief previously with Tylenol 3.  He has seen PM&R who trialed baclofen as the patient was complaining of muscle tightness however this did not provide him much relief.        10/13/2023: Hernan Badillo returns to the office for follow up.  He returns for follow-up with continued right-sided arm pain, chest pain and leg pain.  This is unchanged since previous office visit.  He denies any new or worsening pain, new numbness, weakness.  He reports some worsening muscle tension especially when stretching but no other significant changes.  He continues to take Lyrica 100 mg b.i.d. with some relief.  He denies any ill side effects or adverse events with his medication.    - we discussed his pain and symptoms.  At this time I think it is best to continue to maximize conservative therapy with muscle relaxers, rest and Lyrica 100 mg b.i.d..  Do not recommend continuing with hydrocodone at this time  - prescription for baclofen provided for any myofascial component.  Reports not tolerating tizanidine or Mobic in the past.  - refill for Lyrica 100 mg b.i.d. sent to Dr. Maddox today for approval.   - I will place orders for him to have home health which I think will be beneficial for him as he has difficulty driving and doing formal PT outside of his  residence.  - follow up in 6 months or sooner if needed.    : Reviewed       This note was completed with dictation software and grammatical errors may exist.

## 2023-10-16 RX ORDER — PREGABALIN 100 MG/1
100 CAPSULE ORAL 2 TIMES DAILY
Qty: 60 CAPSULE | Refills: 5 | Status: SHIPPED | OUTPATIENT
Start: 2023-10-16 | End: 2024-01-14

## 2023-10-17 PROCEDURE — G0180 PR HOME HEALTH MD CERTIFICATION: ICD-10-PCS | Mod: ,,,

## 2023-10-17 PROCEDURE — G0180 MD CERTIFICATION HHA PATIENT: HCPCS | Mod: ,,,

## 2023-11-01 ENCOUNTER — TELEPHONE (OUTPATIENT)
Dept: FAMILY MEDICINE | Facility: CLINIC | Age: 58
End: 2023-11-01
Payer: MEDICARE

## 2023-11-01 DIAGNOSIS — J44.9 CHRONIC OBSTRUCTIVE PULMONARY DISEASE, UNSPECIFIED COPD TYPE: ICD-10-CM

## 2023-11-01 DIAGNOSIS — I15.2 HYPERTENSION ASSOCIATED WITH DIABETES: ICD-10-CM

## 2023-11-01 DIAGNOSIS — E11.59 HYPERTENSION ASSOCIATED WITH DIABETES: ICD-10-CM

## 2023-11-01 DIAGNOSIS — E11.65 TYPE 2 DIABETES MELLITUS WITH HYPERGLYCEMIA, WITHOUT LONG-TERM CURRENT USE OF INSULIN: Primary | ICD-10-CM

## 2023-11-01 RX ORDER — ALBUTEROL SULFATE 90 UG/1
2 AEROSOL, METERED RESPIRATORY (INHALATION) EVERY 6 HOURS PRN
Qty: 18 G | Refills: 0 | Status: SHIPPED | OUTPATIENT
Start: 2023-11-01 | End: 2024-10-31

## 2023-11-01 RX ORDER — LISINOPRIL 10 MG/1
10 TABLET ORAL DAILY
Qty: 90 TABLET | Refills: 3 | Status: SHIPPED | OUTPATIENT
Start: 2023-11-01 | End: 2024-10-31

## 2023-11-01 RX ORDER — METFORMIN HYDROCHLORIDE 500 MG/1
500 TABLET, EXTENDED RELEASE ORAL
Qty: 90 TABLET | Refills: 3 | Status: SHIPPED | OUTPATIENT
Start: 2023-11-01 | End: 2024-10-31

## 2023-11-01 NOTE — TELEPHONE ENCOUNTER
----- Message from Jennifer Chiu sent at 11/1/2023 11:22 AM CDT -----  Contact: mom  Type:  Needs Medical Advice    Who Called: Mariann Perez  Symptoms (please be specific):  How long has patient had these symptoms:    Pharmacy name and phone #:    Would the patient rather a call back or a response via [email or web address]? call back/[email or web address]  Best Call Back Number: .[phone]     Additional Information: Pt is having a lot of tightness on R side where stroke was at like muscle spasms  Please advise   Thanks         <<-----Click on this checkbox to enter Procedure Exploratory laparotomy  05/23/2018    Active  HAYDEE

## 2023-11-01 NOTE — TELEPHONE ENCOUNTER
Returned call to patient's mother (Mariann) who states she is currently admitted to hospital, and would like for office to reach out to patient regarding c/o stiffness to right stroke affected side.  Call placed to patient who states he is experiencing stiffness to entire right side, patient states he can barely move and is in great pain.   Patient states he is taking Lyrica and Baclofen as prescribed.  Requesting further recommendations. LOV--10-10-23.  Please advise. Thank you.

## 2023-11-02 NOTE — TELEPHONE ENCOUNTER
Call placed to patient for notification. Patient verbalized understanding.  Appointment scheduled for the date of 11-7-23. Patient agreed to appointment date, time, and location.        Toan Banks., DO  You 16 hours ago (4:49 PM)     LB  Please have patient schedule a visit with Ms. Giovanna Narvaez for follow-up.  He may also try magnesium supplementation

## 2023-11-07 ENCOUNTER — OFFICE VISIT (OUTPATIENT)
Dept: FAMILY MEDICINE | Facility: CLINIC | Age: 58
End: 2023-11-07
Payer: MEDICARE

## 2023-11-07 VITALS
SYSTOLIC BLOOD PRESSURE: 140 MMHG | BODY MASS INDEX: 21.59 KG/M2 | DIASTOLIC BLOOD PRESSURE: 68 MMHG | OXYGEN SATURATION: 95 % | HEIGHT: 69 IN | HEART RATE: 62 BPM | WEIGHT: 145.75 LBS | TEMPERATURE: 98 F

## 2023-11-07 DIAGNOSIS — E11.65 TYPE 2 DIABETES MELLITUS WITH HYPERGLYCEMIA, WITHOUT LONG-TERM CURRENT USE OF INSULIN: ICD-10-CM

## 2023-11-07 DIAGNOSIS — F17.200 TOBACCO DEPENDENCY: ICD-10-CM

## 2023-11-07 DIAGNOSIS — I10 PRIMARY HYPERTENSION: ICD-10-CM

## 2023-11-07 DIAGNOSIS — J44.9 CHRONIC OBSTRUCTIVE PULMONARY DISEASE, UNSPECIFIED COPD TYPE: ICD-10-CM

## 2023-11-07 DIAGNOSIS — G89.0 CENTRAL PAIN SYNDROME: Primary | ICD-10-CM

## 2023-11-07 DIAGNOSIS — G81.11 SPASTIC HEMIPARESIS OF RIGHT DOMINANT SIDE: ICD-10-CM

## 2023-11-07 PROCEDURE — 99999 PR PBB SHADOW E&M-EST. PATIENT-LVL IV: CPT | Mod: PBBFAC,,, | Performed by: NURSE PRACTITIONER

## 2023-11-07 PROCEDURE — 1160F RVW MEDS BY RX/DR IN RCRD: CPT | Mod: CPTII,S$GLB,, | Performed by: NURSE PRACTITIONER

## 2023-11-07 PROCEDURE — 99214 OFFICE O/P EST MOD 30 MIN: CPT | Mod: S$GLB,,, | Performed by: NURSE PRACTITIONER

## 2023-11-07 PROCEDURE — 4010F PR ACE/ARB THEARPY RXD/TAKEN: ICD-10-PCS | Mod: CPTII,S$GLB,, | Performed by: NURSE PRACTITIONER

## 2023-11-07 PROCEDURE — 1160F PR REVIEW ALL MEDS BY PRESCRIBER/CLIN PHARMACIST DOCUMENTED: ICD-10-PCS | Mod: CPTII,S$GLB,, | Performed by: NURSE PRACTITIONER

## 2023-11-07 PROCEDURE — 99214 PR OFFICE/OUTPT VISIT, EST, LEVL IV, 30-39 MIN: ICD-10-PCS | Mod: S$GLB,,, | Performed by: NURSE PRACTITIONER

## 2023-11-07 PROCEDURE — 3078F DIAST BP <80 MM HG: CPT | Mod: CPTII,S$GLB,, | Performed by: NURSE PRACTITIONER

## 2023-11-07 PROCEDURE — 3008F PR BODY MASS INDEX (BMI) DOCUMENTED: ICD-10-PCS | Mod: CPTII,S$GLB,, | Performed by: NURSE PRACTITIONER

## 2023-11-07 PROCEDURE — 99999 PR PBB SHADOW E&M-EST. PATIENT-LVL IV: ICD-10-PCS | Mod: PBBFAC,,, | Performed by: NURSE PRACTITIONER

## 2023-11-07 PROCEDURE — 1159F PR MEDICATION LIST DOCUMENTED IN MEDICAL RECORD: ICD-10-PCS | Mod: CPTII,S$GLB,, | Performed by: NURSE PRACTITIONER

## 2023-11-07 PROCEDURE — 3077F PR MOST RECENT SYSTOLIC BLOOD PRESSURE >= 140 MM HG: ICD-10-PCS | Mod: CPTII,S$GLB,, | Performed by: NURSE PRACTITIONER

## 2023-11-07 PROCEDURE — 3044F PR MOST RECENT HEMOGLOBIN A1C LEVEL <7.0%: ICD-10-PCS | Mod: CPTII,S$GLB,, | Performed by: NURSE PRACTITIONER

## 2023-11-07 PROCEDURE — 1159F MED LIST DOCD IN RCRD: CPT | Mod: CPTII,S$GLB,, | Performed by: NURSE PRACTITIONER

## 2023-11-07 PROCEDURE — 4010F ACE/ARB THERAPY RXD/TAKEN: CPT | Mod: CPTII,S$GLB,, | Performed by: NURSE PRACTITIONER

## 2023-11-07 PROCEDURE — 3078F PR MOST RECENT DIASTOLIC BLOOD PRESSURE < 80 MM HG: ICD-10-PCS | Mod: CPTII,S$GLB,, | Performed by: NURSE PRACTITIONER

## 2023-11-07 PROCEDURE — 3044F HG A1C LEVEL LT 7.0%: CPT | Mod: CPTII,S$GLB,, | Performed by: NURSE PRACTITIONER

## 2023-11-07 PROCEDURE — 3077F SYST BP >= 140 MM HG: CPT | Mod: CPTII,S$GLB,, | Performed by: NURSE PRACTITIONER

## 2023-11-07 PROCEDURE — 3008F BODY MASS INDEX DOCD: CPT | Mod: CPTII,S$GLB,, | Performed by: NURSE PRACTITIONER

## 2023-11-07 RX ORDER — FLUTICASONE PROPIONATE AND SALMETEROL 500; 50 UG/1; UG/1
1 POWDER RESPIRATORY (INHALATION) 2 TIMES DAILY
Qty: 60 EACH | Refills: 11 | Status: SHIPPED | OUTPATIENT
Start: 2023-11-07 | End: 2024-11-06

## 2023-11-07 NOTE — PATIENT INSTRUCTIONS
Vimal Becerra,     If you are due for any health screening(s) below please notify me so we can arrange them to be ordered and scheduled to maintain your health. Most healthy patients complete it. Don't lose out on improving your health.     Tests to Keep You Healthy    Eye Exam: ORDERED BUT NOT SCHEDULED  Colon Cancer Screening: ORDERED  Last Blood Pressure <= 139/89 (11/7/2023): NO  Last HbA1c < 8 (09/26/2023): Yes  Tobacco Cessation: NO         Colon Cancer Screening    Of cancers affecting both men and women, colorectal cancer is the third leading cancer killer in the [place]. But it doesnt have to be. Screening can prevent colorectal cancer or find it at an early stage when treatment often leads to a cure.    A colonoscopy is the preferred test for detecting colon cancer. It is needed only once every 10 years if results are negative. While sedated, a flexible, lighted tube with a tiny camera is inserted into the rectum and advanced through the colon to look for cancers. An alternative screening test that is used at home and returned to the lab may also be used. It detects hidden blood in bowel movements which could indicate cancer in the colon. If results are positive, you will need a colonoscopy to determine if the blood is a sign of cancer. This type of follow up (diagnostic) colonoscopy usually requires additional copays as required by your insurance provider. Please contact your PCP if you have any questions.         Diabetic Retinal Eye Exam    Diabetes is the #1 cause of blindness in the [place] - early detection before signs or symptoms develop can prevent debilitating blindness.    Once-a-year screening is recommended for all diabetic patients. This exam can prevent and treat diabetes complications in the eye before developing symptoms. This can be done with a special camera is used to take photographs of the back of your eye without having to dilate them, or you can see an eye doctor for a full dilated  "exam.          Patient Education       Checking Your Blood Pressure at Home   The Basics   Written by the doctors and editors at [facility]   How is blood pressure measured? -- Blood pressure is usually measured with a device that goes around your upper arm. This is often done in a doctor's office. But some people also check their blood pressure themselves, at home or at work.  Blood pressure is explained with 2 numbers. For instance, your blood pressure might be "140 over 90." The first (top) number is the pressure inside your arteries when your heart is deondre. The second (bottom) number is the pressure inside your arteries when your heart is relaxed. The table shows how doctors and nurses define high and normal blood pressure (table 1).  If your blood pressure gets too high, it puts you at risk for heart attack, stroke, and kidney disease. High blood pressure does not usually cause symptoms. But it can be serious.  What is a home blood pressure meter? -- A home blood pressure meter (or "monitor") is a device you can use to check your blood pressure yourself. It has a cuff that goes around your upper arm (figure 1). Some devices have a cuff that goes around your wrist instead. But doctors aren't sure if these work as well. The meter also has a small screen, or dial, that shows your blood pressure numbers.  There are also special meters you can wear for a day or 2. These are different because they automatically check your blood pressure throughout the day and night, even while you are sleeping. If your doctor thinks you should use one of these devices, they will talk to you about how to wear it.  Why do I need to check my blood pressure at home? -- If your doctor knows or suspects that you have high blood pressure, they might want you to check it at home. There are a few reasons for this. Your doctor might want to look at:  Whether your blood pressure measures the same at home as it did in the doctor's " office  How well your blood pressure medicines are working  Changes in your blood pressure, for example, if it goes up and down  People who check their own blood pressure at home usually do better at keeping it low.  How do I choose a home blood pressure meter? -- When choosing a home blood pressure meter, you will probably want to think about:  Cost - Some devices cost more than others. You should also check to see if your insurance will help pay for your device.  Size - It's important to make sure the cuff fits your arm comfortably. Your doctor or nurse can help you with this.  How easy it is to use - You should make sure you understand how to use the device. You also need to be able to read the numbers on the screen.  You do not need a prescription to buy a home blood pressure meter. You can buy them at most pharmacies or over the internet. Your doctor or nurse can help you choose the right device for you.  How do I check my blood pressure at home? -- Once you have a home blood pressure meter, your doctor or nurse should check it to make sure it fits you and works correctly.  When it's time to check your blood pressure:  Go to the bathroom and empty your bladder first. Having a full bladder can temporarily increase your blood pressure, making the results inaccurate.  Sit in a chair with your feet flat on the ground.  Try to breathe normally and stay calm.  Attach the cuff to your arm. Place the cuff directly on your skin, not over your clothing. The cuff should be tight enough to not slip down, but not uncomfortably tight.  Sit and relax for about 3 to 5 minutes with the cuff on.  Follow the directions that came with your device to start measuring your blood pressure. This might involve squeezing the bulb at the end of the tube to inflate the cuff (fill it with air). With some monitors, you just need to press a button to inflate the cuff. When the cuff fills with air, it feels like someone is squeezing your arm,  but it should not hurt. Then you will slowly deflate the cuff (let the air out of it), or it will deflate by itself. The screen or dial will show your blood pressure numbers.  Stay seated and relax for 1 minute, then measure your blood pressure again.  How often should I check my blood pressure? -- It depends. Different people need to follow different schedules. Your doctor or nurse will tell you how often to check your blood pressure, and when. Some people need to check their blood pressure twice a day, in the morning and evening.  Your doctor or nurse will probably tell you to keep track of your blood pressure for at least a few days (table 2). Then they will look at the numbers. The reason for this is that it's normal for your blood pressure to change a bit from day to day. For example, the numbers might change depending on whether you recently had caffeine, just exercised, or feel stressed. Checking your blood pressure over several days - or longer - will give your doctor or nurse a better idea of what is average for you.  How should I keep track of my blood pressure? -- Some blood pressure meters will record your numbers for you, or send them to your computer or smartphone. If yours does not do this, you will need to write them down. Your doctor or nurse can help you figure out the best way to keep track of the numbers.  What if my blood pressure is high? -- Your doctor or nurse will tell you what to do if your blood pressure is high when you check it at home. If you get a number that is higher than normal, measure it again to see if it is still high. If it is very high (above a certain number, which your doctor or nurse will tell you to watch out for), you should call your doctor right away.  If your blood pressure is only a little high, your doctor or nurse might tell you to keep checking it for a few more days or weeks, and then call if it does not go back down. Then they can help you decide what to do  "next.  All topics are updated as new evidence becomes available and our peer review process is complete.  This topic retrieved from [employer] on: Sep 21, 2021.  Topic 076054 Version 4.0  Release: 29.4.2 - C29.263  © 2021 UpToDate, Inc. and/or its affiliates. All rights reserved.  table 1: Definition of normal and high blood pressure  Level  Top number  Bottom number    High 130 or above 80 or above   Elevated 120 to 129 79 or below   Normal 119 or below 79 or below   These definitions are from the American College of Cardiology/American Heart Association. Other expert groups might use slightly different definitions.  "Elevated blood pressure" is a term doctor or nurses use as a warning. It means you do not yet have high blood pressure, but your blood pressure is not as low as it should be for good health.  Graphic 99663 Version 6.0  figure 1: Using a home blood pressure meter     This is an example of a person using a home blood pressure meter.  Graphic 899847 Version 1.0    table 2: 7-day diary for checking blood pressure at home  Day 1  Day 2  Day 3  Day 4  Day 5  Day 6  Day 7    Morning  1st read Morning  1st read Morning  1st read Morning  1st read Morning  1st read Morning  1st read Morning  1st read   Systolic: __________ Systolic: __________ Systolic: __________ Systolic: __________ Systolic: __________ Systolic: __________ Systolic: __________   Diastolic: __________ Diastolic: __________ Diastolic: __________ Diastolic: __________ Diastolic: __________ Diastolic: __________ Diastolic: __________   Pulse: __________ Pulse: __________ Pulse: __________ Pulse: __________ Pulse: __________ Pulse: __________ Pulse: __________   Morning  2nd read Morning  2nd read Morning  2nd read Morning  2nd read Morning  2nd read Morning  2nd read Morning  2nd read   Systolic: __________ Systolic: __________ Systolic: __________ Systolic: __________ Systolic: __________ Systolic: __________ Systolic: __________   Diastolic: " __________ Diastolic: __________ Diastolic: __________ Diastolic: __________ Diastolic: __________ Diastolic: __________ Diastolic: __________   Pulse: __________ Pulse: __________ Pulse: __________ Pulse: __________ Pulse: __________ Pulse: __________ Pulse: __________   Evening  1st read Evening  1st read Evening  1st read Evening  1st read Evening  1st read Evening  1st read Evening  1st read   Systolic: __________ Systolic: __________ Systolic: __________ Systolic: __________ Systolic: __________ Systolic: __________ Systolic: __________   Diastolic: __________ Diastolic: __________ Diastolic: __________ Diastolic: __________ Diastolic: __________ Diastolic: __________ Diastolic: __________   Pulse: __________ Pulse: __________ Pulse: __________ Pulse: __________ Pulse: __________ Pulse: __________ Pulse: __________   Evening  2nd read Evening  2nd read Evening  2nd read Evening  2nd read Evening  2nd read Evening  2nd read Evening  2nd read   Systolic: __________ Systolic: __________ Systolic: __________ Systolic: __________ Systolic: __________ Systolic: __________ Systolic: __________   Diastolic: __________ Diastolic: __________ Diastolic: __________ Diastolic: __________ Diastolic: __________ Diastolic: __________ Diastolic: __________   Pulse: __________ Pulse: __________ Pulse: __________ Pulse: __________ Pulse: __________ Pulse: __________ Pulse: __________   Notes    Notes    Notes    Notes    Notes    Notes    Notes      ____________________ ____________________ ____________________ ____________________ ____________________ ____________________ ____________________   ____________________ ____________________ ____________________ ____________________ ____________________ ____________________ ____________________   ____________________ ____________________ ____________________ ____________________ ____________________ ____________________ ____________________   Patient name:  ______________________________     Patient ID: ________________________________    Primary care provider: _______________________    Average BP: _______________________________    Graphic [id number] Version 1.0  Consumer Information Use and Disclaimer   This information is not specific medical advice and does not replace information you receive from your health care provider. This is only a brief summary of general information. It does NOT include all information about conditions, illnesses, injuries, tests, procedures, treatments, therapies, discharge instructions or life-style choices that may apply to you. You must talk with your health care provider for complete information about your health and treatment options. This information should not be used to decide whether or not to accept your health care provider's advice, instructions or recommendations. Only your health care provider has the knowledge and training to provide advice that is right for you. The use of this information is governed by the [employer] End User License Agreement, available at https://www.Britely/en/solutions/Supernova/about/kristofer.The use of [employer] content is governed by the [employer] Terms of Use. ©2021 UpToDate, Inc. All rights reserved.  Copyright   © 2021 UpToDate, Inc. and/or its affiliates. All rights reserved.

## 2023-11-07 NOTE — PROGRESS NOTES
Subjective:       Patient ID: Hernan Badillo is a 58 y.o. male.    Chief Complaint: Follow-up    Follow-up  Pertinent negatives include no chest pain, headaches or rash.       Patient presents today with his brother for follow up visit.  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 Patient is requesting Oxycodone for pain . He reports he tries his brother oxycodone which gave him significant pain relief which helped increase his ADLs. Was able to do more exercises. Patient reports the heron, Norco and the muscle relaxers do not work.    10/13/23 Pain Medicine-JANELLE Christie: central pain syndrome, low back pain, muscle pain. On Lyrica 100 mg b.I.d.    Past Medical History:   Diagnosis Date    COPD (chronic obstructive pulmonary disease)     Diabetes mellitus, type 2     Hypertension        Review of patient's allergies indicates:  No Known Allergies      Current Outpatient Medications:     acetaminophen (TYLENOL) 325 MG tablet, Take 650 mg by mouth 3 (three) times daily as needed., Disp: , Rfl:     albuterol (PROVENTIL HFA) 90 mcg/actuation inhaler, Inhale 2 puffs into the lungs every 6 (six) hours as needed for Wheezing. Rescue, Disp: 18 g, Rfl: 0    albuterol (PROVENTIL/VENTOLIN HFA) 90 mcg/actuation inhaler, Inhale 1-2 puffs into the lungs every 6 (six) hours as needed. Rescue, Disp: 1 g, Rfl: 0    amLODIPine (NORVASC) 10 MG tablet, Take 1 tablet (10 mg total) by mouth once daily., Disp: 90 tablet, Rfl: 3    atorvastatin (LIPITOR) 20 MG tablet, Take 1 tablet (20 mg total) by mouth once daily., Disp: 90 tablet, Rfl: 3    baclofen (LIORESAL) 10 MG tablet, Take 1 tablet (10 mg total) by mouth 2 (two) times daily., Disp: 120 tablet, Rfl: 0    EScitalopram oxalate (LEXAPRO) 10 MG tablet, Take 10 mg by mouth., Disp: , Rfl:     lisinopriL 10 MG tablet, Take 1 tablet (10  "mg total) by mouth once daily., Disp: 90 tablet, Rfl: 3    meclizine (ANTIVERT) 25 mg tablet, Take 25 mg by mouth., Disp: , Rfl:     melatonin (MELATIN) 5 mg, Take 5 mg by mouth., Disp: , Rfl:     metFORMIN (GLUCOPHAGE-XR) 500 MG ER 24hr tablet, Take 1 tablet (500 mg total) by mouth daily with breakfast., Disp: 90 tablet, Rfl: 3    pantoprazole (PROTONIX) 40 MG tablet, Take 1 tablet (40 mg total) by mouth once daily., Disp: 90 tablet, Rfl: 3    pregabalin (LYRICA) 100 MG capsule, Take 1 capsule (100 mg total) by mouth 2 (two) times daily., Disp: 60 capsule, Rfl: 05    albuterol-ipratropium (DUO-NEB) 2.5 mg-0.5 mg/3 mL nebulizer solution, Take 3 mLs by nebulization every 6 (six) hours as needed for Wheezing or Shortness of Breath., Disp: 25 each, Rfl: 3    fluticasone-salmeterol diskus inhaler 500-50 mcg, Inhale 1 puff into the lungs 2 (two) times daily. Controller, Disp: 60 each, Rfl: 11    Review of Systems   Constitutional:  Negative for unexpected weight change.   HENT:  Negative for trouble swallowing.    Eyes:  Negative for visual disturbance.   Respiratory:  Negative for shortness of breath.    Cardiovascular:  Negative for chest pain, palpitations and leg swelling.   Gastrointestinal:  Negative for blood in stool.   Genitourinary:  Negative for hematuria.   Skin:  Negative for rash.   Allergic/Immunologic: Negative for immunocompromised state.   Neurological:  Negative for headaches.   Hematological:  Does not bruise/bleed easily.   Psychiatric/Behavioral:  Negative for agitation. The patient is not nervous/anxious.        Objective:      BP (!) 140/68 (BP Location: Right arm, Patient Position: Sitting, BP Method: Small (Manual))   Pulse 62   Temp 97.5 °F (36.4 °C) (Oral)   Ht 5' 9" (1.753 m)   Wt 66.1 kg (145 lb 11.6 oz)   SpO2 95%   BMI 21.52 kg/m²   Physical Exam  Constitutional:       Appearance: He is well-developed.   Eyes:      Conjunctiva/sclera: Conjunctivae normal.      Pupils: Pupils are " equal, round, and reactive to light.   Cardiovascular:      Rate and Rhythm: Normal rate and regular rhythm.      Heart sounds: Normal heart sounds.   Pulmonary:      Effort: Pulmonary effort is normal.   Musculoskeletal:         General: Normal range of motion.   Neurological:      Mental Status: He is alert and oriented to person, place, and time.   Psychiatric:         Behavior: Behavior normal.         Thought Content: Thought content normal.         Judgment: Judgment normal.         Assessment:       1. Central pain syndrome    2. Spastic hemiparesis of right dominant side    3. Chronic obstructive pulmonary disease, unspecified COPD type    4. Tobacco dependency    5. Primary hypertension    6. Type 2 diabetes mellitus with hyperglycemia, without long-term current use of insulin    7. BMI 21.0-21.9, adult        Plan:       Central pain syndrome  Requesting oxycodone, I discuss with PCP--patient can take folic acid and magnesium  to help with muscle pain  I will send my office note to his pain medicine doctor for further recommendations   Spastic hemiparesis of right dominant side  Requesting oxycodone, I discuss with PCP--patient can take folic acid and magnesium  to help with muscle pain  I will send to his pain medicine doctor for further recommendations   Chronic obstructive pulmonary disease, unspecified COPD type  -     fluticasone-salmeterol diskus inhaler 500-50 mcg; Inhale 1 puff into the lungs 2 (two) times daily. Controller  Dispense: 60 each; Refill: 11    Tobacco dependency  Educated on tobacco cessation  Primary hypertension  Stable, continue medication  Low sodium diet  Type 2 diabetes mellitus with hyperglycemia, without long-term current use of insulin  Stable, continue medication  BMI 21.0-21.9, adult  Stable, continue management        Patient readiness: acceptance and barriers:none    During the course of the visit the patient was educated and counseled about the following:      Diabetes:  Addressed ADA diet.  Encouraged aerobic exercise.  Hypertension:   Dietary sodium restriction.  Regular aerobic exercise.    Goals: Diabetes: Maintain Hemoglobin A1C below 7 and Hypertension: Reduce Blood Pressure    Did patient meet goals/outcomes: Yes    The following self management tools provided: declined    Patient Instructions (the written plan) was given to the patient/family.     Time spent with patient: 30 minutes    Barriers to medications present (no )    Adverse reactions to current medications (no)    Over the counter medications reviewed (Yes)

## 2023-11-09 ENCOUNTER — HOSPITAL ENCOUNTER (OUTPATIENT)
Dept: RADIOLOGY | Facility: CLINIC | Age: 58
Discharge: HOME | End: 2023-11-09
Attending: NURSE PRACTITIONER
Payer: MEDICARE

## 2023-11-09 ENCOUNTER — OFFICE VISIT (OUTPATIENT)
Dept: FAMILY MEDICINE | Facility: CLINIC | Age: 58
End: 2023-11-09
Payer: MEDICARE

## 2023-11-09 VITALS
DIASTOLIC BLOOD PRESSURE: 74 MMHG | HEART RATE: 64 BPM | BODY MASS INDEX: 21.48 KG/M2 | HEIGHT: 69 IN | OXYGEN SATURATION: 98 % | WEIGHT: 145 LBS | TEMPERATURE: 98 F | SYSTOLIC BLOOD PRESSURE: 118 MMHG

## 2023-11-09 DIAGNOSIS — E11.59 HYPERTENSION ASSOCIATED WITH DIABETES: ICD-10-CM

## 2023-11-09 DIAGNOSIS — I15.2 HYPERTENSION ASSOCIATED WITH DIABETES: ICD-10-CM

## 2023-11-09 DIAGNOSIS — J44.9 CHRONIC OBSTRUCTIVE PULMONARY DISEASE, UNSPECIFIED COPD TYPE: ICD-10-CM

## 2023-11-09 DIAGNOSIS — R05.8 COUGH PRODUCTIVE OF PURULENT SPUTUM: ICD-10-CM

## 2023-11-09 DIAGNOSIS — R05.8 COUGH PRODUCTIVE OF PURULENT SPUTUM: Primary | ICD-10-CM

## 2023-11-09 PROCEDURE — 94640 PR INHAL RX, AIRWAY OBST/DX SPUTUM INDUCT: ICD-10-PCS | Mod: S$GLB,,, | Performed by: NURSE PRACTITIONER

## 2023-11-09 PROCEDURE — 3008F PR BODY MASS INDEX (BMI) DOCUMENTED: ICD-10-PCS | Mod: CPTII,S$GLB,, | Performed by: NURSE PRACTITIONER

## 2023-11-09 PROCEDURE — 99214 PR OFFICE/OUTPT VISIT, EST, LEVL IV, 30-39 MIN: ICD-10-PCS | Mod: 25,S$GLB,, | Performed by: NURSE PRACTITIONER

## 2023-11-09 PROCEDURE — 3044F HG A1C LEVEL LT 7.0%: CPT | Mod: CPTII,S$GLB,, | Performed by: NURSE PRACTITIONER

## 2023-11-09 PROCEDURE — 4010F PR ACE/ARB THEARPY RXD/TAKEN: ICD-10-PCS | Mod: CPTII,S$GLB,, | Performed by: NURSE PRACTITIONER

## 2023-11-09 PROCEDURE — 4010F ACE/ARB THERAPY RXD/TAKEN: CPT | Mod: CPTII,S$GLB,, | Performed by: NURSE PRACTITIONER

## 2023-11-09 PROCEDURE — 99999 PR PBB SHADOW E&M-EST. PATIENT-LVL III: CPT | Mod: PBBFAC,,, | Performed by: NURSE PRACTITIONER

## 2023-11-09 PROCEDURE — 1159F MED LIST DOCD IN RCRD: CPT | Mod: CPTII,S$GLB,, | Performed by: NURSE PRACTITIONER

## 2023-11-09 PROCEDURE — 3074F SYST BP LT 130 MM HG: CPT | Mod: CPTII,S$GLB,, | Performed by: NURSE PRACTITIONER

## 2023-11-09 PROCEDURE — 99214 OFFICE O/P EST MOD 30 MIN: CPT | Mod: 25,S$GLB,, | Performed by: NURSE PRACTITIONER

## 2023-11-09 PROCEDURE — 3074F PR MOST RECENT SYSTOLIC BLOOD PRESSURE < 130 MM HG: ICD-10-PCS | Mod: CPTII,S$GLB,, | Performed by: NURSE PRACTITIONER

## 2023-11-09 PROCEDURE — 3078F DIAST BP <80 MM HG: CPT | Mod: CPTII,S$GLB,, | Performed by: NURSE PRACTITIONER

## 2023-11-09 PROCEDURE — 1159F PR MEDICATION LIST DOCUMENTED IN MEDICAL RECORD: ICD-10-PCS | Mod: CPTII,S$GLB,, | Performed by: NURSE PRACTITIONER

## 2023-11-09 PROCEDURE — 71045 XR CHEST 1 VIEW: ICD-10-PCS | Mod: 26,,, | Performed by: RADIOLOGY

## 2023-11-09 PROCEDURE — 96372 THER/PROPH/DIAG INJ SC/IM: CPT | Mod: 59,S$GLB,, | Performed by: NURSE PRACTITIONER

## 2023-11-09 PROCEDURE — 3044F PR MOST RECENT HEMOGLOBIN A1C LEVEL <7.0%: ICD-10-PCS | Mod: CPTII,S$GLB,, | Performed by: NURSE PRACTITIONER

## 2023-11-09 PROCEDURE — 96372 PR INJECTION,THERAP/PROPH/DIAG2ST, IM OR SUBCUT: ICD-10-PCS | Mod: 59,S$GLB,, | Performed by: NURSE PRACTITIONER

## 2023-11-09 PROCEDURE — 99999 PR PBB SHADOW E&M-EST. PATIENT-LVL III: ICD-10-PCS | Mod: PBBFAC,,, | Performed by: NURSE PRACTITIONER

## 2023-11-09 PROCEDURE — 3008F BODY MASS INDEX DOCD: CPT | Mod: CPTII,S$GLB,, | Performed by: NURSE PRACTITIONER

## 2023-11-09 PROCEDURE — 3078F PR MOST RECENT DIASTOLIC BLOOD PRESSURE < 80 MM HG: ICD-10-PCS | Mod: CPTII,S$GLB,, | Performed by: NURSE PRACTITIONER

## 2023-11-09 PROCEDURE — 71045 X-RAY EXAM CHEST 1 VIEW: CPT | Mod: 26,,, | Performed by: RADIOLOGY

## 2023-11-09 PROCEDURE — 71045 X-RAY EXAM CHEST 1 VIEW: CPT | Mod: TC,FY,PO

## 2023-11-09 PROCEDURE — 94640 AIRWAY INHALATION TREATMENT: CPT | Mod: S$GLB,,, | Performed by: NURSE PRACTITIONER

## 2023-11-09 RX ORDER — IPRATROPIUM BROMIDE AND ALBUTEROL SULFATE 2.5; .5 MG/3ML; MG/3ML
3 SOLUTION RESPIRATORY (INHALATION)
Status: COMPLETED | OUTPATIENT
Start: 2023-11-09 | End: 2023-11-09

## 2023-11-09 RX ORDER — CEFTRIAXONE 1 G/1
1 INJECTION, POWDER, FOR SOLUTION INTRAMUSCULAR; INTRAVENOUS
Status: COMPLETED | OUTPATIENT
Start: 2023-11-09 | End: 2023-11-09

## 2023-11-09 RX ADMIN — CEFTRIAXONE 1 G: 1 INJECTION, POWDER, FOR SOLUTION INTRAMUSCULAR; INTRAVENOUS at 11:11

## 2023-11-09 RX ADMIN — IPRATROPIUM BROMIDE AND ALBUTEROL SULFATE 3 ML: 2.5; .5 SOLUTION RESPIRATORY (INHALATION) at 11:11

## 2023-11-09 NOTE — PROGRESS NOTES
Subjective:       Patient ID: Hernan Badillo is a 58 y.o. male.    Chief Complaint: possible Pneumonia (Cough , green phlegm)    Cough  This is a recurrent problem. The current episode started in the past 7 days. The problem has been waxing and waning. The cough is Productive of purulent sputum. Associated symptoms include chills, a fever, shortness of breath and wheezing. Pertinent negatives include no chest pain, headaches, hemoptysis, postnasal drip, rash or rhinorrhea. Exacerbated by: heat. Risk factors for lung disease include smoking/tobacco exposure. His past medical history is significant for bronchitis and COPD.     COMPARISON:  09/02/2023     FINDINGS:  Stable cardiomediastinal silhouette.  The heart is not enlarged.  There are mildly prominent markings right lung base not appearing significantly changed compared to prior study and without nodularity that was seen on prior exam.  Left lung appears clear.  Costophrenic sulci sharp     Impression:     Mildly prominent interstitial markings right lower lung field with no confluent infiltrate.        Patient presents today with brother with c/o coughing up green phlem  Past Medical History:   Diagnosis Date    COPD (chronic obstructive pulmonary disease)     Diabetes mellitus, type 2     Hypertension        Review of patient's allergies indicates:  No Known Allergies      Current Outpatient Medications:     acetaminophen (TYLENOL) 325 MG tablet, Take 650 mg by mouth 3 (three) times daily as needed., Disp: , Rfl:     albuterol (PROVENTIL HFA) 90 mcg/actuation inhaler, Inhale 2 puffs into the lungs every 6 (six) hours as needed for Wheezing. Rescue, Disp: 18 g, Rfl: 0    albuterol (PROVENTIL/VENTOLIN HFA) 90 mcg/actuation inhaler, Inhale 1-2 puffs into the lungs every 6 (six) hours as needed. Rescue, Disp: 1 g, Rfl: 0    amLODIPine (NORVASC) 10 MG tablet, Take 1 tablet (10 mg total) by mouth once daily., Disp: 90 tablet, Rfl: 3    atorvastatin (LIPITOR) 20 MG  tablet, Take 1 tablet (20 mg total) by mouth once daily., Disp: 90 tablet, Rfl: 3    baclofen (LIORESAL) 10 MG tablet, Take 1 tablet (10 mg total) by mouth 2 (two) times daily., Disp: 120 tablet, Rfl: 0    EScitalopram oxalate (LEXAPRO) 10 MG tablet, Take 10 mg by mouth., Disp: , Rfl:     fluticasone-salmeterol diskus inhaler 500-50 mcg, Inhale 1 puff into the lungs 2 (two) times daily. Controller, Disp: 60 each, Rfl: 11    lisinopriL 10 MG tablet, Take 1 tablet (10 mg total) by mouth once daily., Disp: 90 tablet, Rfl: 3    meclizine (ANTIVERT) 25 mg tablet, Take 25 mg by mouth., Disp: , Rfl:     melatonin (MELATIN) 5 mg, Take 5 mg by mouth., Disp: , Rfl:     metFORMIN (GLUCOPHAGE-XR) 500 MG ER 24hr tablet, Take 1 tablet (500 mg total) by mouth daily with breakfast., Disp: 90 tablet, Rfl: 3    pantoprazole (PROTONIX) 40 MG tablet, Take 1 tablet (40 mg total) by mouth once daily., Disp: 90 tablet, Rfl: 3    pregabalin (LYRICA) 100 MG capsule, Take 1 capsule (100 mg total) by mouth 2 (two) times daily., Disp: 60 capsule, Rfl: 05    albuterol-ipratropium (DUO-NEB) 2.5 mg-0.5 mg/3 mL nebulizer solution, Take 3 mLs by nebulization every 6 (six) hours as needed for Wheezing or Shortness of Breath., Disp: 25 each, Rfl: 3  No current facility-administered medications for this visit.    Review of Systems   Constitutional:  Positive for chills and fever. Negative for unexpected weight change.   HENT:  Negative for postnasal drip, rhinorrhea and trouble swallowing.    Eyes:  Negative for visual disturbance.   Respiratory:  Positive for cough, shortness of breath and wheezing. Negative for hemoptysis.    Cardiovascular:  Negative for chest pain, palpitations and leg swelling.   Gastrointestinal:  Negative for blood in stool.   Genitourinary:  Negative for hematuria.   Skin:  Negative for rash.   Allergic/Immunologic: Negative for immunocompromised state.   Neurological:  Negative for headaches.   Hematological:  Does not  "bruise/bleed easily.   Psychiatric/Behavioral:  Negative for agitation. The patient is not nervous/anxious.        Objective:      /74 (BP Location: Left arm, Patient Position: Sitting, BP Method: Medium (Manual))   Pulse 64   Temp 98 °F (36.7 °C)   Ht 5' 9" (1.753 m)   Wt 65.8 kg (145 lb)   SpO2 98%   BMI 21.41 kg/m²   Physical Exam  Constitutional:       Appearance: He is well-developed.   Eyes:      Conjunctiva/sclera: Conjunctivae normal.      Pupils: Pupils are equal, round, and reactive to light.   Cardiovascular:      Rate and Rhythm: Normal rate and regular rhythm.      Heart sounds: Normal heart sounds.   Pulmonary:      Effort: Pulmonary effort is normal.   Musculoskeletal:         General: Normal range of motion.   Neurological:      Mental Status: He is alert and oriented to person, place, and time.   Psychiatric:         Behavior: Behavior normal.         Thought Content: Thought content normal.         Judgment: Judgment normal.         Assessment:       1. Cough productive of purulent sputum    2. Chronic obstructive pulmonary disease, unspecified COPD type    3. Hypertension associated with diabetes        Plan:       Cough productive of purulent sputum  -     X-Ray Chest PA And Lateral; Future; Expected date: 11/09/2023  -     methylPREDNISolone sod suc(PF) injection 125 mg  -     albuterol-ipratropium 2.5 mg-0.5 mg/3 mL nebulizer solution 3 mL  -     cefTRIAXone injection 1 g        Chronic obstructive pulmonary disease, unspecified COPD type  -     methylPREDNISolone sod suc(PF) injection 125 mg  -     albuterol-ipratropium 2.5 mg-0.5 mg/3 mL nebulizer solution 3 mL  -     cefTRIAXone injection 1 g    Hypertension associated with diabetes  Stable, continue medication  Low sodium diet  BP Readings from Last 3 Encounters:   11/09/23 118/74   11/07/23 (!) 140/68   10/13/23 (!) 157/82           Patient readiness: acceptance and barriers:none    During the course of the visit the patient " was educated and counseled about the following:     Hypertension:   Dietary sodium restriction.  Regular aerobic exercise.    Goals: Hypertension: Reduce Blood Pressure    Did patient meet goals/outcomes: Yes    The following self management tools provided: declined    Patient Instructions (the written plan) was given to the patient/family.     Time spent with patient: 30 minutes    Barriers to medications present (no )    Adverse reactions to current medications (no)    Over the counter medications reviewed (Yes)

## 2023-11-13 ENCOUNTER — EXTERNAL HOME HEALTH (OUTPATIENT)
Dept: HOME HEALTH SERVICES | Facility: HOSPITAL | Age: 58
End: 2023-11-13
Payer: MEDICARE

## 2023-11-15 ENCOUNTER — TELEPHONE (OUTPATIENT)
Dept: FAMILY MEDICINE | Facility: CLINIC | Age: 58
End: 2023-11-15
Payer: MEDICARE

## 2023-11-15 DIAGNOSIS — G89.0 CENTRAL PAIN SYNDROME: Primary | ICD-10-CM

## 2023-11-15 NOTE — TELEPHONE ENCOUNTER
----- Message from Sameerkatina Huerta sent at 11/15/2023  1:28 PM CST -----  Regarding: refer  Contact: mireya waite [phone]  Type:  Patient Requesting Referral    Who Called:  Mireya Waite  Does the patient already have the specialty appointment scheduled?:  no  If yes, what is the date of that appointment?:    Referral to What Specialty:  rheum  Reason for Referral:  He's in a lot of Pain   Does the patient want the referral with a specific physician?:  No  Is the specialist an [facility]?:    Patient Requesting a Call Back?:   Best Call Back Number:  [phone]  Additional Information:

## 2023-11-15 NOTE — TELEPHONE ENCOUNTER
Pt notified that follow up appt scheduled for 12/22/23 with  and pain management appt scheduled for 1/2/24 voiced understanding but stated he would like to be sooner pt placed on waiting list.

## 2023-11-16 ENCOUNTER — DOCUMENT SCAN (OUTPATIENT)
Dept: HOME HEALTH SERVICES | Facility: HOSPITAL | Age: 58
End: 2023-11-16
Payer: MEDICARE

## 2023-11-29 DIAGNOSIS — E11.9 TYPE 2 DIABETES MELLITUS WITHOUT COMPLICATION, UNSPECIFIED WHETHER LONG TERM INSULIN USE: ICD-10-CM

## 2023-12-03 ENCOUNTER — HOSPITAL ENCOUNTER (INPATIENT)
Facility: HOSPITAL | Age: 58
LOS: 1 days | Discharge: HOME | DRG: 191 | End: 2023-12-05
Attending: EMERGENCY MEDICINE | Admitting: HOSPITALIST
Payer: MEDICARE

## 2023-12-03 DIAGNOSIS — I63.9 STROKE: ICD-10-CM

## 2023-12-03 DIAGNOSIS — R29.818 ACUTE FOCAL NEUROLOGICAL DEFICIT: ICD-10-CM

## 2023-12-03 DIAGNOSIS — R05.9 COUGH: ICD-10-CM

## 2023-12-03 DIAGNOSIS — F17.200 TOBACCO DEPENDENCY: Primary | ICD-10-CM

## 2023-12-03 DIAGNOSIS — J44.1 COPD EXACERBATION: ICD-10-CM

## 2023-12-03 DIAGNOSIS — E11.9 TYPE 2 DIABETES MELLITUS WITHOUT COMPLICATION, WITHOUT LONG-TERM CURRENT USE OF INSULIN: ICD-10-CM

## 2023-12-03 LAB
ALBUMIN SERPL BCP-MCNC: 3.1 G/DL (ref 3.5–5.2)
ALP SERPL-CCNC: 56 U/L (ref 55–135)
ALT SERPL W/O P-5'-P-CCNC: 5 U/L (ref 10–44)
ANION GAP SERPL CALC-SCNC: 9 MMOL/L (ref 8–16)
AST SERPL-CCNC: 10 U/L (ref 10–40)
BASOPHILS # BLD AUTO: 0.02 K/UL (ref 0–0.2)
BASOPHILS NFR BLD: 0.4 % (ref 0–1.9)
BILIRUB SERPL-MCNC: 1.1 MG/DL (ref 0.1–1)
BUN SERPL-MCNC: 9 MG/DL (ref 6–20)
CALCIUM SERPL-MCNC: 8.9 MG/DL (ref 8.7–10.5)
CHLORIDE SERPL-SCNC: 103 MMOL/L (ref 95–110)
CHOLEST SERPL-MCNC: 125 MG/DL (ref 120–199)
CHOLEST/HDLC SERPL: 2.8 {RATIO} (ref 2–5)
CO2 SERPL-SCNC: 27 MMOL/L (ref 23–29)
CREAT SERPL-MCNC: 0.8 MG/DL (ref 0.5–1.4)
DIFFERENTIAL METHOD: ABNORMAL
EOSINOPHIL # BLD AUTO: 0.1 K/UL (ref 0–0.5)
EOSINOPHIL NFR BLD: 2.1 % (ref 0–8)
ERYTHROCYTE [DISTWIDTH] IN BLOOD BY AUTOMATED COUNT: 14.8 % (ref 11.5–14.5)
EST. GFR  (NO RACE VARIABLE): >60 ML/MIN/1.73 M^2
GLUCOSE SERPL-MCNC: 189 MG/DL (ref 70–110)
HCT VFR BLD AUTO: 35.1 % (ref 40–54)
HDLC SERPL-MCNC: 44 MG/DL (ref 40–75)
HDLC SERPL: 35.2 % (ref 20–50)
HGB BLD-MCNC: 11.2 G/DL (ref 14–18)
IMM GRANULOCYTES # BLD AUTO: 0.01 K/UL (ref 0–0.04)
IMM GRANULOCYTES NFR BLD AUTO: 0.2 % (ref 0–0.5)
INR PPP: 1 (ref 0.8–1.2)
LDLC SERPL CALC-MCNC: 70.8 MG/DL (ref 63–159)
LYMPHOCYTES # BLD AUTO: 1.3 K/UL (ref 1–4.8)
LYMPHOCYTES NFR BLD: 24.1 % (ref 18–48)
MCH RBC QN AUTO: 29.6 PG (ref 27–31)
MCHC RBC AUTO-ENTMCNC: 31.9 G/DL (ref 32–36)
MCV RBC AUTO: 93 FL (ref 82–98)
MONOCYTES # BLD AUTO: 0.4 K/UL (ref 0.3–1)
MONOCYTES NFR BLD: 6.9 % (ref 4–15)
NEUTROPHILS # BLD AUTO: 3.5 K/UL (ref 1.8–7.7)
NEUTROPHILS NFR BLD: 66.3 % (ref 38–73)
NONHDLC SERPL-MCNC: 81 MG/DL
NRBC BLD-RTO: 0 /100 WBC
PLATELET # BLD AUTO: 173 K/UL (ref 150–450)
PMV BLD AUTO: 10.5 FL (ref 9.2–12.9)
POCT GLUCOSE: 139 MG/DL (ref 70–110)
POTASSIUM SERPL-SCNC: 3.5 MMOL/L (ref 3.5–5.1)
PROT SERPL-MCNC: 6.9 G/DL (ref 6–8.4)
PROTHROMBIN TIME: 11.3 SEC (ref 9–12.5)
RBC # BLD AUTO: 3.78 M/UL (ref 4.6–6.2)
SODIUM SERPL-SCNC: 139 MMOL/L (ref 136–145)
T4 FREE SERPL-MCNC: 1.04 NG/DL (ref 0.71–1.51)
TRIGL SERPL-MCNC: 51 MG/DL (ref 30–150)
TSH SERPL DL<=0.005 MIU/L-ACNC: 0.24 UIU/ML (ref 0.4–4)
WBC # BLD AUTO: 5.23 K/UL (ref 3.9–12.7)

## 2023-12-03 PROCEDURE — 25000242 PHARM REV CODE 250 ALT 637 W/ HCPCS: Performed by: EMERGENCY MEDICINE

## 2023-12-03 PROCEDURE — 25000003 PHARM REV CODE 250: Performed by: NURSE PRACTITIONER

## 2023-12-03 PROCEDURE — 36415 COLL VENOUS BLD VENIPUNCTURE: CPT | Performed by: EMERGENCY MEDICINE

## 2023-12-03 PROCEDURE — 96374 THER/PROPH/DIAG INJ IV PUSH: CPT

## 2023-12-03 PROCEDURE — 84439 ASSAY OF FREE THYROXINE: CPT | Performed by: EMERGENCY MEDICINE

## 2023-12-03 PROCEDURE — 93010 ELECTROCARDIOGRAM REPORT: CPT | Mod: ,,, | Performed by: GENERAL PRACTICE

## 2023-12-03 PROCEDURE — 99285 EMERGENCY DEPT VISIT HI MDM: CPT | Mod: 25

## 2023-12-03 PROCEDURE — 25500020 PHARM REV CODE 255

## 2023-12-03 PROCEDURE — 93005 ELECTROCARDIOGRAM TRACING: CPT

## 2023-12-03 PROCEDURE — 80061 LIPID PANEL: CPT | Performed by: EMERGENCY MEDICINE

## 2023-12-03 PROCEDURE — G0378 HOSPITAL OBSERVATION PER HR: HCPCS

## 2023-12-03 PROCEDURE — 85610 PROTHROMBIN TIME: CPT | Performed by: EMERGENCY MEDICINE

## 2023-12-03 PROCEDURE — 80053 COMPREHEN METABOLIC PANEL: CPT | Performed by: EMERGENCY MEDICINE

## 2023-12-03 PROCEDURE — 85025 COMPLETE CBC W/AUTO DIFF WBC: CPT | Performed by: EMERGENCY MEDICINE

## 2023-12-03 PROCEDURE — 94761 N-INVAS EAR/PLS OXIMETRY MLT: CPT

## 2023-12-03 PROCEDURE — 63600175 PHARM REV CODE 636 W HCPCS: Performed by: NURSE PRACTITIONER

## 2023-12-03 PROCEDURE — 84443 ASSAY THYROID STIM HORMONE: CPT | Performed by: EMERGENCY MEDICINE

## 2023-12-03 PROCEDURE — 82962 GLUCOSE BLOOD TEST: CPT

## 2023-12-03 PROCEDURE — 93010 EKG 12-LEAD: ICD-10-PCS | Mod: ,,, | Performed by: GENERAL PRACTICE

## 2023-12-03 PROCEDURE — S4991 NICOTINE PATCH NONLEGEND: HCPCS | Performed by: NURSE PRACTITIONER

## 2023-12-03 PROCEDURE — 94640 AIRWAY INHALATION TREATMENT: CPT

## 2023-12-03 RX ORDER — IBUPROFEN 200 MG
24 TABLET ORAL
Status: DISCONTINUED | OUTPATIENT
Start: 2023-12-03 | End: 2023-12-05 | Stop reason: HOSPADM

## 2023-12-03 RX ORDER — BACLOFEN 10 MG/1
10 TABLET ORAL 2 TIMES DAILY
Status: DISCONTINUED | OUTPATIENT
Start: 2023-12-03 | End: 2023-12-05 | Stop reason: HOSPADM

## 2023-12-03 RX ORDER — IBUPROFEN 200 MG
16 TABLET ORAL
Status: DISCONTINUED | OUTPATIENT
Start: 2023-12-03 | End: 2023-12-05 | Stop reason: HOSPADM

## 2023-12-03 RX ORDER — AMOXICILLIN 250 MG
1 CAPSULE ORAL 2 TIMES DAILY
Status: DISCONTINUED | OUTPATIENT
Start: 2023-12-03 | End: 2023-12-05 | Stop reason: HOSPADM

## 2023-12-03 RX ORDER — BUDESONIDE 0.5 MG/2ML
1 INHALANT ORAL 2 TIMES DAILY
Status: DISCONTINUED | OUTPATIENT
Start: 2023-12-04 | End: 2023-12-05 | Stop reason: HOSPADM

## 2023-12-03 RX ORDER — LABETALOL HYDROCHLORIDE 5 MG/ML
10 INJECTION, SOLUTION INTRAVENOUS EVERY 6 HOURS PRN
Status: DISCONTINUED | OUTPATIENT
Start: 2023-12-03 | End: 2023-12-05 | Stop reason: HOSPADM

## 2023-12-03 RX ORDER — ATORVASTATIN CALCIUM 20 MG/1
20 TABLET, FILM COATED ORAL DAILY
Status: DISCONTINUED | OUTPATIENT
Start: 2023-12-04 | End: 2023-12-05 | Stop reason: HOSPADM

## 2023-12-03 RX ORDER — IPRATROPIUM BROMIDE AND ALBUTEROL SULFATE 2.5; .5 MG/3ML; MG/3ML
3 SOLUTION RESPIRATORY (INHALATION) EVERY 6 HOURS
Status: DISCONTINUED | OUTPATIENT
Start: 2023-12-04 | End: 2023-12-05 | Stop reason: HOSPADM

## 2023-12-03 RX ORDER — GLUCAGON 1 MG
1 KIT INJECTION
Status: DISCONTINUED | OUTPATIENT
Start: 2023-12-03 | End: 2023-12-05 | Stop reason: HOSPADM

## 2023-12-03 RX ORDER — IBUPROFEN 200 MG
1 TABLET ORAL DAILY
Status: DISCONTINUED | OUTPATIENT
Start: 2023-12-03 | End: 2023-12-05 | Stop reason: HOSPADM

## 2023-12-03 RX ORDER — PANTOPRAZOLE SODIUM 40 MG/1
40 TABLET, DELAYED RELEASE ORAL DAILY
Status: DISCONTINUED | OUTPATIENT
Start: 2023-12-04 | End: 2023-12-05 | Stop reason: HOSPADM

## 2023-12-03 RX ORDER — SODIUM CHLORIDE 0.9 % (FLUSH) 0.9 %
10 SYRINGE (ML) INJECTION
Status: DISCONTINUED | OUTPATIENT
Start: 2023-12-03 | End: 2023-12-05 | Stop reason: HOSPADM

## 2023-12-03 RX ORDER — INSULIN ASPART 100 [IU]/ML
0-10 INJECTION, SOLUTION INTRAVENOUS; SUBCUTANEOUS
Status: DISCONTINUED | OUTPATIENT
Start: 2023-12-03 | End: 2023-12-05 | Stop reason: HOSPADM

## 2023-12-03 RX ORDER — IPRATROPIUM BROMIDE AND ALBUTEROL SULFATE 2.5; .5 MG/3ML; MG/3ML
3 SOLUTION RESPIRATORY (INHALATION)
Status: COMPLETED | OUTPATIENT
Start: 2023-12-03 | End: 2023-12-03

## 2023-12-03 RX ORDER — ASPIRIN 81 MG/1
81 TABLET ORAL DAILY
Status: DISCONTINUED | OUTPATIENT
Start: 2023-12-04 | End: 2023-12-05 | Stop reason: HOSPADM

## 2023-12-03 RX ORDER — ACETAMINOPHEN 325 MG/1
650 TABLET ORAL EVERY 6 HOURS PRN
Status: DISCONTINUED | OUTPATIENT
Start: 2023-12-03 | End: 2023-12-05 | Stop reason: HOSPADM

## 2023-12-03 RX ORDER — PREDNISONE 20 MG/1
40 TABLET ORAL DAILY
Status: DISCONTINUED | OUTPATIENT
Start: 2023-12-04 | End: 2023-12-05 | Stop reason: HOSPADM

## 2023-12-03 RX ORDER — ARFORMOTEROL TARTRATE 15 UG/2ML
15 SOLUTION RESPIRATORY (INHALATION) 2 TIMES DAILY
Status: DISCONTINUED | OUTPATIENT
Start: 2023-12-04 | End: 2023-12-05 | Stop reason: HOSPADM

## 2023-12-03 RX ORDER — METOCLOPRAMIDE HYDROCHLORIDE 5 MG/ML
5 INJECTION INTRAMUSCULAR; INTRAVENOUS EVERY 6 HOURS PRN
Status: DISCONTINUED | OUTPATIENT
Start: 2023-12-03 | End: 2023-12-05 | Stop reason: HOSPADM

## 2023-12-03 RX ORDER — ONDANSETRON 2 MG/ML
4 INJECTION INTRAMUSCULAR; INTRAVENOUS EVERY 12 HOURS PRN
Status: DISCONTINUED | OUTPATIENT
Start: 2023-12-03 | End: 2023-12-05 | Stop reason: HOSPADM

## 2023-12-03 RX ORDER — ESCITALOPRAM OXALATE 10 MG/1
10 TABLET ORAL DAILY
Status: DISCONTINUED | OUTPATIENT
Start: 2023-12-04 | End: 2023-12-05 | Stop reason: HOSPADM

## 2023-12-03 RX ORDER — FLUTICASONE FUROATE AND VILANTEROL 200; 25 UG/1; UG/1
1 POWDER RESPIRATORY (INHALATION) DAILY
Status: DISCONTINUED | OUTPATIENT
Start: 2023-12-04 | End: 2023-12-03 | Stop reason: SDUPTHER

## 2023-12-03 RX ADMIN — IOHEXOL 75 ML: 350 INJECTION, SOLUTION INTRAVENOUS at 06:12

## 2023-12-03 RX ADMIN — PREGABALIN 100 MG: 75 CAPSULE ORAL at 10:12

## 2023-12-03 RX ADMIN — METHYLPREDNISOLONE SODIUM SUCCINATE 80 MG: 40 INJECTION, POWDER, FOR SOLUTION INTRAMUSCULAR; INTRAVENOUS at 10:12

## 2023-12-03 RX ADMIN — BACLOFEN 10 MG: 10 TABLET ORAL at 10:12

## 2023-12-03 RX ADMIN — IPRATROPIUM BROMIDE AND ALBUTEROL SULFATE 3 ML: .5; 3 SOLUTION RESPIRATORY (INHALATION) at 05:12

## 2023-12-03 RX ADMIN — Medication 1 PATCH: at 10:12

## 2023-12-03 NOTE — CARE UPDATE
12/03/23 1727   Patient Assessment/Suction   Level of Consciousness (AVPU) alert   Respiratory Effort Normal   Expansion/Accessory Muscles/Retractions expansion symmetric   All Lung Fields Breath Sounds coarse;wheezes, expiratory   PRE-TX-O2   Device (Oxygen Therapy) room air   SpO2 100 %   Pulse Oximetry Type Continuous   $ Pulse Oximetry - Multiple Charge Pulse Oximetry - Multiple   Pulse 68   Resp 14   Aerosol Therapy   $ Aerosol Therapy Charges Aerosol Treatment   Daily Review of Necessity (SVN) completed   Respiratory Treatment Status (SVN) given   Treatment Route (SVN) mask;oxygen   Patient Position (SVN) HOB elevated   Post Treatment Assessment (SVN) breath sounds unchanged   Signs of Intolerance (SVN) none   Breath Sounds Post-Respiratory Treatment   Post-treatment Heart Rate (beats/min) 67   Post-treatment Resp Rate (breaths/min) 14

## 2023-12-03 NOTE — ED PROVIDER NOTES
Encounter Date: 12/3/2023       History     Chief Complaint   Patient presents with    Headache     Left facial numbness x 2 days / left leg numbness      Patient is a 58-year-old male who presents the emergency room for evaluation of left-sided facial paresthesias and left leg paresthesias.  He is a difficult historian and his story has changed from triage.  He told triage it was 2 days and he told me it was 2 weeks.  Patient continues to smoke in his likely noncompliant with his medications according to his family member.  He does have a history of central pain syndrome on the right side of his body from prior stroke in November 2022.  He has COPD and has a persistent cough.  He is here because of the pain on the right side in the numbness in the left side of the face and left side of the arm.  He is chronic weakness in the right side but also feels like this is gotten worse over the past few months.      Review of patient's allergies indicates:  No Known Allergies  Past Medical History:   Diagnosis Date    COPD (chronic obstructive pulmonary disease)     Diabetes mellitus, type 2     Hypertension      Past Surgical History:   Procedure Laterality Date    DENTAL SURGERY      ESOPHAGOGASTRODUODENOSCOPY N/A 6/1/2020    Procedure: EGD (ESOPHAGOGASTRODUODENOSCOPY);  Surgeon: Terrell May MD;  Location: [facility];  Service: Endoscopy;  Laterality: N/A;    HERNIA REPAIR      left inguinal    incision and drainiage       Family History   Problem Relation Age of Onset    Heart attack Father 80    Heart disease Father     Drug abuse Father     Cancer Maternal Grandmother         colon     Social History     Tobacco Use    Smoking status: Every Day     Current packs/day: 2.00     Average packs/day: 2.0 packs/day for 30.0 years (60.0 ttl pk-yrs)     Types: Cigarettes    Smokeless tobacco: Never   Substance Use Topics    Alcohol use: Yes     Alcohol/week: 1.0 standard drink of alcohol     Types: 1 Shots of liquor per  week     Comment: 1/2 pint 2 x per week - quit approximately 5 months ago    Drug use: No     Review of Systems   Constitutional:  Negative for appetite change, fatigue and fever.   HENT:  Negative for congestion, ear pain, rhinorrhea and sore throat.    Eyes:  Negative for pain and visual disturbance.   Respiratory:  Positive for cough, shortness of breath and wheezing.    Cardiovascular:  Negative for chest pain.   Gastrointestinal:  Negative for abdominal pain, diarrhea, nausea and vomiting.   Genitourinary:  Negative for difficulty urinating.   Musculoskeletal:  Positive for myalgias.   Skin:  Negative for rash.   Neurological:  Positive for weakness and numbness. Negative for headaches.        Dysesthesias right upper and lower extremity as well as the right side of the face (chronic)   Psychiatric/Behavioral:  Positive for agitation and dysphoric mood.    All other systems reviewed and are negative.      Physical Exam     Initial Vitals [12/03/23 1659]   BP Pulse Resp Temp SpO2   (!) 150/96 83 20 97.7 °F (36.5 °C) 99 %      MAP       --         Physical Exam    Nursing note and vitals reviewed.  Constitutional: He appears well-developed and well-nourished. No distress.   HENT:   Head: Normocephalic and atraumatic.   Mouth/Throat: Oropharynx is clear and moist.   Eyes: Conjunctivae and EOM are normal. Pupils are equal, round, and reactive to light.   Neck: Neck supple.   Normal range of motion.  Cardiovascular:  Normal rate, regular rhythm, normal heart sounds and intact distal pulses.     Exam reveals no gallop and no friction rub.       No murmur heard.  Pulmonary/Chest: He has wheezes. He has no rhonchi. He has no rales.   Abdominal: Abdomen is soft. Bowel sounds are normal. There is no abdominal tenderness.   Musculoskeletal:         General: Normal range of motion.      Cervical back: Normal range of motion and neck supple.     Neurological: He is alert and oriented to person, place, and time. A cranial  nerve deficit and sensory deficit is present.   Chronic weakness in the right upper arm right lower extremity 4/5.  No weakness in the left upper left lower extremity but new paresthesias in the left lower extremity as well as the left side of the face.  Chronic right facial droop.   Skin: Skin is warm and dry.   Psychiatric: He has a normal mood and affect.         ED Course   Procedures  Labs Reviewed   CBC W/ AUTO DIFFERENTIAL - Abnormal; Notable for the following components:       Result Value    RBC 3.78 (*)     Hemoglobin 11.2 (*)     Hematocrit 35.1 (*)     MCHC 31.9 (*)     RDW 14.8 (*)     All other components within normal limits   COMPREHENSIVE METABOLIC PANEL - Abnormal; Notable for the following components:    Glucose 189 (*)     Albumin 3.1 (*)     Total Bilirubin 1.1 (*)     ALT 5 (*)     All other components within normal limits   TSH - Abnormal; Notable for the following components:    TSH 0.243 (*)     All other components within normal limits   POCT GLUCOSE - Abnormal; Notable for the following components:    POCT Glucose 139 (*)     All other components within normal limits   PROTIME-INR   LIPID PANEL   T4, FREE   POCT GLUCOSE, HAND-HELD DEVICE          Imaging Results              MRI BRAIN WITHOUT CONTRAST (Final result)  Result time 12/04/23 11:03:45      Final result by Delvin Wilson MD (12/04/23 11:03:45)                   Impression:      1. Chronic ischemic changes as discussed above including a small lacunar type infarct within the right posterior frontal corona radiata which appears new from 02/22/2023.  No recent infarct or acute hemorrhage.      Electronically signed by: Delvin Wilson  Date:    12/04/2023  Time:    11:03               Narrative:    EXAMINATION:  MRI BRAIN WITHOUT CONTRAST    CLINICAL HISTORY:  Stroke, follow up;.    TECHNIQUE:  Multiplanar multisequence MR imaging of the brain was performed without contrast    COMPARISON:  CTA 12/03/2023.  MRI brain  02/22/2023.    FINDINGS:  Brain: Chronic lacunar type infarct within the right posterior frontal corona radiata but new from previous exam dated 02/22/2023.  No acute infarct or recent hemorrhage.  Chronic hemorrhagic infarct within the alf as well as chronic lacunar type infarcts within the bilateral basal ganglia redemonstrated.  Superimposed mild to moderate chronic small vessel ischemic change for age within the cerebral hemispheric white matter.  No space-occupying extra-axial fluid collections/mass effect.  Midline Structures: The midline structures of the brain are normal.  Ventricles: The ventricles, sulci and cisterns are within normal limits.  Vasculature: The vascular flow voids at the base of the brain are within normal limits.  Calvarium: The visualized osseous structures are unremarkable.  Sinuses: The paranasal sinuses are adequately aerated.  Orbits: The orbits and globes are unremarkable.  Mastoids: The mastoid air cells are adequately aerated.  Extracranial soft tissues: The visualized extracranial soft tissues are unremarkable.                                       US Carotid Bilateral (Final result)  Result time 12/04/23 09:56:34      Final result by Alicia Martin MD (12/04/23 09:56:34)                   Impression:      No evidence of a hemodynamically significant carotid bifurcation stenosis.      Electronically signed by: Alicia Martin MD  Date:    12/04/2023  Time:    09:56               Narrative:    EXAMINATION:  US CAROTID BILATERAL    CLINICAL HISTORY:  Velocities evaluation;    TECHNIQUE:  Grayscale and color Doppler ultrasound examination of the carotid and vertebral artery systems bilaterally.  Stenosis estimates are per the NASCET measurement criteria.    COMPARISON:  None.    FINDINGS:  Right:    Internal Carotid Artery (ICA) peak systolic velocity 27.4 cm/sec    ICA/CCA peak systolic velocity ratio: 0.5    Plaque formation: Mild    Vertebral artery: Antegrade flow and normal  waveform.    Left:    Internal Carotid Artery (ICA)  peak systolic velocity 51.8 cm/sec    ICA/CCA peak systolic velocity ratio: 0.8    Plaque formation: Mild    Vertebral artery: Antegrade flow and normal waveform.                                       CTA Head and Neck (xpd) (Final result)  Result time 12/04/23 05:55:17      Final result by Delvin Wilson MD (12/04/23 05:55:17)                   Impression:      1. Age indeterminate lacunar type infarct within the right frontal corona radiata which can be further assessed on currently ordered of the brain.  No hemorrhage or intracranial mass effect.  Chronic small vessel ischemic changes again noted most pronounced at the level the central alf and to a lesser extent the bilateral basal ganglia.  2. No intracranial proximal large vessel occlusion.  Moderate focal stenosis of the bilateral cavernous carotid arteries.  3. Less than 50% stenosis of the proximal cervical right ICA.  No other significant stenosis within the neck.  Tortuous ICAs.  No major discrepancies between the preliminary and final reports aside from mention of the potential age indeterminate small volume infarct within the right frontal corona radiata as discussed above.      Electronically signed by: Delvin Wilson  Date:    12/04/2023  Time:    05:55               Narrative:    EXAMINATION:  CTA HEAD AND NECK (XPD)    CLINICAL HISTORY:  Neuro deficit, acute, stroke suspected;Stroke/TIA, determine embolic source;    TECHNIQUE:  Non contrast low dose axial images were obtained through the head. CT angiogram was performed from the level of the ignacia to the top of the head following the IV administration of 75mL of Omnipaque 350.   Sagittal and coronal reconstructions and maximum intensity projection reconstructions were performed. Arterial stenosis percentages are based on NASCET measurement criteria.    COMPARISON:  CTA 04/28/2023. MRI/MRA 02/22/2023.    FINDINGS:  CTA HEAD:    Vasculature:  Atherosclerotic changes with moderate intermittent focal stenosis of the cavernous ICAs bilaterally.The intracranial arterial vasculature is otherwise patent.  No high-grade stenosis or proximal large vessel occlusion.  No aneurysm or vascular malformation identified.    Variant anatomy: Developmentally diminutive left P1 PCA and prominent left posterior communicating artery.  Azygous A2 FILOMENA.    Brain: There is a subtle subcentimeter low-density within the right frontal corona radiata which may be new from prior CT examinations as well as most recent MRI 02/22/2023 potentially representing an age indeterminate focus of small vessel ischemic change.  No acute intracranial hemorrhage, midline shift or mass effect, or space-occupying extra-axial fluid collection.  No CT evidence of an acute major vascular territory infarct.  Chronic infarcts redemonstrated involving the bilateral basal ganglia and central alf.  No abnormal intracranial enhancement after the administration of intravenous contrast.    Ventricles/Sulci: The ventricles and sulci are appropriate in size for age.      Osseous Structures: The osseous structures are unremarkable in appearance.    Sinuses and orbits: The visualized portions of the paranasal sinuses and orbits are unremarkable.    Other: Visualized portions of the mastoids are unremarkable.  Chronic scarring or potential complex cyst involving the right superior parietal scalp.    CTA NECK:    Aorta: Conventional branching pattern. The great vessel origins are patent without flow limiting stenosis.    Vertebral Arteries: The origins of the vertebral arteries are patent.  No flow limiting stenosis, occlusion, or dissection. The left vertebral artery is dominant.    Right Carotid: No flow limiting stenosis, occlusion, or dissection of the common carotid or internal carotid arteries.  Mild plaque of the proximal ICA.  Right internal carotid artery: 40-50 % stenosis by and NASCET.  The ICA is  tortuous with medialization at the C2-C3 level.    Left Carotid: No flow limiting stenosis, occlusion, or dissection of the common carotid or internal carotid arteries. No significant plaque of the proximal ICA. Right internal carotid artery: 0 % stenosis by and NASCET.  The ICA is tortuous with medialization at the C2-C3 level.    Extravascular Anatomy: Emphysematous changes of the lung apices.                                       X-Ray Chest AP Portable (Final result)  Result time 12/04/23 08:39:05      Final result by Alicia Martin MD (12/04/23 08:39:05)                   Impression:      No acute abnormality.      Electronically signed by: Alicia Martin MD  Date:    12/04/2023  Time:    08:39               Narrative:    EXAMINATION:  XR CHEST AP PORTABLE    CLINICAL HISTORY:  Cough, unspecified    TECHNIQUE:  Single frontal view of the chest was performed.    COMPARISON:  11/09/2023    FINDINGS:  The lungs are clear, with normal appearance of pulmonary vasculature and no pleural effusion or pneumothorax.    The cardiac silhouette is normal in size. The hilar and mediastinal contours are unremarkable.    Bones are intact.                                       Medications   atorvastatin tablet 20 mg (20 mg Oral Given 12/4/23 0935)   baclofen tablet 10 mg (10 mg Oral Given 12/4/23 2127)   EScitalopram oxalate tablet 10 mg (10 mg Oral Given 12/4/23 0935)   pantoprazole EC tablet 40 mg (40 mg Oral Given 12/4/23 0935)   pregabalin capsule 100 mg (100 mg Oral Given 12/4/23 2127)   sodium chloride 0.9% flush 10 mL (has no administration in time range)   sodium chloride 0.9% bolus 500 mL 500 mL (has no administration in time range)   labetaloL injection 10 mg (has no administration in time range)   aspirin EC tablet 81 mg (81 mg Oral Given 12/4/23 0935)   ondansetron injection 4 mg (has no administration in time range)   metoclopramide HCl injection 5 mg (has no administration in time range)   acetaminophen  tablet 650 mg (650 mg Oral Given 12/4/23 1613)   senna-docusate 8.6-50 mg per tablet 1 tablet (1 tablet Oral Given 12/4/23 2127)   albuterol-ipratropium 2.5 mg-0.5 mg/3 mL nebulizer solution 3 mL (3 mLs Nebulization Given 12/4/23 1932)   budesonide nebulizer solution 1 mg (1 mg Nebulization Given 12/4/23 1939)   arformoteroL nebulizer solution 15 mcg (15 mcg Nebulization Given 12/4/23 1944)   predniSONE tablet 40 mg (40 mg Oral Given 12/4/23 0935)   nicotine 21 mg/24 hr 1 patch (1 patch Transdermal Patch Applied 12/4/23 2144)   glucose chewable tablet 16 g (has no administration in time range)   glucose chewable tablet 24 g (has no administration in time range)   glucagon (human recombinant) injection 1 mg (has no administration in time range)   insulin aspart U-100 pen 0-10 Units (1 Units Subcutaneous Given 12/4/23 2127)   dextrose 10% bolus 125 mL 125 mL (has no administration in time range)   dextrose 10% bolus 250 mL 250 mL (has no administration in time range)   potassium bicarbonate disintegrating tablet 50 mEq (has no administration in time range)   potassium bicarbonate disintegrating tablet 35 mEq (has no administration in time range)   potassium bicarbonate disintegrating tablet 60 mEq (has no administration in time range)   magnesium oxide tablet 800 mg (800 mg Oral Given 12/4/23 1401)   magnesium oxide tablet 800 mg (has no administration in time range)   potassium, sodium phosphates 280-160-250 mg packet 2 packet ( Oral Canceled Entry 12/4/23 1300)   potassium, sodium phosphates 280-160-250 mg packet 2 packet (has no administration in time range)   potassium, sodium phosphates 280-160-250 mg packet 2 packet ( Oral Canceled Entry 12/4/23 0936)   clopidogreL tablet 75 mg (75 mg Oral Given 12/4/23 1401)   albuterol-ipratropium 2.5 mg-0.5 mg/3 mL nebulizer solution 3 mL (3 mLs Nebulization Given 12/3/23 1727)   iohexoL (OMNIPAQUE 350) 350 mg iodine/mL injection (75 mLs Intravenous Given 12/3/23 1812)    methylPREDNISolone sodium succinate injection 80 mg (80 mg Intravenous Given 12/3/23 3437)     Medical Decision Making  Patient very well may have had a stroke 2 weeks ago or having TIAs.  Given that this is his unaffected side I do think he needs a workup with a CTA head and neck.  He is still smoking not taking his medications.  I anticipate admission for further evaluation.  Also likely has COPD but will get a chest x-ray to make sure he does not have pneumonia.    Patient is definitely not a tnK candidate given time of onset.  Patient needs to be admitted for further workup medical management and imaging with Neurology consult.    Amount and/or Complexity of Data Reviewed  Labs: ordered.  Radiology: ordered.    Risk  Prescription drug management.      Additional MDM:     NIH Stroke Scale:   Interval = baseline (upon arrival/admit)  Level of consciousness = 0 - alert  LOC questions = 0 - answers both correctly  LOC commands = 0 - performs both correctly  Best gaze = 0 - normal  Visual = 0 - no visual loss  Facial palsy = 0 - normal (Chronic right sided facial droop)  Motor left arm =  0 - no drift  Motor right arm =  0 - no drift (Chronic drift in the right upper extremity no new drift in the left)  Motor left leg = 0 - no drift  Motor right leg =  0 - no drift (Chronic drift in the right upper extremity no new drift)  Limb ataxia = 0 - absent  Sensory = 1 - mild to moderate loss  Best language = 0 - no aphasia  Dysarthria = 0 - normal articulation  Extinction and inattention = 0 - no neglect  NIH Stroke Scale Total = 1                                     Clinical Impression:  Final diagnoses:  [R29.818] Acute focal neurological deficit  [R05.9] Cough  [I63.9] Stroke  [F17.200] Tobacco dependency (Primary)          ED Disposition Condition    Observation                 Quinn Sarmiento MD  12/04/23 2853

## 2023-12-04 LAB
ALBUMIN SERPL BCP-MCNC: 3.2 G/DL (ref 3.5–5.2)
ALP SERPL-CCNC: 61 U/L (ref 55–135)
ALT SERPL W/O P-5'-P-CCNC: 6 U/L (ref 10–44)
ANION GAP SERPL CALC-SCNC: 10 MMOL/L (ref 8–16)
APTT PPP: 31.6 SEC (ref 21–32)
AST SERPL-CCNC: 9 U/L (ref 10–40)
BASOPHILS # BLD AUTO: 0 K/UL (ref 0–0.2)
BASOPHILS NFR BLD: 0 % (ref 0–1.9)
BILIRUB SERPL-MCNC: 0.8 MG/DL (ref 0.1–1)
BUN SERPL-MCNC: 7 MG/DL (ref 6–20)
CALCIUM SERPL-MCNC: 9.3 MG/DL (ref 8.7–10.5)
CHLORIDE SERPL-SCNC: 102 MMOL/L (ref 95–110)
CHOLEST SERPL-MCNC: 141 MG/DL (ref 120–199)
CHOLEST/HDLC SERPL: 2.9 {RATIO} (ref 2–5)
CO2 SERPL-SCNC: 24 MMOL/L (ref 23–29)
CREAT SERPL-MCNC: 0.7 MG/DL (ref 0.5–1.4)
DIFFERENTIAL METHOD: ABNORMAL
EOSINOPHIL # BLD AUTO: 0 K/UL (ref 0–0.5)
EOSINOPHIL NFR BLD: 0 % (ref 0–8)
ERYTHROCYTE [DISTWIDTH] IN BLOOD BY AUTOMATED COUNT: 14.6 % (ref 11.5–14.5)
EST. GFR  (NO RACE VARIABLE): >60 ML/MIN/1.73 M^2
ESTIMATED AVG GLUCOSE: 140 MG/DL (ref 68–131)
GLUCOSE SERPL-MCNC: 195 MG/DL (ref 70–110)
HBA1C MFR BLD: 6.5 % (ref 4.5–6.2)
HCT VFR BLD AUTO: 38.4 % (ref 40–54)
HDLC SERPL-MCNC: 48 MG/DL (ref 40–75)
HDLC SERPL: 34 % (ref 20–50)
HGB BLD-MCNC: 12.2 G/DL (ref 14–18)
IMM GRANULOCYTES # BLD AUTO: 0.02 K/UL (ref 0–0.04)
IMM GRANULOCYTES NFR BLD AUTO: 0.3 % (ref 0–0.5)
INR PPP: 0.9 (ref 0.8–1.2)
LDLC SERPL CALC-MCNC: 84 MG/DL (ref 63–159)
LYMPHOCYTES # BLD AUTO: 0.4 K/UL (ref 1–4.8)
LYMPHOCYTES NFR BLD: 7.6 % (ref 18–48)
MAGNESIUM SERPL-MCNC: 1.7 MG/DL (ref 1.6–2.6)
MCH RBC QN AUTO: 29.3 PG (ref 27–31)
MCHC RBC AUTO-ENTMCNC: 31.8 G/DL (ref 32–36)
MCV RBC AUTO: 92 FL (ref 82–98)
MONOCYTES # BLD AUTO: 0.1 K/UL (ref 0.3–1)
MONOCYTES NFR BLD: 0.9 % (ref 4–15)
NEUTROPHILS # BLD AUTO: 5.3 K/UL (ref 1.8–7.7)
NEUTROPHILS NFR BLD: 91.2 % (ref 38–73)
NONHDLC SERPL-MCNC: 93 MG/DL
NRBC BLD-RTO: 0 /100 WBC
PHOSPHATE SERPL-MCNC: 2.6 MG/DL (ref 2.7–4.5)
PLATELET # BLD AUTO: 193 K/UL (ref 150–450)
PMV BLD AUTO: 10.5 FL (ref 9.2–12.9)
POCT GLUCOSE: 183 MG/DL (ref 70–110)
POCT GLUCOSE: 204 MG/DL (ref 70–110)
POCT GLUCOSE: 271 MG/DL (ref 70–110)
POCT GLUCOSE: 272 MG/DL (ref 70–110)
POTASSIUM SERPL-SCNC: 4.2 MMOL/L (ref 3.5–5.1)
PROT SERPL-MCNC: 7.3 G/DL (ref 6–8.4)
PROTHROMBIN TIME: 10.8 SEC (ref 9–12.5)
RBC # BLD AUTO: 4.17 M/UL (ref 4.6–6.2)
SODIUM SERPL-SCNC: 136 MMOL/L (ref 136–145)
T4 FREE SERPL-MCNC: 1.02 NG/DL (ref 0.71–1.51)
TRIGL SERPL-MCNC: 45 MG/DL (ref 30–150)
TSH SERPL DL<=0.005 MIU/L-ACNC: 0.16 UIU/ML (ref 0.4–4)
WBC # BLD AUTO: 5.77 K/UL (ref 3.9–12.7)

## 2023-12-04 PROCEDURE — 85610 PROTHROMBIN TIME: CPT | Performed by: NURSE PRACTITIONER

## 2023-12-04 PROCEDURE — 99900035 HC TECH TIME PER 15 MIN (STAT)

## 2023-12-04 PROCEDURE — 25000242 PHARM REV CODE 250 ALT 637 W/ HCPCS: Performed by: HOSPITALIST

## 2023-12-04 PROCEDURE — 25000003 PHARM REV CODE 250: Performed by: NURSE PRACTITIONER

## 2023-12-04 PROCEDURE — 63600175 PHARM REV CODE 636 W HCPCS: Performed by: NURSE PRACTITIONER

## 2023-12-04 PROCEDURE — 97161 PT EVAL LOW COMPLEX 20 MIN: CPT

## 2023-12-04 PROCEDURE — 94761 N-INVAS EAR/PLS OXIMETRY MLT: CPT

## 2023-12-04 PROCEDURE — 84100 ASSAY OF PHOSPHORUS: CPT | Performed by: NURSE PRACTITIONER

## 2023-12-04 PROCEDURE — 85730 THROMBOPLASTIN TIME PARTIAL: CPT | Performed by: NURSE PRACTITIONER

## 2023-12-04 PROCEDURE — 94640 AIRWAY INHALATION TREATMENT: CPT

## 2023-12-04 PROCEDURE — 92523 SPEECH SOUND LANG COMPREHEN: CPT

## 2023-12-04 PROCEDURE — 97116 GAIT TRAINING THERAPY: CPT

## 2023-12-04 PROCEDURE — 83036 HEMOGLOBIN GLYCOSYLATED A1C: CPT | Performed by: NURSE PRACTITIONER

## 2023-12-04 PROCEDURE — 80061 LIPID PANEL: CPT | Performed by: NURSE PRACTITIONER

## 2023-12-04 PROCEDURE — 92610 EVALUATE SWALLOWING FUNCTION: CPT

## 2023-12-04 PROCEDURE — 83735 ASSAY OF MAGNESIUM: CPT | Performed by: NURSE PRACTITIONER

## 2023-12-04 PROCEDURE — 84439 ASSAY OF FREE THYROXINE: CPT | Performed by: NURSE PRACTITIONER

## 2023-12-04 PROCEDURE — 84443 ASSAY THYROID STIM HORMONE: CPT | Performed by: NURSE PRACTITIONER

## 2023-12-04 PROCEDURE — 27000221 HC OXYGEN, UP TO 24 HOURS

## 2023-12-04 PROCEDURE — 80053 COMPREHEN METABOLIC PANEL: CPT | Performed by: NURSE PRACTITIONER

## 2023-12-04 PROCEDURE — 36415 COLL VENOUS BLD VENIPUNCTURE: CPT | Performed by: NURSE PRACTITIONER

## 2023-12-04 PROCEDURE — S4991 NICOTINE PATCH NONLEGEND: HCPCS | Performed by: NURSE PRACTITIONER

## 2023-12-04 PROCEDURE — 25000242 PHARM REV CODE 250 ALT 637 W/ HCPCS: Performed by: NURSE PRACTITIONER

## 2023-12-04 PROCEDURE — 11000001 HC ACUTE MED/SURG PRIVATE ROOM

## 2023-12-04 PROCEDURE — 85025 COMPLETE CBC W/AUTO DIFF WBC: CPT | Performed by: NURSE PRACTITIONER

## 2023-12-04 RX ORDER — SODIUM,POTASSIUM PHOSPHATES 280-250MG
2 POWDER IN PACKET (EA) ORAL
Status: DISCONTINUED | OUTPATIENT
Start: 2023-12-04 | End: 2023-12-05 | Stop reason: HOSPADM

## 2023-12-04 RX ORDER — CLOPIDOGREL BISULFATE 75 MG/1
75 TABLET ORAL DAILY
Status: DISCONTINUED | OUTPATIENT
Start: 2023-12-04 | End: 2023-12-05 | Stop reason: HOSPADM

## 2023-12-04 RX ORDER — LANOLIN ALCOHOL/MO/W.PET/CERES
800 CREAM (GRAM) TOPICAL
Status: DISCONTINUED | OUTPATIENT
Start: 2023-12-04 | End: 2023-12-05 | Stop reason: HOSPADM

## 2023-12-04 RX ADMIN — BUDESONIDE INHALATION 1 MG: 0.5 SUSPENSION RESPIRATORY (INHALATION) at 07:12

## 2023-12-04 RX ADMIN — IPRATROPIUM BROMIDE AND ALBUTEROL SULFATE 3 ML: .5; 3 SOLUTION RESPIRATORY (INHALATION) at 07:12

## 2023-12-04 RX ADMIN — Medication 1 PATCH: at 10:12

## 2023-12-04 RX ADMIN — ATORVASTATIN CALCIUM 20 MG: 20 TABLET, FILM COATED ORAL at 09:12

## 2023-12-04 RX ADMIN — INSULIN ASPART 6 UNITS: 100 INJECTION, SOLUTION INTRAVENOUS; SUBCUTANEOUS at 04:12

## 2023-12-04 RX ADMIN — POTASSIUM & SODIUM PHOSPHATES POWDER PACK 280-160-250 MG 2 PACKET: 280-160-250 PACK at 09:12

## 2023-12-04 RX ADMIN — ESCITALOPRAM OXALATE 10 MG: 10 TABLET, FILM COATED ORAL at 09:12

## 2023-12-04 RX ADMIN — Medication 800 MG: at 09:12

## 2023-12-04 RX ADMIN — BACLOFEN 10 MG: 10 TABLET ORAL at 09:12

## 2023-12-04 RX ADMIN — PREDNISONE 40 MG: 20 TABLET ORAL at 09:12

## 2023-12-04 RX ADMIN — PANTOPRAZOLE SODIUM 40 MG: 40 TABLET, DELAYED RELEASE ORAL at 09:12

## 2023-12-04 RX ADMIN — INSULIN ASPART 1 UNITS: 100 INJECTION, SOLUTION INTRAVENOUS; SUBCUTANEOUS at 09:12

## 2023-12-04 RX ADMIN — ARFORMOTEROL TARTRATE 15 MCG: 15 SOLUTION RESPIRATORY (INHALATION) at 07:12

## 2023-12-04 RX ADMIN — CLOPIDOGREL BISULFATE 75 MG: 75 TABLET, FILM COATED ORAL at 02:12

## 2023-12-04 RX ADMIN — PREGABALIN 100 MG: 75 CAPSULE ORAL at 09:12

## 2023-12-04 RX ADMIN — Medication 800 MG: at 02:12

## 2023-12-04 RX ADMIN — ACETAMINOPHEN 650 MG: 325 TABLET ORAL at 04:12

## 2023-12-04 RX ADMIN — ASPIRIN 81 MG: 81 TABLET, COATED ORAL at 09:12

## 2023-12-04 RX ADMIN — Medication 1 PATCH: at 09:12

## 2023-12-04 RX ADMIN — DOCUSATE SODIUM AND SENNOSIDES 1 TABLET: 8.6; 5 TABLET, FILM COATED ORAL at 09:12

## 2023-12-04 NOTE — PROGRESS NOTES
[facility] Medicine  Progress Note    Patient Name: Hernan Badillo  MRN: [id number]  Patient Class: IP- Inpatient   Admission Date: 12/3/2023  Length of Stay: 0 days  Attending Physician: Evens Richter MD  Primary Care Provider: Toan Banks DO        Subjective:     Principal Problem:Stroke        HPI:  Hernan Badillo is a 57 y/o M with past medical history significant for stroke, HTN, DM2 and tobacco dependency who presented to the emergency department for further evaluation of left-sided facial numbness and left lower extremity numbness which began about 2 weeks ago and became worse over the past 2 days.  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  He reports that his chronic weakness on the right has also become worse over the last couple of months.  He thinks he may have had a new stroke.  In addition, he reports increased shortness of breath, coughing, and wheezing over the past several days.  Family member at bedside states that patient still smokes and, in fact, he is asking during our interview if he can go outside to smoke.  A CTA head and neck was performed while pt was in ED which identified severe stenosis of there L internal carotid artery and moderate stenosis of the R internal carotid artery.  He was also given bronchodilators and IV steroids for COPD exacerbation.  CXR without acute findings.  He is placed in observation under the service of hospital medicine for continued workup and treatment.        Overview/Hospital Course:  No notes on file    Interval History:  Pt seen and examined.  He is very eager to discharge home.  He makes no complaints over left-sided weakness and denies any worsening of his right-sided weakness.  The only complaint today is right hand pain which she describes as nerve like pain, constant since his historical CVA.    He has noticeable increased work of  breathing and wheezing on exam.  His friend at bedside confirms this is how he is at baseline.  He smokes 3 packs of cigarettes daily.  He understands that he needs to quit, however states he has lived a good life and knows that if he does not stopped smoking he is going to die eventually-after much talk, agreeable to smoking cessation referral.  He is not clinically ready for discharge yet.  MRI was just done at time my visit.  He needs ongoing hospitalization for COPD exacerbation.  Neuro to see.    Review of Systems   Constitutional:  Negative for chills and fever.   Respiratory:  Positive for cough, shortness of breath and wheezing.    Cardiovascular:  Negative for chest pain.   Gastrointestinal:  Negative for nausea and vomiting.   Musculoskeletal:  Positive for myalgias.   Neurological:  Positive for weakness.     Objective:     Vital Signs (Most Recent):  Temp: 98.3 °F (36.8 °C) (12/04/23 0420)  Pulse: 90 (12/04/23 0700)  Resp: 16 (12/04/23 0700)  BP: (!) 154/74 (12/04/23 0420)  SpO2: (!) 87 % (Pt. was not wearing O2 in his nose) (12/04/23 0700) Vital Signs (24h Range):  Temp:  [97.7 °F (36.5 °C)-98.3 °F (36.8 °C)] 98.3 °F (36.8 °C)  Pulse:  [68-90] 90  Resp:  [14-20] 16  SpO2:  [87 %-100 %] 87 %  BP: (150-184)/() 154/74     Weight: 65.5 kg (144 lb 6.4 oz)  Body mass index is 21.32 kg/m².    Intake/Output Summary (Last 24 hours) at 12/4/2023 1128  Last data filed at 12/4/2023 0226  Gross per 24 hour   Intake 100 ml   Output 300 ml   Net -200 ml         Physical Exam  Vitals and nursing note reviewed.   Constitutional:       Appearance: Normal appearance.   HENT:      Head: Normocephalic and atraumatic.      Mouth/Throat:      Mouth: Mucous membranes are moist.      Pharynx: Oropharynx is clear.   Eyes:      Extraocular Movements: Extraocular movements intact.      Conjunctiva/sclera: Conjunctivae normal.      Pupils: Pupils are equal, round, and reactive to light.   Cardiovascular:      Rate and Rhythm:  Normal rate and regular rhythm.   Pulmonary:      Breath sounds: Wheezing (Expiratory) present.      Comments: Accessory muscle usage with inspiratory effort  Abdominal:      General: Abdomen is flat. Bowel sounds are normal. There is no distension.      Palpations: Abdomen is soft.      Tenderness: There is no abdominal tenderness.   Musculoskeletal:         General: Normal range of motion.   Skin:     General: Skin is warm and dry.      Capillary Refill: Capillary refill takes less than 2 seconds.   Neurological:      General: No focal deficit present.      Mental Status: He is alert and oriented to person, place, and time. Mental status is at baseline.   Psychiatric:         Mood and Affect: Mood normal.         Behavior: Behavior normal.             Significant Labs: All pertinent labs within the past 24 hours have been reviewed.  CBC:   Recent Labs   Lab 12/03/23 1741 12/04/23  0423   WBC 5.23 5.77   HGB 11.2* 12.2*   HCT 35.1* 38.4*    193     CMP:   Recent Labs   Lab 12/03/23 1741 12/04/23  0423    136   K 3.5 4.2    102   CO2 27 24   * 195*   BUN 9 7   CREATININE 0.8 0.7   CALCIUM 8.9 9.3   PROT 6.9 7.3   ALBUMIN 3.1* 3.2*   BILITOT 1.1* 0.8   ALKPHOS 56 61   AST 10 9*   ALT 5* 6*   ANIONGAP 9 10       Significant Imaging: I have reviewed all pertinent imaging results/findings within the past 24 hours.    Assessment/Plan:      * Stroke  Antithrombotics for secondary stroke prevention: Antiplatelets: Aspirin: 81 mg daily    Statins for secondary stroke prevention and hyperlipidemia, if present:   Statins: Atorvastatin- 20 mg daily-home dose.  Check lipid panel    Aggressive risk factor modification: HTN, Smoking, DM, HLD, Diet, Exercise     Rehab efforts: The patient has been evaluated by a stroke team provider and the therapy needs have been fully considered based off the presenting complaints and exam findings. The following therapy evaluations are needed: PT evaluate and treat,  OT evaluate and treat    Diagnostics ordered/pending: Carotid ultrasound to assess vasculature, CTA Head to assess vasculature , CTA Neck/Arch to assess vasculature, HgbA1C to assess blood glucose levels, Lipid Profile to assess cholesterol levels, MRI head without contrast to assess brain parenchyma, TTE to assess cardiac function/status , TSH to assess thyroid function    VTE prophylaxis: Enoxaparin 40 mg SQ every 24 hours    BP parameters: Infarct: No intervention, SBP <220    -MRI pending   -therapy eval is pending  -neuro to see    COPD exacerbation  Patient's COPD is with exacerbation noted by continued dyspnea and worsening of baseline hypoxia currently.  Patient is currently off COPD Pathway. Continue scheduled inhalers Steroids and Supplemental oxygen and monitor respiratory status closely.   -continued inpatient treatment   -will need O2 evaluation prior to discharge    Tobacco dependency  Dangers of cigarette smoking were reviewed with patient in detail. Patient was Counseled for 3-10 minutes. Nicotine replacement options were discussed. Nicotine replacement was discussed- prescribed    Primary hypertension  Chronic,  stable . Latest blood pressure and vitals reviewed-     Temp:  [97.7 °F (36.5 °C)-98.3 °F (36.8 °C)]   Pulse:  [68-90]   Resp:  [14-20]   BP: (150-184)/()   SpO2:  [87 %-100 %] .   Home meds for hypertension were reviewed and noted below.   Hypertension Medications               amLODIPine (NORVASC) 10 MG tablet Take 1 tablet (10 mg total) by mouth once daily.    lisinopriL 10 MG tablet Take 1 tablet (10 mg total) by mouth once daily.            While in the hospital, will manage blood pressure as follows; Adjust home antihypertensive regimen as follows- hold chronic antihypertensives in the setting of possible ischemic stroke.    Will utilize p.r.n. blood pressure medication only if patient's blood pressure greater than 220/110 and he develops symptoms such as worsening chest pain or  shortness of breath.    DM (diabetes mellitus), type 2  Patient's FSGs are uncontrolled due to hyperglycemia on current medication regimen.  Last A1c reviewed-   Lab Results   Component Value Date    LABA1C 9.8 (H) 07/06/2016    HGBA1C 6.1 (H) 03/08/2023     Most recent fingerstick glucose reviewed-   Recent Labs   Lab 12/03/23  1903 12/04/23  0736 12/04/23  1105   POCTGLUCOSE 139* 204* 271*       Current correctional scale  Medium  Maintain anti-hyperglycemic dose as follows-   Antihyperglycemics (From admission, onward)      SSI          Hold Oral hypoglycemics while patient is in the hospital.      VTE Risk Mitigation (From admission, onward)           Ordered     IP VTE LOW RISK PATIENT  Once         12/03/23 2135     Place sequential compression device  Until discontinued         12/03/23 2135                    Discharge Planning   ZACARIAS: 12/6/2023     Code Status: Full Code   Is the patient medically ready for discharge?:     Reason for patient still in hospital (select all that apply): Patient trending condition, Treatment, Imaging, and Consult recommendations                     Alissa Johns NP  Department of Hospital Medicine   [facility]/Surg

## 2023-12-04 NOTE — ASSESSMENT & PLAN NOTE
Antithrombotics for secondary stroke prevention: Antiplatelets: Aspirin: 81 mg daily    Statins for secondary stroke prevention and hyperlipidemia, if present:   Statins: Atorvastatin- 20 mg daily-home dose.  Check lipid panel    Aggressive risk factor modification: HTN, Smoking, DM, HLD, Diet, Exercise     Rehab efforts: The patient has been evaluated by a stroke team provider and the therapy needs have been fully considered based off the presenting complaints and exam findings. The following therapy evaluations are needed: PT evaluate and treat, OT evaluate and treat    Diagnostics ordered/pending: Carotid ultrasound to assess vasculature, CTA Head to assess vasculature , CTA Neck/Arch to assess vasculature, HgbA1C to assess blood glucose levels, Lipid Profile to assess cholesterol levels, MRI head without contrast to assess brain parenchyma, TTE to assess cardiac function/status , TSH to assess thyroid function    VTE prophylaxis: Enoxaparin 40 mg SQ every 24 hours    BP parameters: Infarct: No intervention, SBP <220    -MRI pending   -therapy eval is pending  -neuro to see

## 2023-12-04 NOTE — H&P
[facility] Medicine  History & Physical    Patient Name: Hernan Badillo  MRN: [id number]  Patient Class: OP- Observation  Admission Date: 12/3/2023  Attending Physician: Matthew Baxter MD   Primary Care Provider: Toan Banks DO         Patient information was obtained from patient, relative(s), past medical records, and ER records.     Subjective:     Principal Problem:Stroke    Chief Complaint:   Chief Complaint   Patient presents with    Headache     Left facial numbness x 2 days / left leg numbness         HPI: Hernan Badillo is a 57 y/o M with past medical history significant for stroke, HTN, DM2 and tobacco dependency who presented to the emergency department for further evaluation of left-sided facial numbness and left lower extremity numbness which began about 2 weeks ago and became worse over the past 2 days.  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  He reports that his chronic weakness on the right has also become worse over the last couple of months.  He thinks he may have had a new stroke.  In addition, he reports increased shortness of breath, coughing, and wheezing over the past several days.  Family member at bedside states that patient still smokes and, in fact, he is asking during our interview if he can go outside to smoke.  A CTA head and neck was performed while pt was in ED which identified severe stenosis of there L internal carotid artery and moderate stenosis of the R internal carotid artery.  He was also given bronchodilators and IV steroids for COPD exacerbation.  CXR without acute findings.  He is placed in observation under the service of hospital medicine for continued workup and treatment.        Past Medical History:   Diagnosis Date    COPD (chronic obstructive pulmonary disease)     Diabetes mellitus, type 2     Hypertension        Past Surgical History:    Procedure Laterality Date    DENTAL SURGERY      ESOPHAGOGASTRODUODENOSCOPY N/A 6/1/2020    Procedure: EGD (ESOPHAGOGASTRODUODENOSCOPY);  Surgeon: Terrell May MD;  Location: [facility];  Service: Endoscopy;  Laterality: N/A;    HERNIA REPAIR      left inguinal    incision and drainiage         Review of patient's allergies indicates:  No Known Allergies    No current facility-administered medications on file prior to encounter.     Current Outpatient Medications on File Prior to Encounter   Medication Sig    acetaminophen (TYLENOL) 325 MG tablet Take 650 mg by mouth 3 (three) times daily as needed.    albuterol (PROVENTIL HFA) 90 mcg/actuation inhaler Inhale 2 puffs into the lungs every 6 (six) hours as needed for Wheezing. Rescue    albuterol (PROVENTIL/VENTOLIN HFA) 90 mcg/actuation inhaler Inhale 1-2 puffs into the lungs every 6 (six) hours as needed. Rescue    albuterol-ipratropium (DUO-NEB) 2.5 mg-0.5 mg/3 mL nebulizer solution Take 3 mLs by nebulization every 6 (six) hours as needed for Wheezing or Shortness of Breath.    amLODIPine (NORVASC) 10 MG tablet Take 1 tablet (10 mg total) by mouth once daily.    atorvastatin (LIPITOR) 20 MG tablet Take 1 tablet (20 mg total) by mouth once daily.    baclofen (LIORESAL) 10 MG tablet Take 1 tablet (10 mg total) by mouth 2 (two) times daily.    EScitalopram oxalate (LEXAPRO) 10 MG tablet Take 10 mg by mouth.    fluticasone-salmeterol diskus inhaler 500-50 mcg Inhale 1 puff into the lungs 2 (two) times daily. Controller    lisinopriL 10 MG tablet Take 1 tablet (10 mg total) by mouth once daily.    meclizine (ANTIVERT) 25 mg tablet Take 25 mg by mouth.    melatonin (MELATIN) 5 mg Take 5 mg by mouth.    metFORMIN (GLUCOPHAGE-XR) 500 MG ER 24hr tablet Take 1 tablet (500 mg total) by mouth daily with breakfast.    pantoprazole (PROTONIX) 40 MG tablet Take 1 tablet (40 mg total) by mouth once daily.    pregabalin (LYRICA) 100 MG capsule Take 1 capsule (100 mg total)  by mouth 2 (two) times daily.     Family History       Problem Relation (Age of Onset)    Cancer Maternal Grandmother    Drug abuse Father    Heart attack Father (80)    Heart disease Father          Tobacco Use    Smoking status: Every Day     Current packs/day: 2.00     Average packs/day: 2.0 packs/day for 30.0 years (60.0 ttl pk-yrs)     Types: Cigarettes    Smokeless tobacco: Never   Substance and Sexual Activity    Alcohol use: Yes     Alcohol/week: 1.0 standard drink of alcohol     Types: 1 Shots of liquor per week     Comment: 1/2 pint 2 x per week - quit approximately 5 months ago    Drug use: No    Sexual activity: Yes     Partners: Female     Birth control/protection: None     Comment: No history of STDs.     Review of Systems   Constitutional:  Positive for appetite change and fatigue. Negative for fever.   HENT:  Positive for congestion. Negative for rhinorrhea.    Respiratory:  Positive for cough, shortness of breath and wheezing.    Cardiovascular:  Negative for chest pain and palpitations.   Gastrointestinal:  Negative for abdominal pain, nausea and vomiting.   Genitourinary:  Negative for dysuria and hematuria.   Musculoskeletal:  Positive for arthralgias.   Neurological:  Positive for facial asymmetry (chronic), weakness, numbness and headaches. Negative for syncope.   Psychiatric/Behavioral:  Negative for agitation and confusion.      Objective:     Vital Signs (Most Recent):  Temp: 97.7 °F (36.5 °C) (12/03/23 1659)  Pulse: 70 (12/03/23 2032)  Resp: 14 (12/03/23 1727)  BP: (!) 172/82 (12/03/23 2032)  SpO2: 97 % (12/03/23 2032) Vital Signs (24h Range):  Temp:  [97.7 °F (36.5 °C)] 97.7 °F (36.5 °C)  Pulse:  [68-83] 70  Resp:  [14-20] 14  SpO2:  [95 %-100 %] 97 %  BP: (150-184)/() 172/82     Weight: 65.8 kg (145 lb)  Body mass index is 21.41 kg/m².     Physical Exam  Constitutional:       General: He is not in acute distress.     Appearance: He is well-developed.   HENT:      Head: Normocephalic  and atraumatic.   Eyes:      Conjunctiva/sclera: Conjunctivae normal.      Pupils: Pupils are equal, round, and reactive to light.   Cardiovascular:      Rate and Rhythm: Normal rate and regular rhythm.      Heart sounds: Normal heart sounds.   Pulmonary:      Effort: Pulmonary effort is normal.      Breath sounds: Wheezing present.   Abdominal:      General: Bowel sounds are normal. There is no distension.      Palpations: Abdomen is soft.      Tenderness: There is no abdominal tenderness.   Musculoskeletal:         General: Normal range of motion.      Cervical back: Normal range of motion and neck supple.   Skin:     General: Skin is warm and dry.   Neurological:      Mental Status: He is alert and oriented to person, place, and time.      Sensory: Sensory deficit (LLE/RUE/RLE) present.      Comments: Chronic right sided facial droop     Psychiatric:         Mood and Affect: Mood normal.         Behavior: Behavior is agitated.              CRANIAL NERVES     CN III, IV, VI   Pupils are equal, round, and reactive to light.       Significant Labs: All pertinent labs within the past 24 hours have been reviewed.  CBC:   Recent Labs   Lab 12/03/23  1741   WBC 5.23   HGB 11.2*   HCT 35.1*        CMP:   Recent Labs   Lab 12/03/23  1741      K 3.5      CO2 27   *   BUN 9   CREATININE 0.8   CALCIUM 8.9   PROT 6.9   ALBUMIN 3.1*   BILITOT 1.1*   ALKPHOS 56   AST 10   ALT 5*   ANIONGAP 9       Significant Imaging: I have reviewed all pertinent imaging results/findings within the past 24 hours.  CTA head and neck identified severe stenosis (80%) left internal carotid artery and moderate stenosis (50%) right internal carotid artery.  Assessment/Plan:     * Stroke  Antithrombotics for secondary stroke prevention: Antiplatelets: Aspirin: 81 mg daily    Statins for secondary stroke prevention and hyperlipidemia, if present:   Statins: Atorvastatin- 20 mg daily-home dose.  Check lipid panel    Aggressive  risk factor modification: HTN, Smoking, DM, HLD, Diet, Exercise     Rehab efforts: The patient has been evaluated by a stroke team provider and the therapy needs have been fully considered based off the presenting complaints and exam findings. The following therapy evaluations are needed: PT evaluate and treat, OT evaluate and treat    Diagnostics ordered/pending: Carotid ultrasound to assess vasculature, CTA Head to assess vasculature , CTA Neck/Arch to assess vasculature, HgbA1C to assess blood glucose levels, Lipid Profile to assess cholesterol levels, MRI head without contrast to assess brain parenchyma, TTE to assess cardiac function/status , TSH to assess thyroid function    VTE prophylaxis: Enoxaparin 40 mg SQ every 24 hours    BP parameters: Infarct: No intervention, SBP <220        COPD exacerbation  Patient's COPD is with exacerbation noted by continued dyspnea and worsening of baseline hypoxia currently.  Patient is currently off COPD Pathway. Continue scheduled inhalers Steroids and Supplemental oxygen and monitor respiratory status closely.     Tobacco dependency  Dangers of cigarette smoking were reviewed with patient in detail. Patient was Counseled for 3-10 minutes. Nicotine replacement options were discussed. Nicotine replacement was discussed- prescribed    Primary hypertension  Chronic,  stable . Latest blood pressure and vitals reviewed-     Temp:  [97.7 °F (36.5 °C)]   Pulse:  [68-83]   Resp:  [14-20]   BP: (150-184)/()   SpO2:  [95 %-100 %] .   Home meds for hypertension were reviewed and noted below.   Hypertension Medications               amLODIPine (NORVASC) 10 MG tablet Take 1 tablet (10 mg total) by mouth once daily.    lisinopriL 10 MG tablet Take 1 tablet (10 mg total) by mouth once daily.            While in the hospital, will manage blood pressure as follows; Adjust home antihypertensive regimen as follows- hold chronic antihypertensives in the setting of possible ischemic  stroke.    Will utilize p.r.n. blood pressure medication only if patient's blood pressure greater than 220/110 and he develops symptoms such as worsening chest pain or shortness of breath.    DM (diabetes mellitus), type 2  Patient's FSGs are uncontrolled due to hyperglycemia on current medication regimen.  Last A1c reviewed-   Lab Results   Component Value Date    LABA1C 9.8 (H) 07/06/2016    HGBA1C 6.1 (H) 03/08/2023     Most recent fingerstick glucose reviewed-   Recent Labs   Lab 12/03/23  1903   POCTGLUCOSE 139*     Current correctional scale  Medium  Maintain anti-hyperglycemic dose as follows-   Antihyperglycemics (From admission, onward)      SSI          Hold Oral hypoglycemics while patient is in the hospital.      VTE Risk Mitigation (From admission, onward)           Ordered     IP VTE LOW RISK PATIENT  Once         12/03/23 2135     Place sequential compression device  Until discontinued         12/03/23 2135                         On 12/03/2023, patient should be placed in hospital observation services under my care in collaboration with Dr. Matthew Baxter.      LORI Francisco  Department of Hospital Medicine  [facility]/Surg

## 2023-12-04 NOTE — ASSESSMENT & PLAN NOTE
Patient's FSGs are uncontrolled due to hyperglycemia on current medication regimen.  Last A1c reviewed-   Lab Results   Component Value Date    LABA1C 9.8 (H) 07/06/2016    HGBA1C 6.1 (H) 03/08/2023     Most recent fingerstick glucose reviewed-   Recent Labs   Lab 12/03/23  1903 12/04/23  0736 12/04/23  1105   POCTGLUCOSE 139* 204* 271*       Current correctional scale  Medium  Maintain anti-hyperglycemic dose as follows-   Antihyperglycemics (From admission, onward)      SSI          Hold Oral hypoglycemics while patient is in the hospital.

## 2023-12-04 NOTE — SUBJECTIVE & OBJECTIVE
Past Medical History:   Diagnosis Date    COPD (chronic obstructive pulmonary disease)     Diabetes mellitus, type 2     Hypertension        Past Surgical History:   Procedure Laterality Date    DENTAL SURGERY      ESOPHAGOGASTRODUODENOSCOPY N/A 6/1/2020    Procedure: EGD (ESOPHAGOGASTRODUODENOSCOPY);  Surgeon: Terrell May MD;  Location: [facility];  Service: Endoscopy;  Laterality: N/A;    HERNIA REPAIR      left inguinal    incision and drainiage         Review of patient's allergies indicates:  No Known Allergies    No current facility-administered medications on file prior to encounter.     Current Outpatient Medications on File Prior to Encounter   Medication Sig    acetaminophen (TYLENOL) 325 MG tablet Take 650 mg by mouth 3 (three) times daily as needed.    albuterol (PROVENTIL HFA) 90 mcg/actuation inhaler Inhale 2 puffs into the lungs every 6 (six) hours as needed for Wheezing. Rescue    albuterol (PROVENTIL/VENTOLIN HFA) 90 mcg/actuation inhaler Inhale 1-2 puffs into the lungs every 6 (six) hours as needed. Rescue    albuterol-ipratropium (DUO-NEB) 2.5 mg-0.5 mg/3 mL nebulizer solution Take 3 mLs by nebulization every 6 (six) hours as needed for Wheezing or Shortness of Breath.    amLODIPine (NORVASC) 10 MG tablet Take 1 tablet (10 mg total) by mouth once daily.    atorvastatin (LIPITOR) 20 MG tablet Take 1 tablet (20 mg total) by mouth once daily.    baclofen (LIORESAL) 10 MG tablet Take 1 tablet (10 mg total) by mouth 2 (two) times daily.    EScitalopram oxalate (LEXAPRO) 10 MG tablet Take 10 mg by mouth.    fluticasone-salmeterol diskus inhaler 500-50 mcg Inhale 1 puff into the lungs 2 (two) times daily. Controller    lisinopriL 10 MG tablet Take 1 tablet (10 mg total) by mouth once daily.    meclizine (ANTIVERT) 25 mg tablet Take 25 mg by mouth.    melatonin (MELATIN) 5 mg Take 5 mg by mouth.    metFORMIN (GLUCOPHAGE-XR) 500 MG ER 24hr tablet Take 1 tablet (500 mg total) by mouth daily with  breakfast.    pantoprazole (PROTONIX) 40 MG tablet Take 1 tablet (40 mg total) by mouth once daily.    pregabalin (LYRICA) 100 MG capsule Take 1 capsule (100 mg total) by mouth 2 (two) times daily.     Family History       Problem Relation (Age of Onset)    Cancer Maternal Grandmother    Drug abuse Father    Heart attack Father (80)    Heart disease Father          Tobacco Use    Smoking status: Every Day     Current packs/day: 2.00     Average packs/day: 2.0 packs/day for 30.0 years (60.0 ttl pk-yrs)     Types: Cigarettes    Smokeless tobacco: Never   Substance and Sexual Activity    Alcohol use: Yes     Alcohol/week: 1.0 standard drink of alcohol     Types: 1 Shots of liquor per week     Comment: 1/2 pint 2 x per week - quit approximately 5 months ago    Drug use: No    Sexual activity: Yes     Partners: Female     Birth control/protection: None     Comment: No history of STDs.     Review of Systems   Constitutional:  Positive for appetite change and fatigue. Negative for fever.   HENT:  Positive for congestion. Negative for rhinorrhea.    Respiratory:  Positive for cough, shortness of breath and wheezing.    Cardiovascular:  Negative for chest pain and palpitations.   Gastrointestinal:  Negative for abdominal pain, nausea and vomiting.   Genitourinary:  Negative for dysuria and hematuria.   Musculoskeletal:  Positive for arthralgias.   Neurological:  Positive for facial asymmetry (chronic), weakness, numbness and headaches. Negative for syncope.   Psychiatric/Behavioral:  Negative for agitation and confusion.      Objective:     Vital Signs (Most Recent):  Temp: 97.7 °F (36.5 °C) (12/03/23 1659)  Pulse: 70 (12/03/23 2032)  Resp: 14 (12/03/23 1727)  BP: (!) 172/82 (12/03/23 2032)  SpO2: 97 % (12/03/23 2032) Vital Signs (24h Range):  Temp:  [97.7 °F (36.5 °C)] 97.7 °F (36.5 °C)  Pulse:  [68-83] 70  Resp:  [14-20] 14  SpO2:  [95 %-100 %] 97 %  BP: (150-184)/() 172/82     Weight: 65.8 kg (145 lb)  Body mass  index is 21.41 kg/m².     Physical Exam  Constitutional:       General: He is not in acute distress.     Appearance: He is well-developed.   HENT:      Head: Normocephalic and atraumatic.   Eyes:      Conjunctiva/sclera: Conjunctivae normal.      Pupils: Pupils are equal, round, and reactive to light.   Cardiovascular:      Rate and Rhythm: Normal rate and regular rhythm.      Heart sounds: Normal heart sounds.   Pulmonary:      Effort: Pulmonary effort is normal.      Breath sounds: Wheezing present.   Abdominal:      General: Bowel sounds are normal. There is no distension.      Palpations: Abdomen is soft.      Tenderness: There is no abdominal tenderness.   Musculoskeletal:         General: Normal range of motion.      Cervical back: Normal range of motion and neck supple.   Skin:     General: Skin is warm and dry.   Neurological:      Mental Status: He is alert and oriented to person, place, and time.      Sensory: Sensory deficit (LLE/RUE/RLE) present.      Comments: Chronic right sided facial droop     Psychiatric:         Mood and Affect: Mood normal.         Behavior: Behavior is agitated.              CRANIAL NERVES     CN III, IV, VI   Pupils are equal, round, and reactive to light.       Significant Labs: All pertinent labs within the past 24 hours have been reviewed.  CBC:   Recent Labs   Lab 12/03/23  1741   WBC 5.23   HGB 11.2*   HCT 35.1*        CMP:   Recent Labs   Lab 12/03/23  1741      K 3.5      CO2 27   *   BUN 9   CREATININE 0.8   CALCIUM 8.9   PROT 6.9   ALBUMIN 3.1*   BILITOT 1.1*   ALKPHOS 56   AST 10   ALT 5*   ANIONGAP 9       Significant Imaging: I have reviewed all pertinent imaging results/findings within the past 24 hours.  CTA head and neck identified severe stenosis (80%) left internal carotid artery and moderate stenosis (50%) right internal carotid artery.

## 2023-12-04 NOTE — PLAN OF CARE
Problem: Adult Inpatient Plan of Care  Goal: Plan of Care Review  Outcome: Ongoing, Progressing  Goal: Patient-Specific Goal (Individualized)  Outcome: Ongoing, Progressing  Goal: Absence of Hospital-Acquired Illness or Injury  Outcome: Ongoing, Progressing  Goal: Optimal Comfort and Wellbeing  Outcome: Ongoing, Progressing  Goal: Readiness for Transition of Care  Outcome: Ongoing, Progressing     Problem: Cerebral Tissue Perfusion (Stroke, Ischemic/Transient Ischemic Attack)  Goal: Optimal Cerebral Tissue Perfusion  Outcome: Ongoing, Progressing     Problem: Functional Ability Impaired (Stroke, Ischemic/Transient Ischemic Attack)  Goal: Optimal Functional Ability  Outcome: Ongoing, Progressing     Problem: Respiratory Compromise (Stroke, Ischemic/Transient Ischemic Attack)  Goal: Effective Oxygenation and Ventilation  Outcome: Ongoing, Progressing     Problem: Sensorimotor Impairment (Stroke, Ischemic/Transient Ischemic Attack)  Goal: Improved Sensorimotor Function  Outcome: Ongoing, Progressing     Problem: Fall Injury Risk  Goal: Absence of Fall and Fall-Related Injury  Outcome: Ongoing, Progressing     Problem: Diabetes Comorbidity  Goal: Blood Glucose Level Within Targeted Range  Outcome: Ongoing, Progressing

## 2023-12-04 NOTE — CONSULTS
[facility]/Surg  Neurology  Consult Note    Patient Name: Hernan Badillo  MRN: [id number]  Admission Date: 12/3/2023  Hospital Length of Stay: 0 days  Code Status: Full Code   Attending Provider: Evens Richter MD   Consulting Provider: Angie Alcantar DNP  Primary Care Physician: Toan Banks DO  Principal Problem:Stroke    Inpatient consult to Neurology  Consult performed by: Angie Alcantar DNP  Consult ordered by: Karoline Abbott FNP        Subjective:     Chief Complaint:  headache, left facial numbness x 2 days, LLE numbness     HPI: as per EMR:  Hernan Badillo is a 57 y/o M with past medical history significant for stroke, HTN, DM2 and tobacco dependency who presented to the emergency department for further evaluation of left-sided facial numbness and left lower extremity numbness which began about 2 weeks ago and became worse over the past 2 days.  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  He reports that his chronic weakness on the right has also become worse over the last couple of months.  He thinks he may have had a new stroke.  In addition, he reports increased shortness of breath, coughing, and wheezing over the past several days.  Family member at bedside states that patient still smokes and, in fact, he is asking during our interview if he can go outside to smoke.  A CTA head and neck was performed while pt was in ED which identified severe stenosis of there L internal carotid artery and moderate stenosis of the R internal carotid artery.  He was also given bronchodilators and IV steroids for COPD exacerbation.  CXR without acute findings.  He is placed in observation under the service of hospital medicine for continued workup and treatment.     NEUROLOGY CONSULT NOTE: Patient seen and examined, POC reviewed with Dr Rodriguez. Patient sitting up on side of bed with significant other at  bedside. Patient reports worsening RUE and RLE weakness and numbness and new onset (starting 2 weeks ago) numbness to LUE and LLE. Patient reports h/o stroke November 2022 and reports he was not taking aspirin at home as prescribed.     Past Medical History:   Diagnosis Date    COPD (chronic obstructive pulmonary disease)     Diabetes mellitus, type 2     Hypertension        Past Surgical History:   Procedure Laterality Date    DENTAL SURGERY      ESOPHAGOGASTRODUODENOSCOPY N/A 6/1/2020    Procedure: EGD (ESOPHAGOGASTRODUODENOSCOPY);  Surgeon: Terrell May MD;  Location: [facility];  Service: Endoscopy;  Laterality: N/A;    HERNIA REPAIR      left inguinal    incision and drainiage         Review of patient's allergies indicates:  No Known Allergies    Current Neurological Medications: aspirin, atorvastatin, baclofen, pregabalin     No current facility-administered medications on file prior to encounter.     Current Outpatient Medications on File Prior to Encounter   Medication Sig    albuterol (PROVENTIL HFA) 90 mcg/actuation inhaler Inhale 2 puffs into the lungs every 6 (six) hours as needed for Wheezing. Rescue    atorvastatin (LIPITOR) 20 MG tablet Take 1 tablet (20 mg total) by mouth once daily.    baclofen (LIORESAL) 10 MG tablet Take 1 tablet (10 mg total) by mouth 2 (two) times daily.    EScitalopram oxalate (LEXAPRO) 10 MG tablet Take 10 mg by mouth.    metFORMIN (GLUCOPHAGE-XR) 500 MG ER 24hr tablet Take 1 tablet (500 mg total) by mouth daily with breakfast.    pregabalin (LYRICA) 100 MG capsule Take 1 capsule (100 mg total) by mouth 2 (two) times daily.    acetaminophen (TYLENOL) 325 MG tablet Take 650 mg by mouth 3 (three) times daily as needed.    albuterol (PROVENTIL/VENTOLIN HFA) 90 mcg/actuation inhaler Inhale 1-2 puffs into the lungs every 6 (six) hours as needed. Rescue    albuterol-ipratropium (DUO-NEB) 2.5 mg-0.5 mg/3 mL nebulizer solution Take 3 mLs by nebulization every 6 (six) hours as  needed for Wheezing or Shortness of Breath.    amLODIPine (NORVASC) 10 MG tablet Take 1 tablet (10 mg total) by mouth once daily.    fluticasone-salmeterol diskus inhaler 500-50 mcg Inhale 1 puff into the lungs 2 (two) times daily. Controller    lisinopriL 10 MG tablet Take 1 tablet (10 mg total) by mouth once daily.    meclizine (ANTIVERT) 25 mg tablet Take 25 mg by mouth.    melatonin (MELATIN) 5 mg Take 5 mg by mouth.    pantoprazole (PROTONIX) 40 MG tablet Take 1 tablet (40 mg total) by mouth once daily.      Family History       Problem Relation (Age of Onset)    Cancer Maternal Grandmother    Drug abuse Father    Heart attack Father (80)    Heart disease Father          Tobacco Use    Smoking status: Every Day     Current packs/day: 2.00     Average packs/day: 2.0 packs/day for 30.0 years (60.0 ttl pk-yrs)     Types: Cigarettes    Smokeless tobacco: Never   Substance and Sexual Activity    Alcohol use: Yes     Alcohol/week: 1.0 standard drink of alcohol     Types: 1 Shots of liquor per week     Comment: 1/2 pint 2 x per week - quit approximately 5 months ago    Drug use: No    Sexual activity: Yes     Partners: Female     Birth control/protection: None     Comment: No history of STDs.     Review of Systems  Objective:     Vital Signs (Most Recent):  Temp: 98.3 °F (36.8 °C) (12/04/23 0420)  Pulse: 90 (12/04/23 0700)  Resp: 16 (12/04/23 0700)  BP: (!) 154/74 (12/04/23 0420)  SpO2: (!) 87 % (Pt. was not wearing O2 in his nose) (12/04/23 0700) Vital Signs (24h Range):  Temp:  [97.7 °F (36.5 °C)-98.3 °F (36.8 °C)] 98.3 °F (36.8 °C)  Pulse:  [68-90] 90  Resp:  [14-20] 16  SpO2:  [87 %-100 %] 87 %  BP: (150-184)/() 154/74     Weight: 65.5 kg (144 lb 6.4 oz)  Body mass index is 21.32 kg/m².    Physical Exam  Vitals and nursing note reviewed.   HENT:      Head: Normocephalic and atraumatic.      Nose: Nose normal.      Mouth/Throat:      Mouth: Mucous membranes are moist.      Pharynx: Oropharynx is clear.    Eyes:      General: Scleral icterus present.      Extraocular Movements: EOM normal.      Pupils: Pupils are equal, round, and reactive to light.   Cardiovascular:      Rate and Rhythm: Normal rate.   Pulmonary:      Effort: Pulmonary effort is normal.   Musculoskeletal:         General: Swelling and tenderness present.      Cervical back: Normal range of motion.   Skin:     General: Skin is warm and dry.   Neurological:      Mental Status: He is alert and oriented to person, place, and time.      Coordination: Finger-Nose-Finger Test normal.   Psychiatric:         Speech: Speech normal.         NEUROLOGICAL EXAMINATION:     MENTAL STATUS   Oriented to person, place, and time.   Follows 1 step commands.   Attention: normal.   Speech: speech is normal   Level of consciousness: alert  Knowledge: good.   Able to name object. Able to repeat. Normal comprehension.     CRANIAL NERVES     CN II   Right visual field deficit: none  Left visual field deficit: none     CN III, IV, VI   Pupils are equal, round, and reactive to light.  Extraocular motions are normal.   Right pupil: Size: 4 mm. Reactivity: brisk.   Left pupil: Size: 4 mm. Reactivity: brisk.     CN V   Facial sensation intact.     CN VII   Facial expression full, symmetric.     CN VIII   CN VIII normal.   Hearing: intact    CN XII   Tongue deviation: none    MOTOR EXAM   Right arm tone: increased  Left arm tone: normal  Right arm pronator drift: absent  Right leg tone: increased  Left leg tone: normal       MS 4/5 RUE, LUE and LLE  3+/5 RUE     REFLEXES     Reflexes   Right : 1+  Left : 2+    SENSORY EXAM   Right arm light touch: decreased from elbow  Left arm light touch: decreased from elbow  Right leg light touch: decreased from knee  Left leg light touch: decreased from knee    GAIT AND COORDINATION     Gait  Gait: (not tested)     Coordination   Finger to nose coordination: normal    Tremor   Resting tremor: absent    Current NIH Stroke Score Values  "     Flowsheet Row Most Recent Value   Interval baseline  [Handoff Hyma and Pina]   1a. Level of Consciousness 0-->Alert, keenly responsive   1b. LOC Questions 0-->Answers both questions correctly   1c. LOC Commands 0-->Performs both tasks correctly   2. Best Gaze 0-->Normal   3. Visual 0-->No visual loss   4. Facial Palsy 1-->Minor paralysis (flattened nasolabial fold, asymmetry on smiling)   5a. Motor Arm, Left 0-->No drift, limb holds 90 (or 45) degrees for full 10 secs   5b. Motor Arm, Right 0-->No drift, limb holds 90 (or 45) degrees for full 10 secs   6a. Motor Leg, Left 0-->No drift, leg holds 30 degree position for full 5 secs   6b. Motor Leg, Right 2-->Some effort against gravity, leg falls to bed by 5 secs, but has some effort against gravity   7. Limb Ataxia 1-->Present in one limb   8. Sensory 1-->Mild-to-moderate sensory loss, patient feels pinprick is less sharp or is dull on the affected side, or there is a loss of superficial pain with pinprick, but patient is aware of being touched   9. Best Language 0-->No aphasia, normal   10. Dysarthria 0-->Normal   11. Extinction and Inattention (formerly Neglect) 0-->No abnormality   Total (NIH Stroke Scale) 5            Significant Labs: Hemoglobin A1c: No results for input(s): "HGBA1C" in the last 720 hours.  BMP:   Recent Labs   Lab 12/03/23 1741 12/04/23  0423   * 195*    136   K 3.5 4.2    102   CO2 27 24   BUN 9 7   CREATININE 0.8 0.7   CALCIUM 8.9 9.3   MG  --  1.7     CBC:   Recent Labs   Lab 12/03/23 1741 12/04/23  0423   WBC 5.23 5.77   HGB 11.2* 12.2*   HCT 35.1* 38.4*    193     CMP:   Recent Labs   Lab 12/03/23 1741 12/04/23  0423   * 195*    136   K 3.5 4.2    102   CO2 27 24   BUN 9 7   CREATININE 0.8 0.7   CALCIUM 8.9 9.3   MG  --  1.7   PROT 6.9 7.3   ALBUMIN 3.1* 3.2*   BILITOT 1.1* 0.8   ALKPHOS 56 61   AST 10 9*   ALT 5* 6*   ANIONGAP 9 10     All pertinent lab results from the past 24 hours " have been reviewed.    Significant Imaging: I have reviewed all pertinent imaging results/findings within the past 24 hours.    TA Head and Neck (xpd)  Order: [id number]  Status: Final result       Visible to patient: No (inaccessible in Patient Portal)       Next appt: 12/22/2023 at 11:00 AM in Family Medicine (Toan Banks DO)    0 Result Notes  Details    Reading Physician Reading Date Result Priority   Delvin Wilson MD  [phone] 12/4/2023 STAT     Narrative & Impression  EXAMINATION:  CTA HEAD AND NECK (XPD)     CLINICAL HISTORY:  Neuro deficit, acute, stroke suspected;Stroke/TIA, determine embolic source;     TECHNIQUE:  Non contrast low dose axial images were obtained through the head. CT angiogram was performed from the level of the ignacia to the top of the head following the IV administration of 75mL of Omnipaque 350.   Sagittal and coronal reconstructions and maximum intensity projection reconstructions were performed. Arterial stenosis percentages are based on NASCET measurement criteria.     COMPARISON:  CTA 04/28/2023. MRI/MRA 02/22/2023.     FINDINGS:  CTA HEAD:     Vasculature: Atherosclerotic changes with moderate intermittent focal stenosis of the cavernous ICAs bilaterally.The intracranial arterial vasculature is otherwise patent.  No high-grade stenosis or proximal large vessel occlusion.  No aneurysm or vascular malformation identified.     Variant anatomy: Developmentally diminutive left P1 PCA and prominent left posterior communicating artery.  Azygous A2 FILOMENA.     Brain: There is a subtle subcentimeter low-density within the right frontal corona radiata which may be new from prior CT examinations as well as most recent MRI 02/22/2023 potentially representing an age indeterminate focus of small vessel ischemic change.  No acute intracranial hemorrhage, midline shift or mass effect, or space-occupying extra-axial fluid collection.  No CT evidence of an acute major vascular territory  infarct.  Chronic infarcts redemonstrated involving the bilateral basal ganglia and central alf.  No abnormal intracranial enhancement after the administration of intravenous contrast.     Ventricles/Sulci: The ventricles and sulci are appropriate in size for age.        Osseous Structures: The osseous structures are unremarkable in appearance.     Sinuses and orbits: The visualized portions of the paranasal sinuses and orbits are unremarkable.     Other: Visualized portions of the mastoids are unremarkable.  Chronic scarring or potential complex cyst involving the right superior parietal scalp.     CTA NECK:     Aorta: Conventional branching pattern. The great vessel origins are patent without flow limiting stenosis.     Vertebral Arteries: The origins of the vertebral arteries are patent.  No flow limiting stenosis, occlusion, or dissection. The left vertebral artery is dominant.     Right Carotid: No flow limiting stenosis, occlusion, or dissection of the common carotid or internal carotid arteries.  Mild plaque of the proximal ICA.  Right internal carotid artery: 40-50 % stenosis by and NASCET.  The ICA is tortuous with medialization at the C2-C3 level.     Left Carotid: No flow limiting stenosis, occlusion, or dissection of the common carotid or internal carotid arteries. No significant plaque of the proximal ICA. Right internal carotid artery: 0 % stenosis by and NASCET.  The ICA is tortuous with medialization at the C2-C3 level.     Extravascular Anatomy: Emphysematous changes of the lung apices.     Impression:     1. Age indeterminate lacunar type infarct within the right frontal corona radiata which can be further assessed on currently ordered of the brain.  No hemorrhage or intracranial mass effect.  Chronic small vessel ischemic changes again noted most pronounced at the level the central alf and to a lesser extent the bilateral basal ganglia.  2. No intracranial proximal large vessel occlusion.   Moderate focal stenosis of the bilateral cavernous carotid arteries.  3. Less than 50% stenosis of the proximal cervical right ICA.  No other significant stenosis within the neck.  Tortuous ICAs.  No major discrepancies between the preliminary and final reports aside from mention of the potential age indeterminate small volume infarct within the right frontal corona radiata as discussed above.   US Carotid Bilateral  Order: [id number]  Status: Final result       Visible to patient: No (inaccessible in Patient Portal)       Next appt: 12/22/2023 at 11:00 AM in Family Medicine (Toan Banks DO)    0 Result Notes  Details    Reading Physician Reading Date Result Priority   Alicia Martin MD  [phone]  [phone] 12/4/2023 Routine     Narrative & Impression  EXAMINATION:  US CAROTID BILATERAL     CLINICAL HISTORY:  Velocities evaluation;     TECHNIQUE:  Grayscale and color Doppler ultrasound examination of the carotid and vertebral artery systems bilaterally.  Stenosis estimates are per the NASCET measurement criteria.     COMPARISON:  None.     FINDINGS:  Right:     Internal Carotid Artery (ICA) peak systolic velocity 27.4 cm/sec     ICA/CCA peak systolic velocity ratio: 0.5     Plaque formation: Mild     Vertebral artery: Antegrade flow and normal waveform.     Left:     Internal Carotid Artery (ICA)  peak systolic velocity 51.8 cm/sec     ICA/CCA peak systolic velocity ratio: 0.8     Plaque formation: Mild     Vertebral artery: Antegrade flow and normal waveform.     Impression:     No evidence of a hemodynamically significant carotid bifurcation stenosis.        Electronically signed by: Alicia Martin MD  Date:                                            12/04/2023  Time:                                           09:56           Exam Ended: 12/04/23 08:15           Assessment and Plan:    Subacute CVA vs TIA    CT head wo contrast: age indeterminate lacunar infarct within right frontal corona  radiata   CTA head and neck: right ICA stenosis < 50%; tortuous ICA  CUS: negative for LVO, stenosis, aneurysm  MRI brain wo contrast: negative for new stroke  ECHO: report pending   LDL: 84  Hgb A1c pending     Continue aspirin 81 mg daily and start plavix 75 mg daily for secondary stroke prevention  Continue with DAPT as stated above for 3 weeks and then continue with aspirin monotherapy  Diet once clear by speech   PT/OT/ST eval and treat   Neuro checks per floor protocol  Recommend outpatient physical therapy  Tobacco cessation discussed as patient reports he smokes 3 ppd     Patient to follow up with [facility] at [phone] within 2 weeks from discharge.  Stroke education was provided including stroke risk factors modification and any acute neurological changes including weakness, confusion, visual changes to come straight to the ER.  Seizure educaation was provided including no driving, no swimming by self, no operation of heavy machinery or climbing on ladders.  All side effects of new medications were discussed with patient and/or next of kin and all questions were answered.       Active Diagnoses:    Diagnosis Date Noted POA    PRINCIPAL PROBLEM:  Stroke [I63.9] 12/03/2023 Yes    COPD exacerbation [J44.1] 11/26/2022 Yes    Tobacco dependency [F17.200] 05/30/2020 Yes    Primary hypertension [I10] 03/24/2014 Yes    DM (diabetes mellitus), type 2 [E11.9] 01/22/2013 Yes      Problems Resolved During this Admission:       VTE Risk Mitigation (From admission, onward)           Ordered     IP VTE LOW RISK PATIENT  Once         12/03/23 2135     Place sequential compression device  Until discontinued         12/03/23 2135                    Thank you for your consult.    Angie Alcantar DNP  Neurology  [facility]/Surg

## 2023-12-04 NOTE — PLAN OF CARE
[place] [facility] - Med/Surg  Initial Discharge Assessment       Primary Care Provider: Toan Banks DO    Admission Diagnosis: Stroke [I63.9]  Acute focal neurological deficit [R29.818]    Admission Date: 12/3/2023  Expected Discharge Date: 12/6/2023    Transition of Care Barriers: None    Payor: [employer]VANTAGE / Plan: [employer]ARE / Product Type: Medicare Advantage /     Extended Emergency Contact Information  Primary Emergency Contact: Mariann Chou  Address: [address]           [place] [address] [address]  Home Phone: [phone]  Mobile Phone: [phone]  Relation: Mother  Preferred language: English   needed? No  Secondary Emergency Contact: CATHERINE ALVARADO  Mobile Phone: [phone]  Relation: Brother  Preferred language: English   needed? No    Discharge Plan A: Home  Discharge Plan B: Home with family      [facility] - [place] - [address]  [address]  [place] [address]  Phone: [phone] Fax: [phone]    SW met with patient at bedside to complete discharge planning assessment.  Patient alert and oriented xs 4.  Patient verified all demographic information on facesheet is correct.  Patient verified PCP is .  Patient verified primary health insurance is [employer].  Patient with NO home health but has listed DME.  Patient with POA (mother) and has Living Will.  Patient not on dialysis or medication coumadin.  Patient with no 30 day admission.  Patient with no financial issues at this time.  Patient family will provide transportation upon discharge from facility.  Patient independent with ADLs, live with 14 yr old son, drives self.    Initial Assessment (most recent)       Adult Discharge Assessment - 12/04/23 1630          Discharge Assessment    Assessment Type Discharge Planning Assessment     Confirmed/corrected address, phone number and insurance Yes     Confirmed  Demographics Correct on Facesheet     Source of Information patient     Communicated ZACARIAS with patient/caregiver Date not available/Unable to determine     People in Home child(annamarie), dependent     Facility Arrived From: home     Do you expect to return to your current living situation? Yes     Do you have help at home or someone to help you manage your care at home? Yes     Who are your caregiver(s) and their phone number(s)? self     Prior to hospitilization cognitive status: Alert/Oriented     Current cognitive status: Alert/Oriented     Equipment Currently Used at Home glucometer     Readmission within 30 days? No     Patient currently being followed by outpatient case management? No     Do you currently have service(s) that help you manage your care at home? No     Do you take prescription medications? Yes     Do you have prescription coverage? Yes     Do you have any problems affording any of your prescribed medications? No     Is the patient taking medications as prescribed? yes     Who is going to help you get home at discharge? mother     How do you get to doctors appointments? car, drives self     Are you on dialysis? No     Do you take coumadin? No     DME Needed Upon Discharge  glucometer     Discharge Plan discussed with: Patient     Transition of Care Barriers None     Discharge Plan A Home     Discharge Plan B Home with family

## 2023-12-04 NOTE — HOSPITAL COURSE
Pt was monitored closely during his stay.  He was treated for COPD exacerbation.  He required supplemental oxygen.  He was evaluated by Neurology.  He underwent MRI which was negative for any acute ischemic infarct.  They recommended 3 weeks of dual antiplatelet therapy followed by aspirin monotherapy.  PT/OT was consulted to evaluate.  Patient was evaluated for home oxygen and which she qualified for.  Referral sent to pulmonology.  Patient educated on importance of smoking sensation is he reports he smokes 3 packs per day.  Nicotine patch is ordered.  Patient requested glucometer which was sent to his pharmacy.  Patient is medically stable for discharge with close outpatient follow-up.

## 2023-12-04 NOTE — PLAN OF CARE
Problem: Physical Therapy  Goal: Physical Therapy Goal  Description: Goals to be met by: 2023     Patient will increase functional independence with mobility by performin. Supine to sit with Contact Guard Assistance  2. Sit to stand transfer with Contact Guard Assistance  3. Bed to chair transfer with Contact Guard Assistance using Rolling Walker  4. Gait  x 250 feet with Contact Guard Assistance using Rolling Walker.   5. Lower extremity exercise program x20 reps   Outcome: Ongoing, Progressing   PT eval and treat . Pt on 2 liters O2. Pt ambulated 50ft x2 with RW with c/o RLE weakness. Pt will benefit from OP PT

## 2023-12-04 NOTE — ASSESSMENT & PLAN NOTE
Patient's COPD is with exacerbation noted by continued dyspnea and worsening of baseline hypoxia currently.  Patient is currently off COPD Pathway. Continue scheduled inhalers Steroids and Supplemental oxygen and monitor respiratory status closely.

## 2023-12-04 NOTE — PLAN OF CARE
Problem: SLP  Goal: SLP Goal  Description: 1. Functional problem solving tasks with MIN A  2. Recall 3 items from functional list following 5 minute filled delay with MIN A        Pt seen for clinical swallow and cognitive communication evaluations. Oropharyngeal swallow judged to be WFL. Pt with h/o Schatzki ring and reports occasional globus sensation. He presents with suspected cognitive linguistic deficits, likely chronic vs acute on chronic. Friend reports speech, language and mental status at baseline, however, decline in cognitive status when compared to SLP evaluation 11/2022. She endorses STM deficits since CVA 11/2022 and learning disability; special education.  REC Regular (IDDSI 7) textures with thin liquids. Initiate skilled ST to address deficits.

## 2023-12-04 NOTE — ASSESSMENT & PLAN NOTE
Patient's FSGs are uncontrolled due to hyperglycemia on current medication regimen.  Last A1c reviewed-   Lab Results   Component Value Date    LABA1C 9.8 (H) 07/06/2016    HGBA1C 6.1 (H) 03/08/2023     Most recent fingerstick glucose reviewed-   Recent Labs   Lab 12/03/23  1903   POCTGLUCOSE 139*     Current correctional scale  Medium  Maintain anti-hyperglycemic dose as follows-   Antihyperglycemics (From admission, onward)      SSI          Hold Oral hypoglycemics while patient is in the hospital.

## 2023-12-04 NOTE — PT/OT/SLP PROGRESS
Occupational Therapy      Patient Name:  Hernan Badillo   MRN:  [id number]    Patient not seen today secondary to patient was unavailable. Will follow-up 12/5/2023.    12/4/2023

## 2023-12-04 NOTE — PT/OT/SLP EVAL
"Physical Therapy Evaluation    Patient Name:  Hernan Badillo   MRN:  [id number]    Recommendations:     Discharge Recommendations: Low Intensity Therapy (OP PT)   Discharge Equipment Recommendations: none   Barriers to discharge: Decreased caregiver support    Assessment:     Hernan Badillo is a 58 y.o. male admitted with a medical diagnosis of Stroke.  He presents with the following impairments/functional limitations: weakness, impaired endurance, impaired sensation, impaired functional mobility, gait instability, impaired balance, decreased lower extremity function, decreased safety awareness, impaired cardiopulmonary response to activity .    Pt seen supine in bed with SO present. Pt alert and agreeable to PT. Pt c/o chronic weakness RLE, numbness L face/leg. Pt asking for big size of Dr Pinon- nurse Heller at bedside who stated due to being diabetic- diet drink only and a smaller size- per SO, pt smokes 3ppd. Pt had difficulty with sit to stand, successful at 4th attempt and using body momentum. Pt ambulated 50ft x2 with RW, impulsive and fast. Back to bed- nurse Heller at bedside  Pt to benefit from further dietary education and OP PT.    Rehab Prognosis: Fair; patient would benefit from acute skilled PT services to address these deficits and reach maximum level of function.    Recent Surgery: * No surgery found *      Plan:     During this hospitalization, patient to be seen daily to address the identified rehab impairments via gait training, therapeutic activities, therapeutic exercises, neuromuscular re-education and progress toward the following goals:    Plan of Care Expires:  12/30/23    Subjective   Pt stated "I can walk"  Asking SO to get him a fountain drink Dr Pepper  Chief Complaint: weak RLE and numbness L side  Patient/Family Comments/goals: none  Pain/Comfort:  Pain Rating 1: 0/10    Patients cultural, spiritual, Restorationism conflicts given the current situation:      Living Environment:  Home alone  Prior to " admission, patients level of function was ambulatory/drives.  Equipment used at home: none.  DME owned (not currently used): rolling walker.  Upon discharge, patient will have assistance from SO.    Objective:     Communicated with nurse Heller prior to session.  Patient found HOB elevated with oxygen, bed alarm, telemetry  upon PT entry to room.    General Precautions: Standard, fall, diabetic  Orthopedic Precautions:N/A   Braces: N/A  Respiratory Status: Nasal cannula, flow 2 L/min    Exams:  Postural Exam:  Patient presented with the following abnormalities:    -       Rounded shoulders  -       Forward head  -       BMI 21.32  RLE ROM: WFL  RLE Strength: Deficits: 3-/5  LLE ROM: WFL  LLE Strength: WFL    Functional Mobility:  Bed Mobility:     Scooting: minimum assistance  Supine to Sit: minimum assistance  Transfers:     Sit to Stand:  minimum assistance with rolling walker  Bed to Chair: minimum assistance with  rolling walker  using  Stand Pivot  Gait: 50ft x2 with RW min assist- quick pace/impulsive  2 liters O2      AM-PAC 6 CLICK MOBILITY  Total Score:17       Treatment & Education:  Patient was educated on the importance of OOB activity and functional mobility to negate negative effects of prolonged bed rest during hospitalization, safe transfers and ambulation, and D/C planning   Pt returned to sitting EOB    Patient left sitting edge of bed with all lines intact, call button in reach, and nurse Heller and SO present.    GOALS:   Multidisciplinary Problems       Physical Therapy Goals          Problem: Physical Therapy    Goal Priority Disciplines Outcome Goal Variances Interventions   Physical Therapy Goal     PT, PT/OT Ongoing, Progressing     Description: Goals to be met by: 2023     Patient will increase functional independence with mobility by performin. Supine to sit with Contact Guard Assistance  2. Sit to stand transfer with Contact Guard Assistance  3. Bed to chair transfer with Contact  Guard Assistance using Rolling Walker  4. Gait  x 250 feet with Contact Guard Assistance using Rolling Walker.   5. Lower extremity exercise program x20 reps                        History:     Past Medical History:   Diagnosis Date    COPD (chronic obstructive pulmonary disease)     Diabetes mellitus, type 2     Hypertension        Past Surgical History:   Procedure Laterality Date    DENTAL SURGERY      ESOPHAGOGASTRODUODENOSCOPY N/A 6/1/2020    Procedure: EGD (ESOPHAGOGASTRODUODENOSCOPY);  Surgeon: Terrell May MD;  Location: [facility];  Service: Endoscopy;  Laterality: N/A;    HERNIA REPAIR      left inguinal    incision and drainiage         Time Tracking:     PT Received On: 12/04/23  PT Start Time: 1044     PT Stop Time: 1101  PT Total Time (min): 17 min     Billable Minutes: Evaluation 8 and Gait Training 9      12/04/2023

## 2023-12-04 NOTE — SUBJECTIVE & OBJECTIVE
Interval History:  Pt seen and examined.  He is very eager to discharge home.  He makes no complaints over left-sided weakness and denies any worsening of his right-sided weakness.  The only complaint today is right hand pain which she describes as nerve like pain, constant since his historical CVA.    He has noticeable increased work of breathing and wheezing on exam.  His friend at bedside confirms this is how he is at baseline.  He smokes 3 packs of cigarettes daily.  He understands that he needs to quit, however states he has lived a good life and knows that if he does not stopped smoking he is going to die eventually-after much talk, agreeable to smoking cessation referral.  He is not clinically ready for discharge yet.  MRI was just done at time my visit.  He needs ongoing hospitalization for COPD exacerbation.  Neuro to see.    Review of Systems   Constitutional:  Negative for chills and fever.   Respiratory:  Positive for cough, shortness of breath and wheezing.    Cardiovascular:  Negative for chest pain.   Gastrointestinal:  Negative for nausea and vomiting.   Musculoskeletal:  Positive for myalgias.   Neurological:  Positive for weakness.     Objective:     Vital Signs (Most Recent):  Temp: 98.3 °F (36.8 °C) (12/04/23 0420)  Pulse: 90 (12/04/23 0700)  Resp: 16 (12/04/23 0700)  BP: (!) 154/74 (12/04/23 0420)  SpO2: (!) 87 % (Pt. was not wearing O2 in his nose) (12/04/23 0700) Vital Signs (24h Range):  Temp:  [97.7 °F (36.5 °C)-98.3 °F (36.8 °C)] 98.3 °F (36.8 °C)  Pulse:  [68-90] 90  Resp:  [14-20] 16  SpO2:  [87 %-100 %] 87 %  BP: (150-184)/() 154/74     Weight: 65.5 kg (144 lb 6.4 oz)  Body mass index is 21.32 kg/m².    Intake/Output Summary (Last 24 hours) at 12/4/2023 1128  Last data filed at 12/4/2023 0226  Gross per 24 hour   Intake 100 ml   Output 300 ml   Net -200 ml         Physical Exam  Vitals and nursing note reviewed.   Constitutional:       Appearance: Normal appearance.   HENT:       Head: Normocephalic and atraumatic.      Mouth/Throat:      Mouth: Mucous membranes are moist.      Pharynx: Oropharynx is clear.   Eyes:      Extraocular Movements: Extraocular movements intact.      Conjunctiva/sclera: Conjunctivae normal.      Pupils: Pupils are equal, round, and reactive to light.   Cardiovascular:      Rate and Rhythm: Normal rate and regular rhythm.   Pulmonary:      Breath sounds: Wheezing (Expiratory) present.      Comments: Accessory muscle usage with inspiratory effort  Abdominal:      General: Abdomen is flat. Bowel sounds are normal. There is no distension.      Palpations: Abdomen is soft.      Tenderness: There is no abdominal tenderness.   Musculoskeletal:         General: Normal range of motion.   Skin:     General: Skin is warm and dry.      Capillary Refill: Capillary refill takes less than 2 seconds.   Neurological:      General: No focal deficit present.      Mental Status: He is alert and oriented to person, place, and time. Mental status is at baseline.   Psychiatric:         Mood and Affect: Mood normal.         Behavior: Behavior normal.             Significant Labs: All pertinent labs within the past 24 hours have been reviewed.  CBC:   Recent Labs   Lab 12/03/23  1741 12/04/23  0423   WBC 5.23 5.77   HGB 11.2* 12.2*   HCT 35.1* 38.4*    193     CMP:   Recent Labs   Lab 12/03/23  1741 12/04/23  0423    136   K 3.5 4.2    102   CO2 27 24   * 195*   BUN 9 7   CREATININE 0.8 0.7   CALCIUM 8.9 9.3   PROT 6.9 7.3   ALBUMIN 3.1* 3.2*   BILITOT 1.1* 0.8   ALKPHOS 56 61   AST 10 9*   ALT 5* 6*   ANIONGAP 9 10       Significant Imaging: I have reviewed all pertinent imaging results/findings within the past 24 hours.

## 2023-12-04 NOTE — ASSESSMENT & PLAN NOTE
Chronic,  stable . Latest blood pressure and vitals reviewed-     Temp:  [97.7 °F (36.5 °C)-98.3 °F (36.8 °C)]   Pulse:  [68-90]   Resp:  [14-20]   BP: (150-184)/()   SpO2:  [87 %-100 %] .   Home meds for hypertension were reviewed and noted below.   Hypertension Medications               amLODIPine (NORVASC) 10 MG tablet Take 1 tablet (10 mg total) by mouth once daily.    lisinopriL 10 MG tablet Take 1 tablet (10 mg total) by mouth once daily.            While in the hospital, will manage blood pressure as follows; Adjust home antihypertensive regimen as follows- hold chronic antihypertensives in the setting of possible ischemic stroke.    Will utilize p.r.n. blood pressure medication only if patient's blood pressure greater than 220/110 and he develops symptoms such as worsening chest pain or shortness of breath.

## 2023-12-04 NOTE — PLAN OF CARE
AAO x 4, NSR on monitor, VSS, neuro assess ie 4 hrs, urinal at bedside, complained of chronic pain, resting between care, will monitor.     Problem: Adult Inpatient Plan of Care  Goal: Plan of Care Review  Outcome: Ongoing, Progressing     Problem: Adult Inpatient Plan of Care  Goal: Optimal Comfort and Wellbeing  Outcome: Ongoing, Progressing     Problem: Cognitive Impairment (Stroke, Ischemic/Transient Ischemic Attack)  Goal: Optimal Cognitive Function  Outcome: Ongoing, Progressing     Problem: Functional Ability Impaired (Stroke, Ischemic/Transient Ischemic Attack)  Goal: Optimal Functional Ability  Outcome: Ongoing, Progressing     Problem: Communication Impairment (Stroke, Ischemic/Transient Ischemic Attack)  Goal: Improved Communication Skills  Outcome: Ongoing, Progressing     Problem: Urinary Elimination Impaired (Stroke, Ischemic/Transient Ischemic Attack)  Goal: Effective Urinary Elimination  Outcome: Ongoing, Progressing     Problem: Diabetes Comorbidity  Goal: Blood Glucose Level Within Targeted Range  Outcome: Ongoing, Progressing  Nurses Note -- 4 Eyes      12/4/2023   5:35 AM      Skin assessed during: Admit      [x] No Altered Skin Integrity Present    [x]Prevention Measures Documented      [] Yes- Altered Skin Integrity Present or Discovered   [] LDA Added if Not in Epic (Describe Wound)   [] New Altered Skin Integrity was Present on Admit and Documented in LDA   [] Wound Image Taken    Wound Care Consulted? No    Attending Nurse:  Darryl GARCIA     Second RN/Staff Member:   Kellee GARCIA

## 2023-12-04 NOTE — ASSESSMENT & PLAN NOTE
Chronic,  stable . Latest blood pressure and vitals reviewed-     Temp:  [97.7 °F (36.5 °C)]   Pulse:  [68-83]   Resp:  [14-20]   BP: (150-184)/()   SpO2:  [95 %-100 %] .   Home meds for hypertension were reviewed and noted below.   Hypertension Medications               amLODIPine (NORVASC) 10 MG tablet Take 1 tablet (10 mg total) by mouth once daily.    lisinopriL 10 MG tablet Take 1 tablet (10 mg total) by mouth once daily.            While in the hospital, will manage blood pressure as follows; Adjust home antihypertensive regimen as follows- hold chronic antihypertensives in the setting of possible ischemic stroke.    Will utilize p.r.n. blood pressure medication only if patient's blood pressure greater than 220/110 and he develops symptoms such as worsening chest pain or shortness of breath.

## 2023-12-04 NOTE — ASSESSMENT & PLAN NOTE
Antithrombotics for secondary stroke prevention: Antiplatelets: Aspirin: 81 mg daily    Statins for secondary stroke prevention and hyperlipidemia, if present:   Statins: Atorvastatin- 20 mg daily-home dose.  Check lipid panel    Aggressive risk factor modification: HTN, Smoking, DM, HLD, Diet, Exercise     Rehab efforts: The patient has been evaluated by a stroke team provider and the therapy needs have been fully considered based off the presenting complaints and exam findings. The following therapy evaluations are needed: PT evaluate and treat, OT evaluate and treat    Diagnostics ordered/pending: Carotid ultrasound to assess vasculature, CTA Head to assess vasculature , CTA Neck/Arch to assess vasculature, HgbA1C to assess blood glucose levels, Lipid Profile to assess cholesterol levels, MRI head without contrast to assess brain parenchyma, TTE to assess cardiac function/status , TSH to assess thyroid function    VTE prophylaxis: Enoxaparin 40 mg SQ every 24 hours    BP parameters: Infarct: No intervention, SBP <220

## 2023-12-04 NOTE — PROGRESS NOTES
Automatic Inhaler to Nebulizer Interchange    fluticasone/vilanterol (Breo Ellipta) 200 mcg/25 mcg changed to budesonide 1 mg twice daily AND arformoterol 15 mcg twice daily per [facility] Automatic Therapeutic Substitutions Protocol.    Please contact pharmacy at extension [phone] with any questions.     Thank you,   Ayaz Haney

## 2023-12-04 NOTE — CONSULTS
Food & Nutrition  Education    Diet Education:  Cardiac Education  Time Spent: RD remote  Learners: Patient       Nutrition Education provided with handouts:   + Clinical Reference attachments to d/c documents  Cardiac and Diabetic educations attached    Comments:  Patient is on a cardiac, carbohydrate controlled diet.  SLP evaluation not available at this time.  RD to continue to monitor for recommendations on texture modified diet if recommended.  Labs reviewed.  VITALY.  LBM:  12/3/2023.  I/O since admit: -200mls.      Patient Active Problem List   Diagnosis    DM (diabetes mellitus), type 2    Primary hypertension    Type 2 diabetes mellitus    Anal pain    BMI 26.0-26.9,adult    Hematoma    Chest pain    Tobacco dependency    Epigastric pain    History of alcoholism    Right lower lobe pneumonia    Noncompliance    Community acquired pneumonia of right lower lobe of lung    Generalized abdominal pain    Left-sided nontraumatic intracerebral hemorrhage of brainstem    Hearing difficulty of both ears    Vasogenic brain edema    Imbalance    Gait instability    Right leg weakness    Impaired instrumental activities of daily living (IADL)    Coordination impairment    Right sided weakness    Spasticity    Wheezing    Intermittent claudication    COPD exacerbation    Stroke     Past Medical History:   Diagnosis Date    COPD (chronic obstructive pulmonary disease)     Diabetes mellitus, type 2     Hypertension          BMP  Lab Results   Component Value Date     12/04/2023    K 4.2 12/04/2023     12/04/2023    CO2 24 12/04/2023    BUN 7 12/04/2023    CREATININE 0.7 12/04/2023    CALCIUM 9.3 12/04/2023    ANIONGAP 10 12/04/2023    EGFRNORACEVR >60 12/04/2023     Lab Results   Component Value Date    LABA1C 9.8 (H) 07/06/2016    HGBA1C 6.1 (H) 03/08/2023     Lab Results   Component Value Date    CALCIUM 9.3 12/04/2023    PHOS 2.6 (L) 12/04/2023     Glucose   Date Value Ref Range Status   12/04/2023 195 (H) 70  - 110 mg/dL Final         All questions and concerns answered. Dietitian's contact information provided.       Follow-Up: Yes     Please Re-consult as needed        Thanks,  Alissa Funk, MS, RDN, LDN

## 2023-12-04 NOTE — HPI
Hernan Badillo is a 57 y/o M with past medical history significant for stroke, HTN, DM2 and tobacco dependency who presented to the emergency department for further evaluation of left-sided facial numbness and left lower extremity numbness which began about 2 weeks ago and became worse over the past 2 days.  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  He reports that his chronic weakness on the right has also become worse over the last couple of months.  He thinks he may have had a new stroke.  In addition, he reports increased shortness of breath, coughing, and wheezing over the past several days.  Family member at bedside states that patient still smokes and, in fact, he is asking during our interview if he can go outside to smoke.  A CTA head and neck was performed while pt was in ED which identified severe stenosis of there L internal carotid artery and moderate stenosis of the R internal carotid artery.  He was also given bronchodilators and IV steroids for COPD exacerbation.  CXR without acute findings.  He is placed in observation under the service of hospital medicine for continued workup and treatment.

## 2023-12-04 NOTE — ASSESSMENT & PLAN NOTE
Patient's COPD is with exacerbation noted by continued dyspnea and worsening of baseline hypoxia currently.  Patient is currently off COPD Pathway. Continue scheduled inhalers Steroids and Supplemental oxygen and monitor respiratory status closely.   -continued inpatient treatment   -will need O2 evaluation prior to discharge

## 2023-12-04 NOTE — PT/OT/SLP EVAL
Speech Language Pathology Evaluation  Cognitive/Bedside Swallow    Patient Name:  Hernan Badillo   MRN:  [id number]  Admitting Diagnosis: Stroke    Recommendations:                  General Recommendations:  Cognitive-linguistic therapy, consider GI referral upon discharge. Pt with h/o Schatzki ring and reports occasional globus sensation. Consider ENT referral upon DC for further evaluation of dysphonia (hoarse); current heavy smoker.   Diet recommendations:  Regular Diet - IDDSI Level 7, Thin   Aspiration Precautions:  alternate bites/sips as needed, Meds crushed in puree, Remain upright 30 minutes post meal, Standard aspiration precautions, and Wear oxygen during intake   General Precautions: Standard, aspiration, fall  Communication strategies:  none    Assessment:   Pt seen for clinical swallow and cognitive communication evaluations. Oropharyngeal swallow judged to be WFL. Pt with h/o Schatzki ring and reports occasional globus sensation. He presents with suspected cognitive linguistic deficits, likely chronic vs acute on chronic. Friend reports speech, language and mental status at baseline, however, change in cognitive status when compared to SLP evaluation 11/2022. She endorses STM deficits since CVA 11/2022 and learning disability; special education.  REC Regular (IDDSI 7) textures with thin liquids. Initiate skilled ST to address deficits.     History:   PER HPI: Hernan Badillo is a 57 y/o M with past medical history significant for stroke, HTN, DM2 and tobacco dependency who presented to the emergency department for further evaluation of left-sided facial numbness and left lower extremity numbness which began about 2 weeks ago and became worse over the past 2 days.  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  He reports that his chronic weakness on the right has also become worse over the last couple of months.  He thinks he may  have had a new stroke.  In addition, he reports increased shortness of breath, coughing, and wheezing over the past several days.  Family member at bedside states that patient still smokes and, in fact, he is asking during our interview if he can go outside to smoke.  A CTA head and neck was performed while pt was in ED which identified severe stenosis of there L internal carotid artery and moderate stenosis of the R internal carotid artery.  He was also given bronchodilators and IV steroids for COPD exacerbation.  CXR without acute findings.  He is placed in observation under the service of hospital medicine for continued workup and treatment     Past Medical History:   Diagnosis Date    COPD (chronic obstructive pulmonary disease)     Diabetes mellitus, type 2     Hypertension        Past Surgical History:   Procedure Laterality Date    DENTAL SURGERY      ESOPHAGOGASTRODUODENOSCOPY N/A 6/1/2020    Procedure: EGD (ESOPHAGOGASTRODUODENOSCOPY);  Surgeon: Terrell May MD;  Location: [facility];  Service: Endoscopy;  Laterality: N/A;    HERNIA REPAIR      left inguinal    incision and drainiage         Social History: Patient lives with 14 year old son.    Prior Intubation HX:  None this admit    Modified Barium Swallow: None in Epic    Imaging:  Imaging Results              MRI BRAIN WITHOUT CONTRAST (Final result)  Result time 12/04/23 11:03:45      Final result by Delvin Wilson MD (12/04/23 11:03:45)                   Impression:      1. Chronic ischemic changes as discussed above including a small lacunar type infarct within the right posterior frontal corona radiata which appears new from 02/22/2023.  No recent infarct or acute hemorrhage.      Electronically signed by: Delvin Wilson  Date:    12/04/2023  Time:    11:03               Narrative:    EXAMINATION:  MRI BRAIN WITHOUT CONTRAST    CLINICAL HISTORY:  Stroke, follow up;.    TECHNIQUE:  Multiplanar multisequence MR imaging of the brain was performed  without contrast    COMPARISON:  CTA 12/03/2023.  MRI brain 02/22/2023.    FINDINGS:  Brain: Chronic lacunar type infarct within the right posterior frontal corona radiata but new from previous exam dated 02/22/2023.  No acute infarct or recent hemorrhage.  Chronic hemorrhagic infarct within the alf as well as chronic lacunar type infarcts within the bilateral basal ganglia redemonstrated.  Superimposed mild to moderate chronic small vessel ischemic change for age within the cerebral hemispheric white matter.  No space-occupying extra-axial fluid collections/mass effect.  Midline Structures: The midline structures of the brain are normal.  Ventricles: The ventricles, sulci and cisterns are within normal limits.  Vasculature: The vascular flow voids at the base of the brain are within normal limits.  Calvarium: The visualized osseous structures are unremarkable.  Sinuses: The paranasal sinuses are adequately aerated.  Orbits: The orbits and globes are unremarkable.  Mastoids: The mastoid air cells are adequately aerated.  Extracranial soft tissues: The visualized extracranial soft tissues are unremarkable.                                       US Carotid Bilateral (Final result)  Result time 12/04/23 09:56:34      Final result by Alicia Martin MD (12/04/23 09:56:34)                   Impression:      No evidence of a hemodynamically significant carotid bifurcation stenosis.      Electronically signed by: Alicia Martin MD  Date:    12/04/2023  Time:    09:56               Narrative:    EXAMINATION:  US CAROTID BILATERAL    CLINICAL HISTORY:  Velocities evaluation;    TECHNIQUE:  Grayscale and color Doppler ultrasound examination of the carotid and vertebral artery systems bilaterally.  Stenosis estimates are per the NASCET measurement criteria.    COMPARISON:  None.    FINDINGS:  Right:    Internal Carotid Artery (ICA) peak systolic velocity 27.4 cm/sec    ICA/CCA peak systolic velocity ratio: 0.5    Plaque  formation: Mild    Vertebral artery: Antegrade flow and normal waveform.    Left:    Internal Carotid Artery (ICA)  peak systolic velocity 51.8 cm/sec    ICA/CCA peak systolic velocity ratio: 0.8    Plaque formation: Mild    Vertebral artery: Antegrade flow and normal waveform.                                       CTA Head and Neck (xpd) (Final result)  Result time 12/04/23 05:55:17      Final result by Delvin Wilson MD (12/04/23 05:55:17)                   Impression:      1. Age indeterminate lacunar type infarct within the right frontal corona radiata which can be further assessed on currently ordered of the brain.  No hemorrhage or intracranial mass effect.  Chronic small vessel ischemic changes again noted most pronounced at the level the central alf and to a lesser extent the bilateral basal ganglia.  2. No intracranial proximal large vessel occlusion.  Moderate focal stenosis of the bilateral cavernous carotid arteries.  3. Less than 50% stenosis of the proximal cervical right ICA.  No other significant stenosis within the neck.  Tortuous ICAs.  No major discrepancies between the preliminary and final reports aside from mention of the potential age indeterminate small volume infarct within the right frontal corona radiata as discussed above.      Electronically signed by: Delvin Wilson  Date:    12/04/2023  Time:    05:55               Narrative:    EXAMINATION:  CTA HEAD AND NECK (XPD)    CLINICAL HISTORY:  Neuro deficit, acute, stroke suspected;Stroke/TIA, determine embolic source;    TECHNIQUE:  Non contrast low dose axial images were obtained through the head. CT angiogram was performed from the level of the ignacia to the top of the head following the IV administration of 75mL of Omnipaque 350.   Sagittal and coronal reconstructions and maximum intensity projection reconstructions were performed. Arterial stenosis percentages are based on NASCET measurement criteria.    COMPARISON:  CTA 04/28/2023. MRI/MRA  02/22/2023.    FINDINGS:  CTA HEAD:    Vasculature: Atherosclerotic changes with moderate intermittent focal stenosis of the cavernous ICAs bilaterally.The intracranial arterial vasculature is otherwise patent.  No high-grade stenosis or proximal large vessel occlusion.  No aneurysm or vascular malformation identified.    Variant anatomy: Developmentally diminutive left P1 PCA and prominent left posterior communicating artery.  Azygous A2 FILOMENA.    Brain: There is a subtle subcentimeter low-density within the right frontal corona radiata which may be new from prior CT examinations as well as most recent MRI 02/22/2023 potentially representing an age indeterminate focus of small vessel ischemic change.  No acute intracranial hemorrhage, midline shift or mass effect, or space-occupying extra-axial fluid collection.  No CT evidence of an acute major vascular territory infarct.  Chronic infarcts redemonstrated involving the bilateral basal ganglia and central alf.  No abnormal intracranial enhancement after the administration of intravenous contrast.    Ventricles/Sulci: The ventricles and sulci are appropriate in size for age.      Osseous Structures: The osseous structures are unremarkable in appearance.    Sinuses and orbits: The visualized portions of the paranasal sinuses and orbits are unremarkable.    Other: Visualized portions of the mastoids are unremarkable.  Chronic scarring or potential complex cyst involving the right superior parietal scalp.    CTA NECK:    Aorta: Conventional branching pattern. The great vessel origins are patent without flow limiting stenosis.    Vertebral Arteries: The origins of the vertebral arteries are patent.  No flow limiting stenosis, occlusion, or dissection. The left vertebral artery is dominant.    Right Carotid: No flow limiting stenosis, occlusion, or dissection of the common carotid or internal carotid arteries.  Mild plaque of the proximal ICA.  Right internal carotid  "artery: 40-50 % stenosis by and NASCET.  The ICA is tortuous with medialization at the C2-C3 level.    Left Carotid: No flow limiting stenosis, occlusion, or dissection of the common carotid or internal carotid arteries. No significant plaque of the proximal ICA. Right internal carotid artery: 0 % stenosis by and NASCET.  The ICA is tortuous with medialization at the C2-C3 level.    Extravascular Anatomy: Emphysematous changes of the lung apices.                                       X-Ray Chest AP Portable (Final result)  Result time 12/04/23 08:39:05      Final result by Alicia Martin MD (12/04/23 08:39:05)                   Impression:      No acute abnormality.      Electronically signed by: Alicia Martin MD  Date:    12/04/2023  Time:    08:39               Narrative:    EXAMINATION:  XR CHEST AP PORTABLE    CLINICAL HISTORY:  Cough, unspecified    TECHNIQUE:  Single frontal view of the chest was performed.    COMPARISON:  11/09/2023    FINDINGS:  The lungs are clear, with normal appearance of pulmonary vasculature and no pleural effusion or pneumothorax.    The cardiac silhouette is normal in size. The hilar and mediastinal contours are unremarkable.    Bones are intact.                                       Prior diet: Regular/thin.    Occupation/hobbies/homemaking: Pt/family report PLOF as independent with ADLS, assistance with IADLS. Pt does drive. Pt was in special education until age 15. He does not read, but able to write. Worked as "." Currently unemployed.    Subjective     "I can't take my medicine whole."  "It goes down the wrong pipe sometimes." Further explains, "It takes so long to go down."   "I don;t have trouble talking."    Pain/Comfort:  Pain Rating 1: 0/10    Respiratory Status: Nasal cannula, flow 2 L/min    Objective:   Pt seen for clinical swallow and cognitive communication evaluations. He is alert and oriented to self, place, month and situation. Poor historian. Friend " at bedside and assists with history. Pt and friend reports speech, language and cognition near baseline. Friend endorses poor STM since CVA 2022. He requires assistance with medication management and finances. He appears to be very [place].    Cognitive Status:    Attention --somewhat distractible but easily redirected  Orientation Person, Place, and Situation. Did not know year.   Problem Solving Solutions --Fair  Simple calculation --accurate responses across 1 of 3 attempts  Memory: able to state personal information without difficulty. Unable to state current President and poor recall of general information. (Possibly 2° level of education)      Receptive Language:   Comprehension:      Questions Simple yes/no --WFL  Complex yes/no --WFL; requires occasional repetition 2° hearing and possible comprehension   Commands  One step --WFL  two step basic commands --WFL  multistep basic commands --mild difficulty      Expressive Language:  Verbal:  able to express basic needs and wants without difficulty      Motor Speech: mildly imprecise situation vs baseline speech disorder      Voice:   Quality Hoarse    Visual-Spatial:  DNT    Reading:   DNT. Poor reading skills at baseline. Pt states he is unable to read; special education     Written Expression:   DNT    Oral Musculature Evaluation  Oral Musculature: WFL  Dentition: present and adequate (few missing)  Secretion Management: adequate  Mucosal Quality: good  Mandibular Strength and Mobility: WFL  Lingual Strength and Mobility: WFL  Velar Elevation: WFL  Buccal Strength and Mobility: WFL  Volitional Swallow: able to palpate laryngeal rise  Voice Prior to PO Intake: hoarse; chronic per pt    Bedside Swallow Eval:   Consistencies Assessed:  Thin liquids --via cup and straw  Puree --applesauce  Mixed consistencies --diced peaches  Solids --brett cracker      Oral Phase:   WFL    Pharyngeal Phase:   no overt clinical signs/symptoms of aspiration  No globus sensation during  assessment. However, pt endorses globus with pills and solids; h/o Schatzki ring    Compensatory Strategies  None    Treatment: Pt/family educated re: results/recs of evaluation. Receptive to information provided.       Plan:     Patient to be seen:  3 x/week   Plan of Care expires:  12/11/23  Plan of Care reviewed with:  patient, friend   SLP Follow-Up:  Yes       Discharge recommendations:  Therapy Intensity Recommendations at Discharge: No Therapy Indicated SPV upon discharge  Barriers to Discharge:  Level of Skilled Assistance Needed , and Safety Awareness .    Time Tracking:     SLP Treatment Date:   12/04/23  Speech Start Time:  1503  Speech Stop Time:  1534     Speech Total Time (min):  31 min    Billable Minutes: Eval 21 , Eval Swallow and Oral Function 10, and Total Time 31    12/04/2023

## 2023-12-05 VITALS
HEIGHT: 69 IN | HEART RATE: 75 BPM | DIASTOLIC BLOOD PRESSURE: 76 MMHG | SYSTOLIC BLOOD PRESSURE: 166 MMHG | RESPIRATION RATE: 20 BRPM | BODY MASS INDEX: 21.38 KG/M2 | TEMPERATURE: 98 F | OXYGEN SATURATION: 94 % | WEIGHT: 144.38 LBS

## 2023-12-05 LAB
ALBUMIN SERPL BCP-MCNC: 2.9 G/DL (ref 3.5–5.2)
ALP SERPL-CCNC: 49 U/L (ref 55–135)
ALT SERPL W/O P-5'-P-CCNC: 7 U/L (ref 10–44)
ANION GAP SERPL CALC-SCNC: 10 MMOL/L (ref 8–16)
AORTIC ROOT ANNULUS: 3.41 CM
AORTIC VALVE CUSP SEPERATION: 1.85 CM
AST SERPL-CCNC: 8 U/L (ref 10–40)
AV INDEX (PROSTH): 1.32
AV MEAN GRADIENT: 3 MMHG
AV PEAK GRADIENT: 6 MMHG
AV VALVE AREA BY VELOCITY RATIO: 3.82 CM²
AV VALVE AREA: 4.64 CM²
AV VELOCITY RATIO: 1.08
BASOPHILS # BLD AUTO: 0.01 K/UL (ref 0–0.2)
BASOPHILS NFR BLD: 0.1 % (ref 0–1.9)
BILIRUB SERPL-MCNC: 0.5 MG/DL (ref 0.1–1)
BSA FOR ECHO PROCEDURE: 1.79 M2
BUN SERPL-MCNC: 12 MG/DL (ref 6–20)
CALCIUM SERPL-MCNC: 9 MG/DL (ref 8.7–10.5)
CHLORIDE SERPL-SCNC: 105 MMOL/L (ref 95–110)
CO2 SERPL-SCNC: 27 MMOL/L (ref 23–29)
CREAT SERPL-MCNC: 0.7 MG/DL (ref 0.5–1.4)
CV ECHO LV RWT: 0.45 CM
DIFFERENTIAL METHOD: ABNORMAL
DOP CALC AO PEAK VEL: 1.19 M/S
DOP CALC AO VTI: 19.6 CM
DOP CALC LVOT AREA: 3.5 CM2
DOP CALC LVOT DIAMETER: 2.12 CM
DOP CALC LVOT PEAK VEL: 1.29 M/S
DOP CALC LVOT STROKE VOLUME: 91.03 CM3
DOP CALC MV VTI: 22.7 CM
DOP CALCLVOT PEAK VEL VTI: 25.8 CM
E WAVE DECELERATION TIME: 271.21 MSEC
E/A RATIO: 0.75
E/E' RATIO: 8 M/S
ECHO LV POSTERIOR WALL: 1.09 CM (ref 0.6–1.1)
EJECTION FRACTION: 60 %
EOSINOPHIL # BLD AUTO: 0 K/UL (ref 0–0.5)
EOSINOPHIL NFR BLD: 0.6 % (ref 0–8)
ERYTHROCYTE [DISTWIDTH] IN BLOOD BY AUTOMATED COUNT: 14.7 % (ref 11.5–14.5)
EST. GFR  (NO RACE VARIABLE): >60 ML/MIN/1.73 M^2
FRACTIONAL SHORTENING: 28 % (ref 28–44)
GLUCOSE SERPL-MCNC: 123 MG/DL (ref 70–110)
HCT VFR BLD AUTO: 34.7 % (ref 40–54)
HGB BLD-MCNC: 11 G/DL (ref 14–18)
IMM GRANULOCYTES # BLD AUTO: 0.01 K/UL (ref 0–0.04)
IMM GRANULOCYTES NFR BLD AUTO: 0.1 % (ref 0–0.5)
INTERVENTRICULAR SEPTUM: 1.38 CM (ref 0.6–1.1)
IVC DIAMETER: 1.55 CM
LEFT ATRIUM SIZE: 3.11 CM
LEFT INTERNAL DIMENSION IN SYSTOLE: 3.52 CM (ref 2.1–4)
LEFT VENTRICLE DIASTOLIC VOLUME INDEX: 62.46 ML/M2
LEFT VENTRICLE DIASTOLIC VOLUME: 112.43 ML
LEFT VENTRICLE MASS INDEX: 131 G/M2
LEFT VENTRICLE SYSTOLIC VOLUME INDEX: 28.7 ML/M2
LEFT VENTRICLE SYSTOLIC VOLUME: 51.68 ML
LEFT VENTRICULAR INTERNAL DIMENSION IN DIASTOLE: 4.89 CM (ref 3.5–6)
LEFT VENTRICULAR MASS: 235.02 G
LV LATERAL E/E' RATIO: 8 M/S
LV SEPTAL E/E' RATIO: 8 M/S
LVOT MG: 3.3 MMHG
LVOT MV: 0.86 CM/S
LYMPHOCYTES # BLD AUTO: 1.5 K/UL (ref 1–4.8)
LYMPHOCYTES NFR BLD: 22.2 % (ref 18–48)
MCH RBC QN AUTO: 29.2 PG (ref 27–31)
MCHC RBC AUTO-ENTMCNC: 31.7 G/DL (ref 32–36)
MCV RBC AUTO: 92 FL (ref 82–98)
MONOCYTES # BLD AUTO: 0.5 K/UL (ref 0.3–1)
MONOCYTES NFR BLD: 7.4 % (ref 4–15)
MV MEAN GRADIENT: 2 MMHG
MV PEAK A VEL: 0.96 M/S
MV PEAK E VEL: 0.72 M/S
MV PEAK GRADIENT: 3 MMHG
MV STENOSIS PRESSURE HALF TIME: 82.65 MS
MV VALVE AREA BY CONTINUITY EQUATION: 4.01 CM2
MV VALVE AREA P 1/2 METHOD: 2.66 CM2
NEUTROPHILS # BLD AUTO: 4.8 K/UL (ref 1.8–7.7)
NEUTROPHILS NFR BLD: 69.6 % (ref 38–73)
NRBC BLD-RTO: 0 /100 WBC
PISA MRMAX VEL: 0 M/S
PLATELET # BLD AUTO: 197 K/UL (ref 150–450)
PMV BLD AUTO: 10.1 FL (ref 9.2–12.9)
POCT GLUCOSE: 112 MG/DL (ref 70–110)
POCT GLUCOSE: 98 MG/DL (ref 70–110)
POTASSIUM SERPL-SCNC: 3.6 MMOL/L (ref 3.5–5.1)
PROT SERPL-MCNC: 6.5 G/DL (ref 6–8.4)
PV MV: 0.69 M/S
PV PEAK GRADIENT: 4 MMHG
PV PEAK VELOCITY: 0.98 M/S
RA PRESSURE ESTIMATED: 3 MMHG
RBC # BLD AUTO: 3.77 M/UL (ref 4.6–6.2)
RIGHT VENTRICULAR END-DIASTOLIC DIMENSION: 2.54 CM
RV TISSUE DOPPLER FREE WALL SYSTOLIC VELOCITY 1 (APICAL 4 CHAMBER VIEW): 12.94 CM/S
SODIUM SERPL-SCNC: 142 MMOL/L (ref 136–145)
TDI LATERAL: 0.09 M/S
TDI SEPTAL: 0.09 M/S
TDI: 0.09 M/S
WBC # BLD AUTO: 6.88 K/UL (ref 3.9–12.7)
Z-SCORE OF LEFT VENTRICULAR DIMENSION IN END DIASTOLE: -0.18
Z-SCORE OF LEFT VENTRICULAR DIMENSION IN END SYSTOLE: 1.07

## 2023-12-05 PROCEDURE — 27000221 HC OXYGEN, UP TO 24 HOURS

## 2023-12-05 PROCEDURE — 25000242 PHARM REV CODE 250 ALT 637 W/ HCPCS: Performed by: HOSPITALIST

## 2023-12-05 PROCEDURE — 25000003 PHARM REV CODE 250: Performed by: NURSE PRACTITIONER

## 2023-12-05 PROCEDURE — 99900035 HC TECH TIME PER 15 MIN (STAT)

## 2023-12-05 PROCEDURE — 85025 COMPLETE CBC W/AUTO DIFF WBC: CPT | Performed by: NURSE PRACTITIONER

## 2023-12-05 PROCEDURE — 94761 N-INVAS EAR/PLS OXIMETRY MLT: CPT

## 2023-12-05 PROCEDURE — 25000242 PHARM REV CODE 250 ALT 637 W/ HCPCS: Performed by: NURSE PRACTITIONER

## 2023-12-05 PROCEDURE — 99900031 HC PATIENT EDUCATION (STAT)

## 2023-12-05 PROCEDURE — 97165 OT EVAL LOW COMPLEX 30 MIN: CPT

## 2023-12-05 PROCEDURE — 97535 SELF CARE MNGMENT TRAINING: CPT

## 2023-12-05 PROCEDURE — 36415 COLL VENOUS BLD VENIPUNCTURE: CPT | Performed by: NURSE PRACTITIONER

## 2023-12-05 PROCEDURE — 63700000 PHARM REV CODE 250 ALT 637 W/O HCPCS: Performed by: NURSE PRACTITIONER

## 2023-12-05 PROCEDURE — 94618 PULMONARY STRESS TESTING: CPT

## 2023-12-05 PROCEDURE — 80053 COMPREHEN METABOLIC PANEL: CPT | Performed by: NURSE PRACTITIONER

## 2023-12-05 PROCEDURE — 94640 AIRWAY INHALATION TREATMENT: CPT

## 2023-12-05 PROCEDURE — 63600175 PHARM REV CODE 636 W HCPCS: Performed by: NURSE PRACTITIONER

## 2023-12-05 PROCEDURE — 97116 GAIT TRAINING THERAPY: CPT | Mod: CQ

## 2023-12-05 RX ORDER — ASPIRIN 81 MG/1
81 TABLET ORAL DAILY
Qty: 360 TABLET | Refills: 0 | Status: SHIPPED | OUTPATIENT
Start: 2023-12-05 | End: 2024-12-04

## 2023-12-05 RX ORDER — INSULIN PUMP SYRINGE, 3 ML
EACH MISCELLANEOUS
Qty: 1 EACH | Refills: 0 | Status: SHIPPED | OUTPATIENT
Start: 2023-12-05 | End: 2024-12-04

## 2023-12-05 RX ORDER — AZITHROMYCIN 250 MG/1
500 TABLET, FILM COATED ORAL DAILY
Status: DISCONTINUED | OUTPATIENT
Start: 2023-12-05 | End: 2023-12-05 | Stop reason: HOSPADM

## 2023-12-05 RX ORDER — AZITHROMYCIN 500 MG/1
500 TABLET, FILM COATED ORAL DAILY
Qty: 2 TABLET | Refills: 0 | Status: SHIPPED | OUTPATIENT
Start: 2023-12-05 | End: 2023-12-08

## 2023-12-05 RX ORDER — IBUPROFEN 200 MG
1 TABLET ORAL DAILY
Qty: 30 PATCH | Refills: 0 | Status: SHIPPED | OUTPATIENT
Start: 2023-12-05 | End: 2024-01-04

## 2023-12-05 RX ORDER — CLOPIDOGREL BISULFATE 75 MG/1
75 TABLET ORAL DAILY
Qty: 30 TABLET | Refills: 0 | Status: SHIPPED | OUTPATIENT
Start: 2023-12-05 | End: 2023-12-26

## 2023-12-05 RX ORDER — LANCETS
1 EACH MISCELLANEOUS 4 TIMES DAILY
Qty: 200 EACH | Refills: 0 | Status: SHIPPED | OUTPATIENT
Start: 2023-12-05

## 2023-12-05 RX ADMIN — ATORVASTATIN CALCIUM 20 MG: 20 TABLET, FILM COATED ORAL at 09:12

## 2023-12-05 RX ADMIN — PANTOPRAZOLE SODIUM 40 MG: 40 TABLET, DELAYED RELEASE ORAL at 09:12

## 2023-12-05 RX ADMIN — ASPIRIN 81 MG: 81 TABLET, COATED ORAL at 09:12

## 2023-12-05 RX ADMIN — ARFORMOTEROL TARTRATE 15 MCG: 15 SOLUTION RESPIRATORY (INHALATION) at 06:12

## 2023-12-05 RX ADMIN — IPRATROPIUM BROMIDE AND ALBUTEROL SULFATE 3 ML: .5; 3 SOLUTION RESPIRATORY (INHALATION) at 12:12

## 2023-12-05 RX ADMIN — IPRATROPIUM BROMIDE AND ALBUTEROL SULFATE 3 ML: .5; 3 SOLUTION RESPIRATORY (INHALATION) at 06:12

## 2023-12-05 RX ADMIN — CLOPIDOGREL BISULFATE 75 MG: 75 TABLET, FILM COATED ORAL at 09:12

## 2023-12-05 RX ADMIN — PREGABALIN 100 MG: 75 CAPSULE ORAL at 09:12

## 2023-12-05 RX ADMIN — BUDESONIDE INHALATION 1 MG: 0.5 SUSPENSION RESPIRATORY (INHALATION) at 06:12

## 2023-12-05 RX ADMIN — BACLOFEN 10 MG: 10 TABLET ORAL at 09:12

## 2023-12-05 RX ADMIN — ESCITALOPRAM OXALATE 10 MG: 10 TABLET, FILM COATED ORAL at 09:12

## 2023-12-05 RX ADMIN — POTASSIUM BICARBONATE 50 MEQ: 977.5 TABLET, EFFERVESCENT ORAL at 09:12

## 2023-12-05 RX ADMIN — AZITHROMYCIN MONOHYDRATE 500 MG: 250 TABLET ORAL at 11:12

## 2023-12-05 RX ADMIN — DOCUSATE SODIUM AND SENNOSIDES 1 TABLET: 8.6; 5 TABLET, FILM COATED ORAL at 09:12

## 2023-12-05 RX ADMIN — PREDNISONE 40 MG: 20 TABLET ORAL at 09:12

## 2023-12-05 NOTE — PT/OT/SLP EVAL
Occupational Therapy   Evaluation    Name: Hernan Badillo  MRN: [id number]  Admitting Diagnosis: Stroke  Recent Surgery: * No surgery found *      Recommendations:     Discharge Recommendations: Low Intensity Therapy  Discharge Equipment Recommendations:  other (see comments) (TBD)  Barriers to discharge:       Assessment:     Hernan Badillo is a 58 y.o. male with a medical diagnosis of Stroke.  He presents with the following performance deficits affecting function: weakness, impaired endurance, impaired self care skills, impaired functional mobility, impaired balance, gait instability, decreased lower extremity function, decreased upper extremity function, impaired cardiopulmonary response to activity.      Rehab Prognosis: Good; patient would benefit from acute skilled OT services to address these deficits and reach maximum level of function.       Plan:     Patient to be seen 5 x/week to address the above listed problems via self-care/home management, therapeutic activities, therapeutic exercises  Plan of Care Expires: 01/02/24  Plan of Care Reviewed with: patient    Subjective     Chief Complaint: stiffness  Patient/Family Comments/goals: To go home    Occupational Profile:  Living Environment: Pt lives alone with 14 year old son. Pt has walk in shower with grab bars and standard toilet.  Previous level of function: Independent; Pt reports he does not use AD  Roles and Routines: Father  Equipment Used at Home: grab bar, other (see comments) (Pt has RW but does not use.)  Assistance upon Discharge: TBD    Pain/Comfort:  Pain Rating 1: 0/10  Pain Rating Post-Intervention 1: 0/10    Patients cultural, spiritual, Yazdanism conflicts given the current situation:      Objective:     Communicated with: Nurse Dunaway prior to session.  Patient found HOB elevated with bed alarm, telemetry upon OT entry to room.    General Precautions: Standard, fall  Orthopedic Precautions: N/A  Braces: N/A  Respiratory Status: Nasal cannula, flow    L/min    Occupational Performance:    Bed Mobility:    Patient completed Supine to Sit with stand by assistance  Patient completed Sit to Supine with stand by assistance    Functional Mobility/Transfers:  Patient completed Sit <> Stand Transfer with contact guard assistance  with  no assistive device       Activities of Daily Living:  Feeding:  independence    Grooming: stand by assistance set up  Bathing: minimum assistance    Upper Body Dressing: stand by assistance    Lower Body Dressing: contact guard assistance    Toileting: contact guard assistance      Cognitive/Visual Perceptual:  Pt alert  and followed commands for OT Evaluation. Chart review indicates short term memory impairment since CVA 2022. Friend assists with meds and finances.    Physical Exam:  Upper Extremity Strength:    -       Right Upper Extremity: Pt reports R hand dominant; AROM/strength WFL's  -       Left Upper Extremity: WFL's; Pt reports stiffness    AMPAC 6 Click ADL:  AMPAC Total Score: 19    Treatment & Education:  OT provided education in role of OT. Pt verbalized understanding and was agreeable to OT.  OT provided instruction in home safety with ADL's/IADL's including review of home set up and DME/AE. Pt verbalized understanding.  OT provided education in calling for assist. Pt verbalized understanding.    Patient left HOB elevated with all lines intact, call button in reach, and bed alarm on    GOALS:   Multidisciplinary Problems       Occupational Therapy Goals          Problem: Occupational Therapy    Goal Priority Disciplines Outcome Interventions   Occupational Therapy Goal     OT, PT/OT     Description: Goals to be met by: 1/2/24     Patient will increase functional independence with ADLs by performing:    UE Dressing with [place].  LE Dressing with Modified [place].  Grooming while standing at sink with Modified [place].  Toileting from toilet with Modified [place] for hygiene and clothing management.    Bathing from  shower chair/bench with Modified [place].  Toilet transfer to toilet with Modified [place].  Increased strength and functional activity tolerance for ADL's/IADL's                         History:     Past Medical History:   Diagnosis Date    COPD (chronic obstructive pulmonary disease)     Diabetes mellitus, type 2     Hypertension          Past Surgical History:   Procedure Laterality Date    DENTAL SURGERY      ESOPHAGOGASTRODUODENOSCOPY N/A 6/1/2020    Procedure: EGD (ESOPHAGOGASTRODUODENOSCOPY);  Surgeon: Terrell May MD;  Location: [facility];  Service: Endoscopy;  Laterality: N/A;    HERNIA REPAIR      left inguinal    incision and drainiage         Time Tracking:     OT Date of Treatment: 12/05/23  OT Start Time: 0945  OT Stop Time: 1003  OT Total Time (min): 18 min    Billable Minutes:Evaluation 8  Self Care/Home Management 10    12/5/2023

## 2023-12-05 NOTE — PT/OT/SLP PROGRESS
Physical Therapy Treatment    Patient Name:  Hernan Badillo   MRN:  [id number]    Recommendations:     Discharge Recommendations: Low Intensity Therapy (OP PT)  Discharge Equipment Recommendations: none  Barriers to discharge: None    Assessment:     Hernan Badillo is a 58 y.o. male admitted with a medical diagnosis of Stroke.  He presents with the following impairments/functional limitations: weakness, impaired endurance, impaired self care skills, impaired functional mobility, gait instability, decreased lower extremity function, impaired cardiopulmonary response to activity . Supine in bed , easily awakened. Agreed to mobilize with encouragement.  Transferred EOB with CGA.  Sit > stand with rw and CGA.  Ambulated 125' with rw and CGA, 125' no assistive device ( declined further use) with Min A.  Returned to room to sit EOB , step transfer bed >chair with CGA.     Rehab Prognosis: Fair; patient would benefit from acute skilled PT services to address these deficits and reach maximum level of function.    Recent Surgery: * No surgery found *      Plan:     During this hospitalization, patient to be seen daily to address the identified rehab impairments via gait training, therapeutic activities, therapeutic exercises, neuromuscular re-education and progress toward the following goals:    Plan of Care Expires:  12/30/23    Subjective     Chief Complaint: none stated  Patient/Family Comments/goals: to return home soon.  Pain/Comfort:  Pain Rating 1: 0/10      Objective:     Communicated with nurse Dunaway prior to session.  Patient found supine with bed alarm, telemetry, oxygen upon PT entry to room.     General Precautions: Standard, fall  Orthopedic Precautions: N/A  Braces: N/A  Respiratory Status: Nasal cannula, flow 2 L/min     Functional Mobility:  Bed Mobility:     Rolling Left:  contact guard assistance  Supine to Sit: contact guard assistance  Transfers:     Sit to Stand:  contact guard assistance with rolling walker  Gait:  125' with rw and CGA, 125' without assistive device with Min A with O2 attached.       AM-PAC 6 CLICK MOBILITY          Treatment & Education:  Ambulated with assistance for safety.     Patient left up in chair with all lines intact, call button in reach, chair alarm on, and nurse Emelyn present..    GOALS:   Multidisciplinary Problems       Physical Therapy Goals          Problem: Physical Therapy    Goal Priority Disciplines Outcome Goal Variances Interventions   Physical Therapy Goal     PT, PT/OT Ongoing, Progressing     Description: Goals to be met by: 2023     Patient will increase functional independence with mobility by performin. Supine to sit with Contact Guard Assistance  2. Sit to stand transfer with Contact Guard Assistance  3. Bed to chair transfer with Contact Guard Assistance using Rolling Walker  4. Gait  x 250 feet with Contact Guard Assistance using Rolling Walker.   5. Lower extremity exercise program x20 reps                        Time Tracking:     PT Received On: 23  PT Start Time: 1120     PT Stop Time: 1137  PT Total Time (min): 17 min     Billable Minutes: Gait Training 17min    Treatment Type: Treatment  PT/PTA: PTA     Number of PTA visits since last PT visit: 1     2023

## 2023-12-05 NOTE — CARE UPDATE
12/05/23 0700   Patient Assessment/Suction   Level of Consciousness (AVPU) alert   Respiratory Effort Normal;Unlabored   Expansion/Accessory Muscles/Retractions no use of accessory muscles   All Lung Fields Breath Sounds diminished;rhonchi   Rhythm/Pattern, Respiratory unlabored;pattern regular;depth regular   Cough Frequency no cough   PRE-TX-O2   Device (Oxygen Therapy) room air   SpO2 (!) 94 %   Pulse Oximetry Type Intermittent   $ Pulse Oximetry - Multiple Charge Pulse Oximetry - Multiple   Pulse 71   Resp 18   Aerosol Therapy   $ Aerosol Therapy Charges Aerosol Treatment   Respiratory Treatment Status (SVN) given   Treatment Route (SVN) mask   Patient Position (SVN) HOB elevated   Signs of Intolerance (SVN) none   Breath Sounds Post-Respiratory Treatment   Throughout All Fields Post-Treatment All Fields   Throughout All Fields Post-Treatment aeration increased   Post-treatment Heart Rate (beats/min) 74   Post-treatment Resp Rate (breaths/min) 18

## 2023-12-05 NOTE — DISCHARGE SUMMARY
[facility] Medicine  Discharge Summary      Patient Name: Hernan Badillo  MRN: [id number]  GEOVANNI: [id number]  Patient Class: IP- Inpatient  Admission Date: 12/3/2023  Hospital Length of Stay: 1 days  Discharge Date and Time: 12/5/2023  3:08 PM  Attending Physician: No att. providers found   Discharging Provider: Christine Oliveira NP  Primary Care Provider: Toan Banks DO    Primary Care Team: Networked reference to record PCT     HPI:   Hernan Badillo is a 59 y/o M with past medical history significant for stroke, HTN, DM2 and tobacco dependency who presented to the emergency department for further evaluation of left-sided facial numbness and left lower extremity numbness which began about 2 weeks ago and became worse over the past 2 days.  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  He reports that his chronic weakness on the right has also become worse over the last couple of months.  He thinks he may have had a new stroke.  In addition, he reports increased shortness of breath, coughing, and wheezing over the past several days.  Family member at bedside states that patient still smokes and, in fact, he is asking during our interview if he can go outside to smoke.  A CTA head and neck was performed while pt was in ED which identified severe stenosis of there L internal carotid artery and moderate stenosis of the R internal carotid artery.  He was also given bronchodilators and IV steroids for COPD exacerbation.  CXR without acute findings.  He is placed in observation under the service of hospital medicine for continued workup and treatment.        * No surgery found *      Hospital Course:   Pt was monitored closely during his stay.  He was treated for COPD exacerbation.  He required supplemental oxygen.  He was evaluated by Neurology.  He underwent MRI which was negative for any acute ischemic  infarct.  They recommended 3 weeks of dual antiplatelet therapy followed by aspirin monotherapy.  PT/OT was consulted to evaluate.  Patient was evaluated for home oxygen and which she qualified for.  Referral sent to pulmonology.  Patient educated on importance of smoking sensation is he reports he smokes 3 packs per day.  Nicotine patch is ordered.  Patient requested glucometer which was sent to his pharmacy.  Patient is medically stable for discharge with close outpatient follow-up.     Goals of Care Treatment Preferences:  Code Status: Full Code      Consults:   Consults (From admission, onward)          Status Ordering Provider     Inpatient consult to Neurology  Once        Provider:  Dipika Rodriguez MD    Completed ELAINE DOLAN     Inpatient consult to Registered Dietitian/Nutritionist  Once        Provider:  (Not yet assigned)    Completed ELAINE DOLAN     IP consult to case management/social work  Once        Provider:  (Not yet assigned)    Completed ELAINE DOLAN            No new Assessment & Plan notes have been filed under this hospital service since the last note was generated.  Service: Hospital Medicine    Final Active Diagnoses:    Diagnosis Date Noted POA    PRINCIPAL PROBLEM:  Stroke [I63.9] 12/03/2023 Yes    COPD exacerbation [J44.1] 11/26/2022 Yes    Tobacco dependency [F17.200] 05/30/2020 Yes    Primary hypertension [I10] 03/24/2014 Yes    DM (diabetes mellitus), type 2 [E11.9] 01/22/2013 Yes      Problems Resolved During this Admission:       Discharged Condition: stable    Disposition: Home or Self Care    Follow Up:   Follow-up Information       Toan Banks DO. Go on 12/8/2023.    Specialties: Family Medicine, Hospitalist  Why: at 9:00am  Contact information:  [address] [address]  [phone]               Angie Alcantar DNP Follow up on 12/26/2023.    Specialty: Neurology  Why: at 1:30pm  Contact information:  [address]  [place]  "[place] [address]  [phone]               Alvero, Leesa E., NP. Go on 12/11/2023.    Specialty: Gastroenterology  Why: at 9:00am  Contact information:  [address]  [address]  [place] [address]  [phone]               Christine Mead MD Follow up on 12/7/2023.    Specialties: Pulmonary Disease, Critical Care Medicine  Why: at 8:30am with Erin Griffin NP  Contact information:  [address]  [address]  [place] [address]  [phone]               Dme, [facility] Follow up.    Specialty: DME Provider  Why: DME- home oxygen with portable tank  Contact information:  [address]  [facility] [address]  [phone]                           Patient Instructions:      DIABETIC INSULIN SUPPLIES FOR HOME USE     Order Specific Question Answer Comments   Height: 5' 9" (1.753 m)    Weight: 65.5 kg (144 lb 6.4 oz)    Does patient have medical equipment at home? glucometer    Length of need (1-99 months): 99    Is the Patient insulin dependent? No    How many times each day does your Patient test his or her glucose level? QID    Do you follow the Patient at least every 6 months for Management of Diabetes? Yes    Home blood glucose monitor? Yes    [facility] : Select 1 1    Lancing device? Yes    Blood glucose strips? Yes    [facility] [place] : How many test strips needed? 300    Lancets? Yes    Alcohol wipes? Yes    Vendor: [facility]    CRM Resolution Date: 12/5/2023      OXYGEN FOR HOME USE     Order Specific Question Answer Comments   Liter Flow 2    Duration Continuous    Qualifying Test Performed at: Activity    Oxygen saturation at rest 91    Oxygen saturation with activity 86    Oxygen saturation with activity on oxygen 93    Portable mode: continuous    Route nasal cannula    Device: home concentrator with portable tanks    Length of need (in months): 3 mos    Patient condition with qualifying saturation COPD    Height: 5' 9" (1.753 m)    Weight: 65.5 kg (144 lb 6.4 oz)    Alternative treatment measures " have been tried or considered and deemed clinically ineffective. Yes    Vendor: [facility]    CRM Resolution Date: 12/5/2023      Ambulatory referral/consult to Smoking Cessation Program   Standing Status: Future   Referral Priority: Routine Referral Type: Consultation   Referral Reason: Specialty Services Required   Requested Specialty: CTTS   Number of Visits Requested: 1     Ambulatory referral/consult to Physical/Occupational Therapy   Standing Status: Future   Referral Priority: Routine Referral Type: Physical Medicine   Referral Reason: Specialty Services Required   Number of Visits Requested: 1     Ambulatory referral/consult to Gastroenterology   Standing Status: Future   Referral Priority: Routine Referral Type: Consultation   Referral Reason: Specialty Services Required   Requested Specialty: Gastroenterology   Number of Visits Requested: 1     Ambulatory referral/consult to ENT   Standing Status: Future   Referral Priority: Routine Referral Type: Consultation   Referral Reason: Specialty Services Required   Requested Specialty: Otolaryngology   Number of Visits Requested: 1     Ambulatory referral/consult to Pulmonology   Standing Status: Future   Referral Priority: Routine Referral Type: Consultation   Referral Reason: Specialty Services Required   Requested Specialty: Pulmonary Disease   Number of Visits Requested: 1     Diet Cardiac     Diet diabetic     Activity as tolerated       Significant Diagnostic Studies: Labs: CMP   Recent Labs   Lab 12/03/23 1741 12/04/23 0423 12/05/23  0509    136 142   K 3.5 4.2 3.6    102 105   CO2 27 24 27   * 195* 123*   BUN 9 7 12   CREATININE 0.8 0.7 0.7   CALCIUM 8.9 9.3 9.0   PROT 6.9 7.3 6.5   ALBUMIN 3.1* 3.2* 2.9*   BILITOT 1.1* 0.8 0.5   ALKPHOS 56 61 49*   AST 10 9* 8*   ALT 5* 6* 7*   ANIONGAP 9 10 10    and CBC   Recent Labs   Lab 12/03/23 1741 12/04/23 0423 12/05/23  0509   WBC 5.23 5.77 6.88   HGB 11.2* 12.2* 11.0*   HCT 35.1* 38.4*  34.7*    193 197         Pending Diagnostic Studies:       None           Medications:  Reconciled Home Medications:      Medication List        START taking these medications      aspirin 81 MG EC tablet  Commonly known as: ECOTRIN  Take 1 tablet (81 mg total) by mouth once daily.     azithromycin 500 MG tablet  Commonly known as: ZITHROMAX  Take 1 tablet (500 mg total) by mouth once daily. for 3 days     blood sugar diagnostic, disc Strp  1 each by Misc.(Non-Drug; Combo Route) route 4 (four) times daily.     blood-glucose meter kit  Use as instructed     clopidogreL 75 mg tablet  Commonly known as: PLAVIX  Take 1 tablet (75 mg total) by mouth once daily. for 21 days     lancets Misc  Commonly known as: LANCETS,THIN  1 each by Misc.(Non-Drug; Combo Route) route 4 (four) times daily.     nicotine 21 mg/24 hr  Commonly known as: NICODERM CQ  Place 1 patch onto the skin once daily.            CONTINUE taking these medications      acetaminophen 325 MG tablet  Commonly known as: TYLENOL  Take 650 mg by mouth 3 (three) times daily as needed.     * albuterol 90 mcg/actuation inhaler  Commonly known as: PROVENTIL/VENTOLIN HFA  Inhale 1-2 puffs into the lungs every 6 (six) hours as needed. Rescue     * albuterol 90 mcg/actuation inhaler  Commonly known as: PROVENTIL HFA  Inhale 2 puffs into the lungs every 6 (six) hours as needed for Wheezing. Rescue     albuterol-ipratropium 2.5 mg-0.5 mg/3 mL nebulizer solution  Commonly known as: DUO-NEB  Take 3 mLs by nebulization every 6 (six) hours as needed for Wheezing or Shortness of Breath.     amLODIPine 10 MG tablet  Commonly known as: NORVASC  Take 1 tablet (10 mg total) by mouth once daily.     atorvastatin 20 MG tablet  Commonly known as: LIPITOR  Take 1 tablet (20 mg total) by mouth once daily.     baclofen 10 MG tablet  Commonly known as: LIORESAL  Take 1 tablet (10 mg total) by mouth 2 (two) times daily.     EScitalopram oxalate 10 MG tablet  Commonly known as:  LEXAPRO  Take 10 mg by mouth.     fluticasone-salmeterol 500-50 mcg/dose 500-50 mcg/dose Dsdv diskus inhaler  Commonly known as: ADVAIR DISKUS  Inhale 1 puff into the lungs 2 (two) times daily. Controller     lisinopriL 10 MG tablet  Take 1 tablet (10 mg total) by mouth once daily.     meclizine 25 mg tablet  Commonly known as: ANTIVERT  Take 25 mg by mouth.     melatonin 5 mg  Commonly known as: MELATIN  Take 5 mg by mouth.     metFORMIN 500 MG ER 24hr tablet  Commonly known as: GLUCOPHAGE-XR  Take 1 tablet (500 mg total) by mouth daily with breakfast.     pantoprazole 40 MG tablet  Commonly known as: PROTONIX  Take 1 tablet (40 mg total) by mouth once daily.     pregabalin 100 MG capsule  Commonly known as: LYRICA  Take 1 capsule (100 mg total) by mouth 2 (two) times daily.           * This list has 2 medication(s) that are the same as other medications prescribed for you. Read the directions carefully, and ask your doctor or other care provider to review them with you.                  Indwelling Lines/Drains at time of discharge:   Lines/Drains/Airways       None                   Time spent on the discharge of patient: 50 minutes         Christine Oliveira NP  Department of Hospital Medicine  [facility]/Surg

## 2023-12-05 NOTE — CARE UPDATE
12/04/23 1932 12/04/23 1939 12/04/23 1944   Patient Assessment/Suction   Level of Consciousness (AVPU) alert  --   --    Respiratory Effort Unlabored;Normal  --   --    Expansion/Accessory Muscles/Retractions no use of accessory muscles;no retractions  --   --    All Lung Fields Breath Sounds coarse  --   --    Rhythm/Pattern, Respiratory pattern regular;depth regular  --   --    Cough Frequency no cough  --   --    Cough Type congested  --   --    Suction Method oral  --   --    Secretions Amount moderate  --   --    Secretions Color clear;white  --   --    Secretions Characteristics thick  --   --    PRE-TX-O2   Device (Oxygen Therapy) nasal cannula  --   --    $ Is the patient on Low Flow Oxygen? Yes  --   --    Flow (L/min) 2  --   --    SpO2 96 % 99 % 100 %   Pulse Oximetry Type Intermittent  --   --    $ Pulse Oximetry - Multiple Charge Pulse Oximetry - Multiple  --   --    Pulse 79 78 73   Resp 18 20 20   Aerosol Therapy   $ Aerosol Therapy Charges Aerosol Treatment Aerosol Treatment Aerosol Treatment   Respiratory Treatment Status (SVN) given given given   Treatment Route (SVN) mask mask mask   Patient Position (SVN) HOB elevated HOB elevated HOB elevated   Signs of Intolerance (SVN) none none none   Breath Sounds Post-Respiratory Treatment   Throughout All Fields Post-Treatment  --   --  All Fields   Throughout All Fields Post-Treatment  --   --  aeration increased   Post-treatment Heart Rate (beats/min)  --   --  73   Post-treatment Resp Rate (breaths/min)  --   --  19

## 2023-12-05 NOTE — PLAN OF CARE
Patient cleared for discharge from case management standpoint.    Ok to pull 1 E-tank set up from [facility] DME closet per Eliu with [facility].  DARIAN delivered portable tank to patient's hospital room. Patient signed delivery ticket and rental agreement.  DARIAN scheduled hospital follow up and placed on AVS.       12/05/23 1331   Final Note   Assessment Type Final Discharge Note   Anticipated Discharge Disposition Home   What phone number can be called within the next 1-3 days to see how you are doing after discharge? [phone]   Hospital Resources/Appts/Education Provided Provided patient/caregiver with written discharge plan information;Provided education on problems/symptoms using teachback;Appointments scheduled and added to AVS;Post-Acute resouces added to AVS   Post-Acute Status   Post-Acute Authorization HME   E Status Set-up Complete/Auth obtained   Discharge Delays None known at this time

## 2023-12-05 NOTE — PLAN OF CARE
Goals to be met by: 1/2/24     Patient will increase functional independence with ADLs by performing:    UE Dressing with [place].  LE Dressing with Modified [place].  Grooming while standing at sink with Modified [place].  Toileting from toilet with Modified [place] for hygiene and clothing management.   Bathing from  shower chair/bench with Modified [place].  Toilet transfer to toilet with Modified [place].  Increased strength and functional activity tolerance for ADL's/IADL's

## 2023-12-05 NOTE — PLAN OF CARE
Patient cleared for discharge from case management standpoint.    SW scheduled hospital follow ups and placed on AVS.       12/05/23 0918   Final Note   Assessment Type Final Discharge Note   Anticipated Discharge Disposition Home   What phone number can be called within the next 1-3 days to see how you are doing after discharge? [phone]   Hospital Resources/Appts/Education Provided Provided patient/caregiver with written discharge plan information;Provided education on problems/symptoms using teachback;Appointments scheduled and added to AVS   Post-Acute Status   Post-Acute Authorization Other   Other Status No Post-Acute Service Needs   Discharge Delays None known at this time

## 2023-12-05 NOTE — PROGRESS NOTES
[facility]/Surg  Neurology  Progress Note    Patient Name: Hernan Badillo  MRN: [id number]  Admission Date: 12/3/2023  Hospital Length of Stay: 1 days  Code Status: Full Code   Attending Provider: Evens Richter MD   Consulting Provider: Angie Alcantar DNP  Primary Care Physician: Toan Banks DO  Principal Problem:Stroke    Consults  Subjective:     Chief Complaint:  headache, left facial numbness x 2 days, LLE numbness     HPI: as per EMR:  Hernan Badillo is a 57 y/o M with past medical history significant for stroke, HTN, DM2 and tobacco dependency who presented to the emergency department for further evaluation of left-sided facial numbness and left lower extremity numbness which began about 2 weeks ago and became worse over the past 2 days.  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  He reports that his chronic weakness on the right has also become worse over the last couple of months.  He thinks he may have had a new stroke.  In addition, he reports increased shortness of breath, coughing, and wheezing over the past several days.  Family member at bedside states that patient still smokes and, in fact, he is asking during our interview if he can go outside to smoke.  A CTA head and neck was performed while pt was in ED which identified severe stenosis of there L internal carotid artery and moderate stenosis of the R internal carotid artery.  He was also given bronchodilators and IV steroids for COPD exacerbation.  CXR without acute findings.  He is placed in observation under the service of hospital medicine for continued workup and treatment.     NEUROLOGY CONSULT NOTE: Patient seen and examined, POC reviewed with Dr Rodriguez. Patient sitting up on side of bed with significant other at bedside. Patient reports worsening RUE and RLE weakness and numbness and new onset (starting 2 weeks ago) numbness to LUE  and LLE. Patient reports h/o stroke November 2022 and reports he was not taking aspirin at home as prescribed.     12/5: Patient seen and examined, POC reviewed with Dr Rodriguez. No acute events reported overnight. Neuro exam stable.     Past Medical History:   Diagnosis Date    COPD (chronic obstructive pulmonary disease)     Diabetes mellitus, type 2     Hypertension        Past Surgical History:   Procedure Laterality Date    DENTAL SURGERY      ESOPHAGOGASTRODUODENOSCOPY N/A 6/1/2020    Procedure: EGD (ESOPHAGOGASTRODUODENOSCOPY);  Surgeon: Terrell May MD;  Location: [facility];  Service: Endoscopy;  Laterality: N/A;    HERNIA REPAIR      left inguinal    incision and drainiage         Review of patient's allergies indicates:  No Known Allergies    Current Neurological Medications: aspirin, atorvastatin, baclofen, pregabalin, clopidogrel     No current facility-administered medications on file prior to encounter.     Current Outpatient Medications on File Prior to Encounter   Medication Sig    albuterol (PROVENTIL HFA) 90 mcg/actuation inhaler Inhale 2 puffs into the lungs every 6 (six) hours as needed for Wheezing. Rescue    atorvastatin (LIPITOR) 20 MG tablet Take 1 tablet (20 mg total) by mouth once daily.    baclofen (LIORESAL) 10 MG tablet Take 1 tablet (10 mg total) by mouth 2 (two) times daily.    EScitalopram oxalate (LEXAPRO) 10 MG tablet Take 10 mg by mouth.    metFORMIN (GLUCOPHAGE-XR) 500 MG ER 24hr tablet Take 1 tablet (500 mg total) by mouth daily with breakfast.    pregabalin (LYRICA) 100 MG capsule Take 1 capsule (100 mg total) by mouth 2 (two) times daily.    acetaminophen (TYLENOL) 325 MG tablet Take 650 mg by mouth 3 (three) times daily as needed.    albuterol (PROVENTIL/VENTOLIN HFA) 90 mcg/actuation inhaler Inhale 1-2 puffs into the lungs every 6 (six) hours as needed. Rescue    albuterol-ipratropium (DUO-NEB) 2.5 mg-0.5 mg/3 mL nebulizer solution Take 3 mLs by nebulization every  6 (six) hours as needed for Wheezing or Shortness of Breath.    amLODIPine (NORVASC) 10 MG tablet Take 1 tablet (10 mg total) by mouth once daily.    fluticasone-salmeterol diskus inhaler 500-50 mcg Inhale 1 puff into the lungs 2 (two) times daily. Controller    lisinopriL 10 MG tablet Take 1 tablet (10 mg total) by mouth once daily.    meclizine (ANTIVERT) 25 mg tablet Take 25 mg by mouth.    melatonin (MELATIN) 5 mg Take 5 mg by mouth.    pantoprazole (PROTONIX) 40 MG tablet Take 1 tablet (40 mg total) by mouth once daily.      Family History       Problem Relation (Age of Onset)    Cancer Maternal Grandmother    Drug abuse Father    Heart attack Father (80)    Heart disease Father          Tobacco Use    Smoking status: Every Day     Current packs/day: 2.00     Average packs/day: 2.0 packs/day for 30.0 years (60.0 ttl pk-yrs)     Types: Cigarettes    Smokeless tobacco: Never   Substance and Sexual Activity    Alcohol use: Yes     Alcohol/week: 1.0 standard drink of alcohol     Types: 1 Shots of liquor per week     Comment: 1/2 pint 2 x per week - quit approximately 5 months ago    Drug use: No    Sexual activity: Yes     Partners: Female     Birth control/protection: None     Comment: No history of STDs.     Review of Systems   Constitutional:  Positive for activity change. Negative for fatigue and fever.   HENT: Negative.     Eyes: Negative.    Respiratory: Negative.     Cardiovascular: Negative.    Neurological:  Positive for weakness and numbness. Negative for dizziness, facial asymmetry, speech difficulty and headaches.     Objective:     Vital Signs (Most Recent):  Temp: 97.9 °F (36.6 °C) (12/05/23 0854)  Pulse: 77 (12/05/23 0854)  Resp: 20 (12/05/23 0854)  BP: (!) 156/89 (12/05/23 0854)  SpO2: (!) 93 % (12/05/23 0854) Vital Signs (24h Range):  Temp:  [97.3 °F (36.3 °C)-97.9 °F (36.6 °C)] 97.9 °F (36.6 °C)  Pulse:  [71-93] 77  Resp:  [18-20] 20  SpO2:  [91 %-100 %] 93 %  BP: (135-167)/(77-91) 156/89      Weight: 65.5 kg (144 lb 6.4 oz)  Body mass index is 21.32 kg/m².    Physical Exam  Vitals and nursing note reviewed.   HENT:      Head: Normocephalic and atraumatic.      Nose: Nose normal.      Mouth/Throat:      Mouth: Mucous membranes are moist.      Pharynx: Oropharynx is clear.   Eyes:      General: Scleral icterus present.      Extraocular Movements: EOM normal.      Pupils: Pupils are equal, round, and reactive to light.   Cardiovascular:      Rate and Rhythm: Normal rate.   Pulmonary:      Effort: Pulmonary effort is normal.   Musculoskeletal:         General: Swelling and tenderness present.      Cervical back: Normal range of motion.   Skin:     General: Skin is warm and dry.   Neurological:      Mental Status: He is alert and oriented to person, place, and time.      Coordination: Finger-Nose-Finger Test normal.   Psychiatric:         Speech: Speech normal.         NEUROLOGICAL EXAMINATION:     MENTAL STATUS   Oriented to person, place, and time.   Follows 1 step commands.   Attention: normal.   Speech: speech is normal   Level of consciousness: drowsy ,  arousable by verbal stimuli  Knowledge: good.   Able to name object. Able to repeat. Normal comprehension.     CRANIAL NERVES     CN II   Right visual field deficit: none  Left visual field deficit: none     CN III, IV, VI   Pupils are equal, round, and reactive to light.  Extraocular motions are normal.   Right pupil: Size: 4 mm. Reactivity: brisk.   Left pupil: Size: 4 mm. Reactivity: brisk.     CN V   Facial sensation intact.     CN VII   Facial expression full, symmetric.     CN VIII   CN VIII normal.   Hearing: intact    CN XII   Tongue deviation: none    MOTOR EXAM   Right arm tone: increased  Left arm tone: normal  Right arm pronator drift: absent  Right leg tone: increased  Left leg tone: normal       MS 4/5 RUE, LUE and LLE  3+/5 RUE     REFLEXES     Reflexes   Right : 1+  Left : 2+    SENSORY EXAM   Right arm light touch: decreased  from elbow  Left arm light touch: decreased from elbow  Right leg light touch: decreased from knee  Left leg light touch: decreased from knee    GAIT AND COORDINATION     Gait  Gait: (not tested)     Coordination   Finger to nose coordination: normal    Tremor   Resting tremor: absent    Current NIH Stroke Score Values      Flowsheet Row Most Recent Value   Interval baseline  [Handoff Hyma and Pina]   1a. Level of Consciousness 0-->Alert, keenly responsive   1b. LOC Questions 0-->Answers both questions correctly   1c. LOC Commands 0-->Performs both tasks correctly   2. Best Gaze 0-->Normal   3. Visual 0-->No visual loss   4. Facial Palsy 1-->Minor paralysis (flattened nasolabial fold, asymmetry on smiling)   5a. Motor Arm, Left 0-->No drift, limb holds 90 (or 45) degrees for full 10 secs   5b. Motor Arm, Right 0-->No drift, limb holds 90 (or 45) degrees for full 10 secs   6a. Motor Leg, Left 0-->No drift, leg holds 30 degree position for full 5 secs   6b. Motor Leg, Right 2-->Some effort against gravity, leg falls to bed by 5 secs, but has some effort against gravity   7. Limb Ataxia 1-->Present in one limb   8. Sensory 1-->Mild-to-moderate sensory loss, patient feels pinprick is less sharp or is dull on the affected side, or there is a loss of superficial pain with pinprick, but patient is aware of being touched   9. Best Language 0-->No aphasia, normal   10. Dysarthria 0-->Normal   11. Extinction and Inattention (formerly Neglect) 0-->No abnormality   Total (NIH Stroke Scale) 5            Significant Labs: Hemoglobin A1c:   Recent Labs   Lab 12/04/23 0423   HGBA1C 6.5*     BMP:   Recent Labs   Lab 12/03/23 1741 12/04/23 0423 12/05/23  0509   * 195* 123*    136 142   K 3.5 4.2 3.6    102 105   CO2 27 24 27   BUN 9 7 12   CREATININE 0.8 0.7 0.7   CALCIUM 8.9 9.3 9.0   MG  --  1.7  --        CBC:   Recent Labs   Lab 12/03/23 1741 12/04/23 0423 12/05/23  0509   WBC 5.23 5.77 6.88   HGB 11.2*  12.2* 11.0*   HCT 35.1* 38.4* 34.7*    193 197       CMP:   Recent Labs   Lab 12/03/23  1741 12/04/23  0423 12/05/23  0509   * 195* 123*    136 142   K 3.5 4.2 3.6    102 105   CO2 27 24 27   BUN 9 7 12   CREATININE 0.8 0.7 0.7   CALCIUM 8.9 9.3 9.0   MG  --  1.7  --    PROT 6.9 7.3 6.5   ALBUMIN 3.1* 3.2* 2.9*   BILITOT 1.1* 0.8 0.5   ALKPHOS 56 61 49*   AST 10 9* 8*   ALT 5* 6* 7*   ANIONGAP 9 10 10       All pertinent lab results from the past 24 hours have been reviewed.    Significant Imaging: I have reviewed all pertinent imaging results/findings within the past 24 hours.    TA Head and Neck (xpd)  Order: [id number]  Status: Final result       Visible to patient: No (inaccessible in Patient Portal)       Next appt: 12/22/2023 at 11:00 AM in Family Medicine (Toan Banks, )    0 Result Notes  Details    Reading Physician Reading Date Result Priority   Delvin Wilson MD  [phone] 12/4/2023 STAT     Narrative & Impression  EXAMINATION:  CTA HEAD AND NECK (XPD)     CLINICAL HISTORY:  Neuro deficit, acute, stroke suspected;Stroke/TIA, determine embolic source;     TECHNIQUE:  Non contrast low dose axial images were obtained through the head. CT angiogram was performed from the level of the ignacia to the top of the head following the IV administration of 75mL of Omnipaque 350.   Sagittal and coronal reconstructions and maximum intensity projection reconstructions were performed. Arterial stenosis percentages are based on NASCET measurement criteria.     COMPARISON:  CTA 04/28/2023. MRI/MRA 02/22/2023.     FINDINGS:  CTA HEAD:     Vasculature: Atherosclerotic changes with moderate intermittent focal stenosis of the cavernous ICAs bilaterally.The intracranial arterial vasculature is otherwise patent.  No high-grade stenosis or proximal large vessel occlusion.  No aneurysm or vascular malformation identified.     Variant anatomy: Developmentally diminutive left P1 PCA and prominent  left posterior communicating artery.  Azygous A2 FILOMENA.     Brain: There is a subtle subcentimeter low-density within the right frontal corona radiata which may be new from prior CT examinations as well as most recent MRI 02/22/2023 potentially representing an age indeterminate focus of small vessel ischemic change.  No acute intracranial hemorrhage, midline shift or mass effect, or space-occupying extra-axial fluid collection.  No CT evidence of an acute major vascular territory infarct.  Chronic infarcts redemonstrated involving the bilateral basal ganglia and central alf.  No abnormal intracranial enhancement after the administration of intravenous contrast.     Ventricles/Sulci: The ventricles and sulci are appropriate in size for age.        Osseous Structures: The osseous structures are unremarkable in appearance.     Sinuses and orbits: The visualized portions of the paranasal sinuses and orbits are unremarkable.     Other: Visualized portions of the mastoids are unremarkable.  Chronic scarring or potential complex cyst involving the right superior parietal scalp.     CTA NECK:     Aorta: Conventional branching pattern. The great vessel origins are patent without flow limiting stenosis.     Vertebral Arteries: The origins of the vertebral arteries are patent.  No flow limiting stenosis, occlusion, or dissection. The left vertebral artery is dominant.     Right Carotid: No flow limiting stenosis, occlusion, or dissection of the common carotid or internal carotid arteries.  Mild plaque of the proximal ICA.  Right internal carotid artery: 40-50 % stenosis by and NASCET.  The ICA is tortuous with medialization at the C2-C3 level.     Left Carotid: No flow limiting stenosis, occlusion, or dissection of the common carotid or internal carotid arteries. No significant plaque of the proximal ICA. Right internal carotid artery: 0 % stenosis by and NASCET.  The ICA is tortuous with medialization at the C2-C3 level.      Extravascular Anatomy: Emphysematous changes of the lung apices.     Impression:     1. Age indeterminate lacunar type infarct within the right frontal corona radiata which can be further assessed on currently ordered of the brain.  No hemorrhage or intracranial mass effect.  Chronic small vessel ischemic changes again noted most pronounced at the level the central alf and to a lesser extent the bilateral basal ganglia.  2. No intracranial proximal large vessel occlusion.  Moderate focal stenosis of the bilateral cavernous carotid arteries.  3. Less than 50% stenosis of the proximal cervical right ICA.  No other significant stenosis within the neck.  Tortuous ICAs.  No major discrepancies between the preliminary and final reports aside from mention of the potential age indeterminate small volume infarct within the right frontal corona radiata as discussed above.   US Carotid Bilateral  Order: [id number]  Status: Final result       Visible to patient: No (inaccessible in Patient Portal)       Next appt: 12/22/2023 at 11:00 AM in Family Medicine (Toan Banks DO)    0 Result Notes  Details    Reading Physician Reading Date Result Priority   Alicia Martin MD  [phone]  [phone] 12/4/2023 Routine     Narrative & Impression  EXAMINATION:  US CAROTID BILATERAL     CLINICAL HISTORY:  Velocities evaluation;     TECHNIQUE:  Grayscale and color Doppler ultrasound examination of the carotid and vertebral artery systems bilaterally.  Stenosis estimates are per the NASCET measurement criteria.     COMPARISON:  None.     FINDINGS:  Right:     Internal Carotid Artery (ICA) peak systolic velocity 27.4 cm/sec     ICA/CCA peak systolic velocity ratio: 0.5     Plaque formation: Mild     Vertebral artery: Antegrade flow and normal waveform.     Left:     Internal Carotid Artery (ICA)  peak systolic velocity 51.8 cm/sec     ICA/CCA peak systolic velocity ratio: 0.8     Plaque formation: Mild     Vertebral artery:  Antegrade flow and normal waveform.     Impression:     No evidence of a hemodynamically significant carotid bifurcation stenosis.        Electronically signed by: Alicia Martin MD  Date:                                            12/04/2023  Time:                                           09:56           Exam Ended: 12/04/23 08:15           Assessment and Plan:    Subacute CVA vs TIA    CT head wo contrast: age indeterminate lacunar infarct within right frontal corona radiata   CTA head and neck: right ICA stenosis < 50%; tortuous ICA  CUS: negative for LVO, stenosis, aneurysm  MRI brain wo contrast: negative for new stroke  ECHO: negative for shunt, LA normal, LVEF 60%   LDL: 84  Hgb A1c 6.5     Continue aspirin 81 mg daily and start plavix 75 mg daily for secondary stroke prevention  Continue with DAPT as stated above for 3 weeks and then continue with aspirin monotherapy  Lyrica 100 mg BID for neuropathic pain   Diet once clear by speech   PT/OT/ST eval and treat   Neuro checks per floor protocol  Recommend outpatient physical therapy  Tobacco cessation discussed as patient reports he smokes 3 ppd     Patient cleared to be discharged from a neurological standpoint.     [phone] 2 weeks from discharge.  Stroke education was provided including stroke risk factors modification and any acute neurological changes including weakness, confusion, visual changes to come straight to the ER.  Seizure educaation was provided including no driving, no swimming by self, no operation of heavy machinery or climbing on ladders.  All side effects of new medications were discussed with patient and/or next of kin and all questions were answered.       Active Diagnoses:    Diagnosis Date Noted POA    PRINCIPAL PROBLEM:  Stroke [I63.9] 12/03/2023 Yes    COPD exacerbation [J44.1] 11/26/2022 Yes    Tobacco dependency [F17.200] 05/30/2020 Yes    Primary hypertension [I10]  03/24/2014 Yes    DM (diabetes mellitus), type 2 [E11.9] 01/22/2013 Yes      Problems Resolved During this Admission:       VTE Risk Mitigation (From admission, onward)           Ordered     IP VTE LOW RISK PATIENT  Once         12/03/23 2135     Place sequential compression device  Until discontinued         12/03/23 2135                    Thank you for your consult.    Angie Alcantar DNP  Neurology  [facility]/Surg

## 2023-12-05 NOTE — PLAN OF CARE
Problem: Adult Inpatient Plan of Care  Goal: Plan of Care Review  Outcome: Ongoing, Progressing  Goal: Patient-Specific Goal (Individualized)  Outcome: Ongoing, Progressing  Goal: Absence of Hospital-Acquired Illness or Injury  Outcome: Ongoing, Progressing  Goal: Optimal Comfort and Wellbeing  Outcome: Ongoing, Progressing  Goal: Readiness for Transition of Care  Outcome: Ongoing, Progressing     Problem: Bowel Elimination Impaired (Stroke, Ischemic/Transient Ischemic Attack)  Goal: Effective Bowel Elimination  Outcome: Ongoing, Progressing     Problem: Adjustment to Illness (Stroke, Ischemic/Transient Ischemic Attack)  Goal: Optimal Coping  Outcome: Ongoing, Progressing     Problem: Cerebral Tissue Perfusion (Stroke, Ischemic/Transient Ischemic Attack)  Goal: Optimal Cerebral Tissue Perfusion  Outcome: Ongoing, Progressing     Problem: Fall Injury Risk  Goal: Absence of Fall and Fall-Related Injury  Outcome: Ongoing, Progressing   Plan of care reviewed with patient,verbalized understanding.  SR  on telemetry. HS bg 183, covered with ss insulin as per orders. Neuro checks as per orders.Remains free from falls, injury. Instructed to call for assistance as needed ,verbalized understanding. Bed in low & locked position. Call light in reach, bed alarm on .

## 2023-12-05 NOTE — NURSING
Discharge instructions provided to pt and family. Oxygen tank delivered to bedside, educated by respiratory. Potassium replaced. All belongings gathered. No complaints or requests at this time. IV and tele removed. Left floor via wheelchair for discharge home.

## 2023-12-05 NOTE — PLAN OF CARE
Problem: Physical Therapy  Goal: Physical Therapy Goal  Description: Goals to be met by: 2023     Patient will increase functional independence with mobility by performin. Supine to sit with Contact Guard Assistance  2. Sit to stand transfer with Contact Guard Assistance  3. Bed to chair transfer with Contact Guard Assistance using Rolling Walker  4. Gait  x 250 feet with Contact Guard Assistance using Rolling Walker.   5. Lower extremity exercise program x20 reps   Outcome: Ongoing, Progressing   Ambulate with assistance for safety.

## 2023-12-05 NOTE — PLAN OF CARE
Patient qualify for home oxygen with portable tank.  Per Eliu with [facility], orders need to be updated with 3 months for length of need.       12/05/23 1125   Discharge Reassessment   Assessment Type Discharge Planning Reassessment   Did the patient's condition or plan change since previous assessment? Yes   Discharge Plan discussed with: Patient   Communicated ZACARIAS with patient/caregiver Yes   Discharge Plan A Home   Discharge Plan B Home with family   DME Needed Upon Discharge  oxygen   Transition of Care Barriers None   Post-Acute Status   Post-Acute Authorization HME   Discharge Delays Orders Needed

## 2023-12-05 NOTE — CARE UPDATE
12/05/23 0800   Home Oxygen Qualification   $ Home O2 Qualification Pulmonary Stress Test/6 min walk;Tech time 15 minutes   Room Air SpO2 At Rest 91 %   Room Air SpO2 During Ambulation (!) 86 %   SpO2 During Ambulation on O2 93 %   Heart Rate on O2 90 bpm   Ambulation O2 LPM 2 LPM   SpO2 Post Ambulation 93 %   Post Ambulation Heart Rate 84 bpm   Post Ambulation O2 LPM 2 LPM   Home O2 Eval Comments Pt needs oxygen with ambulation

## 2023-12-05 NOTE — PLAN OF CARE
Per Joe with [facility], patient has $30.15 copay that need to be paid prior to delivery or portable tank.  Per joe, patient states he will pay once he has his debit card available.       12/05/23 1303   Post-Acute Status   Post-Acute Authorization HME   HME Status (!) Pending Delivery   Discharge Delays (!) Patient and Family Barriers

## 2023-12-06 ENCOUNTER — PATIENT OUTREACH (OUTPATIENT)
Dept: ADMINISTRATIVE | Facility: CLINIC | Age: 58
End: 2023-12-06
Payer: MEDICARE

## 2023-12-06 NOTE — PROGRESS NOTES
C3 nurse attempted to contact Hernan Badillo for a TCC post hospital discharge follow up call. No answer. Left voicemail with callback information. The patient has a scheduled HOSFU appointment with Toan Banks DO on 12/08/2023 @ 0900.

## 2023-12-07 NOTE — PROGRESS NOTES
C3 nurse attempted to contact Hernan Badillo for a TCC post hospital discharge follow up call. No answer. Voicemail box full, unable to leave a voicemail message. The patient has a scheduled HOSFU appointment with Toan Banks DO on 12/08/2023 @ 0900.

## 2023-12-08 ENCOUNTER — OFFICE VISIT (OUTPATIENT)
Dept: FAMILY MEDICINE | Facility: CLINIC | Age: 58
End: 2023-12-08
Payer: MEDICARE

## 2023-12-08 VITALS
HEART RATE: 80 BPM | OXYGEN SATURATION: 87 % | SYSTOLIC BLOOD PRESSURE: 124 MMHG | BODY MASS INDEX: 21.74 KG/M2 | WEIGHT: 146.81 LBS | DIASTOLIC BLOOD PRESSURE: 74 MMHG | TEMPERATURE: 98 F | RESPIRATION RATE: 14 BRPM | HEIGHT: 69 IN

## 2023-12-08 DIAGNOSIS — R26.89 IMBALANCE: ICD-10-CM

## 2023-12-08 DIAGNOSIS — G89.4 CHRONIC PAIN SYNDROME: ICD-10-CM

## 2023-12-08 DIAGNOSIS — R53.1 RIGHT SIDED WEAKNESS: ICD-10-CM

## 2023-12-08 DIAGNOSIS — R05.8 COUGH PRODUCTIVE OF PURULENT SPUTUM: ICD-10-CM

## 2023-12-08 DIAGNOSIS — Z09 FOLLOW-UP EXAM: Primary | ICD-10-CM

## 2023-12-08 PROCEDURE — 3008F PR BODY MASS INDEX (BMI) DOCUMENTED: ICD-10-PCS | Mod: CPTII,S$GLB,, | Performed by: STUDENT IN AN ORGANIZED HEALTH CARE EDUCATION/TRAINING PROGRAM

## 2023-12-08 PROCEDURE — 3074F PR MOST RECENT SYSTOLIC BLOOD PRESSURE < 130 MM HG: ICD-10-PCS | Mod: CPTII,S$GLB,, | Performed by: STUDENT IN AN ORGANIZED HEALTH CARE EDUCATION/TRAINING PROGRAM

## 2023-12-08 PROCEDURE — 3044F HG A1C LEVEL LT 7.0%: CPT | Mod: CPTII,S$GLB,, | Performed by: STUDENT IN AN ORGANIZED HEALTH CARE EDUCATION/TRAINING PROGRAM

## 2023-12-08 PROCEDURE — 3044F PR MOST RECENT HEMOGLOBIN A1C LEVEL <7.0%: ICD-10-PCS | Mod: CPTII,S$GLB,, | Performed by: STUDENT IN AN ORGANIZED HEALTH CARE EDUCATION/TRAINING PROGRAM

## 2023-12-08 PROCEDURE — 4010F PR ACE/ARB THEARPY RXD/TAKEN: ICD-10-PCS | Mod: CPTII,S$GLB,, | Performed by: STUDENT IN AN ORGANIZED HEALTH CARE EDUCATION/TRAINING PROGRAM

## 2023-12-08 PROCEDURE — 99214 OFFICE O/P EST MOD 30 MIN: CPT | Mod: S$GLB,,, | Performed by: STUDENT IN AN ORGANIZED HEALTH CARE EDUCATION/TRAINING PROGRAM

## 2023-12-08 PROCEDURE — 3074F SYST BP LT 130 MM HG: CPT | Mod: CPTII,S$GLB,, | Performed by: STUDENT IN AN ORGANIZED HEALTH CARE EDUCATION/TRAINING PROGRAM

## 2023-12-08 PROCEDURE — 4010F ACE/ARB THERAPY RXD/TAKEN: CPT | Mod: CPTII,S$GLB,, | Performed by: STUDENT IN AN ORGANIZED HEALTH CARE EDUCATION/TRAINING PROGRAM

## 2023-12-08 PROCEDURE — 3078F PR MOST RECENT DIASTOLIC BLOOD PRESSURE < 80 MM HG: ICD-10-PCS | Mod: CPTII,S$GLB,, | Performed by: STUDENT IN AN ORGANIZED HEALTH CARE EDUCATION/TRAINING PROGRAM

## 2023-12-08 PROCEDURE — 3078F DIAST BP <80 MM HG: CPT | Mod: CPTII,S$GLB,, | Performed by: STUDENT IN AN ORGANIZED HEALTH CARE EDUCATION/TRAINING PROGRAM

## 2023-12-08 PROCEDURE — 3008F BODY MASS INDEX DOCD: CPT | Mod: CPTII,S$GLB,, | Performed by: STUDENT IN AN ORGANIZED HEALTH CARE EDUCATION/TRAINING PROGRAM

## 2023-12-08 PROCEDURE — 99999 PR PBB SHADOW E&M-EST. PATIENT-LVL V: ICD-10-PCS | Mod: PBBFAC,,, | Performed by: STUDENT IN AN ORGANIZED HEALTH CARE EDUCATION/TRAINING PROGRAM

## 2023-12-08 PROCEDURE — 99999 PR PBB SHADOW E&M-EST. PATIENT-LVL V: CPT | Mod: PBBFAC,,, | Performed by: STUDENT IN AN ORGANIZED HEALTH CARE EDUCATION/TRAINING PROGRAM

## 2023-12-08 PROCEDURE — 99214 PR OFFICE/OUTPT VISIT, EST, LEVL IV, 30-39 MIN: ICD-10-PCS | Mod: S$GLB,,, | Performed by: STUDENT IN AN ORGANIZED HEALTH CARE EDUCATION/TRAINING PROGRAM

## 2023-12-08 RX ORDER — GUAIFENESIN 600 MG/1
1200 TABLET, EXTENDED RELEASE ORAL 2 TIMES DAILY
Qty: 40 TABLET | Refills: 0 | Status: SHIPPED | OUTPATIENT
Start: 2023-12-08 | End: 2023-12-18

## 2023-12-08 RX ORDER — TRAMADOL HYDROCHLORIDE 50 MG/1
50 TABLET ORAL EVERY 6 HOURS
Qty: 40 TABLET | Refills: 0 | Status: SHIPPED | OUTPATIENT
Start: 2023-12-08 | End: 2023-12-18

## 2023-12-08 NOTE — PROGRESS NOTES
C3 nurse spoke with Hernan Badillo for a TCC post hospital discharge follow up call. The patient has a scheduled HOS appointment with Toan Banks DO on 12/08/2023 @ 0900.

## 2023-12-08 NOTE — PROGRESS NOTES
SUBJECTIVE:    CHIEF COMPLAINT:   Chief Complaint   Patient presents with    Follow-up     Hosp f/u    Leg Pain     Left leg           274}    HISTORY OF PRESENT ILLNESS:  Hernan Badillo is a 58 y.o. male smoker with past medical history significant for CVA with residual right side weakness,  HTN, DM2 and tobacco dependency Here for hospital followup. He was recently seen in the ED on 12/5 for further evaluation of left-sided facial numbness and left lower extremity numbness which began 2 weeks prior.     He was noted to have <50% stenosis of TANIA . He was referred to Pulm, ENT (dysphagia), and PT/OT (gen weakness). He continues feel right LE numbness and tingling. He request medication to assist with chronic pain. Muscle relaxers have not helped, and cause paralysis. He reports that he has not been contacted by pain management or PT, Pulm departments.         PAST MEDICAL HISTORY:     274}  Past Medical History:   Diagnosis Date    COPD (chronic obstructive pulmonary disease)     Diabetes mellitus, type 2     Hypertension        PAST SURGICAL HISTORY:  Past Surgical History:   Procedure Laterality Date    DENTAL SURGERY      ESOPHAGOGASTRODUODENOSCOPY N/A 6/1/2020    Procedure: EGD (ESOPHAGOGASTRODUODENOSCOPY);  Surgeon: Terrell May MD;  Location: [facility];  Service: Endoscopy;  Laterality: N/A;    HERNIA REPAIR      left inguinal    incision and drainiage         SOCIAL HISTORY:  Social History     Socioeconomic History    Marital status: Single    Number of children: 1   Occupational History    Occupation: demolition   Tobacco Use    Smoking status: Every Day     Current packs/day: 2.00     Average packs/day: 2.0 packs/day for 30.0 years (60.0 ttl pk-yrs)     Types: Cigarettes    Smokeless tobacco: Never   Substance and Sexual Activity    Alcohol use: Yes     Alcohol/week: 1.0 standard drink of alcohol     Types: 1 Shots of liquor per week     Comment: 1/2 pint 2 x per week - quit approximately 5 months ago     Drug use: No    Sexual activity: Yes     Partners: Female     Birth control/protection: None     Comment: No history of STDs.   Social History Narrative    The patient does not exercise regularly ().    Rates diet as fair.    He is satisfied with weight.               FAMILY HISTORY:       Family History   Problem Relation Age of Onset    Heart attack Father 80    Heart disease Father     Drug abuse Father     Cancer Maternal Grandmother         colon       ALLERGIES AND MEDICATIONS: updated and reviewed.      274}  Review of patient's allergies indicates:  No Known Allergies  Medication List with Changes/Refills   New Medications    GUAIFENESIN (MUCINEX) 600 MG 12 HR TABLET    Take 2 tablets (1,200 mg total) by mouth 2 (two) times daily. for 10 days    TRAMADOL (ULTRAM) 50 MG TABLET    Take 1 tablet (50 mg total) by mouth every 6 (six) hours. For neuropathic pain for 10 days   Current Medications    ACETAMINOPHEN (TYLENOL) 325 MG TABLET    Take 650 mg by mouth 3 (three) times daily as needed.    ALBUTEROL (PROVENTIL HFA) 90 MCG/ACTUATION INHALER    Inhale 2 puffs into the lungs every 6 (six) hours as needed for Wheezing. Rescue    ALBUTEROL (PROVENTIL/VENTOLIN HFA) 90 MCG/ACTUATION INHALER    Inhale 1-2 puffs into the lungs every 6 (six) hours as needed. Rescue    ALBUTEROL-IPRATROPIUM (DUO-NEB) 2.5 MG-0.5 MG/3 ML NEBULIZER SOLUTION    Take 3 mLs by nebulization every 6 (six) hours as needed for Wheezing or Shortness of Breath.    AMLODIPINE (NORVASC) 10 MG TABLET    Take 1 tablet (10 mg total) by mouth once daily.    ASPIRIN (ECOTRIN) 81 MG EC TABLET    Take 1 tablet (81 mg total) by mouth once daily.    ATORVASTATIN (LIPITOR) 20 MG TABLET    Take 1 tablet (20 mg total) by mouth once daily.    BACLOFEN (LIORESAL) 10 MG TABLET    Take 1 tablet (10 mg total) by mouth 2 (two) times daily.    BLOOD SUGAR DIAGNOSTIC, DISC STRP    1 each by Misc.(Non-Drug; Combo Route) route 4 (four) times daily.    BLOOD-GLUCOSE  METER KIT    Use as instructed    CLOPIDOGREL (PLAVIX) 75 MG TABLET    Take 1 tablet (75 mg total) by mouth once daily. for 21 days    ESCITALOPRAM OXALATE (LEXAPRO) 10 MG TABLET    Take 10 mg by mouth.    FLUTICASONE-SALMETEROL DISKUS INHALER 500-50 MCG    Inhale 1 puff into the lungs 2 (two) times daily. Controller    LANCETS (LANCETS,THIN) MISC    1 each by Misc.(Non-Drug; Combo Route) route 4 (four) times daily.    LISINOPRIL 10 MG TABLET    Take 1 tablet (10 mg total) by mouth once daily.    MECLIZINE (ANTIVERT) 25 MG TABLET    Take 25 mg by mouth.    MELATONIN (MELATIN) 5 MG    Take 5 mg by mouth.    METFORMIN (GLUCOPHAGE-XR) 500 MG ER 24HR TABLET    Take 1 tablet (500 mg total) by mouth daily with breakfast.    NICOTINE (NICODERM CQ) 21 MG/24 HR    Place 1 patch onto the skin once daily.    PANTOPRAZOLE (PROTONIX) 40 MG TABLET    Take 1 tablet (40 mg total) by mouth once daily.    PREGABALIN (LYRICA) 100 MG CAPSULE    Take 1 capsule (100 mg total) by mouth 2 (two) times daily.   Discontinued Medications    AZITHROMYCIN (ZITHROMAX) 500 MG TABLET    Take 1 tablet (500 mg total) by mouth once daily. for 3 days       SCREENING HISTORY:    274}  Health Maintenance         Date Due Completion Date    Eye Exam Never done ---    TETANUS VACCINE Never done ---    Colorectal Cancer Screening Never done ---    Diabetes Urine Screening 03/24/2015 3/24/2014    Shingles Vaccine (1 of 2) Never done ---    Foot Exam 07/06/2017 7/6/2016    COVID-19 Vaccine (2 - 2023-24 season) 09/01/2023 5/26/2021    Hemoglobin A1c 06/04/2024 12/4/2023    LDCT Lung Screen 09/26/2024 9/26/2023    Lipid Panel 12/04/2024 12/4/2023    Pneumococcal Vaccines (Age 0-64) (3 - PPSV23 or PCV20) 10/15/2030 8/29/2016            REVIEW OF SYSTEMS:   Review of Systems   Constitutional:  Positive for fatigue. Negative for chills.   Cardiovascular:  Negative for chest pain and palpitations.   Gastrointestinal:  Negative for abdominal pain, diarrhea, nausea  "and vomiting.   Musculoskeletal:  Positive for back pain, leg pain, myalgias and joint deformity.   Neurological:  Positive for weakness and numbness.   All other systems reviewed and are negative.      PHYSICAL EXAM:      274}  /74 (BP Location: Left arm, Patient Position: Sitting, BP Method: Large (Manual))   Pulse 80   Temp 97.6 °F (36.4 °C) (Oral)   Resp 14   Ht 5' 9" (1.753 m)   Wt 66.6 kg (146 lb 13.2 oz)   SpO2 (!) 87%   BMI 21.68 kg/m²   Wt Readings from Last 3 Encounters:   12/08/23 66.6 kg (146 lb 13.2 oz)   12/04/23 65.5 kg (144 lb 6.4 oz)   11/09/23 65.8 kg (145 lb)     BP Readings from Last 3 Encounters:   12/08/23 124/74   12/05/23 (!) 166/76   11/09/23 118/74     Estimated body mass index is 21.68 kg/m² as calculated from the following:    Height as of this encounter: 5' 9" (1.753 m).    Weight as of this encounter: 66.6 kg (146 lb 13.2 oz).     Physical Exam  Constitutional:       Appearance: He is well-developed. He is ill-appearing.   HENT:      Nose: No congestion or rhinorrhea.   Eyes:      Conjunctiva/sclera: Conjunctivae normal.      Pupils: Pupils are equal, round, and reactive to light.   Cardiovascular:      Rate and Rhythm: Normal rate and regular rhythm.      Heart sounds: Normal heart sounds.   Pulmonary:      Effort: Pulmonary effort is normal.      Breath sounds: No wheezing, rhonchi or rales.   Abdominal:      General: There is no distension.      Tenderness: There is no abdominal tenderness.   Musculoskeletal:         General: Normal range of motion.   Neurological:      Mental Status: He is alert and oriented to person, place, and time.   Psychiatric:         Behavior: Behavior normal.         Thought Content: Thought content normal.         Judgment: Judgment normal.         LABS:   274}  I have reviewed old labs below:  Lab Results   Component Value Date    WBC 6.88 12/05/2023    HGB 11.0 (L) 12/05/2023    HCT 34.7 (L) 12/05/2023    MCV 92 12/05/2023     " 12/05/2023     12/05/2023    K 3.6 12/05/2023     12/05/2023    CALCIUM 9.0 12/05/2023    PHOS 2.6 (L) 12/04/2023    CO2 27 12/05/2023     (H) 12/05/2023    BUN 12 12/05/2023    CREATININE 0.7 12/05/2023    ANIONGAP 10 12/05/2023    ESTGFRAFRICA >60.0 07/26/2022    EGFRNONAA >60.0 07/26/2022    PROT 6.5 12/05/2023    ALBUMIN 2.9 (L) 12/05/2023    BILITOT 0.5 12/05/2023    ALKPHOS 49 (L) 12/05/2023    ALT 7 (L) 12/05/2023    AST 8 (L) 12/05/2023    INR 0.9 12/04/2023    CHOL 141 12/04/2023    TRIG 45 12/04/2023    HDL 48 12/04/2023    LDLCALC 84.0 12/04/2023    TSH 0.161 (L) 12/04/2023    HGBA1C 6.5 (H) 12/04/2023       ASSESSMENT AND PLAN:  274}  1. Follow-up exam  -     Ambulatory referral/consult to Home Health; Future; Expected date: 12/09/2023  -     Ambulatory referral/consult to Outpatient Case Management    2. Imbalance  -     Ambulatory referral/consult to Home Health; Future; Expected date: 12/09/2023  -     Ambulatory referral/consult to Outpatient Case Management    3. Right sided weakness  -     Ambulatory referral/consult to Home Health; Future; Expected date: 12/09/2023  -     Ambulatory referral/consult to Outpatient Case Management    4. Chronic pain syndrome  -     traMADoL (ULTRAM) 50 mg tablet; Take 1 tablet (50 mg total) by mouth every 6 (six) hours. For neuropathic pain for 10 days  Dispense: 40 tablet; Refill: 0  -     Ambulatory referral/consult to Neurology; Future; Expected date: 12/15/2023    5. Cough productive of purulent sputum  -     guaiFENesin (MUCINEX) 600 mg 12 hr tablet; Take 2 tablets (1,200 mg total) by mouth 2 (two) times daily. for 10 days  Dispense: 40 tablet; Refill: 0           Orders Placed This Encounter   Procedures    Ambulatory referral/consult to Home Health    Ambulatory referral/consult to Outpatient Case Management    Ambulatory referral/consult to Neurology       Follow up in about 4 weeks (around 1/5/2024) for 1mo f/u PT, Pulm and Neuro  referrals. or sooner as needed.      I spent a total of 47 minutes on the day of the visit.This includes face to face time and non-face to face time preparing to see the patient (eg, review of tests), obtaining and/or reviewing separately obtained history, documenting clinical information in the electronic or other health record, independently interpreting results and communicating results to the patient/family/caregiver, or care coordinator.

## 2023-12-09 ENCOUNTER — HOSPITAL ENCOUNTER (EMERGENCY)
Facility: HOSPITAL | Age: 58
Discharge: HOME | End: 2023-12-09
Attending: EMERGENCY MEDICINE
Payer: MEDICARE

## 2023-12-09 VITALS
HEIGHT: 69 IN | HEART RATE: 90 BPM | WEIGHT: 146 LBS | SYSTOLIC BLOOD PRESSURE: 120 MMHG | RESPIRATION RATE: 20 BRPM | OXYGEN SATURATION: 96 % | TEMPERATURE: 98 F | BODY MASS INDEX: 21.62 KG/M2 | DIASTOLIC BLOOD PRESSURE: 74 MMHG

## 2023-12-09 DIAGNOSIS — K59.00 CONSTIPATION: ICD-10-CM

## 2023-12-09 DIAGNOSIS — K59.00 CONSTIPATION, UNSPECIFIED CONSTIPATION TYPE: Primary | ICD-10-CM

## 2023-12-09 PROCEDURE — 99283 EMERGENCY DEPT VISIT LOW MDM: CPT

## 2023-12-09 PROCEDURE — 25000003 PHARM REV CODE 250: Performed by: EMERGENCY MEDICINE

## 2023-12-09 RX ORDER — SYRING-NEEDL,DISP,INSUL,0.3 ML 29 G X1/2"
296 SYRINGE, EMPTY DISPOSABLE MISCELLANEOUS ONCE
Qty: 296 ML | Refills: 0 | Status: SHIPPED | OUTPATIENT
Start: 2023-12-09 | End: 2023-12-09

## 2023-12-09 RX ADMIN — SODIUM PHOSPHATE 1 ENEMA: 7; 19 ENEMA RECTAL at 09:12

## 2023-12-10 NOTE — ED PROVIDER NOTES
Encounter Date: 12/9/2023       History     Chief Complaint   Patient presents with    Constipation     Pt states last normal BM was last week.      58-year-old male with a past medical history of COPD, diabetes mellitus, and hypertension presents with constipation.  He reports that his last normal bowel movement was approximately 1 week ago.  He has not taken anything over-the-counter for his symptoms.  He does report some associated rectal pain.  He denies any abdominal pain, nausea fever/chills, chest pain, shortness of breath.  There are alleviating or aggravating factors.  He does report a history of chronic constipation as well.      Review of patient's allergies indicates:  No Known Allergies  Past Medical History:   Diagnosis Date    COPD (chronic obstructive pulmonary disease)     Diabetes mellitus, type 2     Hypertension      Past Surgical History:   Procedure Laterality Date    DENTAL SURGERY      ESOPHAGOGASTRODUODENOSCOPY N/A 6/1/2020    Procedure: EGD (ESOPHAGOGASTRODUODENOSCOPY);  Surgeon: Terrell May MD;  Location[facility];  Service: Endoscopy;  Laterality: N/A;    HERNIA REPAIR      left inguinal    incision and drainiage       Family History   Problem Relation Age of Onset    Heart attack Father 80    Heart disease Father     Drug abuse Father     Cancer Maternal Grandmother         colon     Social History     Tobacco Use    Smoking status: Every Day     Current packs/day: 2.00     Average packs/day: 2.0 packs/day for 30.0 years (60.0 ttl pk-yrs)     Types: Cigarettes    Smokeless tobacco: Never   Substance Use Topics    Alcohol use: Yes     Alcohol/week: 1.0 standard drink of alcohol     Types: 1 Shots of liquor per week     Comment: 1/2 pint 2 x per week - quit approximately 5 months ago    Drug use: No     Review of Systems   Constitutional:  Negative for chills, diaphoresis, fatigue and fever.   HENT:  Negative for congestion and rhinorrhea.    Respiratory:  Negative for cough and  shortness of breath.    Cardiovascular:  Negative for chest pain.   Gastrointestinal:  Positive for constipation and rectal pain. Negative for abdominal pain, diarrhea, nausea and vomiting.   Genitourinary:  Negative for dysuria, frequency and testicular pain.   Musculoskeletal:  Negative for gait problem.   Skin:  Negative for color change.   Neurological:  Negative for dizziness and numbness.   Psychiatric/Behavioral:  Negative for agitation and confusion.        Physical Exam     Initial Vitals [12/09/23 1856]   BP Pulse Resp Temp SpO2   120/74 99 (!) 22 98.1 °F (36.7 °C) (!) 93 %      MAP       --         Physical Exam    Nursing note and vitals reviewed.  Constitutional: He appears well-developed and well-nourished.   HENT:   Head: Normocephalic and atraumatic.   Eyes: EOM are normal. Pupils are equal, round, and reactive to light.   Neck: Neck supple.   Cardiovascular:  Normal rate and regular rhythm.           Pulmonary/Chest: Breath sounds normal.   Abdominal: Abdomen is soft. Bowel sounds are normal. He exhibits no distension. There is no abdominal tenderness. There is no rebound and no guarding.   Genitourinary:    Genitourinary Comments: Rectal exam shows no evidence of hemorrhoids, rectal fissure, or fecal impaction noted.  Nurse chaperone at bedside for rectal exam.     Musculoskeletal:         General: Normal range of motion.      Cervical back: Neck supple.     Neurological: He is alert and oriented to person, place, and time.   Skin: Skin is warm and dry.   Psychiatric: He has a normal mood and affect.         ED Course   Procedures  Labs Reviewed - No data to display       Imaging Results              X-Ray Abdomen Flat And Erect (In process)                      Medications   sodium phosphates 19-7 gram/118 mL enema 1 enema (1 enema Rectal Given 12/9/23 2100)     Medical Decision Making  58-year-old male presents with constipation and rectal pain.    Initial differential diagnosis included but not  limited to constipation, fecal impaction, and hemorrhoids.    Amount and/or Complexity of Data Reviewed  Radiology: ordered.    Risk  OTC drugs.  Risk Details: The patient was emergently evaluated in the emergency department, his evaluation was significant for middle-age male with a benign abdominal exam.  The patient's rectal exam shows no evidence of fecal impaction.  The patient's x-ray does show constipation per my independent interpretation.  The patient was treated in the emergency department with an enema, with improvement in his symptoms.  He is stable for discharge home.  He will be discharged home with p.o. magnesium of citrate.  He is referred to primary care for follow-up.                                      Clinical Impression:  Final diagnoses:  [K59.00] Constipation  [K59.00] Constipation, unspecified constipation type (Primary)          ED Disposition Condition    Discharge Stable          ED Prescriptions       Medication Sig Dispense Start Date End Date Auth. Provider    magnesium citrate solution (Expires today) Take 296 mLs by mouth once. for 1 dose 296 mL 12/9/2023 12/9/2023 Michele Razo MD          Follow-up Information       Follow up With Specialties Details Why Contact Toan Mann, DO Family Medicine, Hospitalist Schedule an appointment as soon as possible for a visit   [address] [facility] [address]  [phone]               Michele Razo MD  12/09/23 4686

## 2023-12-11 ENCOUNTER — PATIENT OUTREACH (OUTPATIENT)
Dept: EMERGENCY MEDICINE | Facility: HOSPITAL | Age: 58
End: 2023-12-11

## 2023-12-11 ENCOUNTER — TELEPHONE (OUTPATIENT)
Dept: NEUROLOGY | Facility: CLINIC | Age: 58
End: 2023-12-11
Payer: MEDICARE

## 2023-12-11 NOTE — TELEPHONE ENCOUNTER
Spoke to the pt, appt scheduled on 12/15/23 at 0900 with Vivien Ortega to evaluate numbness and tingling. Informed the pt that the neurology department does not treat chronic pain.  He v/u understanding.  Date, time and location discussed.

## 2023-12-19 ENCOUNTER — PATIENT OUTREACH (OUTPATIENT)
Dept: ADMINISTRATIVE | Facility: HOSPITAL | Age: 58
End: 2023-12-19
Payer: MEDICARE

## 2023-12-19 NOTE — PROGRESS NOTES
Population Health Chart Review & Patient Outreach Details    Outreach Performed: YES Telephone Successful    Additional Pop Health Notes:           Updates Requested / Reviewed:      Updated Care Coordination Note, Care Everywhere, , and Immunizations Reconciliation Completed or Queried: [place]         Health Maintenance Topics Overdue:    Health Maintenance Due   Topic Date Due    Eye Exam  Never done    TETANUS VACCINE  Never done    Colorectal Cancer Screening  Never done    Diabetes Urine Screening  03/24/2015    Shingles Vaccine (1 of 2) Never done    Foot Exam  07/06/2017    COVID-19 Vaccine (2 - 2023-24 season) 09/01/2023         Health Maintenance Topic(s) Outreach Outcomes & Actions Taken:    Eye Exam - Outreach Outcomes & Actions Taken  : Pt Declined Scheduling Eye Exam

## 2023-12-20 ENCOUNTER — TELEPHONE (OUTPATIENT)
Dept: FAMILY MEDICINE | Facility: CLINIC | Age: 58
End: 2023-12-20
Payer: MEDICARE

## 2023-12-20 NOTE — TELEPHONE ENCOUNTER
Spoke to pt who states he is having right side pain. Pt states this is not a new pain and he has been experiencing this pain since his stroke over a year ago. Pt states pain is on his whole right side. Pain in right arm, hand, hip, leg, shoulder etc. Pt req sooner appt. For pain. Appt scheduled

## 2023-12-20 NOTE — TELEPHONE ENCOUNTER
----- Message from Delia David sent at 12/20/2023 11:22 AM CST -----  Contact: pt [phone]  Type:  Same Day Appointment Request    Caller is requesting a same day appointment.  Caller declined first available appointment listed below.      Name of Caller:  Pt and Brigitte rivera/ Lenard Maier   When is the first available appointment?  Feb 20  Symptoms:  Side pain   Best Call Back Number:  [phone]  Additional Information:   Pt requesting appt today for pain. Pls call back and advise     Brigitte Vazquez nurse  [phone] is leaving her info and stated you can contact her if pt needs any  or  services. Pls call pt back and advise

## 2024-01-22 ENCOUNTER — TELEPHONE (OUTPATIENT)
Dept: FAMILY MEDICINE | Facility: CLINIC | Age: 59
End: 2024-01-22
Payer: MEDICARE

## 2024-01-22 NOTE — TELEPHONE ENCOUNTER
----- Message from Oneida Garnre sent at 1/22/2024  9:37 AM CST -----  Regarding: advise  Contact: patient  Type: Needs Medical Advice  Who Called:  patient   Symptoms (please be specific):  still having pain in right side  How long has patient had these symptoms:    Pharmacy name and phone #:    Best Call Back Number: [phone]// [phone]    Additional Information: needs to be advised

## 2024-01-22 NOTE — TELEPHONE ENCOUNTER
"Spoke to pt who states "You can't help me, They said they won't help me" I assured pt I would do my best to help advocate and find him the proper route in caring for him. Pt complains of right side pain from arm, hand, shoulder, hip and leg. Pt states pain has been present since stroke last year. Pt denies any new pain. Pt states the pain is on his whole right side.   Offered to schedule pt appt and pt states every time we scheduled the office calls me to cancel.     Discussed multiple prior scheduled appts and pt states they always call me and tell me " can't help you with your pain there is no point coming in to see her" Pt states he does not know where to go from here or what his next step would be. Pt states he just can't keep living in pain on stroke affected side- right. Please advise how you wish to proceed with this case.    Please note pt has also canceled/ no showed  Neurology, pain management and Gastro appts  "

## 2024-01-23 NOTE — TELEPHONE ENCOUNTER
Call placed to Mr. Becerra in regards to message received from PCP. No answer, left message to return call for an appt.

## 2024-01-23 NOTE — TELEPHONE ENCOUNTER
Spoke to pt and advised as we previously discussed needs to follow up with GI, Neuro, and pain management as well as  to discuss options in treatment. Discussed canceled and no show appts with patient. Advised pt he will need to follow up with pain management for medication/treatment options. Pt verbalizes understanding and states he does not want appt everyone has not been able to help and is just in paon and needs his medications filled. Advised pt the importance of evaluations and reviewing plan of care with all of his care teams. Pt verbalizes understanding and is willing to schedule appts again with GI, Neuro, pain management and .       Can you please have your team assist patient with rescheduling referral please

## 2024-01-23 NOTE — TELEPHONE ENCOUNTER
----- Message from Kellee Gonsales sent at 1/23/2024 10:59 AM CST -----  Regarding: pain  Pts is in pain dosen't want an apt, is wondering if someone can give them a call? called about pain meds his number is [phone] and wife number is [phone]

## 2024-01-24 NOTE — TELEPHONE ENCOUNTER
This has been explained to pt multiple times.    I have called pt today and advised again he needs to attend his follow up appointments with neurology and pain management. Informed pt unfortunately we do not prescribe long term chronic pain medication and he will need to follow up with pain management as  is unable to do more than she has done in past appointments and he has been non compliant. Pt verbalizes understanding

## 2024-02-21 NOTE — PROGRESS NOTES
Patient educated on use and hazards of oxygen tank.   Pt calling on status of this RX. States pharmacy told her they are waiting for approval from doctor. Pt states she is out of these.

## 2024-03-04 PROBLEM — I63.9 STROKE: Status: RESOLVED | Noted: 2023-12-03 | Resolved: 2024-03-04

## 2024-03-26 ENCOUNTER — HOSPITAL ENCOUNTER (INPATIENT)
Facility: HOSPITAL | Age: 59
LOS: 2 days | Discharge: HOME HEALTH | DRG: 189 | End: 2024-03-29
Attending: EMERGENCY MEDICINE | Admitting: STUDENT IN AN ORGANIZED HEALTH CARE EDUCATION/TRAINING PROGRAM
Payer: MEDICARE

## 2024-03-26 DIAGNOSIS — R09.02 HYPOXIA: ICD-10-CM
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
## 2024-03-26 DIAGNOSIS — R55 SYNCOPE: ICD-10-CM

## 2024-03-26 DIAGNOSIS — R07.81 RIB PAIN: ICD-10-CM

## 2024-03-26 DIAGNOSIS — R29.818 ACUTE FOCAL NEUROLOGICAL DEFICIT: ICD-10-CM

## 2024-03-26 DIAGNOSIS — R94.31 EKG ABNORMALITY: ICD-10-CM

## 2024-03-26 DIAGNOSIS — J44.1 COPD EXACERBATION: Primary | ICD-10-CM

## 2024-03-26 PROBLEM — R79.89 ELEVATED TROPONIN LEVEL: Status: ACTIVE | Noted: 2024-03-26

## 2024-03-26 PROBLEM — J96.01 ACUTE HYPOXEMIC RESPIRATORY FAILURE: Status: ACTIVE | Noted: 2024-03-26

## 2024-03-26 PROBLEM — J18.9 PNEUMONIA: Status: ACTIVE | Noted: 2024-03-26

## 2024-03-26 LAB
ALBUMIN SERPL BCP-MCNC: 3.4 G/DL (ref 3.5–5.2)
ALLENS TEST: ABNORMAL
ALP SERPL-CCNC: 80 U/L (ref 55–135)
ALT SERPL W/O P-5'-P-CCNC: 5 U/L (ref 10–44)
ANION GAP SERPL CALC-SCNC: 9 MMOL/L (ref 8–16)
AST SERPL-CCNC: 11 U/L (ref 10–40)
BASOPHILS # BLD AUTO: 0.02 K/UL (ref 0–0.2)
BASOPHILS NFR BLD: 0.3 % (ref 0–1.9)
BILIRUB SERPL-MCNC: 0.9 MG/DL (ref 0.1–1)
BNP SERPL-MCNC: 19 PG/ML (ref 0–99)
BNP SERPL-MCNC: 19 PG/ML (ref 0–99)
BUN SERPL-MCNC: 8 MG/DL (ref 6–20)
CALCIUM SERPL-MCNC: 9.5 MG/DL (ref 8.7–10.5)
CHLORIDE SERPL-SCNC: 97 MMOL/L (ref 95–110)
CHOLEST SERPL-MCNC: 126 MG/DL (ref 120–199)
CHOLEST/HDLC SERPL: 3.3 {RATIO} (ref 2–5)
CO2 SERPL-SCNC: 31 MMOL/L (ref 23–29)
CREAT SERPL-MCNC: 0.8 MG/DL (ref 0.5–1.4)
DELSYS: ABNORMAL
DIFFERENTIAL METHOD BLD: ABNORMAL
EOSINOPHIL # BLD AUTO: 0.2 K/UL (ref 0–0.5)
EOSINOPHIL NFR BLD: 2.6 % (ref 0–8)
ERYTHROCYTE [DISTWIDTH] IN BLOOD BY AUTOMATED COUNT: 13.5 % (ref 11.5–14.5)
ERYTHROCYTE [SEDIMENTATION RATE] IN BLOOD BY WESTERGREN METHOD: 18 MM/H
EST. GFR  (NO RACE VARIABLE): >60 ML/MIN/1.73 M^2
ESTIMATED AVG GLUCOSE: 128 MG/DL (ref 68–131)
FIO2: 24
FLOW: 2
GLUCOSE SERPL-MCNC: 84 MG/DL (ref 70–110)
HBA1C MFR BLD: 6.1 % (ref 4.5–6.2)
HCO3 UR-SCNC: 34.1 MMOL/L (ref 24–28)
HCT VFR BLD AUTO: 38.9 % (ref 40–54)
HDLC SERPL-MCNC: 38 MG/DL (ref 40–75)
HDLC SERPL: 30.2 % (ref 20–50)
HGB BLD-MCNC: 12.4 G/DL (ref 14–18)
IMM GRANULOCYTES # BLD AUTO: 0.03 K/UL (ref 0–0.04)
IMM GRANULOCYTES NFR BLD AUTO: 0.5 % (ref 0–0.5)
INR PPP: 1 (ref 0.8–1.2)
LDLC SERPL CALC-MCNC: 74.8 MG/DL (ref 63–159)
LYMPHOCYTES # BLD AUTO: 1.3 K/UL (ref 1–4.8)
LYMPHOCYTES NFR BLD: 23.2 % (ref 18–48)
MAGNESIUM SERPL-MCNC: 2.3 MG/DL (ref 1.6–2.6)
MCH RBC QN AUTO: 30.3 PG (ref 27–31)
MCHC RBC AUTO-ENTMCNC: 31.9 G/DL (ref 32–36)
MCV RBC AUTO: 95 FL (ref 82–98)
MODE: ABNORMAL
MONOCYTES # BLD AUTO: 0.4 K/UL (ref 0.3–1)
MONOCYTES NFR BLD: 7.1 % (ref 4–15)
NEUTROPHILS # BLD AUTO: 3.8 K/UL (ref 1.8–7.7)
NEUTROPHILS NFR BLD: 66.3 % (ref 38–73)
NONHDLC SERPL-MCNC: 88 MG/DL
NRBC BLD-RTO: 0 /100 WBC
PCO2 BLDA: 63.1 MMHG (ref 35–45)
PH SMN: 7.34 [PH] (ref 7.35–7.45)
PHOSPHATE SERPL-MCNC: 4.1 MG/DL (ref 2.7–4.5)
PLATELET # BLD AUTO: 201 K/UL (ref 150–450)
PMV BLD AUTO: 10.2 FL (ref 9.2–12.9)
PO2 BLDA: 50 MMHG (ref 80–100)
POC BE: 8 MMOL/L
POC SATURATED O2: 82 % (ref 95–100)
POC TCO2: 36 MMOL/L (ref 23–27)
POCT GLUCOSE: 272 MG/DL (ref 70–110)
POTASSIUM SERPL-SCNC: 3.4 MMOL/L (ref 3.5–5.1)
PROCALCITONIN SERPL IA-MCNC: 0.03 NG/ML (ref 0–0.5)
PROT SERPL-MCNC: 7.8 G/DL (ref 6–8.4)
PROTHROMBIN TIME: 10.9 SEC (ref 9–12.5)
RBC # BLD AUTO: 4.09 M/UL (ref 4.6–6.2)
SAMPLE: ABNORMAL
SITE: ABNORMAL
SODIUM SERPL-SCNC: 137 MMOL/L (ref 136–145)
TRIGL SERPL-MCNC: 66 MG/DL (ref 30–150)
TROPONIN I SERPL DL<=0.01 NG/ML-MCNC: 0.04 NG/ML (ref 0–0.03)
TSH SERPL DL<=0.005 MIU/L-ACNC: 0.42 UIU/ML (ref 0.4–4)
WBC # BLD AUTO: 5.77 K/UL (ref 3.9–12.7)

## 2024-03-26 PROCEDURE — 99900035 HC TECH TIME PER 15 MIN (STAT)

## 2024-03-26 PROCEDURE — 36600 WITHDRAWAL OF ARTERIAL BLOOD: CPT

## 2024-03-26 PROCEDURE — 81000 URINALYSIS NONAUTO W/SCOPE: CPT

## 2024-03-26 PROCEDURE — 85025 COMPLETE CBC W/AUTO DIFF WBC: CPT | Performed by: EMERGENCY MEDICINE

## 2024-03-26 PROCEDURE — 87040 BLOOD CULTURE FOR BACTERIA: CPT

## 2024-03-26 PROCEDURE — 82803 BLOOD GASES ANY COMBINATION: CPT

## 2024-03-26 PROCEDURE — 25000242 PHARM REV CODE 250 ALT 637 W/ HCPCS: Performed by: EMERGENCY MEDICINE

## 2024-03-26 PROCEDURE — 83735 ASSAY OF MAGNESIUM: CPT

## 2024-03-26 PROCEDURE — 36415 COLL VENOUS BLD VENIPUNCTURE: CPT

## 2024-03-26 PROCEDURE — 96366 THER/PROPH/DIAG IV INF ADDON: CPT

## 2024-03-26 PROCEDURE — 94761 N-INVAS EAR/PLS OXIMETRY MLT: CPT | Mod: XB

## 2024-03-26 PROCEDURE — 99285 EMERGENCY DEPT VISIT HI MDM: CPT | Mod: 25

## 2024-03-26 PROCEDURE — 63600175 PHARM REV CODE 636 W HCPCS

## 2024-03-26 PROCEDURE — G0378 HOSPITAL OBSERVATION PER HR: HCPCS

## 2024-03-26 PROCEDURE — 25000003 PHARM REV CODE 250: Performed by: EMERGENCY MEDICINE

## 2024-03-26 PROCEDURE — 96365 THER/PROPH/DIAG IV INF INIT: CPT

## 2024-03-26 PROCEDURE — 36415 COLL VENOUS BLD VENIPUNCTURE: CPT | Performed by: EMERGENCY MEDICINE

## 2024-03-26 PROCEDURE — 80061 LIPID PANEL: CPT | Performed by: EMERGENCY MEDICINE

## 2024-03-26 PROCEDURE — 93005 ELECTROCARDIOGRAM TRACING: CPT

## 2024-03-26 PROCEDURE — 83880 ASSAY OF NATRIURETIC PEPTIDE: CPT

## 2024-03-26 PROCEDURE — 84100 ASSAY OF PHOSPHORUS: CPT

## 2024-03-26 PROCEDURE — 84145 PROCALCITONIN (PCT): CPT

## 2024-03-26 PROCEDURE — 83036 HEMOGLOBIN GLYCOSYLATED A1C: CPT

## 2024-03-26 PROCEDURE — 25000003 PHARM REV CODE 250

## 2024-03-26 PROCEDURE — 84443 ASSAY THYROID STIM HORMONE: CPT | Performed by: EMERGENCY MEDICINE

## 2024-03-26 PROCEDURE — 96372 THER/PROPH/DIAG INJ SC/IM: CPT | Mod: 59

## 2024-03-26 PROCEDURE — 85610 PROTHROMBIN TIME: CPT | Performed by: EMERGENCY MEDICINE

## 2024-03-26 PROCEDURE — 82140 ASSAY OF AMMONIA: CPT

## 2024-03-26 PROCEDURE — 93010 ELECTROCARDIOGRAM REPORT: CPT | Mod: ,,, | Performed by: GENERAL PRACTICE

## 2024-03-26 PROCEDURE — 96375 TX/PRO/DX INJ NEW DRUG ADDON: CPT

## 2024-03-26 PROCEDURE — 94640 AIRWAY INHALATION TREATMENT: CPT

## 2024-03-26 PROCEDURE — 80053 COMPREHEN METABOLIC PANEL: CPT | Performed by: EMERGENCY MEDICINE

## 2024-03-26 PROCEDURE — 84484 ASSAY OF TROPONIN QUANT: CPT | Performed by: EMERGENCY MEDICINE

## 2024-03-26 PROCEDURE — 63600175 PHARM REV CODE 636 W HCPCS: Mod: JZ,JG | Performed by: EMERGENCY MEDICINE

## 2024-03-26 PROCEDURE — 80307 DRUG TEST PRSMV CHEM ANLYZR: CPT

## 2024-03-26 PROCEDURE — 27000221 HC OXYGEN, UP TO 24 HOURS

## 2024-03-26 RX ORDER — ENOXAPARIN SODIUM 100 MG/ML
40 INJECTION SUBCUTANEOUS EVERY 24 HOURS
Status: DISCONTINUED | OUTPATIENT
Start: 2024-03-26 | End: 2024-03-26

## 2024-03-26 RX ORDER — IPRATROPIUM BROMIDE AND ALBUTEROL SULFATE 2.5; .5 MG/3ML; MG/3ML
3 SOLUTION RESPIRATORY (INHALATION)
Status: COMPLETED | OUTPATIENT
Start: 2024-03-26 | End: 2024-03-26

## 2024-03-26 RX ORDER — LANOLIN ALCOHOL/MO/W.PET/CERES
800 CREAM (GRAM) TOPICAL
Status: DISCONTINUED | OUTPATIENT
Start: 2024-03-26 | End: 2024-03-29 | Stop reason: HOSPADM

## 2024-03-26 RX ORDER — POLYETHYLENE GLYCOL 3350 17 G/17G
17 POWDER, FOR SOLUTION ORAL DAILY
Status: DISCONTINUED | OUTPATIENT
Start: 2024-03-27 | End: 2024-03-29 | Stop reason: HOSPADM

## 2024-03-26 RX ORDER — PREDNISONE 20 MG/1
40 TABLET ORAL DAILY
Status: DISCONTINUED | OUTPATIENT
Start: 2024-03-27 | End: 2024-03-29 | Stop reason: HOSPADM

## 2024-03-26 RX ORDER — OXYCODONE HYDROCHLORIDE 5 MG/1
5 TABLET ORAL EVERY 6 HOURS PRN
Status: DISCONTINUED | OUTPATIENT
Start: 2024-03-26 | End: 2024-03-29 | Stop reason: HOSPADM

## 2024-03-26 RX ORDER — METHYLPREDNISOLONE SOD SUCC 125 MG
125 VIAL (EA) INJECTION
Status: COMPLETED | OUTPATIENT
Start: 2024-03-26 | End: 2024-03-26

## 2024-03-26 RX ORDER — ESCITALOPRAM OXALATE 10 MG/1
10 TABLET ORAL DAILY
Status: DISCONTINUED | OUTPATIENT
Start: 2024-03-27 | End: 2024-03-29 | Stop reason: HOSPADM

## 2024-03-26 RX ORDER — ATORVASTATIN CALCIUM 20 MG/1
20 TABLET, FILM COATED ORAL DAILY
Status: DISCONTINUED | OUTPATIENT
Start: 2024-03-27 | End: 2024-03-29 | Stop reason: HOSPADM

## 2024-03-26 RX ORDER — INSULIN ASPART 100 [IU]/ML
0-5 INJECTION, SOLUTION INTRAVENOUS; SUBCUTANEOUS
Status: DISCONTINUED | OUTPATIENT
Start: 2024-03-26 | End: 2024-03-27

## 2024-03-26 RX ORDER — GLUCAGON 1 MG
1 KIT INJECTION
Status: DISCONTINUED | OUTPATIENT
Start: 2024-03-26 | End: 2024-03-27

## 2024-03-26 RX ORDER — MAGNESIUM SULFATE HEPTAHYDRATE 40 MG/ML
2 INJECTION, SOLUTION INTRAVENOUS
Status: COMPLETED | OUTPATIENT
Start: 2024-03-26 | End: 2024-03-26

## 2024-03-26 RX ORDER — ASPIRIN 81 MG/1
81 TABLET ORAL DAILY
Status: DISCONTINUED | OUTPATIENT
Start: 2024-03-27 | End: 2024-03-29 | Stop reason: HOSPADM

## 2024-03-26 RX ORDER — ASPIRIN 325 MG
325 TABLET ORAL DAILY
Status: DISCONTINUED | OUTPATIENT
Start: 2024-03-27 | End: 2024-03-26

## 2024-03-26 RX ORDER — OXYCODONE HYDROCHLORIDE 10 MG/1
10 TABLET ORAL EVERY 6 HOURS PRN
Status: DISCONTINUED | OUTPATIENT
Start: 2024-03-26 | End: 2024-03-27

## 2024-03-26 RX ORDER — ACETAMINOPHEN 325 MG/1
650 TABLET ORAL EVERY 8 HOURS PRN
Status: DISCONTINUED | OUTPATIENT
Start: 2024-03-26 | End: 2024-03-29 | Stop reason: HOSPADM

## 2024-03-26 RX ORDER — IBUPROFEN 200 MG
24 TABLET ORAL
Status: DISCONTINUED | OUTPATIENT
Start: 2024-03-26 | End: 2024-03-27

## 2024-03-26 RX ORDER — ONDANSETRON HYDROCHLORIDE 2 MG/ML
4 INJECTION, SOLUTION INTRAVENOUS EVERY 12 HOURS PRN
Status: DISCONTINUED | OUTPATIENT
Start: 2024-03-26 | End: 2024-03-29 | Stop reason: HOSPADM

## 2024-03-26 RX ORDER — POTASSIUM CHLORIDE 20 MEQ/1
40 TABLET, EXTENDED RELEASE ORAL ONCE
Status: COMPLETED | OUTPATIENT
Start: 2024-03-26 | End: 2024-03-26

## 2024-03-26 RX ORDER — ENOXAPARIN SODIUM 100 MG/ML
40 INJECTION SUBCUTANEOUS EVERY 24 HOURS
Status: DISCONTINUED | OUTPATIENT
Start: 2024-03-26 | End: 2024-03-29 | Stop reason: HOSPADM

## 2024-03-26 RX ORDER — LEVOFLOXACIN 250 MG/1
750 TABLET ORAL DAILY
Status: DISCONTINUED | OUTPATIENT
Start: 2024-03-26 | End: 2024-03-29 | Stop reason: HOSPADM

## 2024-03-26 RX ORDER — FLUTICASONE FUROATE AND VILANTEROL 200; 25 UG/1; UG/1
1 POWDER RESPIRATORY (INHALATION) DAILY
Status: DISCONTINUED | OUTPATIENT
Start: 2024-03-27 | End: 2024-03-26 | Stop reason: SDUPTHER

## 2024-03-26 RX ORDER — ONDANSETRON HYDROCHLORIDE 2 MG/ML
4 INJECTION, SOLUTION INTRAVENOUS
Status: COMPLETED | OUTPATIENT
Start: 2024-03-26 | End: 2024-03-26

## 2024-03-26 RX ORDER — SODIUM CHLORIDE 0.9 % (FLUSH) 0.9 %
3 SYRINGE (ML) INJECTION
Status: DISCONTINUED | OUTPATIENT
Start: 2024-03-26 | End: 2024-03-29 | Stop reason: HOSPADM

## 2024-03-26 RX ORDER — ARFORMOTEROL TARTRATE 15 UG/2ML
15 SOLUTION RESPIRATORY (INHALATION) 2 TIMES DAILY
Status: DISCONTINUED | OUTPATIENT
Start: 2024-03-27 | End: 2024-03-29 | Stop reason: HOSPADM

## 2024-03-26 RX ORDER — MORPHINE SULFATE 4 MG/ML
4 INJECTION, SOLUTION INTRAMUSCULAR; INTRAVENOUS
Status: COMPLETED | OUTPATIENT
Start: 2024-03-26 | End: 2024-03-26

## 2024-03-26 RX ORDER — BUDESONIDE 0.5 MG/2ML
1 INHALANT ORAL 2 TIMES DAILY
Status: DISCONTINUED | OUTPATIENT
Start: 2024-03-27 | End: 2024-03-29 | Stop reason: HOSPADM

## 2024-03-26 RX ORDER — LISINOPRIL 10 MG/1
10 TABLET ORAL DAILY
Status: DISCONTINUED | OUTPATIENT
Start: 2024-03-27 | End: 2024-03-29 | Stop reason: HOSPADM

## 2024-03-26 RX ORDER — PROCHLORPERAZINE EDISYLATE 5 MG/ML
5 INJECTION INTRAMUSCULAR; INTRAVENOUS EVERY 6 HOURS PRN
Status: DISCONTINUED | OUTPATIENT
Start: 2024-03-26 | End: 2024-03-29 | Stop reason: HOSPADM

## 2024-03-26 RX ORDER — PANTOPRAZOLE SODIUM 40 MG/1
40 TABLET, DELAYED RELEASE ORAL DAILY
Status: DISCONTINUED | OUTPATIENT
Start: 2024-03-27 | End: 2024-03-29 | Stop reason: HOSPADM

## 2024-03-26 RX ORDER — AMLODIPINE BESYLATE 5 MG/1
10 TABLET ORAL DAILY
Status: DISCONTINUED | OUTPATIENT
Start: 2024-03-27 | End: 2024-03-29 | Stop reason: HOSPADM

## 2024-03-26 RX ORDER — IBUPROFEN 200 MG
16 TABLET ORAL
Status: DISCONTINUED | OUTPATIENT
Start: 2024-03-26 | End: 2024-03-27

## 2024-03-26 RX ORDER — IPRATROPIUM BROMIDE AND ALBUTEROL SULFATE 2.5; .5 MG/3ML; MG/3ML
3 SOLUTION RESPIRATORY (INHALATION)
Status: DISCONTINUED | OUTPATIENT
Start: 2024-03-27 | End: 2024-03-29 | Stop reason: HOSPADM

## 2024-03-26 RX ORDER — ASPIRIN 325 MG
325 TABLET ORAL
Status: COMPLETED | OUTPATIENT
Start: 2024-03-26 | End: 2024-03-26

## 2024-03-26 RX ORDER — TALC
9 POWDER (GRAM) TOPICAL NIGHTLY PRN
Status: DISCONTINUED | OUTPATIENT
Start: 2024-03-26 | End: 2024-03-29 | Stop reason: HOSPADM

## 2024-03-26 RX ADMIN — IPRATROPIUM BROMIDE AND ALBUTEROL SULFATE 3 ML: 2.5; .5 SOLUTION RESPIRATORY (INHALATION) at 06:03

## 2024-03-26 RX ADMIN — POTASSIUM CHLORIDE 40 MEQ: 1500 TABLET, EXTENDED RELEASE ORAL at 06:03

## 2024-03-26 RX ADMIN — PREGABALIN 100 MG: 75 CAPSULE ORAL at 11:03

## 2024-03-26 RX ADMIN — MELATONIN TAB 3 MG 9 MG: 3 TAB at 11:03

## 2024-03-26 RX ADMIN — IPRATROPIUM BROMIDE AND ALBUTEROL SULFATE 3 ML: 2.5; .5 SOLUTION RESPIRATORY (INHALATION) at 04:03

## 2024-03-26 RX ADMIN — INSULIN ASPART 1 UNITS: 100 INJECTION, SOLUTION INTRAVENOUS; SUBCUTANEOUS at 11:03

## 2024-03-26 RX ADMIN — ENOXAPARIN SODIUM 40 MG: 40 INJECTION SUBCUTANEOUS at 11:03

## 2024-03-26 RX ADMIN — MORPHINE SULFATE 4 MG: 4 INJECTION, SOLUTION INTRAMUSCULAR; INTRAVENOUS at 06:03

## 2024-03-26 RX ADMIN — ONDANSETRON 4 MG: 2 INJECTION INTRAMUSCULAR; INTRAVENOUS at 06:03

## 2024-03-26 RX ADMIN — LEVOFLOXACIN 750 MG: 250 TABLET, FILM COATED ORAL at 11:03

## 2024-03-26 RX ADMIN — ASPIRIN 325 MG: 325 TABLET ORAL at 06:03

## 2024-03-26 RX ADMIN — METHYLPREDNISOLONE SODIUM SUCCINATE 125 MG: 125 INJECTION, POWDER, FOR SOLUTION INTRAMUSCULAR; INTRAVENOUS at 06:03

## 2024-03-26 RX ADMIN — MAGNESIUM SULFATE HEPTAHYDRATE 2 G: 40 INJECTION, SOLUTION INTRAVENOUS at 06:03

## 2024-03-26 NOTE — ED PROVIDER NOTES
Encounter Date: 3/26/2024       History     Chief Complaint   Patient presents with    Rib Injury     Pt fell yesterday or the day before pt could not advise, pt states he is having rib pain on the right side.  Pt complains of tightness and numbness in his neck and throat, pt believes he has had a stroke.     Patient presents complaining of right rib pain.  Patient states he had a fall on Saturday but has difficulty giving me history around the fall.  He is uncertain if she lost consciousness.  He complains of right arm and right leg weakness although he says he has had previous stroke and this is chronic.  Patient states this has been ongoing for multiple years.  He has not able to tell me if this weakness is any worse today.  Patient complains of shortness of breath.  Patient has history of COPD.  At the worst symptoms are moderate.  Nothing seems to make it better, exertion makes it worse.      Review of patient's allergies indicates:  No Known Allergies  Past Medical History:   Diagnosis Date    COPD (chronic obstructive pulmonary disease)     Diabetes mellitus, type 2     Hypertension      Past Surgical History:   Procedure Laterality Date    DENTAL SURGERY      ESOPHAGOGASTRODUODENOSCOPY N/A 6/1/2020    Procedure: EGD (ESOPHAGOGASTRODUODENOSCOPY);  Surgeon: Terrell May MD;  Location: [facility];  Service: Endoscopy;  Laterality: N/A;    HERNIA REPAIR      left inguinal    incision and drainiage       Family History   Problem Relation Age of Onset    Heart attack Father 80    Heart disease Father     Drug abuse Father     Cancer Maternal Grandmother         colon     Social History     Tobacco Use    Smoking status: Every Day     Current packs/day: 2.00     Average packs/day: 2.0 packs/day for 30.0 years (60.0 ttl pk-yrs)     Types: Cigarettes    Smokeless tobacco: Never   Substance Use Topics    Alcohol use: Yes     Alcohol/week: 1.0 standard drink of alcohol     Types: 1 Shots of liquor per week      Comment: 1/2 pint 2 x per week - quit approximately 5 months ago    Drug use: No     Review of Systems   All other systems reviewed and are negative.      Physical Exam     Initial Vitals [03/26/24 1444]   BP Pulse Resp Temp SpO2   (!) 142/84 84 17 98.4 °F (36.9 °C) (!) 92 %      MAP       --         Physical Exam    Nursing note and vitals reviewed.  Constitutional: He appears well-developed and well-nourished. He is not diaphoretic. No distress.   Pleasant, polite.   HENT:   Head: Normocephalic and atraumatic.   Mouth/Throat: Oropharynx is clear and moist.   Eyes: EOM are normal.   Neck: Neck supple.   Normal range of motion.  Cardiovascular:  Intact distal pulses.           Pulmonary/Chest: No respiratory distress. He has wheezes.   Patient is tender over the right ribs especially rib 7. In the anterior axillary line.  There is no obvious crepitus.  There is no obvious bruise.   Abdominal: Abdomen is soft.   Musculoskeletal:         General: Normal range of motion.      Cervical back: Normal range of motion and neck supple.     Neurological: He is alert.   There is chronic right-sided weakness.  To both the upper and lower extremity.   Skin: Skin is warm and dry.   Psychiatric: He has a normal mood and affect. His behavior is normal. Judgment and thought content normal.         ED Course   Procedures  Labs Reviewed   CBC W/ AUTO DIFFERENTIAL - Abnormal; Notable for the following components:       Result Value    RBC 4.09 (*)     Hemoglobin 12.4 (*)     Hematocrit 38.9 (*)     MCHC 31.9 (*)     All other components within normal limits   COMPREHENSIVE METABOLIC PANEL - Abnormal; Notable for the following components:    Potassium 3.4 (*)     CO2 31 (*)     Albumin 3.4 (*)     ALT 5 (*)     All other components within normal limits   LIPID PANEL - Abnormal; Notable for the following components:    HDL 38 (*)     All other components within normal limits   TROPONIN I - Abnormal; Notable for the following components:     Troponin I 0.039 (*)     All other components within normal limits   PROTIME-INR   TSH          Imaging Results              CTA Head and Neck (xpd) (Final result)  Result time 03/26/24 18:25:40      Final result by Delvin Vera MD (03/26/24 18:25:40)                   Impression:      1. No evidence of acute intracranial abnormality or significant interval detrimental change as compared to the prior exam.  Further evaluation as warranted clinically.  2. Chronic small vessel ischemic changes again demonstrated, including chronic lacunar infarcts involving the right frontal corona radiata, bilateral basal ganglia and alf.  3. No high-grade stenosis, large vessel occlusion, dissection or aneurysm in the head and neck vasculature.      Electronically signed by: Delvin Vera  Date:    03/26/2024  Time:    18:25               Narrative:    EXAMINATION:  CTA HEAD AND NECK (XPD)    CLINICAL HISTORY:  Neuro deficit, acute, stroke suspected;    TECHNIQUE:  Non contrast low dose axial images were obtained through the head. CT angiogram was performed from the level of the ignacia to the top of the head following the IV administration of 75mL of Omnipaque 350.   Sagittal and coronal reconstructions and maximum intensity projection reconstructions were performed. Arterial stenosis percentages are based on NASCET measurement criteria.    COMPARISON:  12/03/2023, MRI brain 12/04/2023    FINDINGS:  Brain:    No evidence of acute intracranial hemorrhage.    The ventricles and sulci are appropriate in size and configuration for the patient's age without hydrocephalus.    Stable small chronic lacunar infarct in the right frontal corona radiata, as well as bilateral basal ganglia and right paramedian alf.  There is no significant mass effect, midline shift, or extra-axial fluid collection. Remainder of the gray-white matter differentiation is within normal limits without evidence of an acute major vascular territory infarct.    No  abnormal intracranial enhancement.    Chronic deformity of the medial left orbital wall.  Partial opacification of the right sphenoid sinus.  No acute calvarial fracture.    Extracranial:    Aorta and great vessels: Left-sided aortic arch with normal configuration.   No evidence of stenosis of the origins of the great vessels from the arch.    Subclavian arteries: The subclavian arteries are without hemodynamically significant stenosis or occlusion.    Right carotid: The right common carotid artery is without significant stenosis. Mild calcific atherosclerotic plaque at the carotid bifurcation without evidence of a hemodynamically significant stenosis..  The right internal carotid artery is tortuous without significant stenosis, occlusion, or dissection.    Left carotid: The left common carotid artery is without significant stenosis. Mild calcific atherosclerotic plaque at the carotid bifurcation without evidence of a hemodynamically significant stenosis.  The left internal carotid artery is tortuous without significant stenosis, occlusion, or dissection.    Extracranial vertebral arteries:  The vertebral arteries are without significant stenosis, occlusion, or dissection to the skull base.    Intracranial:    Anterior circulation: Moderate calcific atherosclerosis of bilateral carotid artery siphons.  No areas of significant atherosclerotic narrowing or filling defects are identified.  The middle cerebral arteries are normal.  The anterior cerebral arteries are normal.    Posterior circulation: The vertebral arteries are normal. The basilar artery is normal. 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  Posterior cerebral arteries otherwise grossly normal.    No evidence of aneurysm or arteriovenous malformation.    Dural venous sinuses are patent.    Other:    The visualized portions of the lung apices demonstrated moderate  centrilobular emphysematous changes.  Respiratory motion limits evaluation of the pulmonary parenchyma.  No focal consolidation, pleural effusion or pneumothorax.    There are degenerative spine changes. No concerning osseous lesions identified.                                       X-Ray Chest AP Portable (Final result)  Result time 03/26/24 16:49:37      Final result by Iván Coronado MD (03/26/24 16:49:37)                   Impression:      Unchanged extent of reticulonodular disease in the lung bases.  This is better characterized on prior CT.  Some differential considerations include includes atypical infection, pneumonitis, aspiration, airways disease.      Electronically signed by: Iván Coronado  Date:    03/26/2024  Time:    16:49               Narrative:    EXAMINATION:  XR CHEST AP PORTABLE    CLINICAL HISTORY:  Pleurodynia    TECHNIQUE:  Single frontal view of the chest was performed.    COMPARISON:  12/03/2023 and 09/26/2023    FINDINGS:  There is reticulonodular disease in the lower lung zones with some tram-tracking.  Upper lung zones are clear.  Unchanged heart size.  No pleural effusion or pneumothorax.                                       X-Ray Ribs 2 View Right (Final result)  Result time 03/26/24 16:55:10      Final result by Iván Coronado MD (03/26/24 16:55:10)                   Narrative:    EXAMINATION:  XR RIBS 2 VIEW RIGHT    CLINICAL HISTORY:  Unspecified fall, initial encounter    TECHNIQUE:  Two views of the right ribs were performed.    COMPARISON:  03/26/2024    FINDINGS:  No displaced fracture or traumatic malalignment.  Hazy interstitial opacities right lung base better characterized on dated chest radiograph.  Mild degenerative change right AC joint.      Electronically signed by: Iván Coronado  Date:    03/26/2024  Time:    16:55                                     Medications   magnesium sulfate 2g in water 50mL IVPB (premix) (2 g Intravenous New Bag 3/26/24 9031)    enoxaparin injection 40 mg (has no administration in time range)   potassium bicarbonate disintegrating tablet 50 mEq (has no administration in time range)   potassium bicarbonate disintegrating tablet 35 mEq (has no administration in time range)   potassium bicarbonate disintegrating tablet 60 mEq (has no administration in time range)   magnesium oxide tablet 800 mg (has no administration in time range)   magnesium oxide tablet 800 mg (has no administration in time range)   albuterol-ipratropium 2.5 mg-0.5 mg/3 mL nebulizer solution 3 mL (3 mLs Nebulization Given 3/26/24 1636)   methylPREDNISolone sodium succinate injection 125 mg (125 mg Intravenous Given 3/26/24 1829)   potassium chloride SA CR tablet 40 mEq (40 mEq Oral Given 3/26/24 1835)   albuterol-ipratropium 2.5 mg-0.5 mg/3 mL nebulizer solution 3 mL (3 mLs Nebulization Given 3/26/24 1842)   aspirin tablet 325 mg (325 mg Oral Given 3/26/24 1846)   morphine injection 4 mg (4 mg Intravenous Given 3/26/24 1843)   ondansetron injection 4 mg (4 mg Intravenous Given 3/26/24 1844)     Medical Decision Making  Patient appears mild respiratory distress    Considerations include but are not limited to pneumothorax, COPD exacerbation, pneumonia, acute kidney injury, dehydration, electrolyte abnormalities, less likely CVA    MDM    Patient presents for emergent evaluation of acute shortness breath, rib pain that poses a possible threat to life and/or bodily function.    In the ED patient found to have acute COPD exacerbation, right rib contusion, hypoxia.     Amount and/or complexity of data reviewed  I ordered labs and personally reviewed them.  Labs significant for mildly elevated troponin.    I ordered X-rays and personally reviewed them and reviewed the radiologist interpretation.  Xray significant for no pneumothorax, no obvious rib fracture, chronic changes suggestive of COPD.    I ordered EKG and personally reviewed it.  EKG significant for regular rate and  rhythm without ST elevation.    I ordered CT scan and personally reviewed it and reviewed the radiologist interpretation.  CT significant for no evidence of acute intracranial hemorrhage or large vessel occlusion.      I did review prior medical records.    Admission MDM and Risk  I discussed the patient presentation labs, ekg, X-rays, CT findings with the consultant(s) hospitalist   Patient was managed in the ED with IV , magnesium, DuoNeb.  Aspirin, morphine, Zofran  The response to treatment was improved.    Shared decision-making with the patient regarding diagnosis, treatment, and plan was well received.    Patient required emergent consultation to hospitalist for admission.    Amount and/or Complexity of Data Reviewed  Labs: ordered. Decision-making details documented in ED Course.  Radiology: ordered.    Risk  OTC drugs.  Prescription drug management.  Decision regarding hospitalization.               ED Course as of 03/26/24 1927   Tue Mar 26, 2024   1728 Platelet Count: 201 [AP]   1728 WBC: 5.77 [AP]   1728 Hemoglobin(!): 12.4 [AP]   1728 Hematocrit(!): 38.9 [AP]   1728 Platelet Count: 201 [AP]   1728 INR: 1.0 [AP]   1728 PT: 10.9 [AP]   1757 Sodium: 137 [AP]   1757 Potassium(!): 3.4 [AP]   1757 Chloride: 97 [AP]   1757 CO2(!): 31 [AP]   1757 Glucose: 84 [AP]   1757 BUN: 8 [AP]   1757 Creatinine: 0.8 [AP]   1757 Calcium: 9.5 [AP]   1757 PROTEIN TOTAL: 7.8 [AP]   1758 Albumin(!): 3.4 [AP]   1758 BILIRUBIN TOTAL: 0.9 [AP]   1758 ALP: 80 [AP]   1758 AST: 11 [AP]   1758 ALT(!): 5 [AP]   1758 eGFR: >60 [AP]   1758 Anion Gap: 9 [AP]   1758 WBC: 5.77 [AP]   1758 Hematocrit(!): 38.9 [AP]   1758 Platelet Count: 201 [AP]   1758 PT: 10.9 [AP]   1758 INR: 1.0 [AP]   1817 Troponin I(!): 0.039 [AP]   1817 Sodium: 137 [AP]   1817 Potassium(!): 3.4 [AP]   1817 Chloride: 97 [AP]   1817 CO2(!): 31 [AP]   1817 Glucose: 84 [AP]   1817 BUN: 8 [AP]   1817 Calcium: 9.5 [AP]   1817 Albumin(!): 3.4 [AP]   1817 BILIRUBIN TOTAL: 0.9  [AP]   1817 ALP: 80 [AP]   1817 AST: 11 [AP]   1817 ALT(!): 5 [AP]   1817 eGFR: >60 [AP]   1818 Anion Gap: 9 [AP]   1818 WBC: 5.77 [AP]   1818 Hemoglobin(!): 12.4 [AP]   1818 Hematocrit(!): 38.9 [AP]   1818 Platelet Count: 201 [AP]      ED Course User Index  [AP] Denzel Ramos MD                           Clinical Impression:  Final diagnoses:  [R29.818] Acute focal neurological deficit  [R07.81] Rib pain  [W19.XXXA] Fall  [J44.1] COPD exacerbation (Primary)  [R09.02] Hypoxia          ED Disposition Condition    Observation                 Denzel Ramos MD  03/26/24 1927

## 2024-03-26 NOTE — ED NOTES
Assumed care:  Hernan Badillo is awake, alert and oriented x 3, skin warm and dry, in NAD.  Patient with history of CVA states that he fell injuring his right ribs.  Patient states he has had right side weakness since stroke last year.  States unable to move left leg and right upper extremity is stiff and can not move it very well.      Patient identifiers for Hernan Badillo checked and correct.  LOC:  Hernan Badillo is awake, alert, and aware of environment with an appropriate affect. He is oriented x 3 and speaking appropriately.  APPEARANCE:  He is resting comfortably and in no acute distress. He is clean and well groomed, patient's clothing is properly fastened.  SKIN:  The skin is warm and dry. He has normal skin turgor and moist mucus membranes. Skin is intact; no bruising or breakdown noted.  MUSCULOSKELETAL:  He is moving all extremities well, no obvious deformities noted. Pulses intact. Right rib pain  RESPIRATORY:  Airway is open and patent. Respirations are spontaneous and non-labored with normal effort and rate.  Coarse, cough, congestion  CARDIAC:  He has a normal rate and rhythm. No peripheral edema noted. Capillary refill < 3 seconds.  ABDOMEN:  No distention noted.  Soft and non-tender upon palpation.  NEUROLOGICAL:  PERRL. Facial expression is symmetrical. Left sided weakness.  Allergies reported:  Review of patient's allergies indicates:  No Known Allergies

## 2024-03-27 PROBLEM — J18.9 RIGHT LOWER LOBE PNEUMONIA: Status: RESOLVED | Noted: 2020-05-30 | Resolved: 2024-03-27

## 2024-03-27 PROBLEM — T14.8XXA HEMATOMA: Status: RESOLVED | Noted: 2017-07-06 | Resolved: 2024-03-27

## 2024-03-27 PROBLEM — R07.9 CHEST PAIN: Status: RESOLVED | Noted: 2020-05-30 | Resolved: 2024-03-27

## 2024-03-27 PROBLEM — D64.9 ANEMIA: Status: ACTIVE | Noted: 2024-03-27

## 2024-03-27 PROBLEM — K21.9 GERD (GASTROESOPHAGEAL REFLUX DISEASE): Status: ACTIVE | Noted: 2024-03-27

## 2024-03-27 PROBLEM — G93.6 VASOGENIC BRAIN EDEMA: Status: RESOLVED | Noted: 2022-11-22 | Resolved: 2024-03-27

## 2024-03-27 PROBLEM — R26.81 GAIT INSTABILITY: Status: RESOLVED | Noted: 2023-02-13 | Resolved: 2024-03-27

## 2024-03-27 PROBLEM — R10.13 EPIGASTRIC PAIN: Status: RESOLVED | Noted: 2020-05-30 | Resolved: 2024-03-27

## 2024-03-27 PROBLEM — E78.5 HLD (HYPERLIPIDEMIA): Status: ACTIVE | Noted: 2024-03-27

## 2024-03-27 PROBLEM — I61.9 NONTRAUMATIC INTRACEREBRAL HEMORRHAGE, UNSPECIFIED: Status: ACTIVE | Noted: 2022-11-22

## 2024-03-27 PROBLEM — J18.9 COMMUNITY ACQUIRED PNEUMONIA OF RIGHT LOWER LOBE OF LUNG: Status: RESOLVED | Noted: 2020-05-30 | Resolved: 2024-03-27

## 2024-03-27 PROBLEM — J18.9 PNEUMONIA: Status: RESOLVED | Noted: 2024-03-26 | Resolved: 2024-03-27
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
## 2024-03-27 PROBLEM — F32.9 MDD (MAJOR DEPRESSIVE DISORDER): Status: ACTIVE | Noted: 2024-03-27

## 2024-03-27 LAB
AMMONIA PLAS-SCNC: 53 UMOL/L (ref 10–50)
AMPHET+METHAMPHET UR QL: NEGATIVE
ANION GAP SERPL CALC-SCNC: 8 MMOL/L (ref 8–16)
BACTERIA #/AREA URNS HPF: NORMAL /HPF
BARBITURATES UR QL SCN>200 NG/ML: NEGATIVE
BASOPHILS # BLD AUTO: 0 K/UL (ref 0–0.2)
BASOPHILS NFR BLD: 0 % (ref 0–1.9)
BENZODIAZ UR QL SCN>200 NG/ML: NEGATIVE
BILIRUB UR QL STRIP: NEGATIVE
BUN SERPL-MCNC: 13 MG/DL (ref 6–20)
BZE UR QL SCN: NEGATIVE
CALCIUM SERPL-MCNC: 9.4 MG/DL (ref 8.7–10.5)
CANNABINOIDS UR QL SCN: NEGATIVE
CHLORIDE SERPL-SCNC: 97 MMOL/L (ref 95–110)
CLARITY UR: CLEAR
CO2 SERPL-SCNC: 31 MMOL/L (ref 23–29)
COLOR UR: YELLOW
CREAT SERPL-MCNC: 0.8 MG/DL (ref 0.5–1.4)
CREAT UR-MCNC: 120.1 MG/DL (ref 23–375)
DIFFERENTIAL METHOD BLD: ABNORMAL
EOSINOPHIL # BLD AUTO: 0 K/UL (ref 0–0.5)
EOSINOPHIL NFR BLD: 0 % (ref 0–8)
ERYTHROCYTE [DISTWIDTH] IN BLOOD BY AUTOMATED COUNT: 13.5 % (ref 11.5–14.5)
EST. GFR  (NO RACE VARIABLE): >60 ML/MIN/1.73 M^2
GLUCOSE SERPL-MCNC: 196 MG/DL (ref 70–110)
GLUCOSE UR QL STRIP: ABNORMAL
HCT VFR BLD AUTO: 37 % (ref 40–54)
HGB BLD-MCNC: 12 G/DL (ref 14–18)
HGB UR QL STRIP: NEGATIVE
IMM GRANULOCYTES # BLD AUTO: 0.01 K/UL (ref 0–0.04)
IMM GRANULOCYTES NFR BLD AUTO: 0.3 % (ref 0–0.5)
KETONES UR QL STRIP: NEGATIVE
LEUKOCYTE ESTERASE UR QL STRIP: NEGATIVE
LYMPHOCYTES # BLD AUTO: 0.3 K/UL (ref 1–4.8)
LYMPHOCYTES NFR BLD: 8.2 % (ref 18–48)
MCH RBC QN AUTO: 30.4 PG (ref 27–31)
MCHC RBC AUTO-ENTMCNC: 32.4 G/DL (ref 32–36)
MCV RBC AUTO: 94 FL (ref 82–98)
METHADONE UR QL SCN>300 NG/ML: NEGATIVE
MICROSCOPIC COMMENT: NORMAL
MONOCYTES # BLD AUTO: 0 K/UL (ref 0.3–1)
MONOCYTES NFR BLD: 0.8 % (ref 4–15)
NEUTROPHILS # BLD AUTO: 3.4 K/UL (ref 1.8–7.7)
NEUTROPHILS NFR BLD: 90.7 % (ref 38–73)
NITRITE UR QL STRIP: NEGATIVE
NRBC BLD-RTO: 0 /100 WBC
OPIATES UR QL SCN: ABNORMAL
PCP UR QL SCN>25 NG/ML: NEGATIVE
PH UR STRIP: 6 [PH] (ref 5–8)
PLATELET # BLD AUTO: 193 K/UL (ref 150–450)
PMV BLD AUTO: 10.3 FL (ref 9.2–12.9)
POCT GLUCOSE: 149 MG/DL (ref 70–110)
POCT GLUCOSE: 152 MG/DL (ref 70–110)
POCT GLUCOSE: 168 MG/DL (ref 70–110)
POCT GLUCOSE: 169 MG/DL (ref 70–110)
POCT GLUCOSE: 193 MG/DL (ref 70–110)
POTASSIUM SERPL-SCNC: 4.7 MMOL/L (ref 3.5–5.1)
PROT UR QL STRIP: ABNORMAL
RBC # BLD AUTO: 3.95 M/UL (ref 4.6–6.2)
RBC #/AREA URNS HPF: 1 /HPF (ref 0–4)
SODIUM SERPL-SCNC: 136 MMOL/L (ref 136–145)
SP GR UR STRIP: >1.03 (ref 1–1.03)
SQUAMOUS #/AREA URNS HPF: 1 /HPF
TOXICOLOGY INFORMATION: ABNORMAL
TROPONIN I SERPL DL<=0.01 NG/ML-MCNC: 0.04 NG/ML (ref 0–0.03)
TROPONIN I SERPL DL<=0.01 NG/ML-MCNC: 0.06 NG/ML (ref 0–0.03)
URN SPEC COLLECT METH UR: ABNORMAL
UROBILINOGEN UR STRIP-ACNC: NEGATIVE EU/DL
WBC # BLD AUTO: 3.79 K/UL (ref 3.9–12.7)
WBC #/AREA URNS HPF: 1 /HPF (ref 0–5)
YEAST URNS QL MICRO: NORMAL

## 2024-03-27 PROCEDURE — 99406 BEHAV CHNG SMOKING 3-10 MIN: CPT

## 2024-03-27 PROCEDURE — 25000242 PHARM REV CODE 250 ALT 637 W/ HCPCS

## 2024-03-27 PROCEDURE — 27000221 HC OXYGEN, UP TO 24 HOURS

## 2024-03-27 PROCEDURE — 97165 OT EVAL LOW COMPLEX 30 MIN: CPT

## 2024-03-27 PROCEDURE — 85025 COMPLETE CBC W/AUTO DIFF WBC: CPT

## 2024-03-27 PROCEDURE — 36415 COLL VENOUS BLD VENIPUNCTURE: CPT

## 2024-03-27 PROCEDURE — 63600175 PHARM REV CODE 636 W HCPCS

## 2024-03-27 PROCEDURE — 97535 SELF CARE MNGMENT TRAINING: CPT

## 2024-03-27 PROCEDURE — 94660 CPAP INITIATION&MGMT: CPT

## 2024-03-27 PROCEDURE — 94640 AIRWAY INHALATION TREATMENT: CPT | Mod: XB

## 2024-03-27 PROCEDURE — 80048 BASIC METABOLIC PNL TOTAL CA: CPT

## 2024-03-27 PROCEDURE — 99900035 HC TECH TIME PER 15 MIN (STAT)

## 2024-03-27 PROCEDURE — 84484 ASSAY OF TROPONIN QUANT: CPT | Mod: 91

## 2024-03-27 PROCEDURE — 96372 THER/PROPH/DIAG INJ SC/IM: CPT | Performed by: STUDENT IN AN ORGANIZED HEALTH CARE EDUCATION/TRAINING PROGRAM

## 2024-03-27 PROCEDURE — 25000003 PHARM REV CODE 250

## 2024-03-27 PROCEDURE — 11000001 HC ACUTE MED/SURG PRIVATE ROOM

## 2024-03-27 PROCEDURE — 84484 ASSAY OF TROPONIN QUANT: CPT

## 2024-03-27 PROCEDURE — 94761 N-INVAS EAR/PLS OXIMETRY MLT: CPT

## 2024-03-27 PROCEDURE — 63600175 PHARM REV CODE 636 W HCPCS: Performed by: STUDENT IN AN ORGANIZED HEALTH CARE EDUCATION/TRAINING PROGRAM

## 2024-03-27 RX ORDER — GLUCAGON 1 MG
1 KIT INJECTION
Status: DISCONTINUED | OUTPATIENT
Start: 2024-03-27 | End: 2024-03-29 | Stop reason: HOSPADM

## 2024-03-27 RX ORDER — NALOXONE HCL 0.4 MG/ML
0.4 VIAL (ML) INJECTION
Status: DISCONTINUED | OUTPATIENT
Start: 2024-03-27 | End: 2024-03-29 | Stop reason: HOSPADM

## 2024-03-27 RX ORDER — IBUPROFEN 200 MG
24 TABLET ORAL
Status: DISCONTINUED | OUTPATIENT
Start: 2024-03-27 | End: 2024-03-29 | Stop reason: HOSPADM

## 2024-03-27 RX ORDER — INSULIN ASPART 100 [IU]/ML
0-10 INJECTION, SOLUTION INTRAVENOUS; SUBCUTANEOUS
Status: DISCONTINUED | OUTPATIENT
Start: 2024-03-27 | End: 2024-03-29 | Stop reason: HOSPADM

## 2024-03-27 RX ORDER — IBUPROFEN 200 MG
16 TABLET ORAL
Status: DISCONTINUED | OUTPATIENT
Start: 2024-03-27 | End: 2024-03-29 | Stop reason: HOSPADM

## 2024-03-27 RX ADMIN — INSULIN ASPART 1 UNITS: 100 INJECTION, SOLUTION INTRAVENOUS; SUBCUTANEOUS at 11:03

## 2024-03-27 RX ADMIN — POLYETHYLENE GLYCOL 3350 17 G: 17 POWDER, FOR SOLUTION ORAL at 08:03

## 2024-03-27 RX ADMIN — MELATONIN TAB 3 MG 9 MG: 3 TAB at 11:03

## 2024-03-27 RX ADMIN — IPRATROPIUM BROMIDE AND ALBUTEROL SULFATE 3 ML: 2.5; .5 SOLUTION RESPIRATORY (INHALATION) at 07:03

## 2024-03-27 RX ADMIN — ESCITALOPRAM OXALATE 10 MG: 10 TABLET, FILM COATED ORAL at 08:03

## 2024-03-27 RX ADMIN — ARFORMOTEROL TARTRATE 15 MCG: 15 SOLUTION RESPIRATORY (INHALATION) at 07:03

## 2024-03-27 RX ADMIN — PREGABALIN 100 MG: 75 CAPSULE ORAL at 08:03

## 2024-03-27 RX ADMIN — PANTOPRAZOLE SODIUM 40 MG: 40 TABLET, DELAYED RELEASE ORAL at 08:03

## 2024-03-27 RX ADMIN — PREDNISONE 40 MG: 20 TABLET ORAL at 08:03

## 2024-03-27 RX ADMIN — INSULIN ASPART 2 UNITS: 100 INJECTION, SOLUTION INTRAVENOUS; SUBCUTANEOUS at 04:03

## 2024-03-27 RX ADMIN — ACETAMINOPHEN 650 MG: 325 TABLET ORAL at 11:03

## 2024-03-27 RX ADMIN — ATORVASTATIN CALCIUM 20 MG: 20 TABLET, FILM COATED ORAL at 08:03

## 2024-03-27 RX ADMIN — LEVOFLOXACIN 750 MG: 250 TABLET, FILM COATED ORAL at 08:03

## 2024-03-27 RX ADMIN — BUDESONIDE INHALATION 1 MG: 0.5 SUSPENSION RESPIRATORY (INHALATION) at 07:03

## 2024-03-27 RX ADMIN — ENOXAPARIN SODIUM 40 MG: 40 INJECTION SUBCUTANEOUS at 11:03

## 2024-03-27 RX ADMIN — ASPIRIN 81 MG: 81 TABLET, COATED ORAL at 08:03

## 2024-03-27 RX ADMIN — INSULIN ASPART 2 UNITS: 100 INJECTION, SOLUTION INTRAVENOUS; SUBCUTANEOUS at 11:03

## 2024-03-27 RX ADMIN — LISINOPRIL 10 MG: 10 TABLET ORAL at 08:03

## 2024-03-27 RX ADMIN — AMLODIPINE BESYLATE 10 MG: 5 TABLET ORAL at 08:03

## 2024-03-27 NOTE — NURSING
Pt's troponin resulted  0.057 from 0.039. VSS and when this RN woke pt up, pt denies SOB and chest pain. Pt on 1L NC. Melida Sam NP notified with no new orders.     Order for BIPAP received on pt. Pt did not tolerate it when Soledad Rosas RRT attempted to place. Melida Sam NP notifed, no new orders. Rocio Alaniz NP notified and verified if provider wanted 0200 ABG drawn on pt since pt is not wearing BIPAP. Was told not to draw it.

## 2024-03-27 NOTE — ASSESSMENT & PLAN NOTE
"Patient's FSGs are controlled on current medication regimen.  Last A1c reviewed-   Lab Results   Component Value Date    LABA1C 9.8 (H) 07/06/2016    HGBA1C 6.5 (H) 12/04/2023     Most recent fingerstick glucose reviewed- No results for input(s): "POCTGLUCOSE" in the last 24 hours.  Current correctional scale  Low  Maintain anti-hyperglycemic dose as follows-   Antihyperglycemics (From admission, onward)      Start     Stop Route Frequency Ordered    03/26/24 2219  insulin aspart U-100 pen 0-5 Units         -- SubQ Before meals & nightly PRN 03/26/24 2219          Hold Oral hypoglycemics while patient is in the hospital.  "

## 2024-03-27 NOTE — PT/OT/SLP PROGRESS
Physical Therapy      Patient Name:  Hernan Badillo   MRN:  [id number]    PT eval attempted- pt sleepy and request PT tomorrow. Nurse Rachel stated pt showered earlier. Will follow up 03-.

## 2024-03-27 NOTE — ASSESSMENT & PLAN NOTE
Patient's FSGs are controlled on current medication regimen.  Last A1c reviewed-   Lab Results   Component Value Date    LABA1C 9.8 (H) 07/06/2016    HGBA1C 6.1 03/26/2024     Most recent fingerstick glucose reviewed-   Recent Labs   Lab 03/26/24  2300 03/27/24  0756   POCTGLUCOSE 272* 149*     Current correctional scale  Low  Maintain anti-hyperglycemic dose as follows-   Antihyperglycemics (From admission, onward)      Start     Stop Route Frequency Ordered    03/27/24 1240  insulin aspart U-100 pen 0-10 Units         -- SubQ Before meals & nightly PRN 03/27/24 1140          Hold Oral hypoglycemics while patient is in the hospital.

## 2024-03-27 NOTE — ASSESSMENT & PLAN NOTE
Patient's COPD is with exacerbation noted by continued dyspnea and worsening of baseline hypoxia currently.  Patient is currently on COPD Pathway. Continue scheduled inhalers Steroids, Antibiotics, and Supplemental oxygen and monitor respiratory status closely.     -Procalcitonin pending  -Sputum Culture  -Blood culture x2   -Levaquin initiated

## 2024-03-27 NOTE — SUBJECTIVE & OBJECTIVE
Past Medical History:   Diagnosis Date    COPD (chronic obstructive pulmonary disease)     Diabetes mellitus, type 2     Hypertension        Past Surgical History:   Procedure Laterality Date    DENTAL SURGERY      ESOPHAGOGASTRODUODENOSCOPY N/A 6/1/2020    Procedure: EGD (ESOPHAGOGASTRODUODENOSCOPY);  Surgeon: Terrell May MD;  Location: [facility];  Service: Endoscopy;  Laterality: N/A;    HERNIA REPAIR      left inguinal    incision and drainiage         Review of patient's allergies indicates:  No Known Allergies    No current facility-administered medications on file prior to encounter.     Current Outpatient Medications on File Prior to Encounter   Medication Sig    acetaminophen (TYLENOL) 325 MG tablet Take 650 mg by mouth 3 (three) times daily as needed.    albuterol (PROVENTIL HFA) 90 mcg/actuation inhaler Inhale 2 puffs into the lungs every 6 (six) hours as needed for Wheezing. Rescue    albuterol (PROVENTIL/VENTOLIN HFA) 90 mcg/actuation inhaler Inhale 1-2 puffs into the lungs every 6 (six) hours as needed. Rescue    albuterol-ipratropium (DUO-NEB) 2.5 mg-0.5 mg/3 mL nebulizer solution Take 3 mLs by nebulization every 6 (six) hours as needed for Wheezing or Shortness of Breath.    amLODIPine (NORVASC) 10 MG tablet Take 1 tablet (10 mg total) by mouth once daily.    aspirin (ECOTRIN) 81 MG EC tablet Take 1 tablet (81 mg total) by mouth once daily.    atorvastatin (LIPITOR) 20 MG tablet Take 1 tablet (20 mg total) by mouth once daily.    baclofen (LIORESAL) 10 MG tablet Take 1 tablet (10 mg total) by mouth 2 (two) times daily.    blood sugar diagnostic, disc Strp 1 each by Misc.(Non-Drug; Combo Route) route 4 (four) times daily.    blood-glucose meter kit Use as instructed    clopidogreL (PLAVIX) 75 mg tablet Take 1 tablet (75 mg total) by mouth once daily. for 21 days    EScitalopram oxalate (LEXAPRO) 10 MG tablet Take 10 mg by mouth.    fluticasone-salmeterol diskus inhaler 500-50 mcg Inhale 1 puff  into the lungs 2 (two) times daily. Controller    lancets (LANCETS,THIN) Misc 1 each by Misc.(Non-Drug; Combo Route) route 4 (four) times daily.    lisinopriL 10 MG tablet Take 1 tablet (10 mg total) by mouth once daily.    meclizine (ANTIVERT) 25 mg tablet Take 25 mg by mouth.    melatonin (MELATIN) 5 mg Take 5 mg by mouth.    metFORMIN (GLUCOPHAGE-XR) 500 MG ER 24hr tablet Take 1 tablet (500 mg total) by mouth daily with breakfast.    pantoprazole (PROTONIX) 40 MG tablet Take 1 tablet (40 mg total) by mouth once daily.    pregabalin (LYRICA) 100 MG capsule Take 1 capsule (100 mg total) by mouth 2 (two) times daily.     Family History       Problem Relation (Age of Onset)    Cancer Maternal Grandmother    Drug abuse Father    Heart attack Father (80)    Heart disease Father          Tobacco Use    Smoking status: Every Day     Current packs/day: 2.00     Average packs/day: 2.0 packs/day for 30.0 years (60.0 ttl pk-yrs)     Types: Cigarettes    Smokeless tobacco: Never   Substance and Sexual Activity    Alcohol use: Yes     Alcohol/week: 1.0 standard drink of alcohol     Types: 1 Shots of liquor per week     Comment: 1/2 pint 2 x per week - quit approximately 5 months ago    Drug use: No    Sexual activity: Yes     Partners: Female     Birth control/protection: None     Comment: No history of STDs.     Review of Systems   Respiratory:  Positive for shortness of breath.    Musculoskeletal:  Positive for arthralgias and myalgias.   Neurological:  Positive for syncope, weakness and numbness.   Psychiatric/Behavioral:  Positive for confusion.    All other systems reviewed and are negative.    Objective:     Vital Signs (Most Recent):  Temp: 97.8 °F (36.6 °C) (03/26/24 2239)  Pulse: 84 (03/26/24 2239)  Resp: 16 (03/26/24 2239)  BP: (!) 165/87 (03/26/24 2239)  SpO2: 100 % (03/26/24 2239) Vital Signs (24h Range):  Temp:  [97.8 °F (36.6 °C)-98.4 °F (36.9 °C)] 97.8 °F (36.6 °C)  Pulse:  [67-91] 84  Resp:  [16-20] 16  SpO2:   [92 %-100 %] 100 %  BP: (136-187)/() 165/87        There is no height or weight on file to calculate BMI.     Physical Exam  Vitals reviewed.   HENT:      Head: Normocephalic and atraumatic.      Mouth/Throat:      Mouth: Mucous membranes are dry.      Pharynx: Oropharynx is clear.   Eyes:      Extraocular Movements: Extraocular movements intact.      Pupils: Pupils are equal, round, and reactive to light.   Cardiovascular:      Rate and Rhythm: Normal rate and regular rhythm.      Pulses: Normal pulses.      Heart sounds: Normal heart sounds.   Pulmonary:      Breath sounds: Decreased air movement present. Examination of the right-upper field reveals decreased breath sounds. Examination of the left-upper field reveals decreased breath sounds. Examination of the right-middle field reveals decreased breath sounds. Examination of the left-middle field reveals decreased breath sounds. Examination of the right-lower field reveals decreased breath sounds. Examination of the left-lower field reveals decreased breath sounds. Decreased breath sounds present.      Comments: Patient unable to take deep breath  Dry cough noted with attempt of deep breathing  Chest:      Chest wall: Tenderness present.          Comments: [place] is area of tenderness and guarding with palpation   Abdominal:      General: Bowel sounds are normal. There is no distension.      Palpations: Abdomen is soft.      Tenderness: There is no abdominal tenderness. There is no guarding.   Musculoskeletal:         General: Normal range of motion.      Cervical back: Normal range of motion and neck supple.   Skin:     General: Skin is warm and dry.      Capillary Refill: Capillary refill takes 2 to 3 seconds.   Neurological:      Mental Status: He is alert. He is confused.      GCS: GCS eye subscore is 4. GCS verbal subscore is 4. GCS motor subscore is 6.      Sensory: Sensory deficit present.      Motor: Weakness present.      Coordination: Heel to  Shin Test abnormal.      Gait: Gait is intact.      Comments: Chronic entire right sided weakness              CRANIAL NERVES     CN III, IV, VI   Pupils are equal, round, and reactive to light.       Significant Labs: All pertinent labs within the past 24 hours have been reviewed.  CBC:   Recent Labs   Lab 03/26/24  1646   WBC 5.77   HGB 12.4*   HCT 38.9*        CMP:   Recent Labs   Lab 03/26/24  1646      K 3.4*   CL 97   CO2 31*   GLU 84   BUN 8   CREATININE 0.8   CALCIUM 9.5   PROT 7.8   ALBUMIN 3.4*   BILITOT 0.9   ALKPHOS 80   AST 11   ALT 5*   ANIONGAP 9     Coagulation:   Recent Labs   Lab 03/26/24  1646   INR 1.0     Lipid Panel:   Recent Labs   Lab 03/26/24  1646   CHOL 126   HDL 38*   LDLCALC 74.8   TRIG 66   CHOLHDL 30.2     Troponin:   Recent Labs   Lab 03/26/24  1646   TROPONINI 0.039*     TSH:   Recent Labs   Lab 03/26/24  1646   TSH 0.417       Significant Imaging: I have reviewed all pertinent imaging results/findings within the past 24 hours.  EXAMINATION:  CTA HEAD AND NECK (XPD)     CLINICAL HISTORY:  Neuro deficit, acute, stroke suspected;     TECHNIQUE:  Non contrast low dose axial images were obtained through the head. CT angiogram was performed from the level of the ignacia to the top of the head following the IV administration of 75mL of Omnipaque 350.   Sagittal and coronal reconstructions and maximum intensity projection reconstructions were performed. Arterial stenosis percentages are based on NASCET measurement criteria.     COMPARISON:  12/03/2023, MRI brain 12/04/2023     FINDINGS:  Brain:     No evidence of acute intracranial hemorrhage.     The ventricles and sulci are appropriate in size and configuration for the patient's age without hydrocephalus.     Stable small chronic lacunar infarct in the right frontal corona radiata, as well as bilateral basal ganglia and right paramedian alf.  There is no significant mass effect, midline shift, or extra-axial fluid collection.  Remainder of the gray-white matter differentiation is within normal limits without evidence of an acute major vascular territory infarct.     No abnormal intracranial enhancement.     Chronic deformity of the medial left orbital wall.  Partial opacification of the right sphenoid sinus.  No acute calvarial fracture.     Extracranial:     Aorta and great vessels: Left-sided aortic arch with normal configuration.   No evidence of stenosis of the origins of the great vessels from the arch.     Subclavian arteries: The subclavian arteries are without hemodynamically significant stenosis or occlusion.     Right carotid: The right common carotid artery is without significant stenosis. Mild calcific atherosclerotic plaque at the carotid bifurcation without evidence of a hemodynamically significant stenosis..  The right internal carotid artery is tortuous without significant stenosis, occlusion, or dissection.     Left carotid: The left common carotid artery is without significant stenosis. Mild calcific atherosclerotic plaque at the carotid bifurcation without evidence of a hemodynamically significant stenosis.  The left internal carotid artery is tortuous without significant stenosis, occlusion, or dissection.     Extracranial vertebral arteries:  The vertebral arteries are without significant stenosis, occlusion, or dissection to the skull base.     Intracranial:     Anterior circulation: Moderate calcific atherosclerosis of bilateral carotid artery siphons.  No areas of significant atherosclerotic narrowing or filling defects are identified.  The middle cerebral arteries are normal.  The anterior cerebral arteries are normal.     Posterior circulation: The vertebral arteries are normal. The basilar artery is normal. 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  Posterior cerebral arteries otherwise grossly normal.     No evidence  of aneurysm or arteriovenous malformation.     Dural venous sinuses are patent.     Other:     The visualized portions of the lung apices demonstrated moderate centrilobular emphysematous changes.  Respiratory motion limits evaluation of the pulmonary parenchyma.  No focal consolidation, pleural effusion or pneumothorax.     There are degenerative spine changes. No concerning osseous lesions identified.     Impression:     1. No evidence of acute intracranial abnormality or significant interval detrimental change as compared to the prior exam.  Further evaluation as warranted clinically.  2. Chronic small vessel ischemic changes again demonstrated, including chronic lacunar infarcts involving the right frontal corona radiata, bilateral basal ganglia and alf.  3. No high-grade stenosis, large vessel occlusion, dissection or aneurysm in the head and neck vasculature.        Electronically signed by: Delvin Vera  Date:                                            03/26/2024  Time:                                           18:25      EXAMINATION:  XR CHEST AP PORTABLE     CLINICAL HISTORY:  Pleurodynia     TECHNIQUE:  Single frontal view of the chest was performed.     COMPARISON:  12/03/2023 and 09/26/2023     FINDINGS:  There is reticulonodular disease in the lower lung zones with some tram-tracking.  Upper lung zones are clear.  Unchanged heart size.  No pleural effusion or pneumothorax.     Impression:     Unchanged extent of reticulonodular disease in the lung bases.  This is better characterized on prior CT.  Some differential considerations include includes atypical infection, pneumonitis, aspiration, airways disease.        Electronically signed by: Iván Coronado  Date:                                            03/26/2024  Time:                                           16:49      EXAMINATION:  XR RIBS 2 VIEW RIGHT     CLINICAL HISTORY:  Unspecified fall, initial encounter     TECHNIQUE:  Two views of the  right ribs were performed.     COMPARISON:  03/26/2024     FINDINGS:  No displaced fracture or traumatic malalignment.  Hazy interstitial opacities right lung base better characterized on dated chest radiograph.  Mild degenerative change right AC joint.        Electronically signed by: Iván Coronado  Date:                                            03/26/2024  Time:                                           16:55

## 2024-03-27 NOTE — PLAN OF CARE
POC/Meds reviewed, pt verbalized understanding. Vitals stable.  Afebrile.   Tele In place-[id number]. Accucechecks monitored. Pt had a shower today. standby assist. Repositions self. Hourly/Q2hr rounding performed, safety maintained. Bed in lowest position, wheels locked, SR up x2, call light in easy reach. No  complaints at this time. Will continue to monitor.

## 2024-03-27 NOTE — ASSESSMENT & PLAN NOTE
Chronic, controlled. Latest blood pressure and vitals reviewed-     Temp:  [98.4 °F (36.9 °C)]   Pulse:  [67-91]   Resp:  [16-20]   BP: (136-187)/()   SpO2:  [92 %-100 %] .   Home meds for hypertension were reviewed and noted below.   Hypertension Medications               amLODIPine (NORVASC) 10 MG tablet Take 1 tablet (10 mg total) by mouth once daily.    lisinopriL 10 MG tablet Take 1 tablet (10 mg total) by mouth once daily.            While in the hospital, will manage blood pressure as follows; Continue home antihypertensive regimen    Will utilize p.r.n. blood pressure medication only if patient's blood pressure greater than 180/110 and he develops symptoms such as worsening chest pain or shortness of breath.

## 2024-03-27 NOTE — H&P
"  [facility]  History & Physical    Patient Name: Hernan Badillo  MRN: [id number]  Patient Class: OP- Observation  Admission Date: 3/26/2024  Attending Physician: Delvin Chung MD   Primary Care Provider: Toan Banks DO         Patient information was obtained from patient, past medical records, and ER records.     Subjective:     Principal Problem:COPD exacerbation    Chief Complaint:   Chief Complaint   Patient presents with    Rib Injury     Pt fell yesterday or the day before pt could not advise, pt states he is having rib pain on the right side.  Pt complains of tightness and numbness in his neck and throat, pt believes he has had a stroke.        HPI: Hernan Badillo is a 58 year old male with a previous medical history of COPD, DMII, HTN, CVA in December 2023 with right side residual weakness who presents for right side rib pain secondary to fall and possible syncope. Unable to obtain a clear history as patient is poor historian but reports a possible fall on Saturday or Sunday and all he recalls is "darkness". Since that time patient has had severe right sided rib pain aggravated by movement and endorses buying pain pills off the street but they have afforded minimal relief. Initial ED evaluation included a CT of the head and neck which was negative for any acute process along with xrays of the chest and ribs that showed no rib fractures but hazy interstitial opacities of the right lung base. CBC and CMP unremarkable. Troponin 0.039. EKG shows no signs of St elevation. Upon examination patient noted to have increased work of breathing and unable to have a conversation on room air without having shortness of breath. Per nursing notes patient seen to be 86% on room air while sleeping. Patient to be admitted by hospital medicine for further evaluation and management     Past Medical History:   Diagnosis Date    COPD (chronic obstructive pulmonary disease)     Diabetes " mellitus, type 2     Hypertension        Past Surgical History:   Procedure Laterality Date    DENTAL SURGERY      ESOPHAGOGASTRODUODENOSCOPY N/A 6/1/2020    Procedure: EGD (ESOPHAGOGASTRODUODENOSCOPY);  Surgeon: Terrell May MD;  Location: [facility];  Service: Endoscopy;  Laterality: N/A;    HERNIA REPAIR      left inguinal    incision and drainiage         Review of patient's allergies indicates:  No Known Allergies    No current facility-administered medications on file prior to encounter.     Current Outpatient Medications on File Prior to Encounter   Medication Sig    acetaminophen (TYLENOL) 325 MG tablet Take 650 mg by mouth 3 (three) times daily as needed.    albuterol (PROVENTIL HFA) 90 mcg/actuation inhaler Inhale 2 puffs into the lungs every 6 (six) hours as needed for Wheezing. Rescue    albuterol (PROVENTIL/VENTOLIN HFA) 90 mcg/actuation inhaler Inhale 1-2 puffs into the lungs every 6 (six) hours as needed. Rescue    albuterol-ipratropium (DUO-NEB) 2.5 mg-0.5 mg/3 mL nebulizer solution Take 3 mLs by nebulization every 6 (six) hours as needed for Wheezing or Shortness of Breath.    amLODIPine (NORVASC) 10 MG tablet Take 1 tablet (10 mg total) by mouth once daily.    aspirin (ECOTRIN) 81 MG EC tablet Take 1 tablet (81 mg total) by mouth once daily.    atorvastatin (LIPITOR) 20 MG tablet Take 1 tablet (20 mg total) by mouth once daily.    baclofen (LIORESAL) 10 MG tablet Take 1 tablet (10 mg total) by mouth 2 (two) times daily.    blood sugar diagnostic, disc Strp 1 each by Misc.(Non-Drug; Combo Route) route 4 (four) times daily.    blood-glucose meter kit Use as instructed    clopidogreL (PLAVIX) 75 mg tablet Take 1 tablet (75 mg total) by mouth once daily. for 21 days    EScitalopram oxalate (LEXAPRO) 10 MG tablet Take 10 mg by mouth.    fluticasone-salmeterol diskus inhaler 500-50 mcg Inhale 1 puff into the lungs 2 (two) times daily. Controller    lancets (LANCETS,THIN) Misc 1 each by  Misc.(Non-Drug; Combo Route) route 4 (four) times daily.    lisinopriL 10 MG tablet Take 1 tablet (10 mg total) by mouth once daily.    meclizine (ANTIVERT) 25 mg tablet Take 25 mg by mouth.    melatonin (MELATIN) 5 mg Take 5 mg by mouth.    metFORMIN (GLUCOPHAGE-XR) 500 MG ER 24hr tablet Take 1 tablet (500 mg total) by mouth daily with breakfast.    pantoprazole (PROTONIX) 40 MG tablet Take 1 tablet (40 mg total) by mouth once daily.    pregabalin (LYRICA) 100 MG capsule Take 1 capsule (100 mg total) by mouth 2 (two) times daily.     Family History       Problem Relation (Age of Onset)    Cancer Maternal Grandmother    Drug abuse Father    Heart attack Father (80)    Heart disease Father          Tobacco Use    Smoking status: Every Day     Current packs/day: 2.00     Average packs/day: 2.0 packs/day for 30.0 years (60.0 ttl pk-yrs)     Types: Cigarettes    Smokeless tobacco: Never   Substance and Sexual Activity    Alcohol use: Yes     Alcohol/week: 1.0 standard drink of alcohol     Types: 1 Shots of liquor per week     Comment: 1/2 pint 2 x per week - quit approximately 5 months ago    Drug use: No    Sexual activity: Yes     Partners: Female     Birth control/protection: None     Comment: No history of STDs.     Review of Systems   Respiratory:  Positive for shortness of breath.    Musculoskeletal:  Positive for arthralgias and myalgias.   Neurological:  Positive for syncope, weakness and numbness.   Psychiatric/Behavioral:  Positive for confusion.    All other systems reviewed and are negative.    Objective:     Vital Signs (Most Recent):  Temp: 97.8 °F (36.6 °C) (03/26/24 2239)  Pulse: 84 (03/26/24 2239)  Resp: 16 (03/26/24 2239)  BP: (!) 165/87 (03/26/24 2239)  SpO2: 100 % (03/26/24 2239) Vital Signs (24h Range):  Temp:  [97.8 °F (36.6 °C)-98.4 °F (36.9 °C)] 97.8 °F (36.6 °C)  Pulse:  [67-91] 84  Resp:  [16-20] 16  SpO2:  [92 %-100 %] 100 %  BP: (136-187)/() 165/87        There is no height or weight on  file to calculate BMI.     Physical Exam  Vitals reviewed.   HENT:      Head: Normocephalic and atraumatic.      Mouth/Throat:      Mouth: Mucous membranes are dry.      Pharynx: Oropharynx is clear.   Eyes:      Extraocular Movements: Extraocular movements intact.      Pupils: Pupils are equal, round, and reactive to light.   Cardiovascular:      Rate and Rhythm: Normal rate and regular rhythm.      Pulses: Normal pulses.      Heart sounds: Normal heart sounds.   Pulmonary:      Breath sounds: Decreased air movement present. Examination of the right-upper field reveals decreased breath sounds. Examination of the left-upper field reveals decreased breath sounds. Examination of the right-middle field reveals decreased breath sounds. Examination of the left-middle field reveals decreased breath sounds. Examination of the right-lower field reveals decreased breath sounds. Examination of the left-lower field reveals decreased breath sounds. Decreased breath sounds present.      Comments: Patient unable to take deep breath  Dry cough noted with attempt of deep breathing  Chest:      Chest wall: Tenderness present.          Comments: Blue [place] is area of tenderness and guarding with palpation   Abdominal:      General: Bowel sounds are normal. There is no distension.      Palpations: Abdomen is soft.      Tenderness: There is no abdominal tenderness. There is no guarding.   Musculoskeletal:         General: Normal range of motion.      Cervical back: Normal range of motion and neck supple.   Skin:     General: Skin is warm and dry.      Capillary Refill: Capillary refill takes 2 to 3 seconds.   Neurological:      Mental Status: He is alert. He is confused.      GCS: GCS eye subscore is 4. GCS verbal subscore is 4. GCS motor subscore is 6.      Sensory: Sensory deficit present.      Motor: Weakness present.      Coordination: Heel to Shin Test abnormal.      Gait: Gait is intact.      Comments: Chronic entire right sided  weakness   Patient unable to report year, current president or current month    Psych: Patient affect is odd. Patient is restless.          CRANIAL NERVES     CN III, IV, VI   Pupils are equal, round, and reactive to light.       Significant Labs: All pertinent labs within the past 24 hours have been reviewed.  CBC:   Recent Labs   Lab 03/26/24  1646   WBC 5.77   HGB 12.4*   HCT 38.9*        CMP:   Recent Labs   Lab 03/26/24  1646      K 3.4*   CL 97   CO2 31*   GLU 84   BUN 8   CREATININE 0.8   CALCIUM 9.5   PROT 7.8   ALBUMIN 3.4*   BILITOT 0.9   ALKPHOS 80   AST 11   ALT 5*   ANIONGAP 9     Coagulation:   Recent Labs   Lab 03/26/24  1646   INR 1.0     Lipid Panel:   Recent Labs   Lab 03/26/24  1646   CHOL 126   HDL 38*   LDLCALC 74.8   TRIG 66   CHOLHDL 30.2     Troponin:   Recent Labs   Lab 03/26/24  1646   TROPONINI 0.039*     TSH:   Recent Labs   Lab 03/26/24  1646   TSH 0.417       Significant Imaging: I have reviewed all pertinent imaging results/findings within the past 24 hours.  EXAMINATION:  CTA HEAD AND NECK (XPD)     CLINICAL HISTORY:  Neuro deficit, acute, stroke suspected;     TECHNIQUE:  Non contrast low dose axial images were obtained through the head. CT angiogram was performed from the level of the ignacia to the top of the head following the IV administration of 75mL of Omnipaque 350.   Sagittal and coronal reconstructions and maximum intensity projection reconstructions were performed. Arterial stenosis percentages are based on NASCET measurement criteria.     COMPARISON:  12/03/2023, MRI brain 12/04/2023     FINDINGS:  Brain:     No evidence of acute intracranial hemorrhage.     The ventricles and sulci are appropriate in size and configuration for the patient's age without hydrocephalus.     Stable small chronic lacunar infarct in the right frontal corona radiata, as well as bilateral basal ganglia and right paramedian alf.  There is no significant mass effect, midline shift, or  extra-axial fluid collection. Remainder of the gray-white matter differentiation is within normal limits without evidence of an acute major vascular territory infarct.     No abnormal intracranial enhancement.     Chronic deformity of the medial left orbital wall.  Partial opacification of the right sphenoid sinus.  No acute calvarial fracture.     Extracranial:     Aorta and great vessels: Left-sided aortic arch with normal configuration.   No evidence of stenosis of the origins of the great vessels from the arch.     Subclavian arteries: The subclavian arteries are without hemodynamically significant stenosis or occlusion.     Right carotid: The right common carotid artery is without significant stenosis. Mild calcific atherosclerotic plaque at the carotid bifurcation without evidence of a hemodynamically significant stenosis..  The right internal carotid artery is tortuous without significant stenosis, occlusion, or dissection.     Left carotid: The left common carotid artery is without significant stenosis. Mild calcific atherosclerotic plaque at the carotid bifurcation without evidence of a hemodynamically significant stenosis.  The left internal carotid artery is tortuous without significant stenosis, occlusion, or dissection.     Extracranial vertebral arteries:  The vertebral arteries are without significant stenosis, occlusion, or dissection to the skull base.     Intracranial:     Anterior circulation: Moderate calcific atherosclerosis of bilateral carotid artery siphons.  No areas of significant atherosclerotic narrowing or filling defects are identified.  The middle cerebral arteries are normal.  The anterior cerebral arteries are normal.     Posterior circulation: The vertebral arteries are normal. The basilar artery is normal. 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  Posterior cerebral arteries otherwise  grossly normal.     No evidence of aneurysm or arteriovenous malformation.     Dural venous sinuses are patent.     Other:     The visualized portions of the lung apices demonstrated moderate centrilobular emphysematous changes.  Respiratory motion limits evaluation of the pulmonary parenchyma.  No focal consolidation, pleural effusion or pneumothorax.     There are degenerative spine changes. No concerning osseous lesions identified.     Impression:     1. No evidence of acute intracranial abnormality or significant interval detrimental change as compared to the prior exam.  Further evaluation as warranted clinically.  2. Chronic small vessel ischemic changes again demonstrated, including chronic lacunar infarcts involving the right frontal corona radiata, bilateral basal ganglia and alf.  3. No high-grade stenosis, large vessel occlusion, dissection or aneurysm in the head and neck vasculature.        Electronically signed by: Delvin Vera  Date:                                            03/26/2024  Time:                                           18:25      EXAMINATION:  XR CHEST AP PORTABLE     CLINICAL HISTORY:  Pleurodynia     TECHNIQUE:  Single frontal view of the chest was performed.     COMPARISON:  12/03/2023 and 09/26/2023     FINDINGS:  There is reticulonodular disease in the lower lung zones with some tram-tracking.  Upper lung zones are clear.  Unchanged heart size.  No pleural effusion or pneumothorax.     Impression:     Unchanged extent of reticulonodular disease in the lung bases.  This is better characterized on prior CT.  Some differential considerations include includes atypical infection, pneumonitis, aspiration, airways disease.        Electronically signed by: Iván Coronado  Date:                                            03/26/2024  Time:                                           16:49      EXAMINATION:  XR RIBS 2 VIEW RIGHT     CLINICAL HISTORY:  Unspecified fall, initial encounter      TECHNIQUE:  Two views of the right ribs were performed.     COMPARISON:  03/26/2024     FINDINGS:  No displaced fracture or traumatic malalignment.  Hazy interstitial opacities right lung base better characterized on dated chest radiograph.  Mild degenerative change right AC joint.        Electronically signed by: Iván Coronado  Date:                                            03/26/2024  Time:                                           16:55      Assessment/Plan:     * COPD exacerbation  Patient's COPD is with exacerbation noted by continued dyspnea and worsening of baseline hypoxia currently.  Patient is currently on COPD Pathway. Continue scheduled inhalers Steroids, Antibiotics, and Supplemental oxygen and monitor respiratory status closely.     -Procalcitonin pending  -Sputum Culture  -Blood culture x2   -Levaquin initiated    Elevated troponin level  Patient endorses shortness of breath    -EKG shows no ST elevation  -Trend Troponin x3       Syncope  Patient endorses fall with right rib pain and unable to confirm if he had LOC but is unable to recall all events surrounding fall or what day it occurred      Neuro-checks.  Tele-monitoring.   Check TSH.   Check Orthostatics.   2D Echocardiogram.   Carotid doppler U/S.   Fall precautions.     Pneumonia  Xray impression showed possible atypical pnemonia      -Procalcitonin pending  -Blood cultures x2  -Sputum culture  -Initiated levaquin daily  -Scheduled nebs      Acute hypoxemic respiratory failure  Patient with Hypercapnic and Hypoxic Respiratory failure which is Acute on chronic.  he is not on home oxygen. Supplemental oxygen was provided and noted-  2 liters via nasal cannula    .   Signs/symptoms of respiratory failure include-  tachypnea, increased work of breathing, wheezing, and inability in speak in full sentences while on room air. Contributing diagnoses includes - COPD Labs and images were reviewed. Patient Has recent ABG, which has been reviewed.  "Will treat underlying causes and adjust management of respiratory failure as follows-     Patient with Hypoxic Respiratory failure which is Acute.  he is not on home oxygen. Supplemental oxygen was provided and noted-  patient 93% on 2 liters nasal cannula and was noted to be 86% on room air while sleeping       -Bipap trial with repeat ABG  -Procalcitonin pending  -PO steroids daily  -Levaquin Initiated  -Supplemental oxygen PRN   -Scheduled nebulizers     Right sided weakness  Chronic condition noted. No acute process seen on CT scan.    -Fall precautions        Primary hypertension  Chronic, controlled. Latest blood pressure and vitals reviewed-     Temp:  [98.4 °F (36.9 °C)]   Pulse:  [67-91]   Resp:  [16-20]   BP: (136-187)/()   SpO2:  [92 %-100 %] .   Home meds for hypertension were reviewed and noted below.   Hypertension Medications               amLODIPine (NORVASC) 10 MG tablet Take 1 tablet (10 mg total) by mouth once daily.    lisinopriL 10 MG tablet Take 1 tablet (10 mg total) by mouth once daily.            While in the hospital, will manage blood pressure as follows; Continue home antihypertensive regimen    Will utilize p.r.n. blood pressure medication only if patient's blood pressure greater than 180/110 and he develops symptoms such as worsening chest pain or shortness of breath.    DM (diabetes mellitus), type 2  Patient's FSGs are controlled on current medication regimen.  Last A1c reviewed-   Lab Results   Component Value Date    LABA1C 9.8 (H) 07/06/2016    HGBA1C 6.5 (H) 12/04/2023     Most recent fingerstick glucose reviewed- No results for input(s): "POCTGLUCOSE" in the last 24 hours.  Current correctional scale  Low  Maintain anti-hyperglycemic dose as follows-   Antihyperglycemics (From admission, onward)      Start     Stop Route Frequency Ordered    03/26/24 2219  insulin aspart U-100 pen 0-5 Units         -- SubQ Before meals & nightly PRN 03/26/24 2219          Hold Oral " hypoglycemics while patient is in the hospital.      VTE Risk Mitigation (From admission, onward)           Ordered     enoxaparin injection 40 mg  Every 24 hours         03/26/24 2226     IP VTE LOW RISK PATIENT  Once         03/26/24 2219     Place sequential compression device  Until discontinued         03/26/24 2219                         On 03/26/2024, patient should be placed in hospital observation services under my care in collaboration with Dr. Delvin Chung MD.      Automatic Inhaler to Nebulizer Interchange    fluticasone/vilanterol (Breo Ellipta) 200 mcg/25 mcg changed to budesonide 1 mg twice daily AND arformoterol 15 mcg twice daily per [facility] Automatic Therapeutic Substitutions Protocol.    Please contact pharmacy at extension [phone] with any questions.     Thank you,   Ayaz Sam NP  Department of Hospital Medicine  [facility]/Surg

## 2024-03-27 NOTE — CARE UPDATE
03/27/24 1416   Tobacco Cessation Intervention   Do you use any type of tobacco product? Yes   Are you interested in quitting use of tobacco products? Not interested   Are you interested in Nicotine Replacement for symptom relief? No   $ Smoking Cessation Charges Smoking Cessation - Intermediate (CTTS)

## 2024-03-27 NOTE — PLAN OF CARE
Problem: Adjustment to Illness COPD (Chronic Obstructive Pulmonary Disease)  Goal: Optimal Chronic Illness Coping  Outcome: Ongoing, Progressing     Problem: Functional Ability Impaired COPD (Chronic Obstructive Pulmonary Disease)  Goal: Optimal Level of Functional [place]  Outcome: Ongoing, Progressing     Problem: Infection COPD (Chronic Obstructive Pulmonary Disease)  Goal: Absence of Infection Signs and Symptoms  Outcome: Ongoing, Progressing     Problem: Oral Intake Inadequate COPD (Chronic Obstructive Pulmonary Disease)  Goal: Improved Nutrition Intake  Outcome: Ongoing, Progressing     Problem: Respiratory Compromise COPD (Chronic Obstructive Pulmonary Disease)  Goal: Effective Oxygenation and Ventilation  Outcome: Ongoing, Progressing     Problem: Adult Inpatient Plan of Care  Goal: Plan of Care Review  Outcome: Ongoing, Progressing  Goal: Patient-Specific Goal (Individualized)  Outcome: Ongoing, Progressing  Goal: Absence of Hospital-Acquired Illness or Injury  Outcome: Ongoing, Progressing  Goal: Optimal Comfort and Wellbeing  Outcome: Ongoing, Progressing  Goal: Readiness for Transition of Care  Outcome: Ongoing, Progressing     Problem: Diabetes Comorbidity  Goal: Blood Glucose Level Within Targeted Range  Outcome: Ongoing, Progressing     Problem: Fluid Imbalance (Pneumonia)  Goal: Fluid Balance  Outcome: Ongoing, Progressing     Problem: Infection (Pneumonia)  Goal: Resolution of Infection Signs and Symptoms  Outcome: Ongoing, Progressing     Problem: Respiratory Compromise (Pneumonia)  Goal: Effective Oxygenation and Ventilation  Outcome: Ongoing, Progressing

## 2024-03-27 NOTE — PROGRESS NOTES
Automatic Inhaler to Nebulizer Interchange    fluticasone/vilanterol (Breo Ellipta) 200 mcg/25 mcg changed to budesonide 1 mg twice daily AND arformoterol 15 mcg twice daily per [facility] Automatic Therapeutic Substitutions Protocol.    Please contact pharmacy at extension [phone] with any questions.     Thank you,   Ayaz Haney

## 2024-03-27 NOTE — ASSESSMENT & PLAN NOTE
Patient endorses fall with right rib pain and unable to confirm if he had LOC but is unable to recall all events surrounding fall or what day it occurred      Neuro-checks.  Tele-monitoring.   Check TSH.   Check Orthostatics.   2D Echocardiogram.   Carotid doppler U/S.   Fall precautions.

## 2024-03-27 NOTE — NURSING
Nurses Note -- 4 Eyes      3/27/2024   12:05 AM      Skin assessed during: Admit      [] No Altered Skin Integrity Present    [x]Prevention Measures Documented      [x] Yes- Altered Skin Integrity Present or Discovered   [] LDA Added if Not in Epic (Describe Wound)   [x] New Altered Skin Integrity was Present on Admit and Documented in LDA   [] Wound Image Taken    Wound Care Consulted? No    Attending Nurse:  Ana Gonzalez RN/Staff Member:   Linda

## 2024-03-27 NOTE — CARE UPDATE
"   03/27/24 0008   Patient Assessment/Suction   Level of Consciousness (AVPU) alert   Respiratory Effort Normal;Unlabored   Expansion/Accessory Muscles/Retractions no use of accessory muscles   All Lung Fields Breath Sounds Anterior:;coarse   Rhythm/Pattern, Respiratory unlabored   Cough Frequency frequent   PRE-TX-O2   Device (Oxygen Therapy) nasal cannula   Flow (L/min) 2   SpO2 (!) 94 %   Pulse Oximetry Type Intermittent   $ Pulse Oximetry - Multiple Charge Pulse Oximetry - Multiple   Pulse 97   Resp 18   Preset CPAP/BiPAP Settings   Mode Of Delivery BiPAP   $ CPAP/BiPAP Daily Charge BiPAP/CPAP Daily   $ Initial CPAP/BiPAP Setup? Yes   $ Is patient using? No/refused   Reason patient is not wearing? Patient refused   Who was contacted if refused? Ana Colvin RN  (Np notified)   Size of Mask Small   Sized Appropriately? Yes   Equipment Type DeVil[place]iss   CPAP (cm H2O) 8   Ipap 4     Pt sized and put on bipap 8/4 and pt stated too much air.  Made a second attempt and pt said "it would make him throw up."  "

## 2024-03-27 NOTE — ASSESSMENT & PLAN NOTE
Xray impression showed possible atypical pnemonia      -Procalcitonin pending  -Blood cultures x2  -Sputum culture  -Initiated levaquin daily  -Scheduled nebs

## 2024-03-27 NOTE — HOSPITAL COURSE
Hernan Badillo is a 58 year old male with a  past medical history of COPD, DM, HTN, HLD, MDD, GERD, tobacco use, anemia and HFpEF who presented with acute hypoxic respiratory failure in the setting of a COPD exacerbation. He is on Duonebs, 2 L NC, and steroids. Patient endorsed right-sided muscle spasms.  Patient was offered muscle relaxers but refused.  Patient stated this is a chronic issue since his previous CVA.  Patient endorsed pleuritic chest pain.  Repeat CXR showed improvement of bibasilar opacities.  Patient is satting 94-97% on 2 L nasal cannula.  Patient received home oxygen evaluation.  Home oxygen ordered with assistance from case management.  Patient worked with physical therapy while in the hospital.  Home health was recommended.  Ambulatory referral for home health was sent.  The prescription for Levaquin, Pulmicort, and prednisone were sent to patient's preferred pharmacy.  Patient was educated on the need to stopped smoking.  Patient refused for nicotine patch to be sent to pharmacy.  It was advised to remain compliant with all medications.  Primary care provider was scheduled for 04/05/2024.  Patient denied chest pain at time of exam.    Patient seen and examined on day of discharge.  Patient deemed appropriate for discharge.  Patient was educated on all prescriptions and advised to remain compliant.  Ambulatory referral to pulmonology was sent.  Patient educated on discharge planning, he verbalizes understanding.

## 2024-03-27 NOTE — NURSING
"Pt oriented x3, disoriented to time. When asked pt what year it was pt replied "I do not keep up with stuff like that." Pt able to answer all other orientation questions. Pt unable to tell this RN his home medications at this time.   "

## 2024-03-27 NOTE — ASSESSMENT & PLAN NOTE
Chronic, controlled. Latest blood pressure and vitals reviewed-     Temp:  [97.5 °F (36.4 °C)-98.7 °F (37.1 °C)]   Pulse:  []   Resp:  [16-20]   BP: (135-187)/()   SpO2:  [92 %-100 %] .   Home meds for hypertension were reviewed and noted below.   Hypertension Medications               amLODIPine (NORVASC) 10 MG tablet Take 1 tablet (10 mg total) by mouth once daily.    lisinopriL 10 MG tablet Take 1 tablet (10 mg total) by mouth once daily.            While in the hospital, will manage blood pressure as follows; Continue home antihypertensive regimen    Will utilize p.r.n. blood pressure medication only if patient's blood pressure greater than 180/110 and he develops symptoms such as worsening chest pain or shortness of breath.

## 2024-03-27 NOTE — PROGRESS NOTES
"[facility] Medicine  Progress Note    Patient Name: Hernan Badillo  MRN: [id number]  Patient Class: OP- Observation   Admission Date: 3/26/2024  Length of Stay: 0 days  Attending Physician: Delvin Chung MD  Primary Care Provider: Toan Banks DO        Subjective:     Principal Problem:COPD exacerbation        HPI:  Hernan Badillo is a 58 year old male with a previous medical history of COPD, DMII, HTN, CVA in December 2023 with right side residual weakness who presents for right side rib pain secondary to fall and possible syncope. Unable to obtain a clear history as patient is poor historian but reports a possible fall on Saturday or Sunday and all he recalls is "darkness". Since that time patient has had severe right sided rib pain aggravated by movement and endorses buying pain pills off the street but they have afforded minimal relief. Initial ED evaluation included a CT of the head and neck which was negative for any acute process along with xrays of the chest and ribs that showed no rib fractures but hazy interstitial opacities of the right lung base. CBC and CMP unremarkable. Troponin 0.039. EKG shows no signs of St elevation. Upon examination patient noted to have increased work of breathing and unable to have a conversation on room air without having shortness of breath. Per nursing notes patient seen to be 86% on room air while sleeping. Patient to be admitted by hospital medicine for further evaluation and management     Overview/Hospital Course:  Hernan Badillo is a 58 year old male with a  past medical history of COPD, DM, HTN, HLD, MDD, GERD, tobacco use, anemia and HFpEF who presented with acute hypoxic respiratory failure in the setting of a COPD exacerbation. He is on Duonebs, 2 L NC, and steroids. PT/OT has been consulted.    Interval History: see "Hospital Course"    Review of Systems   Constitutional:  Positive for activity change.   Respiratory:  Positive for " shortness of breath.    Neurological:  Positive for weakness.     Objective:     Vital Signs (Most Recent):  Temp: 97.5 °F (36.4 °C) (03/27/24 0827)  Pulse: 89 (03/27/24 0827)  Resp: 18 (03/27/24 0827)  BP: (!) 166/93 (03/27/24 0827)  SpO2: 100 % (03/27/24 0827) Vital Signs (24h Range):  Temp:  [97.5 °F (36.4 °C)-98.7 °F (37.1 °C)] 97.5 °F (36.4 °C)  Pulse:  [] 89  Resp:  [16-20] 18  SpO2:  [92 %-100 %] 100 %  BP: (135-187)/() 166/93     Weight: 68.7 kg (151 lb 7.3 oz)  Body mass index is 22.37 kg/m².    Intake/Output Summary (Last 24 hours) at 3/27/2024 1137  Last data filed at 3/27/2024 0800  Gross per 24 hour   Intake 240 ml   Output 350 ml   Net -110 ml         Physical Exam  Vitals and nursing note reviewed.   Constitutional:       General: He is not in acute distress.  HENT:      Head: Normocephalic and atraumatic.      Right Ear: External ear normal.      Left Ear: External ear normal.      Nose: Nose normal.      Mouth/Throat:      Mouth: Mucous membranes are moist.      Pharynx: Oropharynx is clear.   Cardiovascular:      Rate and Rhythm: Normal rate.      Pulses: Normal pulses.      Heart sounds: Normal heart sounds.   Pulmonary:      Effort: Pulmonary effort is normal.      Breath sounds: Normal breath sounds.      Comments: NC.  Abdominal:      General: Bowel sounds are normal.      Palpations: Abdomen is soft.   Musculoskeletal:         General: Normal range of motion.      Cervical back: Normal range of motion and neck supple.   Skin:     General: Skin is warm and dry.   Neurological:      Mental Status: He is alert. Mental status is at baseline.   Psychiatric:         Mood and Affect: Mood normal.         Behavior: Behavior normal.             Significant Labs: All pertinent labs within the past 24 hours have been reviewed.    Significant Imaging: I have reviewed all pertinent imaging results/findings within the past 24 hours.    Assessment/Plan:      * COPD  exacerbation  -Duonebs  -Steroids  -NC O2    Acute hypoxemic respiratory failure  -CXR abd ABG PRN  -Steroids  -Duonebs    Anemia  -Trend Hgb with CBC    GERD (gastroesophageal reflux disease)  -PPI    MDD (major depressive disorder)  -Lexapro        HLD (hyperlipidemia)  -Continue ASA and statin      Elevated troponin level  Asymptomatic.      Fall  -Fall precautions  -PT/OT    Right sided weakness  Chronic condition noted. No acute process seen on CT scan.  -Fall precautions        Tobacco dependency  -To be counseled on cessation    Primary hypertension  Chronic, controlled. Latest blood pressure and vitals reviewed-     Temp:  [97.5 °F (36.4 °C)-98.7 °F (37.1 °C)]   Pulse:  []   Resp:  [16-20]   BP: (135-187)/()   SpO2:  [92 %-100 %] .   Home meds for hypertension were reviewed and noted below.   Hypertension Medications               amLODIPine (NORVASC) 10 MG tablet Take 1 tablet (10 mg total) by mouth once daily.    lisinopriL 10 MG tablet Take 1 tablet (10 mg total) by mouth once daily.            While in the hospital, will manage blood pressure as follows; Continue home antihypertensive regimen    Will utilize p.r.n. blood pressure medication only if patient's blood pressure greater than 180/110 and he develops symptoms such as worsening chest pain or shortness of breath.    DM (diabetes mellitus), type 2  Patient's FSGs are controlled on current medication regimen.  Last A1c reviewed-   Lab Results   Component Value Date    LABA1C 9.8 (H) 07/06/2016    HGBA1C 6.1 03/26/2024     Most recent fingerstick glucose reviewed-   Recent Labs   Lab 03/26/24  2300 03/27/24  0756   POCTGLUCOSE 272* 149*     Current correctional scale  Low  Maintain anti-hyperglycemic dose as follows-   Antihyperglycemics (From admission, onward)      Start     Stop Route Frequency Ordered    03/27/24 1240  insulin aspart U-100 pen 0-10 Units         -- SubQ Before meals & nightly PRN 03/27/24 1140          Hold Oral  hypoglycemics while patient is in the hospital.      VTE Risk Mitigation (From admission, onward)           Ordered     enoxaparin injection 40 mg  Every 24 hours         03/26/24 2226     IP VTE LOW RISK PATIENT  Once         03/26/24 2219     Place sequential compression device  Until discontinued         03/26/24 2219                    Discharge Planning   ZACARIAS:      Code Status: Full Code   Is the patient medically ready for discharge?:     Reason for patient still in hospital (select all that apply): Patient trending condition, Treatment, and PT / OT recommendations                     Delvin Chung MD  Department of Hospital Medicine   [facility]/Surg

## 2024-03-27 NOTE — ASSESSMENT & PLAN NOTE
Patient with Hypoxic Respiratory failure which is Acute.  he is not on home oxygen. Supplemental oxygen was provided and noted-  patient 93% on 2 liters nasal cannula and was noted to be 86% on room air while sleeping     .   Signs/symptoms of respiratory failure include- tachypnea, increased work of breathing, wheezing, and inability in speak in full sentences  . Contributing diagnoses includes - COPD Labs and images were reviewed. Patient Has not had a recent ABG.     -Procalcitonin pending  -PO steroids daily  -Levaquin Initiated  -Supplemental oxygen PRN   -Scheduled nebulizers

## 2024-03-27 NOTE — RESPIRATORY THERAPY
03/27/24 0739   Patient Assessment/Suction   Level of Consciousness (AVPU) alert   Respiratory Effort Normal;Unlabored   Expansion/Accessory Muscles/Retractions expansion symmetric;no use of accessory muscles;no retractions   All Lung Fields Breath Sounds Anterior:;Lateral:;coarse   Rhythm/Pattern, Respiratory depth regular;pattern regular;unlabored   Cough Frequency frequent   Cough Type congested;good;nonproductive   Skin Integrity   $ Wound Care Tech Time 15 min   Area Observed Left;Right;Behind ear;Cheek;Upper lip;Nares   Skin Appearance without discoloration   PRE-TX-O2   Device (Oxygen Therapy) nasal cannula   $ Is the patient on Low Flow Oxygen? Yes   Flow (L/min) 2   SpO2 (!) 94 %   Pulse Oximetry Type Intermittent   $ Pulse Oximetry - Multiple Charge Pulse Oximetry - Multiple   Pulse 76   Resp 18   Positioning HOB elevated 30 degrees   Aerosol Therapy   $ Aerosol Therapy Charges Aerosol Treatment   Respiratory Treatment Status (SVN) given  (pulmicort)   Treatment Route (SVN) mask;oxygen   Patient Position (SVN) HOB elevated;semi-Garcia's   Post Treatment Assessment (SVN) breath sounds unchanged   Signs of Intolerance (SVN) none   Breath Sounds Post-Respiratory Treatment   Post-treatment Heart Rate (beats/min) 76   Post-treatment Resp Rate (breaths/min) 18

## 2024-03-27 NOTE — PT/OT/SLP EVAL
"Occupational Therapy   Evaluation and Discharge Note    Name: Hernan Badillo  MRN: [id number]  Admitting Diagnosis: COPD exacerbation  Recent Surgery: * No surgery found *      Recommendations:     Discharge Recommendations: No Therapy Indicated  Discharge Equipment Recommendations: none  Barriers to discharge:  None    Assessment:     Hernan Badillo is a 58 y.o. male with a medical diagnosis of COPD exacerbation. At this time, patient is Supervision level with ADLs. Noted unsteady gait when ambulating in room, but no significant LOB. Patient insistent on not receiving assistance for balance with mobility. Patient presents with decreased likelihood to comply with safety education with mobility. Patient required frequent verbal cues and extra time to perform a single task. No further acute OT services.     Plan:     During this hospitalization, patient does not require further acute OT services.  Please re-consult if situation changes.    Plan of Care Reviewed with: patient    Subjective     Chief Complaint: none  Patient/Family Comments/goals: "I have pain all over."    Occupational Profile:  Living Environment: Patient stated he lives with son.   Previous level of function: Patient stated he was independent with ADLs and mobility.   Equipment Used at home: walker, rolling, grab bar  Assistance upon Discharge: Patient will receive assistance from family.     Pain/Comfort:  Pain Rating 1:  (not rated)  Location - Orientation 1: generalized  Location 1:  ("everywhere")  Pain Addressed 1: Reposition, Distraction  Pain Rating Post-Intervention 1:  (not rated)    Patients cultural, spiritual, Alevism conflicts given the current situation: no    Objective:     Communicated with: nurse Ramos prior to session.  Patient found HOB elevated with peripheral IV upon OT entry to room.    General Precautions: Standard, fall  Orthopedic Precautions: N/A  Braces: N/A  Respiratory Status: Nasal cannula, flow 2 L/min     Occupational " Performance:    Bed Mobility:    Patient completed Scooting/Bridging with supervision  Patient completed Supine to Sit with supervision  Patient completed Sit to Supine with supervision    Functional Mobility/Transfers:  Patient completed Sit <> Stand Transfer with stand by assistance  with  rolling walker   Patient completed Toilet Transfer Stand Pivot technique with stand by assistance with  rolling walker  Functional Mobility: Noted unsteadiness with mobility w/o AD.     Activities of Daily Living:  Grooming: supervision with oral and facial hygiene while standing at sink  Lower Body Dressing: supervision to don/doff socks seated EOB  Toileting: supervision     Cognitive/Visual Perceptual:  Cognitive/Psychosocial Skills:     -       Oriented to: person, place   -       Follows Commands/attention:Follows multistep  commands  -       Communication: clear/fluent  -       Safety awareness/insight to disability: impaired   -       Mood/Affect/Coping skills/emotional control: Appropriate to situation and Cooperative  Visual/Perceptual:      -Intact     Physical Exam:  Postural examination/scapula alignment:    -       Rounded shoulders  -       Forward head  Upper Extremity Range of Motion:     -       Right Upper Extremity: WFL  -       Left Upper Extremity: WFL  Upper Extremity Strength:    -       Right Upper Extremity: WFL  -       Left Upper Extremity: WFL   Strength:    -       Right Upper Extremity: WFL  -       Left Upper Extremity: WFL  Fine Motor Coordination:    -       Intact  Gross motor coordination:   WFL    AMPAC 6 Click ADL:  AMPAC Total Score: 24    Treatment & Education:  OT ed pt on OT role & POC as well as discharge recommendations.  OT ed patient on safety with walker use for functional mobility with cues for hand placement & sequencing.       Patient left HOB elevated with all lines intact, call button in reach, and nurse notified    GOALS:   Multidisciplinary Problems       Occupational  Therapy Goals       Not on file                    History:     Past Medical History:   Diagnosis Date    COPD (chronic obstructive pulmonary disease)     Diabetes mellitus, type 2     Hypertension          Past Surgical History:   Procedure Laterality Date    DENTAL SURGERY      ESOPHAGOGASTRODUODENOSCOPY N/A 6/1/2020    Procedure: EGD (ESOPHAGOGASTRODUODENOSCOPY);  Surgeon: Terrell May MD;  Location: [facility];  Service: Endoscopy;  Laterality: N/A;    HERNIA REPAIR      left inguinal    incision and drainiage         Time Tracking:     OT Date of Treatment: 03/27/24  OT Start Time: 1318  OT Stop Time: 1409  OT Total Time (min): 51 min    Billable Minutes:Evaluation 10  Self Care/Home Management 41    3/27/2024

## 2024-03-27 NOTE — HPI
"Hernan Badillo is a 58 year old male with a previous medical history of COPD, DMII, HTN, CVA in December 2023 with right side residual weakness who presents for right side rib pain secondary to fall and possible syncope. Unable to obtain a clear history as patient is poor historian but reports a possible fall on Saturday or Sunday and all he recalls is "darkness". Since that time patient has had severe right sided rib pain aggravated by movement and endorses buying pain pills off the street but they have afforded minimal relief. Initial ED evaluation included a CT of the head and neck which was negative for any acute process along with xrays of the chest and ribs that showed no rib fractures but hazy interstitial opacities of the right lung base. CBC and CMP unremarkable. Troponin 0.039. EKG shows no signs of St elevation. Upon examination patient noted to have increased work of breathing and unable to have a conversation on room air without having shortness of breath. Per nursing notes patient seen to be 86% on room air while sleeping. Patient to be admitted by hospital medicine for further evaluation and management   "

## 2024-03-27 NOTE — H&P
"  Former[facility] Medicine  History & Physical    Patient Name: Hernan Badillo  MRN: [id number]  Patient Class: OP- Observation  Admission Date: 3/26/2024  Attending Physician: Delvin Chung MD   Primary Care Provider: Toan Banks DO         Patient information was obtained from patient, past medical records, and ER records.     Subjective:     Principal Problem:COPD exacerbation    Chief Complaint:   Chief Complaint   Patient presents with    Rib Injury     Pt fell yesterday or the day before pt could not advise, pt states he is having rib pain on the right side.  Pt complains of tightness and numbness in his neck and throat, pt believes he has had a stroke.        HPI: Hernan Badillo is a 58 year old male with a previous medical history of COPD, DMII, HTN, CVA in December 2023 with right side residual weakness who presents for right side rib pain secondary to fall and possible syncope. Unable to obtain a clear history as patient is poor historian but reports a possible fall on Saturday or Sunday and all he recalls is "darkness". Since that time patient has had severe right sided rib pain aggravated by movement and endorses buying pain pills off the street but they have afforded minimal relief. Initial ED evaluation included a CT of the head and neck which was negative for any acute process along with xrays of the chest and ribs that showed no rib fractures but hazy interstitial opacities of the right lung base. CBC and CMP unremarkable. Troponin 0.039. EKG shows no signs of St elevation. Upon examination patient noted to have increased work of breathing and unable to have a conversation on room air without having shortness of breath. Per nursing notes patient seen to be 86% on room air while sleeping. Patient to be admitted by hospital medicine for further evaluation and management     Past Medical History:   Diagnosis Date    COPD (chronic obstructive pulmonary disease)     Diabetes " mellitus, type 2     Hypertension        Past Surgical History:   Procedure Laterality Date    DENTAL SURGERY      ESOPHAGOGASTRODUODENOSCOPY N/A 6/1/2020    Procedure: EGD (ESOPHAGOGASTRODUODENOSCOPY);  Surgeon: Terrell May MD;  Location: [facility];  Service: Endoscopy;  Laterality: N/A;    HERNIA REPAIR      left inguinal    incision and drainiage         Review of patient's allergies indicates:  No Known Allergies    No current facility-administered medications on file prior to encounter.     Current Outpatient Medications on File Prior to Encounter   Medication Sig    acetaminophen (TYLENOL) 325 MG tablet Take 650 mg by mouth 3 (three) times daily as needed.    albuterol (PROVENTIL HFA) 90 mcg/actuation inhaler Inhale 2 puffs into the lungs every 6 (six) hours as needed for Wheezing. Rescue    albuterol (PROVENTIL/VENTOLIN HFA) 90 mcg/actuation inhaler Inhale 1-2 puffs into the lungs every 6 (six) hours as needed. Rescue    albuterol-ipratropium (DUO-NEB) 2.5 mg-0.5 mg/3 mL nebulizer solution Take 3 mLs by nebulization every 6 (six) hours as needed for Wheezing or Shortness of Breath.    amLODIPine (NORVASC) 10 MG tablet Take 1 tablet (10 mg total) by mouth once daily.    aspirin (ECOTRIN) 81 MG EC tablet Take 1 tablet (81 mg total) by mouth once daily.    atorvastatin (LIPITOR) 20 MG tablet Take 1 tablet (20 mg total) by mouth once daily.    baclofen (LIORESAL) 10 MG tablet Take 1 tablet (10 mg total) by mouth 2 (two) times daily.    blood sugar diagnostic, disc Strp 1 each by Misc.(Non-Drug; Combo Route) route 4 (four) times daily.    blood-glucose meter kit Use as instructed    clopidogreL (PLAVIX) 75 mg tablet Take 1 tablet (75 mg total) by mouth once daily. for 21 days    EScitalopram oxalate (LEXAPRO) 10 MG tablet Take 10 mg by mouth.    fluticasone-salmeterol diskus inhaler 500-50 mcg Inhale 1 puff into the lungs 2 (two) times daily. Controller    lancets (LANCETS,THIN) Misc 1 each by  Misc.(Non-Drug; Combo Route) route 4 (four) times daily.    lisinopriL 10 MG tablet Take 1 tablet (10 mg total) by mouth once daily.    meclizine (ANTIVERT) 25 mg tablet Take 25 mg by mouth.    melatonin (MELATIN) 5 mg Take 5 mg by mouth.    metFORMIN (GLUCOPHAGE-XR) 500 MG ER 24hr tablet Take 1 tablet (500 mg total) by mouth daily with breakfast.    pantoprazole (PROTONIX) 40 MG tablet Take 1 tablet (40 mg total) by mouth once daily.    pregabalin (LYRICA) 100 MG capsule Take 1 capsule (100 mg total) by mouth 2 (two) times daily.     Family History       Problem Relation (Age of Onset)    Cancer Maternal Grandmother    Drug abuse Father    Heart attack Father (80)    Heart disease Father          Tobacco Use    Smoking status: Every Day     Current packs/day: 2.00     Average packs/day: 2.0 packs/day for 30.0 years (60.0 ttl pk-yrs)     Types: Cigarettes    Smokeless tobacco: Never   Substance and Sexual Activity    Alcohol use: Yes     Alcohol/week: 1.0 standard drink of alcohol     Types: 1 Shots of liquor per week     Comment: 1/2 pint 2 x per week - quit approximately 5 months ago    Drug use: No    Sexual activity: Yes     Partners: Female     Birth control/protection: None     Comment: No history of STDs.     Review of Systems   Respiratory:  Positive for shortness of breath.    Musculoskeletal:  Positive for arthralgias and myalgias.   Neurological:  Positive for syncope, weakness and numbness.   Psychiatric/Behavioral:  Positive for confusion.    All other systems reviewed and are negative.    Objective:     Vital Signs (Most Recent):  Temp: 97.8 °F (36.6 °C) (03/26/24 2239)  Pulse: 84 (03/26/24 2239)  Resp: 16 (03/26/24 2239)  BP: (!) 165/87 (03/26/24 2239)  SpO2: 100 % (03/26/24 2239) Vital Signs (24h Range):  Temp:  [97.8 °F (36.6 °C)-98.4 °F (36.9 °C)] 97.8 °F (36.6 °C)  Pulse:  [67-91] 84  Resp:  [16-20] 16  SpO2:  [92 %-100 %] 100 %  BP: (136-187)/() 165/87        There is no height or weight on  file to calculate BMI.     Physical Exam  Vitals reviewed.   HENT:      Head: Normocephalic and atraumatic.      Mouth/Throat:      Mouth: Mucous membranes are dry.      Pharynx: Oropharynx is clear.   Eyes:      Extraocular Movements: Extraocular movements intact.      Pupils: Pupils are equal, round, and reactive to light.   Cardiovascular:      Rate and Rhythm: Normal rate and regular rhythm.      Pulses: Normal pulses.      Heart sounds: Normal heart sounds.   Pulmonary:      Breath sounds: Decreased air movement present. Examination of the right-upper field reveals decreased breath sounds. Examination of the left-upper field reveals decreased breath sounds. Examination of the right-middle field reveals decreased breath sounds. Examination of the left-middle field reveals decreased breath sounds. Examination of the right-lower field reveals decreased breath sounds. Examination of the left-lower field reveals decreased breath sounds. Decreased breath sounds present.      Comments: Patient unable to take deep breath  Dry cough noted with attempt of deep breathing  Chest:      Chest wall: Tenderness present.          Comments: [place] is area of tenderness and guarding with palpation   Abdominal:      General: Bowel sounds are normal. There is no distension.      Palpations: Abdomen is soft.      Tenderness: There is no abdominal tenderness. There is no guarding.   Musculoskeletal:         General: Normal range of motion.      Cervical back: Normal range of motion and neck supple.   Skin:     General: Skin is warm and dry.      Capillary Refill: Capillary refill takes 2 to 3 seconds.   Neurological:      Mental Status: He is alert. He is confused.      GCS: GCS eye subscore is 4. GCS verbal subscore is 4. GCS motor subscore is 6.      Sensory: Sensory deficit present.      Motor: Weakness present.      Coordination: Heel to Shin Test abnormal.      Gait: Gait is intact.      Comments: Chronic entire right sided  weakness              CRANIAL NERVES     CN III, IV, VI   Pupils are equal, round, and reactive to light.       Significant Labs: All pertinent labs within the past 24 hours have been reviewed.  CBC:   Recent Labs   Lab 03/26/24  1646   WBC 5.77   HGB 12.4*   HCT 38.9*        CMP:   Recent Labs   Lab 03/26/24  1646      K 3.4*   CL 97   CO2 31*   GLU 84   BUN 8   CREATININE 0.8   CALCIUM 9.5   PROT 7.8   ALBUMIN 3.4*   BILITOT 0.9   ALKPHOS 80   AST 11   ALT 5*   ANIONGAP 9     Coagulation:   Recent Labs   Lab 03/26/24  1646   INR 1.0     Lipid Panel:   Recent Labs   Lab 03/26/24  1646   CHOL 126   HDL 38*   LDLCALC 74.8   TRIG 66   CHOLHDL 30.2     Troponin:   Recent Labs   Lab 03/26/24  1646   TROPONINI 0.039*     TSH:   Recent Labs   Lab 03/26/24  1646   TSH 0.417       Significant Imaging: I have reviewed all pertinent imaging results/findings within the past 24 hours.  EXAMINATION:  CTA HEAD AND NECK (XPD)     CLINICAL HISTORY:  Neuro deficit, acute, stroke suspected;     TECHNIQUE:  Non contrast low dose axial images were obtained through the head. CT angiogram was performed from the level of the ignacia to the top of the head following the IV administration of 75mL of Omnipaque 350.   Sagittal and coronal reconstructions and maximum intensity projection reconstructions were performed. Arterial stenosis percentages are based on NASCET measurement criteria.     COMPARISON:  12/03/2023, MRI brain 12/04/2023     FINDINGS:  Brain:     No evidence of acute intracranial hemorrhage.     The ventricles and sulci are appropriate in size and configuration for the patient's age without hydrocephalus.     Stable small chronic lacunar infarct in the right frontal corona radiata, as well as bilateral basal ganglia and right paramedian alf.  There is no significant mass effect, midline shift, or extra-axial fluid collection. Remainder of the gray-white matter differentiation is within normal limits without  evidence of an acute major vascular territory infarct.     No abnormal intracranial enhancement.     Chronic deformity of the medial left orbital wall.  Partial opacification of the right sphenoid sinus.  No acute calvarial fracture.     Extracranial:     Aorta and great vessels: Left-sided aortic arch with normal configuration.   No evidence of stenosis of the origins of the great vessels from the arch.     Subclavian arteries: The subclavian arteries are without hemodynamically significant stenosis or occlusion.     Right carotid: The right common carotid artery is without significant stenosis. Mild calcific atherosclerotic plaque at the carotid bifurcation without evidence of a hemodynamically significant stenosis..  The right internal carotid artery is tortuous without significant stenosis, occlusion, or dissection.     Left carotid: The left common carotid artery is without significant stenosis. Mild calcific atherosclerotic plaque at the carotid bifurcation without evidence of a hemodynamically significant stenosis.  The left internal carotid artery is tortuous without significant stenosis, occlusion, or dissection.     Extracranial vertebral arteries:  The vertebral arteries are without significant stenosis, occlusion, or dissection to the skull base.     Intracranial:     Anterior circulation: Moderate calcific atherosclerosis of bilateral carotid artery siphons.  No areas of significant atherosclerotic narrowing or filling defects are identified.  The middle cerebral arteries are normal.  The anterior cerebral arteries are normal.     Posterior circulation: The vertebral arteries are normal. The basilar artery is normal. 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  Posterior cerebral arteries otherwise grossly normal.     No evidence of aneurysm or arteriovenous malformation.     Dural venous sinuses are patent.      Other:     The visualized portions of the lung apices demonstrated moderate centrilobular emphysematous changes.  Respiratory motion limits evaluation of the pulmonary parenchyma.  No focal consolidation, pleural effusion or pneumothorax.     There are degenerative spine changes. No concerning osseous lesions identified.     Impression:     1. No evidence of acute intracranial abnormality or significant interval detrimental change as compared to the prior exam.  Further evaluation as warranted clinically.  2. Chronic small vessel ischemic changes again demonstrated, including chronic lacunar infarcts involving the right frontal corona radiata, bilateral basal ganglia and alf.  3. No high-grade stenosis, large vessel occlusion, dissection or aneurysm in the head and neck vasculature.        Electronically signed by: Delvin Vera  Date:                                            03/26/2024  Time:                                           18:25      EXAMINATION:  XR CHEST AP PORTABLE     CLINICAL HISTORY:  Pleurodynia     TECHNIQUE:  Single frontal view of the chest was performed.     COMPARISON:  12/03/2023 and 09/26/2023     FINDINGS:  There is reticulonodular disease in the lower lung zones with some tram-tracking.  Upper lung zones are clear.  Unchanged heart size.  No pleural effusion or pneumothorax.     Impression:     Unchanged extent of reticulonodular disease in the lung bases.  This is better characterized on prior CT.  Some differential considerations include includes atypical infection, pneumonitis, aspiration, airways disease.        Electronically signed by: Iván Coronado  Date:                                            03/26/2024  Time:                                           16:49      EXAMINATION:  XR RIBS 2 VIEW RIGHT     CLINICAL HISTORY:  Unspecified fall, initial encounter     TECHNIQUE:  Two views of the right ribs were performed.     COMPARISON:  03/26/2024     FINDINGS:  No displaced  fracture or traumatic malalignment.  Hazy interstitial opacities right lung base better characterized on dated chest radiograph.  Mild degenerative change right AC joint.        Electronically signed by: Iván Coronado  Date:                                            03/26/2024  Time:                                           16:55      Assessment/Plan:     * COPD exacerbation  Patient's COPD is with exacerbation noted by continued dyspnea and worsening of baseline hypoxia currently.  Patient is currently on COPD Pathway. Continue scheduled inhalers Steroids, Antibiotics, and Supplemental oxygen and monitor respiratory status closely.     -Procalcitonin pending  -Sputum Culture  -Blood culture x2   -Levaquin initiated    Elevated troponin level  Patient endorses shortness of breath    -EKG shows no ST elevation  -Trend Troponin x3       Syncope  Patient endorses fall with right rib pain and unable to confirm if he had LOC but is unable to recall all events surrounding fall or what day it occurred. Echo and Carotid US recent and performed on 12/04/2023      Neuro-checks.  Tele-monitoring.   Check TSH.   Check Orthostatics.   Fall precautions.     Pneumonia  Xray impression showed possible atypical pnemonia      -Procalcitonin pending  -Blood cultures x2  -Sputum culture  -Initiated levaquin daily  -Scheduled nebs      Acute hypoxemic respiratory failure  Patient with Hypoxic Respiratory failure which is Acute.  he is not on home oxygen. Supplemental oxygen was provided and noted-  patient 93% on 2 liters nasal cannula and was noted to be 86% on room air while sleeping     .   Signs/symptoms of respiratory failure include- tachypnea, increased work of breathing, wheezing, and inability in speak in full sentences  . Contributing diagnoses includes - COPD Labs and images were reviewed. Patient Has not had a recent ABG.     -Procalcitonin pending  -PO steroids daily  -Levaquin Initiated  -Supplemental oxygen PRN  "  -Scheduled nebulizers     Right sided weakness  Chronic condition noted. No acute process seen on CT scan.    -Fall precautions        Primary hypertension  Chronic, controlled. Latest blood pressure and vitals reviewed-     Temp:  [98.4 °F (36.9 °C)]   Pulse:  [67-91]   Resp:  [16-20]   BP: (136-187)/()   SpO2:  [92 %-100 %] .   Home meds for hypertension were reviewed and noted below.   Hypertension Medications               amLODIPine (NORVASC) 10 MG tablet Take 1 tablet (10 mg total) by mouth once daily.    lisinopriL 10 MG tablet Take 1 tablet (10 mg total) by mouth once daily.            While in the hospital, will manage blood pressure as follows; Continue home antihypertensive regimen    Will utilize p.r.n. blood pressure medication only if patient's blood pressure greater than 180/110 and he develops symptoms such as worsening chest pain or shortness of breath.    DM (diabetes mellitus), type 2  Patient's FSGs are controlled on current medication regimen.  Last A1c reviewed-   Lab Results   Component Value Date    LABA1C 9.8 (H) 07/06/2016    HGBA1C 6.5 (H) 12/04/2023     Most recent fingerstick glucose reviewed- No results for input(s): "POCTGLUCOSE" in the last 24 hours.  Current correctional scale  Low  Maintain anti-hyperglycemic dose as follows-   Antihyperglycemics (From admission, onward)      Start     Stop Route Frequency Ordered    03/26/24 2219  insulin aspart U-100 pen 0-5 Units         -- SubQ Before meals & nightly PRN 03/26/24 2219          Hold Oral hypoglycemics while patient is in the hospital.      VTE Risk Mitigation (From admission, onward)           Ordered     enoxaparin injection 40 mg  Every 24 hours         03/26/24 2226     IP VTE LOW RISK PATIENT  Once         03/26/24 2219     Place sequential compression device  Until discontinued         03/26/24 2219                         On 03/26/2024, patient should be placed in hospital observation services under my care in " collaboration with Dr. Delvin Chung MD .      Automatic Inhaler to Nebulizer Interchange    fluticasone/vilanterol (Breo Ellipta) 200 mcg/25 mcg changed to budesonide 1 mg twice daily AND arformoterol 15 mcg twice daily per [facility] Automatic Therapeutic Substitutions Protocol.    Please contact pharmacy at extension [phone] with any questions.     Thank you,   Ayaz Sam NP  Department of Hospital Medicine  [facility]/Surg

## 2024-03-27 NOTE — SUBJECTIVE & OBJECTIVE
"Interval History: see "Hospital Course"    Review of Systems   Constitutional:  Positive for activity change.   Respiratory:  Positive for shortness of breath.    Neurological:  Positive for weakness.     Objective:     Vital Signs (Most Recent):  Temp: 97.5 °F (36.4 °C) (03/27/24 0827)  Pulse: 89 (03/27/24 0827)  Resp: 18 (03/27/24 0827)  BP: (!) 166/93 (03/27/24 0827)  SpO2: 100 % (03/27/24 0827) Vital Signs (24h Range):  Temp:  [97.5 °F (36.4 °C)-98.7 °F (37.1 °C)] 97.5 °F (36.4 °C)  Pulse:  [] 89  Resp:  [16-20] 18  SpO2:  [92 %-100 %] 100 %  BP: (135-187)/() 166/93     Weight: 68.7 kg (151 lb 7.3 oz)  Body mass index is 22.37 kg/m².    Intake/Output Summary (Last 24 hours) at 3/27/2024 1137  Last data filed at 3/27/2024 0800  Gross per 24 hour   Intake 240 ml   Output 350 ml   Net -110 ml         Physical Exam  Vitals and nursing note reviewed.   Constitutional:       General: He is not in acute distress.  HENT:      Head: Normocephalic and atraumatic.      Right Ear: External ear normal.      Left Ear: External ear normal.      Nose: Nose normal.      Mouth/Throat:      Mouth: Mucous membranes are moist.      Pharynx: Oropharynx is clear.   Cardiovascular:      Rate and Rhythm: Normal rate.      Pulses: Normal pulses.      Heart sounds: Normal heart sounds.   Pulmonary:      Effort: Pulmonary effort is normal.      Breath sounds: Normal breath sounds.      Comments: NC.  Abdominal:      General: Bowel sounds are normal.      Palpations: Abdomen is soft.   Musculoskeletal:         General: Normal range of motion.      Cervical back: Normal range of motion and neck supple.   Skin:     General: Skin is warm and dry.   Neurological:      Mental Status: He is alert. Mental status is at baseline.   Psychiatric:         Mood and Affect: Mood normal.         Behavior: Behavior normal.             Significant Labs: All pertinent labs within the past 24 hours have been reviewed.    Significant Imaging: I " have reviewed all pertinent imaging results/findings within the past 24 hours.

## 2024-03-27 NOTE — ASSESSMENT & PLAN NOTE
Patient with Hypercapnic and Hypoxic Respiratory failure which is Acute on chronic.  he is not on home oxygen. Supplemental oxygen was provided and noted-  2 liters via nasal cannula    .   Signs/symptoms of respiratory failure include-  tachypnea, increased work of breathing, wheezing, and inability in speak in full sentences Contributing diagnoses includes - COPD Labs and images were reviewed. Patient Has recent ABG, which has been reviewed. Will treat underlying causes and adjust management of respiratory failure as follows-     Patient with Hypoxic Respiratory failure which is Acute.  he is not on home oxygen. Supplemental oxygen was provided and noted-  patient 93% on 2 liters nasal cannula and was noted to be 86% on room air while sleeping       -Bipap trial with repeat ABG  -Procalcitonin pending  -PO steroids daily  -Levaquin Initiated  -Supplemental oxygen PRN   -Scheduled nebulizers

## 2024-03-28 LAB
ANION GAP SERPL CALC-SCNC: 7 MMOL/L (ref 8–16)
BASOPHILS # BLD AUTO: 0.01 K/UL (ref 0–0.2)
BASOPHILS NFR BLD: 0.1 % (ref 0–1.9)
BUN SERPL-MCNC: 13 MG/DL (ref 6–20)
CALCIUM SERPL-MCNC: 8.9 MG/DL (ref 8.7–10.5)
CHLORIDE SERPL-SCNC: 102 MMOL/L (ref 95–110)
CO2 SERPL-SCNC: 31 MMOL/L (ref 23–29)
CREAT SERPL-MCNC: 0.8 MG/DL (ref 0.5–1.4)
DIFFERENTIAL METHOD BLD: ABNORMAL
EOSINOPHIL # BLD AUTO: 0 K/UL (ref 0–0.5)
EOSINOPHIL NFR BLD: 0.3 % (ref 0–8)
ERYTHROCYTE [DISTWIDTH] IN BLOOD BY AUTOMATED COUNT: 13.4 % (ref 11.5–14.5)
EST. GFR  (NO RACE VARIABLE): >60 ML/MIN/1.73 M^2
GLUCOSE SERPL-MCNC: 96 MG/DL (ref 70–110)
HCT VFR BLD AUTO: 34.6 % (ref 40–54)
HGB BLD-MCNC: 11 G/DL (ref 14–18)
IMM GRANULOCYTES # BLD AUTO: 0.02 K/UL (ref 0–0.04)
IMM GRANULOCYTES NFR BLD AUTO: 0.3 % (ref 0–0.5)
LYMPHOCYTES # BLD AUTO: 1.2 K/UL (ref 1–4.8)
LYMPHOCYTES NFR BLD: 15.8 % (ref 18–48)
MCH RBC QN AUTO: 30.4 PG (ref 27–31)
MCHC RBC AUTO-ENTMCNC: 31.8 G/DL (ref 32–36)
MCV RBC AUTO: 96 FL (ref 82–98)
MONOCYTES # BLD AUTO: 0.5 K/UL (ref 0.3–1)
MONOCYTES NFR BLD: 6.9 % (ref 4–15)
NEUTROPHILS # BLD AUTO: 5.8 K/UL (ref 1.8–7.7)
NEUTROPHILS NFR BLD: 76.6 % (ref 38–73)
NRBC BLD-RTO: 0 /100 WBC
PLATELET # BLD AUTO: 177 K/UL (ref 150–450)
PMV BLD AUTO: 10 FL (ref 9.2–12.9)
POCT GLUCOSE: 124 MG/DL (ref 70–110)
POCT GLUCOSE: 174 MG/DL (ref 70–110)
POCT GLUCOSE: 243 MG/DL (ref 70–110)
POTASSIUM SERPL-SCNC: 4.4 MMOL/L (ref 3.5–5.1)
RBC # BLD AUTO: 3.62 M/UL (ref 4.6–6.2)
SODIUM SERPL-SCNC: 140 MMOL/L (ref 136–145)
WBC # BLD AUTO: 7.54 K/UL (ref 3.9–12.7)

## 2024-03-28 PROCEDURE — 93010 ELECTROCARDIOGRAM REPORT: CPT | Mod: ,,, | Performed by: GENERAL PRACTICE

## 2024-03-28 PROCEDURE — 25000242 PHARM REV CODE 250 ALT 637 W/ HCPCS

## 2024-03-28 PROCEDURE — 80048 BASIC METABOLIC PNL TOTAL CA: CPT | Performed by: STUDENT IN AN ORGANIZED HEALTH CARE EDUCATION/TRAINING PROGRAM

## 2024-03-28 PROCEDURE — 97161 PT EVAL LOW COMPLEX 20 MIN: CPT

## 2024-03-28 PROCEDURE — 27000221 HC OXYGEN, UP TO 24 HOURS

## 2024-03-28 PROCEDURE — 93005 ELECTROCARDIOGRAM TRACING: CPT

## 2024-03-28 PROCEDURE — 94761 N-INVAS EAR/PLS OXIMETRY MLT: CPT

## 2024-03-28 PROCEDURE — 25000003 PHARM REV CODE 250

## 2024-03-28 PROCEDURE — 36415 COLL VENOUS BLD VENIPUNCTURE: CPT | Performed by: STUDENT IN AN ORGANIZED HEALTH CARE EDUCATION/TRAINING PROGRAM

## 2024-03-28 PROCEDURE — 85025 COMPLETE CBC W/AUTO DIFF WBC: CPT | Performed by: STUDENT IN AN ORGANIZED HEALTH CARE EDUCATION/TRAINING PROGRAM

## 2024-03-28 PROCEDURE — 94640 AIRWAY INHALATION TREATMENT: CPT

## 2024-03-28 PROCEDURE — 11000001 HC ACUTE MED/SURG PRIVATE ROOM

## 2024-03-28 PROCEDURE — 63600175 PHARM REV CODE 636 W HCPCS

## 2024-03-28 RX ADMIN — PREGABALIN 100 MG: 75 CAPSULE ORAL at 09:03

## 2024-03-28 RX ADMIN — BUDESONIDE INHALATION 1 MG: 0.5 SUSPENSION RESPIRATORY (INHALATION) at 07:03

## 2024-03-28 RX ADMIN — IPRATROPIUM BROMIDE AND ALBUTEROL SULFATE 3 ML: 2.5; .5 SOLUTION RESPIRATORY (INHALATION) at 07:03

## 2024-03-28 RX ADMIN — PANTOPRAZOLE SODIUM 40 MG: 40 TABLET, DELAYED RELEASE ORAL at 09:03

## 2024-03-28 RX ADMIN — ENOXAPARIN SODIUM 40 MG: 40 INJECTION SUBCUTANEOUS at 10:03

## 2024-03-28 RX ADMIN — ARFORMOTEROL TARTRATE 15 MCG: 15 SOLUTION RESPIRATORY (INHALATION) at 07:03

## 2024-03-28 RX ADMIN — OXYCODONE 5 MG: 5 TABLET ORAL at 09:03

## 2024-03-28 RX ADMIN — AMLODIPINE BESYLATE 10 MG: 5 TABLET ORAL at 09:03

## 2024-03-28 RX ADMIN — PREDNISONE 40 MG: 20 TABLET ORAL at 09:03

## 2024-03-28 RX ADMIN — INSULIN ASPART 1 UNITS: 100 INJECTION, SOLUTION INTRAVENOUS; SUBCUTANEOUS at 10:03

## 2024-03-28 RX ADMIN — ESCITALOPRAM OXALATE 10 MG: 10 TABLET, FILM COATED ORAL at 09:03

## 2024-03-28 RX ADMIN — POLYETHYLENE GLYCOL 3350 17 G: 17 POWDER, FOR SOLUTION ORAL at 12:03

## 2024-03-28 RX ADMIN — ASPIRIN 81 MG: 81 TABLET, COATED ORAL at 09:03

## 2024-03-28 RX ADMIN — LEVOFLOXACIN 750 MG: 250 TABLET, FILM COATED ORAL at 09:03

## 2024-03-28 RX ADMIN — ATORVASTATIN CALCIUM 20 MG: 20 TABLET, FILM COATED ORAL at 09:03

## 2024-03-28 RX ADMIN — IPRATROPIUM BROMIDE AND ALBUTEROL SULFATE 3 ML: 2.5; .5 SOLUTION RESPIRATORY (INHALATION) at 01:03

## 2024-03-28 RX ADMIN — INSULIN ASPART 4 UNITS: 100 INJECTION, SOLUTION INTRAVENOUS; SUBCUTANEOUS at 05:03

## 2024-03-28 RX ADMIN — LISINOPRIL 10 MG: 10 TABLET ORAL at 09:03

## 2024-03-28 NOTE — CARE UPDATE
03/27/24 1953   Patient Assessment/Suction   Level of Consciousness (AVPU) alert   Respiratory Effort Unlabored   Expansion/Accessory Muscles/Retractions no use of accessory muscles;tracheal tugging   All Lung Fields Breath Sounds Anterior:;Lateral:;coarse;diminished   Rhythm/Pattern, Respiratory pattern regular;depth regular   Cough Frequency infrequent   Skin Integrity   $ Wound Care Tech Time 15 min   Skin Appearance without discoloration   PRE-TX-O2   Device (Oxygen Therapy) nasal cannula with humidification   $ Is the patient on Low Flow Oxygen? Yes   Flow (L/min) 2   SpO2 (!) 92 %   Pulse Oximetry Type Intermittent   $ Pulse Oximetry - Multiple Charge Pulse Oximetry - Multiple   Pulse 79   Resp 20   Aerosol Therapy   $ Aerosol Therapy Charges Aerosol Treatment  (brovana)   Respiratory Treatment Status (SVN) given   Treatment Route (SVN) mask;oxygen   Patient Position (SVN) HOB elevated   Post Treatment Assessment (SVN) breath sounds unchanged   Signs of Intolerance (SVN) none   Breath Sounds Post-Respiratory Treatment   Throughout All Fields Post-Treatment All Fields   Throughout All Fields Post-Treatment aeration increased   Post-treatment Heart Rate (beats/min) 79   Post-treatment Resp Rate (breaths/min) 18

## 2024-03-28 NOTE — NURSING
Monitor room called. Pt has an elevated T wave. LAURY morrell notified. Orders placed fro stat EKG

## 2024-03-28 NOTE — PT/OT/SLP EVAL
Physical Therapy Evaluation    Patient Name:  Hernan Badillo   MRN:  [id number]    Recommendations:     Discharge Recommendations: Low Intensity Therapy   Discharge Equipment Recommendations: none   Barriers to discharge: None    Assessment:     Hernan Badillo is a 58 y.o. male admitted with a medical diagnosis of COPD exacerbation.  He presents with the following impairments/functional limitations: weakness, impaired endurance, impaired functional mobility, gait instability, impaired balance, decreased lower extremity function, impaired cardiopulmonary response to activity .    Pt seen supine in bed, alerta nd agreeable to PT. Pt stated is ambulatory within room and to bathroom. Pt on RA and ambulated 250ft with  CGA with SAT 88-93%..    Rehab Prognosis: Fair; patient would benefit from acute skilled PT services to address these deficits and reach maximum level of function.    Recent Surgery: * No surgery found *      Plan:     During this hospitalization, patient to be seen 6 x/week to address the identified rehab impairments via gait training, therapeutic activities, therapeutic exercises and progress toward the following goals:    Plan of Care Expires:  04/15/24    Subjective   Stated that he does get up to bathroom  Chief Complaint: none  Patient/Family Comments/goals: none  Pain/Comfort:  Pain Rating 1: 0/10    Patients cultural, spiritual, Oriental orthodox conflicts given the current situation:      Living Environment:  Home with family  Prior to admission, patients level of function was ambulatory/drives.  Equipment used at home: none.  DME owned (not currently used): rolling walker and wheelchair.  Upon discharge, patient will have assistance from family.    Objective:     Communicated with nurse Ramos prior to session.  Patient found HOB elevated with telemetry, oxygen  upon PT entry to room.    General Precautions: Standard, fall  Orthopedic Precautions:N/A   Braces: N/A  Respiratory Status: Room air    Exams:  Postural  Exam:  Patient presented with the following abnormalities:    -       Rounded shoulders  -       Forward head  -       BMI 22.37  RLE ROM: WFL  RLE Strength: WFL  LLE ROM: WFL  LLE Strength: WFL    Functional Mobility:  Bed Mobility:     Scooting: modified independence  Supine to Sit: contact guard assistance  Sit to Supine: contact guard assistance  Transfers:     Sit to Stand:  contact guard assistance with no AD  Gait: 250ft with HHA CGA  pt on RA with SAT 88% and fast recovery to 93% with reat      AM-PAC 6 CLICK MOBILITY  Total Score:18       Treatment & Education:  Patient was educated on the importance of OOB activity and functional mobility to negate negative effects of prolonged bed rest during hospitalization, safe transfers and ambulation, and D/C planning   Pt encouraged activities and breathing techniques    Patient left HOB elevated with all lines intact and call button in reach.    GOALS:   Multidisciplinary Problems       Physical Therapy Goals          Problem: Physical Therapy    Goal Priority Disciplines Outcome Goal Variances Interventions   Physical Therapy Goal     PT, PT/OT Ongoing, Progressing     Description: Goals to be met by: 04-     Patient will increase functional independence with mobility by performin. Supine to sit with [place]  2. Sit to stand transfer with [place]  3. Bed to chair transfer with Supervision using No Assistive Device  4. Gait  x 250 feet with Stand-by Assistance using No Assistive Device.   5. Lower extremity exercise program x20 reps                        History:     Past Medical History:   Diagnosis Date    COPD (chronic obstructive pulmonary disease)     Diabetes mellitus, type 2     Hypertension        Past Surgical History:   Procedure Laterality Date    DENTAL SURGERY      ESOPHAGOGASTRODUODENOSCOPY N/A 2020    Procedure: EGD (ESOPHAGOGASTRODUODENOSCOPY);  Surgeon: Terrell May MD;  Location: [facility];  Service: Endoscopy;   Laterality: N/A;    HERNIA REPAIR      left inguinal    incision and drainiage         Time Tracking:     PT Received On: 03/28/24  PT Start Time: 1003     PT Stop Time: 1015  PT Total Time (min): 12 min     Billable Minutes: Evaluation 12 03/28/2024

## 2024-03-28 NOTE — PLAN OF CARE
Problem: Adjustment to Illness COPD (Chronic Obstructive Pulmonary Disease)  Goal: Optimal Chronic Illness Coping  Outcome: Ongoing, Progressing     Problem: Functional Ability Impaired COPD (Chronic Obstructive Pulmonary Disease)  Goal: Optimal Level of Functional [place]  Outcome: Ongoing, Progressing     Problem: Infection COPD (Chronic Obstructive Pulmonary Disease)  Goal: Absence of Infection Signs and Symptoms  Outcome: Ongoing, Progressing     Problem: Oral Intake Inadequate COPD (Chronic Obstructive Pulmonary Disease)  Goal: Improved Nutrition Intake  Outcome: Ongoing, Progressing     Problem: Respiratory Compromise COPD (Chronic Obstructive Pulmonary Disease)  Goal: Effective Oxygenation and Ventilation  Outcome: Ongoing, Progressing     Problem: Adult Inpatient Plan of Care  Goal: Plan of Care Review  Outcome: Ongoing, Progressing  Goal: Patient-Specific Goal (Individualized)  Outcome: Ongoing, Progressing  Goal: Absence of Hospital-Acquired Illness or Injury  Outcome: Ongoing, Progressing  Goal: Optimal Comfort and Wellbeing  Outcome: Ongoing, Progressing  Goal: Readiness for Transition of Care  Outcome: Ongoing, Progressing     Problem: Diabetes Comorbidity  Goal: Blood Glucose Level Within Targeted Range  Outcome: Ongoing, Progressing     Problem: Fluid Imbalance (Pneumonia)  Goal: Fluid Balance  Outcome: Ongoing, Progressing     Problem: Infection (Pneumonia)  Goal: Resolution of Infection Signs and Symptoms  Outcome: Ongoing, Progressing     Problem: Respiratory Compromise (Pneumonia)  Goal: Effective Oxygenation and Ventilation  Outcome: Ongoing, Progressing

## 2024-03-28 NOTE — PLAN OF CARE
[place] [facility] - Med/Surg  Initial Discharge Assessment       Primary Care Provider: Toan Banks DO    Admission Diagnosis: COPD exacerbation [J44.1]    Admission Date: 3/26/2024  Expected Discharge Date: 3/28/2024    Transition of Care Barriers: Does not adhere to care plan    Payor: [employer]AGE / Plan: [employer] MC[employer] ADV / Product Type: Medicare Advantage /     Extended Emergency Contact Information  Primary Emergency Contact: Mariann Chou  Address: [address]           [place] [address] [facility]  Home Phone: [phone]  Mobile Phone: [phone]  Relation: Mother  Preferred language: English   needed? No  Secondary Emergency Contact: CATHERINE ALVARADO  Mobile Phone: [phone]  Relation: Brother  Preferred language: English   needed? No    Discharge Plan A: Home with family  Discharge Plan B: Home      [facility] - [place] - [address]  [address]  [place] [address]  Phone: [phone] Fax: [phone]    DC assessment completed with patient at bedside. Verified information on facesheet as correct. Pt lives at listed address with children. Reports he has help if needed from family. Reports POA as mother Mariann Chou ([phone])  PCP is Toan Banks DO- reports last apt was last yr. Pharmacy is [facility]. Denies hh/hd/outpt services/coumadin. DME states has RW, WC,cane and nebulizer. Reports being independent with activities. States drives himself to apts. Reports a friend or family member will provide transportation home upon DC. States doesn't always take home medications as prescribed and states unsure if can currently afford them.  States mother picks up his medication. Verified insurance on file. Denies recent inpt stay in last 30 days.  DC plan is home with family.    Initial Assessment (most recent)       Adult Discharge Assessment - 03/28/24 0853          Discharge Assessment     Assessment Type Discharge Planning Assessment     Confirmed/corrected address, phone number and insurance Yes     Confirmed Demographics Correct on Facesheet     Source of Information patient     Reason For Admission COPD     People in Home child(annamarie), dependent     Do you expect to return to your current living situation? Yes     Do you have help at home or someone to help you manage your care at home? No     Prior to hospitilization cognitive status: Alert/Oriented     Current cognitive status: Alert/Oriented     Equipment Currently Used at Home cane, straight;walker, rolling;wheelchair;nebulizer     Readmission within 30 days? No     Patient currently being followed by outpatient case management? No     Do you currently have service(s) that help you manage your care at home? No     Do you take prescription medications? Yes     Do you have prescription coverage? Yes     Do you have any problems affording any of your prescribed medications? TBD     Is the patient taking medications as prescribed? no     Who is going to help you get home at discharge? family     How do you get to doctors appointments? family or friend will provide;car, drives self     Are you on dialysis? No     Do you take coumadin? No     Discharge Plan A Home with family     Discharge Plan B Home     DME Needed Upon Discharge  other (see comments)   tbd    Discharge Plan discussed with: Patient     Transition of Care Barriers Does not adhere to care plan

## 2024-03-28 NOTE — PLAN OF CARE
Problem: Physical Therapy  Goal: Physical Therapy Goal  Description: Goals to be met by: 04-     Patient will increase functional independence with mobility by performin. Supine to sit with [place]  2. Sit to stand transfer with [place]  3. Bed to chair transfer with Supervision using No Assistive Device  4. Gait  x 250 feet with Stand-by Assistance using No Assistive Device.   5. Lower extremity exercise program x20 reps   Outcome: Ongoing, Progressing   PT eval and treat. Pt ambulated 250ft with HHA. Low complexity

## 2024-03-28 NOTE — CONSULTS
Thank you for your consult to [facility]. We have reviewed the patient chart. This patient does not meet criteria for [facility] service at this time due to Patient has a condition likely to benefit from bedside evaluation such as COPD exacerbation with hypoxic and hypercapnic respiratory failure refusing BIPAP.  Will not assume care of the patient at this time.    Sun Pak MD

## 2024-03-29 VITALS
BODY MASS INDEX: 22.43 KG/M2 | HEIGHT: 69 IN | DIASTOLIC BLOOD PRESSURE: 63 MMHG | SYSTOLIC BLOOD PRESSURE: 126 MMHG | OXYGEN SATURATION: 95 % | RESPIRATION RATE: 17 BRPM | TEMPERATURE: 98 F | HEART RATE: 80 BPM | WEIGHT: 151.44 LBS

## 2024-03-29 LAB
ANION GAP SERPL CALC-SCNC: 8 MMOL/L (ref 8–16)
BASOPHILS # BLD AUTO: 0.01 K/UL (ref 0–0.2)
BASOPHILS NFR BLD: 0.2 % (ref 0–1.9)
BUN SERPL-MCNC: 13 MG/DL (ref 6–20)
CALCIUM SERPL-MCNC: 9.1 MG/DL (ref 8.7–10.5)
CHLORIDE SERPL-SCNC: 100 MMOL/L (ref 95–110)
CO2 SERPL-SCNC: 30 MMOL/L (ref 23–29)
CREAT SERPL-MCNC: 0.9 MG/DL (ref 0.5–1.4)
DIFFERENTIAL METHOD BLD: ABNORMAL
EOSINOPHIL # BLD AUTO: 0 K/UL (ref 0–0.5)
EOSINOPHIL NFR BLD: 0.3 % (ref 0–8)
ERYTHROCYTE [DISTWIDTH] IN BLOOD BY AUTOMATED COUNT: 13.6 % (ref 11.5–14.5)
EST. GFR  (NO RACE VARIABLE): >60 ML/MIN/1.73 M^2
GLUCOSE SERPL-MCNC: 223 MG/DL (ref 70–110)
HCT VFR BLD AUTO: 36.5 % (ref 40–54)
HGB BLD-MCNC: 11.6 G/DL (ref 14–18)
IMM GRANULOCYTES # BLD AUTO: 0.01 K/UL (ref 0–0.04)
IMM GRANULOCYTES NFR BLD AUTO: 0.2 % (ref 0–0.5)
LYMPHOCYTES # BLD AUTO: 1.4 K/UL (ref 1–4.8)
LYMPHOCYTES NFR BLD: 21.6 % (ref 18–48)
MCH RBC QN AUTO: 30.1 PG (ref 27–31)
MCHC RBC AUTO-ENTMCNC: 31.8 G/DL (ref 32–36)
MCV RBC AUTO: 95 FL (ref 82–98)
MONOCYTES # BLD AUTO: 0.3 K/UL (ref 0.3–1)
MONOCYTES NFR BLD: 3.9 % (ref 4–15)
NEUTROPHILS # BLD AUTO: 4.9 K/UL (ref 1.8–7.7)
NEUTROPHILS NFR BLD: 73.8 % (ref 38–73)
NRBC BLD-RTO: 0 /100 WBC
PLATELET # BLD AUTO: 202 K/UL (ref 150–450)
PMV BLD AUTO: 10 FL (ref 9.2–12.9)
POCT GLUCOSE: 180 MG/DL (ref 70–110)
POTASSIUM SERPL-SCNC: 4.1 MMOL/L (ref 3.5–5.1)
RBC # BLD AUTO: 3.85 M/UL (ref 4.6–6.2)
SODIUM SERPL-SCNC: 138 MMOL/L (ref 136–145)
WBC # BLD AUTO: 6.61 K/UL (ref 3.9–12.7)

## 2024-03-29 PROCEDURE — 36415 COLL VENOUS BLD VENIPUNCTURE: CPT | Performed by: STUDENT IN AN ORGANIZED HEALTH CARE EDUCATION/TRAINING PROGRAM

## 2024-03-29 PROCEDURE — 27000221 HC OXYGEN, UP TO 24 HOURS

## 2024-03-29 PROCEDURE — 63600175 PHARM REV CODE 636 W HCPCS

## 2024-03-29 PROCEDURE — 94640 AIRWAY INHALATION TREATMENT: CPT

## 2024-03-29 PROCEDURE — 25000242 PHARM REV CODE 250 ALT 637 W/ HCPCS

## 2024-03-29 PROCEDURE — 99900031 HC PATIENT EDUCATION (STAT)

## 2024-03-29 PROCEDURE — 25000003 PHARM REV CODE 250

## 2024-03-29 PROCEDURE — 80048 BASIC METABOLIC PNL TOTAL CA: CPT | Performed by: STUDENT IN AN ORGANIZED HEALTH CARE EDUCATION/TRAINING PROGRAM

## 2024-03-29 PROCEDURE — 94618 PULMONARY STRESS TESTING: CPT

## 2024-03-29 PROCEDURE — 94761 N-INVAS EAR/PLS OXIMETRY MLT: CPT

## 2024-03-29 PROCEDURE — 85025 COMPLETE CBC W/AUTO DIFF WBC: CPT | Performed by: STUDENT IN AN ORGANIZED HEALTH CARE EDUCATION/TRAINING PROGRAM

## 2024-03-29 RX ORDER — LEVOFLOXACIN 750 MG/1
750 TABLET ORAL DAILY
Qty: 4 TABLET | Refills: 0 | Status: SHIPPED | OUTPATIENT
Start: 2024-03-29 | End: 2024-04-02

## 2024-03-29 RX ORDER — BUDESONIDE 0.5 MG/2ML
0.5 SUSPENSION RESPIRATORY (INHALATION) 2 TIMES DAILY
Qty: 120 ML | Refills: 0 | Status: ON HOLD | OUTPATIENT
Start: 2024-03-29 | End: 2024-04-11 | Stop reason: HOSPADM

## 2024-03-29 RX ORDER — PREDNISONE 20 MG/1
40 TABLET ORAL DAILY
Qty: 6 TABLET | Refills: 0 | Status: SHIPPED | OUTPATIENT
Start: 2024-03-29 | End: 2024-04-01

## 2024-03-29 RX ORDER — IBUPROFEN 200 MG
1 TABLET ORAL DAILY
Qty: 30 PATCH | Refills: 0 | Status: SHIPPED | OUTPATIENT
Start: 2024-03-29 | End: 2024-03-29 | Stop reason: HOSPADM

## 2024-03-29 RX ADMIN — OXYCODONE 5 MG: 5 TABLET ORAL at 10:03

## 2024-03-29 RX ADMIN — LEVOFLOXACIN 750 MG: 250 TABLET, FILM COATED ORAL at 10:03

## 2024-03-29 RX ADMIN — PREDNISONE 40 MG: 20 TABLET ORAL at 10:03

## 2024-03-29 RX ADMIN — PANTOPRAZOLE SODIUM 40 MG: 40 TABLET, DELAYED RELEASE ORAL at 10:03

## 2024-03-29 RX ADMIN — INSULIN ASPART 2 UNITS: 100 INJECTION, SOLUTION INTRAVENOUS; SUBCUTANEOUS at 12:03

## 2024-03-29 RX ADMIN — IPRATROPIUM BROMIDE AND ALBUTEROL SULFATE 3 ML: 2.5; .5 SOLUTION RESPIRATORY (INHALATION) at 07:03

## 2024-03-29 RX ADMIN — PREGABALIN 100 MG: 75 CAPSULE ORAL at 10:03

## 2024-03-29 RX ADMIN — BUDESONIDE INHALATION 1 MG: 0.5 SUSPENSION RESPIRATORY (INHALATION) at 07:03

## 2024-03-29 RX ADMIN — ATORVASTATIN CALCIUM 20 MG: 20 TABLET, FILM COATED ORAL at 10:03

## 2024-03-29 RX ADMIN — AMLODIPINE BESYLATE 10 MG: 5 TABLET ORAL at 10:03

## 2024-03-29 RX ADMIN — POLYETHYLENE GLYCOL 3350 17 G: 17 POWDER, FOR SOLUTION ORAL at 10:03

## 2024-03-29 RX ADMIN — ARFORMOTEROL TARTRATE 15 MCG: 15 SOLUTION RESPIRATORY (INHALATION) at 07:03

## 2024-03-29 RX ADMIN — ESCITALOPRAM OXALATE 10 MG: 10 TABLET, FILM COATED ORAL at 10:03

## 2024-03-29 RX ADMIN — IPRATROPIUM BROMIDE AND ALBUTEROL SULFATE 3 ML: 2.5; .5 SOLUTION RESPIRATORY (INHALATION) at 01:03

## 2024-03-29 RX ADMIN — ASPIRIN 81 MG: 81 TABLET, COATED ORAL at 10:03

## 2024-03-29 RX ADMIN — LISINOPRIL 10 MG: 10 TABLET ORAL at 10:03

## 2024-03-29 NOTE — DISCHARGE SUMMARY
"[facility] Medicine  Discharge Summary      Patient Name: Hernan Badillo  MRN: [id number]  GEOVANNI: [id number]  Patient Class: IP- Inpatient  Admission Date: 3/26/2024  Hospital Length of Stay: 2 days  Discharge Date and Time: 3/29/2024  4:18 PM  Attending Physician: Roel Larose MD   Discharging Provider: Lazara Franks NP  Primary Care Provider: Toan Banks DO    Primary Care Team: Networked reference to record PCT     HPI:   Hernan Badillo is a 58 year old male with a previous medical history of COPD, DMII, HTN, CVA in December 2023 with right side residual weakness who presents for right side rib pain secondary to fall and possible syncope. Unable to obtain a clear history as patient is poor historian but reports a possible fall on Saturday or Sunday and all he recalls is "darkness". Since that time patient has had severe right sided rib pain aggravated by movement and endorses buying pain pills off the street but they have afforded minimal relief. Initial ED evaluation included a CT of the head and neck which was negative for any acute process along with xrays of the chest and ribs that showed no rib fractures but hazy interstitial opacities of the right lung base. CBC and CMP unremarkable. Troponin 0.039. EKG shows no signs of St elevation. Upon examination patient noted to have increased work of breathing and unable to have a conversation on room air without having shortness of breath. Per nursing notes patient seen to be 86% on room air while sleeping. Patient to be admitted by hospital medicine for further evaluation and management     * No surgery found *      Hospital Course:   Hernan Badillo is a 58 year old male with a  past medical history of COPD, DM, HTN, HLD, MDD, GERD, tobacco use, anemia and HFpEF who presented with acute hypoxic respiratory failure in the setting of a COPD exacerbation. He is on Duonebs, 2 L NC, and steroids. Patient endorsed right-sided muscle " spasms.  Patient was offered muscle relaxers but refused.  Patient stated this is a chronic issue since his previous CVA.  Patient endorsed pleuritic chest pain.  Repeat CXR showed improvement of bibasilar opacities.  Patient is satting 94-97% on 2 L nasal cannula.  Patient received home oxygen evaluation.  Home oxygen ordered with assistance from case management.  Patient worked with physical therapy while in the hospital.  Home health was recommended.  Ambulatory referral for home health was sent.  The prescription for Levaquin, Pulmicort, and prednisone were sent to patient's preferred pharmacy.  Patient was educated on the need to stopped smoking.  Patient refused for nicotine patch to be sent to pharmacy.  It was advised to remain compliant with all medications.  Primary care provider was scheduled for 04/05/2024.  Patient denied chest pain at time of exam.    Patient seen and examined on day of discharge.  Patient deemed appropriate for discharge.  Patient was educated on all prescriptions and advised to remain compliant.  Ambulatory referral to pulmonology was sent.  Patient educated on discharge planning, he verbalizes understanding.     Goals of Care Treatment Preferences:  Code Status: Full Code      Consults:   Consults (From admission, onward)          Status Ordering Provider     Inpatient consult to Social Work/Case Management  Once        Provider:  (Not yet assigned)    Completed JERI AVILEZ     Inpatient virtual consult to Hospital Medicine  Once        Provider:  (Not yet assigned)    Completed MERT CARIAS            No new Assessment & Plan notes have been filed under this hospital service since the last note was generated.  Service: Hospital Medicine    Final Active Diagnoses:    Diagnosis Date Noted POA    PRINCIPAL PROBLEM:  COPD exacerbation [J44.1] 11/26/2022 Yes    HLD (hyperlipidemia) [E78.5] 03/27/2024 Yes    MDD (major depressive disorder) [F32.9] 03/27/2024 Yes    GERD  "(gastroesophageal reflux disease) [K21.9] 03/27/2024 Yes    Anemia [D64.9] 03/27/2024 Yes    Acute hypoxemic respiratory failure [J96.01] 03/26/2024 Yes    Fall [W19.XXXA] 03/26/2024 Yes    Elevated troponin level [R79.89] 03/26/2024 Yes    Right sided weakness [R53.1] 03/02/2023 Yes    Tobacco dependency [F17.200] 05/30/2020 Yes    Primary hypertension [I10] 03/24/2014 Yes    DM (diabetes mellitus), type 2 [E11.9] 01/22/2013 Yes      Problems Resolved During this Admission:    Diagnosis Date Noted Date Resolved POA    Pneumonia [J18.9] 03/26/2024 03/27/2024 Yes       Discharged Condition: fair    Disposition: [place] [facility]    Follow Up:   Follow-up Information       Toan Banks DO. Go on 4/5/2024.    Specialties: Family Medicine, Hospitalist  Why: at 3:30pm with Luba Blas NP  Contact information:  [address] [place] [address]  [phone]               [facility], [facility] Follow up.    Specialty: DME Provider  Why: DME- oxygen with portable tank  Contact information:  [address]  [facility] [address]  [phone]               [place] [facility] Follow up.    Specialty: Home Health Services  Why: Home Health  Contact information:  [address]  [facility]  [place] [address]  [phone]                           Patient Instructions:      OXYGEN FOR HOME USE     Order Specific Question Answer Comments   Liter Flow 3    Duration Continuous    Qualifying Test Performed at: Activity    Oxygen saturation at rest 89    Oxygen saturation with activity 87    Oxygen saturation with activity on oxygen 93    Portable mode: continuous    Route nasal cannula    Device: home concentrator with portable tanks    Length of need (in months): 12 mos    Patient condition with qualifying saturation COPD    Height: 5' 9" (1.753 m)    Weight: 68.7 kg (151 lb 7.3 oz)    Alternative treatment measures have been tried or considered and deemed clinically ineffective. Yes  "     Ambulatory referral/consult to Smoking Cessation Program   Standing Status: Future   Referral Priority: Routine Referral Type: Consultation   Referral Reason: Specialty Services Required   Requested Specialty: CTTS   Number of Visits Requested: 1     Ambulatory referral/consult to Pulmonology   Standing Status: Future   Referral Priority: Routine Referral Type: Consultation   Referral Reason: Specialty Services Required   Requested Specialty: Pulmonary Disease   Number of Visits Requested: 1     Ambulatory referral/consult to Home Health   Standing Status: Future   Referral Priority: Routine Referral Type: Home Health   Referral Reason: Specialty Services Required   Requested Specialty: Home Health Services   Number of Visits Requested: 1     Diet Adult Regular       Significant Diagnostic Studies: Labs: CMP   Recent Labs   Lab 03/28/24  0537 03/29/24  0443    138   K 4.4 4.1    100   CO2 31* 30*   GLU 96 223*   BUN 13 13   CREATININE 0.8 0.9   CALCIUM 8.9 9.1   ANIONGAP 7* 8    and CBC   Recent Labs   Lab 03/28/24  0537 03/29/24  0443   WBC 7.54 6.61   HGB 11.0* 11.6*   HCT 34.6* 36.5*    202     Imaging Results              CTA Head and Neck (xpd) (Final result)  Result time 03/26/24 18:25:40      Final result by Delvin Vera MD (03/26/24 18:25:40)                   Impression:      1. No evidence of acute intracranial abnormality or significant interval detrimental change as compared to the prior exam.  Further evaluation as warranted clinically.  2. Chronic small vessel ischemic changes again demonstrated, including chronic lacunar infarcts involving the right frontal corona radiata, bilateral basal ganglia and alf.  3. No high-grade stenosis, large vessel occlusion, dissection or aneurysm in the head and neck vasculature.      Electronically signed by: Delvin Vera  Date:    03/26/2024  Time:    18:25               Narrative:    EXAMINATION:  CTA HEAD AND NECK (XPD)    CLINICAL  HISTORY:  Neuro deficit, acute, stroke suspected;    TECHNIQUE:  Non contrast low dose axial images were obtained through the head. CT angiogram was performed from the level of the ignacia to the top of the head following the IV administration of 75mL of Omnipaque 350.   Sagittal and coronal reconstructions and maximum intensity projection reconstructions were performed. Arterial stenosis percentages are based on NASCET measurement criteria.    COMPARISON:  12/03/2023, MRI brain 12/04/2023    FINDINGS:  Brain:    No evidence of acute intracranial hemorrhage.    The ventricles and sulci are appropriate in size and configuration for the patient's age without hydrocephalus.    Stable small chronic lacunar infarct in the right frontal corona radiata, as well as bilateral basal ganglia and right paramedian alf.  There is no significant mass effect, midline shift, or extra-axial fluid collection. Remainder of the gray-white matter differentiation is within normal limits without evidence of an acute major vascular territory infarct.    No abnormal intracranial enhancement.    Chronic deformity of the medial left orbital wall.  Partial opacification of the right sphenoid sinus.  No acute calvarial fracture.    Extracranial:    Aorta and great vessels: Left-sided aortic arch with normal configuration.   No evidence of stenosis of the origins of the great vessels from the arch.    Subclavian arteries: The subclavian arteries are without hemodynamically significant stenosis or occlusion.    Right carotid: The right common carotid artery is without significant stenosis. Mild calcific atherosclerotic plaque at the carotid bifurcation without evidence of a hemodynamically significant stenosis..  The right internal carotid artery is tortuous without significant stenosis, occlusion, or dissection.    Left carotid: The left common carotid artery is without significant stenosis. Mild calcific atherosclerotic plaque at the carotid  bifurcation without evidence of a hemodynamically significant stenosis.  The left internal carotid artery is tortuous without significant stenosis, occlusion, or dissection.    Extracranial vertebral arteries:  The vertebral arteries are without significant stenosis, occlusion, or dissection to the skull base.    Intracranial:    Anterior circulation: Moderate calcific atherosclerosis of bilateral carotid artery siphons.  No areas of significant atherosclerotic narrowing or filling defects are identified.  The middle cerebral arteries are normal.  The anterior cerebral arteries are normal.    Posterior circulation: The vertebral arteries are normal. The basilar artery is normal. 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  Posterior cerebral arteries otherwise grossly normal.    No evidence of aneurysm or arteriovenous malformation.    Dural venous sinuses are patent.    Other:    The visualized portions of the lung apices demonstrated moderate centrilobular emphysematous changes.  Respiratory motion limits evaluation of the pulmonary parenchyma.  No focal consolidation, pleural effusion or pneumothorax.    There are degenerative spine changes. No concerning osseous lesions identified.                                       X-Ray Chest AP Portable (Final result)  Result time 03/26/24 16:49:37      Final result by Iván Coronado MD (03/26/24 16:49:37)                   Impression:      Unchanged extent of reticulonodular disease in the lung bases.  This is better characterized on prior CT.  Some differential considerations include includes atypical infection, pneumonitis, aspiration, airways disease.      Electronically signed by: Iván Coronado  Date:    03/26/2024  Time:    16:49               Narrative:    EXAMINATION:  XR CHEST AP PORTABLE    CLINICAL HISTORY:  Pleurodynia    TECHNIQUE:  Single frontal view of the  chest was performed.    COMPARISON:  12/03/2023 and 09/26/2023    FINDINGS:  There is reticulonodular disease in the lower lung zones with some tram-tracking.  Upper lung zones are clear.  Unchanged heart size.  No pleural effusion or pneumothorax.                                       X-Ray Ribs 2 View Right (Final result)  Result time 03/26/24 16:55:10      Final result by Iván Coronado MD (03/26/24 16:55:10)                   Narrative:    EXAMINATION:  XR RIBS 2 VIEW RIGHT    CLINICAL HISTORY:  Unspecified fall, initial encounter    TECHNIQUE:  Two views of the right ribs were performed.    COMPARISON:  03/26/2024    FINDINGS:  No displaced fracture or traumatic malalignment.  Hazy interstitial opacities right lung base better characterized on dated chest radiograph.  Mild degenerative change right AC joint.      Electronically signed by: Iván Coronado  Date:    03/26/2024  Time:    16:55                                   Pending Diagnostic Studies:       Procedure Component Value Units Date/Time    EKG 12-lead [[id number]]     Order Status: Sent Lab Status: No result            Medications:  Reconciled Home Medications:      Medication List        START taking these medications      levoFLOXacin 750 MG tablet  Commonly known as: LEVAQUIN  Take 1 tablet (750 mg total) by mouth once daily. for 4 days     predniSONE 20 MG tablet  Commonly known as: DELTASONE  Take 2 tablets (40 mg total) by mouth once daily. for 3 days     PULMICORT 0.5 mg/2 mL nebulizer solution  Generic drug: budesonide  Take 2 mLs (0.5 mg total) by nebulization 2 (two) times a day. Controller            CONTINUE taking these medications      acetaminophen 325 MG tablet  Commonly known as: TYLENOL  Take 650 mg by mouth 3 (three) times daily as needed.     * albuterol 90 mcg/actuation inhaler  Commonly known as: PROVENTIL/VENTOLIN HFA  Inhale 1-2 puffs into the lungs every 6 (six) hours as needed. Rescue     * albuterol 90 mcg/actuation  inhaler  Commonly known as: PROVENTIL HFA  Inhale 2 puffs into the lungs every 6 (six) hours as needed for Wheezing. Rescue     albuterol-ipratropium 2.5 mg-0.5 mg/3 mL nebulizer solution  Commonly known as: DUO-NEB  Take 3 mLs by nebulization every 6 (six) hours as needed for Wheezing or Shortness of Breath.     amLODIPine 10 MG tablet  Commonly known as: NORVASC  Take 1 tablet (10 mg total) by mouth once daily.     aspirin 81 MG EC tablet  Commonly known as: ECOTRIN  Take 1 tablet (81 mg total) by mouth once daily.     atorvastatin 20 MG tablet  Commonly known as: LIPITOR  Take 1 tablet (20 mg total) by mouth once daily.     baclofen 10 MG tablet  Commonly known as: LIORESAL  Take 1 tablet (10 mg total) by mouth 2 (two) times daily.     blood sugar diagnostic, disc Strp  1 each by Misc.(Non-Drug; Combo Route) route 4 (four) times daily.     blood-glucose meter kit  Use as instructed     clopidogreL 75 mg tablet  Commonly known as: PLAVIX  Take 1 tablet (75 mg total) by mouth once daily. for 21 days     EScitalopram oxalate 10 MG tablet  Commonly known as: LEXAPRO  Take 10 mg by mouth.     fluticasone-salmeterol 500-50 mcg/dose 500-50 mcg/dose Dsdv diskus inhaler  Commonly known as: ADVAIR DISKUS  Inhale 1 puff into the lungs 2 (two) times daily. Controller     lancets Misc  Commonly known as: LANCETS,THIN  1 each by Misc.(Non-Drug; Combo Route) route 4 (four) times daily.     lisinopriL 10 MG tablet  Take 1 tablet (10 mg total) by mouth once daily.     meclizine 25 mg tablet  Commonly known as: ANTIVERT  Take 25 mg by mouth.     melatonin 5 mg  Commonly known as: MELATIN  Take 5 mg by mouth.     metFORMIN 500 MG ER 24hr tablet  Commonly known as: GLUCOPHAGE-XR  Take 1 tablet (500 mg total) by mouth daily with breakfast.     pantoprazole 40 MG tablet  Commonly known as: PROTONIX  Take 1 tablet (40 mg total) by mouth once daily.     pregabalin 100 MG capsule  Commonly known as: LYRICA  Take 1 capsule (100 mg total)  by mouth 2 (two) times daily.           * This list has 2 medication(s) that are the same as other medications prescribed for you. Read the directions carefully, and ask your doctor or other care provider to review them with you.                  Indwelling Lines/Drains at time of discharge:   Lines/Drains/Airways       None                   Time spent on the discharge of patient: 35 minutes         Lazara Franks NP  Department of Hospital Medicine  [facility]/Surg

## 2024-03-29 NOTE — PLAN OF CARE
Discharged pt per MD order. Given d/c instruction and education on home oxygen. Pt transferred off unit with NAD noted with home oxygen in use. Pt educated to wear oxygen at all times and risks of smoking while using oxygen. Belongings with pt.

## 2024-03-29 NOTE — CARE UPDATE
03/29/24 0900   Home Oxygen Qualification   $ Home O2 Qualification Pulmonary Stress Test/6 min walk   Room Air SpO2 At Rest (!) 89 %   Room Air SpO2 During Ambulation (!) 87 %   SpO2 During Ambulation on O2 94 %   Heart Rate on O2 86 bpm   Ambulation O2 LPM 3 LPM   SpO2 Post Ambulation 93 %   Post Ambulation Heart Rate 92 bpm   Post Ambulation O2 LPM 3 LPM   Home O2 Eval Comments patient needs 3 lpm to maintain sats> 88%

## 2024-03-29 NOTE — PLAN OF CARE
Problem: Adult Inpatient Plan of Care  Goal: Plan of Care Review  Outcome: Ongoing, Progressing     Problem: Adult Inpatient Plan of Care  Goal: Optimal Comfort and Wellbeing  Outcome: Ongoing, Progressing     Assumed care of patient at 1430. Pt rested in bed. 2L O2 in place. No complaints of pain voiced. BG monitored closely, coverage provided per prn sliding scale. Continuous cardiac monitoring in place. Safety maintained. Nees attended to.

## 2024-03-29 NOTE — PROGRESS NOTES
"[facility]  Progress Note    Patient Name: Hernan Badillo  MRN: [id number]  Patient Class: IP- Inpatient   Admission Date: 3/26/2024  Length of Stay: 1 days  Attending Physician: Evens Richter MD  Primary Care Provider: Toan Banks DO        Subjective:     Principal Problem:COPD exacerbation        HPI:  Hernan Badillo is a 58 year old male with a previous medical history of COPD, DMII, HTN, CVA in December 2023 with right side residual weakness who presents for right side rib pain secondary to fall and possible syncope. Unable to obtain a clear history as patient is poor historian but reports a possible fall on Saturday or Sunday and all he recalls is "darkness". Since that time patient has had severe right sided rib pain aggravated by movement and endorses buying pain pills off the street but they have afforded minimal relief. Initial ED evaluation included a CT of the head and neck which was negative for any acute process along with xrays of the chest and ribs that showed no rib fractures but hazy interstitial opacities of the right lung base. CBC and CMP unremarkable. Troponin 0.039. EKG shows no signs of St elevation. Upon examination patient noted to have increased work of breathing and unable to have a conversation on room air without having shortness of breath. Per nursing notes patient seen to be 86% on room air while sleeping. Patient to be admitted by hospital medicine for further evaluation and management     Overview/Hospital Course:  Hernan Badillo is a 58 year old male with a  past medical history of COPD, DM, HTN, HLD, MDD, GERD, tobacco use, anemia and HFpEF who presented with acute hypoxic respiratory failure in the setting of a COPD exacerbation. He is on Duonebs, 2 L NC, and steroids. PT/OT has been consulted.    Interval History:   Seen and examined.  Patient in no acute distress at time of exam.  Patient endorses right-sided muscle spasms.  Patient was " offered muscle relaxers but refused.  Patient stated this is a chronic issue since his previous CVA.  Patient endorsed pleuritic chest pain.  Repeat CXR showed improvement of bibasilar opacities.  Patient is satting 94-97% on 2 L nasal cannula.  We will likely discharge home tomorrow.  Review of Systems   Constitutional:  Positive for activity change. Negative for fatigue and fever.   Respiratory:  Positive for shortness of breath.    Cardiovascular:  Positive for chest pain (Pleuritic).        Right intercostal pain   Musculoskeletal:  Positive for myalgias (Chronic right side s/p CVA).   Skin: Negative.    Neurological:  Positive for weakness.     Objective:     Vital Signs (Most Recent):  Temp: 97.9 °F (36.6 °C) (03/28/24 1740)  Pulse: 75 (03/28/24 1926)  Resp: 12 (03/28/24 1926)  BP: (!) 145/75 (03/28/24 1740)  SpO2: 97 % (03/28/24 1922) Vital Signs (24h Range):  Temp:  [97.4 °F (36.3 °C)-98.8 °F (37.1 °C)] 97.9 °F (36.6 °C)  Pulse:  [72-90] 75  Resp:  [12-20] 12  SpO2:  [91 %-97 %] 97 %  BP: (137-154)/(73-93) 145/75     Weight: 68.7 kg (151 lb 7.3 oz)  Body mass index is 22.37 kg/m².  No intake or output data in the 24 hours ending 03/28/24 1943      Physical Exam  Vitals and nursing note reviewed.   Constitutional:       General: He is not in acute distress.  HENT:      Head: Normocephalic and atraumatic.      Right Ear: External ear normal.      Left Ear: External ear normal.      Nose: Nose normal.      Mouth/Throat:      Mouth: Mucous membranes are moist.      Pharynx: Oropharynx is clear.   Cardiovascular:      Rate and Rhythm: Normal rate.      Pulses: Normal pulses.      Heart sounds: Normal heart sounds.   Pulmonary:      Effort: Pulmonary effort is normal.      Breath sounds: Normal breath sounds.      Comments: NC.  Abdominal:      General: Bowel sounds are normal.      Palpations: Abdomen is soft.   Musculoskeletal:         General: Normal range of motion.      Cervical back: Normal range of motion  and neck supple.   Skin:     General: Skin is warm and dry.   Neurological:      Mental Status: He is alert. Mental status is at baseline.   Psychiatric:         Mood and Affect: Mood normal.         Behavior: Behavior normal.             Significant Labs: All pertinent labs within the past 24 hours have been reviewed.  Recent Lab Results  (Last 5 results in the past 24 hours)        03/28/24  1653   03/28/24  1204   03/28/24  0537   03/27/24  2318   03/27/24 2001        Anion Gap     7           Baso #     0.01           Basophil %     0.1           BUN     13           Calcium     8.9           Chloride     102           CO2     31           Creatinine     0.8           Differential Method     Automated           eGFR     >60           Eos #     0.0           Eos %     0.3           Glucose     96           Gran # (ANC)     5.8           Gran %     76.6           Hematocrit     34.6           Hemoglobin     11.0           Immature Grans (Abs)     0.02  Comment: Mild elevation in immature granulocytes is non specific and   can be seen in a variety of conditions including stress response,   acute inflammation, trauma and pregnancy. Correlation with other   laboratory and clinical findings is essential.             Immature Granulocytes     0.3           Lymph #     1.2           Lymph %     15.8           MCH     30.4           MCHC     31.8           MCV     96           Mono #     0.5           Mono %     6.9           MPV     10.0           nRBC     0           Platelet Count     177           POCT Glucose 243   124     152   168       Potassium     4.4           RBC     3.62           RDW     13.4           Sodium     140           WBC     7.54                                  Significant Imaging: I have reviewed all pertinent imaging results/findings within the past 24 hours.  Imaging Results              CTA Head and Neck (xpd) (Final result)  Result time 03/26/24 18:25:40      Final result by Delvin Vera,  MD (03/26/24 18:25:40)                   Impression:      1. No evidence of acute intracranial abnormality or significant interval detrimental change as compared to the prior exam.  Further evaluation as warranted clinically.  2. Chronic small vessel ischemic changes again demonstrated, including chronic lacunar infarcts involving the right frontal corona radiata, bilateral basal ganglia and alf.  3. No high-grade stenosis, large vessel occlusion, dissection or aneurysm in the head and neck vasculature.      Electronically signed by: Delvin Vera  Date:    03/26/2024  Time:    18:25               Narrative:    EXAMINATION:  CTA HEAD AND NECK (XPD)    CLINICAL HISTORY:  Neuro deficit, acute, stroke suspected;    TECHNIQUE:  Non contrast low dose axial images were obtained through the head. CT angiogram was performed from the level of the ignacia to the top of the head following the IV administration of 75mL of Omnipaque 350.   Sagittal and coronal reconstructions and maximum intensity projection reconstructions were performed. Arterial stenosis percentages are based on NASCET measurement criteria.    COMPARISON:  12/03/2023, MRI brain 12/04/2023    FINDINGS:  Brain:    No evidence of acute intracranial hemorrhage.    The ventricles and sulci are appropriate in size and configuration for the patient's age without hydrocephalus.    Stable small chronic lacunar infarct in the right frontal corona radiata, as well as bilateral basal ganglia and right paramedian alf.  There is no significant mass effect, midline shift, or extra-axial fluid collection. Remainder of the gray-white matter differentiation is within normal limits without evidence of an acute major vascular territory infarct.    No abnormal intracranial enhancement.    Chronic deformity of the medial left orbital wall.  Partial opacification of the right sphenoid sinus.  No acute calvarial fracture.    Extracranial:    Aorta and great vessels: Left-sided aortic  arch with normal configuration.   No evidence of stenosis of the origins of the great vessels from the arch.    Subclavian arteries: The subclavian arteries are without hemodynamically significant stenosis or occlusion.    Right carotid: The right common carotid artery is without significant stenosis. Mild calcific atherosclerotic plaque at the carotid bifurcation without evidence of a hemodynamically significant stenosis..  The right internal carotid artery is tortuous without significant stenosis, occlusion, or dissection.    Left carotid: The left common carotid artery is without significant stenosis. Mild calcific atherosclerotic plaque at the carotid bifurcation without evidence of a hemodynamically significant stenosis.  The left internal carotid artery is tortuous without significant stenosis, occlusion, or dissection.    Extracranial vertebral arteries:  The vertebral arteries are without significant stenosis, occlusion, or dissection to the skull base.    Intracranial:    Anterior circulation: Moderate calcific atherosclerosis of bilateral carotid artery siphons.  No areas of significant atherosclerotic narrowing or filling defects are identified.  The middle cerebral arteries are normal.  The anterior cerebral arteries are normal.    Posterior circulation: The vertebral arteries are normal. The basilar artery is normal. 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  Posterior cerebral arteries otherwise grossly normal.    No evidence of aneurysm or arteriovenous malformation.    Dural venous sinuses are patent.    Other:    The visualized portions of the lung apices demonstrated moderate centrilobular emphysematous changes.  Respiratory motion limits evaluation of the pulmonary parenchyma.  No focal consolidation, pleural effusion or pneumothorax.    There are degenerative spine changes. No concerning osseous lesions  identified.                                       X-Ray Chest AP Portable (Final result)  Result time 03/26/24 16:49:37      Final result by Iván Coronado MD (03/26/24 16:49:37)                   Impression:      Unchanged extent of reticulonodular disease in the lung bases.  This is better characterized on prior CT.  Some differential considerations include includes atypical infection, pneumonitis, aspiration, airways disease.      Electronically signed by: Iván Coronado  Date:    03/26/2024  Time:    16:49               Narrative:    EXAMINATION:  XR CHEST AP PORTABLE    CLINICAL HISTORY:  Pleurodynia    TECHNIQUE:  Single frontal view of the chest was performed.    COMPARISON:  12/03/2023 and 09/26/2023    FINDINGS:  There is reticulonodular disease in the lower lung zones with some tram-tracking.  Upper lung zones are clear.  Unchanged heart size.  No pleural effusion or pneumothorax.                                       X-Ray Ribs 2 View Right (Final result)  Result time 03/26/24 16:55:10      Final result by Iván Coronado MD (03/26/24 16:55:10)                   Narrative:    EXAMINATION:  XR RIBS 2 VIEW RIGHT    CLINICAL HISTORY:  Unspecified fall, initial encounter    TECHNIQUE:  Two views of the right ribs were performed.    COMPARISON:  03/26/2024    FINDINGS:  No displaced fracture or traumatic malalignment.  Hazy interstitial opacities right lung base better characterized on dated chest radiograph.  Mild degenerative change right AC joint.      Electronically signed by: Iván Coronado  Date:    03/26/2024  Time:    16:55                                     Assessment/Plan:      * COPD exacerbation  -Duonebs  -Steroids  -NC O2    Anemia  -Trend Hgb with CBC    GERD (gastroesophageal reflux disease)  -PPI    MDD (major depressive disorder)  -Lexapro        HLD (hyperlipidemia)  -Continue ASA and statin      Elevated troponin level  Asymptomatic.      Fall  -Fall  precautions  -PT/OT    Acute hypoxemic respiratory failure  -CXR abd ABG PRN  -Steroids  -Duonebs    Right sided weakness  Chronic condition noted. No acute process seen on CT scan.  -Fall precautions        Tobacco dependency  It is very important that he quit smoking. There are various alternatives available to help with this difficult task, but first and foremost, he must make a firm commitment and decision to quit. The nature of nicotine addiction is discussed. The usefulness of behavioral therapy is discussed and suggested.  The correct use, cost and side effects of nicotine replacement therapy such as gum or patches is discussed. Bupropion and its cost (sometimes not covered fully by insurance) and side effects are reviewed. The quit rates are discussed. I recommend he not allow potential costs of treatment to deter him from using nicotine replacement therapy or bupropion, as the long term economic and health benefits are obvious.     Primary hypertension  Chronic, controlled. Latest blood pressure and vitals reviewed-     Temp:  [97.5 °F (36.4 °C)-98.7 °F (37.1 °C)]   Pulse:  []   Resp:  [16-20]   BP: (135-187)/()   SpO2:  [92 %-100 %] .   Home meds for hypertension were reviewed and noted below.   Hypertension Medications               amLODIPine (NORVASC) 10 MG tablet Take 1 tablet (10 mg total) by mouth once daily.    lisinopriL 10 MG tablet Take 1 tablet (10 mg total) by mouth once daily.            While in the hospital, will manage blood pressure as follows; Continue home antihypertensive regimen    Will utilize p.r.n. blood pressure medication only if patient's blood pressure greater than 180/110 and he develops symptoms such as worsening chest pain or shortness of breath.    DM (diabetes mellitus), type 2  Patient's FSGs are controlled on current medication regimen.  Last A1c reviewed-   Lab Results   Component Value Date    LABA1C 9.8 (H) 07/06/2016    HGBA1C 6.1 03/26/2024     Most  recent fingerstick glucose reviewed-   Recent Labs   Lab 03/26/24  2300 03/27/24  0756   POCTGLUCOSE 272* 149*     Current correctional scale  Low  Maintain anti-hyperglycemic dose as follows-   Antihyperglycemics (From admission, onward)      Start     Stop Route Frequency Ordered    03/27/24 1240  insulin aspart U-100 pen 0-10 Units         -- SubQ Before meals & nightly PRN 03/27/24 1140          Hold Oral hypoglycemics while patient is in the hospital.      VTE Risk Mitigation (From admission, onward)           Ordered     enoxaparin injection 40 mg  Every 24 hours         03/26/24 2226     IP VTE LOW RISK PATIENT  Once         03/26/24 2219     Place sequential compression device  Until discontinued         03/26/24 2219                    Discharge Planning   ZACARIAS: 3/29/2024     Code Status: Full Code   Is the patient medically ready for discharge?:     Reason for patient still in hospital (select all that apply): Patient trending condition  Discharge Plan A: Home with family                  Lazara Franks NP  Department of Hospital Medicine   [facility]/Surg

## 2024-03-29 NOTE — CARE UPDATE
03/29/24 0729 03/29/24 0732 03/29/24 0739   Patient Assessment/Suction   Level of Consciousness (AVPU) alert  --   --    Respiratory Effort Unlabored;Normal  --   --    Expansion/Accessory Muscles/Retractions no retractions  --   --    All Lung Fields Breath Sounds coarse  --   --    Rhythm/Pattern, Respiratory unlabored;pattern regular;depth regular  --   --    Cough Frequency no cough  --   --    PRE-TX-O2   Device (Oxygen Therapy) nasal cannula  --   --    $ Is the patient on Low Flow Oxygen? Yes  --   --    Flow (L/min) 2  --   --    SpO2 98 % 100 % 100 %   Pulse Oximetry Type Intermittent  --   --    $ Pulse Oximetry - Multiple Charge Pulse Oximetry - Multiple  --   --    Pulse 72 74 75   Resp 18 18 18   Aerosol Therapy   $ Aerosol Therapy Charges Aerosol Treatment Aerosol Treatment Aerosol Treatment   Respiratory Treatment Status (SVN) given given given   Treatment Route (SVN) mask mask mask   Patient Position (SVN) HOB elevated HOB elevated HOB elevated   Signs of Intolerance (SVN) none none none   Breath Sounds Post-Respiratory Treatment   Throughout All Fields Post-Treatment  --   --  All Fields   Throughout All Fields Post-Treatment  --   --  aeration increased   Post-treatment Heart Rate (beats/min)  --   --  75   Post-treatment Resp Rate (breaths/min)  --   --  18

## 2024-03-29 NOTE — SUBJECTIVE & OBJECTIVE
Interval History:   Seen and examined.  Patient in no acute distress at time of exam.  Patient endorses right-sided muscle spasms.  Patient was offered muscle relaxers but refused.  Patient stated this is a chronic issue since his previous CVA.  Patient endorsed pleuritic chest pain.  Repeat CXR showed improvement of bibasilar opacities.  Patient is satting 94-97% on 2 L nasal cannula.  We will likely discharge home tomorrow.  Review of Systems   Constitutional:  Positive for activity change. Negative for fatigue and fever.   Respiratory:  Positive for shortness of breath.    Cardiovascular:  Positive for chest pain (Pleuritic).        Right intercostal pain   Musculoskeletal:  Positive for myalgias (Chronic right side s/p CVA).   Skin: Negative.    Neurological:  Positive for weakness.     Objective:     Vital Signs (Most Recent):  Temp: 97.9 °F (36.6 °C) (03/28/24 1740)  Pulse: 75 (03/28/24 1926)  Resp: 12 (03/28/24 1926)  BP: (!) 145/75 (03/28/24 1740)  SpO2: 97 % (03/28/24 1922) Vital Signs (24h Range):  Temp:  [97.4 °F (36.3 °C)-98.8 °F (37.1 °C)] 97.9 °F (36.6 °C)  Pulse:  [72-90] 75  Resp:  [12-20] 12  SpO2:  [91 %-97 %] 97 %  BP: (137-154)/(73-93) 145/75     Weight: 68.7 kg (151 lb 7.3 oz)  Body mass index is 22.37 kg/m².  No intake or output data in the 24 hours ending 03/28/24 1943      Physical Exam  Vitals and nursing note reviewed.   Constitutional:       General: He is not in acute distress.  HENT:      Head: Normocephalic and atraumatic.      Right Ear: External ear normal.      Left Ear: External ear normal.      Nose: Nose normal.      Mouth/Throat:      Mouth: Mucous membranes are moist.      Pharynx: Oropharynx is clear.   Cardiovascular:      Rate and Rhythm: Normal rate.      Pulses: Normal pulses.      Heart sounds: Normal heart sounds.   Pulmonary:      Effort: Pulmonary effort is normal.      Breath sounds: Normal breath sounds.      Comments: NC.  Abdominal:      General: Bowel sounds are  normal.      Palpations: Abdomen is soft.   Musculoskeletal:         General: Normal range of motion.      Cervical back: Normal range of motion and neck supple.   Skin:     General: Skin is warm and dry.   Neurological:      Mental Status: He is alert. Mental status is at baseline.   Psychiatric:         Mood and Affect: Mood normal.         Behavior: Behavior normal.             Significant Labs: All pertinent labs within the past 24 hours have been reviewed.  Recent Lab Results  (Last 5 results in the past 24 hours)        03/28/24  1653   03/28/24  1204   03/28/24  0537   03/27/24  2318   03/27/24 2001        Anion Gap     7           Baso #     0.01           Basophil %     0.1           BUN     13           Calcium     8.9           Chloride     102           CO2     31           Creatinine     0.8           Differential Method     Automated           eGFR     >60           Eos #     0.0           Eos %     0.3           Glucose     96           Gran # (ANC)     5.8           Gran %     76.6           Hematocrit     34.6           Hemoglobin     11.0           Immature Grans (Abs)     0.02  Comment: Mild elevation in immature granulocytes is non specific and   can be seen in a variety of conditions including stress response,   acute inflammation, trauma and pregnancy. Correlation with other   laboratory and clinical findings is essential.             Immature Granulocytes     0.3           Lymph #     1.2           Lymph %     15.8           MCH     30.4           MCHC     31.8           MCV     96           Mono #     0.5           Mono %     6.9           MPV     10.0           nRBC     0           Platelet Count     177           POCT Glucose 243   124     152   168       Potassium     4.4           RBC     3.62           RDW     13.4           Sodium     140           WBC     7.54                                  Significant Imaging: I have reviewed all pertinent imaging results/findings within the past  24 hours.  Imaging Results              CTA Head and Neck (xpd) (Final result)  Result time 03/26/24 18:25:40      Final result by Delvin Vera MD (03/26/24 18:25:40)                   Impression:      1. No evidence of acute intracranial abnormality or significant interval detrimental change as compared to the prior exam.  Further evaluation as warranted clinically.  2. Chronic small vessel ischemic changes again demonstrated, including chronic lacunar infarcts involving the right frontal corona radiata, bilateral basal ganglia and alf.  3. No high-grade stenosis, large vessel occlusion, dissection or aneurysm in the head and neck vasculature.      Electronically signed by: Delvin Vera  Date:    03/26/2024  Time:    18:25               Narrative:    EXAMINATION:  CTA HEAD AND NECK (XPD)    CLINICAL HISTORY:  Neuro deficit, acute, stroke suspected;    TECHNIQUE:  Non contrast low dose axial images were obtained through the head. CT angiogram was performed from the level of the ignacia to the top of the head following the IV administration of 75mL of Omnipaque 350.   Sagittal and coronal reconstructions and maximum intensity projection reconstructions were performed. Arterial stenosis percentages are based on NASCET measurement criteria.    COMPARISON:  12/03/2023, MRI brain 12/04/2023    FINDINGS:  Brain:    No evidence of acute intracranial hemorrhage.    The ventricles and sulci are appropriate in size and configuration for the patient's age without hydrocephalus.    Stable small chronic lacunar infarct in the right frontal corona radiata, as well as bilateral basal ganglia and right paramedian alf.  There is no significant mass effect, midline shift, or extra-axial fluid collection. Remainder of the gray-white matter differentiation is within normal limits without evidence of an acute major vascular territory infarct.    No abnormal intracranial enhancement.    Chronic deformity of the medial left orbital wall.   Partial opacification of the right sphenoid sinus.  No acute calvarial fracture.    Extracranial:    Aorta and great vessels: Left-sided aortic arch with normal configuration.   No evidence of stenosis of the origins of the great vessels from the arch.    Subclavian arteries: The subclavian arteries are without hemodynamically significant stenosis or occlusion.    Right carotid: The right common carotid artery is without significant stenosis. Mild calcific atherosclerotic plaque at the carotid bifurcation without evidence of a hemodynamically significant stenosis..  The right internal carotid artery is tortuous without significant stenosis, occlusion, or dissection.    Left carotid: The left common carotid artery is without significant stenosis. Mild calcific atherosclerotic plaque at the carotid bifurcation without evidence of a hemodynamically significant stenosis.  The left internal carotid artery is tortuous without significant stenosis, occlusion, or dissection.    Extracranial vertebral arteries:  The vertebral arteries are without significant stenosis, occlusion, or dissection to the skull base.    Intracranial:    Anterior circulation: Moderate calcific atherosclerosis of bilateral carotid artery siphons.  No areas of significant atherosclerotic narrowing or filling defects are identified.  The middle cerebral arteries are normal.  The anterior cerebral arteries are normal.    Posterior circulation: The vertebral arteries are normal. The basilar artery is normal. 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  Posterior cerebral arteries otherwise grossly normal.    No evidence of aneurysm or arteriovenous malformation.    Dural venous sinuses are patent.    Other:    The visualized portions of the lung apices demonstrated moderate centrilobular emphysematous changes.  Respiratory motion limits evaluation of the  pulmonary parenchyma.  No focal consolidation, pleural effusion or pneumothorax.    There are degenerative spine changes. No concerning osseous lesions identified.                                       X-Ray Chest AP Portable (Final result)  Result time 03/26/24 16:49:37      Final result by Iván Coronado MD (03/26/24 16:49:37)                   Impression:      Unchanged extent of reticulonodular disease in the lung bases.  This is better characterized on prior CT.  Some differential considerations include includes atypical infection, pneumonitis, aspiration, airways disease.      Electronically signed by: Iván Coronado  Date:    03/26/2024  Time:    16:49               Narrative:    EXAMINATION:  XR CHEST AP PORTABLE    CLINICAL HISTORY:  Pleurodynia    TECHNIQUE:  Single frontal view of the chest was performed.    COMPARISON:  12/03/2023 and 09/26/2023    FINDINGS:  There is reticulonodular disease in the lower lung zones with some tram-tracking.  Upper lung zones are clear.  Unchanged heart size.  No pleural effusion or pneumothorax.                                       X-Ray Ribs 2 View Right (Final result)  Result time 03/26/24 16:55:10      Final result by Iván Coronado MD (03/26/24 16:55:10)                   Narrative:    EXAMINATION:  XR RIBS 2 VIEW RIGHT    CLINICAL HISTORY:  Unspecified fall, initial encounter    TECHNIQUE:  Two views of the right ribs were performed.    COMPARISON:  03/26/2024    FINDINGS:  No displaced fracture or traumatic malalignment.  Hazy interstitial opacities right lung base better characterized on dated chest radiograph.  Mild degenerative change right AC joint.      Electronically signed by: Iván Coronado  Date:    03/26/2024  Time:    16:55

## 2024-03-29 NOTE — PT/OT/SLP PROGRESS
Physical Therapy      Patient Name:  Hernan Badillo   MRN:  [id number]    Patient not seen today secondary to Other (Comment) (asleep x 2 attempts.). Will follow-up 3/29/24.

## 2024-03-29 NOTE — PLAN OF CARE
Problem: Adjustment to Illness COPD (Chronic Obstructive Pulmonary Disease)  Goal: Optimal Chronic Illness Coping  Outcome: Ongoing, Progressing     Problem: Functional Ability Impaired COPD (Chronic Obstructive Pulmonary Disease)  Goal: Optimal Level of Functional [place]  Outcome: Ongoing, Progressing     Problem: Infection COPD (Chronic Obstructive Pulmonary Disease)  Goal: Absence of Infection Signs and Symptoms  Outcome: Ongoing, Progressing     Problem: Oral Intake Inadequate COPD (Chronic Obstructive Pulmonary Disease)  Goal: Improved Nutrition Intake  Outcome: Ongoing, Progressing     Problem: Respiratory Compromise COPD (Chronic Obstructive Pulmonary Disease)  Goal: Effective Oxygenation and Ventilation  Outcome: Ongoing, Progressing     Problem: Adult Inpatient Plan of Care  Goal: Plan of Care Review  Outcome: Ongoing, Progressing  Goal: Patient-Specific Goal (Individualized)  Outcome: Ongoing, Progressing  Goal: Absence of Hospital-Acquired Illness or Injury  Outcome: Ongoing, Progressing  Goal: Optimal Comfort and Wellbeing  Outcome: Ongoing, Progressing  Goal: Readiness for Transition of Care  Outcome: Ongoing, Progressing     Problem: Diabetes Comorbidity  Goal: Blood Glucose Level Within Targeted Range  Outcome: Ongoing, Progressing     Problem: Fluid Imbalance (Pneumonia)  Goal: Fluid Balance  Outcome: Ongoing, Progressing     Problem: Infection (Pneumonia)  Goal: Resolution of Infection Signs and Symptoms  Outcome: Ongoing, Progressing     Problem: Respiratory Compromise (Pneumonia)  Goal: Effective Oxygenation and Ventilation  Outcome: Ongoing, Progressing

## 2024-03-29 NOTE — PLAN OF CARE
Problem: Adjustment to Illness COPD (Chronic Obstructive Pulmonary Disease)  Goal: Optimal Chronic Illness Coping  Outcome: Met     Problem: Functional Ability Impaired COPD (Chronic Obstructive Pulmonary Disease)  Goal: Optimal Level of Functional [address]  Outcome: Met     Problem: Infection COPD (Chronic Obstructive Pulmonary Disease)  Goal: Absence of Infection Signs and Symptoms  Outcome: Met     Problem: Oral Intake Inadequate COPD (Chronic Obstructive Pulmonary Disease)  Goal: Improved Nutrition Intake  Outcome: Met     Problem: Respiratory Compromise COPD (Chronic Obstructive Pulmonary Disease)  Goal: Effective Oxygenation and Ventilation  Outcome: Met     Problem: Adult Inpatient Plan of Care  Goal: Plan of Care Review  Outcome: Met  Goal: Patient-Specific Goal (Individualized)  Outcome: Met  Goal: Absence of Hospital-Acquired Illness or Injury  Outcome: Met  Goal: Optimal Comfort and Wellbeing  Outcome: Met  Goal: Readiness for Transition of Care  Outcome: Met     Problem: Diabetes Comorbidity  Goal: Blood Glucose Level Within Targeted Range  Outcome: Met     Problem: Fluid Imbalance (Pneumonia)  Goal: Fluid Balance  Outcome: Met     Problem: Infection (Pneumonia)  Goal: Resolution of Infection Signs and Symptoms  Outcome: Met     Problem: Respiratory Compromise (Pneumonia)  Goal: Effective Oxygenation and Ventilation  Outcome: Met

## 2024-03-29 NOTE — PLAN OF CARE
SW met with patient at bedside regarding home oxygen and home health.  SW sent patient information to [facility] home health via care[employer] system.  Ok to pull 1 Etank set  up from [facility] per Julia RUTH with [facility].       03/29/24 1330   Post-Acute Status   Post-Acute Authorization Home Health;HME   HME Status Referrals Sent   Home Health Status Referrals Sent   Patient choice form signed by patient/caregiver List with quality metrics by geographic area provided

## 2024-03-29 NOTE — CARE UPDATE
03/29/24 1313   Education   $ Education DME Oxygen     Demo oxygen tank and regulator to patient with understanding. Explained hazards and storage.

## 2024-03-29 NOTE — PLAN OF CARE
[facility] - Med/Surg  Discharge Final Note    Primary Care Provider: Toan Banks DO    Expected Discharge Date: 3/29/2024    Patient cleared for discharge from case management standpoint.    DARIAN scheduled hospital follow up and placed on AVS.  Patient accepted by [facility] home health via careport system with start of care 03/31/2024.  DARIAN delivered home oxygen to patient's hospital room.  Patient signed delivery ticket and rental agreement.    Final Discharge Note (most recent)       Final Note - 03/29/24 1404          Final Note    Assessment Type Final Discharge Note     Anticipated Discharge Disposition Home-Health Care Svc     What phone number can be called within the next 1-3 days to see how you are doing after discharge? [phone]     Hospital Resources/Appts/Education Provided Provided patient/caregiver with written discharge plan information;Provided education on problems/symptoms using teachback;Appointments scheduled and added to AVS        Post-Acute Status    Post-Acute Authorization HME;Home Health     HME Status Set-up Complete/Auth obtained     Home Health Status Set-up Complete/Auth obtained     Discharge Delays None known at this time                     Important Message from Medicare             Contact Info       Toan Banks DO   Specialty: Family Medicine, Hospitalist   Relationship: PCP - General    [address] [place] [address] [place] [address]   Phone: [phone]       Next Steps: Go on 4/5/2024    Instructions: at 3:30pm with Cadence T Griffing, NP [facility]   Specialty: DME Provider    [address]  [facility]  [facility] [address]   Phone: [phone]       Next Steps: Follow up    Instructions: DME- oxygen with portable tank    [facility]   Specialty: Home Health Services    [address] [facility] [place] [address]   Phone: [phone]       Next Steps: Follow up    Instructions: Home Health

## 2024-03-29 NOTE — ASSESSMENT & PLAN NOTE
It is very important that he quit smoking. There are various alternatives available to help with this difficult task, but first and foremost, he must make a firm commitment and decision to quit. The nature of nicotine addiction is discussed. The usefulness of behavioral therapy is discussed and suggested.  The correct use, cost and side effects of nicotine replacement therapy such as gum or patches is discussed. Bupropion and its cost (sometimes not covered fully by insurance) and side effects are reviewed. The quit rates are discussed. I recommend he not allow potential costs of treatment to deter him from using nicotine replacement therapy or bupropion, as the long term economic and health benefits are obvious.

## 2024-03-31 LAB
OHS QRS DURATION: 90 MS
OHS QTC CALCULATION: 444 MS

## 2024-04-01 ENCOUNTER — PATIENT OUTREACH (OUTPATIENT)
Dept: ADMINISTRATIVE | Facility: CLINIC | Age: 59
End: 2024-04-01
Payer: MEDICARE

## 2024-04-01 LAB
BACTERIA BLD CULT: NORMAL
BACTERIA BLD CULT: NORMAL

## 2024-04-01 NOTE — PROGRESS NOTES
C3 nurse attempted to contact Hernan Badillo  for a TCC post hospital discharge follow up call. No answer. No voicemail available. The patient has a scheduled HOSFU appointment with Luba Blas on 4/5/24 @ 2740.

## 2024-04-02 LAB
OHS QRS DURATION: 90 MS
OHS QTC CALCULATION: 422 MS

## 2024-04-02 PROCEDURE — G0180 MD CERTIFICATION HHA PATIENT: HCPCS | Mod: ,,, | Performed by: STUDENT IN AN ORGANIZED HEALTH CARE EDUCATION/TRAINING PROGRAM

## 2024-04-02 NOTE — PROGRESS NOTES
3rd Attempt made to reach patient for TCC call. Patient requested I contact his mother to review the medications.

## 2024-04-02 NOTE — PROGRESS NOTES
C3 nurse spoke with Hernan Waite  for a TCC post hospital discharge follow up call. The patient has a scheduled HOSFU appointment with Luba Blas on 4/5/24 @ 2410.

## 2024-04-05 ENCOUNTER — PATIENT OUTREACH (OUTPATIENT)
Dept: ADMINISTRATIVE | Facility: HOSPITAL | Age: 59
End: 2024-04-05
Payer: MEDICARE

## 2024-04-05 NOTE — PROGRESS NOTES
Population Health Chart Review & Patient Outreach Details      Additional [facility] Health Notes:      Unable to leave   PORTAL/ LETTER MSG SENT    PLACED PATIENT ON EYE CAM SCHEDULE AFTER OFFICE VISIT    Future Appointments   Date Time Provider Department Center   4/8/2024  9:30 AM Luba Blas NP [facility]   4/8/2024 10:00 AM [facility]              Updates Requested / Reviewed:        Health Maintenance Topics Overdue:      VBHM Score: 2     Colon Cancer Screening  Foot Exam                       Health Maintenance Topic(s) Outreach Outcomes & Actions Taken:    Eye Exam - Outreach Outcomes & Actions Taken  : Eye Camera Scheduled or Optometry/Ophthalmology Referral Placed/Appt Scheduled

## 2024-04-07 ENCOUNTER — HOSPITAL ENCOUNTER (INPATIENT)
Facility: HOSPITAL | Age: 59
LOS: 4 days | Discharge: HOME HEALTH | DRG: 917 | End: 2024-04-11
Attending: STUDENT IN AN ORGANIZED HEALTH CARE EDUCATION/TRAINING PROGRAM | Admitting: INTERNAL MEDICINE
Payer: MEDICARE

## 2024-04-07 DIAGNOSIS — J96.02 ACUTE RESPIRATORY FAILURE WITH HYPERCAPNIA: Primary | ICD-10-CM

## 2024-04-07 DIAGNOSIS — R07.9 CHEST PAIN: ICD-10-CM

## 2024-04-07 DIAGNOSIS — Z01.818 ENCOUNTER FOR INTUBATION: ICD-10-CM

## 2024-04-07 DIAGNOSIS — R79.89 ELEVATED TROPONIN: ICD-10-CM

## 2024-04-07 DIAGNOSIS — R41.89 UNRESPONSIVENESS: ICD-10-CM

## 2024-04-07 DIAGNOSIS — R41.82 AMS (ALTERED MENTAL STATUS): ICD-10-CM

## 2024-04-07 DIAGNOSIS — R41.82 ALTERED MENTAL STATUS, UNSPECIFIED ALTERED MENTAL STATUS TYPE: ICD-10-CM

## 2024-04-07 LAB
ALBUMIN SERPL BCP-MCNC: 3.4 G/DL (ref 3.5–5.2)
ALLENS TEST: ABNORMAL
ALLENS TEST: ABNORMAL
ALP SERPL-CCNC: 60 U/L (ref 55–135)
ALT SERPL W/O P-5'-P-CCNC: 6 U/L (ref 10–44)
AMMONIA PLAS-SCNC: 39 UMOL/L (ref 10–50)
AMPHET+METHAMPHET UR QL: NEGATIVE
ANION GAP SERPL CALC-SCNC: 13 MMOL/L (ref 8–16)
AST SERPL-CCNC: 14 U/L (ref 10–40)
BARBITURATES UR QL SCN>200 NG/ML: NEGATIVE
BASOPHILS # BLD AUTO: 0.01 K/UL (ref 0–0.2)
BASOPHILS NFR BLD: 0.1 % (ref 0–1.9)
BENZODIAZ UR QL SCN>200 NG/ML: NEGATIVE
BILIRUB SERPL-MCNC: 1.1 MG/DL (ref 0.1–1)
BILIRUB UR QL STRIP: NEGATIVE
BNP SERPL-MCNC: 75 PG/ML (ref 0–99)
BUN SERPL-MCNC: 12 MG/DL (ref 6–20)
BZE UR QL SCN: NEGATIVE
CALCIUM SERPL-MCNC: 9.5 MG/DL (ref 8.7–10.5)
CANNABINOIDS UR QL SCN: NEGATIVE
CHLORIDE SERPL-SCNC: 96 MMOL/L (ref 95–110)
CLARITY UR: CLEAR
CO2 SERPL-SCNC: 28 MMOL/L (ref 23–29)
COLOR UR: COLORLESS
CREAT SERPL-MCNC: 0.8 MG/DL (ref 0.5–1.4)
CREAT UR-MCNC: 22 MG/DL (ref 23–375)
DELSYS: ABNORMAL
DELSYS: ABNORMAL
DIFFERENTIAL METHOD BLD: ABNORMAL
EOSINOPHIL # BLD AUTO: 0.1 K/UL (ref 0–0.5)
EOSINOPHIL NFR BLD: 0.9 % (ref 0–8)
ERYTHROCYTE [DISTWIDTH] IN BLOOD BY AUTOMATED COUNT: 13.2 % (ref 11.5–14.5)
ERYTHROCYTE [SEDIMENTATION RATE] IN BLOOD BY WESTERGREN METHOD: 14 MM/H
EST. GFR  (NO RACE VARIABLE): >60 ML/MIN/1.73 M^2
ETHANOL SERPL-MCNC: <10 MG/DL
FIO2: 50
GLUCOSE SERPL-MCNC: 135 MG/DL (ref 70–110)
GLUCOSE UR QL STRIP: NEGATIVE
HCO3 UR-SCNC: 32.9 MMOL/L (ref 24–28)
HCO3 UR-SCNC: 36.6 MMOL/L (ref 24–28)
HCT VFR BLD AUTO: 41.8 % (ref 40–54)
HGB BLD-MCNC: 13.4 G/DL (ref 14–18)
HGB UR QL STRIP: NEGATIVE
IMM GRANULOCYTES # BLD AUTO: 0.01 K/UL (ref 0–0.04)
IMM GRANULOCYTES NFR BLD AUTO: 0.1 % (ref 0–0.5)
KETONES UR QL STRIP: NEGATIVE
LDH SERPL L TO P-CCNC: 0.62 MMOL/L (ref 0.5–2.2)
LEUKOCYTE ESTERASE UR QL STRIP: NEGATIVE
LYMPHOCYTES # BLD AUTO: 2.1 K/UL (ref 1–4.8)
LYMPHOCYTES NFR BLD: 28.3 % (ref 18–48)
MAGNESIUM SERPL-MCNC: 1.6 MG/DL (ref 1.6–2.6)
MCH RBC QN AUTO: 30.4 PG (ref 27–31)
MCHC RBC AUTO-ENTMCNC: 32.1 G/DL (ref 32–36)
MCV RBC AUTO: 95 FL (ref 82–98)
METHADONE UR QL SCN>300 NG/ML: NEGATIVE
MIN VOL: 7.68
MODE: ABNORMAL
MODE: ABNORMAL
MONOCYTES # BLD AUTO: 0.6 K/UL (ref 0.3–1)
MONOCYTES NFR BLD: 8.3 % (ref 4–15)
NEUTROPHILS # BLD AUTO: 4.7 K/UL (ref 1.8–7.7)
NEUTROPHILS NFR BLD: 62.3 % (ref 38–73)
NITRITE UR QL STRIP: NEGATIVE
NRBC BLD-RTO: 0 /100 WBC
OPIATES UR QL SCN: NEGATIVE
PCO2 BLDA: 66.1 MMHG (ref 35–45)
PCO2 BLDA: 75.2 MMHG (ref 35–45)
PCP UR QL SCN>25 NG/ML: NEGATIVE
PEEP: 5
PH SMN: 7.25 [PH] (ref 7.35–7.45)
PH SMN: 7.35 [PH] (ref 7.35–7.45)
PH UR STRIP: 6 [PH] (ref 5–8)
PIP: 36
PLATELET # BLD AUTO: 143 K/UL (ref 150–450)
PMV BLD AUTO: 11.6 FL (ref 9.2–12.9)
PO2 BLDA: 170 MMHG (ref 80–100)
PO2 BLDA: 22 MMHG (ref 40–60)
POC BE: 11 MMOL/L
POC BE: 6 MMOL/L
POC SATURATED O2: 27 % (ref 95–100)
POC SATURATED O2: 99 % (ref 95–100)
POC TCO2: 35 MMOL/L (ref 24–29)
POC TCO2: 39 MMOL/L (ref 23–27)
POCT GLUCOSE: 155 MG/DL (ref 70–110)
POTASSIUM SERPL-SCNC: 4.2 MMOL/L (ref 3.5–5.1)
PROT SERPL-MCNC: 7.3 G/DL (ref 6–8.4)
PROT UR QL STRIP: NEGATIVE
RBC # BLD AUTO: 4.41 M/UL (ref 4.6–6.2)
SAMPLE: ABNORMAL
SAMPLE: ABNORMAL
SITE: ABNORMAL
SITE: ABNORMAL
SODIUM SERPL-SCNC: 137 MMOL/L (ref 136–145)
SP GR UR STRIP: <1.005 (ref 1–1.03)
SP02: 100
TOXICOLOGY INFORMATION: ABNORMAL
TROPONIN I SERPL DL<=0.01 NG/ML-MCNC: 0.08 NG/ML (ref 0–0.03)
TSH SERPL DL<=0.005 MIU/L-ACNC: 0.71 UIU/ML (ref 0.4–4)
URN SPEC COLLECT METH UR: ABNORMAL
UROBILINOGEN UR STRIP-ACNC: NEGATIVE EU/DL
VT: 500
WBC # BLD AUTO: 7.48 K/UL (ref 3.9–12.7)

## 2024-04-07 PROCEDURE — 25000003 PHARM REV CODE 250: Performed by: STUDENT IN AN ORGANIZED HEALTH CARE EDUCATION/TRAINING PROGRAM

## 2024-04-07 PROCEDURE — 82077 ASSAY SPEC XCP UR&BREATH IA: CPT | Performed by: STUDENT IN AN ORGANIZED HEALTH CARE EDUCATION/TRAINING PROGRAM

## 2024-04-07 PROCEDURE — 83735 ASSAY OF MAGNESIUM: CPT | Performed by: STUDENT IN AN ORGANIZED HEALTH CARE EDUCATION/TRAINING PROGRAM

## 2024-04-07 PROCEDURE — 81003 URINALYSIS AUTO W/O SCOPE: CPT | Performed by: STUDENT IN AN ORGANIZED HEALTH CARE EDUCATION/TRAINING PROGRAM

## 2024-04-07 PROCEDURE — 83880 ASSAY OF NATRIURETIC PEPTIDE: CPT | Performed by: STUDENT IN AN ORGANIZED HEALTH CARE EDUCATION/TRAINING PROGRAM

## 2024-04-07 PROCEDURE — 0BH17EZ INSERTION OF ENDOTRACHEAL AIRWAY INTO TRACHEA, VIA NATURAL OR ARTIFICIAL OPENING: ICD-10-PCS | Performed by: STUDENT IN AN ORGANIZED HEALTH CARE EDUCATION/TRAINING PROGRAM

## 2024-04-07 PROCEDURE — 94002 VENT MGMT INPAT INIT DAY: CPT

## 2024-04-07 PROCEDURE — 80307 DRUG TEST PRSMV CHEM ANLYZR: CPT | Performed by: STUDENT IN AN ORGANIZED HEALTH CARE EDUCATION/TRAINING PROGRAM

## 2024-04-07 PROCEDURE — 94761 N-INVAS EAR/PLS OXIMETRY MLT: CPT | Mod: XB

## 2024-04-07 PROCEDURE — 20000000 HC ICU ROOM

## 2024-04-07 PROCEDURE — 99900035 HC TECH TIME PER 15 MIN (STAT)

## 2024-04-07 PROCEDURE — 96375 TX/PRO/DX INJ NEW DRUG ADDON: CPT

## 2024-04-07 PROCEDURE — 96376 TX/PRO/DX INJ SAME DRUG ADON: CPT

## 2024-04-07 PROCEDURE — 63600175 PHARM REV CODE 636 W HCPCS: Performed by: STUDENT IN AN ORGANIZED HEALTH CARE EDUCATION/TRAINING PROGRAM

## 2024-04-07 PROCEDURE — 36600 WITHDRAWAL OF ARTERIAL BLOOD: CPT

## 2024-04-07 PROCEDURE — 82803 BLOOD GASES ANY COMBINATION: CPT

## 2024-04-07 PROCEDURE — 31500 INSERT EMERGENCY AIRWAY: CPT

## 2024-04-07 PROCEDURE — 51702 INSERT TEMP BLADDER CATH: CPT

## 2024-04-07 PROCEDURE — 93005 ELECTROCARDIOGRAM TRACING: CPT

## 2024-04-07 PROCEDURE — 99291 CRITICAL CARE FIRST HOUR: CPT

## 2024-04-07 PROCEDURE — 27000221 HC OXYGEN, UP TO 24 HOURS

## 2024-04-07 PROCEDURE — 84484 ASSAY OF TROPONIN QUANT: CPT | Performed by: STUDENT IN AN ORGANIZED HEALTH CARE EDUCATION/TRAINING PROGRAM

## 2024-04-07 PROCEDURE — 96361 HYDRATE IV INFUSION ADD-ON: CPT

## 2024-04-07 PROCEDURE — 96374 THER/PROPH/DIAG INJ IV PUSH: CPT

## 2024-04-07 PROCEDURE — 82140 ASSAY OF AMMONIA: CPT | Performed by: STUDENT IN AN ORGANIZED HEALTH CARE EDUCATION/TRAINING PROGRAM

## 2024-04-07 PROCEDURE — 85025 COMPLETE CBC W/AUTO DIFF WBC: CPT | Performed by: STUDENT IN AN ORGANIZED HEALTH CARE EDUCATION/TRAINING PROGRAM

## 2024-04-07 PROCEDURE — 82962 GLUCOSE BLOOD TEST: CPT

## 2024-04-07 PROCEDURE — 80053 COMPREHEN METABOLIC PANEL: CPT | Performed by: STUDENT IN AN ORGANIZED HEALTH CARE EDUCATION/TRAINING PROGRAM

## 2024-04-07 PROCEDURE — 36415 COLL VENOUS BLD VENIPUNCTURE: CPT | Performed by: STUDENT IN AN ORGANIZED HEALTH CARE EDUCATION/TRAINING PROGRAM

## 2024-04-07 PROCEDURE — 83605 ASSAY OF LACTIC ACID: CPT

## 2024-04-07 PROCEDURE — 84443 ASSAY THYROID STIM HORMONE: CPT | Performed by: STUDENT IN AN ORGANIZED HEALTH CARE EDUCATION/TRAINING PROGRAM

## 2024-04-07 PROCEDURE — 31720 CLEARANCE OF AIRWAYS: CPT

## 2024-04-07 PROCEDURE — 93010 ELECTROCARDIOGRAM REPORT: CPT | Mod: ,,, | Performed by: GENERAL PRACTICE

## 2024-04-07 PROCEDURE — 25500020 PHARM REV CODE 255

## 2024-04-07 PROCEDURE — 5A1945Z RESPIRATORY VENTILATION, 24-96 CONSECUTIVE HOURS: ICD-10-PCS | Performed by: STUDENT IN AN ORGANIZED HEALTH CARE EDUCATION/TRAINING PROGRAM

## 2024-04-07 RX ORDER — NALOXONE HCL 0.4 MG/ML
2 VIAL (ML) INJECTION
Status: COMPLETED | OUTPATIENT
Start: 2024-04-07 | End: 2024-04-07

## 2024-04-07 RX ORDER — NALOXONE HCL 0.4 MG/ML
2 VIAL (ML) INJECTION ONCE
Status: COMPLETED | OUTPATIENT
Start: 2024-04-07 | End: 2024-04-07

## 2024-04-07 RX ORDER — ETOMIDATE 2 MG/ML
20 INJECTION INTRAVENOUS
Status: COMPLETED | OUTPATIENT
Start: 2024-04-07 | End: 2024-04-07

## 2024-04-07 RX ORDER — ONDANSETRON HYDROCHLORIDE 2 MG/ML
4 INJECTION, SOLUTION INTRAVENOUS
Status: COMPLETED | OUTPATIENT
Start: 2024-04-07 | End: 2024-04-07

## 2024-04-07 RX ORDER — MORPHINE SULFATE 4 MG/ML
4 INJECTION, SOLUTION INTRAMUSCULAR; INTRAVENOUS
Status: COMPLETED | OUTPATIENT
Start: 2024-04-07 | End: 2024-04-07

## 2024-04-07 RX ORDER — ROCURONIUM BROMIDE 10 MG/ML
100 INJECTION, SOLUTION INTRAVENOUS ONCE
Status: COMPLETED | OUTPATIENT
Start: 2024-04-07 | End: 2024-04-07

## 2024-04-07 RX ORDER — MUPIROCIN 20 MG/G
OINTMENT TOPICAL 2 TIMES DAILY
Status: DISCONTINUED | OUTPATIENT
Start: 2024-04-07 | End: 2024-04-11 | Stop reason: HOSPADM

## 2024-04-07 RX ORDER — PROPOFOL 10 MG/ML
0-50 INJECTION, EMULSION INTRAVENOUS CONTINUOUS
Status: DISCONTINUED | OUTPATIENT
Start: 2024-04-07 | End: 2024-04-08

## 2024-04-07 RX ADMIN — NALOXONE HYDROCHLORIDE 2 MG: 0.4 INJECTION, SOLUTION INTRAMUSCULAR; INTRAVENOUS; SUBCUTANEOUS at 07:04

## 2024-04-07 RX ADMIN — MORPHINE SULFATE 4 MG: 4 INJECTION, SOLUTION INTRAMUSCULAR; INTRAVENOUS at 08:04

## 2024-04-07 RX ADMIN — ETOMIDATE 20 MG: 2 INJECTION INTRAVENOUS at 08:04

## 2024-04-07 RX ADMIN — PROPOFOL 15 MCG/KG/MIN: 10 INJECTION, EMULSION INTRAVENOUS at 08:04

## 2024-04-07 RX ADMIN — IOHEXOL 100 ML: 350 INJECTION, SOLUTION INTRAVENOUS at 09:04

## 2024-04-07 RX ADMIN — SODIUM CHLORIDE 1000 ML: 0.9 INJECTION, SOLUTION INTRAVENOUS at 09:04

## 2024-04-07 RX ADMIN — ONDANSETRON 4 MG: 2 INJECTION INTRAMUSCULAR; INTRAVENOUS at 07:04

## 2024-04-07 RX ADMIN — ROCURONIUM BROMIDE 100 MG: 10 INJECTION, SOLUTION INTRAVENOUS at 08:04

## 2024-04-08 ENCOUNTER — CLINICAL SUPPORT (OUTPATIENT)
Dept: CARDIOLOGY | Facility: HOSPITAL | Age: 59
DRG: 917 | End: 2024-04-08
Attending: HOSPITALIST
Payer: MEDICARE

## 2024-04-08 VITALS — HEIGHT: 66 IN | WEIGHT: 146.38 LBS | BODY MASS INDEX: 23.53 KG/M2

## 2024-04-08 PROBLEM — J96.21 ACUTE ON CHRONIC RESPIRATORY FAILURE WITH HYPOXIA AND HYPERCAPNIA: Status: ACTIVE | Noted: 2024-03-26

## 2024-04-08 PROBLEM — J96.02 ACUTE RESPIRATORY FAILURE WITH HYPERCAPNIA: Status: ACTIVE | Noted: 2024-04-08

## 2024-04-08 PROBLEM — I63.9 CVA (CEREBRAL VASCULAR ACCIDENT): Status: ACTIVE | Noted: 2024-04-08

## 2024-04-08 PROBLEM — J44.9 COPD (CHRONIC OBSTRUCTIVE PULMONARY DISEASE): Status: ACTIVE | Noted: 2024-04-08

## 2024-04-08 PROBLEM — G93.40 ACUTE ENCEPHALOPATHY: Status: ACTIVE | Noted: 2024-04-08

## 2024-04-08 PROBLEM — J96.22 ACUTE ON CHRONIC RESPIRATORY FAILURE WITH HYPOXIA AND HYPERCAPNIA: Status: ACTIVE | Noted: 2024-03-26

## 2024-04-08 LAB
ALBUMIN SERPL BCP-MCNC: 3.7 G/DL (ref 3.5–5.2)
ALLENS TEST: ABNORMAL
ALLENS TEST: ABNORMAL
ALP SERPL-CCNC: 50 U/L (ref 55–135)
ALT SERPL W/O P-5'-P-CCNC: <3 U/L (ref 10–44)
AMPHET+METHAMPHET UR QL: NEGATIVE
ANION GAP SERPL CALC-SCNC: 10 MMOL/L (ref 8–16)
AORTIC ROOT ANNULUS: 3.6 CM
AORTIC VALVE CUSP SEPERATION: 2.1 CM
AST SERPL-CCNC: 10 U/L (ref 10–40)
AV INDEX (PROSTH): 0.75
AV MEAN GRADIENT: 2 MMHG
AV PEAK GRADIENT: 4 MMHG
AV VALVE AREA BY VELOCITY RATIO: 3.16 CM²
AV VALVE AREA: 3.4 CM²
AV VELOCITY RATIO: 0.7
BARBITURATES UR QL SCN>200 NG/ML: NEGATIVE
BASOPHILS # BLD AUTO: 0.02 K/UL (ref 0–0.2)
BASOPHILS NFR BLD: 0.3 % (ref 0–1.9)
BENZODIAZ UR QL SCN>200 NG/ML: ABNORMAL
BILIRUB SERPL-MCNC: 1.2 MG/DL (ref 0.1–1)
BSA FOR ECHO PROCEDURE: 1.76 M2
BUN SERPL-MCNC: 13 MG/DL (ref 6–20)
BZE UR QL SCN: NEGATIVE
CALCIUM SERPL-MCNC: 9.1 MG/DL (ref 8.7–10.5)
CANNABINOIDS UR QL SCN: NEGATIVE
CHLORIDE SERPL-SCNC: 101 MMOL/L (ref 95–110)
CO2 SERPL-SCNC: 27 MMOL/L (ref 23–29)
CREAT SERPL-MCNC: 0.8 MG/DL (ref 0.5–1.4)
CREAT UR-MCNC: 63.3 MG/DL (ref 23–375)
CREAT UR-MCNC: 63.3 MG/DL (ref 23–375)
CV ECHO LV RWT: 0.5 CM
DELSYS: ABNORMAL
DELSYS: ABNORMAL
DIFFERENTIAL METHOD BLD: ABNORMAL
DOP CALC AO PEAK VEL: 0.96 M/S
DOP CALC AO VTI: 19.3 CM
DOP CALC LVOT AREA: 4.5 CM2
DOP CALC LVOT DIAMETER: 2.4 CM
DOP CALC LVOT PEAK VEL: 0.67 M/S
DOP CALC LVOT STROKE VOLUME: 65.56 CM3
DOP CALC MV VTI: 21.5 CM
DOP CALCLVOT PEAK VEL VTI: 14.5 CM
E WAVE DECELERATION TIME: 451 MSEC
E/A RATIO: 0.79
E/E' RATIO: 6.29 M/S
ECHO LV POSTERIOR WALL: 1.03 CM (ref 0.6–1.1)
EOSINOPHIL # BLD AUTO: 0.1 K/UL (ref 0–0.5)
EOSINOPHIL NFR BLD: 1.1 % (ref 0–8)
ERYTHROCYTE [DISTWIDTH] IN BLOOD BY AUTOMATED COUNT: 13.6 % (ref 11.5–14.5)
ERYTHROCYTE [SEDIMENTATION RATE] IN BLOOD BY WESTERGREN METHOD: 14 MM/H
EST. GFR  (NO RACE VARIABLE): >60 ML/MIN/1.73 M^2
FENTANYL UR QL SCN: NORMAL
FIO2: 40
FIO2: 50
FRACTIONAL SHORTENING: 38 % (ref 28–44)
GLUCOSE SERPL-MCNC: 110 MG/DL (ref 70–110)
GLUCOSE SERPL-MCNC: 136 MG/DL (ref 70–110)
GLUCOSE SERPL-MCNC: 139 MG/DL (ref 70–110)
HCO3 UR-SCNC: 34.6 MMOL/L (ref 24–28)
HCO3 UR-SCNC: 35.1 MMOL/L (ref 24–28)
HCT VFR BLD AUTO: 41.7 % (ref 40–54)
HCT VFR BLD CALC: 36 %PCV (ref 36–54)
HGB BLD-MCNC: 13.1 G/DL (ref 14–18)
IMM GRANULOCYTES # BLD AUTO: 0.02 K/UL (ref 0–0.04)
IMM GRANULOCYTES NFR BLD AUTO: 0.3 % (ref 0–0.5)
INR PPP: 0.9 (ref 0.8–1.2)
INTERVENTRICULAR SEPTUM: 1.66 CM (ref 0.6–1.1)
IVC DIAMETER: 2.67 CM
LACTATE SERPL-SCNC: 2.8 MMOL/L (ref 0.5–1.9)
LEFT ATRIUM SIZE: 2.9 CM
LEFT INTERNAL DIMENSION IN SYSTOLE: 2.58 CM (ref 2.1–4)
LEFT VENTRICLE DIASTOLIC VOLUME INDEX: 43.37 ML/M2
LEFT VENTRICLE DIASTOLIC VOLUME: 75.9 ML
LEFT VENTRICLE MASS INDEX: 118 G/M2
LEFT VENTRICLE SYSTOLIC VOLUME INDEX: 13.8 ML/M2
LEFT VENTRICLE SYSTOLIC VOLUME: 24.1 ML
LEFT VENTRICULAR INTERNAL DIMENSION IN DIASTOLE: 4.14 CM (ref 3.5–6)
LEFT VENTRICULAR MASS: 206.65 G
LV LATERAL E/E' RATIO: 5.5 M/S
LV SEPTAL E/E' RATIO: 7.33 M/S
LVOT MG: 1 MMHG
LVOT MV: 0.41 CM/S
LYMPHOCYTES # BLD AUTO: 2 K/UL (ref 1–4.8)
LYMPHOCYTES NFR BLD: 27.1 % (ref 18–48)
MAGNESIUM SERPL-MCNC: 1.6 MG/DL (ref 1.6–2.6)
MCH RBC QN AUTO: 30.4 PG (ref 27–31)
MCHC RBC AUTO-ENTMCNC: 31.4 G/DL (ref 32–36)
MCV RBC AUTO: 97 FL (ref 82–98)
MIN VOL: 7.3
MODE: ABNORMAL
MODE: ABNORMAL
MONOCYTES # BLD AUTO: 0.8 K/UL (ref 0.3–1)
MONOCYTES NFR BLD: 10.9 % (ref 4–15)
MV MEAN GRADIENT: 1 MMHG
MV PEAK A VEL: 0.56 M/S
MV PEAK E VEL: 0.44 M/S
MV PEAK GRADIENT: 2 MMHG
MV STENOSIS PRESSURE HALF TIME: 103 MS
MV VALVE AREA BY CONTINUITY EQUATION: 3.05 CM2
MV VALVE AREA P 1/2 METHOD: 2.14 CM2
NEUTROPHILS # BLD AUTO: 4.5 K/UL (ref 1.8–7.7)
NEUTROPHILS NFR BLD: 60.3 % (ref 38–73)
NRBC BLD-RTO: 0 /100 WBC
OPIATES UR QL SCN: ABNORMAL
PCO2 BLDA: 58 MMHG (ref 35–45)
PCO2 BLDA: 83.7 MMHG (ref 35–45)
PCP UR QL SCN>25 NG/ML: NEGATIVE
PEEP: 5
PEEP: 5
PH SMN: 7.23 [PH] (ref 7.35–7.45)
PH SMN: 7.38 [PH] (ref 7.35–7.45)
PHOSPHATE SERPL-MCNC: 3.4 MG/DL (ref 2.7–4.5)
PLATELET # BLD AUTO: 112 K/UL (ref 150–450)
PLATELET BLD QL SMEAR: ABNORMAL
PMV BLD AUTO: 11.5 FL (ref 9.2–12.9)
PO2 BLDA: 101 MMHG (ref 80–100)
PO2 BLDA: 62 MMHG (ref 80–100)
POC BE: 10 MMOL/L
POC BE: 8 MMOL/L
POC IONIZED CALCIUM: 1.24 MMOL/L (ref 1.06–1.42)
POC SATURATED O2: 85 % (ref 95–100)
POC SATURATED O2: 97 % (ref 95–100)
POC TCO2: 36 MMOL/L (ref 23–27)
POC TCO2: 38 MMOL/L (ref 23–27)
POTASSIUM BLD-SCNC: 3.7 MMOL/L (ref 3.5–5.1)
POTASSIUM SERPL-SCNC: 4.1 MMOL/L (ref 3.5–5.1)
PROCALCITONIN SERPL IA-MCNC: 0.07 NG/ML (ref 0–0.5)
PROT SERPL-MCNC: 6.7 G/DL (ref 6–8.4)
PROTHROMBIN TIME: 10.6 SEC (ref 9–12.5)
PS: 10
PV MV: 0.54 M/S
PV PEAK GRADIENT: 2 MMHG
PV PEAK VELOCITY: 0.79 M/S
RA PRESSURE ESTIMATED: 3 MMHG
RBC # BLD AUTO: 4.31 M/UL (ref 4.6–6.2)
RIGHT VENTRICULAR END-DIASTOLIC DIMENSION: 2.94 CM
RV TISSUE DOPPLER FREE WALL SYSTOLIC VELOCITY 1 (APICAL 4 CHAMBER VIEW): 8.27 CM/S
SAMPLE: ABNORMAL
SAMPLE: ABNORMAL
SITE: ABNORMAL
SITE: ABNORMAL
SODIUM BLD-SCNC: 137 MMOL/L (ref 136–145)
SODIUM SERPL-SCNC: 138 MMOL/L (ref 136–145)
TDI LATERAL: 0.08 M/S
TDI SEPTAL: 0.06 M/S
TDI: 0.07 M/S
TOXICOLOGY INFORMATION: ABNORMAL
TROPONIN I SERPL HS-MCNC: 28 PG/ML (ref 0–14.9)
TROPONIN I SERPL HS-MCNC: 32.3 PG/ML (ref 0–14.9)
TROPONIN I SERPL HS-MCNC: 35.5 PG/ML (ref 0–14.9)
TROPONIN I SERPL HS-MCNC: 36.7 PG/ML (ref 0–14.9)
VT: 500
WBC # BLD AUTO: 7.45 K/UL (ref 3.9–12.7)
Z-SCORE OF LEFT VENTRICULAR DIMENSION IN END DIASTOLE: -1.56
Z-SCORE OF LEFT VENTRICULAR DIMENSION IN END SYSTOLE: -1.18

## 2024-04-08 PROCEDURE — 85610 PROTHROMBIN TIME: CPT | Performed by: INTERNAL MEDICINE

## 2024-04-08 PROCEDURE — 84295 ASSAY OF SERUM SODIUM: CPT

## 2024-04-08 PROCEDURE — 84100 ASSAY OF PHOSPHORUS: CPT | Performed by: INTERNAL MEDICINE

## 2024-04-08 PROCEDURE — 20000000 HC ICU ROOM

## 2024-04-08 PROCEDURE — 83605 ASSAY OF LACTIC ACID: CPT | Performed by: INTERNAL MEDICINE

## 2024-04-08 PROCEDURE — 82803 BLOOD GASES ANY COMBINATION: CPT

## 2024-04-08 PROCEDURE — 93005 ELECTROCARDIOGRAM TRACING: CPT | Performed by: GENERAL PRACTICE

## 2024-04-08 PROCEDURE — 93306 TTE W/DOPPLER COMPLETE: CPT

## 2024-04-08 PROCEDURE — 84484 ASSAY OF TROPONIN QUANT: CPT | Mod: 91 | Performed by: NURSE PRACTITIONER

## 2024-04-08 PROCEDURE — 82800 BLOOD PH: CPT

## 2024-04-08 PROCEDURE — 99900035 HC TECH TIME PER 15 MIN (STAT)

## 2024-04-08 PROCEDURE — 25000003 PHARM REV CODE 250: Performed by: INTERNAL MEDICINE

## 2024-04-08 PROCEDURE — 36415 COLL VENOUS BLD VENIPUNCTURE: CPT | Performed by: INTERNAL MEDICINE

## 2024-04-08 PROCEDURE — 94640 AIRWAY INHALATION TREATMENT: CPT

## 2024-04-08 PROCEDURE — 36600 WITHDRAWAL OF ARTERIAL BLOOD: CPT

## 2024-04-08 PROCEDURE — 99900026 HC AIRWAY MAINTENANCE (STAT)

## 2024-04-08 PROCEDURE — 93306 TTE W/DOPPLER COMPLETE: CPT | Mod: 26,,, | Performed by: INTERNAL MEDICINE

## 2024-04-08 PROCEDURE — 93010 ELECTROCARDIOGRAM REPORT: CPT | Mod: ,,, | Performed by: GENERAL PRACTICE

## 2024-04-08 PROCEDURE — 95819 EEG AWAKE AND ASLEEP: CPT

## 2024-04-08 PROCEDURE — 94761 N-INVAS EAR/PLS OXIMETRY MLT: CPT | Mod: XB

## 2024-04-08 PROCEDURE — 80354 DRUG SCREENING FENTANYL: CPT | Performed by: INTERNAL MEDICINE

## 2024-04-08 PROCEDURE — 87205 SMEAR GRAM STAIN: CPT | Performed by: HOSPITALIST

## 2024-04-08 PROCEDURE — 63600175 PHARM REV CODE 636 W HCPCS: Performed by: INTERNAL MEDICINE

## 2024-04-08 PROCEDURE — 80307 DRUG TEST PRSMV CHEM ANLYZR: CPT | Performed by: INTERNAL MEDICINE

## 2024-04-08 PROCEDURE — 85014 HEMATOCRIT: CPT

## 2024-04-08 PROCEDURE — 87070 CULTURE OTHR SPECIMN AEROBIC: CPT | Performed by: HOSPITALIST

## 2024-04-08 PROCEDURE — 82330 ASSAY OF CALCIUM: CPT

## 2024-04-08 PROCEDURE — 25000003 PHARM REV CODE 250: Performed by: HOSPITALIST

## 2024-04-08 PROCEDURE — 94003 VENT MGMT INPAT SUBQ DAY: CPT

## 2024-04-08 PROCEDURE — 84145 PROCALCITONIN (PCT): CPT | Performed by: INTERNAL MEDICINE

## 2024-04-08 PROCEDURE — 27100171 HC OXYGEN HIGH FLOW UP TO 24 HOURS

## 2024-04-08 PROCEDURE — 85025 COMPLETE CBC W/AUTO DIFF WBC: CPT | Performed by: INTERNAL MEDICINE

## 2024-04-08 PROCEDURE — 80053 COMPREHEN METABOLIC PANEL: CPT | Performed by: INTERNAL MEDICINE

## 2024-04-08 PROCEDURE — 63600175 PHARM REV CODE 636 W HCPCS: Performed by: HOSPITALIST

## 2024-04-08 PROCEDURE — 84132 ASSAY OF SERUM POTASSIUM: CPT

## 2024-04-08 PROCEDURE — 36415 COLL VENOUS BLD VENIPUNCTURE: CPT | Performed by: NURSE PRACTITIONER

## 2024-04-08 PROCEDURE — 25000242 PHARM REV CODE 250 ALT 637 W/ HCPCS: Performed by: HOSPITALIST

## 2024-04-08 PROCEDURE — 84484 ASSAY OF TROPONIN QUANT: CPT | Performed by: INTERNAL MEDICINE

## 2024-04-08 PROCEDURE — 99291 CRITICAL CARE FIRST HOUR: CPT | Mod: ,,, | Performed by: INTERNAL MEDICINE

## 2024-04-08 PROCEDURE — 99900031 HC PATIENT EDUCATION (STAT)

## 2024-04-08 PROCEDURE — 83735 ASSAY OF MAGNESIUM: CPT | Performed by: INTERNAL MEDICINE

## 2024-04-08 PROCEDURE — 84484 ASSAY OF TROPONIN QUANT: CPT | Mod: 91 | Performed by: INTERNAL MEDICINE

## 2024-04-08 RX ORDER — CLOPIDOGREL BISULFATE 75 MG/1
75 TABLET ORAL DAILY
Status: DISCONTINUED | OUTPATIENT
Start: 2024-04-08 | End: 2024-04-08

## 2024-04-08 RX ORDER — IPRATROPIUM BROMIDE AND ALBUTEROL SULFATE 2.5; .5 MG/3ML; MG/3ML
3 SOLUTION RESPIRATORY (INHALATION) EVERY 4 HOURS
Status: DISCONTINUED | OUTPATIENT
Start: 2024-04-08 | End: 2024-04-11 | Stop reason: HOSPADM

## 2024-04-08 RX ORDER — POTASSIUM CHLORIDE 7.45 MG/ML
80 INJECTION INTRAVENOUS
Status: DISCONTINUED | OUTPATIENT
Start: 2024-04-08 | End: 2024-04-11

## 2024-04-08 RX ORDER — FAMOTIDINE 10 MG/ML
20 INJECTION INTRAVENOUS EVERY 12 HOURS
Status: DISCONTINUED | OUTPATIENT
Start: 2024-04-08 | End: 2024-04-11 | Stop reason: HOSPADM

## 2024-04-08 RX ORDER — ONDANSETRON HYDROCHLORIDE 2 MG/ML
4 INJECTION, SOLUTION INTRAVENOUS EVERY 8 HOURS PRN
Status: DISCONTINUED | OUTPATIENT
Start: 2024-04-08 | End: 2024-04-11 | Stop reason: HOSPADM

## 2024-04-08 RX ORDER — MAGNESIUM SULFATE HEPTAHYDRATE 40 MG/ML
4 INJECTION, SOLUTION INTRAVENOUS
Status: DISCONTINUED | OUTPATIENT
Start: 2024-04-08 | End: 2024-04-11 | Stop reason: HOSPADM

## 2024-04-08 RX ORDER — CALCIUM GLUCONATE 20 MG/ML
3 INJECTION, SOLUTION INTRAVENOUS
Status: DISCONTINUED | OUTPATIENT
Start: 2024-04-08 | End: 2024-04-11 | Stop reason: HOSPADM

## 2024-04-08 RX ORDER — PROPOFOL 10 MG/ML
0-50 INJECTION, EMULSION INTRAVENOUS CONTINUOUS
Status: DISCONTINUED | OUTPATIENT
Start: 2024-04-08 | End: 2024-04-10

## 2024-04-08 RX ORDER — POTASSIUM CHLORIDE 7.45 MG/ML
40 INJECTION INTRAVENOUS
Status: DISCONTINUED | OUTPATIENT
Start: 2024-04-08 | End: 2024-04-11

## 2024-04-08 RX ORDER — ASPIRIN 81 MG/1
81 TABLET ORAL DAILY
Status: DISCONTINUED | OUTPATIENT
Start: 2024-04-08 | End: 2024-04-11 | Stop reason: HOSPADM

## 2024-04-08 RX ORDER — ESCITALOPRAM OXALATE 10 MG/1
10 TABLET ORAL DAILY
Status: DISCONTINUED | OUTPATIENT
Start: 2024-04-08 | End: 2024-04-11 | Stop reason: HOSPADM

## 2024-04-08 RX ORDER — SODIUM CHLORIDE 9 MG/ML
INJECTION, SOLUTION INTRAVENOUS CONTINUOUS
Status: DISCONTINUED | OUTPATIENT
Start: 2024-04-08 | End: 2024-04-10

## 2024-04-08 RX ORDER — DEXMEDETOMIDINE HYDROCHLORIDE 4 UG/ML
0-1.4 INJECTION, SOLUTION INTRAVENOUS CONTINUOUS
Status: DISCONTINUED | OUTPATIENT
Start: 2024-04-08 | End: 2024-04-10

## 2024-04-08 RX ORDER — MAGNESIUM SULFATE HEPTAHYDRATE 40 MG/ML
2 INJECTION, SOLUTION INTRAVENOUS
Status: DISCONTINUED | OUTPATIENT
Start: 2024-04-08 | End: 2024-04-11 | Stop reason: HOSPADM

## 2024-04-08 RX ORDER — POTASSIUM CHLORIDE 7.45 MG/ML
60 INJECTION INTRAVENOUS
Status: DISCONTINUED | OUTPATIENT
Start: 2024-04-08 | End: 2024-04-11

## 2024-04-08 RX ORDER — ALBUTEROL SULFATE 0.83 MG/ML
2.5 SOLUTION RESPIRATORY (INHALATION) EVERY 4 HOURS PRN
Status: DISCONTINUED | OUTPATIENT
Start: 2024-04-08 | End: 2024-04-11 | Stop reason: HOSPADM

## 2024-04-08 RX ORDER — CALCIUM GLUCONATE 20 MG/ML
1 INJECTION, SOLUTION INTRAVENOUS
Status: DISCONTINUED | OUTPATIENT
Start: 2024-04-08 | End: 2024-04-11 | Stop reason: HOSPADM

## 2024-04-08 RX ORDER — ENOXAPARIN SODIUM 100 MG/ML
40 INJECTION SUBCUTANEOUS EVERY 24 HOURS
Status: DISCONTINUED | OUTPATIENT
Start: 2024-04-08 | End: 2024-04-11 | Stop reason: HOSPADM

## 2024-04-08 RX ORDER — CHLORHEXIDINE GLUCONATE ORAL RINSE 1.2 MG/ML
15 SOLUTION DENTAL 2 TIMES DAILY
Status: DISCONTINUED | OUTPATIENT
Start: 2024-04-08 | End: 2024-04-11 | Stop reason: HOSPADM

## 2024-04-08 RX ORDER — ACETAMINOPHEN 325 MG/1
650 TABLET ORAL EVERY 4 HOURS PRN
Status: DISCONTINUED | OUTPATIENT
Start: 2024-04-08 | End: 2024-04-11 | Stop reason: HOSPADM

## 2024-04-08 RX ORDER — FENTANYL CITRATE 50 UG/ML
100 INJECTION, SOLUTION INTRAMUSCULAR; INTRAVENOUS
Status: DISCONTINUED | OUTPATIENT
Start: 2024-04-08 | End: 2024-04-10

## 2024-04-08 RX ORDER — CALCIUM GLUCONATE 20 MG/ML
2 INJECTION, SOLUTION INTRAVENOUS
Status: DISCONTINUED | OUTPATIENT
Start: 2024-04-08 | End: 2024-04-11 | Stop reason: HOSPADM

## 2024-04-08 RX ORDER — SODIUM CHLORIDE 0.9 % (FLUSH) 0.9 %
10 SYRINGE (ML) INJECTION
Status: DISCONTINUED | OUTPATIENT
Start: 2024-04-08 | End: 2024-04-11 | Stop reason: HOSPADM

## 2024-04-08 RX ADMIN — FENTANYL CITRATE 100 MCG: 50 INJECTION INTRAMUSCULAR; INTRAVENOUS at 12:04

## 2024-04-08 RX ADMIN — DEXMEDETOMIDINE HYDROCHLORIDE 0.4 MCG/KG/HR: 4 INJECTION, SOLUTION INTRAVENOUS at 05:04

## 2024-04-08 RX ADMIN — ENOXAPARIN SODIUM 40 MG: 40 INJECTION SUBCUTANEOUS at 06:04

## 2024-04-08 RX ADMIN — SODIUM CHLORIDE: 9 INJECTION, SOLUTION INTRAVENOUS at 09:04

## 2024-04-08 RX ADMIN — MUPIROCIN 1 G: 20 OINTMENT TOPICAL at 09:04

## 2024-04-08 RX ADMIN — METHYLPREDNISOLONE SODIUM SUCCINATE 60 MG: 40 INJECTION, POWDER, FOR SOLUTION INTRAMUSCULAR; INTRAVENOUS at 02:04

## 2024-04-08 RX ADMIN — CHLORHEXIDINE GLUCONATE 15 ML: 1.2 RINSE ORAL at 09:04

## 2024-04-08 RX ADMIN — IPRATROPIUM BROMIDE AND ALBUTEROL SULFATE 3 ML: 2.5; .5 SOLUTION RESPIRATORY (INHALATION) at 11:04

## 2024-04-08 RX ADMIN — FAMOTIDINE 20 MG: 10 INJECTION INTRAVENOUS at 09:04

## 2024-04-08 RX ADMIN — ASPIRIN 81 MG: 81 TABLET, COATED ORAL at 09:04

## 2024-04-08 RX ADMIN — ESCITALOPRAM OXALATE 10 MG: 10 TABLET ORAL at 09:04

## 2024-04-08 RX ADMIN — METHYLPREDNISOLONE SODIUM SUCCINATE 60 MG: 40 INJECTION, POWDER, FOR SOLUTION INTRAMUSCULAR; INTRAVENOUS at 09:04

## 2024-04-08 RX ADMIN — PROPOFOL 25 MCG/KG/MIN: 10 INJECTION, EMULSION INTRAVENOUS at 03:04

## 2024-04-08 RX ADMIN — DEXMEDETOMIDINE HYDROCHLORIDE 0.2 MCG/KG/HR: 4 INJECTION, SOLUTION INTRAVENOUS at 06:04

## 2024-04-08 RX ADMIN — IPRATROPIUM BROMIDE AND ALBUTEROL SULFATE 3 ML: 2.5; .5 SOLUTION RESPIRATORY (INHALATION) at 07:04

## 2024-04-08 RX ADMIN — IPRATROPIUM BROMIDE AND ALBUTEROL SULFATE 3 ML: 2.5; .5 SOLUTION RESPIRATORY (INHALATION) at 05:04

## 2024-04-08 NOTE — ASSESSMENT & PLAN NOTE
On the TRUE ABG he had after intubation he still showed Hypercapnia but normal pH     Tells me he likely lives with a higher PC02 and is compensated  ( secondary to COPD )  but no Metabolic alkalosis present     I suspect this is all from Opiate  induced Narcosis  /  sedation .           [place] a [place] which his brother said he has done in past.     Past tox screens show positive for opiates.      I think he took it and then [place].      I'm repeating a tox screen here and checking specifically fentanyl also which I bet doesn't show on the Tox Screen that [facility] uses         For now he is stable on the vent  sedated on propofol       Vent  :  500 TV  14 RR  40% Fi02      Good tube placement per CXR   Sedation ; propofol     and Fentanyl 100 mcg IV Q1 PRN   GI :  Pepcid IV Q12  DVT :  lovenox 40 SQ daily     OGT in place for his other meds       Pulmonology is aware and on consult  ( consult placed )

## 2024-04-08 NOTE — CARE UPDATE
Placed on spontaneous PS 10, Peep 5, 40%    Blood pressure went up to 202/102. Nurse spoke with Dr. Rodriguez and she said give him Precedex and make him comfortable . Pt still on spontaneous.

## 2024-04-08 NOTE — ASSESSMENT & PLAN NOTE
Patient apparently has severe COPD, continue heavy smoker, on top of that, patient taking tramadol, baclofen, Lyrica from what I see, family denies OD.  Patient has significant pinpoint pupils for my physical examination.  I suspect the patient may have narcotic overdose on top of severe COPD.    Sedation and ventilator management as per ICU team.  We will try IV steroids and duo nebulizer q.4 hours.    start tube feeding if okay with ICU team

## 2024-04-08 NOTE — PROCEDURES
EEG REPORT    NAME: Hernan Badillo  : 1965  MRN: [id number]    DATE of EE2024    CLINICAL INDICATION: This is a 58 y.o. male with history of ICH, HTN being evaluated for encephalopathy.    MEDICATIONS:    albuterol-ipratropium  3 mL Nebulization Q4H    aspirin  81 mg Oral Daily    chlorhexidine  15 mL Mouth/Throat BID    enoxparin  40 mg Subcutaneous Daily    EScitalopram oxalate  10 mg Per OG tube Daily    famotidine (PF)  20 mg Intravenous Q12H    methylPREDNISolone sodium succinate injection  60 mg Intravenous Q8H    mupirocin   Nasal BID         EEG DESCRIPTION:  A 16-channel EEG was performed with electrode placement in 10/20 International System. Longitudinal bipolar and referential montages were utilized in analysis.    This is a technically adequate study with minor muscle and motion artifacts.    There was no clear posterior dominant background rhythm.  The record consisted of admixture of delta and theta frequency waveforms.  The record was reactive.  Sleep architecture was not seen.  Photic stimulation, performed elicited no driving response. Hyperventilation  was not performed.    No epileptiform discharges were recorded. No electrographic or electroclinical seizures were recorded.    DIAGNOSIS: This is an abnormal EEG due to the presence of moderate to severe generalised slowing. No lateralizing or epileptiform features are noted. No electrographic or electroclinical seizures are recorded.      CLINICAL INTERPRETATION:  This is an abnormal EEG indicating moderate to severe encephalopathy.    Findings of this study indicate a generalized encephalopathic process that is nonspecific in type. Toxic, metabolic, or structural abnormalities may be considered.  Further clinical correlation is advised.      Pedrito Serrano MD  Neurology

## 2024-04-08 NOTE — CARE UPDATE
04/08/24 0700   PRE-TX-O2   Oxygen Concentration (%) 40   SpO2 99 %   Pulse 78   Resp (!) 22   Vent Select   Charged w/in last 24h YES   Preset Conventional Ventilator Settings   Ventilation Type VC   Vent Mode A/C   Set Rate 14 BPM   Vt Set 500 mL   PEEP/CPAP 5 cmH20   Pressure Support 10 cmH20   Peak Flow 60 L/min   Peak End Inspiratory Pressure 18 cmH20   Insp Rise Time  70 %   I-Trigger Type  V-TRIG   Trigger Sensitivity Flow/I-Trigger 3 L/min   Patient Ventilator Parameters   Resp Rate Total 16 br/min   Peak Airway Pressure 40 cmH20   Mean Airway Pressure 12 cmH20   Plateau Pressure 0 cmH20   Exhaled Vt 426 mL   Total Ve 8.04 L/m   Spont Ve 7.95 L   I:E Ratio Measured 1:2.50   Auto PEEP 0 cmH20   Inspired Tidal Volume (VTI) 0 mL   Conventional Ventilator Alarms   Resp Rate High Alarm 40 br/min   Press High Alarm 60 cmH2O   Apnea Rate 10   Apnea Volume (mL) 0 mL   Apnea Oxygen Concentration  100   Apnea Flow Rate (L/min) 63   T Apnea 20 sec(s)   Ready to Wean/Extubation Screen   FIO2<=50 (chart decimal) 0.4   MV<16L (chart vol.) 8.04   PEEP <=8 (chart #) 5   Ready to Wean Parameters   F/VT Ratio<105 (RSBI) (!) 51.64   Vital Capacity   Vital Capacity (mL) 0   Labs   $ Was an ABG obtained? Arterial Puncture;POCT - Blood gas   $ Labs Tech Time 15 min   Critical Value Communication   Date Result Received 04/08/24   Time Result Received 0657   Resulting Department of Critical Value RESP   Who communicated critical value from resulting department? KENDRA OSBORN   Critical Test #1 PH   Critical Test #2 PCO2   Name of Notified Physician/Designee MACK   Date Notified 04/08/24   Time Notified 0000   Read Back Verification Yes   Physician Directive BACK ON AC MODE

## 2024-04-08 NOTE — ASSESSMENT & PLAN NOTE
Patient's FSGs are controlled on current medication regimen.  Last A1c reviewed-   Lab Results   Component Value Date    LABA1C 9.8 (H) 07/06/2016    HGBA1C 6.1 03/26/2024     Most recent fingerstick glucose reviewed-   Recent Labs   Lab 04/07/24  1846   POCTGLUCOSE 155*     Current correctional scale  Medium  Maintain anti-hyperglycemic dose as follows-   Antihyperglycemics (From admission, onward)      None          Hold Oral hypoglycemics while patient is in the hospital.

## 2024-04-08 NOTE — CONSULTS
[facility]  Department of Cardiology  Consult Note      PATIENT NAME: Hernan Badillo  MRN: [id number]  TODAY'S DATE: 04/08/2024  ADMIT DATE: 4/7/2024                          CONSULT REQUESTED BY: Alissa Jackson MD    SUBJECTIVE     PRINCIPAL PROBLEM: <principal problem not specified>      REASON FOR CONSULT:  Unresponsiveness, abnormal EKG      HPI:    Pt is 57 yo  male with prior history of COPD, DM 2, HTN who was found unresponsive by family while on fishing trip. Mother at  states she took him to the hospital. States he does not take drugs to her knowledge. States he had not been ill. Nurse states she was told pt usually stays dehydrated and did have response to narcan.       ER record of HPI:    Altered Mental Status      58-year-old male brought in for altered mental status.  Last known normal between 3 and 4:00 p.m..  History of alcohol use per mother however has not been drinking for years.  She reports patient does not do drugs.  Patient found unresponsive in van after family/friends came back physician.  Patient brought to ED POV.  On arrival patient agonal breathing, significant oral secretions and some hypoxemia.  History of prior stroke per mother    Review of patient's allergies indicates:  No Known Allergies    Past Medical History:   Diagnosis Date    COPD (chronic obstructive pulmonary disease)     Diabetes mellitus, type 2     Hypertension      Past Surgical History:   Procedure Laterality Date    DENTAL SURGERY      ESOPHAGOGASTRODUODENOSCOPY N/A 6/1/2020    Procedure: EGD (ESOPHAGOGASTRODUODENOSCOPY);  Surgeon: Terrell May MD;  Location: [facility];  Service: Endoscopy;  Laterality: N/A;    HERNIA REPAIR      left inguinal    incision and drainiage       Social History     Tobacco Use    Smoking status: Every Day     Current packs/day: 2.00     Average packs/day: 2.0 packs/day for 30.0 years (60.0 ttl pk-yrs)     Types: Cigarettes    Smokeless tobacco: Never    Substance Use Topics    Alcohol use: Yes     Alcohol/week: 1.0 standard drink of alcohol     Types: 1 Shots of liquor per week     Comment: 1/2 pint 2 x per week - quit approximately 5 months ago    Drug use: No        REVIEW OF SYSTEMS  Unable to obtain due to pt condition    OBJECTIVE     VITAL SIGNS (Most Recent)  Temp: 97.5 °F (36.4 °C) (04/08/24 1501)  Pulse: (!) 52 (04/08/24 1501)  Resp: 14 (04/08/24 1501)  BP: (!) 164/86 (04/08/24 1501)  SpO2: 95 % (04/08/24 1501)    VENTILATION STATUS  Resp: 14 (04/08/24 1501)  SpO2: 95 % (04/08/24 1501)  Vent Mode: A/C  Oxygen Concentration (%):  [30-60] 40  Resp Rate Total:  [14 br/min-26 br/min] 14 br/min  Vt Set:  [500 mL] 500 mL  PEEP/CPAP:  [5 cmH20] 5 cmH20  Pressure Support:  [10 cmH20] 10 cmH20  Mean Airway Pressure:  [8.4 wsK97-40 cmH20] 9.2 cmH20        I & O (Last 24H):  Intake/Output Summary (Last 24 hours) at 4/8/2024 1528  Last data filed at 4/8/2024 1507  Gross per 24 hour   Intake 613.66 ml   Output 490 ml   Net 123.66 ml       WEIGHTS  Wt Readings from Last 3 Encounters:   04/07/24 2308 66.4 kg (146 lb 6.2 oz)   04/07/24 2021 70.3 kg (155 lb)   04/08/24 1231 66.4 kg (146 lb 6.2 oz)   03/27/24 0621 68.7 kg (151 lb 7.3 oz)   03/26/24 2239 68.7 kg (151 lb 7.3 oz)       PHYSICAL EXAM    GENERAL: middle aged male sedated and on ventilator.  HEENT: Normocephalic.  Pupils are pin point. ETT in place. OGT in place  NECK: No JVD.   CARDIAC: Regular rate and rhythm. S1 is normal.S2 is normal  CHEST ANATOMY: barrel shaped  LUNGS: Scattered rhonchi.. Breathing comfortably on ventilator, 95% Sp02  ABDOMEN: Soft , OGT w/ brown contents    EXTREMITIES: No edema  CENTRAL NERVOUS SYSTEM: sedated  SKIN: No rash   Ann cath draining concentrated urine    HOME MEDICATIONS:  No current facility-administered medications on file prior to encounter.     Current Outpatient Medications on File Prior to Encounter   Medication Sig Dispense Refill    acetaminophen (TYLENOL) 325 MG  tablet Take 650 mg by mouth 3 (three) times daily as needed for Pain.      albuterol (PROVENTIL HFA) 90 mcg/actuation inhaler Inhale 2 puffs into the lungs every 6 (six) hours as needed for Wheezing. Rescue 18 g 0    amLODIPine (NORVASC) 10 MG tablet Take 1 tablet (10 mg total) by mouth once daily. (Patient not taking: Reported on 4/8/2024) 90 tablet 3    aspirin (ECOTRIN) 81 MG EC tablet Take 1 tablet (81 mg total) by mouth once daily. 360 tablet 0    baclofen (LIORESAL) 10 MG tablet Take 1 tablet (10 mg total) by mouth 2 (two) times daily. (Patient not taking: Reported on 4/8/2024) 120 tablet 0    blood sugar diagnostic, disc Strp 1 each by Misc.(Non-Drug; Combo Route) route 4 (four) times daily. 200 strip 0    blood-glucose meter kit Use as instructed 1 each 0    budesonide (PULMICORT) 0.5 mg/2 mL nebulizer solution Take 2 mLs (0.5 mg total) by nebulization 2 (two) times a day. Controller 120 mL 0    clopidogreL (PLAVIX) 75 mg tablet Take 1 tablet (75 mg total) by mouth once daily. for 21 days (Patient not taking: Reported on 4/8/2024) 30 tablet 0    fluticasone-salmeterol diskus inhaler 500-50 mcg Inhale 1 puff into the lungs 2 (two) times daily. Controller (Patient not taking: Reported on 4/8/2024) 60 each 11    lancets (LANCETS,THIN) Misc 1 each by Misc.(Non-Drug; Combo Route) route 4 (four) times daily. 200 each 0    lisinopriL 10 MG tablet Take 1 tablet (10 mg total) by mouth once daily. 90 tablet 3    metFORMIN (GLUCOPHAGE-XR) 500 MG ER 24hr tablet Take 1 tablet (500 mg total) by mouth daily with breakfast. 90 tablet 3    pantoprazole (PROTONIX) 40 MG tablet Take 1 tablet (40 mg total) by mouth once daily. (Patient not taking: Reported on 4/8/2024) 90 tablet 3    pregabalin (LYRICA) 100 MG capsule Take 1 capsule (100 mg total) by mouth 2 (two) times daily. (Patient not taking: Reported on 4/8/2024) 60 capsule 05    [DISCONTINUED] albuterol (PROVENTIL/VENTOLIN HFA) 90 mcg/actuation inhaler Inhale 1-2 puffs  into the lungs every 6 (six) hours as needed. Rescue (Patient not taking: Reported on 4/2/2024) 1 g 0    [DISCONTINUED] albuterol-ipratropium (DUO-NEB) 2.5 mg-0.5 mg/3 mL nebulizer solution Take 3 mLs by nebulization every 6 (six) hours as needed for Wheezing or Shortness of Breath. (Patient not taking: Reported on 4/2/2024) 25 each 3    [DISCONTINUED] atorvastatin (LIPITOR) 20 MG tablet Take 1 tablet (20 mg total) by mouth once daily. 90 tablet 3    [DISCONTINUED] EScitalopram oxalate (LEXAPRO) 10 MG tablet Take 10 mg by mouth.      [DISCONTINUED] meclizine (ANTIVERT) 25 mg tablet Take 25 mg by mouth.      [DISCONTINUED] melatonin (MELATIN) 5 mg Take 5 mg by mouth.         SCHEDULED MEDS:   albuterol-ipratropium  3 mL Nebulization Q4H    aspirin  81 mg Oral Daily    chlorhexidine  15 mL Mouth/Throat BID    enoxparin  40 mg Subcutaneous Daily    EScitalopram oxalate  10 mg Per OG tube Daily    famotidine (PF)  20 mg Intravenous Q12H    methylPREDNISolone sodium succinate injection  60 mg Intravenous Q8H    mupirocin   Nasal BID       CONTINUOUS INFUSIONS:   sodium chloride 0.9% 100 mL/hr at 04/08/24 0944    dexmedeTOMIDine (Precedex) infusion (titrating) 0.2 mcg/kg/hr (04/08/24 0629)    propofoL 10 mcg/kg/min (04/08/24 1445)       PRN MEDS:acetaminophen, albuterol sulfate, calcium gluconate IVPB, calcium gluconate IVPB, calcium gluconate IVPB, fentaNYL, magnesium sulfate IVPB, magnesium sulfate IVPB, ondansetron, potassium chloride **AND** potassium chloride **AND** potassium chloride, sodium chloride 0.9%, sodium phosphate 15 mmol in dextrose 5 % (D5W) 250 mL IVPB, sodium phosphate 20.01 mmol in dextrose 5 % (D5W) 250 mL IVPB, sodium phosphate 30 mmol in dextrose 5 % (D5W) 250 mL IVPB    LABS AND DIAGNOSTICS     CBC LAST 3 DAYS  Recent Labs   Lab 04/07/24  1925 04/08/24  0406 04/08/24  0518   WBC 7.48  --  7.45   RBC 4.41*  --  4.31*   HGB 13.4*  --  13.1*   HCT 41.8 36 41.7   MCV 95  --  97   MCH 30.4  --  30.4    MCHC 32.1  --  31.4*   RDW 13.2  --  13.6   *  --  112*   MPV 11.6  --  11.5   GRAN 62.3  4.7  --  60.3  4.5   LYMPH 28.3  2.1  --  27.1  2.0   MONO 8.3  0.6  --  10.9  0.8   BASO 0.01  --  0.02   NRBC 0  --  0       COAGULATION LAST 3 DAYS  Recent Labs   Lab 04/08/24 0518   INR 0.9       CHEMISTRY LAST 3 DAYS  Recent Labs   Lab 04/07/24 1925 04/07/24 2003 04/07/24 2120 04/08/24 0406 04/08/24 0518 04/08/24  0657     --   --   --  138  --    K 4.2  --   --   --  4.1  --    CL 96  --   --   --  101  --    CO2 28  --   --   --  27  --    ANIONGAP 13  --   --   --  10  --    BUN 12  --   --   --  13  --    CREATININE 0.8  --   --   --  0.8  --    *  --   --   --  110  --    CALCIUM 9.5  --   --   --  9.1  --    PH  --    < > 7.352 7.384  --  7.230*   MG 1.6  --   --   --  1.6  --    ALBUMIN 3.4*  --   --   --  3.7  --    PROT 7.3  --   --   --  6.7  --    ALKPHOS 60  --   --   --  50*  --    ALT 6*  --   --   --  <3*  --    AST 14  --   --   --  10  --    BILITOT 1.1*  --   --   --  1.2*  --     < > = values in this interval not displayed.       CARDIAC PROFILE LAST 3 DAYS  Recent Labs   Lab 04/07/24 1925   BNP 75   TROPONINI 0.076*       ENDOCRINE LAST 3 DAYS  Recent Labs   Lab 04/07/24 1925 04/08/24  1209   TSH 0.706  --    PROCAL  --  0.068       LAST ARTERIAL BLOOD GAS  ABG  Recent Labs   Lab 04/08/24  0657   PH 7.230*   PO2 62*   PCO2 83.7*   HCO3 35.1*   BE 8*       LAST 7 DAYS MICROBIOLOGY   Microbiology Results (last 7 days)       Procedure Component Value Units Date/Time    Culture, Respiratory with Gram Stain [[id number]] Collected: 04/08/24 0742    Order Status: Sent Specimen: Respiratory from Endotracheal Aspirate Updated: 04/08/24 0750            MOST RECENT IMAGING  CTA Head and Neck (xpd)  Addendum: Presumably reactive partially imaged right hilar lymphadenopathy which  appears similar to prior exams.  Clinical correlation needed.  If  indicated this can be further  characterized with contrast-enhanced CT of  the chest in the non emergent setting.     Electronically signed by: Delvin Wilson   Date:    04/08/2024   Time:    08:38  Narrative: EXAMINATION:  CTA HEAD AND NECK (XPD)    CLINICAL HISTORY:  stroke like symptoms;    TECHNIQUE:  Non contrast low dose axial images were obtained through the head. CT angiogram was performed from the level of the ignacia to the top of the head following the IV administration of 100mL of Omnipaque 350.   Sagittal and coronal reconstructions and maximum intensity projection reconstructions were performed. Arterial stenosis percentages are based on NASCET measurement criteria.    COMPARISON:  Concurrently performed head CT 04/07/2024.  CTA of the head and neck 03/26/2024.    FINDINGS:  CTA HEAD:    Vasculature: Mild atherosclerotic changes of the bilateral internal carotid arteries without evidence of any hemodynamically significant stenosis.  No hemodynamically significant stenosis or proximal large vessel occlusion.  No aneurysm identified.  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    Variant anatomy: Hypoplastic left P1 PCA in the setting of a prominent left-sided posterior communicating artery.  Dominant distal left vertebral artery.    Brain: Limited supplemental post-contrast CT imaging of the head demonstrates no abnormal parenchymal enhancement, midline shift or mass effect, or space-occupying extra-axial fluid collection.  No CT evidence of an acute major vascular territory infarct again noting chronic small vessel ischemic changes including multiple small lacunar type infarcts described on the concurrently performed noncontrast head CT.    Ventricles/Sulci: The ventricles and sulci are appropriate in size for age.    Osseous Structures: The osseous structures are unremarkable in appearance.    Other:  Scattered opacification of the paranasal sinuses as well as fluid within the pharyngeal airway in the setting of respiratory/enteric support tubes mastoid air cells are clear.  No acute bony process identified.  Chronic defect at the level of the left lamina papyracea.    CTA NECK:    Aorta: Conventional branching pattern. The great vessel origins are patent without flow limiting stenosis.    Vertebral Arteries: The origins of the vertebral arteries are patent.  No flow limiting stenosis, occlusion, or dissection.    Right Carotid: No flow limiting stenosis, occlusion, or dissection of the common carotid or internal carotid arteries.  Mild plaque of the proximal ICA.  Right internal carotid artery: Less than 50 % stenosis by and NASCET.  The internal carotid artery is medialized at the C2-C3 level.    Left Carotid: No flow limiting stenosis, occlusion, or dissection of the common carotid or internal carotid arteries. No significant plaque of the proximal ICA. Right internal carotid artery: 0 % stenosis by and NASCET.    Extravascular Anatomy: Emphysematous changes of the visualized upper lung zones.  Nonspecific prominence of a right hilar lymph node which is grossly unchanged compared to prior exams, nonspecific but presumably reactive.  Impression: 1. No intracranial proximal large vessel occlusion or hemodynamically significant stenosis.  No change from prior CTA.  2. Limited postcontrast CT imaging demonstrates no acute process.  3. 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  4. No hemodynamically significant arterial stenosis within the neck.  The preliminary and final reports are concordant.    Electronically signed by: Delvin Wilson  Date:    04/08/2024  Time:    08:10  X-Ray Chest 1 View  Narrative: EXAMINATION:  XR CHEST 1 VIEW    CLINICAL HISTORY:  Encounter for other preprocedural examination    TECHNIQUE:  Single frontal view of the chest was  performed.    COMPARISON:  04/07/2024    FINDINGS:  The endotracheal tube has been placed and has its tip 3 cm above the ignacia.  Nasogastric tube has been placed and passes into the stomach and out of field of view.  The cardiomediastinal silhouette is normal limits.  The lungs are well expanded.  There is now slight patchy density again evident at the right lung base.  No pleural effusion.  Impression: Support devices in good position.  Probable mild right basilar infiltrate.    Electronically signed by: Jozef Adams MD  Date:    04/08/2024  Time:    08:37  X-Ray Chest AP Portable  Narrative: EXAMINATION:  XR CHEST AP PORTABLE    CLINICAL HISTORY:  Chest Pain;    TECHNIQUE:  Single frontal view of the chest was performed.    COMPARISON:  03/28/2024    FINDINGS:  The cardiomediastinal silhouette is within normal limits.  The lungs are well expanded without consolidation or pleural effusion.  No infiltrate evident on this exam.  Impression: No acute process.  Previous right basilar predominant infiltrate no longer evident.    Electronically signed by: Jozef Adams MD  Date:    04/08/2024  Time:    08:34  X-ray chest AP portable  Narrative: EXAMINATION:  XR CHEST AP PORTABLE    CLINICAL HISTORY:  hypoxemia;    COMPARISON:  04/07/2024    FINDINGS:  Cardiac silhouette size is within normal limits.  Endotracheal tube and enteric tube are in stable position.    No airspace consolidation or pleural effusion.  No pneumothorax.  No acute osseous abnormality.  Impression: No acute pulmonary process.    ET tube and enteric tube are in stable position.    Electronically signed by: Patrick Mace  Date:    04/08/2024  Time:    07:37  CT Head Without Contrast  Narrative: EXAMINATION:  CT HEAD WITHOUT CONTRAST    CLINICAL HISTORY:  Mental status change, unknown cause;    TECHNIQUE:  Low dose axial CT images obtained throughout the head without intravenous contrast. Sagittal and coronal reconstructions were  performed.    COMPARISON:  CT/CTA 03/26/2024.  MRI brain 12/04/2023.    FINDINGS:  Brain: There is no evidence of a mass, edema, midline shift, or intracranial hemorrhage. No extra-axial fluid collection.  Grossly similar findings of chronic small vessel ischemia throughout the cerebral hemispheric white matter as well as chronic lacunar type infarcts involving the bilateral basal ganglia, right frontal corona radiata, and alf.  No CT evidence of an acute major vascular territorial infarct.    Ventricles: The ventricles, sulci, and cisterns are within normal limits.    Skull: The osseous structures are unremarkable in appearance.    Extracranial soft tissues: Limited imaging is within normal limits.    Other: Chronic injury to the left lamina papyracea, unchanged.  Mild mucosal thickening within the visualized paranasal sinuses.  Mastoid air cells are clear.  Impression: 1. No acute intracranial CT findings or change from prior exams.  The preliminary and final reports are concordant.    Electronically signed by: Delvin Wilson  Date:    04/08/2024  Time:    07:20      ECHOCARDIOGRAM RESULTS (last 5)  Results for orders placed during the hospital encounter of 12/03/23    Echo Saline Bubble? Yes    Interpretation Summary    Left Ventricle: The left ventricle is normal in size. Mildly increased wall thickness. There is mild concentric hypertrophy. Normal wall motion. There is normal systolic function. Ejection fraction by visual approximation is 60%. Grade I diastolic dysfunction.    Right Ventricle: Normal right ventricular cavity size. Wall thickness is normal. Right ventricle wall motion  is normal. Systolic function is normal.    IVC/SVC: Normal venous pressure at 3 mmHg.    There was agitated saline (bubble) used. Study is negative for shunt.      Results for orders placed during the hospital encounter of 11/22/22    Echo    Interpretation Summary  · The left ventricle is normal in size with concentric hypertrophy  and mildly decreased systolic function.  · The estimated ejection fraction is 45%.  · Grade I left ventricular diastolic dysfunction.  · Normal right ventricular size with mildly reduced right ventricular systolic function.  · The quantitatively derived ejection fraction is 45%.  · Normal central venous pressure (3 mmHg).      CURRENT/PREVIOUS VISIT EKG  Results for orders placed or performed during the hospital encounter of 04/07/24   EKG 12-lead    Collection Time: 04/08/24  9:24 AM   Result Value Ref Range    QRS Duration 92 ms    [facility] QTC Calculation 444 ms    Narrative    Test Reason : R79.89,    Vent. Rate : 061 BPM     Atrial Rate : 061 BPM     P-R Int : 150 ms          QRS Dur : 092 ms      QT Int : 442 ms       P-R-T Axes : 071 090 048 degrees     QTc Int : 444 ms    Normal sinus rhythm  Rightward axis  Borderline Abnormal ECG  When compared with ECG of 07-APR-2024 19:02,  Vent. rate has decreased BY  45 BPM  ST elevation now present in Anterior-lateral leads  T wave inversion no longer evident in Inferior leads  T wave inversion no longer evident in Lateral leads    Referred By: [email or web address]   SELF           Confirmed By:            ASSESSMENT/PLAN:     Active Hospital Problems    Diagnosis    CVA (cerebral vascular accident)    COPD (chronic obstructive pulmonary disease)    Acute encephalopathy    Acute on chronic respiratory failure with hypoxia and hypercapnia    Tobacco dependency    DM (diabetes mellitus), type 2       ASSESSMENT & PLAN:     Acute encephalopathy  Acute on chronic respiratory failure  COPD  HTN  Nicotine dependence  DM2  H/O CVA  ?Drug overdose    RECOMMENDATIONS:  CT head showed no acute findings  EEG showed moderate to severe encephalopathy  HS troponin elevation trivial ; obtain another stat troponin now  EKG St changes non specific  Pt tested positive for opiates+ benzodiazepines in UDS, mother denies illicit drug use  Continue supportive measures  Echocardiogram in 12/2023 showed  good Efx 60% w/ grade 1 diastolic dysfunction  Repeat echocardiogram done and pending  Plan will be conservative medical management given patient condition        Vivien Gottlieb NP  [facility]  Department of Cardiology  Date of Service: 04/08/2024      Patient was found to be unresponsive in his truck when he went for fishing trip brought to the emergency room by his mother intubated for support and currently resting on ventilator on Precedex and Diprivan some of the history has been obtained from his mother and medical record since admission.  No reports of chest pain noted EKG has some nonspecific changes and tox screen was abnormal.  Echocardiogram showed preserved ejection fraction obtained today with no acute abnormalities.  I have personally evaluated and examined the patient, I have reviewed the Nurse Practitioner's history and physical, assessment, and plan. I agree with the findings and plan.  Recommend to continue on supportive measures monitor troponins  And serial EEG evaluations to evaluate for acute encephalopathy  No new cardiac recommendations at this time  Thank you for the consultation  Discussed with the patient's mother at bedside, nursing staff.    Dr. Eduar Escobar M.D.  [facility]  Department of Cardiology  Date of Service: 04/08/2024  3:28 PM

## 2024-04-08 NOTE — ASSESSMENT & PLAN NOTE
His Brother says he is supposed to be on oxygen     But he does not use it much if ever      But lungs clear  WBC normal   I hear no wheezes on exam   On th event      PLAN    - Albuterol Nebs Q6 scheduled while on the vent

## 2024-04-08 NOTE — CARE UPDATE
04/08/24 0406   PRE-TX-O2   Oxygen Concentration (%) 50   SpO2 (!) 88 %   Pulse 79   Resp 14   BP 98/65   Vent Select   Conventional Vent Y   Charged w/in last 24h YES   Preset Conventional Ventilator Settings   Vent ID [id number]   Vent Type    Ventilation Type VC   Vent Mode A/C   Humidity HME   Set Rate 14 BPM   Vt Set 500 mL   PEEP/CPAP 5 cmH20   Peak Flow 60 L/min   Peak End Inspiratory Pressure 22 cmH20   I-Trigger Type  V-TRIG   Trigger Sensitivity Flow/I-Trigger 3 L/min   Patient Ventilator Parameters   Resp Rate Total 14 br/min   Peak Airway Pressure 35 cmH20   Mean Airway Pressure 11 cmH20   Plateau Pressure 0 cmH20   Exhaled Vt 504 mL   Total Ve 7.07 L/m   I:E Ratio Measured 1:3.80   Auto PEEP 0 cmH20   Tube Type ET   Conventional Ventilator Alarms   Alarms On Y   Resp Rate High Alarm 40 br/min   Press High Alarm 60 cmH2O   Apnea Rate 10   Apnea Volume (mL) 0 mL   Apnea Oxygen Concentration  100   Apnea Flow Rate (L/min) 63   T Apnea 20 sec(s)   Ready to Wean/Extubation Screen   FIO2<=50 (chart decimal) 0.5   MV<16L (chart vol.) 7.07   PEEP <=8 (chart #) 5   Ready to Wean Parameters   F/VT Ratio<105 (RSBI) (!) 27.78   Vital Capacity   Vital Capacity (mL) 0   Labs   $ Was an ABG obtained? Arterial Puncture;POCT - Blood gas;POCT - Calcium;POCT - PH, Blood;POCT - Potassium;POCT - Sodium;POCT - Hematocrit   $ Labs Tech Time 15 min

## 2024-04-08 NOTE — PLAN OF CARE
[facility]  Initial Discharge Assessment       Primary Care Provider: Reji Rodriguez MD    Admission Diagnosis: Acute respiratory failure with hypercapnia [J96.02]    Admission Date: 4/7/2024  Expected Discharge Date: 4/11/2024    Discharge assessment completed via chart review - patient intubated and recently discharged from [facility]. Patient independent in ADls prior to hospital admission with DME listed below. Active with [facility]. No HD/coumadin. Discharge plan to be determined on hospital course - CM following and remains available    Transition of Care Barriers: Does not adhere to care plan    Payor: [employer]ARE [employer]VANTAGE / Plan: [employer] MCARE ADV / Product Type: Medicare Advantage /     Extended Emergency Contact Information  Primary Emergency Contact: Mariann Chou  Address: [address] [place] [address] [address]           [place] [address] [facility]  Home Phone: [phone]  Mobile Phone: [phone]  Relation: Mother  Preferred language: English   needed? No  Secondary Emergency Contact: CATHERINE ALVARADO  Mobile Phone: [phone]  Relation: Brother  Preferred language: English   needed? No    Discharge Plan A: Home with family, Home Health  Discharge Plan B: Skilled Nursing Facility      [facility] - [place] - [address]  [address] [place] [address]  Phone: [phone] Fax: [phone]      Initial Assessment (most recent)       Adult Discharge Assessment - 04/08/24 1121          Discharge Assessment    Assessment Type Discharge Planning Assessment     Confirmed/corrected address, phone number and insurance Yes     Confirmed Demographics Correct on Facesheet     Source of Information health record     Reason For Admission COPD     People in Home other relative(s);sibling(s)     Facility Arrived From: home     Do you expect to return to your current living situation? Yes     Do you have help at home or someone to  help you manage your care at home? Yes     Who are your caregiver(s) and their phone number(s)? family     Prior to hospitilization cognitive status: Alert/Oriented     Current cognitive status: Coma/Sedated/Intubated     Equipment Currently Used at Home wheelchair;walker, rolling;cane, straight;oxygen;nebulizer     Readmission within 30 days? Yes     Patient currently being followed by outpatient case management? No     Do you currently have service(s) that help you manage your care at home? Yes     Name and Contact number of agency [facility]     Is the pt/caregiver preference to resume services with current agency Yes     Do you take prescription medications? Yes     Do you have prescription coverage? Yes     Coverage [facility]     Do you have any problems affording any of your prescribed medications? TBD     Who is going to help you get home at discharge? family     How do you get to doctors appointments? car, drives self;family or friend will provide     Are you on dialysis? No     Do you take coumadin? No     Discharge Plan A Home with family;Home Health     Discharge Plan B Skilled Nursing Facility     DME Needed Upon Discharge  none     Transition of Care Barriers Does not adhere to care plan

## 2024-04-08 NOTE — ED PROVIDER NOTES
Encounter Date: 4/7/2024       History     Chief Complaint   Patient presents with    Altered Mental Status     58-year-old male brought in for altered mental status.  Last known normal between 3 and 4:00 p.m..  History of alcohol use per mother however has not been drinking for years.  She reports patient does not do drugs.  Patient found unresponsive in van after family/friends came back physician.  Patient brought to ED POV.  On arrival patient agonal breathing, significant oral secretions and some hypoxemia.  History of prior stroke per mother    The history is provided by the patient, the spouse and medical records.     Review of patient's allergies indicates:  No Known Allergies  Past Medical History:   Diagnosis Date    COPD (chronic obstructive pulmonary disease)     Diabetes mellitus, type 2     Hypertension      Past Surgical History:   Procedure Laterality Date    DENTAL SURGERY      ESOPHAGOGASTRODUODENOSCOPY N/A 6/1/2020    Procedure: EGD (ESOPHAGOGASTRODUODENOSCOPY);  Surgeon: Terrell May MD;  Location: [facility];  Service: Endoscopy;  Laterality: N/A;    HERNIA REPAIR      left inguinal    incision and drainiage       Family History   Problem Relation Age of Onset    Heart attack Father 80    Heart disease Father     Drug abuse Father     Cancer Maternal Grandmother         colon     Social History     Tobacco Use    Smoking status: Every Day     Current packs/day: 2.00     Average packs/day: 2.0 packs/day for 30.0 years (60.0 ttl pk-yrs)     Types: Cigarettes    Smokeless tobacco: Never   Substance Use Topics    Alcohol use: Yes     Alcohol/week: 1.0 standard drink of alcohol     Types: 1 Shots of liquor per week     Comment: 1/2 pint 2 x per week - quit approximately 5 months ago    Drug use: No     Review of Systems   All other systems reviewed and are negative.      Physical Exam     Initial Vitals   BP Pulse Resp Temp SpO2   04/07/24 1856 04/07/24 1856 04/07/24 2026 -- 04/07/24 1856   (!)  178/89 103 14  96 %      MAP       --                Physical Exam    Nursing note and vitals reviewed.  Constitutional: He appears distressed.   Critically ill-appearing male, lethargic   HENT:   No scalp bogginess or hematoma, unable to control oral secretions   Eyes:   Pupils 2 mm bilaterally, no anisocoria or eye deviation   Neck:   Gurgling breath sounds   Cardiovascular:            Rate 106 on EKG   Pulmonary/Chest: He is in respiratory distress.   Unable to protect airway.   Abdominal:   No abdominal tenderness palpation     Neurological:   Lethargic, slurred speech, does not follow commands, able to move all extremities   Skin: Skin is warm.         ED Course   Intubation    Date/Time: 4/7/2024 6:42 PM  Location procedure was performed: [facility] EMERGENCY DEPARTMENT    Performed by: Kamlesh Weeks Jr., DO  Authorized by: Kamlesh Weeks Jr., DO  Consent Done: Emergent Situation  Indications: respiratory failure and airway protection  Intubation method: video-assisted  Patient status: paralyzed (RSI)  Preoxygenation: BVM and nasal cannula  Sedatives: etomidate  Paralytic: rocuronium  Laryngoscope size: Hyper angle S 4.  Tube size: 7.5 mm  Tube type: cuffed  Number of attempts: 1  Cords visualized: yes  Post-procedure assessment: chest rise and CO2 detector  Breath sounds: equal  Cuff inflated: yes  ETT to teeth: 25 cm  Tube secured with: ETT alfaro  Chest x-ray interpreted by me.  Chest x-ray findings: endotracheal tube in appropriate position      Critical Care    Date/Time: 4/7/2024 6:42 PM    Performed by: Kamlesh Weeks Jr., DO  Authorized by: Enio Zhang MD  Direct patient critical care time: 30 minutes  Additional history critical care time: 10 minutes  Ordering / reviewing critical care time: 10 minutes  Documentation critical care time: 15 minutes  Consulting other physicians critical care time: 15 minutes  Total critical care time (exclusive of procedural time) : 80 minutes  Critical care  was necessary to treat or prevent imminent or life-threatening deterioration of the following conditions: respiratory failure.        Labs Reviewed   CBC W/ AUTO DIFFERENTIAL - Abnormal; Notable for the following components:       Result Value    RBC 4.41 (*)     Hemoglobin 13.4 (*)     Platelets 143 (*)     All other components within normal limits   COMPREHENSIVE METABOLIC PANEL - Abnormal; Notable for the following components:    Glucose 135 (*)     Albumin 3.4 (*)     Total Bilirubin 1.1 (*)     ALT 6 (*)     All other components within normal limits   TROPONIN I - Abnormal; Notable for the following components:    Troponin I 0.076 (*)     All other components within normal limits    Narrative:       troponin critical result(s) called and verbal readback obtained   from sherrill gonzales by CPW1 04/07/2024 20:05   URINALYSIS, REFLEX TO URINE CULTURE - Abnormal; Notable for the following components:    Color, UA Colorless (*)     Specific Gravity, UA <1.005 (*)     All other components within normal limits    Narrative:     Specimen Source->Urine   DRUG SCREEN PANEL, URINE EMERGENCY - Abnormal; Notable for the following components:    Creatinine, Urine 22.0 (*)     All other components within normal limits    Narrative:     Specimen Source->Urine   POCT GLUCOSE - Abnormal; Notable for the following components:    POCT Glucose 155 (*)     All other components within normal limits   ISTAT PROCEDURE - Abnormal; Notable for the following components:    POC PH 7.249 (*)     POC PCO2 75.2 (*)     POC PO2 22 (*)     POC HCO3 32.9 (*)     POC BE 6 (*)     POC TCO2 35 (*)     All other components within normal limits   ISTAT PROCEDURE - Abnormal; Notable for the following components:    POC PCO2 66.1 (*)     POC PO2 170 (*)     POC HCO3 36.6 (*)     POC BE 11 (*)     POC TCO2 39 (*)     All other components within normal limits   B-TYPE NATRIURETIC PEPTIDE   AMMONIA   MAGNESIUM   ALCOHOL,MEDICAL (ETHANOL)   TSH           Imaging Results              CTA Head and Neck (xpd) (In process)                      X-Ray Chest 1 View (In process)                      CT Head Without Contrast (In process)                      X-Ray Chest AP Portable (In process)                      Medications   propofol (DIPRIVAN) 10 mg/mL infusion (15 mcg/kg/min × 70.3 kg Intravenous New Bag 4/7/24 2037)   sodium chloride 0.9% bolus 1,000 mL 1,000 mL (has no administration in time range)   naloxone 0.4 mg/mL injection 2 mg (2 mg Intravenous Given 4/7/24 1910)   ondansetron injection 4 mg (4 mg Intravenous Given 4/7/24 1955)   naloxone 0.4 mg/mL injection 2 mg (2 mg Intravenous Given 4/7/24 1955)   etomidate injection 20 mg (20 mg Intravenous Given 4/7/24 2028)   morphine injection 4 mg (4 mg Intravenous Given 4/7/24 2034)   rocuronium injection 100 mg (100 mg Intravenous Given 4/7/24 2028)   iohexoL (OMNIPAQUE 350) 350 mg iodine/mL injection (100 mLs  Given 4/7/24 2108)     Medical Decision Making  58-year-old male presents with altered mental status.  Patient with low GCS, nonverbal, unable to control oral secretions and will not move extremities on arrival.  Patient administered 2 mg Narcan IV became alert, slurred speech and able to move all extremities.  Mother reports this is somewhat similar to his normal speech although slightly worsened.  Timeline of last known normal possible 3:00 p.m..  Obtained initially no acute abnormality, additional history for last known normal and slurred speech obtain later on in the visit when mother was in the room.  Patient continued to have nausea and vomiting and unable to control airway.  Became more lethargic.  Unable to obtain ABG cassette times patient is combative to stimulation.  VBG obtained with hypercapnic respiratory failure.  Patient administered 2 more mg of Narcan and unresponsive.  Patient intubated for airway protection as well as for respiratory failure.  Also within differential diagnosis besides  toxidrome includes stroke.  CTA head and neck ordered and pending.  Ethanol level within normal limits, UDS negative and no significant electrolyte or metabolic abnormality to cause symptoms.  Lab work with elevated troponin however no STEMI.  Per chart review history of elevated troponin in the past.  Mother updated of plan so far and agrees with plan.  Spoke with neurologist on-call, intensivist on-call and hospitalist on-call regarding patient's disposition and management.  No large vessel occlusion, patient will [facility] to [facility].    Amount and/or Complexity of Data Reviewed  Labs: ordered. Decision-making details documented in ED Course.  Radiology: ordered and independent interpretation performed.     Details: ET tube Shelli and below thoracic inlet on my interpretation  ECG/medicine tests: ordered and independent interpretation performed.     Details: Rate 106, sinus tachycardia, QRS 88, , sinus tachycardia, no STEMI  Discussion of management or test interpretation with external provider(s): Spoke with hospitalist   Spoke with intensivist Dr. Rodriguez  Spoke with neurologist Dr. Serrano- Recommend MRI Brain- I spoke with MRI tech here at our facility [facility] and she is unable to perform imaging on intubated patient    Risk  Prescription drug management.  Decision regarding hospitalization.               ED Course as of 04/07/24 2155   Sun Apr 07, 2024 1934 WBC: 7.48 [KB]   1936 Hemoglobin(!): 13.4 [KB]   1936 Hematocrit: 41.8 [KB]   1936 POCT Glucose(!): 155 [KB]   1936 Specimen UA: Urine, Clean Catch [KB]   1936 Color, UA(!): Colorless [KB]   1936 Specific Gravity, UA(!): <1.005 [KB]   1936 Leukocyte Esterase, UA: Negative [KB]   1936 NITRITE UA: Negative [KB]   1936 UROBILINOGEN UA: Negative [KB]   1940 PROCEDURE INFORMATION: Exam: CT Head Without Contrast Exam date and time: 4/7/2024 7:19 PM Age: 58 years old Clinical indication: Stroke-like symptoms; Altered mental  status/memory loss TECHNIQUE: Imaging protocol: Computed tomography of the head without contrast. Radiation optimization: All CT scans at this facility use at least one of these dose optimization techniques: automated exposure control; mA and/or kV adjustment per patient size (includes targeted exams where dose is matched to clinical indication); or iterative reconstruction. Other technique: STROKE PROTOCOL was implemented. COMPARISON: CTA HEAD AND NECK (XPD) 3/26/2024 5:54 PM FINDINGS: Brain: Small hypodense areas, identified in the right and left central semiovale are due to chronic microvascular ischemic changes. No intra or extra-axial fluid collections. The cortical white matter junction differentiation is preserved. Cerebral ventricles: No ventriculomegaly. Paranasal sinuses: Visualized sinuses are unremarkable. No fluid levels. Mastoid air cells: Visualized mastoid air cells are well aerated. Bones/joints: Unremarkable. No acute fracture. Soft tissues: Unremarkable. IMPRESSION: 1. No acute intracranial findings. 2. Bilateral central semiovale minimal chronic microvascular ischemic changes   [KB]   1947 Benzodiazepines: Negative [KB]   1948 Methadone metabolites: Negative [KB]   1948 Cocaine, Urine: Negative [KB]   1948 Opiates, Urine: Negative [KB]   1948 Barbituates, Urine: Negative [KB]   1948 Amphetamines, Urine: Negative [KB]   1948 Marijuana (THC) Metabolite: Negative [KB]   1948 Phencyclidine: Negative [KB]   2000 Alcohol, Serum: <10 [KB]   2002 BNP: 75 [KB]   2004 Sodium: 137 [KB]   2004 Potassium: 4.2 [KB]   2004 Chloride: 96 [KB]   2004 CO2: 28 [KB]   2004 Glucose(!): 135 [KB]   2004 BUN: 12 [KB]   2004 Creatinine: 0.8 [KB]   2004 Calcium: 9.5 [KB]   2004 Albumin(!): 3.4 [KB]   2004 BILIRUBIN TOTAL(!): 1.1 [KB]   2004 ALT(!): 6 [KB]   2004 eGFR: >60 [KB]   2004 Magnesium : 1.6 [KB]   2006 Troponin I(!!): 0.076 [KB]   2047 POC PH(!!): 7.249 [KB]   2049 POC PCO2(!): 75.2 [KB]   2049 TSH: 0.706 [KB]    2049 Ammonia: 39 [KB]   2130 POC PH: 7.352 [KB]   2130 POC PCO2(!!): 66.1 [KB]   2130 POC PO2(!): 170 [KB]   2149 Repeat ABG improved, patient compensating at this time.  FiO2 decreased [KB]   2151 CTA head and neck per Vrad  IMPRESSION: 1. Right temporal lobe arteriovenous malformation. This has been stable since the prior study. 2. Otherwise, there is no significant abnormalities seen in intracranial arteries.   [KB]      ED Course User Index  [KB] Kamlesh Weeks Jr., DO                           Clinical Impression:  Final diagnoses:  [Z01.818] Encounter for intubation  [J96.02] Acute respiratory failure with hypercapnia (Primary)  [R41.82] Altered mental status, unspecified altered mental status type  [R79.89] Elevated troponin          ED Disposition Condition    Admit                 Kamlesh Weeks Jr., DO  04/07/24 6373

## 2024-04-08 NOTE — CARE UPDATE
04/07/24 2004   Patient Assessment/Suction   Level of Consciousness (AVPU) responds to voice   Critical Value Communication   Date Result Received 04/07/24   Time Result Received 2005   Resulting Department of Critical Value Lab   Who communicated critical value from resulting department? Lab   Critical Test #1 Troponin   Critical Test #1 Result 0.076   Name of Notified Physician/Designee Desi   Date Notified 04/07/24   Time Notified 2005

## 2024-04-08 NOTE — HPI
Hernan Badillo is a 58 year old male with a previous medical history of COPD, DMII, HTN, CVA in December 2023 with right side residual weakness     Smoker , still smokes  Non drinker      My history is from the ER Physician at [facility] and then from the patient's brother at bedside     The story is , the patient , lives with his brother and their sons and their mother.   All in one place.        Several of them went fishing today and they drove to the [place].     Hernan ( the patient) , stayed in the Van while the others went out fishing.   He left for a few hours and went home and then came back .   They saw him come back and park and it was about 30 minutes at that point until they returned to the Van where they found him to be unresponsive or not waking up.         The brother says he was breathing.  But wouldn't open eyes or move or respond.  So they drove him to their houses and they were going to call 911.   The mother said no she will drive him to the ER. Which was about 4-5 miles away.     They took him to [facility] ER where the ER physician tells me was not responsive and sats were low in 80s.      First ABG was done but VENOUS showed barely low pH and high PC02     They gave him IV narcan and he responded and started slurring words and moving arms around.     They decided to intubate him for air way protection and to improve ventilation .        Next ABG showed normal pH and PC02 was still high .        His labs and CXR were normal there     His drug tox screen was NEGATIVE also         The Brother said he was not sick in any way before this happened .       I talked to the brother further in the ICU here asking about drugs or pain meds.   He said he has chronic pain from stroke , on his right side and right arm mainly.   He was given something for pain but it doesn't help when pain is bad.  So he would take pain meds that he could get off the street or from other people he knew .   But the brother said he can't  be sure of that and didn't know if he took anything today or not .         They shipped him over to us and we admitted him to our ICU

## 2024-04-08 NOTE — ASSESSMENT & PLAN NOTE
Initially they thought maybe CVA  He had one 1.5 years ago affecting his right side     Head CT negative    CTA brain was also done and negative       They spoke to neurology who wanted MRI brain but we held off on that since techs were not comfortable doing it on a vent       I dont feel that would offer us any more information we need right now.      If he had large bleed or infarct in brain to make him this obtunded / somnolent , then we should be seeing evidence on CTA and CT brain       PLAN    - cont his 81 mg asa and 75 mg plavix from home meds  per [facility]

## 2024-04-08 NOTE — NURSING
Nurses Note -- 4 Eyes      4/8/2024   07:01        Skin assessed during: Q Shift Change      [x] No Altered Skin Integrity Present    [x]Prevention Measures Documented      [] Yes- Altered Skin Integrity Present or Discovered   [] LDA Added if Not in Epic (Describe Wound)   [] New Altered Skin Integrity was Present on Admit and Documented in LDA   [] Wound Image Taken    Wound Care Consulted? No    Attending Nurse:  Sia Gonzalez RN/Staff Member:  Sia Gomez RN       Mepilex on sacrum. Patient on ICU sepcialty bed. Pt's heels elevated on pillow. After bedside report received, assisted pffgoing RN with complete linen change and gown change of patient. No altered skin integrity noted.

## 2024-04-08 NOTE — CARE UPDATE
04/07/24 2300   Patient Assessment/Suction   Level of Consciousness (AVPU) unresponsive   Respiratory Effort Normal;Unlabored   Expansion/Accessory Muscles/Retractions no retractions;no use of accessory muscles   BARON Breath Sounds clear   LLL Breath Sounds crackles, fine   RUL Breath Sounds coarse   RML Breath Sounds coarse   RLL Breath Sounds coarse   Rhythm/Pattern, Respiratory assisted mechanically   Cough Frequency with stimulation   Cough Type good   Suction Method oral   Suction Pressure (mmHg)   (-120 mmHg)   Secretions Amount moderate   Secretions Color clear;cloudy   Secretions Characteristics frothy;thin   PRE-TX-O2   Device (Oxygen Therapy) ventilator   Oxygen Concentration (%) 50   SpO2 95 %   Pulse Oximetry Type Continuous   $ Pulse Oximetry - Multiple Charge Pulse Oximetry - Multiple   Pulse 83   Resp 18   /85   Vent Select   Charged w/in last 24h YES   Preset Conventional Ventilator Settings   Ventilation Type VC   Vent Mode A/C   Set Rate 14 BPM   Vt Set 500 mL   PEEP/CPAP 5 cmH20   Peak Flow 60 L/min   Peak End Inspiratory Pressure 23 cmH20   I-Trigger Type  V-TRIG   Trigger Sensitivity Flow/I-Trigger 3 L/min   Patient Ventilator Parameters   Resp Rate Total 14 br/min   Peak Airway Pressure 35 cmH20   Mean Airway Pressure 11 cmH20   Plateau Pressure 0 cmH20   Exhaled Vt 506 mL   Total Ve 7.01 L/m   I:E Ratio Measured 1:3.80   Auto PEEP 0 cmH20   Conventional Ventilator Alarms   Resp Rate High Alarm 40 br/min   Press High Alarm 60 cmH2O   Apnea Rate 10   Apnea Volume (mL) 0 mL   Apnea Oxygen Concentration  100   Apnea Flow Rate (L/min) 63   T Apnea 20 sec(s)   Ready to Wean/Extubation Screen   FIO2<=50 (chart decimal) 0.5   MV<16L (chart vol.) 7.01   PEEP <=8 (chart #) 5   Ready to Wean Parameters   F/VT Ratio<105 (RSBI) (!) 35.57   Vital Capacity   Vital Capacity (mL) 0

## 2024-04-08 NOTE — NURSING
Pt arrived to unit via stretcher accompanied by EMS. On mechanical ventilator and monitor with IV pole with Propofol infusing. Bed zeroed and and lowered. Pt oriented to room. HANDY assess understanding as pt is intubated and sedated. MD notified of patients arrival. No signs of trauma, injury, or distress. Will continue to monitor

## 2024-04-08 NOTE — H&P
Formerly [facility], [facility] Medicine  History & Physical    Patient Name: Hernan Badillo  MRN: [id number]  Patient Class: IP- Inpatient  Admission Date: 4/7/2024  Attending Physician: Enio Zhang MD  Primary Care Provider: Reji Rodriguez MD         Patient information was obtained from relative(s) and ER records.     Subjective:     Principal Problem:<principal problem not specified>    Chief Complaint:   Chief Complaint   Patient presents with    Altered Mental Status        HPI: Hernan Badillo is a 58 year old male with a previous medical history of COPD, DMII, HTN, CVA in December 2023 with right side residual weakness     Smoker , still smokes  Non drinker      My history is from the ER Physician at [facility] and then from the patient's brother at bedside     The story is , the patient , lives with his brother and their sons and their mother.   All in one place.        Several of them went fishing today and they [place] to [place].     Hernan ( the patient) , stayed in the Van while the others went out fishing.   He left for a few hours and went home and then came back .   They saw him come back and park and it was about 30 minutes at that point until they returned to the Van where they found him to be unresponsive or not waking up.         The brother says he was breathing.  But wouldn't open eyes or move or respond.  So they drove him to their houses and they were going to call 911.   The mother said no she will drive him to the ER. Which was about 4-5 miles away.     They took him to [facility] ER where the ER physician tells me was not responsive and sats were low in 80s.      First ABG was done but VENOUS showed barely low pH and high PC02     They gave him IV narcan and he responded and started slurring words and moving arms around.     They decided to intubate him for air way protection and to improve ventilation .        Next ABG showed normal pH and PC02 was still high .        His labs and CXR were normal  there     His drug tox screen was NEGATIVE also         The Brother said he was not sick in any way before this happened .       I talked to the brother further in the ICU here asking about drugs or pain meds.   He said he has chronic pain from stroke , on his right side and right arm mainly.   He was given something for pain but it doesn't help when pain is bad.  So he would take pain meds that he could get off the street or from other people he knew .   But the brother said he can't be sure of that and didn't know if he took anything today or not .         They shipped him over to us and we admitted him to our ICU           Past Medical History:   Diagnosis Date    COPD (chronic obstructive pulmonary disease)     Diabetes mellitus, type 2     Hypertension        Past Surgical History:   Procedure Laterality Date    DENTAL SURGERY      ESOPHAGOGASTRODUODENOSCOPY N/A 6/1/2020    Procedure: EGD (ESOPHAGOGASTRODUODENOSCOPY);  Surgeon: Terrell May MD;  Location: [facility];  Service: Endoscopy;  Laterality: N/A;    HERNIA REPAIR      left inguinal    incision and drainiage         Review of patient's allergies indicates:  No Known Allergies    No current facility-administered medications on file prior to encounter.     Current Outpatient Medications on File Prior to Encounter   Medication Sig    acetaminophen (TYLENOL) 325 MG tablet Take 650 mg by mouth 3 (three) times daily as needed.    albuterol (PROVENTIL HFA) 90 mcg/actuation inhaler Inhale 2 puffs into the lungs every 6 (six) hours as needed for Wheezing. Rescue    albuterol (PROVENTIL/VENTOLIN HFA) 90 mcg/actuation inhaler Inhale 1-2 puffs into the lungs every 6 (six) hours as needed. Rescue (Patient not taking: Reported on 4/2/2024)    albuterol-ipratropium (DUO-NEB) 2.5 mg-0.5 mg/3 mL nebulizer solution Take 3 mLs by nebulization every 6 (six) hours as needed for Wheezing or Shortness of Breath. (Patient not taking: Reported on 4/2/2024)    amLODIPine  (NORVASC) 10 MG tablet Take 1 tablet (10 mg total) by mouth once daily.    aspirin (ECOTRIN) 81 MG EC tablet Take 1 tablet (81 mg total) by mouth once daily.    atorvastatin (LIPITOR) 20 MG tablet Take 1 tablet (20 mg total) by mouth once daily.    baclofen (LIORESAL) 10 MG tablet Take 1 tablet (10 mg total) by mouth 2 (two) times daily.    blood sugar diagnostic, disc Strp 1 each by Misc.(Non-Drug; Combo Route) route 4 (four) times daily.    blood-glucose meter kit Use as instructed    budesonide (PULMICORT) 0.5 mg/2 mL nebulizer solution Take 2 mLs (0.5 mg total) by nebulization 2 (two) times a day. Controller    clopidogreL (PLAVIX) 75 mg tablet Take 1 tablet (75 mg total) by mouth once daily. for 21 days    EScitalopram oxalate (LEXAPRO) 10 MG tablet Take 10 mg by mouth.    fluticasone-salmeterol diskus inhaler 500-50 mcg Inhale 1 puff into the lungs 2 (two) times daily. Controller    lancets (LANCETS,THIN) Misc 1 each by Misc.(Non-Drug; Combo Route) route 4 (four) times daily.    lisinopriL 10 MG tablet Take 1 tablet (10 mg total) by mouth once daily.    meclizine (ANTIVERT) 25 mg tablet Take 25 mg by mouth.    melatonin (MELATIN) 5 mg Take 5 mg by mouth.    metFORMIN (GLUCOPHAGE-XR) 500 MG ER 24hr tablet Take 1 tablet (500 mg total) by mouth daily with breakfast.    pantoprazole (PROTONIX) 40 MG tablet Take 1 tablet (40 mg total) by mouth once daily. (Patient not taking: Reported on 4/2/2024)    pregabalin (LYRICA) 100 MG capsule Take 1 capsule (100 mg total) by mouth 2 (two) times daily.     Family History       Problem Relation (Age of Onset)    Cancer Maternal Grandmother    Drug abuse Father    Heart attack Father (80)    Heart disease Father          Tobacco Use    Smoking status: Every Day     Current packs/day: 2.00     Average packs/day: 2.0 packs/day for 30.0 years (60.0 ttl pk-yrs)     Types: Cigarettes    Smokeless tobacco: Never   Substance and Sexual Activity    Alcohol use: Yes     Alcohol/week:  1.0 standard drink of alcohol     Types: 1 Shots of liquor per week     Comment: 1/2 pint 2 x per week - quit approximately 5 months ago    Drug use: No    Sexual activity: Yes     Partners: Female     Birth control/protection: None     Comment: No history of STDs.     Review of Systems   Unable to perform ROS: Intubated     Objective:     Vital Signs (Most Recent):  Temp: 96.4 °F (35.8 °C) (04/07/24 2308)  Pulse: 81 (04/08/24 0300)  Resp: 13 (04/08/24 0300)  BP: 107/77 (04/08/24 0245)  SpO2: (!) 89 % (04/08/24 0300) Vital Signs (24h Range):  Temp:  [96.4 °F (35.8 °C)] 96.4 °F (35.8 °C)  Pulse:  [] 81  Resp:  [13-18] 13  SpO2:  [89 %-100 %] 89 %  BP: ()/() 107/77     Weight: 66.4 kg (146 lb 6.2 oz)  Body mass index is 23.63 kg/m².     Physical Exam  Vitals and nursing note reviewed. Exam conducted with a chaperone present.   Constitutional:       Appearance: Normal appearance.   HENT:      Head: Normocephalic and atraumatic.      Nose: Nose normal.      Mouth/Throat:      Mouth: Mucous membranes are moist.   Eyes:      Extraocular Movements: Extraocular movements intact.      Pupils: Pupils are equal, round, and reactive to light.   Cardiovascular:      Rate and Rhythm: Normal rate and regular rhythm.      Pulses: Normal pulses.      Heart sounds: Normal heart sounds.   Pulmonary:      Effort: Pulmonary effort is normal.      Breath sounds: Normal breath sounds.   Abdominal:      General: Abdomen is flat. Bowel sounds are normal.      Palpations: Abdomen is soft.   Musculoskeletal:         General: Normal range of motion.      Cervical back: Normal range of motion and neck supple.   Skin:     General: Skin is warm.      Capillary Refill: Capillary refill takes less than 2 seconds.   Neurological:      Mental Status: He is alert.      Comments: Sedated    Psychiatric:      Comments: Sedated               CRANIAL NERVES     CN III, IV, VI   Pupils are equal, round, and reactive to light.      "  Significant Labs: All pertinent labs within the past 24 hours have been reviewed.  CBC:   Recent Labs   Lab 04/07/24 1925   WBC 7.48   HGB 13.4*   HCT 41.8   *     CMP:   Recent Labs   Lab 04/07/24 1925      K 4.2   CL 96   CO2 28   *   BUN 12   CREATININE 0.8   CALCIUM 9.5   PROT 7.3   ALBUMIN 3.4*   BILITOT 1.1*   ALKPHOS 60   AST 14   ALT 6*   ANIONGAP 13     Cardiac Markers:   Recent Labs   Lab 04/07/24 1925   BNP 75     Coagulation: No results for input(s): "PT", "INR", "APTT" in the last 48 hours.  Lactic Acid: No results for input(s): "LACTATE" in the last 48 hours.  Lipase: No results for input(s): "LIPASE" in the last 48 hours.  Lipid Panel: No results for input(s): "CHOL", "HDL", "LDLCALC", "TRIG", "CHOLHDL" in the last 48 hours.  Magnesium:   Recent Labs   Lab 04/07/24 1925   MG 1.6       Significant Imaging: I have reviewed all pertinent imaging results/findings within the past 24 hours.  I have reviewed and interpreted all pertinent imaging results/findings within the past 24 hours.  Assessment/Plan:     COPD (chronic obstructive pulmonary disease)  His Brother says he is supposed to be on oxygen     But he does not use it much if ever      But lungs clear  WBC normal   I hear no wheezes on exam   On th event      PLAN    - Albuterol Nebs Q6 scheduled while on the vent     CVA (cerebral vascular accident)  Initially they thought maybe CVA  He had one 1.5 years ago affecting his right side     Head CT negative    CTA brain was also done and negative       They spoke to neurology who wanted MRI brain but we held off on that since techs were not comfortable doing it on a vent       I dont feel that would offer us any more information we need right now.      If he had large bleed or infarct in brain to make him this obtunded / somnolent , then we should be seeing evidence on CTA and CT brain       PLAN    - cont his 81 mg asa and 75 mg plavix from home meds  per OGT      Acute " "hypoxemic respiratory failure  On the TRUE ABG he had after intubation he still showed Hypercapnia but normal pH     Tells me he likely lives with a higher PC02 and is compensated  ( secondary to COPD )  but no Metabolic alkalosis present     I suspect this is all from Opiate  induced Narcosis  /  sedation .           [place] a [place] which his brother said he has done in past.     Past tox screens show positive for opiates.      I think he took it and then [place].      I'm repeating a tox screen here and checking specifically fentanyl also which I bet doesn't show on the Tox Screen that [facility] uses         For now he is stable on the vent  sedated on propofol       Vent  :  500 TV  14 RR  40% Fi02      Good tube placement per CXR   Sedation ; propofol     and Fentanyl 100 mcg IV Q1 PRN   GI :  Pepcid IV Q12  DVT :  lovenox 40 SQ daily     OGT in place for his other meds       Pulmonology is aware and on consult  ( consult placed )        VTE Risk Mitigation (From admission, onward)           Ordered     enoxaparin injection 40 mg  Daily        Note to Pharmacy: Ht: 5' 6" (1.676 m)  Wt: 66.4 kg (146 lb 6.2 oz)  Estimated Creatinine Clearance: 90.8 mL/min (based on SCr of 0.8 mg/dL).  Body mass index is 23.63 kg/m².    04/08/24 0245     IP VTE HIGH RISK PATIENT  Once         04/08/24 0245     Place sequential compression device  Until discontinued         04/08/24 0245                  Critical care time spent on the evaluation and treatment of severe organ dysfunction, review of pertinent labs and imaging studies, discussions with consulting providers and discussions with patient/family: 70 minutes.                  Enio Zhang MD  Department of Hospital Medicine  [facility]          "

## 2024-04-08 NOTE — CARE UPDATE
04/08/24 0708   Patient Assessment/Suction   Level of Consciousness (AVPU) responds to pain   Respiratory Effort Normal;Unlabored   Expansion/Accessory Muscles/Retractions no use of accessory muscles;no retractions;expansion symmetric   All Lung Fields Breath Sounds Anterior:   BARON Breath Sounds coarse   RUL Breath Sounds coarse   Rhythm/Pattern, Respiratory assisted mechanically   Cough Frequency with stimulation   Suction Method tracheal   $ Suction Charges Inline Suction Procedure Stat Charge   Secretions Amount small   Secretions Color white   Secretions Characteristics thin   Skin Integrity   $ Wound Care Tech Time 15 min   Area Observed Left;Right   Skin Appearance without discoloration   PRE-TX-O2   Device (Oxygen Therapy) ventilator   $ Is the patient on High Flow Oxygen? Yes   Pulse Oximetry Type Continuous   $ Pulse Oximetry - Multiple Charge Pulse Oximetry - Multiple        Oral Airway 04/07/24 2026    Placement Date/Time: 04/07/24 2026   Size (mm): (c)   Placed By: ED physician  Insertion attempts (enter comment if more than 2 attempts): 1   Secured by Commercial tube alfaro   Site Condition Cool;Dry   Airway Safety   Is Ambu Bag and Mask with Patient? Yes, Adult Ambu Bag and Mask   Suction set is at the bedside? Yes   ETT Size 7.5   Vent Select   Conventional Vent Y   $ Ventilator Subsequent 1   Charged w/in last 24h YES   Preset Conventional Ventilator Settings   Vent ID [id number]   Vent Type    Humidity HME   Patient Ventilator Parameters   All Fields Breath Sounds coarse   Tube Type ET   Conventional Ventilator Alarms   Alarms On Y   Ve High Alarm 20 L/min   Ve Low Alarm 2 L/min   Vt High Alarm 1000 mL   Vt Low Alarm 250 mL   Resp Rate Low Alarm 10   IHI Ventilator Associated Pneumonia Bundle (Required)   Daily Awakening Trials Performed Yes   Ready to Wean Parameters   SBT Safety Screen Fail   Spon. Breathing Trial Initiated? Initiated   Sedation Vacation Completed? Yes   Doctor Notified and  Provider Name [facility]   SBT Results Fail   SBT Failure Reason   (ABG)   Education   $ Education Ventilator Oxygen;15 min

## 2024-04-08 NOTE — CONSULTS
[facility]  Adult Nutrition   Consult Note (Nutrition Support Management)    SUMMARY     Recommendations  1) RD recommends to initiate enteral feeding with Glucerna 1.5 at 20 mls/hr advancing by 15 mls every 4 to 6 hours as tolerated to the goal rate of 50 mls/hr to  provide 1800 kcals (1911 with propofol at 4.2 mls/hr), 99 g protein and 911 mls water.   2) Provide FWF's of 30 mls every 4 hours to clear tube and assist in meeting fluid needs.   3) RD will monitor rate/tolerance, pts weight, propofol rate and labs and make recommendations prn.    Goals:   1) Nutrition provision to meet 100% of pts EEN/EPN from his enteral feeding by the next couple of follow ups.   2) Pt to tolerate his enteral feeding at the goal rate by the next couple of follow ups.    Nutrition Goal Status: progressing towards goal    Communication of RD Recs: reviewed with RN    Nutrition Diagnosis PES Statement: Inadequate (suboptimal) protein-energy intake related to poor intake as evidenced by NPO status.     Dietitian Rounds Brief  Consult for Enteral Feeding: Observed pt during rounds today intubated and sedated on propofol. The decision had to yet been made for enteral feeding but has now at this time. Pts nurse confirms his propofol rate of 4.2 mls/hr at this time. Will take this into account and monitor it and make adjustment to TF prn. LBM was yesterday, skin is intact and labs have been reviewed. Will continue to follow biweekly and prn.    Nutrition Related Social Determinants of Health: SDOH: None Identified    Diet order:   Current Diet Order: NPO      Evaluation of Received Nutrient/Fluid Intake  Energy Calories Required: not meeting needs  Protein Required: not meeting needs  Fluid Required: meeting needs  Tolerance: other (see comments)     % Intake of Estimated Energy Needs: 0%  % Meal Intake: NPO      Intake/Output Summary (Last 24 hours) at 4/8/2024 1507  Last data filed at 4/8/2024 0000  Gross per 24 hour  "  Intake --   Output 350 ml   Net -350 ml        Anthropometrics  Temp: 97.6 °F (36.4 °C)  Height Method: Estimated  Height: 5' 6" (167.6 cm)  Height (inches): 66 in  Weight Method: Bed Scale  Weight: 66.4 kg (146 lb 6.2 oz)  Weight (lb): 146.39 lb  Ideal Body Weight (IBW), Male: 142 lb  % Ideal Body Weight, Male (lb): 103.09 %  BMI (Calculated): 23.6  BMI Grade: 18.5-24.9 - normal       Estimated/Assessed Needs  Weight Used For Calorie Calculations: 66.4 kg (146 lb 6.2 oz)  Energy Calorie Requirements (kcal): 1660 kcals/day (25 kcals/kg ABW)  Energy Need Method: Kcal/kg  Protein Requirements: 100-133 g/day (1.5-2.0 g/kg ABW)  Weight Used For Protein Calculations: 66.4 kg (146 lb 6.2 oz)     Estimated Fluid Requirement Method: RDA Method  RDA Method (mL): 1660  CHO Requirement: 208 g CHO/day    Reason for Assessment  Reason For Assessment: consult, new tube feeding  Relevant Medical History: Diabetes mellitus, type 2, Hypertension, COPD (chronic obstructive pulmonary disease)  Interdisciplinary Rounds: attended  Nutrition Discharge Planning: Pending    Nutrition/Diet History  Spiritual, Cultural Beliefs, Oriental orthodox Practices, Values that Affect Care: no  Food Allergies: NKFA  Factors Affecting Nutritional Intake: NPO    Nutrition Risk Screen  Nutrition Risk Screen: no indicators present     MST Score: 0  Have you recently lost weight without trying?: No (jessa pt intubated and sedated)  Weight loss score: 0  Have you been eating poorly because of a decreased appetite?:  (jessa pt intubated and sedated)  Appetite score: 0       Weight History:  Wt Readings from Last 10 Encounters:   04/07/24 66.4 kg (146 lb 6.2 oz)   04/08/24 66.4 kg (146 lb 6.2 oz)   03/27/24 68.7 kg (151 lb 7.3 oz)   12/09/23 66.2 kg (146 lb)   12/08/23 66.6 kg (146 lb 13.2 oz)   12/04/23 65.5 kg (144 lb 6.4 oz)   11/09/23 65.8 kg (145 lb)   11/07/23 66.1 kg (145 lb 11.6 oz)   10/13/23 67 kg (147 lb 9.6 oz)   10/10/23 65.9 kg (145 lb 4.5 oz)    "     Lab/Procedures/Meds: Pertinent Labs/Meds Reviewed    Medications:Pertinent Medications Reviewed  Scheduled Meds:   albuterol-ipratropium  3 mL Nebulization Q4H    aspirin  81 mg Oral Daily    chlorhexidine  15 mL Mouth/Throat BID    enoxparin  40 mg Subcutaneous Daily    EScitalopram oxalate  10 mg Per OG tube Daily    famotidine (PF)  20 mg Intravenous Q12H    methylPREDNISolone sodium succinate injection  60 mg Intravenous Q8H    mupirocin   Nasal BID     Continuous Infusions:   sodium chloride 0.9% 100 mL/hr at 04/08/24 0944    dexmedeTOMIDine (Precedex) infusion (titrating) 0.2 mcg/kg/hr (04/08/24 0629)    propofoL 10 mcg/kg/min (04/08/24 1445)     PRN Meds:.acetaminophen, albuterol sulfate, calcium gluconate IVPB, calcium gluconate IVPB, calcium gluconate IVPB, fentaNYL, magnesium sulfate IVPB, magnesium sulfate IVPB, ondansetron, potassium chloride **AND** potassium chloride **AND** potassium chloride, sodium chloride 0.9%, sodium phosphate 15 mmol in dextrose 5 % (D5W) 250 mL IVPB, sodium phosphate 20.01 mmol in dextrose 5 % (D5W) 250 mL IVPB, sodium phosphate 30 mmol in dextrose 5 % (D5W) 250 mL IVPB    Labs: Pertinent Labs Reviewed  Clinical Chemistry:  Recent Labs   Lab 04/07/24 1925 04/08/24 0518    138   K 4.2 4.1   CL 96 101   CO2 28 27   * 110   BUN 12 13   CREATININE 0.8 0.8   CALCIUM 9.5 9.1   PROT 7.3 6.7   ALBUMIN 3.4* 3.7   BILITOT 1.1* 1.2*   ALKPHOS 60 50*   AST 14 10   ALT 6* <3*   ANIONGAP 13 10   MG 1.6 1.6   PHOS  --  3.4     CBC:   Recent Labs   Lab 04/08/24 0518   WBC 7.45   RBC 4.31*   HGB 13.1*   HCT 41.7   *   MCV 97   MCH 30.4   MCHC 31.4*     Cardiac Profile:  Recent Labs   Lab 04/07/24 1925   BNP 75   TROPONINI 0.076*       Diabetes:  Recent Labs   Lab 04/07/24  1846   POCTGLUCOSE 155*     Thyroid & Parathyroid:  Recent Labs   Lab 04/07/24 1925   TSH 0.706       Monitor and Evaluation  Food and Nutrient Intake: enteral nutrition intake  Food and Nutrient  Adminstration: enteral and parenteral nutrition administration  Knowledge/Beliefs/Attitudes: beliefs and attitudes, food and nutrition knowledge/skill  Physical Activity and Function: nutrition-related ADLs and IADLs, factors affecting access to physical activity  Anthropometric Measurements: weight, weight change, body mass index  Biochemical Data, Medical Tests and Procedures: lipid profile, inflammatory profile, glucose/endocrine profile, gastrointestinal profile, electrolyte and renal panel  Nutrition-Focused Physical Findings: overall appearance     Nutrition Risk  Level of Risk/Frequency of Follow-up: high     Nutrition Follow-Up  RD Follow-up?: Yes      Suzanne Winslow RD 04/08/2024 3:07 PM

## 2024-04-08 NOTE — CONSULTS
Pulmonary/Critical Care Consult      Patient name: Hernan Badillo  MRN: [id number]  Date: 04/08/2024    Admit Date: 4/7/2024  Consult Requested By: Alissa Jackson MD    Reason for Consult: respiratory failure    HPI:    4/8/2024 - Pt found unresponsive but breathing and brought to ER for evaluation.  On arrival he had decreased sats and vBG showed elevated paCO2.  By report he was given narcan with some response but was intubated for airway protection.  Moved to ICU and has required sedation.  SBT tried this AM but ABG  was not adequate and sTV were small by report.  Case d/w nursing and family at bedside.  Pt is not responsive at the time of my exam.  Chart has been reviewed.    Review of Systems    Review of Systems   Unable to perform ROS: Intubated   Constitutional:  Positive for weight loss.   Neurological:  Positive for speech change (some slurring at baseline) and weakness (L side (by family report though chart says right), prior CVA).        Altered mental status       Past Medical History    Past Medical History:   Diagnosis Date    COPD (chronic obstructive pulmonary disease)     Diabetes mellitus, type 2     Hypertension        Past Surgical History    Past Surgical History:   Procedure Laterality Date    DENTAL SURGERY      ESOPHAGOGASTRODUODENOSCOPY N/A 6/1/2020    Procedure: EGD (ESOPHAGOGASTRODUODENOSCOPY);  Surgeon: Terrell May MD;  Location: [facility];  Service: Endoscopy;  Laterality: N/A;    HERNIA REPAIR      left inguinal    incision and drainiage         Medications (scheduled):      aspirin  81 mg Oral Daily    chlorhexidine  15 mL Mouth/Throat BID    clopidogreL  75 mg Per OG tube Daily    enoxparin  40 mg Subcutaneous Daily    EScitalopram oxalate  10 mg Per OG tube Daily    famotidine (PF)  20 mg Intravenous Q12H    mupirocin   Nasal BID       Medications (infusions):      dexmedeTOMIDine (Precedex) infusion (titrating) 0.2 mcg/kg/hr (04/08/24 0629)    propofoL Stopped (04/08/24 0400)  "      Medications (prn):     acetaminophen, albuterol sulfate, calcium gluconate IVPB, calcium gluconate IVPB, calcium gluconate IVPB, fentaNYL, magnesium sulfate IVPB, magnesium sulfate IVPB, ondansetron, potassium chloride **AND** potassium chloride **AND** potassium chloride, sodium chloride 0.9%, sodium phosphate 15 mmol in dextrose 5 % (D5W) 250 mL IVPB, sodium phosphate 20.01 mmol in dextrose 5 % (D5W) 250 mL IVPB, sodium phosphate 30 mmol in dextrose 5 % (D5W) 250 mL IVPB    Family History:   Family History   Problem Relation Age of Onset    Heart attack Father 80    Heart disease Father     Drug abuse Father     Cancer Maternal Grandmother         colon       Social History: Tobacco:   Social History     Tobacco Use   Smoking Status Every Day    Current packs/day: 2.00    Average packs/day: 2.0 packs/day for 30.0 years (60.0 ttl pk-yrs)    Types: Cigarettes   Smokeless Tobacco Never                                EtOH:   Social History     Substance and Sexual Activity   Alcohol Use Yes    Alcohol/week: 1.0 standard drink of alcohol    Types: 1 Shots of liquor per week    Comment: 1/2 pint 2 x per week - quit approximately 5 months ago                                Drugs:   Social History     Substance and Sexual Activity   Drug Use No       Physical Exam    Vital signs:  Temp:  [96.4 °F (35.8 °C)-98.2 °F (36.8 °C)]   Pulse:  []   Resp:  [13-34]   BP: ()/()   SpO2:  [86 %-100 %]     Intake/Output:   Intake/Output Summary (Last 24 hours) at 4/8/2024 0840  Last data filed at 4/8/2024 0000  Gross per 24 hour   Intake --   Output 350 ml   Net -350 ml        BMI: Estimated body mass index is 23.63 kg/m² as calculated from the following:    Height as of this encounter: 5' 6" (1.676 m).    Weight as of this encounter: 66.4 kg (146 lb 6.2 oz).    Physical Exam  Vitals and nursing note reviewed.   Constitutional:       General: He is not in acute distress.     Appearance: Normal appearance. He is " not ill-appearing, toxic-appearing or diaphoretic.      Comments: Thin  Intubated  Sedated   By report does respond some when sedation decreased    HENT:      Head: Normocephalic and atraumatic.      Right Ear: External ear normal.      Left Ear: External ear normal.      Nose: Nose normal.      Mouth/Throat:      Mouth: Mucous membranes are moist.      Pharynx: Oropharynx is clear. No oropharyngeal exudate.      Comments: Intubated  Could not do full exam  Eyes:      General: No scleral icterus.        Right eye: No discharge.         Left eye: No discharge.      Pupils: Pupils are equal, round, and reactive to light.      Comments: Conjunctiva muddy   Neck:      Vascular: No carotid bruit.      Comments: No elevated JVD  Cardiovascular:      Rate and Rhythm: Normal rate and regular rhythm.      Pulses: Normal pulses.      Heart sounds: Normal heart sounds. No murmur heard.     No friction rub. No gallop.   Pulmonary:      Effort: Pulmonary effort is normal. No respiratory distress.      Breath sounds: No stridor. Rhonchi present. No wheezing or rales.      Comments: Ventilated   Chest:      Chest wall: No tenderness.   Abdominal:      General: There is no distension.      Tenderness: There is no abdominal tenderness. There is no guarding.      Comments: Hypoactive/rare BS   Musculoskeletal:         General: No swelling. Normal range of motion.      Cervical back: Normal range of motion and neck supple. No rigidity or tenderness.      Right lower leg: No edema.      Left lower leg: No edema.   Lymphadenopathy:      Cervical: No cervical adenopathy.   Skin:     General: Skin is warm and dry.      Capillary Refill: Capillary refill takes 2 to 3 seconds.      Coloration: Skin is not jaundiced.      Findings: No bruising.   Neurological:      Cranial Nerves: No cranial nerve deficit.      Sensory: No sensory deficit.      Motor: No weakness.      Comments: Sedated  WILD  Not able to fully assess strength   Psychiatric:       Comments: Not able to assess         Laboratory    Recent Labs   Lab 04/08/24 0518   WBC 7.45   RBC 4.31*   HGB 13.1*   HCT 41.7   *   MCV 97   MCH 30.4   MCHC 31.4*       Recent Labs   Lab 04/08/24 0518   CALCIUM 9.1   ALBUMIN 3.7   PROT 6.7      K 4.1   CO2 27      BUN 13   CREATININE 0.8   ALKPHOS 50*   ALT <3*   AST 10   BILITOT 1.2*       Recent Labs   Lab 04/08/24 0518   INR 0.9       Recent Labs   Lab 04/07/24  1925   TROPONINI 0.076*       Additional labs: reviewed    Microbiology:       Microbiology Results (last 7 days)       Procedure Component Value Units Date/Time    Culture, Respiratory with Gram Stain [[id number]] Collected: 04/08/24 0742    Order Status: Sent Specimen: Respiratory from Endotracheal Aspirate Updated: 04/08/24 0750            Radiology    X-Ray Chest 1 View  Narrative: EXAMINATION:  XR CHEST 1 VIEW    CLINICAL HISTORY:  Encounter for other preprocedural examination    TECHNIQUE:  Single frontal view of the chest was performed.    COMPARISON:  04/07/2024    FINDINGS:  The endotracheal tube has been placed and has its tip 3 cm above the ignacia.  Nasogastric tube has been placed and passes into the stomach and out of field of view.  The cardiomediastinal silhouette is normal limits.  The lungs are well expanded.  There is now slight patchy density again evident at the right lung base.  No pleural effusion.  Impression: Support devices in good position.  Probable mild right basilar infiltrate.    Electronically signed by: Jozef Adams MD  Date:    04/08/2024  Time:    08:37  X-Ray Chest AP Portable  Narrative: EXAMINATION:  XR CHEST AP PORTABLE    CLINICAL HISTORY:  Chest Pain;    TECHNIQUE:  Single frontal view of the chest was performed.    COMPARISON:  03/28/2024    FINDINGS:  The cardiomediastinal silhouette is within normal limits.  The lungs are well expanded without consolidation or pleural effusion.  No infiltrate evident on this exam.  Impression:  No acute process.  Previous right basilar predominant infiltrate no longer evident.    Electronically signed by: Jozef Adams MD  Date:    04/08/2024  Time:    08:34  CTA Head and Neck (xpd)  Narrative: EXAMINATION:  CTA HEAD AND NECK (XPD)    CLINICAL HISTORY:  stroke like symptoms;    TECHNIQUE:  Non contrast low dose axial images were obtained through the head. CT angiogram was performed from the level of the ignacia to the top of the head following the IV administration of 100mL of Omnipaque 350.   Sagittal and coronal reconstructions and maximum intensity projection reconstructions were performed. Arterial stenosis percentages are based on NASCET measurement criteria.    COMPARISON:  Concurrently performed head CT 04/07/2024.  CTA of the head and neck 03/26/2024.    FINDINGS:  CTA HEAD:    Vasculature: Mild atherosclerotic changes of the bilateral internal carotid arteries without evidence of any hemodynamically significant stenosis.  No hemodynamically significant stenosis or proximal large vessel occlusion.  No aneurysm identified.  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    Variant anatomy: Hypoplastic left P1 PCA in the setting of a prominent left-sided posterior communicating artery.  Dominant distal left vertebral artery.    Brain: Limited supplemental post-contrast CT imaging of the head demonstrates no abnormal parenchymal enhancement, midline shift or mass effect, or space-occupying extra-axial fluid collection.  No CT evidence of an acute major vascular territory infarct again noting chronic small vessel ischemic changes including multiple small lacunar type infarcts described on the concurrently performed noncontrast head CT.    Ventricles/Sulci: The ventricles and sulci are appropriate in size for age.    Osseous Structures: The osseous structures are unremarkable  in appearance.    Other: Scattered opacification of the paranasal sinuses as well as fluid within the pharyngeal airway in the setting of respiratory/enteric support tubes mastoid air cells are clear.  No acute bony process identified.  Chronic defect at the level of the left lamina papyracea.    CTA NECK:    Aorta: Conventional branching pattern. The great vessel origins are patent without flow limiting stenosis.    Vertebral Arteries: The origins of the vertebral arteries are patent.  No flow limiting stenosis, occlusion, or dissection.    Right Carotid: No flow limiting stenosis, occlusion, or dissection of the common carotid or internal carotid arteries.  Mild plaque of the proximal ICA.  Right internal carotid artery: Less than 50 % stenosis by and NASCET.  The internal carotid artery is medialized at the C2-C3 level.    Left Carotid: No flow limiting stenosis, occlusion, or dissection of the common carotid or internal carotid arteries. No significant plaque of the proximal ICA. Right internal carotid artery: 0 % stenosis by and NASCET.    Extravascular Anatomy: Emphysematous changes of the visualized upper lung zones.  Nonspecific prominence of a right hilar lymph node which is grossly unchanged compared to prior exams, nonspecific but presumably reactive.  Impression: 1. No intracranial proximal large vessel occlusion or hemodynamically significant stenosis.  No change from prior CTA.  2. Limited postcontrast CT imaging demonstrates no acute process.  3. 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  4. No hemodynamically significant arterial stenosis within the neck.  The preliminary and final reports are concordant.    Electronically signed by: Delvin Wilson  Date:    04/08/2024  Time:    08:10  X-ray chest AP portable  Narrative: EXAMINATION:  XR CHEST AP PORTABLE    CLINICAL HISTORY:  hypoxemia;    COMPARISON:  04/07/2024    FINDINGS:  Cardiac silhouette size is within  normal limits.  Endotracheal tube and enteric tube are in stable position.    No airspace consolidation or pleural effusion.  No pneumothorax.  No acute osseous abnormality.  Impression: No acute pulmonary process.    ET tube and enteric tube are in stable position.    Electronically signed by: Patrick Mace  Date:    04/08/2024  Time:    07:37  CT Head Without Contrast  Narrative: EXAMINATION:  CT HEAD WITHOUT CONTRAST    CLINICAL HISTORY:  Mental status change, unknown cause;    TECHNIQUE:  Low dose axial CT images obtained throughout the head without intravenous contrast. Sagittal and coronal reconstructions were performed.    COMPARISON:  CT/CTA 03/26/2024.  MRI brain 12/04/2023.    FINDINGS:  Brain: There is no evidence of a mass, edema, midline shift, or intracranial hemorrhage. No extra-axial fluid collection.  Grossly similar findings of chronic small vessel ischemia throughout the cerebral hemispheric white matter as well as chronic lacunar type infarcts involving the bilateral basal ganglia, right frontal corona radiata, and alf.  No CT evidence of an acute major vascular territorial infarct.    Ventricles: The ventricles, sulci, and cisterns are within normal limits.    Skull: The osseous structures are unremarkable in appearance.    Extracranial soft tissues: Limited imaging is within normal limits.    Other: Chronic injury to the left lamina papyracea, unchanged.  Mild mucosal thickening within the visualized paranasal sinuses.  Mastoid air cells are clear.  Impression: 1. No acute intracranial CT findings or change from prior exams.  The preliminary and final reports are concordant.    Electronically signed by: Delvin Wilson  Date:    04/08/2024  Time:    07:20        Additional Studies    reviewed    Ventilator Information    Vent Mode: A/C  Oxygen Concentration (%):  [40-60] 40  Resp Rate Total:  [14 br/min-26 br/min] 15 br/min  Vt Set:  [500 mL] 500 mL  PEEP/CPAP:  [5 cmH20] 5 cmH20  Pressure Support:   [10 cmH20] 10 cmH20  Mean Airway Pressure:  [8.4 jlW77-95 cmH20] 11 cmH20             Recent Labs     04/08/24  0657   PH 7.230*   PCO2 83.7*   PO2 62*   HCO3 35.1*   POCSATURATED 85   BE 8*         Impression    Active Hospital Problems    Diagnosis  POA    CVA (cerebral vascular accident) [I63.9]  Yes    COPD (chronic obstructive pulmonary disease) [J44.9]  Yes    Acute hypoxemic respiratory failure [J96.01]  Yes      Resolved Hospital Problems   No resolved problems to display.       Plan    Continue ventilator for now and reassess for weaning   ? If decreased mental status was relkated to narcotic use 9 note response to narcan)  Consider neuro evaluation  Follow troponin though low elevation and is likely nonspecific  Note 2 different drug screens  Minimize sedatives as able  Check procalcitonin  Check EKG  Continue IVF for now    Thank you for this consult.  I will follow with you while the patient is hospitalized.      Critical Care Time    I have spent > 35 minutes providing critical care services for this pt for the above diagnoses.  These services have included pt evaluation, pt exam, ventilator assessment, SBT assessment, discussions with staff, chart review, data review, note preparation and .  Medications have been reviewed and adjusted as needed.  The patient has life threatening illness with a high risk of decompensation and/or death.      Iván Beckett MD  [facility] Pulmonary/Critical Care

## 2024-04-08 NOTE — ASSESSMENT & PLAN NOTE
Patient has history of CVA with residual left-sided weakness secondary to previous ICH.  Not CT head is negative for acute changes.  Continue aspirin  Hold Plavix now

## 2024-04-08 NOTE — CARE UPDATE
04/08/24 0546   PRE-TX-O2   Oxygen Concentration (%) 40   SpO2 (!) 90 %   Pulse 77   Resp (!) 34   Vent Select   Conventional Vent Y   Charged w/in last 24h YES   Preset Conventional Ventilator Settings   Ventilation Type VC   Vent Mode A/C   Set Rate 14 BPM   Vt Set 500 mL   PEEP/CPAP 5 cmH20   Pressure Support 10 cmH20   Peak Flow 60 L/min   Peak End Inspiratory Pressure 60 cmH20   Insp Rise Time  70 %   I-Trigger Type  V-TRIG   Trigger Sensitivity Flow/I-Trigger 3 L/min   Patient Ventilator Parameters   Resp Rate Total 19 br/min   Peak Airway Pressure 60 cmH20   Mean Airway Pressure 14 cmH20   Plateau Pressure 0 cmH20   Exhaled Vt 127 mL   Total Ve 6.65 L/m   Spont Ve 4.15 L   I:E Ratio Measured 1:20.0   Auto PEEP 0 cmH20   Inspired Tidal Volume (VTI) 0 mL   Conventional Ventilator Alarms   Resp Rate High Alarm 40 br/min   Press High Alarm 60 cmH2O   Apnea Rate 10   Apnea Volume (mL) 0 mL   Apnea Oxygen Concentration  100   Apnea Flow Rate (L/min) 63   T Apnea 20 sec(s)   Ready to Wean/Extubation Screen   FIO2<=50 (chart decimal) 0.4   MV<16L (chart vol.) 6.65   PEEP <=8 (chart #) 5   Ready to Wean Parameters   F/VT Ratio<105 (RSBI) 267.72   Vital Capacity   Vital Capacity (mL) 0

## 2024-04-08 NOTE — PROGRESS NOTES
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   All in one place.        Several of them went fishing today and they drove to the [place].     Hernan ( the patient) , stayed in the Van while the others went out fishing.   He left for a few hours and went home and then came back .   They saw him come back and park and it was about 30 minutes at that point until they returned to the Van where they found him to be unresponsive or not waking up.         The brother says he was breathing.  But wouldn't open eyes or move or respond.  So they drove him to their houses and they were going to call 911.   The mother said no she will drive him to the ER. Which was about 4-5 miles away.     They took him to [facility] ER where the ER physician tells me was not responsive and sats were low in 80s.      First ABG was done but VENOUS showed barely low pH and high PC02     They gave him IV narcan and he responded and started slurring words and moving arms around.     They decided to intubate him for air way protection and to improve ventilation .        Next ABG showed normal pH and PC02 was still high .        His labs and CXR were normal there     His drug tox screen was NEGATIVE also         The Brother said he was not sick in any way before this happened .       I talked  to the brother further in the ICU here asking about drugs or pain meds.   He said he has chronic pain from stroke , on his right side and right arm mainly.   He was given something for pain but it doesn't help when pain is bad.  So he would take pain meds that he could get off the street or from other people he knew .   But the brother said he can't be sure of that and didn't know if he took anything today or not .         They shipped him over to us and we admitted him to our ICU           Overview/Hospital Course:  No notes on file    Interval History:   58-year-old male with history of severe COPD, active tobacco abuse 2 packs a day for 30 years, history of CVA secondary to ICH with residual left-sided weakness, diabetes presenting here after found unresponsiveness.  ABG shows severe CO2 retention, patient is intubated and moved to ICU.  Chest x-ray clear.  Patient is afebrile.  UDS negative.      Seen and examined in the multidisciplinary rounds, family at bedside, patient is intubated and sedated, family stated that patient was taking tramadol at home, not taking any other recreational drugs, patient was taking baclofen and Lyrica as per chart review, patient has frequent COPD exacerbation request hospitalization.  Patient has severe pinpoint pupil during my physical exam.  CT shows severe emphysema.             Review of Systems   Unable to perform ROS: Intubated     Objective:     Vital Signs (Most Recent):  Temp: 98.2 °F (36.8 °C) (04/08/24 0315)  Pulse: 81 (04/08/24 0708)  Resp: (!) 23 (04/08/24 0708)  BP: (!) 194/102 (04/08/24 0600)  SpO2: (!) 86 % (04/08/24 0708) Vital Signs (24h Range):  Temp:  [96.4 °F (35.8 °C)-98.2 °F (36.8 °C)] 98.2 °F (36.8 °C)  Pulse:  [] 81  Resp:  [13-34] 23  SpO2:  [86 %-100 %] 86 %  BP: ()/() 194/102     Weight: 66.4 kg (146 lb 6.2 oz)  Body mass index is 23.63 kg/m².    Intake/Output Summary (Last 24 hours) at 4/8/2024 1044  Last data filed at 4/8/2024  0000  Gross per 24 hour   Intake --   Output 350 ml   Net -350 ml         Physical Exam  Vitals and nursing note reviewed.   Constitutional:       General: He is not in acute distress.     Appearance: He is not diaphoretic.      Comments: Intubated, sedated   HENT:      Mouth/Throat:      Pharynx: No oropharyngeal exudate.      Comments: ETT/NG tube noted in place.  Eyes:      General:         Right eye: No discharge.         Left eye: No discharge.      Comments: Pinpoint pupils   Neck:      Thyroid: No thyromegaly.      Vascular: No JVD.      Trachea: No tracheal deviation.   Cardiovascular:      Heart sounds: No murmur heard.     No friction rub. No gallop.   Pulmonary:      Effort: No respiratory distress.      Breath sounds: No wheezing or rales.      Comments: Breath sounds coarse on ventilator.  Abdominal:      General: There is no distension.      Palpations: Abdomen is soft.      Tenderness: There is no abdominal tenderness.   Genitourinary:     Comments: Ann catheter noted in place.  Musculoskeletal:         General: No tenderness or deformity.   Skin:     Capillary Refill: Capillary refill takes 2 to 3 seconds.      Findings: No erythema or rash.   Neurological:      Motor: No abnormal muscle tone.      Deep Tendon Reflexes: Reflexes normal.      Comments: Patient sedated, unresponsive at present             Significant Labs: All pertinent labs within the past 24 hours have been reviewed.  BMP:   Recent Labs   Lab 04/08/24  0518         K 4.1      CO2 27   BUN 13   CREATININE 0.8   CALCIUM 9.1   MG 1.6     CBC:   Recent Labs   Lab 04/07/24 1925 04/08/24  0406 04/08/24  0518   WBC 7.48  --  7.45   HGB 13.4*  --  13.1*   HCT 41.8 36 41.7   *  --  112*     Troponin:   Recent Labs   Lab 04/07/24 1925 04/08/24  0518   TROPONINI 0.076*  --    TROPONINIHS  --  35.5*       Significant Imaging: I have reviewed all pertinent imaging results/findings within the past 24 hours.  I have  reviewed and interpreted all pertinent imaging results/findings within the past 24 hours.    CTA Head and Neck (xpd)    Addendum Date: 4/8/2024    Presumably reactive partially imaged right hilar lymphadenopathy which appears similar to prior exams.  Clinical correlation needed.  If indicated this can be further characterized with contrast-enhanced CT of the chest in the non emergent setting. Electronically signed by: Delvin Wilson Date:    04/08/2024 Time:    08:38    Result Date: 4/8/2024  EXAMINATION: CTA HEAD AND NECK (XPD) CLINICAL HISTORY: stroke like symptoms; TECHNIQUE: Non contrast low dose axial images were obtained through the head. CT angiogram was performed from the level of the ignacia to the top of the head following the IV administration of 100mL of Omnipaque 350.   Sagittal and coronal reconstructions and maximum intensity projection reconstructions were performed. Arterial stenosis percentages are based on NASCET measurement criteria. COMPARISON: Concurrently performed head CT 04/07/2024.  CTA of the head and neck 03/26/2024. FINDINGS: CTA HEAD: Vasculature: Mild atherosclerotic changes of the bilateral internal carotid arteries without evidence of any hemodynamically significant stenosis.  No hemodynamically significant stenosis or proximal large vessel occlusion.  No aneurysm identified.  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 Variant anatomy: Hypoplastic left P1 PCA in the setting of a prominent left-sided posterior communicating artery.  Dominant distal left vertebral artery. Brain: Limited supplemental post-contrast CT imaging of the head demonstrates no abnormal parenchymal enhancement, midline shift or mass effect, or space-occupying extra-axial fluid collection.  No CT evidence of an acute major vascular territory infarct again noting chronic small vessel  ischemic changes including multiple small lacunar type infarcts described on the concurrently performed noncontrast head CT. Ventricles/Sulci: The ventricles and sulci are appropriate in size for age. Osseous Structures: The osseous structures are unremarkable in appearance. Other: Scattered opacification of the paranasal sinuses as well as fluid within the pharyngeal airway in the setting of respiratory/enteric support tubes mastoid air cells are clear.  No acute bony process identified.  Chronic defect at the level of the left lamina papyracea. CTA NECK: Aorta: Conventional branching pattern. The great vessel origins are patent without flow limiting stenosis. Vertebral Arteries: The origins of the vertebral arteries are patent.  No flow limiting stenosis, occlusion, or dissection. Right Carotid: No flow limiting stenosis, occlusion, or dissection of the common carotid or internal carotid arteries.  Mild plaque of the proximal ICA.  Right internal carotid artery: Less than 50 % stenosis by and NASCET.  The internal carotid artery is medialized at the C2-C3 level. Left Carotid: No flow limiting stenosis, occlusion, or dissection of the common carotid or internal carotid arteries. No significant plaque of the proximal ICA. Right internal carotid artery: 0 % stenosis by and NASCET. Extravascular Anatomy: Emphysematous changes of the visualized upper lung zones.  Nonspecific prominence of a right hilar lymph node which is grossly unchanged compared to prior exams, nonspecific but presumably reactive.     1. No intracranial proximal large vessel occlusion or hemodynamically significant stenosis.  No change from prior CTA. 2. Limited postcontrast CT imaging demonstrates no acute process. 3. 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 4. No hemodynamically significant arterial stenosis within the neck. The preliminary and final reports are concordant. Electronically signed by: Delvin  Steve Date:    04/08/2024 Time:    08:10    X-Ray Chest 1 View    Result Date: 4/8/2024  EXAMINATION: XR CHEST 1 VIEW CLINICAL HISTORY: Encounter for other preprocedural examination TECHNIQUE: Single frontal view of the chest was performed. COMPARISON: 04/07/2024 FINDINGS: The endotracheal tube has been placed and has its tip 3 cm above the ignacia.  Nasogastric tube has been placed and passes into the stomach and out of field of view.  The cardiomediastinal silhouette is normal limits.  The lungs are well expanded.  There is now slight patchy density again evident at the right lung base.  No pleural effusion.     Support devices in good position.  Probable mild right basilar infiltrate. Electronically signed by: Jozef Adams MD Date:    04/08/2024 Time:    08:37    X-Ray Chest AP Portable    Result Date: 4/8/2024  EXAMINATION: XR CHEST AP PORTABLE CLINICAL HISTORY: Chest Pain; TECHNIQUE: Single frontal view of the chest was performed. COMPARISON: 03/28/2024 FINDINGS: The cardiomediastinal silhouette is within normal limits.  The lungs are well expanded without consolidation or pleural effusion.  No infiltrate evident on this exam.     No acute process.  Previous right basilar predominant infiltrate no longer evident. Electronically signed by: Jozef Adams MD Date:    04/08/2024 Time:    08:34    X-ray chest AP portable    Result Date: 4/8/2024  EXAMINATION: XR CHEST AP PORTABLE CLINICAL HISTORY: hypoxemia; COMPARISON: 04/07/2024 FINDINGS: Cardiac silhouette size is within normal limits.  Endotracheal tube and enteric tube are in stable position. No airspace consolidation or pleural effusion.  No pneumothorax.  No acute osseous abnormality.     No acute pulmonary process. ET tube and enteric tube are in stable position. Electronically signed by: Patrick Mace Date:    04/08/2024 Time:    07:37    CT Head Without Contrast    Result Date: 4/8/2024  EXAMINATION: CT HEAD WITHOUT CONTRAST CLINICAL HISTORY:  Mental status change, unknown cause; TECHNIQUE: Low dose axial CT images obtained throughout the head without intravenous contrast. Sagittal and coronal reconstructions were performed. COMPARISON: CT/CTA 03/26/2024.  MRI brain 12/04/2023. FINDINGS: Brain: There is no evidence of a mass, edema, midline shift, or intracranial hemorrhage. No extra-axial fluid collection.  Grossly similar findings of chronic small vessel ischemia throughout the cerebral hemispheric white matter as well as chronic lacunar type infarcts involving the bilateral basal ganglia, right frontal corona radiata, and alf.  No CT evidence of an acute major vascular territorial infarct. Ventricles: The ventricles, sulci, and cisterns are within normal limits. Skull: The osseous structures are unremarkable in appearance. Extracranial soft tissues: Limited imaging is within normal limits. Other: Chronic injury to the left lamina papyracea, unchanged.  Mild mucosal thickening within the visualized paranasal sinuses.  Mastoid air cells are clear.     1. No acute intracranial CT findings or change from prior exams. The preliminary and final reports are concordant. Electronically signed by: Delvin Wilson Date:    04/08/2024 Time:    07:20    X-Ray Chest 1 View    Result Date: 3/28/2024  EXAMINATION: XR CHEST 1 VIEW CLINICAL HISTORY: Right pleuritic chest pain; TECHNIQUE: Single frontal view of the chest was performed. COMPARISON: 03/26/2024 FINDINGS: Faint interstitial disease in the lung bases.  Normal size heart.  No pleural effusion or pneumothorax.     Basilar opacities are less confluent. Electronically signed by: Iván Coronado Date:    03/28/2024 Time:    13:19    CTA Head and Neck (xpd)    Result Date: 3/26/2024  EXAMINATION: CTA HEAD AND NECK (XPD) CLINICAL HISTORY: Neuro deficit, acute, stroke suspected; TECHNIQUE: Non contrast low dose axial images were obtained through the head. CT angiogram was performed from the level of the ignacia to the top  of the head following the IV administration of 75mL of Omnipaque 350.   Sagittal and coronal reconstructions and maximum intensity projection reconstructions were performed. Arterial stenosis percentages are based on NASCET measurement criteria. COMPARISON: 12/03/2023, MRI brain 12/04/2023 FINDINGS: Brain: No evidence of acute intracranial hemorrhage. The ventricles and sulci are appropriate in size and configuration for the patient's age without hydrocephalus. Stable small chronic lacunar infarct in the right frontal corona radiata, as well as bilateral basal ganglia and right paramedian alf.  There is no significant mass effect, midline shift, or extra-axial fluid collection. Remainder of the gray-white matter differentiation is within normal limits without evidence of an acute major vascular territory infarct. No abnormal intracranial enhancement. Chronic deformity of the medial left orbital wall.  Partial opacification of the right sphenoid sinus.  No acute calvarial fracture. Extracranial: Aorta and great vessels: Left-sided aortic arch with normal configuration.   No evidence of stenosis of the origins of the great vessels from the arch. Subclavian arteries: The subclavian arteries are without hemodynamically significant stenosis or occlusion. Right carotid: The right common carotid artery is without significant stenosis. Mild calcific atherosclerotic plaque at the carotid bifurcation without evidence of a hemodynamically significant stenosis..  The right internal carotid artery is tortuous without significant stenosis, occlusion, or dissection. Left carotid: The left common carotid artery is without significant stenosis. Mild calcific atherosclerotic plaque at the carotid bifurcation without evidence of a hemodynamically significant stenosis.  The left internal carotid artery is tortuous without significant stenosis, occlusion, or dissection. Extracranial vertebral arteries:  The vertebral arteries are  without significant stenosis, occlusion, or dissection to the skull base. Intracranial: Anterior circulation: Moderate calcific atherosclerosis of bilateral carotid artery siphons.  No areas of significant atherosclerotic narrowing or filling defects are identified.  The middle cerebral arteries are normal.  The anterior cerebral arteries are normal. Posterior circulation: The vertebral arteries are normal. The basilar artery is normal. 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  Posterior cerebral arteries otherwise grossly normal. No evidence of aneurysm or arteriovenous malformation. Dural venous sinuses are patent. Other: The visualized portions of the lung apices demonstrated moderate centrilobular emphysematous changes.  Respiratory motion limits evaluation of the pulmonary parenchyma.  No focal consolidation, pleural effusion or pneumothorax. There are degenerative spine changes. No concerning osseous lesions identified.     1. No evidence of acute intracranial abnormality or significant interval detrimental change as compared to the prior exam.  Further evaluation as warranted clinically. 2. Chronic small vessel ischemic changes again demonstrated, including chronic lacunar infarcts involving the right frontal corona radiata, bilateral basal ganglia and alf. 3. No high-grade stenosis, large vessel occlusion, dissection or aneurysm in the head and neck vasculature. Electronically signed by: Delvin Vera Date:    03/26/2024 Time:    18:25    X-Ray Ribs 2 View Right    Result Date: 3/26/2024  EXAMINATION: XR RIBS 2 VIEW RIGHT CLINICAL HISTORY: Unspecified fall, initial encounter TECHNIQUE: Two views of the right ribs were performed. COMPARISON: 03/26/2024 FINDINGS: No displaced fracture or traumatic malalignment.  Hazy interstitial opacities right lung base better characterized on dated chest radiograph.  Mild  degenerative change right AC joint. Electronically signed by: Iván Coronado Date:    03/26/2024 Time:    16:55    X-Ray Chest AP Portable    Result Date: 3/26/2024  EXAMINATION: XR CHEST AP PORTABLE CLINICAL HISTORY: Pleurodynia TECHNIQUE: Single frontal view of the chest was performed. COMPARISON: 12/03/2023 and 09/26/2023 FINDINGS: There is reticulonodular disease in the lower lung zones with some tram-tracking.  Upper lung zones are clear.  Unchanged heart size.  No pleural effusion or pneumothorax.     Unchanged extent of reticulonodular disease in the lung bases.  This is better characterized on prior CT.  Some differential considerations include includes atypical infection, pneumonitis, aspiration, airways disease. Electronically signed by: Iván Coronado Date:    03/26/2024 Time:    16:49  - pulls last radiology orders      Assessment/Plan:      Acute encephalopathy    Secondary to CO2 retention.      COPD (chronic obstructive pulmonary disease)  His Brother says he is supposed to be on oxygen     But he does not use it much if ever      But lungs clear  WBC normal   I hear no wheezes on exam   On th event      PLAN    - Albuterol Nebs Q6 scheduled while on the vent     CVA (cerebral vascular accident)  Patient has history of CVA with residual left-sided weakness secondary to previous ICH.  Not CT head is negative for acute changes.  Continue aspirin  Hold Plavix now    Acute on chronic respiratory failure with hypoxia and hypercapnia  Patient apparently has severe COPD, continue heavy smoker, on top of that, patient taking tramadol, baclofen, Lyrica from what I see, family denies OD.  Patient has significant pinpoint pupils for my physical examination.  I suspect the patient may have narcotic overdose on top of severe COPD.    Sedation and ventilator management as per ICU team.  We will try IV steroids and duo nebulizer q.4 hours.    start tube feeding if okay with ICU team    Tobacco dependency  We will  "do counseling once patient is extubated    DM (diabetes mellitus), type 2  Patient's FSGs are controlled on current medication regimen.  Last A1c reviewed-   Lab Results   Component Value Date    LABA1C 9.8 (H) 07/06/2016    HGBA1C 6.1 03/26/2024     Most recent fingerstick glucose reviewed-   Recent Labs   Lab 04/07/24  1846   POCTGLUCOSE 155*     Current correctional scale  Medium  Maintain anti-hyperglycemic dose as follows-   Antihyperglycemics (From admission, onward)      None          Hold Oral hypoglycemics while patient is in the hospital.      VTE Risk Mitigation (From admission, onward)           Ordered     enoxaparin injection 40 mg  Daily        Note to Pharmacy: Ht: 5' 6" (1.676 m)  Wt: 66.4 kg (146 lb 6.2 oz)  Estimated Creatinine Clearance: 90.8 mL/min (based on SCr of 0.8 mg/dL).  Body mass index is 23.63 kg/m².    04/08/24 0245     IP VTE HIGH RISK PATIENT  Once         04/08/24 0245     Place sequential compression device  Until discontinued         04/08/24 0245                    Discharge Planning   ZACARIAS: 4/11/2024     Code Status: Full Code   Is the patient medically ready for discharge?:     Reason for patient still in hospital (select all that apply): Patient trending condition and Treatment               Critical care time spent on the evaluation and treatment of severe organ dysfunction, review of pertinent labs and imaging studies, discussions with consulting providers and discussions with patient/family: 35 minutes.      Alissa Jackson MD  Department of Hospital Medicine   [facility]    "

## 2024-04-08 NOTE — PLAN OF CARE
Plan of Care and education attempted to be reviewed with patient. No learner available.   Problem: Adult Inpatient Plan of Care  Goal: Plan of Care Review  Outcome: Ongoing, Progressing  Goal: Patient-Specific Goal (Individualized)  Outcome: Ongoing, Progressing  Goal: Absence of Hospital-Acquired Illness or Injury  Outcome: Ongoing, Progressing  Goal: Optimal Comfort and Wellbeing  Outcome: Ongoing, Progressing  Goal: Readiness for Transition of Care  Outcome: Ongoing, Progressing     Problem: Infection  Goal: Absence of Infection Signs and Symptoms  Outcome: Ongoing, Progressing     Problem: Diabetes Comorbidity  Goal: Blood Glucose Level Within Targeted Range  Outcome: Ongoing, Progressing     Problem: Fall Injury Risk  Goal: Absence of Fall and Fall-Related Injury  Outcome: Ongoing, Progressing     Problem: Skin Injury Risk Increased  Goal: Skin Health and Integrity  Outcome: Ongoing, Progressing     Problem: Restraint, Nonbehavioral (Nonviolent)  Goal: Absence of Harm or Injury  Outcome: Ongoing, Progressing

## 2024-04-08 NOTE — SUBJECTIVE & OBJECTIVE
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  ABG shows severe CO2 retention, patient is intubated and moved to ICU.  Chest x-ray clear.  Patient is afebrile.  UDS negative.      Seen and examined in the multidisciplinary rounds, family at bedside, patient is intubated and sedated, family stated that patient was taking tramadol at home, not taking any other recreational drugs, patient was taking baclofen and Lyrica as per chart review, patient has frequent COPD exacerbation request hospitalization.  Patient has severe pinpoint pupil during my physical exam.  CT shows severe emphysema.             Review of Systems   Unable to perform ROS: Intubated     Objective:     Vital Signs (Most Recent):  Temp: 98.2 °F (36.8 °C) (04/08/24 0315)  Pulse: 81 (04/08/24 0708)  Resp: (!) 23 (04/08/24 0708)  BP: (!) 194/102 (04/08/24 0600)  SpO2: (!) 86 % (04/08/24 0708) Vital Signs (24h Range):  Temp:  [96.4 °F (35.8 °C)-98.2 °F (36.8 °C)] 98.2 °F (36.8 °C)  Pulse:  [] 81  Resp:  [13-34] 23  SpO2:  [86 %-100 %] 86 %  BP: ()/() 194/102     Weight: 66.4 kg (146 lb 6.2 oz)  Body mass index is 23.63 kg/m².    Intake/Output Summary (Last 24 hours) at 4/8/2024 1044  Last data filed at 4/8/2024 0000  Gross per 24 hour   Intake --   Output 350 ml   Net -350 ml         Physical Exam  Vitals and nursing note reviewed.   Constitutional:       General: He is not in acute distress.     Appearance: He is not diaphoretic.      Comments: Intubated, sedated   HENT:      Mouth/Throat:      Pharynx: No oropharyngeal exudate.      Comments: ETT/NG tube noted in place.  Eyes:      General:         Right eye: No discharge.         Left eye: No discharge.      Comments: Pinpoint pupils   Neck:      Thyroid: No thyromegaly.      Vascular: No JVD.      no vomiting/no nausea  Trachea: No tracheal deviation.   Cardiovascular:      Heart sounds: No murmur heard.     No friction rub. No gallop.   Pulmonary:      Effort: No respiratory distress.      Breath sounds: No wheezing or rales.      Comments: Breath sounds coarse on ventilator.  Abdominal:      General: There is no distension.      Palpations: Abdomen is soft.      Tenderness: There is no abdominal tenderness.   Genitourinary:     Comments: Ann catheter noted in place.  Musculoskeletal:         General: No tenderness or deformity.   Skin:     Capillary Refill: Capillary refill takes 2 to 3 seconds.      Findings: No erythema or rash.   Neurological:      Motor: No abnormal muscle tone.      Deep Tendon Reflexes: Reflexes normal.      Comments: Patient sedated, unresponsive at present             Significant Labs: All pertinent labs within the past 24 hours have been reviewed.  BMP:   Recent Labs   Lab 04/08/24  0518         K 4.1      CO2 27   BUN 13   CREATININE 0.8   CALCIUM 9.1   MG 1.6     CBC:   Recent Labs   Lab 04/07/24 1925 04/08/24  0406 04/08/24  0518   WBC 7.48  --  7.45   HGB 13.4*  --  13.1*   HCT 41.8 36 41.7   *  --  112*     Troponin:   Recent Labs   Lab 04/07/24 1925 04/08/24  0518   TROPONINI 0.076*  --    TROPONINIHS  --  35.5*       Significant Imaging: I have reviewed all pertinent imaging results/findings within the past 24 hours.  I have reviewed and interpreted all pertinent imaging results/findings within the past 24 hours.    CTA Head and Neck (xpd)    Addendum Date: 4/8/2024    Presumably reactive partially imaged right hilar lymphadenopathy which appears similar to prior exams.  Clinical correlation needed.  If indicated this can be further characterized with contrast-enhanced CT of the chest in the non emergent setting. Electronically signed by: Delvin Wilson Date:    04/08/2024 Time:    08:38    Result Date: 4/8/2024  EXAMINATION: CTA HEAD AND NECK (XPD) CLINICAL HISTORY:  stroke like symptoms; TECHNIQUE: Non contrast low dose axial images were obtained through the head. CT angiogram was performed from the level of the ignacia to the top of the head following the IV administration of 100mL of Omnipaque 350.   Sagittal and coronal reconstructions and maximum intensity projection reconstructions were performed. Arterial stenosis percentages are based on NASCET measurement criteria. COMPARISON: Concurrently performed head CT 04/07/2024.  CTA of the head and neck 03/26/2024. FINDINGS: CTA HEAD: Vasculature: Mild atherosclerotic changes of the bilateral internal carotid arteries without evidence of any hemodynamically significant stenosis.  No hemodynamically significant stenosis or proximal large vessel occlusion.  No aneurysm identified.  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 Variant anatomy: Hypoplastic left P1 PCA in the setting of a prominent left-sided posterior communicating artery.  Dominant distal left vertebral artery. Brain: Limited supplemental post-contrast CT imaging of the head demonstrates no abnormal parenchymal enhancement, midline shift or mass effect, or space-occupying extra-axial fluid collection.  No CT evidence of an acute major vascular territory infarct again noting chronic small vessel ischemic changes including multiple small lacunar type infarcts described on the concurrently performed noncontrast head CT. Ventricles/Sulci: The ventricles and sulci are appropriate in size for age. Osseous Structures: The osseous structures are unremarkable in appearance. Other: Scattered opacification of the paranasal sinuses as well as fluid within the pharyngeal airway in the setting of respiratory/enteric support tubes mastoid air cells are clear.  No acute bony process identified.  Chronic defect at the level of the left lamina papyracea. CTA  NECK: Aorta: Conventional branching pattern. The great vessel origins are patent without flow limiting stenosis. Vertebral Arteries: The origins of the vertebral arteries are patent.  No flow limiting stenosis, occlusion, or dissection. Right Carotid: No flow limiting stenosis, occlusion, or dissection of the common carotid or internal carotid arteries.  Mild plaque of the proximal ICA.  Right internal carotid artery: Less than 50 % stenosis by and NASCET.  The internal carotid artery is medialized at the C2-C3 level. Left Carotid: No flow limiting stenosis, occlusion, or dissection of the common carotid or internal carotid arteries. No significant plaque of the proximal ICA. Right internal carotid artery: 0 % stenosis by and NASCET. Extravascular Anatomy: Emphysematous changes of the visualized upper lung zones.  Nonspecific prominence of a right hilar lymph node which is grossly unchanged compared to prior exams, nonspecific but presumably reactive.     1. No intracranial proximal large vessel occlusion or hemodynamically significant stenosis.  No change from prior CTA. 2. Limited postcontrast CT imaging demonstrates no acute process. 3. 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 4. No hemodynamically significant arterial stenosis within the neck. The preliminary and final reports are concordant. Electronically signed by: Delvin Wilson Date:    04/08/2024 Time:    08:10    X-Ray Chest 1 View    Result Date: 4/8/2024  EXAMINATION: XR CHEST 1 VIEW CLINICAL HISTORY: Encounter for other preprocedural examination TECHNIQUE: Single frontal view of the chest was performed. COMPARISON: 04/07/2024 FINDINGS: The endotracheal tube has been placed and has its tip 3 cm above the ignacia.  Nasogastric tube has been placed and passes into the stomach and out of field of view.  The cardiomediastinal silhouette is normal limits.  The lungs are well expanded.  There is now slight patchy density  again evident at the right lung base.  No pleural effusion.     Support devices in good position.  Probable mild right basilar infiltrate. Electronically signed by: Jozef Adams MD Date:    04/08/2024 Time:    08:37    X-Ray Chest AP Portable    Result Date: 4/8/2024  EXAMINATION: XR CHEST AP PORTABLE CLINICAL HISTORY: Chest Pain; TECHNIQUE: Single frontal view of the chest was performed. COMPARISON: 03/28/2024 FINDINGS: The cardiomediastinal silhouette is within normal limits.  The lungs are well expanded without consolidation or pleural effusion.  No infiltrate evident on this exam.     No acute process.  Previous right basilar predominant infiltrate no longer evident. Electronically signed by: Jozef Adams MD Date:    04/08/2024 Time:    08:34    X-ray chest AP portable    Result Date: 4/8/2024  EXAMINATION: XR CHEST AP PORTABLE CLINICAL HISTORY: hypoxemia; COMPARISON: 04/07/2024 FINDINGS: Cardiac silhouette size is within normal limits.  Endotracheal tube and enteric tube are in stable position. No airspace consolidation or pleural effusion.  No pneumothorax.  No acute osseous abnormality.     No acute pulmonary process. ET tube and enteric tube are in stable position. Electronically signed by: Patrick Mace Date:    04/08/2024 Time:    07:37    CT Head Without Contrast    Result Date: 4/8/2024  EXAMINATION: CT HEAD WITHOUT CONTRAST CLINICAL HISTORY: Mental status change, unknown cause; TECHNIQUE: Low dose axial CT images obtained throughout the head without intravenous contrast. Sagittal and coronal reconstructions were performed. COMPARISON: CT/CTA 03/26/2024.  MRI brain 12/04/2023. FINDINGS: Brain: There is no evidence of a mass, edema, midline shift, or intracranial hemorrhage. No extra-axial fluid collection.  Grossly similar findings of chronic small vessel ischemia throughout the cerebral hemispheric white matter as well as chronic lacunar type infarcts involving the bilateral basal ganglia,  right frontal corona radiata, and alf.  No CT evidence of an acute major vascular territorial infarct. Ventricles: The ventricles, sulci, and cisterns are within normal limits. Skull: The osseous structures are unremarkable in appearance. Extracranial soft tissues: Limited imaging is within normal limits. Other: Chronic injury to the left lamina papyracea, unchanged.  Mild mucosal thickening within the visualized paranasal sinuses.  Mastoid air cells are clear.     1. No acute intracranial CT findings or change from prior exams. The preliminary and final reports are concordant. Electronically signed by: Delvin Wilson Date:    04/08/2024 Time:    07:20    X-Ray Chest 1 View    Result Date: 3/28/2024  EXAMINATION: XR CHEST 1 VIEW CLINICAL HISTORY: Right pleuritic chest pain; TECHNIQUE: Single frontal view of the chest was performed. COMPARISON: 03/26/2024 FINDINGS: Faint interstitial disease in the lung bases.  Normal size heart.  No pleural effusion or pneumothorax.     Basilar opacities are less confluent. Electronically signed by: Iván Coronado Date:    03/28/2024 Time:    13:19    CTA Head and Neck (xpd)    Result Date: 3/26/2024  EXAMINATION: CTA HEAD AND NECK (XPD) CLINICAL HISTORY: Neuro deficit, acute, stroke suspected; TECHNIQUE: Non contrast low dose axial images were obtained through the head. CT angiogram was performed from the level of the ignacia to the top of the head following the IV administration of 75mL of Omnipaque 350.   Sagittal and coronal reconstructions and maximum intensity projection reconstructions were performed. Arterial stenosis percentages are based on NASCET measurement criteria. COMPARISON: 12/03/2023, MRI brain 12/04/2023 FINDINGS: Brain: No evidence of acute intracranial hemorrhage. The ventricles and sulci are appropriate in size and configuration for the patient's age without hydrocephalus. Stable small chronic lacunar infarct in the right frontal corona radiata, as well as  bilateral basal ganglia and right paramedian alf.  There is no significant mass effect, midline shift, or extra-axial fluid collection. Remainder of the gray-white matter differentiation is within normal limits without evidence of an acute major vascular territory infarct. No abnormal intracranial enhancement. Chronic deformity of the medial left orbital wall.  Partial opacification of the right sphenoid sinus.  No acute calvarial fracture. Extracranial: Aorta and great vessels: Left-sided aortic arch with normal configuration.   No evidence of stenosis of the origins of the great vessels from the arch. Subclavian arteries: The subclavian arteries are without hemodynamically significant stenosis or occlusion. Right carotid: The right common carotid artery is without significant stenosis. Mild calcific atherosclerotic plaque at the carotid bifurcation without evidence of a hemodynamically significant stenosis..  The right internal carotid artery is tortuous without significant stenosis, occlusion, or dissection. Left carotid: The left common carotid artery is without significant stenosis. Mild calcific atherosclerotic plaque at the carotid bifurcation without evidence of a hemodynamically significant stenosis.  The left internal carotid artery is tortuous without significant stenosis, occlusion, or dissection. Extracranial vertebral arteries:  The vertebral arteries are without significant stenosis, occlusion, or dissection to the skull base. Intracranial: Anterior circulation: Moderate calcific atherosclerosis of bilateral carotid artery siphons.  No areas of significant atherosclerotic narrowing or filling defects are identified.  The middle cerebral arteries are normal.  The anterior cerebral arteries are normal. Posterior circulation: The vertebral arteries are normal. The basilar artery is normal. 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  Posterior cerebral arteries otherwise grossly normal. No evidence of aneurysm or arteriovenous malformation. Dural venous sinuses are patent. Other: The visualized portions of the lung apices demonstrated moderate centrilobular emphysematous changes.  Respiratory motion limits evaluation of the pulmonary parenchyma.  No focal consolidation, pleural effusion or pneumothorax. There are degenerative spine changes. No concerning osseous lesions identified.     1. No evidence of acute intracranial abnormality or significant interval detrimental change as compared to the prior exam.  Further evaluation as warranted clinically. 2. Chronic small vessel ischemic changes again demonstrated, including chronic lacunar infarcts involving the right frontal corona radiata, bilateral basal ganglia and alf. 3. No high-grade stenosis, large vessel occlusion, dissection or aneurysm in the head and neck vasculature. Electronically signed by: Delvin Vera Date:    03/26/2024 Time:    18:25    X-Ray Ribs 2 View Right    Result Date: 3/26/2024  EXAMINATION: XR RIBS 2 VIEW RIGHT CLINICAL HISTORY: Unspecified fall, initial encounter TECHNIQUE: Two views of the right ribs were performed. COMPARISON: 03/26/2024 FINDINGS: No displaced fracture or traumatic malalignment.  Hazy interstitial opacities right lung base better characterized on dated chest radiograph.  Mild degenerative change right AC joint. Electronically signed by: Iván Coronado Date:    03/26/2024 Time:    16:55    X-Ray Chest AP Portable    Result Date: 3/26/2024  EXAMINATION: XR CHEST AP PORTABLE CLINICAL HISTORY: Pleurodynia TECHNIQUE: Single frontal view of the chest was performed. COMPARISON: 12/03/2023 and 09/26/2023 FINDINGS: There is reticulonodular disease in the lower lung zones with some tram-tracking.  Upper lung zones are clear.  Unchanged heart size.  No pleural effusion or pneumothorax.     Unchanged extent of reticulonodular disease in the  lung bases.  This is better characterized on prior CT.  Some differential considerations include includes atypical infection, pneumonitis, aspiration, airways disease. Electronically signed by: Iván Coronado Date:    03/26/2024 Time:    16:49  - pulls last radiology orders

## 2024-04-08 NOTE — CONSULTS
On license of [facility]  Department of Neurology  Neurology Consultation Note        PATIENT NAME: Hernan Badillo  MRN: [id number]  CSN: [id number]      TODAY'S DATE: 04/08/2024  ADMIT DATE: 4/7/2024                            CONSULTING PROVIDER: Pedrito Serrano MD  CONSULT REQUESTED BY: Alissa Jackson MD      Reason for consult:  Encephalopathy       History obtained from chart review.    HPI: Hernan Badillo is a 58 y.o. male with a history of COPD, DMII, HTN, CVA in December 2023 with right side residual weakness presents to the hospital with encephalopathy.    Apparently went fishing with family and he stayed in the van while others went out.  By the time his other family members came back to the van, he was found unresponsive so they drove him to the hospital.  Upon arrival to the ER, he was unresponsive with sats in low 80s.  He given Narcan with some response and later was intubated and transferred to the ICU.  He was on propofol for sedation which was discontinued this morning.    In the ER he had a CT head was negative for acute pathology and CT angiogram head and neck showed no large vessel occlusion or high-grade stenosis, small AVM was noted in the right MCA CVA and fissure.  His blood pressure was elevated in the ER initial presentation was 170 systolic and then went up to 200s systolic.    This morning on my assessment, he is intubated, sedated with Precedex only.  He does not open eyes to stimulus and does not follow commands.  Intermittently gets agitated with stimulus in his lower extremities.    Of note, patient was recently admitted in December 2023 with right-sided weakness and workup was negative for MRI brain.  He was treated as an MRI negative stroke.  He was discharged on dual antiplatelet therapy for 3 weeks at that time.  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    PREVIOUS MEDICAL HISTORY:  Past Medical  History:   Diagnosis Date    COPD (chronic obstructive pulmonary disease)     Diabetes mellitus, type 2     Hypertension      PREVIOUS SURGICAL HISTORY:  Past Surgical History:   Procedure Laterality Date    DENTAL SURGERY      ESOPHAGOGASTRODUODENOSCOPY N/A 6/1/2020    Procedure: EGD (ESOPHAGOGASTRODUODENOSCOPY);  Surgeon: Terrell May MD;  Location: [facility];  Service: Endoscopy;  Laterality: N/A;    HERNIA REPAIR      left inguinal    incision and drainiage       FAMILY MEDICAL HISTORY:  Family History   Problem Relation Age of Onset    Heart attack Father 80    Heart disease Father     Drug abuse Father     Cancer Maternal Grandmother         colon     SOCIAL HISTORY:  Social History     Tobacco Use    Smoking status: Every Day     Current packs/day: 2.00     Average packs/day: 2.0 packs/day for 30.0 years (60.0 ttl pk-yrs)     Types: Cigarettes    Smokeless tobacco: Never   Substance Use Topics    Alcohol use: Yes     Alcohol/week: 1.0 standard drink of alcohol     Types: 1 Shots of liquor per week     Comment: 1/2 pint 2 x per week - quit approximately 5 months ago    Drug use: No     ALLERGIES:  Review of patient's allergies indicates:  No Known Allergies  HOME MEDICATIONS:  Prior to Admission medications    Medication Sig Start Date End Date Taking? Authorizing Provider   acetaminophen (TYLENOL) 325 MG tablet Take 650 mg by mouth 3 (three) times daily as needed for Pain. 12/8/22   Provider, Historical   albuterol (PROVENTIL HFA) 90 mcg/actuation inhaler Inhale 2 puffs into the lungs every 6 (six) hours as needed for Wheezing. Rescue 11/1/23 10/31/24  Giovanna Narvaez, NP   amLODIPine (NORVASC) 10 MG tablet Take 1 tablet (10 mg total) by mouth once daily.  Patient not taking: Reported on 4/8/2024 9/20/23 9/19/24  Toan aBnks,    aspirin (ECOTRIN) 81 MG EC tablet Take 1 tablet (81 mg total) by mouth once daily. 12/5/23 12/4/24  Christine Oliveira, NP   baclofen (LIORESAL) 10 MG tablet  Take 1 tablet (10 mg total) by mouth 2 (two) times daily.  Patient not taking: Reported on 4/8/2024 10/13/23 12/12/23  Zoran Peacock PA-C   blood sugar diagnostic, disc Strp 1 each by Misc.(Non-Drug; Combo Route) route 4 (four) times daily. 12/5/23   Christine Oliveira NP   blood-glucose meter kit Use as instructed 12/5/23 12/4/24  Christine Oliveira NP   budesonide (PULMICORT) 0.5 mg/2 mL nebulizer solution Take 2 mLs (0.5 mg total) by nebulization 2 (two) times a day. Controller 3/29/24 4/28/24  Lisset Franks NP   clopidogreL (PLAVIX) 75 mg tablet Take 1 tablet (75 mg total) by mouth once daily. for 21 days  Patient not taking: Reported on 4/8/2024 12/5/23 12/26/23  Christine Oliveira NP   fluticasone-salmeterol diskus inhaler 500-50 mcg Inhale 1 puff into the lungs 2 (two) times daily. Controller  Patient not taking: Reported on 4/8/2024 11/7/23 11/6/24  Giovanna Narvaez, NP   lancets (LANCETS,THIN) Misc 1 each by Misc.(Non-Drug; Combo Route) route 4 (four) times daily. 12/5/23   Christine Oliveira NP   lisinopriL 10 MG tablet Take 1 tablet (10 mg total) by mouth once daily. 11/1/23 10/31/24  Giovanna Narvaez, NP   metFORMIN (GLUCOPHAGE-XR) 500 MG ER 24hr tablet Take 1 tablet (500 mg total) by mouth daily with breakfast. 11/1/23 10/31/24  Giovanna Narvaez, NP   pantoprazole (PROTONIX) 40 MG tablet Take 1 tablet (40 mg total) by mouth once daily.  Patient not taking: Reported on 4/8/2024 9/20/23 9/19/24  Toan Banks DO   pregabalin (LYRICA) 100 MG capsule Take 1 capsule (100 mg total) by mouth 2 (two) times daily.  Patient not taking: Reported on 4/8/2024 10/16/23 1/14/24  Lee Maddox MD   albuterol (PROVENTIL/VENTOLIN HFA) 90 mcg/actuation inhaler Inhale 1-2 puffs into the lungs every 6 (six) hours as needed. Rescue  Patient not taking: Reported on 4/2/2024 9/2/23 4/8/24  Concetta Leon, LAURY   albuterol-ipratropium (DUO-NEB) 2.5 mg-0.5 mg/3 mL nebulizer solution  Take 3 mLs by nebulization every 6 (six) hours as needed for Wheezing or Shortness of Breath.  Patient not taking: Reported on 4/2/2024 7/25/22 4/8/24  Giovanna Narvaez, NP   atorvastatin (LIPITOR) 20 MG tablet Take 1 tablet (20 mg total) by mouth once daily. 12/15/22 4/8/24  Giovanna Narvaez, NP   EScitalopram oxalate (LEXAPRO) 10 MG tablet Take 10 mg by mouth. 12/9/22 4/8/24  Provider, Historical   meclizine (ANTIVERT) 25 mg tablet Take 25 mg by mouth. 12/9/22 4/8/24  Provider, Historical   melatonin (MELATIN) 5 mg Take 5 mg by mouth. 12/8/22 4/8/24  Provider, Historical     CURRENT SCHEDULED MEDICATIONS:   albuterol-ipratropium  3 mL Nebulization Q4H    aspirin  81 mg Oral Daily    chlorhexidine  15 mL Mouth/Throat BID    enoxparin  40 mg Subcutaneous Daily    EScitalopram oxalate  10 mg Per OG tube Daily    famotidine (PF)  20 mg Intravenous Q12H    methylPREDNISolone sodium succinate injection  60 mg Intravenous Q8H    mupirocin   Nasal BID     CURRENT INFUSIONS:   sodium chloride 0.9% 100 mL/hr at 04/08/24 0944    dexmedeTOMIDine (Precedex) infusion (titrating) 0.2 mcg/kg/hr (04/08/24 0629)    propofoL Stopped (04/08/24 0400)     CURRENT PRN MEDICATIONS:  acetaminophen, albuterol sulfate, calcium gluconate IVPB, calcium gluconate IVPB, calcium gluconate IVPB, fentaNYL, magnesium sulfate IVPB, magnesium sulfate IVPB, ondansetron, potassium chloride **AND** potassium chloride **AND** potassium chloride, sodium chloride 0.9%, sodium phosphate 15 mmol in dextrose 5 % (D5W) 250 mL IVPB, sodium phosphate 20.01 mmol in dextrose 5 % (D5W) 250 mL IVPB, sodium phosphate 30 mmol in dextrose 5 % (D5W) 250 mL IVPB    REVIEW OF SYSTEMS:  A review of systems cannot be obtained as the patient is currently sedated and intubated.       PHYSICAL EXAM:  Patient Vitals for the past 24 hrs:   BP Temp Temp src Pulse Resp SpO2 Height Weight   04/08/24 1255 -- -- -- -- (!) 28 -- -- --   04/08/24 1115 -- -- -- 61 14 95 % --  --   04/08/24 1100 111/75 -- -- 63 18 (!) 93 % -- --   04/08/24 1045 98/63 -- -- 62 14 97 % -- --   04/08/24 1030 98/68 -- -- 60 14 100 % -- --   04/08/24 1015 -- -- -- 64 14 100 % -- --   04/08/24 1000 (!) 79/53 -- -- 66 14 100 % -- --   04/08/24 0945 (!) 89/58 -- -- 65 14 99 % -- --   04/08/24 0930 (!) 50/32 -- -- 69 (!) 29 (!) 90 % -- --   04/08/24 0915 (!) 87/59 -- -- 65 14 100 % -- --   04/08/24 0900 (!) 83/51 -- -- 68 14 100 % -- --   04/08/24 0845 (!) 97/57 -- -- 71 19 100 % -- --   04/08/24 0830 (!) 83/50 -- -- 76 18 97 % -- --   04/08/24 0815 (!) 93/58 -- -- 77 19 97 % -- --   04/08/24 0800 (!) 103/58 -- -- 74 15 97 % -- --   04/08/24 0745 109/69 -- -- 77 (!) 29 (!) 94 % -- --   04/08/24 0730 (!) 134/95 -- -- 72 14 100 % -- --   04/08/24 0715 (!) 141/88 -- -- 75 10 100 % -- --   04/08/24 0708 -- -- -- 81 (!) 23 (!) 86 % -- --   04/08/24 0701 (!) 141/88 97.6 °F (36.4 °C) Axillary 82 (!) 21 (!) 85 % -- --   04/08/24 0700 -- -- -- 78 (!) 22 99 % -- --   04/08/24 0600 (!) 194/102 -- -- 81 (!) 28 97 % -- --   04/08/24 0546 -- -- -- 77 (!) 34 (!) 90 % -- --   04/08/24 0530 (!) 168/93 -- -- 89 19 (!) 94 % -- --   04/08/24 0515 103/66 -- -- 76 14 100 % -- --   04/08/24 0500 (!) 185/113 -- -- 80 19 100 % -- --   04/08/24 0445 133/80 -- -- 71 14 (!) 91 % -- --   04/08/24 0430 115/67 -- -- 77 14 100 % -- --   04/08/24 0415 (!) 89/61 -- -- 79 14 99 % -- --   04/08/24 0406 98/65 -- -- 79 14 (!) 88 % -- --   04/08/24 0400 96/64 -- -- 78 14 (!) 91 % -- --   04/08/24 0345 105/73 -- -- 75 15 (!) 91 % -- --   04/08/24 0330 113/78 -- -- 72 14 95 % -- --   04/08/24 0315 -- 98.2 °F (36.8 °C) Oral 68 14 97 % -- --   04/08/24 0300 (!) 164/108 -- -- 81 13 (!) 89 % -- --   04/08/24 0245 107/77 -- -- 77 14 (!) 94 % -- --   04/08/24 0230 99/70 -- -- 79 14 96 % -- --   04/08/24 0200 103/69 -- -- 76 14 96 % -- --   04/08/24 0145 103/68 -- -- 75 14 97 % -- --   04/08/24 0130 (!) 143/89 -- -- 71 14 (!) 93 % -- --   04/08/24 0115 (!)  "140/93 -- -- 81 14 96 % -- --   04/08/24 0100 108/71 -- -- 80 14 96 % -- --   04/08/24 0045 105/68 -- -- 79 14 95 % -- --   04/08/24 0030 118/71 -- -- 80 14 98 % -- --   04/08/24 0000 102/67 -- -- 85 14 (!) 93 % -- --   04/07/24 2345 111/76 -- -- 87 14 96 % -- --   04/07/24 2330 110/70 -- -- 84 14 96 % -- --   04/07/24 2315 (!) 180/95 -- -- 85 14 (!) 90 % -- --   04/07/24 2308 125/78 96.4 °F (35.8 °C) Axillary 83 14 (!) 91 % 5' 6" (1.676 m) 66.4 kg (146 lb 6.2 oz)   04/07/24 2300 132/85 -- -- 83 18 95 % -- --   04/07/24 2200 105/68 -- -- 87 -- 99 % -- --   04/07/24 2135 96/63 -- -- 99 -- 99 % -- --   04/07/24 2132 101/63 -- -- 99 -- 97 % -- --   04/07/24 2127 120/65 -- -- 92 -- 100 % -- --   04/07/24 2122 (!) 160/101 -- -- 91 -- 100 % -- --   04/07/24 2120 (!) 146/97 -- -- 92 14 100 % -- --   04/07/24 2107 (!) 150/92 -- -- 86 -- 100 % -- --   04/07/24 2103 (!) 218/132 -- -- 87 -- 100 % -- --   04/07/24 2040 (!) 137/95 -- -- 105 -- 100 % -- --   04/07/24 2034 (!) 211/118 -- -- (!) 114 17 100 % -- --   04/07/24 2026 (!) 239/109 -- -- 104 14 99 % -- --   04/07/24 2021 -- -- -- -- -- -- -- 70.3 kg (155 lb)   04/07/24 2010 (!) 226/114 -- -- 99 -- 100 % -- --   04/07/24 2002 -- -- -- 101 -- 98 % -- --   04/07/24 2000 -- -- -- 103 -- 98 % -- --   04/07/24 1945 (!) 202/99 -- -- 104 -- 97 % -- --   04/07/24 1930 (!) 165/103 -- -- 101 -- 98 % -- --   04/07/24 1915 (!) 185/91 -- -- 104 -- 100 % -- --   04/07/24 1900 (!) 237/102 -- -- 90 -- 98 % -- --   04/07/24 1856 (!) 178/89 -- -- 103 -- 96 % -- --       GENERAL APPEARANCE:  Intubated, sedated with Precedex.  Does not respond to verbal stimulus.  HEENT: Normocephalic and atraumatic.  Pupils pinpoint bilaterally  PULM:  Intubated on ventilator  CV: RRR.  ABDOMEN: Soft, nontender.  EXTREMITIES: No obvious signs of vascular compromise. Pulses present. No cyanosis, clubbing or edema.  SKIN: Clear; no rashes, lesions or skin breaks in exposed areas.    NEURO:  MENTAL STATUS:  " "Intubated, sedated with Precedex.  Does not respond to verbal stimulus.    CRANIAL NERVES:  Pupils pinpoint bilaterally, face is grossly symmetric.  Rest of cranial nerves can not be tested.    MOTOR:  Bulk normal. Tone normal and symmetric throughout.  Abnormal movements absent.  Tremor: none present.  Strength could not be tested.  Moves all extremities symmetrically to stimulus    REFLEXES:  DTRs 1+ throughout.  Plantar response equivocal bilaterally.  SENSATION: not tested.  COORDINATION:  Could not be tested .  STATION: not tested.  GAIT: not tested.      Labs:  Recent Labs   Lab 04/07/24 1925 04/08/24  0518    138   K 4.2 4.1   CL 96 101   CO2 28 27   BUN 12 13   CREATININE 0.8 0.8   * 110   CALCIUM 9.5 9.1   PHOS  --  3.4   MG 1.6 1.6     Recent Labs   Lab 04/07/24 1925 04/08/24  0406 04/08/24  0518   WBC 7.48  --  7.45   HGB 13.4*  --  13.1*   HCT 41.8 36 41.7   *  --  112*     Recent Labs   Lab 04/07/24 1925 04/08/24  0518   ALBUMIN 3.4* 3.7   PROT 7.3 6.7   BILITOT 1.1* 1.2*   ALKPHOS 60 50*   ALT 6* <3*   AST 14 10     Lab Results   Component Value Date    INR 0.9 04/08/2024     Lab Results   Component Value Date    TRIG 66 03/26/2024    HDL 38 (L) 03/26/2024    CHOLHDL 30.2 03/26/2024     Lab Results   Component Value Date    HGBA1C 6.1 03/26/2024     No results found for: "PROTEINCSF", "GLUCCSF", "WBCCSF"    Imaging:  I have reviewed and interpreted the pertinent imaging and lab results.      CTA Head and Neck (xpd)  Addendum: Presumably reactive partially imaged right hilar lymphadenopathy which  appears similar to prior exams.  Clinical correlation needed.  If  indicated this can be further characterized with contrast-enhanced CT of  the chest in the non emergent setting.     Electronically signed by: Delvin Wilson   Date:    04/08/2024   Time:    08:38  Narrative: EXAMINATION:  CTA HEAD AND NECK (XPD)    CLINICAL HISTORY:  stroke like symptoms;    TECHNIQUE:  Non contrast low dose " axial images were obtained through the head. CT angiogram was performed from the level of the ignacia to the top of the head following the IV administration of 100mL of Omnipaque 350.   Sagittal and coronal reconstructions and maximum intensity projection reconstructions were performed. Arterial stenosis percentages are based on NASCET measurement criteria.    COMPARISON:  Concurrently performed head CT 04/07/2024.  CTA of the head and neck 03/26/2024.    FINDINGS:  CTA HEAD:    Vasculature: Mild atherosclerotic changes of the bilateral internal carotid arteries without evidence of any hemodynamically significant stenosis.  No hemodynamically significant stenosis or proximal large vessel occlusion.  No aneurysm identified.  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    Variant anatomy: Hypoplastic left P1 PCA in the setting of a prominent left-sided posterior communicating artery.  Dominant distal left vertebral artery.    Brain: Limited supplemental post-contrast CT imaging of the head demonstrates no abnormal parenchymal enhancement, midline shift or mass effect, or space-occupying extra-axial fluid collection.  No CT evidence of an acute major vascular territory infarct again noting chronic small vessel ischemic changes including multiple small lacunar type infarcts described on the concurrently performed noncontrast head CT.    Ventricles/Sulci: The ventricles and sulci are appropriate in size for age.    Osseous Structures: The osseous structures are unremarkable in appearance.    Other: Scattered opacification of the paranasal sinuses as well as fluid within the pharyngeal airway in the setting of respiratory/enteric support tubes mastoid air cells are clear.  No acute bony process identified.  Chronic defect at the level of the left lamina papyracea.    CTA NECK:    Aorta:  Conventional branching pattern. The great vessel origins are patent without flow limiting stenosis.    Vertebral Arteries: The origins of the vertebral arteries are patent.  No flow limiting stenosis, occlusion, or dissection.    Right Carotid: No flow limiting stenosis, occlusion, or dissection of the common carotid or internal carotid arteries.  Mild plaque of the proximal ICA.  Right internal carotid artery: Less than 50 % stenosis by and NASCET.  The internal carotid artery is medialized at the C2-C3 level.    Left Carotid: No flow limiting stenosis, occlusion, or dissection of the common carotid or internal carotid arteries. No significant plaque of the proximal ICA. Right internal carotid artery: 0 % stenosis by and NASCET.    Extravascular Anatomy: Emphysematous changes of the visualized upper lung zones.  Nonspecific prominence of a right hilar lymph node which is grossly unchanged compared to prior exams, nonspecific but presumably reactive.  Impression: 1. No intracranial proximal large vessel occlusion or hemodynamically significant stenosis.  No change from prior CTA.  2. Limited postcontrast CT imaging demonstrates no acute process.  3. 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  4. No hemodynamically significant arterial stenosis within the neck.  The preliminary and final reports are concordant.    Electronically signed by: Delvin Wilson  Date:    04/08/2024  Time:    08:10  X-Ray Chest 1 View  Narrative: EXAMINATION:  XR CHEST 1 VIEW    CLINICAL HISTORY:  Encounter for other preprocedural examination    TECHNIQUE:  Single frontal view of the chest was performed.    COMPARISON:  04/07/2024    FINDINGS:  The endotracheal tube has been placed and has its tip 3 cm above the ignacia.  Nasogastric tube has been placed and passes into the stomach and out of field of view.  The cardiomediastinal silhouette is normal limits.  The lungs are well expanded.  There is now slight  patchy density again evident at the right lung base.  No pleural effusion.  Impression: Support devices in good position.  Probable mild right basilar infiltrate.    Electronically signed by: Jozef Adams MD  Date:    04/08/2024  Time:    08:37  X-Ray Chest AP Portable  Narrative: EXAMINATION:  XR CHEST AP PORTABLE    CLINICAL HISTORY:  Chest Pain;    TECHNIQUE:  Single frontal view of the chest was performed.    COMPARISON:  03/28/2024    FINDINGS:  The cardiomediastinal silhouette is within normal limits.  The lungs are well expanded without consolidation or pleural effusion.  No infiltrate evident on this exam.  Impression: No acute process.  Previous right basilar predominant infiltrate no longer evident.    Electronically signed by: Jozef Adams MD  Date:    04/08/2024  Time:    08:34  X-ray chest AP portable  Narrative: EXAMINATION:  XR CHEST AP PORTABLE    CLINICAL HISTORY:  hypoxemia;    COMPARISON:  04/07/2024    FINDINGS:  Cardiac silhouette size is within normal limits.  Endotracheal tube and enteric tube are in stable position.    No airspace consolidation or pleural effusion.  No pneumothorax.  No acute osseous abnormality.  Impression: No acute pulmonary process.    ET tube and enteric tube are in stable position.    Electronically signed by: Patrick Mace  Date:    04/08/2024  Time:    07:37  CT Head Without Contrast  Narrative: EXAMINATION:  CT HEAD WITHOUT CONTRAST    CLINICAL HISTORY:  Mental status change, unknown cause;    TECHNIQUE:  Low dose axial CT images obtained throughout the head without intravenous contrast. Sagittal and coronal reconstructions were performed.    COMPARISON:  CT/CTA 03/26/2024.  MRI brain 12/04/2023.    FINDINGS:  Brain: There is no evidence of a mass, edema, midline shift, or intracranial hemorrhage. No extra-axial fluid collection.  Grossly similar findings of chronic small vessel ischemia throughout the cerebral hemispheric white matter as well as chronic  lacunar type infarcts involving the bilateral basal ganglia, right frontal corona radiata, and alf.  No CT evidence of an acute major vascular territorial infarct.    Ventricles: The ventricles, sulci, and cisterns are within normal limits.    Skull: The osseous structures are unremarkable in appearance.    Extracranial soft tissues: Limited imaging is within normal limits.    Other: Chronic injury to the left lamina papyracea, unchanged.  Mild mucosal thickening within the visualized paranasal sinuses.  Mastoid air cells are clear.  Impression: 1. No acute intracranial CT findings or change from prior exams.  The preliminary and final reports are concordant.    Electronically signed by: Delvin Wilson  Date:    04/08/2024  Time:    07:20         ASSESSMENT & PLAN:      Acute encephalopathy   Acute hypoxic respiratory failure    Plan:   Etiology of encephalopathy is unknown however could likely be secondary hypoxic respiratory failure secondary to to opiate use causing hypoxic ischemic encephalopathy.  Drug screen was positive for opiates.  Will need better history after patient's mental status improves.  EEG and MRI pending.  Blood pressure improved-goal normotensive range.  Wean ventilator as Tolerated.    Aspirin, Lipitor for stroke prevention.  Will follow        Thank you kindly for including us in the care of this patient. Please do not hesitate to contact us with any questions.         Critical Care:  41 minutes of critical care time has been spent evaluating with the patient. Time includes chart review not limited to diagnostic imaging, labs, and vitals, patient assessment, discussion with family and nursing, current order evaluations, and new order entries.       Pedrito Serrano MD  Neurology/vascular Neurology  Date of Service: 04/08/2024  2:05  PM    --------------------------------------------------------------------------------------------------------------------------------------------------------------------------------------------------------------------------------------------------------------  Please note: This note was transcribed using voice recognition software. Because of this technology there are often uinintended grammatical, spelling, and other transcription errors. Please disregard these errors.

## 2024-04-08 NOTE — NURSING
Nurses Note -- 4 Eyes      4/8/2024   03:40 PM      Skin assessed during: Admit      [x] No Altered Skin Integrity Present    [x]Prevention Measures Documented      [] Yes- Altered Skin Integrity Present or Discovered   [] LDA Added if Not in Epic (Describe Wound)   [] New Altered Skin Integrity was Present on Admit and Documented in LDA   [] Wound Image Taken    Wound Care Consulted? No    Attending Nurse:  Patricia Gonzalez RN/Staff Member:   RADHA Metz

## 2024-04-09 LAB
ALBUMIN SERPL BCP-MCNC: 3.3 G/DL (ref 3.5–5.2)
ALLENS TEST: ABNORMAL
ALLENS TEST: ABNORMAL
ALP SERPL-CCNC: 47 U/L (ref 55–135)
ALT SERPL W/O P-5'-P-CCNC: 4 U/L (ref 10–44)
ANION GAP SERPL CALC-SCNC: 12 MMOL/L (ref 8–16)
AST SERPL-CCNC: 8 U/L (ref 10–40)
BASOPHILS # BLD AUTO: 0 K/UL (ref 0–0.2)
BASOPHILS NFR BLD: 0 % (ref 0–1.9)
BILIRUB SERPL-MCNC: 0.7 MG/DL (ref 0.1–1)
BUN SERPL-MCNC: 31 MG/DL (ref 6–20)
CALCIUM SERPL-MCNC: 8.8 MG/DL (ref 8.7–10.5)
CHLORIDE SERPL-SCNC: 102 MMOL/L (ref 95–110)
CO2 SERPL-SCNC: 25 MMOL/L (ref 23–29)
CREAT SERPL-MCNC: 1.1 MG/DL (ref 0.5–1.4)
DELSYS: ABNORMAL
DELSYS: ABNORMAL
DIFFERENTIAL METHOD BLD: ABNORMAL
EOSINOPHIL # BLD AUTO: 0 K/UL (ref 0–0.5)
EOSINOPHIL NFR BLD: 0 % (ref 0–8)
ERYTHROCYTE [DISTWIDTH] IN BLOOD BY AUTOMATED COUNT: 13.5 % (ref 11.5–14.5)
ERYTHROCYTE [SEDIMENTATION RATE] IN BLOOD BY WESTERGREN METHOD: 14 MM/H
EST. GFR  (NO RACE VARIABLE): >60 ML/MIN/1.73 M^2
FIO2: 40
FIO2: 40
GLUCOSE SERPL-MCNC: 258 MG/DL (ref 70–110)
GLUCOSE SERPL-MCNC: 295 MG/DL (ref 70–110)
GLUCOSE SERPL-MCNC: 317 MG/DL (ref 70–110)
HCO3 UR-SCNC: 23.9 MMOL/L (ref 24–28)
HCO3 UR-SCNC: 24.4 MMOL/L (ref 24–28)
HCT VFR BLD AUTO: 36.9 % (ref 40–54)
HCT VFR BLD CALC: 35 %PCV (ref 36–54)
HCT VFR BLD CALC: 36 %PCV (ref 36–54)
HGB BLD-MCNC: 11.8 G/DL (ref 14–18)
IMM GRANULOCYTES # BLD AUTO: 0.03 K/UL (ref 0–0.04)
IMM GRANULOCYTES NFR BLD AUTO: 0.5 % (ref 0–0.5)
LYMPHOCYTES # BLD AUTO: 0.5 K/UL (ref 1–4.8)
LYMPHOCYTES NFR BLD: 7.3 % (ref 18–48)
MAGNESIUM SERPL-MCNC: 1.5 MG/DL (ref 1.6–2.6)
MAGNESIUM SERPL-MCNC: 2 MG/DL (ref 1.6–2.6)
MAGNESIUM SERPL-MCNC: 2.3 MG/DL (ref 1.6–2.6)
MCH RBC QN AUTO: 29.9 PG (ref 27–31)
MCHC RBC AUTO-ENTMCNC: 32 G/DL (ref 32–36)
MCV RBC AUTO: 94 FL (ref 82–98)
MODE: ABNORMAL
MODE: ABNORMAL
MONOCYTES # BLD AUTO: 0.1 K/UL (ref 0.3–1)
MONOCYTES NFR BLD: 1.1 % (ref 4–15)
NEUTROPHILS # BLD AUTO: 5.9 K/UL (ref 1.8–7.7)
NEUTROPHILS NFR BLD: 91.1 % (ref 38–73)
NRBC BLD-RTO: 0 /100 WBC
PCO2 BLDA: 41.7 MMHG (ref 35–45)
PCO2 BLDA: 43.2 MMHG (ref 35–45)
PEEP: 5
PEEP: 5
PH SMN: 7.36 [PH] (ref 7.35–7.45)
PH SMN: 7.37 [PH] (ref 7.35–7.45)
PHOSPHATE SERPL-MCNC: 5.2 MG/DL (ref 2.7–4.5)
PLATELET # BLD AUTO: 141 K/UL (ref 150–450)
PMV BLD AUTO: 10.5 FL (ref 9.2–12.9)
PO2 BLDA: 85 MMHG (ref 80–100)
PO2 BLDA: 88 MMHG (ref 80–100)
POC BE: -1 MMOL/L
POC BE: -1 MMOL/L
POC IONIZED CALCIUM: 1.19 MMOL/L (ref 1.06–1.42)
POC IONIZED CALCIUM: 1.2 MMOL/L (ref 1.06–1.42)
POC SATURATED O2: 96 % (ref 95–100)
POC SATURATED O2: 96 % (ref 95–100)
POC TCO2: 25 MMOL/L (ref 23–27)
POC TCO2: 26 MMOL/L (ref 23–27)
POTASSIUM BLD-SCNC: 3.9 MMOL/L (ref 3.5–5.1)
POTASSIUM BLD-SCNC: 4 MMOL/L (ref 3.5–5.1)
POTASSIUM SERPL-SCNC: 4.1 MMOL/L (ref 3.5–5.1)
POTASSIUM SERPL-SCNC: 4.3 MMOL/L (ref 3.5–5.1)
PROT SERPL-MCNC: 6.5 G/DL (ref 6–8.4)
PS: 10
RBC # BLD AUTO: 3.94 M/UL (ref 4.6–6.2)
SAMPLE: ABNORMAL
SAMPLE: ABNORMAL
SITE: ABNORMAL
SITE: ABNORMAL
SODIUM BLD-SCNC: 136 MMOL/L (ref 136–145)
SODIUM BLD-SCNC: 137 MMOL/L (ref 136–145)
SODIUM SERPL-SCNC: 139 MMOL/L (ref 136–145)
TROPONIN I SERPL HS-MCNC: 22.4 PG/ML (ref 0–14.9)
TROPONIN I SERPL HS-MCNC: 22.5 PG/ML (ref 0–14.9)
VT: 500
WBC # BLD AUTO: 6.47 K/UL (ref 3.9–12.7)

## 2024-04-09 PROCEDURE — 84484 ASSAY OF TROPONIN QUANT: CPT | Mod: 91 | Performed by: INTERNAL MEDICINE

## 2024-04-09 PROCEDURE — 36600 WITHDRAWAL OF ARTERIAL BLOOD: CPT

## 2024-04-09 PROCEDURE — 36415 COLL VENOUS BLD VENIPUNCTURE: CPT | Performed by: INTERNAL MEDICINE

## 2024-04-09 PROCEDURE — 84132 ASSAY OF SERUM POTASSIUM: CPT

## 2024-04-09 PROCEDURE — 99900035 HC TECH TIME PER 15 MIN (STAT)

## 2024-04-09 PROCEDURE — 25000242 PHARM REV CODE 250 ALT 637 W/ HCPCS: Performed by: HOSPITALIST

## 2024-04-09 PROCEDURE — 94003 VENT MGMT INPAT SUBQ DAY: CPT

## 2024-04-09 PROCEDURE — 99233 SBSQ HOSP IP/OBS HIGH 50: CPT | Mod: ,,, | Performed by: INTERNAL MEDICINE

## 2024-04-09 PROCEDURE — 99900017 HC EXTUBATION W/PARAMETERS (STAT)

## 2024-04-09 PROCEDURE — 94640 AIRWAY INHALATION TREATMENT: CPT

## 2024-04-09 PROCEDURE — 84132 ASSAY OF SERUM POTASSIUM: CPT | Performed by: HOSPITALIST

## 2024-04-09 PROCEDURE — 85014 HEMATOCRIT: CPT

## 2024-04-09 PROCEDURE — 99900031 HC PATIENT EDUCATION (STAT)

## 2024-04-09 PROCEDURE — 63600175 PHARM REV CODE 636 W HCPCS: Performed by: INTERNAL MEDICINE

## 2024-04-09 PROCEDURE — 80053 COMPREHEN METABOLIC PANEL: CPT | Performed by: INTERNAL MEDICINE

## 2024-04-09 PROCEDURE — 83735 ASSAY OF MAGNESIUM: CPT | Mod: 91 | Performed by: HOSPITALIST

## 2024-04-09 PROCEDURE — 99900026 HC AIRWAY MAINTENANCE (STAT)

## 2024-04-09 PROCEDURE — 25000003 PHARM REV CODE 250: Performed by: INTERNAL MEDICINE

## 2024-04-09 PROCEDURE — 36415 COLL VENOUS BLD VENIPUNCTURE: CPT | Performed by: HOSPITALIST

## 2024-04-09 PROCEDURE — 94799 UNLISTED PULMONARY SVC/PX: CPT

## 2024-04-09 PROCEDURE — 84484 ASSAY OF TROPONIN QUANT: CPT | Performed by: INTERNAL MEDICINE

## 2024-04-09 PROCEDURE — 84295 ASSAY OF SERUM SODIUM: CPT

## 2024-04-09 PROCEDURE — 82803 BLOOD GASES ANY COMBINATION: CPT

## 2024-04-09 PROCEDURE — 85025 COMPLETE CBC W/AUTO DIFF WBC: CPT | Performed by: HOSPITALIST

## 2024-04-09 PROCEDURE — 84100 ASSAY OF PHOSPHORUS: CPT | Performed by: INTERNAL MEDICINE

## 2024-04-09 PROCEDURE — 20000000 HC ICU ROOM

## 2024-04-09 PROCEDURE — 27100171 HC OXYGEN HIGH FLOW UP TO 24 HOURS

## 2024-04-09 PROCEDURE — 27000221 HC OXYGEN, UP TO 24 HOURS

## 2024-04-09 PROCEDURE — 82330 ASSAY OF CALCIUM: CPT

## 2024-04-09 PROCEDURE — 99291 CRITICAL CARE FIRST HOUR: CPT | Mod: ,,, | Performed by: INTERNAL MEDICINE

## 2024-04-09 PROCEDURE — 51702 INSERT TEMP BLADDER CATH: CPT

## 2024-04-09 PROCEDURE — 83735 ASSAY OF MAGNESIUM: CPT | Performed by: INTERNAL MEDICINE

## 2024-04-09 PROCEDURE — 94761 N-INVAS EAR/PLS OXIMETRY MLT: CPT | Mod: XB

## 2024-04-09 PROCEDURE — 63600175 PHARM REV CODE 636 W HCPCS: Performed by: HOSPITALIST

## 2024-04-09 RX ORDER — HALOPERIDOL 5 MG/ML
5 INJECTION INTRAMUSCULAR ONCE
Status: COMPLETED | OUTPATIENT
Start: 2024-04-09 | End: 2024-04-09

## 2024-04-09 RX ORDER — HYDRALAZINE HYDROCHLORIDE 20 MG/ML
10 INJECTION INTRAMUSCULAR; INTRAVENOUS EVERY 4 HOURS PRN
Status: DISCONTINUED | OUTPATIENT
Start: 2024-04-09 | End: 2024-04-11 | Stop reason: HOSPADM

## 2024-04-09 RX ADMIN — IPRATROPIUM BROMIDE AND ALBUTEROL SULFATE 3 ML: 2.5; .5 SOLUTION RESPIRATORY (INHALATION) at 11:04

## 2024-04-09 RX ADMIN — IPRATROPIUM BROMIDE AND ALBUTEROL SULFATE 3 ML: 2.5; .5 SOLUTION RESPIRATORY (INHALATION) at 07:04

## 2024-04-09 RX ADMIN — METHYLPREDNISOLONE SODIUM SUCCINATE 60 MG: 40 INJECTION, POWDER, FOR SOLUTION INTRAMUSCULAR; INTRAVENOUS at 05:04

## 2024-04-09 RX ADMIN — CHLORHEXIDINE GLUCONATE 15 ML: 1.2 RINSE ORAL at 09:04

## 2024-04-09 RX ADMIN — MUPIROCIN 1 G: 20 OINTMENT TOPICAL at 09:04

## 2024-04-09 RX ADMIN — FENTANYL CITRATE 100 MCG: 50 INJECTION INTRAMUSCULAR; INTRAVENOUS at 04:04

## 2024-04-09 RX ADMIN — DEXMEDETOMIDINE HYDROCHLORIDE 1 MCG/KG/HR: 4 INJECTION, SOLUTION INTRAVENOUS at 12:04

## 2024-04-09 RX ADMIN — DEXMEDETOMIDINE HYDROCHLORIDE 0.7 MCG/KG/HR: 4 INJECTION, SOLUTION INTRAVENOUS at 07:04

## 2024-04-09 RX ADMIN — MAGNESIUM SULFATE HEPTAHYDRATE 2 G: 40 INJECTION, SOLUTION INTRAVENOUS at 05:04

## 2024-04-09 RX ADMIN — METHYLPREDNISOLONE SODIUM SUCCINATE 40 MG: 40 INJECTION, POWDER, FOR SOLUTION INTRAMUSCULAR; INTRAVENOUS at 09:04

## 2024-04-09 RX ADMIN — IPRATROPIUM BROMIDE AND ALBUTEROL SULFATE 3 ML: 2.5; .5 SOLUTION RESPIRATORY (INHALATION) at 03:04

## 2024-04-09 RX ADMIN — DEXMEDETOMIDINE HYDROCHLORIDE 1.4 MCG/KG/HR: 4 INJECTION, SOLUTION INTRAVENOUS at 05:04

## 2024-04-09 RX ADMIN — POTASSIUM CHLORIDE 40 MEQ: 7.46 INJECTION, SOLUTION INTRAVENOUS at 09:04

## 2024-04-09 RX ADMIN — PROPOFOL 30 MCG/KG/MIN: 10 INJECTION, EMULSION INTRAVENOUS at 12:04

## 2024-04-09 RX ADMIN — FAMOTIDINE 20 MG: 10 INJECTION INTRAVENOUS at 09:04

## 2024-04-09 RX ADMIN — HALOPERIDOL LACTATE 5 MG: 5 INJECTION, SOLUTION INTRAMUSCULAR at 12:04

## 2024-04-09 RX ADMIN — DEXMEDETOMIDINE HYDROCHLORIDE 0.4 MCG/KG/HR: 4 INJECTION, SOLUTION INTRAVENOUS at 12:04

## 2024-04-09 RX ADMIN — FENTANYL CITRATE 100 MCG: 50 INJECTION INTRAMUSCULAR; INTRAVENOUS at 09:04

## 2024-04-09 RX ADMIN — HYDRALAZINE HYDROCHLORIDE 10 MG: 20 INJECTION, SOLUTION INTRAMUSCULAR; INTRAVENOUS at 04:04

## 2024-04-09 RX ADMIN — DEXMEDETOMIDINE HYDROCHLORIDE 1 MCG/KG/HR: 4 INJECTION, SOLUTION INTRAVENOUS at 11:04

## 2024-04-09 RX ADMIN — ENOXAPARIN SODIUM 40 MG: 40 INJECTION SUBCUTANEOUS at 05:04

## 2024-04-09 RX ADMIN — METHYLPREDNISOLONE SODIUM SUCCINATE 40 MG: 40 INJECTION, POWDER, FOR SOLUTION INTRAMUSCULAR; INTRAVENOUS at 02:04

## 2024-04-09 RX ADMIN — HYDRALAZINE HYDROCHLORIDE 10 MG: 20 INJECTION, SOLUTION INTRAMUSCULAR; INTRAVENOUS at 11:04

## 2024-04-09 RX ADMIN — HYDRALAZINE HYDROCHLORIDE 10 MG: 20 INJECTION, SOLUTION INTRAMUSCULAR; INTRAVENOUS at 12:04

## 2024-04-09 RX ADMIN — FENTANYL CITRATE 25 MCG: 50 INJECTION INTRAMUSCULAR; INTRAVENOUS at 02:04

## 2024-04-09 NOTE — CARE UPDATE
04/09/24 0741   Patient Assessment/Suction   Level of Consciousness (AVPU) responds to voice   Respiratory Effort Normal;Unlabored   Expansion/Accessory Muscles/Retractions no use of accessory muscles;no retractions;expansion symmetric   All Lung Fields Breath Sounds Anterior:   BARON Breath Sounds clear   RUL Breath Sounds diminished   Rhythm/Pattern, Respiratory unlabored;depth regular;pattern regular   Cough Frequency no cough   PRE-TX-O2   Device (Oxygen Therapy) nasal cannula with humidification   $ Is the patient on Low Flow Oxygen? Yes   Flow (L/min) 5   IHI Ventilator Associated Pneumonia Bundle (Required)   Daily Awakening Trials Performed Yes   Daily Assessment of Readiness to Extubate Yes   Ready to Wean/Extubation Screen   Extubation Screen Passed? Passed   Ready to Wean Parameters   $ Extubation Tech Time Tech Time 15 min;Extubation w/ Parameters   SBT Safety Screen Pass   Spon. Breathing Trial Initiated? Initiated   Sedation Vacation Completed? Yes   Cough Reflex? Yes   Doctor Notified and Provider Name    SBT Results Pass   Extubated? Yes   Reason for Extubation Extubated

## 2024-04-09 NOTE — SUBJECTIVE & OBJECTIVE
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  ABG shows severe CO2 retention, patient is intubated and moved to ICU.  Chest x-ray clear.  Patient is afebrile.  UDS initially negative repeat positive for opiates and benzos.  Patient extubated on 4/9.       Seen and examined in the multidisciplinary rounds, family at bedside, patient is extubated, on nasal cannula patient is very restless, confused, on restraints, afebrile, still on low-dose Precedex drip.           Review of Systems   Unable to perform ROS: Intubated     Objective:     Vital Signs (Most Recent):  Temp: 98.1 °F (36.7 °C) (04/09/24 0430)  Pulse: 73 (04/09/24 0741)  Resp: (!) 27 (04/09/24 0741)  BP: (!) 152/88 (04/09/24 0500)  SpO2: 99 % (04/09/24 0741) Vital Signs (24h Range):  Temp:  [97 °F (36.1 °C)-98.1 °F (36.7 °C)] 98.1 °F (36.7 °C)  Pulse:  [50-83] 73  Resp:  [14-28] 27  SpO2:  [92 %-100 %] 99 %  BP: (111-184)/(72-94) 152/88     Weight: 66.4 kg (146 lb 6.2 oz)  Body mass index is 23.63 kg/m².    Intake/Output Summary (Last 24 hours) at 4/9/2024 1047  Last data filed at 4/9/2024 0601  Gross per 24 hour   Intake 2315.95 ml   Output 362 ml   Net 1953.95 ml           Physical Exam  Vitals and nursing note reviewed.   Constitutional:       General: He is not in acute distress.     Appearance: He is not diaphoretic.   HENT:      Mouth/Throat:      Pharynx: No oropharyngeal exudate.   Eyes:      General:         Right eye: No discharge.         Left eye: No discharge.      Comments: Pinpoint pupils   Neck:      Thyroid: No thyromegaly.      Vascular: No JVD.      Trachea: No tracheal deviation.   Cardiovascular:      Heart sounds: No murmur heard.     No friction rub. No gallop.   Pulmonary:      Effort: No respiratory distress.      Breath sounds: No wheezing or  rales.      Comments: Breath sounds coarse on ventilator.  Abdominal:      General: There is no distension.      Palpations: Abdomen is soft.      Tenderness: There is no abdominal tenderness.   Genitourinary:     Comments: Ann catheter noted in place.  Musculoskeletal:         General: No tenderness or deformity.   Skin:     Capillary Refill: Capillary refill takes 2 to 3 seconds.      Findings: No erythema or rash.   Neurological:      Motor: No abnormal muscle tone.      Deep Tendon Reflexes: Reflexes normal.             Significant Labs: All pertinent labs within the past 24 hours have been reviewed.  BMP:   Recent Labs   Lab 04/09/24  0211   *      K 4.3      CO2 25   BUN 31*   CREATININE 1.1   CALCIUM 8.8   MG 1.5*       CBC:   Recent Labs   Lab 04/07/24 1925 04/08/24 0406 04/08/24 0518 04/09/24 0415 04/09/24  0527 04/09/24 0829   WBC 7.48  --  7.45  --   --  6.47   HGB 13.4*  --  13.1*  --   --  11.8*   HCT 41.8   < > 41.7 35* 36 36.9*   *  --  112*  --   --  141*    < > = values in this interval not displayed.       Troponin:   Recent Labs   Lab 04/07/24 1925 04/08/24 0518 04/08/24 2022 04/09/24 0211 04/09/24 0829   TROPONINI 0.076*  --   --   --   --    TROPONINIHS  --    < > 28.0* 22.4* 22.5*    < > = values in this interval not displayed.         Significant Imaging: I have reviewed all pertinent imaging results/findings within the past 24 hours.  I have reviewed and interpreted all pertinent imaging results/findings within the past 24 hours.    CTA Head and Neck (xpd)    Addendum Date: 4/8/2024    Presumably reactive partially imaged right hilar lymphadenopathy which appears similar to prior exams.  Clinical correlation needed.  If indicated this can be further characterized with contrast-enhanced CT of the chest in the non emergent setting. Electronically signed by: Delvin Wilson Date:    04/08/2024 Time:    08:38    Result Date: 4/8/2024  EXAMINATION: CTA HEAD AND  NECK (XPD) CLINICAL HISTORY: stroke like symptoms; TECHNIQUE: Non contrast low dose axial images were obtained through the head. CT angiogram was performed from the level of the ignacia to the top of the head following the IV administration of 100mL of Omnipaque 350.   Sagittal and coronal reconstructions and maximum intensity projection reconstructions were performed. Arterial stenosis percentages are based on NASCET measurement criteria. COMPARISON: Concurrently performed head CT 04/07/2024.  CTA of the head and neck 03/26/2024. FINDINGS: CTA HEAD: Vasculature: Mild atherosclerotic changes of the bilateral internal carotid arteries without evidence of any hemodynamically significant stenosis.  No hemodynamically significant stenosis or proximal large vessel occlusion.  No aneurysm identified.  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 Variant anatomy: Hypoplastic left P1 PCA in the setting of a prominent left-sided posterior communicating artery.  Dominant distal left vertebral artery. Brain: Limited supplemental post-contrast CT imaging of the head demonstrates no abnormal parenchymal enhancement, midline shift or mass effect, or space-occupying extra-axial fluid collection.  No CT evidence of an acute major vascular territory infarct again noting chronic small vessel ischemic changes including multiple small lacunar type infarcts described on the concurrently performed noncontrast head CT. Ventricles/Sulci: The ventricles and sulci are appropriate in size for age. Osseous Structures: The osseous structures are unremarkable in appearance. Other: Scattered opacification of the paranasal sinuses as well as fluid within the pharyngeal airway in the setting of respiratory/enteric support tubes mastoid air cells are clear.  No acute bony process identified.  Chronic defect at the level of  the left lamina papyracea. CTA NECK: Aorta: Conventional branching pattern. The great vessel origins are patent without flow limiting stenosis. Vertebral Arteries: The origins of the vertebral arteries are patent.  No flow limiting stenosis, occlusion, or dissection. Right Carotid: No flow limiting stenosis, occlusion, or dissection of the common carotid or internal carotid arteries.  Mild plaque of the proximal ICA.  Right internal carotid artery: Less than 50 % stenosis by and NASCET.  The internal carotid artery is medialized at the C2-C3 level. Left Carotid: No flow limiting stenosis, occlusion, or dissection of the common carotid or internal carotid arteries. No significant plaque of the proximal ICA. Right internal carotid artery: 0 % stenosis by and NASCET. Extravascular Anatomy: Emphysematous changes of the visualized upper lung zones.  Nonspecific prominence of a right hilar lymph node which is grossly unchanged compared to prior exams, nonspecific but presumably reactive.     1. No intracranial proximal large vessel occlusion or hemodynamically significant stenosis.  No change from prior CTA. 2. Limited postcontrast CT imaging demonstrates no acute process. 3. 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 4. No hemodynamically significant arterial stenosis within the neck. The preliminary and final reports are concordant. Electronically signed by: Delvin Wilson Date:    04/08/2024 Time:    08:10    X-Ray Chest 1 View    Result Date: 4/8/2024  EXAMINATION: XR CHEST 1 VIEW CLINICAL HISTORY: Encounter for other preprocedural examination TECHNIQUE: Single frontal view of the chest was performed. COMPARISON: 04/07/2024 FINDINGS: The endotracheal tube has been placed and has its tip 3 cm above the ingacia.  Nasogastric tube has been placed and passes into the stomach and out of field of view.  The cardiomediastinal silhouette is normal limits.  The lungs are well expanded.  There  is now slight patchy density again evident at the right lung base.  No pleural effusion.     Support devices in good position.  Probable mild right basilar infiltrate. Electronically signed by: Jozef Adams MD Date:    04/08/2024 Time:    08:37    X-Ray Chest AP Portable    Result Date: 4/8/2024  EXAMINATION: XR CHEST AP PORTABLE CLINICAL HISTORY: Chest Pain; TECHNIQUE: Single frontal view of the chest was performed. COMPARISON: 03/28/2024 FINDINGS: The cardiomediastinal silhouette is within normal limits.  The lungs are well expanded without consolidation or pleural effusion.  No infiltrate evident on this exam.     No acute process.  Previous right basilar predominant infiltrate no longer evident. Electronically signed by: Jozef Adams MD Date:    04/08/2024 Time:    08:34    X-ray chest AP portable    Result Date: 4/8/2024  EXAMINATION: XR CHEST AP PORTABLE CLINICAL HISTORY: hypoxemia; COMPARISON: 04/07/2024 FINDINGS: Cardiac silhouette size is within normal limits.  Endotracheal tube and enteric tube are in stable position. No airspace consolidation or pleural effusion.  No pneumothorax.  No acute osseous abnormality.     No acute pulmonary process. ET tube and enteric tube are in stable position. Electronically signed by: Patrick Mace Date:    04/08/2024 Time:    07:37    CT Head Without Contrast    Result Date: 4/8/2024  EXAMINATION: CT HEAD WITHOUT CONTRAST CLINICAL HISTORY: Mental status change, unknown cause; TECHNIQUE: Low dose axial CT images obtained throughout the head without intravenous contrast. Sagittal and coronal reconstructions were performed. COMPARISON: CT/CTA 03/26/2024.  MRI brain 12/04/2023. FINDINGS: Brain: There is no evidence of a mass, edema, midline shift, or intracranial hemorrhage. No extra-axial fluid collection.  Grossly similar findings of chronic small vessel ischemia throughout the cerebral hemispheric white matter as well as chronic lacunar type infarcts involving the  bilateral basal ganglia, right frontal corona radiata, and alf.  No CT evidence of an acute major vascular territorial infarct. Ventricles: The ventricles, sulci, and cisterns are within normal limits. Skull: The osseous structures are unremarkable in appearance. Extracranial soft tissues: Limited imaging is within normal limits. Other: Chronic injury to the left lamina papyracea, unchanged.  Mild mucosal thickening within the visualized paranasal sinuses.  Mastoid air cells are clear.     1. No acute intracranial CT findings or change from prior exams. The preliminary and final reports are concordant. Electronically signed by: Delvin Wilson Date:    04/08/2024 Time:    07:20    X-Ray Chest 1 View    Result Date: 3/28/2024  EXAMINATION: XR CHEST 1 VIEW CLINICAL HISTORY: Right pleuritic chest pain; TECHNIQUE: Single frontal view of the chest was performed. COMPARISON: 03/26/2024 FINDINGS: Faint interstitial disease in the lung bases.  Normal size heart.  No pleural effusion or pneumothorax.     Basilar opacities are less confluent. Electronically signed by: Iván Coronado Date:    03/28/2024 Time:    13:19    CTA Head and Neck (xpd)    Result Date: 3/26/2024  EXAMINATION: CTA HEAD AND NECK (XPD) CLINICAL HISTORY: Neuro deficit, acute, stroke suspected; TECHNIQUE: Non contrast low dose axial images were obtained through the head. CT angiogram was performed from the level of the ignacia to the top of the head following the IV administration of 75mL of Omnipaque 350.   Sagittal and coronal reconstructions and maximum intensity projection reconstructions were performed. Arterial stenosis percentages are based on NASCET measurement criteria. COMPARISON: 12/03/2023, MRI brain 12/04/2023 FINDINGS: Brain: No evidence of acute intracranial hemorrhage. The ventricles and sulci are appropriate in size and configuration for the patient's age without hydrocephalus. Stable small chronic lacunar infarct in the right frontal corona  radiata, as well as bilateral basal ganglia and right paramedian alf.  There is no significant mass effect, midline shift, or extra-axial fluid collection. Remainder of the gray-white matter differentiation is within normal limits without evidence of an acute major vascular territory infarct. No abnormal intracranial enhancement. Chronic deformity of the medial left orbital wall.  Partial opacification of the right sphenoid sinus.  No acute calvarial fracture. Extracranial: Aorta and great vessels: Left-sided aortic arch with normal configuration.   No evidence of stenosis of the origins of the great vessels from the arch. Subclavian arteries: The subclavian arteries are without hemodynamically significant stenosis or occlusion. Right carotid: The right common carotid artery is without significant stenosis. Mild calcific atherosclerotic plaque at the carotid bifurcation without evidence of a hemodynamically significant stenosis..  The right internal carotid artery is tortuous without significant stenosis, occlusion, or dissection. Left carotid: The left common carotid artery is without significant stenosis. Mild calcific atherosclerotic plaque at the carotid bifurcation without evidence of a hemodynamically significant stenosis.  The left internal carotid artery is tortuous without significant stenosis, occlusion, or dissection. Extracranial vertebral arteries:  The vertebral arteries are without significant stenosis, occlusion, or dissection to the skull base. Intracranial: Anterior circulation: Moderate calcific atherosclerosis of bilateral carotid artery siphons.  No areas of significant atherosclerotic narrowing or filling defects are identified.  The middle cerebral arteries are normal.  The anterior cerebral arteries are normal. Posterior circulation: The vertebral arteries are normal. The basilar artery is normal. 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  Posterior cerebral arteries otherwise grossly normal. No evidence of aneurysm or arteriovenous malformation. Dural venous sinuses are patent. Other: The visualized portions of the lung apices demonstrated moderate centrilobular emphysematous changes.  Respiratory motion limits evaluation of the pulmonary parenchyma.  No focal consolidation, pleural effusion or pneumothorax. There are degenerative spine changes. No concerning osseous lesions identified.     1. No evidence of acute intracranial abnormality or significant interval detrimental change as compared to the prior exam.  Further evaluation as warranted clinically. 2. Chronic small vessel ischemic changes again demonstrated, including chronic lacunar infarcts involving the right frontal corona radiata, bilateral basal ganglia and alf. 3. No high-grade stenosis, large vessel occlusion, dissection or aneurysm in the head and neck vasculature. Electronically signed by: Delvin Vera Date:    03/26/2024 Time:    18:25    X-Ray Ribs 2 View Right    Result Date: 3/26/2024  EXAMINATION: XR RIBS 2 VIEW RIGHT CLINICAL HISTORY: Unspecified fall, initial encounter TECHNIQUE: Two views of the right ribs were performed. COMPARISON: 03/26/2024 FINDINGS: No displaced fracture or traumatic malalignment.  Hazy interstitial opacities right lung base better characterized on dated chest radiograph.  Mild degenerative change right AC joint. Electronically signed by: Iván Coronado Date:    03/26/2024 Time:    16:55    X-Ray Chest AP Portable    Result Date: 3/26/2024  EXAMINATION: XR CHEST AP PORTABLE CLINICAL HISTORY: Pleurodynia TECHNIQUE: Single frontal view of the chest was performed. COMPARISON: 12/03/2023 and 09/26/2023 FINDINGS: There is reticulonodular disease in the lower lung zones with some tram-tracking.  Upper lung zones are clear.  Unchanged heart size.  No pleural effusion or pneumothorax.     Unchanged extent of  reticulonodular disease in the lung bases.  This is better characterized on prior CT.  Some differential considerations include includes atypical infection, pneumonitis, aspiration, airways disease. Electronically signed by: Iván Coronado Date:    03/26/2024 Time:    16:49  - pulls last radiology orders

## 2024-04-09 NOTE — PLAN OF CARE
ACICU DAILY GOALS       A: Awake    RASS: Goal -    Actual - RASS (Wilcox Agitation-Sedation Scale): drowsy   Restraint necessity: Clinical Justification: Treatment Interference  B: Breath   SBT: Pass   C: Coordinate A & B, analgesics/sedatives   Pain: managed    SAT: Pass  D: Delirium   CAM-ICU:    E: Early(intubated/ Progressive (non-intubated) Mobility   MOVE Screen: Pass   Activity: Activity Management: Rolling - L1  FAS: Feeding/Nutrition   Diet order: Diet/Nutrition Received: NPO,   Fluid restriction:    T: Thrombus   DVT prophylaxis: VTE Required Core Measure: (SCDs) Sequential compression device initiated/maintained  H: HOB Elevation   Head of Bed (HOB) Positioning: HOB at 30 degrees  U: Ulcer Prophylaxis   GI: yes  G: Glucose control   managed Glycemic Management: blood glucose monitored  S: Skin   Bundle compliance: yes   Bathing/Skin Care: bath, complete, dressed/undressed, electrode patches/site rotation, linen changed Date: 04/09/2024 0501  B: Bowel Function   no issues   I: Indwelling Catheters   Ann necessity:      Urethral Catheter 04/07/24 2201 Non-latex 16 Fr.-Reason for Continuing Urinary Catheterization: Critically ill in ICU and requiring hourly monitoring of intake/output   CVC necessity: No   IPAD offered: No  D: De-escalation Antibx   No  Plan for the day   Passed SBT this morning. Possible extubation later today. Brother stayed at bedside throughout the night.  Family/Goals of care/Code Status   Code Status: Full Code     No acute events throughout the night, VS and assessment per flow sheet, patient progressing towards goals as tolerated, plan of care reviewed with Hernan Badillo and family, all concerns addressed, will continue to monitor.

## 2024-04-09 NOTE — ASSESSMENT & PLAN NOTE
Patient has history of CVA with residual left-sided weakness secondary to previous ICH.  Not CT head is negative for acute changes.  Continue aspirin  Hold Plavix now  Neurologist following, pending MRI brain

## 2024-04-09 NOTE — NURSING
Nurses Note -- 4 Eyes      4/9/2024   0701 AM      Skin assessed during: Daily Assessment      [x] No Altered Skin Integrity Present    [x]Prevention Measures Documented      [] Yes- Altered Skin Integrity Present or Discovered   [] LDA Added if Not in Epic (Describe Wound)   [] New Altered Skin Integrity was Present on Admit and Documented in LDA   [] Wound Image Taken    Wound Care Consulted? No    Attending Nurse:  Imelda Gonzalez RN/Staff Member:  Brnena GARCIA

## 2024-04-09 NOTE — ASSESSMENT & PLAN NOTE
Patient apparently has severe COPD, continue heavy smoker, on top of that, patient taking tramadol, baclofen, Lyrica from what I see, family denies OD.  Patient has significant pinpoint pupils for my physical examination.  I suspect the patient may have narcotic overdose on top of severe COPD.    Intubated on 4/7, extubated on 4/9    Continue low-dose Precedex drip, p.o. trial later when patient is more awake, continue steroids and duo nebulizers.

## 2024-04-09 NOTE — PROGRESS NOTES
[facility]  Department of Cardiology  Progress Note      PATIENT NAME: Hernan Badillo  MRN: [id number]  TODAY'S DATE: 04/09/2024  ADMIT DATE: 4/7/2024                          CONSULT REQUESTED BY: Alissa Jackson MD    SUBJECTIVE     PRINCIPAL PROBLEM: <principal problem not specified>      REASON FOR CONSULT:  Unresponsiveness, abnormal EKG    INTERVAL  HISTORY:  4/9/24  Pt awake, extubated,   He is agitated on maximum Precedex. Wrists restrained but he is hitting bedrails  Illogical speech. Unable to state his name or who his son is   Nurse states he was able to state his name earlier in shift  BP stable. No arrhythmias on telemetry; NSR/SB noted  Troponin levels have been flat    HPI:    Pt is 57 yo  male with prior history of COPD, DM 2, HTN who was found unresponsive by family while on fishing trip. Mother at  states she took him to the hospital. States he does not take drugs to her knowledge. States he had not been ill. Nurse states she was told pt usually stays dehydrated and did have response to narcan.       ER record of HPI:    Altered Mental Status      58-year-old male brought in for altered mental status.  Last known normal between 3 and 4:00 p.m..  History of alcohol use per mother however has not been drinking for years.  She reports patient does not do drugs.  Patient found unresponsive in van after family/friends came back physician.  Patient brought to ED POV.  On arrival patient agonal breathing, significant oral secretions and some hypoxemia.  History of prior stroke per mother    Review of patient's allergies indicates:  No Known Allergies    Past Medical History:   Diagnosis Date    COPD (chronic obstructive pulmonary disease)     Diabetes mellitus, type 2     Hypertension      Past Surgical History:   Procedure Laterality Date    DENTAL SURGERY      ESOPHAGOGASTRODUODENOSCOPY N/A 6/1/2020    Procedure: EGD (ESOPHAGOGASTRODUODENOSCOPY);  Surgeon: Terrell May MD;   Location: [facility];  Service: Endoscopy;  Laterality: N/A;    HERNIA REPAIR      left inguinal    incision and drainiage       Social History     Tobacco Use    Smoking status: Every Day     Current packs/day: 2.00     Average packs/day: 2.0 packs/day for 30.0 years (60.0 ttl pk-yrs)     Types: Cigarettes    Smokeless tobacco: Never   Substance Use Topics    Alcohol use: Yes     Alcohol/week: 1.0 standard drink of alcohol     Types: 1 Shots of liquor per week     Comment: 1/2 pint 2 x per week - quit approximately 5 months ago    Drug use: No        REVIEW OF SYSTEMS  Unable to obtain due to pt condition    OBJECTIVE     VITAL SIGNS (Most Recent)  Temp: 98.1 °F (36.7 °C) (04/09/24 0430)  Pulse: 73 (04/09/24 0741)  Resp: (!) 27 (04/09/24 0741)  BP: (!) 152/88 (04/09/24 0500)  SpO2: 99 % (04/09/24 0741)    VENTILATION STATUS  Resp: (!) 27 (04/09/24 0741)  SpO2: 99 % (04/09/24 0741)  Vent Mode: A/C  Oxygen Concentration (%):  [30-40] 40  Resp Rate Total:  [14 br/min-25 br/min] 16 br/min  Vt Set:  [500 mL] 500 mL  PEEP/CPAP:  [5 cmH20] 5 cmH20  Pressure Support:  [10 cmH20] 10 cmH20  Mean Airway Pressure:  [9.1 qqL67-27 cmH20] 9.1 cmH20        I & O (Last 24H):  Intake/Output Summary (Last 24 hours) at 4/9/2024 0852  Last data filed at 4/9/2024 0601  Gross per 24 hour   Intake 2315.95 ml   Output 412 ml   Net 1903.95 ml         WEIGHTS  Wt Readings from Last 3 Encounters:   04/07/24 2308 66.4 kg (146 lb 6.2 oz)   04/07/24 2021 70.3 kg (155 lb)   04/08/24 1231 66.4 kg (146 lb 6.2 oz)   03/27/24 0621 68.7 kg (151 lb 7.3 oz)   03/26/24 2239 68.7 kg (151 lb 7.3 oz)       PHYSICAL EXAM    GENERAL: middle aged male agitated, wrists restrained, hitting bed rails  HEENT: Normocephalic.  NECK: No JVD.   CARDIAC: Regular rate and rhythm. S1 is normal.S2 is normal  CHEST ANATOMY: barrel shaped  LUNGS: Scattered rhonchi. 95% Sp02 on low flow O2  ABDOMEN: Soft , non tender   EXTREMITIES: No edema  CENTRAL NERVOUS SYSTEM: unable to  state his name or family member; slurring speech,   SKIN: No rash   Ann cath draining pale yellow urine    HOME MEDICATIONS:  No current facility-administered medications on file prior to encounter.     Current Outpatient Medications on File Prior to Encounter   Medication Sig Dispense Refill    acetaminophen (TYLENOL) 325 MG tablet Take 650 mg by mouth 3 (three) times daily as needed for Pain.      albuterol (PROVENTIL HFA) 90 mcg/actuation inhaler Inhale 2 puffs into the lungs every 6 (six) hours as needed for Wheezing. Rescue 18 g 0    amLODIPine (NORVASC) 10 MG tablet Take 1 tablet (10 mg total) by mouth once daily. (Patient not taking: Reported on 4/8/2024) 90 tablet 3    aspirin (ECOTRIN) 81 MG EC tablet Take 1 tablet (81 mg total) by mouth once daily. 360 tablet 0    baclofen (LIORESAL) 10 MG tablet Take 1 tablet (10 mg total) by mouth 2 (two) times daily. (Patient not taking: Reported on 4/8/2024) 120 tablet 0    blood sugar diagnostic, disc Strp 1 each by Misc.(Non-Drug; Combo Route) route 4 (four) times daily. 200 strip 0    blood-glucose meter kit Use as instructed 1 each 0    budesonide (PULMICORT) 0.5 mg/2 mL nebulizer solution Take 2 mLs (0.5 mg total) by nebulization 2 (two) times a day. Controller 120 mL 0    clopidogreL (PLAVIX) 75 mg tablet Take 1 tablet (75 mg total) by mouth once daily. for 21 days (Patient not taking: Reported on 4/8/2024) 30 tablet 0    fluticasone-salmeterol diskus inhaler 500-50 mcg Inhale 1 puff into the lungs 2 (two) times daily. Controller (Patient not taking: Reported on 4/8/2024) 60 each 11    lancets (LANCETS,THIN) Misc 1 each by Misc.(Non-Drug; Combo Route) route 4 (four) times daily. 200 each 0    lisinopriL 10 MG tablet Take 1 tablet (10 mg total) by mouth once daily. 90 tablet 3    metFORMIN (GLUCOPHAGE-XR) 500 MG ER 24hr tablet Take 1 tablet (500 mg total) by mouth daily with breakfast. 90 tablet 3    pantoprazole (PROTONIX) 40 MG tablet Take 1 tablet (40 mg  total) by mouth once daily. (Patient not taking: Reported on 4/8/2024) 90 tablet 3    pregabalin (LYRICA) 100 MG capsule Take 1 capsule (100 mg total) by mouth 2 (two) times daily. (Patient not taking: Reported on 4/8/2024) 60 capsule 05       SCHEDULED MEDS:   albuterol-ipratropium  3 mL Nebulization Q4H    aspirin  81 mg Oral Daily    chlorhexidine  15 mL Mouth/Throat BID    enoxparin  40 mg Subcutaneous Daily    EScitalopram oxalate  10 mg Per OG tube Daily    famotidine (PF)  20 mg Intravenous Q12H    methylPREDNISolone sodium succinate injection  60 mg Intravenous Q8H    mupirocin   Nasal BID       CONTINUOUS INFUSIONS:   sodium chloride 0.9% 100 mL/hr at 04/08/24 2137    dexmedeTOMIDine (Precedex) infusion (titrating) 1.4 mcg/kg/hr (04/09/24 0825)    propofoL Stopped (04/09/24 0730)       PRN MEDS:acetaminophen, albuterol sulfate, calcium gluconate IVPB, calcium gluconate IVPB, calcium gluconate IVPB, fentaNYL, magnesium sulfate IVPB, magnesium sulfate IVPB, ondansetron, potassium chloride **AND** potassium chloride **AND** potassium chloride, sodium chloride 0.9%, sodium phosphate 15 mmol in dextrose 5 % (D5W) 250 mL IVPB, sodium phosphate 20.01 mmol in dextrose 5 % (D5W) 250 mL IVPB, sodium phosphate 30 mmol in dextrose 5 % (D5W) 250 mL IVPB    LABS AND DIAGNOSTICS     CBC LAST 3 DAYS  Recent Labs   Lab 04/07/24  1925 04/08/24  0406 04/08/24  0518 04/09/24  0415 04/09/24  0527 04/09/24  0829   WBC 7.48  --  7.45  --   --  6.47   RBC 4.41*  --  4.31*  --   --  3.94*   HGB 13.4*  --  13.1*  --   --  11.8*   HCT 41.8   < > 41.7 35* 36 36.9*   MCV 95  --  97  --   --  94   MCH 30.4  --  30.4  --   --  29.9   MCHC 32.1  --  31.4*  --   --  32.0   RDW 13.2  --  13.6  --   --  13.5   *  --  112*  --   --  141*   MPV 11.6  --  11.5  --   --  10.5   GRAN 62.3  4.7  --  60.3  4.5  --   --  91.1*  5.9   LYMPH 28.3  2.1  --  27.1  2.0  --   --  7.3*  0.5*   MONO 8.3  0.6  --  10.9  0.8  --   --  1.1*   0.1*   BASO 0.01  --  0.02  --   --  0.00   NRBC 0  --  0  --   --  0    < > = values in this interval not displayed.         COAGULATION LAST 3 DAYS  Recent Labs   Lab 04/08/24 0518   INR 0.9         CHEMISTRY LAST 3 DAYS  Recent Labs   Lab 04/07/24 1925 04/07/24 2003 04/08/24 0518 04/08/24  0657 04/09/24 0211 04/09/24 0415 04/09/24 0527     --  138  --  139  --   --    K 4.2  --  4.1  --  4.3  --   --    CL 96  --  101  --  102  --   --    CO2 28  --  27  --  25  --   --    ANIONGAP 13  --  10  --  12  --   --    BUN 12  --  13  --  31*  --   --    CREATININE 0.8  --  0.8  --  1.1  --   --    *  --  110  --  258*  --   --    CALCIUM 9.5  --  9.1  --  8.8  --   --    PH  --    < >  --  7.230*  --  7.366 7.359   MG 1.6  --  1.6  --  1.5*  --   --    ALBUMIN 3.4*  --  3.7  --  3.3*  --   --    PROT 7.3  --  6.7  --  6.5  --   --    ALKPHOS 60  --  50*  --  47*  --   --    ALT 6*  --  <3*  --  4*  --   --    AST 14  --  10  --  8*  --   --    BILITOT 1.1*  --  1.2*  --  0.7  --   --     < > = values in this interval not displayed.         CARDIAC PROFILE LAST 3 DAYS  Recent Labs   Lab 04/07/24 1925   BNP 75   TROPONINI 0.076*         ENDOCRINE LAST 3 DAYS  Recent Labs   Lab 04/07/24 1925 04/08/24  1209   TSH 0.706  --    PROCAL  --  0.068         LAST ARTERIAL BLOOD GAS  ABG  Recent Labs   Lab 04/09/24 0527   PH 7.359   PO2 85   PCO2 43.2   HCO3 24.4   BE -1         LAST 7 DAYS MICROBIOLOGY   Microbiology Results (last 7 days)       Procedure Component Value Units Date/Time    Culture, Respiratory with Gram Stain [[id number]] Collected: 04/08/24 0742    Order Status: Completed Specimen: Respiratory from Endotracheal Aspirate Updated: 04/09/24 0734     Respiratory Culture Reduced Normal Respiratory willie     Gram Stain (Respiratory) <10 epithelial cells per low power field.     Gram Stain (Respiratory) Few WBC's     Gram Stain (Respiratory) Rare Gram positive cocci            MOST RECENT  IMAGING  X-Ray Chest 1 View  Narrative: EXAMINATION:  XR CHEST 1 VIEW    CLINICAL HISTORY:  Respiratory failure.    FINDINGS:  Two portable chest radiographs at 04:48 hours compared to 04/08/2024 show ET and NG tubes, unchanged in position.  The cardiomediastinal silhouette and pulmonary vasculature are within normal limits.    The lungs are normally expanded, with no consolidation, large pleural effusion, evidence of pulmonary edema, or pneumothorax.  Impression: No significant interval change, or evidence of acute cardiopulmonary disease.    Electronically signed by: Ravi Meza  Date:    04/09/2024  Time:    06:35      ECHOCARDIOGRAM RESULTS (last 5)  Results for orders placed during the hospital encounter of 12/03/23    Echo Saline Bubble? Yes    Interpretation Summary    Left Ventricle: The left ventricle is normal in size. Mildly increased wall thickness. There is mild concentric hypertrophy. Normal wall motion. There is normal systolic function. Ejection fraction by visual approximation is 60%. Grade I diastolic dysfunction.    Right Ventricle: Normal right ventricular cavity size. Wall thickness is normal. Right ventricle wall motion  is normal. Systolic function is normal.    IVC/SVC: Normal venous pressure at 3 mmHg.    There was agitated saline (bubble) used. Study is negative for shunt.      Results for orders placed during the hospital encounter of 11/22/22    Echo    Interpretation Summary  · The left ventricle is normal in size with concentric hypertrophy and mildly decreased systolic function.  · The estimated ejection fraction is 45%.  · Grade I left ventricular diastolic dysfunction.  · Normal right ventricular size with mildly reduced right ventricular systolic function.  · The quantitatively derived ejection fraction is 45%.  · Normal central venous pressure (3 mmHg).      CURRENT/PREVIOUS VISIT EKG  Results for orders placed or performed during the hospital encounter of 04/07/24   EKG 12-lead     Collection Time: 04/08/24  9:24 AM   Result Value Ref Range    QRS Duration 92 ms    OHS QTC Calculation 444 ms    Narrative    Test Reason : R79.89,    Vent. Rate : 061 BPM     Atrial Rate : 061 BPM     P-R Int : 150 ms          QRS Dur : 092 ms      QT Int : 442 ms       P-R-T Axes : 071 090 048 degrees     QTc Int : 444 ms    Normal sinus rhythm  Rightward axis  Borderline Abnormal ECG  When compared with ECG of 07-APR-2024 19:02,  Vent. rate has decreased BY  45 BPM  ST elevation now present in Anterior-lateral leads  T wave inversion no longer evident in Inferior leads  T wave inversion no longer evident in Lateral leads    Referred By: [email or web address]   SELF           Confirmed By:            ASSESSMENT/PLAN:     Active Hospital Problems    Diagnosis    CVA (cerebral vascular accident)    COPD (chronic obstructive pulmonary disease)    Acute encephalopathy    Acute on chronic respiratory failure with hypoxia and hypercapnia    Tobacco dependency    DM (diabetes mellitus), type 2       ASSESSMENT & PLAN:   Acute encephalopathy  Acute on chronic hypoxemic respiratory failure  COPD  HTN  Nicotine dependence  DM2  H/O CVA  Drug overdose    RECOMMENDATIONS:  CT head showed no acute findings  EEG showed moderate to severe encephalopathy  HS troponin elevation is chronic/flat  EKG St changes non specific  Pt tested positive for opiates+ benzodiazepines in UDS, mother denies illicit drug use. Neuro notes state pt admitted to taking street drugs    Echocardiogram showed Efx 50-55%    Continue medical management : aspirin 81 mg daily and Lipitor 40 mg QHS    Cardiology will sign off. Please re-consult if needed        Vivien Gottlieb NP  [facility]  Department of Cardiology  Date of Service: 04/09/2024 4/8/24  Patient was found to be unresponsive in his truck when he went for fishing trip brought to the emergency room by his mother intubated for support and currently resting on ventilator on Precedex  and Diprivan some of the history has been obtained from his mother and medical record since admission.  No reports of chest pain noted EKG has some nonspecific changes and tox screen was abnormal.  Echocardiogram showed preserved ejection fraction obtained today with no acute abnormalities.  I have personally evaluated and examined the patient, I have reviewed the Nurse Practitioner's history and physical, assessment, and plan. I agree with the findings and plan.  Recommend to continue on supportive measures monitor troponins  And serial EEG evaluations to evaluate for acute encephalopathy  No new cardiac recommendations at this time  Thank you for the consultation  Discussed with the patient's mother at bedside, nursing staff.    04/09/2024:   As outlined above patient has remained hemodynamically stable cardiac standpoint although encephalopathy is gradually improving.  On Precedex still for confusion and agitation.  Patient was son at bedside appraised him of patient is clinical condition  It was encouraged him to see that the encephalopathy is gradually improving but still persisting although he is remained clinically stable from hemodynamic perspective.    Will sign off for now from Cardiology standpoint please re-consult as needed     Dr. Eduar Escobar M.D.  [facility]  Department of Cardiology  Date of Service: 04/09/2024  3:28 PM

## 2024-04-09 NOTE — NURSING
Nurses Note -- 4 Eyes      4/8/2024   10:55 PM      Skin assessed during: Daily Assessment      [x] No Altered Skin Integrity Present    []Prevention Measures Documented      [] Yes- Altered Skin Integrity Present or Discovered   [] LDA Added if Not in Epic (Describe Wound)   [] New Altered Skin Integrity was Present on Admit and Documented in LDA   [] Wound Image Taken    Wound Care Consulted? No    Attending Nurse:  Raisa Gonzalez RN/Staff Member:  RADHA Stovall

## 2024-04-09 NOTE — ASSESSMENT & PLAN NOTE
"Patient's FSGs are controlled on current medication regimen.  Last A1c reviewed-   Lab Results   Component Value Date    LABA1C 9.8 (H) 07/06/2016    HGBA1C 6.1 03/26/2024     Most recent fingerstick glucose reviewed-   No results for input(s): "POCTGLUCOSE" in the last 24 hours.    Current correctional scale  Medium  Maintain anti-hyperglycemic dose as follows-   Antihyperglycemics (From admission, onward)    None        Hold Oral hypoglycemics while patient is in the hospital.  "

## 2024-04-09 NOTE — PLAN OF CARE
04/08/24 1924   Patient Assessment/Suction   Level of Consciousness (AVPU) responds to pain   Respiratory Effort Unlabored   Expansion/Accessory Muscles/Retractions expansion symmetric   All Lung Fields Breath Sounds coarse   Rhythm/Pattern, Respiratory assisted mechanically   Cough Frequency infrequent   Cough Type assisted   Suction Method tracheal   $ Suction Charges Inline Suction Procedure Stat Charge   Secretions Amount small   Secretions Color white   Secretions Characteristics thin   PRE-TX-O2   Device (Oxygen Therapy) ventilator   $ Is the patient on Low Flow Oxygen? Yes   Oxygen Concentration (%) 40   SpO2 98 %   Pulse Oximetry Type Continuous   $ Pulse Oximetry - Multiple Charge Pulse Oximetry - Multiple   Pulse 66   Resp 17   Aerosol Therapy   $ Aerosol Therapy Charges Aerosol Treatment   Daily Review of Necessity (SVN) completed   Respiratory Treatment Status (SVN) given   Treatment Route (SVN) in-line   Patient Position (SVN) HOB elevated   Post Treatment Assessment (SVN) breath sounds unchanged   Signs of Intolerance (SVN) none   Breath Sounds Post-Respiratory Treatment   Throughout All Fields Post-Treatment All Fields   Throughout All Fields Post-Treatment no change   Post-treatment Heart Rate (beats/min) 66   Post-treatment Resp Rate (breaths/min) 16        Airway - Non-Surgical Endotracheal Tube   No placement date or time found.   Airway Device: Endotracheal Tube  Airway Device Size: 7.5  Style: Cuffed  Cuff Inflated With: Air  Placement Verified By: Auscultation  ETT Centimeters Above the Shelli: 24 cm  Breath Sounds: Equal Bilateral   Secured at 24 cm   Measured At Teeth   Secured Location Center   Secured by Commercial tube alfaro   Position of ETT in xRay In good position   Site Condition Cool;Dry   Status Intact;Secured;Patent   Site Assessment Clean;Dry;No bleeding   Cuff Volume   (mlt)   Vent Select   Charged w/in last 24h YES   Preset Conventional Ventilator Settings   Vent ID [id number]   Vent  Type    Ventilation Type VC   Vent Mode A/C   Humidity HME   Set Rate 14 BPM   Vt Set 500 mL   PEEP/CPAP 5 cmH20   Peak Flow 60 L/min   Peak End Inspiratory Pressure 19 cmH20   I-Trigger Type  V-TRIG   Trigger Sensitivity Flow/I-Trigger 3 L/min   Patient Ventilator Parameters   Resp Rate Total 16 br/min   Peak Airway Pressure 31 cmH20   Mean Airway Pressure 10 cmH20   Plateau Pressure 0 cmH20   Exhaled Vt 560 mL   Total Ve 8.3 L/m   I:E Ratio Measured 1:3.80   Auto PEEP 0 cmH20   Conventional Ventilator Alarms   Alarms On Y   Resp Rate High Alarm 40 br/min   Press High Alarm 50 cmH2O   Apnea Rate 10   Apnea Volume (mL) 0 mL   Apnea Oxygen Concentration  100   Apnea Flow Rate (L/min) 63   T Apnea 20 sec(s)   IHI Ventilator Associated Pneumonia Bundle (Required)   Head of Bed Elevated  HOB 30   Oral Care Teeth brushed;Lip moisturizer applied;Mouth swabbed;Mouth moisturizer;Mouth suctioned;Suction toothette;with mouthwash;with normal saline   Vent Circut Breaks Minimized Yes   Ready to Wean/Extubation Screen   FIO2<=50 (chart decimal) 0.4   MV<16L (chart vol.) 8.3   PEEP <=8 (chart #) 5   Ready to Wean Parameters   F/VT Ratio<105 (RSBI) (!) 30.36   Vital Capacity   Vital Capacity (mL) 0   Education   $ Education Ventilator Oxygen;15 min

## 2024-04-09 NOTE — PROGRESS NOTES
[facility]  Department of Neurology  Progress Note  Date: 2024 10:15 AM          Patient Name: Hernan Badillo   MRN: [id number]   : 1965    AGE: 58 y.o.    LOS: 2 days Hospital Day: 3  Admit date: 2024  6:47 PM       2024: No acute events overnight. Patient was seen and examined by me this morning.  Patient extubated this morning remains to be on Precedex drip due to agitation.  On exam, he wakes up to stimulus able to follow commands with bilateral upper and lower extremities and able to answer some questions.  He states that he did take pain pills that he got off the street.  Denies any other complaints at this time.         Vitals:  Patient Vitals for the past 24 hrs:   BP Temp Temp src Pulse Resp SpO2   24 0741 -- -- -- 73 (!) 27 99 %   24 0700 -- -- -- 64 17 98 %   24 0532 -- -- -- 71 19 98 %   24 0527 -- -- -- 80 (!) 22 99 %   24 0500 (!) 152/88 -- -- 78 (!) 24 97 %   24 0450 -- -- -- 83 (!) 26 99 %   24 0430 -- 98.1 °F (36.7 °C) Axillary 74 (!) 21 99 %   24 0415 -- -- -- 78 (!) 21 99 %   24 0400 (!) 154/83 -- -- 71 (!) 21 100 %   24 0307 -- -- -- (!) 58 (!) 22 100 %   24 0306 -- -- -- (!) 59 (!) 22 100 %   24 0300 (!) 160/81 -- -- (!) 58 (!) 25 100 %   24 0201 (!) 153/81 -- -- 64 (!) 23 100 %   24 0112 -- -- -- 69 (!) 23 100 %   24 0100 (!) 145/79 -- -- 70 (!) 22 100 %   24 0000 129/78 -- -- 76 (!) 22 100 %   24 2330 -- 97 °F (36.1 °C) Axillary 74 (!) 22 100 %   24 2309 -- -- -- 63 (!) 25 99 %   24 2300 (!) 147/83 -- -- 64 (!) 23 100 %   240 128/77 -- -- 72 (!) 24 100 %   24 -- -- -- 74 15 100 %   24 124/72 -- -- 80 18 97 %   24 129/73 -- -- 79 18 97 %   24 127/76 -- -- 78 20 95 %   24 192 -- -- -- 66 17 98 %   24 1915 -- 98 °F (36.7 °C) Axillary 67 16 98 %   24 1900 128/74 -- -- 68 17 98 %    04/08/24 1709 -- -- -- (!) 50 14 100 %   04/08/24 1705 -- -- -- (!) 51 14 100 %   04/08/24 1702 -- -- -- (!) 52 14 100 %   04/08/24 1701 (!) 184/94 -- -- (!) 51 14 100 %   04/08/24 1700 (!) 184/94 -- -- (!) 54 16 100 %   04/08/24 1501 (!) 164/86 97.5 °F (36.4 °C) Axillary (!) 52 14 95 %   04/08/24 1500 (!) 164/86 -- -- (!) 52 14 (!) 94 %   04/08/24 1255 -- -- -- -- (!) 28 --   04/08/24 1115 -- -- -- 61 14 95 %   04/08/24 1101 111/75 97.6 °F (36.4 °C) Axillary (!) 59 14 (!) 92 %   04/08/24 1100 111/75 -- -- 63 18 (!) 93 %   04/08/24 1045 98/63 -- -- 62 14 97 %   04/08/24 1030 98/68 -- -- 60 14 100 %     PHYSICAL EXAM:     GENERAL APPEARANCE: Well-developed, well-nourished male in no acute distress.  HEENT: Normocephalic and atraumatic. PERRL. Oropharynx unremarkable.  PULM: Comfortable on room air.  CV: RRR.  ABDOMEN: Soft, nontender.  EXTREMITIES: No signs of vascular compromise. Pulses present. No cyanosis, clubbing or edema.  SKIN: Clear; no rashes, lesions or skin breaks in exposed areas.      NEURO:   MENTAL STATUS: Patient drowsy, wakes up to stimulus and able to follow commands.  Speech is slurred.  He is able to tell me his name and he knows that he is in the hospital.    CRANIAL NERVES II-XII: Pupils equal, round and reactive to light. Extraocular movements full and intact. No facial asymmetry.  MOTOR: Normal bulk. Tone normal and symmetrical throughout.  No abnormal movements. No tremor.   Strength:  5/5 in left upper extremity, 4/5 in right upper extremity.  Left lower extremity 5/5 in right lower extremity 4+/5  REFLEXES: DTRs 1+; normal and symmetric throughout.   SENSATION: Sensation grossly intact to fine touch.  COORDINATION:  Did not assess  STATION: Romberg deferred.  GAIT: Deferred.    CURRENT SCHEDULED MEDICATIONS:   albuterol-ipratropium  3 mL Nebulization Q4H    aspirin  81 mg Oral Daily    chlorhexidine  15 mL Mouth/Throat BID    enoxparin  40 mg Subcutaneous Daily    EScitalopram oxalate  10  "mg Per OG tube Daily    famotidine (PF)  20 mg Intravenous Q12H    methylPREDNISolone sodium succinate injection  60 mg Intravenous Q8H    mupirocin   Nasal BID     CURRENT INFUSIONS:   sodium chloride 0.9% 100 mL/hr at 04/08/24 2137    dexmedeTOMIDine (Precedex) infusion (titrating) 1.4 mcg/kg/hr (04/09/24 0825)    propofoL Stopped (04/09/24 0730)     DATA:  Recent Labs   Lab 04/07/24 1925 04/07/24  1944 04/08/24  0518 04/09/24  0211     --  138 139   K 4.2  --  4.1 4.3   CL 96  --  101 102   CO2 28  --  27 25   BUN 12  --  13 31*   CREATININE 0.8  --  0.8 1.1   *  --  110 258*   CALCIUM 9.5  --  9.1 8.8   PHOS  --   --  3.4 5.2*   MG 1.6  --  1.6 1.5*   AST 14  --  10 8*   ALT 6*  --  <3* 4*   AMMONIA  --  39  --   --      Recent Labs   Lab 04/07/24  1925 04/08/24  0406 04/08/24  0518 04/09/24  0415 04/09/24  0527 04/09/24  0829   WBC 7.48  --  7.45  --   --  6.47   HGB 13.4*  --  13.1*  --   --  11.8*   HCT 41.8 36 41.7 35* 36 36.9*   *  --  112*  --   --  141*     No results found for: "PROTEINCSF", "GLUCCSF", "WBCCSF", "RBCCSF", "PMNCSF"  Hemoglobin A1C   Date Value Ref Range Status   03/26/2024 6.1 4.5 - 6.2 % Final     Comment:     ADA Screening Guidelines:  5.7-6.4%  Consistent with prediabetes  >or=6.5%  Consistent with diabetes    High levels of fetal hemoglobin interfere with the HbA1C  assay. Heterozygous hemoglobin variants (HbS, HgC, etc)do  not significantly interfere with this assay.   However, presence of multiple variants may affect accuracy.     12/04/2023 6.5 (H) 4.5 - 6.2 % Final     Comment:     According to ADA guidelines, hemoglobin A1C <7.0% represents  optimal control in non-pregnant diabetic patients.  Different  metrics may apply to specific populations.   Standards of Medical Care in Diabetes - 2016.    For the purpose of screening for the presence of diabetes:  <5.7%     Consistent with the absence of diabetes  5.7-6.4%  Consistent with increasing risk for diabetes "   (prediabetes)  >or=6.5%  Consistent with diabetes    Currently no consensus exists for use of hemoglobin A1C  for diagnosis of diabetes for children.     03/08/2023 6.1 (H) 4.0 - 5.6 % Final     Comment:     ADA Screening Guidelines:  5.7-6.4%  Consistent with prediabetes  >or=6.5%  Consistent with diabetes    High levels of fetal hemoglobin interfere with the HbA1C  assay. Heterozygous hemoglobin variants (HbS, HgC, etc)do  not significantly interfere with this assay.   However, presence of multiple variants may affect accuracy.              I have personally reviewed and interpreted the pertinent imaging and lab results.  Imaging Results              CTA Head and Neck (xpd) (Edited Result - FINAL)  Result time 04/08/24 08:38:02      Addendum (preliminary) 1 of 1 by Delvin Wilson MD (04/08/24 08:38:02)      Presumably reactive partially imaged right hilar lymphadenopathy which appears similar to prior exams.  Clinical correlation needed.  If indicated this can be further characterized with contrast-enhanced CT of the chest in the non emergent setting.      Electronically signed by: Delvin Wilson  Date:    04/08/2024  Time:    08:38                 Final result by Delvin Wilson MD (04/08/24 08:10:12)                   Impression:      1. No intracranial proximal large vessel occlusion or hemodynamically significant stenosis.  No change from prior CTA.  2. Limited postcontrast CT imaging demonstrates no acute process.  3. 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  4. No hemodynamically significant arterial stenosis within the neck.  The preliminary and final reports are concordant.      Electronically signed by: Delvin Wilson  Date:    04/08/2024  Time:    08:10               Narrative:    EXAMINATION:  CTA HEAD AND NECK (XPD)    CLINICAL HISTORY:  stroke like symptoms;    TECHNIQUE:  Non contrast low dose axial images were obtained through the head. CT angiogram was performed from  the level of the ignacia to the top of the head following the IV administration of 100mL of Omnipaque 350.   Sagittal and coronal reconstructions and maximum intensity projection reconstructions were performed. Arterial stenosis percentages are based on NASCET measurement criteria.    COMPARISON:  Concurrently performed head CT 04/07/2024.  CTA of the head and neck 03/26/2024.    FINDINGS:  CTA HEAD:    Vasculature: Mild atherosclerotic changes of the bilateral internal carotid arteries without evidence of any hemodynamically significant stenosis.  No hemodynamically significant stenosis or proximal large vessel occlusion.  No aneurysm identified.  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    Variant anatomy: Hypoplastic left P1 PCA in the setting of a prominent left-sided posterior communicating artery.  Dominant distal left vertebral artery.    Brain: Limited supplemental post-contrast CT imaging of the head demonstrates no abnormal parenchymal enhancement, midline shift or mass effect, or space-occupying extra-axial fluid collection.  No CT evidence of an acute major vascular territory infarct again noting chronic small vessel ischemic changes including multiple small lacunar type infarcts described on the concurrently performed noncontrast head CT.    Ventricles/Sulci: The ventricles and sulci are appropriate in size for age.    Osseous Structures: The osseous structures are unremarkable in appearance.    Other: Scattered opacification of the paranasal sinuses as well as fluid within the pharyngeal airway in the setting of respiratory/enteric support tubes mastoid air cells are clear.  No acute bony process identified.  Chronic defect at the level of the left lamina papyracea.    CTA NECK:    Aorta: Conventional branching pattern. The great vessel origins are patent without flow  limiting stenosis.    Vertebral Arteries: The origins of the vertebral arteries are patent.  No flow limiting stenosis, occlusion, or dissection.    Right Carotid: No flow limiting stenosis, occlusion, or dissection of the common carotid or internal carotid arteries.  Mild plaque of the proximal ICA.  Right internal carotid artery: Less than 50 % stenosis by and NASCET.  The internal carotid artery is medialized at the C2-C3 level.    Left Carotid: No flow limiting stenosis, occlusion, or dissection of the common carotid or internal carotid arteries. No significant plaque of the proximal ICA. Right internal carotid artery: 0 % stenosis by and NASCET.    Extravascular Anatomy: Emphysematous changes of the visualized upper lung zones.  Nonspecific prominence of a right hilar lymph node which is grossly unchanged compared to prior exams, nonspecific but presumably reactive.                                       X-Ray Chest 1 View (Final result)  Result time 04/08/24 08:37:10      Final result by Jozef Adams MD (04/08/24 08:37:10)                   Impression:      Support devices in good position.  Probable mild right basilar infiltrate.      Electronically signed by: Jozef Adams MD  Date:    04/08/2024  Time:    08:37               Narrative:    EXAMINATION:  XR CHEST 1 VIEW    CLINICAL HISTORY:  Encounter for other preprocedural examination    TECHNIQUE:  Single frontal view of the chest was performed.    COMPARISON:  04/07/2024    FINDINGS:  The endotracheal tube has been placed and has its tip 3 cm above the ignacia.  Nasogastric tube has been placed and passes into the stomach and out of field of view.  The cardiomediastinal silhouette is normal limits.  The lungs are well expanded.  There is now slight patchy density again evident at the right lung base.  No pleural effusion.                                       CT Head Without Contrast (Final result)  Result time 04/08/24 07:20:30      Final  result by Delvin Wilson MD (04/08/24 07:20:30)                   Impression:      1. No acute intracranial CT findings or change from prior exams.  The preliminary and final reports are concordant.      Electronically signed by: Delvin Wilson  Date:    04/08/2024  Time:    07:20               Narrative:    EXAMINATION:  CT HEAD WITHOUT CONTRAST    CLINICAL HISTORY:  Mental status change, unknown cause;    TECHNIQUE:  Low dose axial CT images obtained throughout the head without intravenous contrast. Sagittal and coronal reconstructions were performed.    COMPARISON:  CT/CTA 03/26/2024.  MRI brain 12/04/2023.    FINDINGS:  Brain: There is no evidence of a mass, edema, midline shift, or intracranial hemorrhage. No extra-axial fluid collection.  Grossly similar findings of chronic small vessel ischemia throughout the cerebral hemispheric white matter as well as chronic lacunar type infarcts involving the bilateral basal ganglia, right frontal corona radiata, and alf.  No CT evidence of an acute major vascular territorial infarct.    Ventricles: The ventricles, sulci, and cisterns are within normal limits.    Skull: The osseous structures are unremarkable in appearance.    Extracranial soft tissues: Limited imaging is within normal limits.    Other: Chronic injury to the left lamina papyracea, unchanged.  Mild mucosal thickening within the visualized paranasal sinuses.  Mastoid air cells are clear.                                       X-Ray Chest AP Portable (Final result)  Result time 04/08/24 08:34:26      Final result by Jozef Adams MD (04/08/24 08:34:26)                   Impression:      No acute process.  Previous right basilar predominant infiltrate no longer evident.      Electronically signed by: Jozef Adams MD  Date:    04/08/2024  Time:    08:34               Narrative:    EXAMINATION:  XR CHEST AP PORTABLE    CLINICAL HISTORY:  Chest Pain;    TECHNIQUE:  Single frontal view of the chest was  performed.    COMPARISON:  03/28/2024    FINDINGS:  The cardiomediastinal silhouette is within normal limits.  The lungs are well expanded without consolidation or pleural effusion.  No infiltrate evident on this exam.                                              ASSESSMENT AND PLAN:       Acute encephalopathy   Acute hypoxic respiratory failure     Plan:   Etiology of encephalopathy is unknown however could likely be secondary hypoxic respiratory failure secondary from opiate use causing encephalopathy and hypoxia  Drug screen was positive for opiates.  Patient endorses of taking street side pills for his pain.     EEG showed encephalopathy without seizures or epileptiform activity.  MRI brain was not done as patient was agitated and could not hold still.  Will hold off on MRI at this time as mental status is improving.  Will monitor clinically.  Blood pressure improved-goal normotensive range.  Wean Precedex as tolerated.  Can use Haldol p.r.n. for agitation/psychosis  Aspirin, Lipitor for stroke prevention.  Will follow          Critical Care:  40 minutes of critical care time has been spent evaluating with the patient. Time includes chart review not limited to diagnostic imaging, labs, and vitals, patient assessment, discussion with family and nursing, current order evaluations, and new order entries.     Pedrito Serrano MD  Neurology/vascular Neurology  Date of Service: 04/09/2024  10:15 AM    Please note: This note was transcribed using voice recognition software. Because of this technology there are often uinintended grammatical, spelling, and other transcription errors. Please disregard these errors.

## 2024-04-09 NOTE — PLAN OF CARE
Problem: Adult Inpatient Plan of Care  Goal: Plan of Care Review  Outcome: Ongoing, Progressing      Propofol turned off and pt extubated at 0740. Oriented to person.Pt agitated and aggressive when awake, uncooperative. Precedex titrated as tolerated. Haladol 5mg IV given.  Sinus sandy on monitor periodically. BP elevated at times. MD notified and hydralazine given. Ann intact with good urine output.   Restraints in place and safety maintained.

## 2024-04-09 NOTE — PROGRESS NOTES
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   All in one place.        Several of them went fishing today and they drove to the [place].     Hernan ( the patient) , stayed in the Van while the others went out fishing.   He left for a few hours and went home and then came back .   They saw him come back and park and it was about 30 minutes at that point until they returned to the Van where they found him to be unresponsive or not waking up.         The brother says he was breathing.  But wouldn't open eyes or move or respond.  So they drove him to their houses and they were going to call 911.   The mother said no she will drive him to the ER. Which was about 4-5 miles away.     They took him to [facility] ER where the ER physician tells me was not responsive and sats were low in 80s.      First ABG was done but VENOUS showed barely low pH and high PC02     They gave him IV narcan and he responded and started slurring words and moving arms around.     They decided to intubate him for air way protection and to improve ventilation .        Next ABG showed normal pH and PC02 was still high .        His labs and CXR were normal there     His drug tox screen was NEGATIVE also         The Brother said he was not sick in any way before this happened .       I talked  to the brother further in the ICU here asking about drugs or pain meds.   He said he has chronic pain from stroke , on his right side and right arm mainly.   He was given something for pain but it doesn't help when pain is bad.  So he would take pain meds that he could get off the street or from other people he knew .   But the brother said he can't be sure of that and didn't know if he took anything today or not .         They shipped him over to us and we admitted him to our ICU           Overview/Hospital Course:  No notes on file    Interval History:   58-year-old male with history of severe COPD, active tobacco abuse 2 packs a day for 30 years, history of CVA secondary to ICH with residual left-sided weakness, diabetes presenting here after found unresponsiveness.  ABG shows severe CO2 retention, patient is intubated and moved to ICU.  Chest x-ray clear.  Patient is afebrile.  UDS initially negative repeat positive for opiates and benzos.  Patient extubated on 4/9.       Seen and examined in the multidisciplinary rounds, family at bedside, patient is extubated, on nasal cannula patient is very restless, confused, on restraints, afebrile, still on low-dose Precedex drip.           Review of Systems   Unable to perform ROS: Intubated     Objective:     Vital Signs (Most Recent):  Temp: 98.1 °F (36.7 °C) (04/09/24 0430)  Pulse: 73 (04/09/24 0741)  Resp: (!) 27 (04/09/24 0741)  BP: (!) 152/88 (04/09/24 0500)  SpO2: 99 % (04/09/24 0741) Vital Signs (24h Range):  Temp:  [97 °F (36.1 °C)-98.1 °F (36.7 °C)] 98.1 °F (36.7 °C)  Pulse:  [50-83] 73  Resp:  [14-28] 27  SpO2:  [92 %-100 %] 99 %  BP: (111-184)/(72-94) 152/88     Weight: 66.4 kg (146 lb 6.2 oz)  Body mass index is 23.63 kg/m².    Intake/Output Summary (Last 24 hours) at 4/9/2024 1047  Last data filed at 4/9/2024 0601  Gross per 24 hour   Intake 2315.95 ml   Output 362 ml   Net 1953.95 ml           Physical Exam  Vitals and nursing note reviewed.    Constitutional:       General: He is not in acute distress.     Appearance: He is not diaphoretic.   HENT:      Mouth/Throat:      Pharynx: No oropharyngeal exudate.   Eyes:      General:         Right eye: No discharge.         Left eye: No discharge.      Comments: Pinpoint pupils   Neck:      Thyroid: No thyromegaly.      Vascular: No JVD.      Trachea: No tracheal deviation.   Cardiovascular:      Heart sounds: No murmur heard.     No friction rub. No gallop.   Pulmonary:      Effort: No respiratory distress.      Breath sounds: No wheezing or rales.      Comments: Breath sounds coarse on ventilator.  Abdominal:      General: There is no distension.      Palpations: Abdomen is soft.      Tenderness: There is no abdominal tenderness.   Genitourinary:     Comments: Ann catheter noted in place.  Musculoskeletal:         General: No tenderness or deformity.   Skin:     Capillary Refill: Capillary refill takes 2 to 3 seconds.      Findings: No erythema or rash.   Neurological:      Motor: No abnormal muscle tone.      Deep Tendon Reflexes: Reflexes normal.             Significant Labs: All pertinent labs within the past 24 hours have been reviewed.  BMP:   Recent Labs   Lab 04/09/24 0211   *      K 4.3      CO2 25   BUN 31*   CREATININE 1.1   CALCIUM 8.8   MG 1.5*       CBC:   Recent Labs   Lab 04/07/24  1925 04/08/24  0406 04/08/24 0518 04/09/24  0415 04/09/24  0527 04/09/24  0829   WBC 7.48  --  7.45  --   --  6.47   HGB 13.4*  --  13.1*  --   --  11.8*   HCT 41.8   < > 41.7 35* 36 36.9*   *  --  112*  --   --  141*    < > = values in this interval not displayed.       Troponin:   Recent Labs   Lab 04/07/24  1925 04/08/24  0518 04/08/24 2022 04/09/24 0211 04/09/24  0829   TROPONINI 0.076*  --   --   --   --    TROPONINIHS  --    < > 28.0* 22.4* 22.5*    < > = values in this interval not displayed.         Significant Imaging: I have reviewed all pertinent imaging results/findings  within the past 24 hours.  I have reviewed and interpreted all pertinent imaging results/findings within the past 24 hours.    CTA Head and Neck (xpd)    Addendum Date: 4/8/2024    Presumably reactive partially imaged right hilar lymphadenopathy which appears similar to prior exams.  Clinical correlation needed.  If indicated this can be further characterized with contrast-enhanced CT of the chest in the non emergent setting. Electronically signed by: Delvin Wilson Date:    04/08/2024 Time:    08:38    Result Date: 4/8/2024  EXAMINATION: CTA HEAD AND NECK (XPD) CLINICAL HISTORY: stroke like symptoms; TECHNIQUE: Non contrast low dose axial images were obtained through the head. CT angiogram was performed from the level of the ignacia to the top of the head following the IV administration of 100mL of Omnipaque 350.   Sagittal and coronal reconstructions and maximum intensity projection reconstructions were performed. Arterial stenosis percentages are based on NASCET measurement criteria. COMPARISON: Concurrently performed head CT 04/07/2024.  CTA of the head and neck 03/26/2024. FINDINGS: CTA HEAD: Vasculature: Mild atherosclerotic changes of the bilateral internal carotid arteries without evidence of any hemodynamically significant stenosis.  No hemodynamically significant stenosis or proximal large vessel occlusion.  No aneurysm identified.  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 Variant anatomy: Hypoplastic left P1 PCA in the setting of a prominent left-sided posterior communicating artery.  Dominant distal left vertebral artery. Brain: Limited supplemental post-contrast CT imaging of the head demonstrates no abnormal parenchymal enhancement, midline shift or mass effect, or space-occupying extra-axial fluid collection.  No CT evidence of an acute major vascular territory infarct again  noting chronic small vessel ischemic changes including multiple small lacunar type infarcts described on the concurrently performed noncontrast head CT. Ventricles/Sulci: The ventricles and sulci are appropriate in size for age. Osseous Structures: The osseous structures are unremarkable in appearance. Other: Scattered opacification of the paranasal sinuses as well as fluid within the pharyngeal airway in the setting of respiratory/enteric support tubes mastoid air cells are clear.  No acute bony process identified.  Chronic defect at the level of the left lamina papyracea. CTA NECK: Aorta: Conventional branching pattern. The great vessel origins are patent without flow limiting stenosis. Vertebral Arteries: The origins of the vertebral arteries are patent.  No flow limiting stenosis, occlusion, or dissection. Right Carotid: No flow limiting stenosis, occlusion, or dissection of the common carotid or internal carotid arteries.  Mild plaque of the proximal ICA.  Right internal carotid artery: Less than 50 % stenosis by and NASCET.  The internal carotid artery is medialized at the C2-C3 level. Left Carotid: No flow limiting stenosis, occlusion, or dissection of the common carotid or internal carotid arteries. No significant plaque of the proximal ICA. Right internal carotid artery: 0 % stenosis by and NASCET. Extravascular Anatomy: Emphysematous changes of the visualized upper lung zones.  Nonspecific prominence of a right hilar lymph node which is grossly unchanged compared to prior exams, nonspecific but presumably reactive.     1. No intracranial proximal large vessel occlusion or hemodynamically significant stenosis.  No change from prior CTA. 2. Limited postcontrast CT imaging demonstrates no acute process. 3. 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 4. No hemodynamically significant arterial stenosis within the neck. The preliminary and final reports are concordant.  Electronically signed by: Delvin Wilson Date:    04/08/2024 Time:    08:10    X-Ray Chest 1 View    Result Date: 4/8/2024  EXAMINATION: XR CHEST 1 VIEW CLINICAL HISTORY: Encounter for other preprocedural examination TECHNIQUE: Single frontal view of the chest was performed. COMPARISON: 04/07/2024 FINDINGS: The endotracheal tube has been placed and has its tip 3 cm above the ignacia.  Nasogastric tube has been placed and passes into the stomach and out of field of view.  The cardiomediastinal silhouette is normal limits.  The lungs are well expanded.  There is now slight patchy density again evident at the right lung base.  No pleural effusion.     Support devices in good position.  Probable mild right basilar infiltrate. Electronically signed by: Jozef Adams MD Date:    04/08/2024 Time:    08:37    X-Ray Chest AP Portable    Result Date: 4/8/2024  EXAMINATION: XR CHEST AP PORTABLE CLINICAL HISTORY: Chest Pain; TECHNIQUE: Single frontal view of the chest was performed. COMPARISON: 03/28/2024 FINDINGS: The cardiomediastinal silhouette is within normal limits.  The lungs are well expanded without consolidation or pleural effusion.  No infiltrate evident on this exam.     No acute process.  Previous right basilar predominant infiltrate no longer evident. Electronically signed by: Jozef Adams MD Date:    04/08/2024 Time:    08:34    X-ray chest AP portable    Result Date: 4/8/2024  EXAMINATION: XR CHEST AP PORTABLE CLINICAL HISTORY: hypoxemia; COMPARISON: 04/07/2024 FINDINGS: Cardiac silhouette size is within normal limits.  Endotracheal tube and enteric tube are in stable position. No airspace consolidation or pleural effusion.  No pneumothorax.  No acute osseous abnormality.     No acute pulmonary process. ET tube and enteric tube are in stable position. Electronically signed by: Patrick Mace Date:    04/08/2024 Time:    07:37    CT Head Without Contrast    Result Date: 4/8/2024  EXAMINATION: CT HEAD WITHOUT  CONTRAST CLINICAL HISTORY: Mental status change, unknown cause; TECHNIQUE: Low dose axial CT images obtained throughout the head without intravenous contrast. Sagittal and coronal reconstructions were performed. COMPARISON: CT/CTA 03/26/2024.  MRI brain 12/04/2023. FINDINGS: Brain: There is no evidence of a mass, edema, midline shift, or intracranial hemorrhage. No extra-axial fluid collection.  Grossly similar findings of chronic small vessel ischemia throughout the cerebral hemispheric white matter as well as chronic lacunar type infarcts involving the bilateral basal ganglia, right frontal corona radiata, and alf.  No CT evidence of an acute major vascular territorial infarct. Ventricles: The ventricles, sulci, and cisterns are within normal limits. Skull: The osseous structures are unremarkable in appearance. Extracranial soft tissues: Limited imaging is within normal limits. Other: Chronic injury to the left lamina papyracea, unchanged.  Mild mucosal thickening within the visualized paranasal sinuses.  Mastoid air cells are clear.     1. No acute intracranial CT findings or change from prior exams. The preliminary and final reports are concordant. Electronically signed by: Delvin Wilson Date:    04/08/2024 Time:    07:20    X-Ray Chest 1 View    Result Date: 3/28/2024  EXAMINATION: XR CHEST 1 VIEW CLINICAL HISTORY: Right pleuritic chest pain; TECHNIQUE: Single frontal view of the chest was performed. COMPARISON: 03/26/2024 FINDINGS: Faint interstitial disease in the lung bases.  Normal size heart.  No pleural effusion or pneumothorax.     Basilar opacities are less confluent. Electronically signed by: Iván Coronado Date:    03/28/2024 Time:    13:19    CTA Head and Neck (xpd)    Result Date: 3/26/2024  EXAMINATION: CTA HEAD AND NECK (XPD) CLINICAL HISTORY: Neuro deficit, acute, stroke suspected; TECHNIQUE: Non contrast low dose axial images were obtained through the head. CT angiogram was performed from the  level of the ignacia to the top of the head following the IV administration of 75mL of Omnipaque 350.   Sagittal and coronal reconstructions and maximum intensity projection reconstructions were performed. Arterial stenosis percentages are based on NASCET measurement criteria. COMPARISON: 12/03/2023, MRI brain 12/04/2023 FINDINGS: Brain: No evidence of acute intracranial hemorrhage. The ventricles and sulci are appropriate in size and configuration for the patient's age without hydrocephalus. Stable small chronic lacunar infarct in the right frontal corona radiata, as well as bilateral basal ganglia and right paramedian alf.  There is no significant mass effect, midline shift, or extra-axial fluid collection. Remainder of the gray-white matter differentiation is within normal limits without evidence of an acute major vascular territory infarct. No abnormal intracranial enhancement. Chronic deformity of the medial left orbital wall.  Partial opacification of the right sphenoid sinus.  No acute calvarial fracture. Extracranial: Aorta and great vessels: Left-sided aortic arch with normal configuration.   No evidence of stenosis of the origins of the great vessels from the arch. Subclavian arteries: The subclavian arteries are without hemodynamically significant stenosis or occlusion. Right carotid: The right common carotid artery is without significant stenosis. Mild calcific atherosclerotic plaque at the carotid bifurcation without evidence of a hemodynamically significant stenosis..  The right internal carotid artery is tortuous without significant stenosis, occlusion, or dissection. Left carotid: The left common carotid artery is without significant stenosis. Mild calcific atherosclerotic plaque at the carotid bifurcation without evidence of a hemodynamically significant stenosis.  The left internal carotid artery is tortuous without significant stenosis, occlusion, or dissection. Extracranial vertebral arteries:   The vertebral arteries are without significant stenosis, occlusion, or dissection to the skull base. Intracranial: Anterior circulation: Moderate calcific atherosclerosis of bilateral carotid artery siphons.  No areas of significant atherosclerotic narrowing or filling defects are identified.  The middle cerebral arteries are normal.  The anterior cerebral arteries are normal. Posterior circulation: The vertebral arteries are normal. The basilar artery is normal. 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  Posterior cerebral arteries otherwise grossly normal. No evidence of aneurysm or arteriovenous malformation. Dural venous sinuses are patent. Other: The visualized portions of the lung apices demonstrated moderate centrilobular emphysematous changes.  Respiratory motion limits evaluation of the pulmonary parenchyma.  No focal consolidation, pleural effusion or pneumothorax. There are degenerative spine changes. No concerning osseous lesions identified.     1. No evidence of acute intracranial abnormality or significant interval detrimental change as compared to the prior exam.  Further evaluation as warranted clinically. 2. Chronic small vessel ischemic changes again demonstrated, including chronic lacunar infarcts involving the right frontal corona radiata, bilateral basal ganglia and alf. 3. No high-grade stenosis, large vessel occlusion, dissection or aneurysm in the head and neck vasculature. Electronically signed by: Delvin Vera Date:    03/26/2024 Time:    18:25    X-Ray Ribs 2 View Right    Result Date: 3/26/2024  EXAMINATION: XR RIBS 2 VIEW RIGHT CLINICAL HISTORY: Unspecified fall, initial encounter TECHNIQUE: Two views of the right ribs were performed. COMPARISON: 03/26/2024 FINDINGS: No displaced fracture or traumatic malalignment.  Hazy interstitial opacities right lung base better characterized on dated chest  radiograph.  Mild degenerative change right AC joint. Electronically signed by: Iván Coronado Date:    03/26/2024 Time:    16:55    X-Ray Chest AP Portable    Result Date: 3/26/2024  EXAMINATION: XR CHEST AP PORTABLE CLINICAL HISTORY: Pleurodynia TECHNIQUE: Single frontal view of the chest was performed. COMPARISON: 12/03/2023 and 09/26/2023 FINDINGS: There is reticulonodular disease in the lower lung zones with some tram-tracking.  Upper lung zones are clear.  Unchanged heart size.  No pleural effusion or pneumothorax.     Unchanged extent of reticulonodular disease in the lung bases.  This is better characterized on prior CT.  Some differential considerations include includes atypical infection, pneumonitis, aspiration, airways disease. Electronically signed by: Iván Coronado Date:    03/26/2024 Time:    16:49  - pulls last radiology orders      Assessment/Plan:      Acute encephalopathy    Secondary to CO2 retention.      COPD (chronic obstructive pulmonary disease)  Continue duo nebulizers and IV steroids    CVA (cerebral vascular accident)  Patient has history of CVA with residual left-sided weakness secondary to previous ICH.  Not CT head is negative for acute changes.  Continue aspirin  Hold Plavix now  Neurologist following, pending MRI brain    Acute on chronic respiratory failure with hypoxia and hypercapnia  Patient apparently has severe COPD, continue heavy smoker, on top of that, patient taking tramadol, baclofen, Lyrica from what I see, family denies OD.  Patient has significant pinpoint pupils for my physical examination.  I suspect the patient may have narcotic overdose on top of severe COPD.    Intubated on 4/7, extubated on 4/9    Continue low-dose Precedex drip, p.o. trial later when patient is more awake, continue steroids and duo nebulizers.     Tobacco dependency  We will do counseling once patient is extubated    DM (diabetes mellitus), type 2  Patient's FSGs are controlled on current  "medication regimen.  Last A1c reviewed-   Lab Results   Component Value Date    LABA1C 9.8 (H) 07/06/2016    HGBA1C 6.1 03/26/2024     Most recent fingerstick glucose reviewed-   No results for input(s): "POCTGLUCOSE" in the last 24 hours.    Current correctional scale  Medium  Maintain anti-hyperglycemic dose as follows-   Antihyperglycemics (From admission, onward)      None          Hold Oral hypoglycemics while patient is in the hospital.      VTE Risk Mitigation (From admission, onward)           Ordered     enoxaparin injection 40 mg  Daily        Note to Pharmacy: Ht: 5' 6" (1.676 m)  Wt: 66.4 kg (146 lb 6.2 oz)  Estimated Creatinine Clearance: 90.8 mL/min (based on SCr of 0.8 mg/dL).  Body mass index is 23.63 kg/m².    04/08/24 0245     IP VTE HIGH RISK PATIENT  Once         04/08/24 0245     Place sequential compression device  Until discontinued         04/08/24 0245                    Discharge Planning   ZACARIAS: 4/13/2024     Code Status: Full Code   Is the patient medically ready for discharge?:     Reason for patient still in hospital (select all that apply): Patient trending condition and Treatment  Discharge Plan A: Home with family, Home Health              Alissa Jackson MD  Department of Hospital Medicine   [facility]    "

## 2024-04-09 NOTE — CARE UPDATE
04/09/24 0700   Patient Assessment/Suction   Level of Consciousness (AVPU) responds to pain   Respiratory Effort Unlabored   Expansion/Accessory Muscles/Retractions no use of accessory muscles;no retractions;expansion symmetric   All Lung Fields Breath Sounds Anterior:   BARON Breath Sounds clear   RUL Breath Sounds clear   Rhythm/Pattern, Respiratory assisted mechanically   PRE-TX-O2   Device (Oxygen Therapy) ventilator   $ Is the patient on High Flow Oxygen? Yes   SpO2 98 %   Pulse Oximetry Type Continuous   $ Pulse Oximetry - Multiple Charge Pulse Oximetry - Multiple   Pulse 64   Resp 17   Aerosol Therapy   $ Aerosol Therapy Charges Aerosol Treatment   Daily Review of Necessity (SVN) completed   Respiratory Treatment Status (SVN) given   Treatment Route (SVN) in-line   Patient Position (SVN) HOB elevated   Post Treatment Assessment (SVN) breath sounds unchanged   Signs of Intolerance (SVN) none   Breath Sounds Post-Respiratory Treatment   Throughout All Fields Post-Treatment All Fields   Throughout All Fields Post-Treatment no change   Post-treatment Heart Rate (beats/min) 64   Post-treatment Resp Rate (breaths/min) 18        Oral Airway 04/07/24 2026    Placement Date/Time: 04/07/24 2026   Size (mm): (c)   Placed By: ED physician  Insertion attempts (enter comment if more than 2 attempts): 1   Secured Location Right   Secured by Commercial tube alfaro   Site Condition Cool;Dry        Airway - Non-Surgical Endotracheal Tube   No placement date or time found.   Airway Device: Endotracheal Tube  Airway Device Size: 7.5  Style: Cuffed  Cuff Inflated With: Air  Placement Verified By: Auscultation  ETT Centimeters Above the Shelli: 24 cm  Breath Sounds: Equal Bilateral   Secured at 26 cm   Measured At Lips   Secured Location Right   Secured by Commercial tube alfaro   Position of ETT in xRay In good position   Tube Securement Device Changed? Yes   Site Condition Cool;Dry   Status Intact;Secured;Patent   Site  Assessment Clean;Dry   General Safety Checklist   Safety Promotion/Fall Prevention side rails raised   Airway Safety   Is Ambu Bag and Mask with Patient? Yes, Adult Ambu Bag and Mask   Suction set is at the bedside? Yes   Vent Select   Conventional Vent Y   $ Ventilator Subsequent 1   Charged w/in last 24h YES   Preset Conventional Ventilator Settings   Vent ID [id number]   Vent Type    Humidity HME   Patient Ventilator Parameters   All Fields Breath Sounds clear   Conventional Ventilator Alarms   Alarms On Y   Ve High Alarm 20 L/min   Ve Low Alarm 2 L/min   Vt High Alarm 1000 mL   Vt Low Alarm 250 mL   Resp Rate Low Alarm 10   Education   $ Education Bronchodilator;Ventilator Oxygen;30 min

## 2024-04-09 NOTE — PROGRESS NOTES
Pulmonary/Critical Care  Progress Note      Patient name: Hernan Badillo  MRN: [id number]  Date: 04/09/2024    Admit Date: 4/7/2024  Consult Requested By: Alissa Jackson MD    Reason for Consult: respiratory failure    HPI:    4/8/2024 - Pt found unresponsive but breathing and brought to ER for evaluation.  On arrival he had decreased sats and vBG showed elevated paCO2.  By report he was given narcan with some response but was intubated for airway protection.  Moved to ICU and has required sedation.  SBT tried this AM but ABG  was not adequate and sTV were small by report.  Case d/w nursing and family at bedside.  Pt is not responsive at the time of my exam.  Chart has been reviewed.    4/9/2024 - Stable overnight, did SBT early this AM with good parameters and ABG and will proceed with extubation this AM.  No new issues reported.  He did get agitated some when sedation was decreased for SBT.  Hgb has decreased some but no bleeding reported.  Procalcitonin was very low.  Troponin only minimally elevated and trending down.  CXR looks OK, EKG reviewed and cardiology following.  ECHO noted    Review of Systems    Review of Systems   Unable to perform ROS: Intubated   Constitutional:  Positive for weight loss.   Neurological:  Positive for speech change (some slurring at baseline) and weakness (L side (by family report though chart says right), prior CVA).        Altered mental status       Past Medical History    Past Medical History:   Diagnosis Date    COPD (chronic obstructive pulmonary disease)     Diabetes mellitus, type 2     Hypertension        Past Surgical History    Past Surgical History:   Procedure Laterality Date    DENTAL SURGERY      ESOPHAGOGASTRODUODENOSCOPY N/A 6/1/2020    Procedure: EGD (ESOPHAGOGASTRODUODENOSCOPY);  Surgeon: Terrell May MD;  Location: [facility];  Service: Endoscopy;  Laterality: N/A;    HERNIA REPAIR      left inguinal    incision and drainiage         Medications (scheduled):       albuterol-ipratropium  3 mL Nebulization Q4H    aspirin  81 mg Oral Daily    chlorhexidine  15 mL Mouth/Throat BID    enoxparin  40 mg Subcutaneous Daily    EScitalopram oxalate  10 mg Per OG tube Daily    famotidine (PF)  20 mg Intravenous Q12H    methylPREDNISolone sodium succinate injection  60 mg Intravenous Q8H    mupirocin   Nasal BID       Medications (infusions):      sodium chloride 0.9% 100 mL/hr at 04/08/24 2137    dexmedeTOMIDine (Precedex) infusion (titrating) 1.4 mcg/kg/hr (04/09/24 0825)    propofoL Stopped (04/09/24 0730)       Medications (prn):     acetaminophen, albuterol sulfate, calcium gluconate IVPB, calcium gluconate IVPB, calcium gluconate IVPB, fentaNYL, magnesium sulfate IVPB, magnesium sulfate IVPB, ondansetron, potassium chloride **AND** potassium chloride **AND** potassium chloride, sodium chloride 0.9%, sodium phosphate 15 mmol in dextrose 5 % (D5W) 250 mL IVPB, sodium phosphate 20.01 mmol in dextrose 5 % (D5W) 250 mL IVPB, sodium phosphate 30 mmol in dextrose 5 % (D5W) 250 mL IVPB    Family History:   Family History   Problem Relation Age of Onset    Heart attack Father 80    Heart disease Father     Drug abuse Father     Cancer Maternal Grandmother         colon       Social History: Tobacco:   Social History     Tobacco Use   Smoking Status Every Day    Current packs/day: 2.00    Average packs/day: 2.0 packs/day for 30.0 years (60.0 ttl pk-yrs)    Types: Cigarettes   Smokeless Tobacco Never                                EtOH:   Social History     Substance and Sexual Activity   Alcohol Use Yes    Alcohol/week: 1.0 standard drink of alcohol    Types: 1 Shots of liquor per week    Comment: 1/2 pint 2 x per week - quit approximately 5 months ago                                Drugs:   Social History     Substance and Sexual Activity   Drug Use No       Physical Exam    Vital signs:  Temp:  [97 °F (36.1 °C)-98.1 °F (36.7 °C)]   Pulse:  [50-83]   Resp:  [14-29]   BP: ()/(32-94)  "  SpO2:  [90 %-100 %]     Intake/Output:   Intake/Output Summary (Last 24 hours) at 4/9/2024 0922  Last data filed at 4/9/2024 0601  Gross per 24 hour   Intake 2315.95 ml   Output 382 ml   Net 1933.95 ml          BMI: Estimated body mass index is 23.63 kg/m² as calculated from the following:    Height as of this encounter: 5' 6" (1.676 m).    Weight as of this encounter: 66.4 kg (146 lb 6.2 oz).    Physical Exam  Vitals and nursing note reviewed.   Constitutional:       General: He is not in acute distress.     Appearance: Normal appearance. He is not ill-appearing, toxic-appearing or diaphoretic.      Comments: Thin  Intubated  Sedated   By report does respond some when sedation decreased    HENT:      Head: Normocephalic and atraumatic.      Right Ear: External ear normal.      Left Ear: External ear normal.      Nose: Nose normal.      Mouth/Throat:      Mouth: Mucous membranes are moist.      Pharynx: Oropharynx is clear. No oropharyngeal exudate.      Comments: Intubated  Could not do full exam  Eyes:      General: No scleral icterus.        Right eye: No discharge.         Left eye: No discharge.      Pupils: Pupils are equal, round, and reactive to light.      Comments: Conjunctiva muddy   Neck:      Vascular: No carotid bruit.      Comments: No elevated JVD  Cardiovascular:      Rate and Rhythm: Normal rate and regular rhythm.      Pulses: Normal pulses.      Heart sounds: Normal heart sounds. No murmur heard.     No friction rub. No gallop.   Pulmonary:      Effort: Pulmonary effort is normal. No respiratory distress.      Breath sounds: No stridor. Rhonchi present. No wheezing or rales.      Comments: Ventilated   Chest:      Chest wall: No tenderness.   Abdominal:      General: There is no distension.      Tenderness: There is no abdominal tenderness. There is no guarding.      Comments: Hypoactive/rare BS   Musculoskeletal:         General: No swelling. Normal range of motion.      Cervical back: Normal " "range of motion and neck supple. No rigidity or tenderness.      Right lower leg: No edema.      Left lower leg: No edema.   Lymphadenopathy:      Cervical: No cervical adenopathy.   Skin:     General: Skin is warm and dry.      Capillary Refill: Capillary refill takes 2 to 3 seconds.      Coloration: Skin is not jaundiced.      Findings: No bruising.   Neurological:      Cranial Nerves: No cranial nerve deficit.      Sensory: No sensory deficit.      Motor: No weakness.      Comments: Sedated  WILD  Not able to fully assess strength   Psychiatric:      Comments: Not able to assess         Laboratory    Recent Labs   Lab 04/09/24  0829   WBC 6.47   RBC 3.94*   HGB 11.8*   HCT 36.9*   *   MCV 94   MCH 29.9   MCHC 32.0         Recent Labs   Lab 04/09/24  0211   CALCIUM 8.8   ALBUMIN 3.3*   PROT 6.5      K 4.3   CO2 25      BUN 31*   CREATININE 1.1   ALKPHOS 47*   ALT 4*   AST 8*   BILITOT 0.7         No results for input(s): "PT", "INR", "APTT" in the last 24 hours.      No results for input(s): "CPK", "CPKMB", "TROPONINI", "MB" in the last 24 hours.      Additional labs: reviewed    Microbiology:       Microbiology Results (last 7 days)       Procedure Component Value Units Date/Time    Culture, Respiratory with Gram Stain [[id number]] Collected: 04/08/24 0742    Order Status: Completed Specimen: Respiratory from Endotracheal Aspirate Updated: 04/09/24 0734     Respiratory Culture Reduced Normal Respiratory willie     Gram Stain (Respiratory) <10 epithelial cells per low power field.     Gram Stain (Respiratory) Few WBC's     Gram Stain (Respiratory) Rare Gram positive cocci            Radiology    X-Ray Chest 1 View  Narrative: EXAMINATION:  XR CHEST 1 VIEW    CLINICAL HISTORY:  Respiratory failure.    FINDINGS:  Two portable chest radiographs at 04:48 hours compared to 04/08/2024 show ET and NG tubes, unchanged in position.  The cardiomediastinal silhouette and pulmonary vasculature are within " normal limits.    The lungs are normally expanded, with no consolidation, large pleural effusion, evidence of pulmonary edema, or pneumothorax.  Impression: No significant interval change, or evidence of acute cardiopulmonary disease.    Electronically signed by: Ravi Meza  Date:    04/09/2024  Time:    06:35        Additional Studies    reviewed    Ventilator Information    Vent Mode: A/C  Oxygen Concentration (%):  [30-40] 40  Resp Rate Total:  [14 br/min-25 br/min] 16 br/min  Vt Set:  [500 mL] 500 mL  PEEP/CPAP:  [5 cmH20] 5 cmH20  Pressure Support:  [10 cmH20] 10 cmH20  Mean Airway Pressure:  [9.1 nlT04-59 cmH20] 9.1 cmH20             Recent Labs     04/09/24  0527   PH 7.359   PCO2 43.2   PO2 85   HCO3 24.4   POCSATURATED 96   BE -1           Impression    Active Hospital Problems    Diagnosis  POA    CVA (cerebral vascular accident) [I63.9]  Yes    COPD (chronic obstructive pulmonary disease) [J44.9]  Yes    Acute encephalopathy [G93.40]  Yes    Acute on chronic respiratory failure with hypoxia and hypercapnia [J96.21, J96.22]  Yes    Tobacco dependency [F17.200]  Yes    DM (diabetes mellitus), type 2 [E11.9]  Yes      Resolved Hospital Problems   No resolved problems to display.       Plan    Proceed with extubation  ? If decreased mental status was relkated to narcotic use (note response to narcan)  Seen by neurology  Cardiology following  Continue IVF for now but will stop once taking po well    Thank you for this consult.  I will follow with you while the patient is hospitalized.      Critical Care Time    I have spent > 35 minutes providing critical care services for this pt for the above diagnoses.  These services have included pt evaluation, pt exam, ventilator assessment, SBT assessment, decision to extubate, discussions with staff, chart review, data review, note preparation and .  Medications have been reviewed and adjusted as needed.  The patient has life threatening illness with a high  risk of decompensation and/or death.      Iván Beckett MD  [facility] Pulmonary/Critical Care

## 2024-04-10 LAB
ALBUMIN SERPL BCP-MCNC: 3.3 G/DL (ref 3.5–5.2)
ALP SERPL-CCNC: 43 U/L (ref 55–135)
ALT SERPL W/O P-5'-P-CCNC: 4 U/L (ref 10–44)
ANION GAP SERPL CALC-SCNC: 6 MMOL/L (ref 8–16)
AST SERPL-CCNC: 7 U/L (ref 10–40)
BACTERIA SPEC AEROBE CULT: NORMAL
BASOPHILS # BLD AUTO: 0.01 K/UL (ref 0–0.2)
BASOPHILS NFR BLD: 0.1 % (ref 0–1.9)
BILIRUB SERPL-MCNC: 0.7 MG/DL (ref 0.1–1)
BUN SERPL-MCNC: 24 MG/DL (ref 6–20)
CALCIUM SERPL-MCNC: 8.6 MG/DL (ref 8.7–10.5)
CHLORIDE SERPL-SCNC: 107 MMOL/L (ref 95–110)
CO2 SERPL-SCNC: 28 MMOL/L (ref 23–29)
CREAT SERPL-MCNC: 0.8 MG/DL (ref 0.5–1.4)
DIFFERENTIAL METHOD BLD: ABNORMAL
EOSINOPHIL # BLD AUTO: 0 K/UL (ref 0–0.5)
EOSINOPHIL NFR BLD: 0 % (ref 0–8)
ERYTHROCYTE [DISTWIDTH] IN BLOOD BY AUTOMATED COUNT: 13.8 % (ref 11.5–14.5)
EST. GFR  (NO RACE VARIABLE): >60 ML/MIN/1.73 M^2
GLUCOSE SERPL-MCNC: 281 MG/DL (ref 70–110)
GRAM STN SPEC: NORMAL
HCT VFR BLD AUTO: 34.9 % (ref 40–54)
HGB BLD-MCNC: 11.5 G/DL (ref 14–18)
IMM GRANULOCYTES # BLD AUTO: 0.08 K/UL (ref 0–0.04)
IMM GRANULOCYTES NFR BLD AUTO: 0.6 % (ref 0–0.5)
LYMPHOCYTES # BLD AUTO: 0.3 K/UL (ref 1–4.8)
LYMPHOCYTES NFR BLD: 2.6 % (ref 18–48)
MAGNESIUM SERPL-MCNC: 1.9 MG/DL (ref 1.6–2.6)
MCH RBC QN AUTO: 30.3 PG (ref 27–31)
MCHC RBC AUTO-ENTMCNC: 33 G/DL (ref 32–36)
MCV RBC AUTO: 92 FL (ref 82–98)
MONOCYTES # BLD AUTO: 0.3 K/UL (ref 0.3–1)
MONOCYTES NFR BLD: 2.3 % (ref 4–15)
NEUTROPHILS # BLD AUTO: 12.2 K/UL (ref 1.8–7.7)
NEUTROPHILS NFR BLD: 94.4 % (ref 38–73)
NRBC BLD-RTO: 0 /100 WBC
PHOSPHATE SERPL-MCNC: 2.1 MG/DL (ref 2.7–4.5)
PLATELET # BLD AUTO: 145 K/UL (ref 150–450)
PMV BLD AUTO: 10.7 FL (ref 9.2–12.9)
POTASSIUM SERPL-SCNC: 3.7 MMOL/L (ref 3.5–5.1)
PROT SERPL-MCNC: 6.1 G/DL (ref 6–8.4)
RBC # BLD AUTO: 3.8 M/UL (ref 4.6–6.2)
SODIUM SERPL-SCNC: 141 MMOL/L (ref 136–145)
WBC # BLD AUTO: 12.9 K/UL (ref 3.9–12.7)

## 2024-04-10 PROCEDURE — 85025 COMPLETE CBC W/AUTO DIFF WBC: CPT | Performed by: HOSPITALIST

## 2024-04-10 PROCEDURE — 83735 ASSAY OF MAGNESIUM: CPT | Performed by: INTERNAL MEDICINE

## 2024-04-10 PROCEDURE — 97116 GAIT TRAINING THERAPY: CPT

## 2024-04-10 PROCEDURE — 94640 AIRWAY INHALATION TREATMENT: CPT

## 2024-04-10 PROCEDURE — 94760 N-INVAS EAR/PLS OXIMETRY 1: CPT

## 2024-04-10 PROCEDURE — 63600175 PHARM REV CODE 636 W HCPCS: Performed by: INTERNAL MEDICINE

## 2024-04-10 PROCEDURE — 12000002 HC ACUTE/MED SURGE SEMI-PRIVATE ROOM

## 2024-04-10 PROCEDURE — 36415 COLL VENOUS BLD VENIPUNCTURE: CPT | Performed by: INTERNAL MEDICINE

## 2024-04-10 PROCEDURE — 36415 COLL VENOUS BLD VENIPUNCTURE: CPT | Performed by: HOSPITALIST

## 2024-04-10 PROCEDURE — 87040 BLOOD CULTURE FOR BACTERIA: CPT | Performed by: HOSPITALIST

## 2024-04-10 PROCEDURE — 99233 SBSQ HOSP IP/OBS HIGH 50: CPT | Mod: ,,, | Performed by: INTERNAL MEDICINE

## 2024-04-10 PROCEDURE — 25000242 PHARM REV CODE 250 ALT 637 W/ HCPCS: Performed by: HOSPITALIST

## 2024-04-10 PROCEDURE — 94761 N-INVAS EAR/PLS OXIMETRY MLT: CPT

## 2024-04-10 PROCEDURE — 97161 PT EVAL LOW COMPLEX 20 MIN: CPT

## 2024-04-10 PROCEDURE — 25000003 PHARM REV CODE 250: Performed by: HOSPITALIST

## 2024-04-10 PROCEDURE — 63600175 PHARM REV CODE 636 W HCPCS: Performed by: HOSPITALIST

## 2024-04-10 PROCEDURE — 80053 COMPREHEN METABOLIC PANEL: CPT | Performed by: INTERNAL MEDICINE

## 2024-04-10 PROCEDURE — 84100 ASSAY OF PHOSPHORUS: CPT | Performed by: INTERNAL MEDICINE

## 2024-04-10 PROCEDURE — 25000003 PHARM REV CODE 250: Performed by: INTERNAL MEDICINE

## 2024-04-10 PROCEDURE — 99900031 HC PATIENT EDUCATION (STAT)

## 2024-04-10 RX ORDER — HYDROCODONE BITARTRATE AND ACETAMINOPHEN 5; 325 MG/1; MG/1
1 TABLET ORAL EVERY 6 HOURS PRN
Status: DISCONTINUED | OUTPATIENT
Start: 2024-04-10 | End: 2024-04-10

## 2024-04-10 RX ORDER — PREDNISONE 20 MG/1
40 TABLET ORAL DAILY
Status: DISCONTINUED | OUTPATIENT
Start: 2024-04-11 | End: 2024-04-11 | Stop reason: HOSPADM

## 2024-04-10 RX ORDER — HYDROCODONE BITARTRATE AND ACETAMINOPHEN 7.5; 325 MG/1; MG/1
1 TABLET ORAL EVERY 6 HOURS PRN
Status: DISCONTINUED | OUTPATIENT
Start: 2024-04-10 | End: 2024-04-11 | Stop reason: HOSPADM

## 2024-04-10 RX ORDER — HALOPERIDOL 5 MG/ML
5 INJECTION INTRAMUSCULAR EVERY 6 HOURS PRN
Status: DISCONTINUED | OUTPATIENT
Start: 2024-04-10 | End: 2024-04-11 | Stop reason: HOSPADM

## 2024-04-10 RX ORDER — CLOPIDOGREL BISULFATE 75 MG/1
75 TABLET ORAL DAILY
Status: DISCONTINUED | OUTPATIENT
Start: 2024-04-10 | End: 2024-04-11 | Stop reason: HOSPADM

## 2024-04-10 RX ADMIN — IPRATROPIUM BROMIDE AND ALBUTEROL SULFATE 3 ML: 2.5; .5 SOLUTION RESPIRATORY (INHALATION) at 07:04

## 2024-04-10 RX ADMIN — HALOPERIDOL LACTATE 5 MG: 5 INJECTION, SOLUTION INTRAMUSCULAR at 10:04

## 2024-04-10 RX ADMIN — ASPIRIN 81 MG: 81 TABLET, COATED ORAL at 09:04

## 2024-04-10 RX ADMIN — IPRATROPIUM BROMIDE AND ALBUTEROL SULFATE 3 ML: 2.5; .5 SOLUTION RESPIRATORY (INHALATION) at 11:04

## 2024-04-10 RX ADMIN — FAMOTIDINE 20 MG: 10 INJECTION INTRAVENOUS at 09:04

## 2024-04-10 RX ADMIN — AZITHROMYCIN MONOHYDRATE 500 MG: 500 INJECTION, POWDER, LYOPHILIZED, FOR SOLUTION INTRAVENOUS at 09:04

## 2024-04-10 RX ADMIN — CLOPIDOGREL BISULFATE 75 MG: 75 TABLET, FILM COATED ORAL at 10:04

## 2024-04-10 RX ADMIN — CHLORHEXIDINE GLUCONATE 15 ML: 1.2 RINSE ORAL at 09:04

## 2024-04-10 RX ADMIN — ESCITALOPRAM OXALATE 10 MG: 10 TABLET ORAL at 09:04

## 2024-04-10 RX ADMIN — FAMOTIDINE 20 MG: 10 INJECTION INTRAVENOUS at 08:04

## 2024-04-10 RX ADMIN — METHYLPREDNISOLONE SODIUM SUCCINATE 40 MG: 40 INJECTION, POWDER, FOR SOLUTION INTRAMUSCULAR; INTRAVENOUS at 06:04

## 2024-04-10 RX ADMIN — IPRATROPIUM BROMIDE AND ALBUTEROL SULFATE 3 ML: 2.5; .5 SOLUTION RESPIRATORY (INHALATION) at 03:04

## 2024-04-10 RX ADMIN — IPRATROPIUM BROMIDE AND ALBUTEROL SULFATE 3 ML: 2.5; .5 SOLUTION RESPIRATORY (INHALATION) at 12:04

## 2024-04-10 RX ADMIN — ENOXAPARIN SODIUM 40 MG: 40 INJECTION SUBCUTANEOUS at 05:04

## 2024-04-10 RX ADMIN — HYDROCODONE BITARTRATE AND ACETAMINOPHEN 1 TABLET: 7.5; 325 TABLET ORAL at 07:04

## 2024-04-10 RX ADMIN — POTASSIUM CHLORIDE 40 MEQ: 7.46 INJECTION, SOLUTION INTRAVENOUS at 04:04

## 2024-04-10 RX ADMIN — FENTANYL CITRATE 100 MCG: 50 INJECTION INTRAMUSCULAR; INTRAVENOUS at 04:04

## 2024-04-10 RX ADMIN — HYDROCODONE BITARTRATE AND ACETAMINOPHEN 1 TABLET: 5; 325 TABLET ORAL at 01:04

## 2024-04-10 RX ADMIN — CEFTRIAXONE SODIUM 1 G: 1 INJECTION, POWDER, FOR SOLUTION INTRAMUSCULAR; INTRAVENOUS at 09:04

## 2024-04-10 RX ADMIN — FENTANYL CITRATE 100 MCG: 50 INJECTION INTRAMUSCULAR; INTRAVENOUS at 07:04

## 2024-04-10 RX ADMIN — MUPIROCIN 1 G: 20 OINTMENT TOPICAL at 09:04

## 2024-04-10 NOTE — NURSING
Patient AAOX4, moves all extremities, and is calm and cooperative. Bedside swallow screen completed and passed. Ann catheter removed and urinal provided.

## 2024-04-10 NOTE — PROGRESS NOTES
[facility]  Department of Neurology  Progress Note  Date: 04/10/2024 10:15 AM          Patient Name: Hernan Badillo   MRN: [id number]   : 1965    AGE: 58 y.o.    LOS: 3 days Hospital Day: 4  Admit date: 2024  6:47 PM       2024: No acute events overnight. Patient was seen and examined by me this morning.  Patient extubated this morning remains to be on Precedex drip due to agitation.  On exam, he wakes up to stimulus able to follow commands with bilateral upper and lower extremities and able to answer some questions.  He states that he did take pain pills that he got off the street.  Denies any other complaints at this time.    04/10/2024:  Patient was seen examined.  He is awake and oriented x3.  He is much calmer and able to cooperate history and exam.  He endorses of taking street side pain medicine and he states that he did not know that he should not take them.  He denies any other new complaints.  He complains of mild stiffness in his right-sided which is baseline from his prior stroke.         Vitals:  Patient Vitals for the past 24 hrs:   BP Temp Temp src Pulse Resp SpO2   04/10/24 1522 -- -- -- 94 20 95 %   04/10/24 1430 -- -- -- 90 -- (!) 90 %   04/10/24 1415 -- -- -- 88 -- (!) 88 %   04/10/24 1400 (!) 143/72 -- -- 89 -- (!) 88 %   04/10/24 1347 -- -- -- 97 (!) 30 (!) 94 %   04/10/24 1345 -- -- -- 98 (!) 28 (!) 94 %   04/10/24 1330 -- -- -- 98 (!) 35 (!) 93 %   04/10/24 1315 -- -- -- 99 (!) 30 (!) 89 %   04/10/24 1300 (!) 142/71 -- -- 102 (!) 32 (!) 90 %   04/10/24 1245 -- -- -- 100 (!) 30 (!) 87 %   04/10/24 1230 137/72 -- -- 108 -- --   04/10/24 1215 -- -- -- 107 -- --   04/10/24 1200 -- -- -- 108 -- --   04/10/24 1145 -- -- -- 108 -- --   04/10/24 1130 -- -- -- 110 (!) 37 --   04/10/24 1118 -- -- -- 99 18 96 %   04/10/24 1115 -- -- -- 97 (!) 33 --   04/10/24 1100 (!) 160/82 -- -- 96 (!) 34 --   04/10/24 1045 -- -- -- 95 (!) 29 --   04/10/24 1030 -- -- -- 105 (!) 41 --    04/10/24 1015 -- -- -- 103 (!) 26 --   04/10/24 1000 114/84 -- -- 99 (!) 51 100 %   04/10/24 0945 -- -- -- 103 (!) 30 (!) 92 %   04/10/24 0930 -- -- -- 105 (!) 33 98 %   04/10/24 0915 -- -- -- 105 (!) 30 96 %   04/10/24 0900 123/71 -- -- 101 (!) 33 96 %   04/10/24 0845 -- -- -- 99 (!) 27 (!) 94 %   04/10/24 0830 -- -- -- (!) 112 (!) 29 95 %   04/10/24 0815 -- -- -- 102 (!) 26 95 %   04/10/24 0800 (!) 140/72 -- -- 96 (!) 29 95 %   04/10/24 0745 -- -- -- 93 (!) 41 97 %   04/10/24 0730 -- -- -- 86 (!) 25 96 %   04/10/24 0715 -- -- -- 66 -- 99 %   04/10/24 0712 -- -- -- 84 18 95 %   04/10/24 0706 -- -- -- -- (!) 30 --   04/10/24 0701 -- 98.5 °F (36.9 °C) Oral -- -- --   04/10/24 0700 (!) 148/84 -- -- 102 (!) 35 97 %   04/10/24 0600 (!) 154/88 -- -- 96 (!) 37 96 %   04/10/24 0500 (!) 157/83 -- -- 91 (!) 31 97 %   04/10/24 0416 -- -- -- -- (!) 27 --   04/10/24 0400 (!) 188/88 98 °F (36.7 °C) Axillary 90 -- 98 %   04/10/24 0300 (!) 191/91 -- -- 87 (!) 27 100 %   04/10/24 0200 (!) 167/79 -- -- 81 (!) 23 95 %   04/10/24 0100 (!) 166/80 -- -- 88 (!) 26 99 %   04/10/24 0036 -- -- -- 75 18 (!) 91 %   04/10/24 0000 (!) 172/79 -- -- 82 (!) 48 (!) 89 %   04/09/24 2305 (!) 189/91 97.4 °F (36.3 °C) Axillary (!) 59 (!) 31 (!) 89 %   04/09/24 2300 (!) 202/95 -- -- (!) 56 19 100 %   04/09/24 2200 (!) 193/92 -- -- 64 19 96 %   04/09/24 2135 (!) 187/86 -- -- 69 (!) 41 97 %   04/09/24 2130 (!) 209/90 -- -- 77 (!) 33 99 %   04/09/24 2115 (!) 190/91 -- -- 69 (!) 29 100 %   04/09/24 2100 (!) 212/91 -- -- 69 (!) 22 100 %   04/09/24 2030 (!) 190/91 -- -- 66 (!) 21 100 %   04/09/24 2015 (!) 199/93 -- -- 71 (!) 22 100 %   04/09/24 2000 (!) 198/90 -- -- (!) 57 18 100 %   04/09/24 1952 -- -- -- 61 20 100 %   04/09/24 1930 (!) 171/88 97 °F (36.1 °C) Axillary 66 (!) 24 99 %   04/09/24 1900 (!) 181/101 -- -- 68 19 98 %   04/09/24 1638 (!) 202/94 -- -- -- -- --   04/09/24 1630 -- -- -- -- (!) 24 --     PHYSICAL EXAM:     GENERAL APPEARANCE:  Well-developed, well-nourished male in no acute distress.  HEENT: Normocephalic and atraumatic. PERRL. Oropharynx unremarkable.  PULM: Comfortable on room air.  CV: RRR.  ABDOMEN: Soft, nontender.  EXTREMITIES: No signs of vascular compromise. Pulses present. No cyanosis, clubbing or edema.  SKIN: Clear; no rashes, lesions or skin breaks in exposed areas.      NEURO:   MENTAL STATUS: Patient awake and oriented x3.  Able to follow commands appropriately.  Speech is improved.   CRANIAL NERVES II-XII: Pupils equal, round and reactive to light. Extraocular movements full and intact. No facial asymmetry.  MOTOR: Normal bulk. Tone mild increase in RUE  No abnormal movements. No tremor.   Strength:  5/5 in left upper extremity, 4+/5 in right upper extremity.  Left lower extremity 5/5 in right lower extremity 4+/5  REFLEXES: DTRs 1+; normal and symmetric throughout.   SENSATION: Sensation grossly intact to fine touch.  COORDINATION:  Did not assess  STATION: Romberg deferred.  GAIT: Deferred.    CURRENT SCHEDULED MEDICATIONS:   albuterol-ipratropium  3 mL Nebulization Q4H    aspirin  81 mg Oral Daily    cefTRIAXone (Rocephin) IV (PEDS and ADULTS)  1 g Intravenous Q24H    chlorhexidine  15 mL Mouth/Throat BID    clopidogreL  75 mg Oral Daily    enoxparin  40 mg Subcutaneous Daily    EScitalopram oxalate  10 mg Per OG tube Daily    famotidine (PF)  20 mg Intravenous Q12H    mupirocin   Nasal BID    [START ON 4/11/2024] predniSONE  40 mg Oral Daily     CURRENT INFUSIONS:      DATA:  Recent Labs   Lab 04/07/24  1925 04/07/24  1944 04/08/24  0518 04/09/24  0211 04/09/24  0829 04/09/24  1605 04/10/24  0302     --  138 139  --   --  141   K 4.2  --  4.1 4.3  --  4.1 3.7   CL 96  --  101 102  --   --  107   CO2 28  --  27 25  --   --  28   BUN 12  --  13 31*  --   --  24*   CREATININE 0.8  --  0.8 1.1  --   --  0.8   *  --  110 258*  --   --  281*   CALCIUM 9.5  --  9.1 8.8  --   --  8.6*   PHOS  --   --  3.4 5.2*  --   " --  2.1*   MG 1.6  --  1.6 1.5* 2.3 2.0 1.9   AST 14  --  10 8*  --   --  7*   ALT 6*  --  <3* 4*  --   --  4*   AMMONIA  --  39  --   --   --   --   --      Recent Labs   Lab 04/07/24  1925 04/08/24  0406 04/08/24  0518 04/09/24  0415 04/09/24  0527 04/09/24  0829 04/10/24  0302   WBC 7.48  --  7.45  --   --  6.47 12.90*   HGB 13.4*  --  13.1*  --   --  11.8* 11.5*   HCT 41.8 36 41.7 35* 36 36.9* 34.9*   *  --  112*  --   --  141* 145*     No results found for: "PROTEINCSF", "GLUCCSF", "WBCCSF", "RBCCSF", "PMNCSF"  Hemoglobin A1C   Date Value Ref Range Status   03/26/2024 6.1 4.5 - 6.2 % Final     Comment:     ADA Screening Guidelines:  5.7-6.4%  Consistent with prediabetes  >or=6.5%  Consistent with diabetes    High levels of fetal hemoglobin interfere with the HbA1C  assay. Heterozygous hemoglobin variants (HbS, HgC, etc)do  not significantly interfere with this assay.   However, presence of multiple variants may affect accuracy.     12/04/2023 6.5 (H) 4.5 - 6.2 % Final     Comment:     According to ADA guidelines, hemoglobin A1C <7.0% represents  optimal control in non-pregnant diabetic patients.  Different  metrics may apply to specific populations.   Standards of Medical Care in Diabetes - 2016.    For the purpose of screening for the presence of diabetes:  <5.7%     Consistent with the absence of diabetes  5.7-6.4%  Consistent with increasing risk for diabetes   (prediabetes)  >or=6.5%  Consistent with diabetes    Currently no consensus exists for use of hemoglobin A1C  for diagnosis of diabetes for children.     03/08/2023 6.1 (H) 4.0 - 5.6 % Final     Comment:     ADA Screening Guidelines:  5.7-6.4%  Consistent with prediabetes  >or=6.5%  Consistent with diabetes    High levels of fetal hemoglobin interfere with the HbA1C  assay. Heterozygous hemoglobin variants (HbS, HgC, etc)do  not significantly interfere with this assay.   However, presence of multiple variants may affect accuracy.      "         I have personally reviewed and interpreted the pertinent imaging and lab results.  Imaging Results              CTA Head and Neck (xpd) (Edited Result - FINAL)  Result time 04/08/24 08:38:02      Addendum (preliminary) 1 of 1 by Delvin Wilson MD (04/08/24 08:38:02)      Presumably reactive partially imaged right hilar lymphadenopathy which appears similar to prior exams.  Clinical correlation needed.  If indicated this can be further characterized with contrast-enhanced CT of the chest in the non emergent setting.      Electronically signed by: Delvin Wilson  Date:    04/08/2024  Time:    08:38                 Final result by Delvin Wilson MD (04/08/24 08:10:12)                   Impression:      1. No intracranial proximal large vessel occlusion or hemodynamically significant stenosis.  No change from prior CTA.  2. Limited postcontrast CT imaging demonstrates no acute process.  3. 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  4. No hemodynamically significant arterial stenosis within the neck.  The preliminary and final reports are concordant.      Electronically signed by: Delvin Wilson  Date:    04/08/2024  Time:    08:10               Narrative:    EXAMINATION:  CTA HEAD AND NECK (XPD)    CLINICAL HISTORY:  stroke like symptoms;    TECHNIQUE:  Non contrast low dose axial images were obtained through the head. CT angiogram was performed from the level of the ignacia to the top of the head following the IV administration of 100mL of Omnipaque 350.   Sagittal and coronal reconstructions and maximum intensity projection reconstructions were performed. Arterial stenosis percentages are based on NASCET measurement criteria.    COMPARISON:  Concurrently performed head CT 04/07/2024.  CTA of the head and neck 03/26/2024.    FINDINGS:  CTA HEAD:    Vasculature: Mild atherosclerotic changes of the bilateral internal carotid arteries without evidence of any hemodynamically significant  stenosis.  No hemodynamically significant stenosis or proximal large vessel occlusion.  No aneurysm identified.  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    Variant anatomy: Hypoplastic left P1 PCA in the setting of a prominent left-sided posterior communicating artery.  Dominant distal left vertebral artery.    Brain: Limited supplemental post-contrast CT imaging of the head demonstrates no abnormal parenchymal enhancement, midline shift or mass effect, or space-occupying extra-axial fluid collection.  No CT evidence of an acute major vascular territory infarct again noting chronic small vessel ischemic changes including multiple small lacunar type infarcts described on the concurrently performed noncontrast head CT.    Ventricles/Sulci: The ventricles and sulci are appropriate in size for age.    Osseous Structures: The osseous structures are unremarkable in appearance.    Other: Scattered opacification of the paranasal sinuses as well as fluid within the pharyngeal airway in the setting of respiratory/enteric support tubes mastoid air cells are clear.  No acute bony process identified.  Chronic defect at the level of the left lamina papyracea.    CTA NECK:    Aorta: Conventional branching pattern. The great vessel origins are patent without flow limiting stenosis.    Vertebral Arteries: The origins of the vertebral arteries are patent.  No flow limiting stenosis, occlusion, or dissection.    Right Carotid: No flow limiting stenosis, occlusion, or dissection of the common carotid or internal carotid arteries.  Mild plaque of the proximal ICA.  Right internal carotid artery: Less than 50 % stenosis by and NASCET.  The internal carotid artery is medialized at the C2-C3 level.    Left Carotid: No flow limiting stenosis, occlusion, or dissection of the common carotid or internal carotid  arteries. No significant plaque of the proximal ICA. Right internal carotid artery: 0 % stenosis by and NASCET.    Extravascular Anatomy: Emphysematous changes of the visualized upper lung zones.  Nonspecific prominence of a right hilar lymph node which is grossly unchanged compared to prior exams, nonspecific but presumably reactive.                                       X-Ray Chest 1 View (Final result)  Result time 04/08/24 08:37:10      Final result by Jozef Adams MD (04/08/24 08:37:10)                   Impression:      Support devices in good position.  Probable mild right basilar infiltrate.      Electronically signed by: Jozef Adams MD  Date:    04/08/2024  Time:    08:37               Narrative:    EXAMINATION:  XR CHEST 1 VIEW    CLINICAL HISTORY:  Encounter for other preprocedural examination    TECHNIQUE:  Single frontal view of the chest was performed.    COMPARISON:  04/07/2024    FINDINGS:  The endotracheal tube has been placed and has its tip 3 cm above the ignacia.  Nasogastric tube has been placed and passes into the stomach and out of field of view.  The cardiomediastinal silhouette is normal limits.  The lungs are well expanded.  There is now slight patchy density again evident at the right lung base.  No pleural effusion.                                       CT Head Without Contrast (Final result)  Result time 04/08/24 07:20:30      Final result by Delvin Wilson MD (04/08/24 07:20:30)                   Impression:      1. No acute intracranial CT findings or change from prior exams.  The preliminary and final reports are concordant.      Electronically signed by: Delvin Wilson  Date:    04/08/2024  Time:    07:20               Narrative:    EXAMINATION:  CT HEAD WITHOUT CONTRAST    CLINICAL HISTORY:  Mental status change, unknown cause;    TECHNIQUE:  Low dose axial CT images obtained throughout the head without intravenous contrast. Sagittal and coronal reconstructions were  performed.    COMPARISON:  CT/CTA 03/26/2024.  MRI brain 12/04/2023.    FINDINGS:  Brain: There is no evidence of a mass, edema, midline shift, or intracranial hemorrhage. No extra-axial fluid collection.  Grossly similar findings of chronic small vessel ischemia throughout the cerebral hemispheric white matter as well as chronic lacunar type infarcts involving the bilateral basal ganglia, right frontal corona radiata, and alf.  No CT evidence of an acute major vascular territorial infarct.    Ventricles: The ventricles, sulci, and cisterns are within normal limits.    Skull: The osseous structures are unremarkable in appearance.    Extracranial soft tissues: Limited imaging is within normal limits.    Other: Chronic injury to the left lamina papyracea, unchanged.  Mild mucosal thickening within the visualized paranasal sinuses.  Mastoid air cells are clear.                                       X-Ray Chest AP Portable (Final result)  Result time 04/08/24 08:34:26      Final result by Jozef Adams MD (04/08/24 08:34:26)                   Impression:      No acute process.  Previous right basilar predominant infiltrate no longer evident.      Electronically signed by: Jozef Adams MD  Date:    04/08/2024  Time:    08:34               Narrative:    EXAMINATION:  XR CHEST AP PORTABLE    CLINICAL HISTORY:  Chest Pain;    TECHNIQUE:  Single frontal view of the chest was performed.    COMPARISON:  03/28/2024    FINDINGS:  The cardiomediastinal silhouette is within normal limits.  The lungs are well expanded without consolidation or pleural effusion.  No infiltrate evident on this exam.                                              ASSESSMENT AND PLAN:       Acute encephalopathy   Acute hypoxic respiratory failure     Plan:   Etiology of encephalopathy could likely be secondary hypoxic respiratory failure. Likely Opiate use causing encephalopathy and hypoxia  Drug screen was positive for opiates.  Patient  endorses of taking street side pills for his pain.    CT head was negative for acute pathology admission.  CT angiogram head and neck showed no large vessel occlusion or high-grade stenosis.  Small right sylvian fissure AVM was noted.   EEG showed encephalopathy without seizures or epileptiform activity.    Blood pressure improved-goal normotensive range.  Aspirin, Lipitor for stroke prevention.  No further neuro workup is needed at this time.  Patient back to his baseline.  Educated patient about not to use street side drugs/ pain medicine.  Recommend outpatient pain medicine evaluation and follow-up with neurology outpatient.      Will sign off.  Please call with any questions            Pedrito Serrano MD  Neurology/vascular Neurology  Date of Service: 04/10/2024  10:15 AM    Please note: This note was transcribed using voice recognition software. Because of this technology there are often uinintended grammatical, spelling, and other transcription errors. Please disregard these errors.

## 2024-04-10 NOTE — PROGRESS NOTES
Pulmonary/Critical Care  Progress Note      Patient name: Hernan Badillo  MRN: [id number]  Date: 04/10/2024    Admit Date: 4/7/2024  Consult Requested By: Alissa Jackson MD    Reason for Consult: respiratory failure    HPI:    4/8/2024 - Pt found unresponsive but breathing and brought to ER for evaluation.  On arrival he had decreased sats and vBG showed elevated paCO2.  By report he was given narcan with some response but was intubated for airway protection.  Moved to ICU and has required sedation.  SBT tried this AM but ABG  was not adequate and sTV were small by report.  Case d/w nursing and family at bedside.  Pt is not responsive at the time of my exam.  Chart has been reviewed.    4/9/2024 - Stable overnight, did SBT early this AM with good parameters and ABG and will proceed with extubation this AM.  No new issues reported.  He did get agitated some when sedation was decreased for SBT.  Hgb has decreased some but no bleeding reported.  Procalcitonin was very low.  Troponin only minimally elevated and trending down.  CXR looks OK, EKG reviewed and cardiology following.  ECHO noted    4/10/2024 - Stable overnight, no new issues reported, stable extubated.  He does remember taking a pain medicine prior to his event.  He has chronic right shoulder pain.  Respiratory status is good.  CXR reviewed.  Shoulder xray reviewed    Review of Systems    Review of Systems   Unable to perform ROS: Intubated   Constitutional:  Positive for weight loss.   Neurological:  Positive for speech change (some slurring at baseline) and weakness (L side (by family report though chart says right), prior CVA).        Altered mental status       Past Medical History    Past Medical History:   Diagnosis Date    COPD (chronic obstructive pulmonary disease)     Diabetes mellitus, type 2     Hypertension        Past Surgical History    Past Surgical History:   Procedure Laterality Date    DENTAL SURGERY      ESOPHAGOGASTRODUODENOSCOPY N/A 6/1/2020     Procedure: EGD (ESOPHAGOGASTRODUODENOSCOPY);  Surgeon: Terrell May MD;  Location: [facility];  Service: Endoscopy;  Laterality: N/A;    HERNIA REPAIR      left inguinal    incision and drainiage         Medications (scheduled):      albuterol-ipratropium  3 mL Nebulization Q4H    aspirin  81 mg Oral Daily    cefTRIAXone (Rocephin) IV (PEDS and ADULTS)  1 g Intravenous Q24H    chlorhexidine  15 mL Mouth/Throat BID    clopidogreL  75 mg Oral Daily    enoxparin  40 mg Subcutaneous Daily    EScitalopram oxalate  10 mg Per OG tube Daily    famotidine (PF)  20 mg Intravenous Q12H    mupirocin   Nasal BID    [START ON 4/11/2024] predniSONE  40 mg Oral Daily       Medications (infusions):           Medications (prn):     acetaminophen, albuterol sulfate, calcium gluconate IVPB, calcium gluconate IVPB, calcium gluconate IVPB, haloperidol lactate, hydrALAZINE, magnesium sulfate IVPB, magnesium sulfate IVPB, ondansetron, potassium chloride **AND** potassium chloride **AND** potassium chloride, sodium chloride 0.9%, sodium phosphate 15 mmol in dextrose 5 % (D5W) 250 mL IVPB, sodium phosphate 20.01 mmol in dextrose 5 % (D5W) 250 mL IVPB, sodium phosphate 30 mmol in dextrose 5 % (D5W) 250 mL IVPB    Family History:   Family History   Problem Relation Age of Onset    Heart attack Father 80    Heart disease Father     Drug abuse Father     Cancer Maternal Grandmother         colon       Social History: Tobacco:   Social History     Tobacco Use   Smoking Status Every Day    Current packs/day: 2.00    Average packs/day: 2.0 packs/day for 30.0 years (60.0 ttl pk-yrs)    Types: Cigarettes   Smokeless Tobacco Never                                EtOH:   Social History     Substance and Sexual Activity   Alcohol Use Yes    Alcohol/week: 1.0 standard drink of alcohol    Types: 1 Shots of liquor per week    Comment: 1/2 pint 2 x per week - quit approximately 5 months ago                                Drugs:   Social History  "    Substance and Sexual Activity   Drug Use No       Physical Exam    Vital signs:  Temp:  [97 °F (36.1 °C)-98.5 °F (36.9 °C)]   Pulse:  []   Resp:  [16-51]   BP: (114-212)/()   SpO2:  [85 %-100 %]     Intake/Output:   Intake/Output Summary (Last 24 hours) at 4/10/2024 1213  Last data filed at 4/10/2024 0601  Gross per 24 hour   Intake 3104.54 ml   Output 2035 ml   Net 1069.54 ml          BMI: Estimated body mass index is 23.63 kg/m² as calculated from the following:    Height as of this encounter: 5' 6" (1.676 m).    Weight as of this encounter: 66.4 kg (146 lb 6.2 oz).    Physical Exam  Vitals and nursing note reviewed.   Constitutional:       General: He is not in acute distress.     Appearance: Normal appearance. He is not ill-appearing, toxic-appearing or diaphoretic.      Comments: Thin   HENT:      Head: Normocephalic and atraumatic.      Right Ear: External ear normal.      Left Ear: External ear normal.      Nose: Nose normal.      Mouth/Throat:      Mouth: Mucous membranes are moist.      Pharynx: Oropharynx is clear. No oropharyngeal exudate.   Eyes:      General: No scleral icterus.        Right eye: No discharge.         Left eye: No discharge.      Pupils: Pupils are equal, round, and reactive to light.      Comments: Conjunctiva muddy   Neck:      Vascular: No carotid bruit.      Comments: No elevated JVD  Cardiovascular:      Rate and Rhythm: Normal rate and regular rhythm.      Pulses: Normal pulses.      Heart sounds: Normal heart sounds. No murmur heard.     No friction rub. No gallop.   Pulmonary:      Effort: Pulmonary effort is normal. No respiratory distress.      Breath sounds: No stridor. Rhonchi present. No wheezing or rales.   Chest:      Chest wall: No tenderness.   Abdominal:      General: There is no distension.      Tenderness: There is no abdominal tenderness. There is no guarding.      Comments: Hypoactive/rare BS   Musculoskeletal:         General: No swelling. Normal " "range of motion.      Cervical back: Normal range of motion and neck supple. No rigidity or tenderness.      Right lower leg: No edema.      Left lower leg: No edema.   Lymphadenopathy:      Cervical: No cervical adenopathy.   Skin:     General: Skin is warm and dry.      Capillary Refill: Capillary refill takes 2 to 3 seconds.      Coloration: Skin is not jaundiced.      Findings: No bruising.   Neurological:      General: No focal deficit present.      Mental Status: Mental status is at baseline.      Cranial Nerves: No cranial nerve deficit.      Sensory: No sensory deficit.      Motor: No weakness.   Psychiatric:      Comments: Gets agitated at times         Laboratory    Recent Labs   Lab 04/10/24  0302   WBC 12.90*   RBC 3.80*   HGB 11.5*   HCT 34.9*   *   MCV 92   MCH 30.3   MCHC 33.0         Recent Labs   Lab 04/10/24  0302   CALCIUM 8.6*   ALBUMIN 3.3*   PROT 6.1      K 3.7   CO2 28      BUN 24*   CREATININE 0.8   ALKPHOS 43*   ALT 4*   AST 7*   BILITOT 0.7         No results for input(s): "PT", "INR", "APTT" in the last 24 hours.      No results for input(s): "CPK", "CPKMB", "TROPONINI", "MB" in the last 24 hours.      Additional labs: reviewed    Microbiology:       Microbiology Results (last 7 days)       Procedure Component Value Units Date/Time    Blood culture [[id number]] Collected: 04/10/24 0853    Order Status: Sent Specimen: Blood Updated: 04/10/24 0909    Narrative:      Collection has been rescheduled by JT4 at 04/10/2024 08:25 Reason:   Nurses with patient at the moment  Collection has been rescheduled by JT4 at 04/10/2024 08:25 Reason:   Nurses with patient at the moment    Blood culture [[id number]] Collected: 04/10/24 0820    Order Status: Sent Specimen: Blood Updated: 04/10/24 0851    Culture, Respiratory with Gram Stain [[id number]] Collected: 04/08/24 0742    Order Status: Completed Specimen: Respiratory from Endotracheal Aspirate Updated: 04/10/24 0800     Respiratory " Culture Reduced normal respiratory willie     Gram Stain (Respiratory) <10 epithelial cells per low power field.     Gram Stain (Respiratory) Few WBC's     Gram Stain (Respiratory) Rare Gram positive cocci    Culture, Respiratory with Gram Stain [[id number]]     Order Status: No result Specimen: Respiratory             Radiology    X-ray Shoulder 2 or More Views Right  Narrative: EXAMINATION:  XR SHOULDER COMPLETE 2 OR MORE VIEWS RIGHT    CLINICAL HISTORY:  Right shoulder pain    COMPARISON:  None.    FINDINGS:  Three views are negative for fracture or dislocation.  No osseous destructive lesion is identified.  Mild acromioclavicular arthritic change with undersurface spurring is noted.  Soft tissues are unremarkable.  Impression: 1. Mild arthritic changes at the acromioclavicular joint, otherwise normal exam.    Electronically signed by: Eren Simon  Date:    04/10/2024  Time:    11:10  X-Ray Chest AP Portable  Narrative: EXAMINATION:  XR CHEST AP PORTABLE    CLINICAL HISTORY:  resp fail;    FINDINGS:  Portable chest with comparison chest x-ray 04/09/2024.  Normal cardiomediastinal silhouette.There is mild hazy opacification of the right lung base.  Left lung is clear.  Interval removal of nasogastric tube and endotracheal tube.  No pneumothorax.  Pulmonary vasculature is normal. No acute osseous abnormality.  Impression: Mild hazy opacification of the right lung base, could reflect atelectasis or infiltrate.  Atelectasis is favored.    Electronically signed by: Yaniv Ponce  Date:    04/10/2024  Time:    07:00        Additional Studies    reviewed    Ventilator Information                  Recent Labs     04/09/24  0527   PH 7.359   PCO2 43.2   PO2 85   HCO3 24.4   POCSATURATED 96   BE -1           Impression    Active Hospital Problems    Diagnosis  POA    *Acute on chronic respiratory failure with hypoxia and hypercapnia [J96.21, J96.22]  Yes    CVA (cerebral vascular accident) [I63.9]  Yes    COPD  (chronic obstructive pulmonary disease) [J44.9]  Yes    Acute encephalopathy [G93.40]  Yes    Tobacco dependency [F17.200]  Yes    DM (diabetes mellitus), type 2 [E11.9]  Yes      Resolved Hospital Problems   No resolved problems to display.       Plan    Stable extubated  Increase activity as able  Neurology following  Cardiology following  Consider stopping antibiotics  Will wean steroids quickly  OK to transfer out of ICU    Thank you for this consult.  I will follow with you while the patient is hospitalized.            Iván Beckett MD  [facility] Pulmonary/Critical Care

## 2024-04-10 NOTE — PROGRESS NOTES
"[facility]  Adult Nutrition   Progress Note (Nutrition Support Management)    SUMMARY     Recommendations  RD recommends and has ordered pt a Soft/Consistent CHO diet with Glucerna BID.    Goals:   Pt to meet 100% of his EEN/EPN by follow up.    Nutrition Goal Status: progressing towards goal    Communication of RD Recs: reviewed with RN    Nutrition Diagnosis PES Statement: Inadequate oral intake related to poor intake as evidenced by 0% intake at breakfast.     Dietitian Rounds Brief  Follow Up Nutrition Note: Pt was on a full liquid diet at breakfast this morning but wants to have soft foods so he did not eat anything but the mashed potatoes on his tray. A soft/consistent CHO diet has been ordered for him with Glucerna BID to insure his kcal and protein needs are met. His LBM was on 4/7, his skin is intact and will follow up biweekly and prn to insure pt is getting adequate PO intake.    Nutrition Related Social Determinants of Health: SDOH: None Identified    Diet order:   Current Diet Order: Full Liquid      Evaluation of Received Nutrient/Fluid Intake  Energy Calories Required: not meeting needs  Protein Required: not meeting needs  Fluid Required: meeting needs  Tolerance: tolerating     % Intake of Estimated Energy Needs: 0 - 25 %  % Meal Intake: 0 - 25 %      Intake/Output Summary (Last 24 hours) at 4/10/2024 1319  Last data filed at 4/10/2024 0601  Gross per 24 hour   Intake 3104.54 ml   Output 1870 ml   Net 1234.54 ml        Anthropometrics  Temp: 98.5 °F (36.9 °C)  Height Method: Estimated  Height: 5' 6" (167.6 cm)  Height (inches): 66 in  Weight Method: Bed Scale  Weight: 66.4 kg (146 lb 6.2 oz)  Weight (lb): 146.39 lb  Ideal Body Weight (IBW), Male: 142 lb  % Ideal Body Weight, Male (lb): 103.09 %  BMI (Calculated): 23.6  BMI Grade: 18.5-24.9 - normal       Estimated/Assessed Needs  Weight Used For Calorie Calculations: 66.4 kg (146 lb 6.2 oz)  Energy Calorie Requirements (kcal): " 6975-0209 kcals/day (25-30 kcals/kg ABW)  Energy Need Method: Kcal/kg  Protein Requirements:  g/day (1.2-2.0 g/kg ABW)  Weight Used For Protein Calculations: 66.4 kg (146 lb 6.2 oz)     Estimated Fluid Requirement Method: RDA Method  RDA Method (mL): 1660  CHO Requirement: 208-249 g CHO/day    Reason for Assessment  Reason For Assessment: RD follow-up  Diagnosis: other (see comments) (Acute on chronic respiratory failure with hypoxia and hypercapnia)  Relevant Medical History: Diabetes mellitus, type 2, Hypertension, COPD (chronic obstructive pulmonary disease)  Interdisciplinary Rounds:  (No rounds today)  Nutrition Discharge Planning: Pending    Nutrition/Diet History  Spiritual, Cultural Beliefs, Jewish Practices, Values that Affect Care: no  Food Allergies: NKFA  Factors Affecting Nutritional Intake: None identified at this time    Nutrition Risk Screen  Nutrition Risk Screen: no indicators present     MST Score: 0  Have you recently lost weight without trying?: No (jessa pt intubated and sedated)  Weight loss score: 0  Have you been eating poorly because of a decreased appetite?:  (jessa pt intubated and sedated)  Appetite score: 0       Weight History:  Wt Readings from Last 10 Encounters:   04/07/24 66.4 kg (146 lb 6.2 oz)   04/08/24 66.4 kg (146 lb 6.2 oz)   03/27/24 68.7 kg (151 lb 7.3 oz)   12/09/23 66.2 kg (146 lb)   12/08/23 66.6 kg (146 lb 13.2 oz)   12/04/23 65.5 kg (144 lb 6.4 oz)   11/09/23 65.8 kg (145 lb)   11/07/23 66.1 kg (145 lb 11.6 oz)   10/13/23 67 kg (147 lb 9.6 oz)   10/10/23 65.9 kg (145 lb 4.5 oz)        Lab/Procedures/Meds: Pertinent Labs/Meds Reviewed    Medications:Pertinent Medications Reviewed  Scheduled Meds:   albuterol-ipratropium  3 mL Nebulization Q4H    aspirin  81 mg Oral Daily    cefTRIAXone (Rocephin) IV (PEDS and ADULTS)  1 g Intravenous Q24H    chlorhexidine  15 mL Mouth/Throat BID    clopidogreL  75 mg Oral Daily    enoxparin  40 mg Subcutaneous Daily    EScitalopram  "oxalate  10 mg Per OG tube Daily    famotidine (PF)  20 mg Intravenous Q12H    mupirocin   Nasal BID    [START ON 4/11/2024] predniSONE  40 mg Oral Daily     Continuous Infusions:  PRN Meds:.acetaminophen, albuterol sulfate, calcium gluconate IVPB, calcium gluconate IVPB, calcium gluconate IVPB, haloperidol lactate, hydrALAZINE, magnesium sulfate IVPB, magnesium sulfate IVPB, ondansetron, potassium chloride **AND** potassium chloride **AND** potassium chloride, sodium chloride 0.9%, sodium phosphate 15 mmol in dextrose 5 % (D5W) 250 mL IVPB, sodium phosphate 20.01 mmol in dextrose 5 % (D5W) 250 mL IVPB, sodium phosphate 30 mmol in dextrose 5 % (D5W) 250 mL IVPB    Labs: Pertinent Labs Reviewed  Clinical Chemistry:  Recent Labs   Lab 04/08/24  0518 04/09/24  0211 04/09/24  0829 04/10/24  0302    139  --  141   K 4.1 4.3   < > 3.7    102  --  107   CO2 27 25  --  28    258*  --  281*   BUN 13 31*  --  24*   CREATININE 0.8 1.1  --  0.8   CALCIUM 9.1 8.8  --  8.6*   PROT 6.7 6.5  --  6.1   ALBUMIN 3.7 3.3*  --  3.3*   BILITOT 1.2* 0.7  --  0.7   ALKPHOS 50* 47*  --  43*   AST 10 8*  --  7*   ALT <3* 4*  --  4*   ANIONGAP 10 12  --  6*   MG 1.6 1.5*   < > 1.9   PHOS 3.4 5.2*  --  2.1*    < > = values in this interval not displayed.     CBC:   Recent Labs   Lab 04/10/24  0302   WBC 12.90*   RBC 3.80*   HGB 11.5*   HCT 34.9*   *   MCV 92   MCH 30.3   MCHC 33.0     Lipid Panel:  No results for input(s): "CHOL", "HDL", "LDLCALC", "TRIG", "CHOLHDL" in the last 168 hours.  Cardiac Profile:  Recent Labs   Lab 04/07/24  1925   BNP 75   TROPONINI 0.076*     Diabetes:  Recent Labs   Lab 04/07/24  1846   POCTGLUCOSE 155*     Thyroid & Parathyroid:  Recent Labs   Lab 04/07/24  1925   TSH 0.706       Monitor and Evaluation  Food and Nutrient Intake: enteral nutrition intake  Food and Nutrient Adminstration: enteral and parenteral nutrition administration  Knowledge/Beliefs/Attitudes: beliefs and attitudes, " food and nutrition knowledge/skill  Physical Activity and Function: nutrition-related ADLs and IADLs, factors affecting access to physical activity  Anthropometric Measurements: weight, weight change, body mass index  Biochemical Data, Medical Tests and Procedures: lipid profile, inflammatory profile, glucose/endocrine profile, gastrointestinal profile, electrolyte and renal panel  Nutrition-Focused Physical Findings: overall appearance     Nutrition Risk  Level of Risk/Frequency of Follow-up: high     Nutrition Follow-Up  RD Follow-up?: Yes      Suzanne Winslow RD 04/10/2024 1:19 PM

## 2024-04-10 NOTE — PT/OT/SLP EVAL
Physical Therapy Evaluation    Patient Name:  Hernan Badillo   MRN:  [id number]    Recommendations:     Discharge Recommendations: Low Intensity Therapy   Discharge Equipment Recommendations: none   Barriers to discharge:  impulsive, decrease safety awareness, high fall risk,     Assessment:     Hernan Badillo is a 58 y.o. male admitted with a medical diagnosis of Acute on chronic respiratory failure with hypoxia and hypercapnia.  He presents with the following impairments/functional limitations: weakness, impaired endurance, impaired self care skills, impaired functional mobility, gait instability, impaired balance, decreased lower extremity function, decreased safety awareness, pain, impaired cardiopulmonary response to activity.    Pt found in bed with HOB elevated. Son at bedside. Pt agreeable to visit. Pt required supervision with bed mobility. Sit to stand with CGA no AD. Pt ambulated 120 ft with no AD and min A due to impulsivity, decrease safety awareness and mildly impaired balance.     Rehab Prognosis: Fair; patient would benefit from acute skilled PT services to address these deficits and reach maximum level of function.    Recent Surgery: * No surgery found *      Plan:     During this hospitalization, patient to be seen 6 x/week to address the identified rehab impairments via therapeutic activities, therapeutic exercises, gait training, neuromuscular re-education and progress toward the following goals:    Plan of Care Expires:  05/10/24    Subjective     Chief Complaint: eager to mobilize  Patient/Family Comments/goals: mobilize  Pain/Comfort:  Pain Rating 1: 0/10    Patients cultural, spiritual, Confucianist conflicts given the current situation: no    Living Environment:  Pt lives with family in a one story home   Prior to admission, patients level of function was supervision no AD.  Equipment used at home: wheelchair, walker, rolling, cane, straight, oxygen.  DME owned (not currently used): none.  Upon  discharge, patient will have assistance from family.    Objective:     Communicated with RN prior to session.  Patient found HOB elevated with peripheral IV, telemetry, pulse ox (continuous), blood pressure cuff  upon PT entry to room.    General Precautions: Standard, fall  Orthopedic Precautions:N/A   Braces: N/A  Respiratory Status: Room air    Exams:  RLE ROM: WFL  RLE Strength: WFL  LLE ROM: WFL  LLE Strength: WFL    Functional Mobility:  Bed Mobility:     Supine to Sit: supervision  Sit to Supine: supervision  Transfers:     Sit to Stand:  contact guard assistance with no AD  Gait: 120 ft with no AD and min A with deficits as stated above      AM-PAC 6 CLICK MOBILITY  Total Score:17       Treatment & Education:  Pt educated on POC, discharge recommendation, importance of time OOB, pacing/energy conservation, safety awareness, optimal gait pattern, need for assist with mobility, use of call bell to seek assistance as needed and fall prevention      Patient left HOB elevated with all lines intact, call button in reach, bed alarm on, and son present.    GOALS:   Multidisciplinary Problems       Physical Therapy Goals          Problem: Physical Therapy    Goal Priority Disciplines Outcome Goal Variances Interventions   Physical Therapy Goal     PT, PT/OT Ongoing, Progressing     Description: Goals to be met by: 5/10/24     Patient will increase functional independence with mobility by performin. Supine to sit with Supervision  2. Sit to stand transfer with Supervision  3. Bed to chair transfer with Supervision using no AD  4. Gait  x 150 feet with Supervision using no AD                             History:     Past Medical History:   Diagnosis Date    COPD (chronic obstructive pulmonary disease)     Diabetes mellitus, type 2     Hypertension        Past Surgical History:   Procedure Laterality Date    DENTAL SURGERY      ESOPHAGOGASTRODUODENOSCOPY N/A 2020    Procedure: EGD  (ESOPHAGOGASTRODUODENOSCOPY);  Surgeon: Terrell May MD;  Location: [facility];  Service: Endoscopy;  Laterality: N/A;    HERNIA REPAIR      left inguinal    incision and drainiage         Time Tracking:     PT Received On: 04/10/24  PT Start Time: 0930     PT Stop Time: 0946  PT Total Time (min): 16 min     Billable Minutes: Evaluation 8 and Gait Training 8      04/10/2024

## 2024-04-10 NOTE — PLAN OF CARE
Updated clinicals sent to [facility] via [facility]  following for additional discharge needs     04/10/24 1539   Discharge Reassessment   Assessment Type Discharge Planning Reassessment   Did the patient's condition or plan change since previous assessment? Yes   Discharge Plan discussed with: Patient   Discharge Plan A Home with family;Home Health   Why the patient remains in the hospital Requires continued medical care

## 2024-04-10 NOTE — ASSESSMENT & PLAN NOTE
Patient has severe COPD, and also patient using some street drugs, patient has pinpoint pupils upon admission.   Secondary to CO2 retention.

## 2024-04-10 NOTE — RESPIRATORY THERAPY
04/09/24 1952   Patient Assessment/Suction   Level of Consciousness (AVPU) alert   Respiratory Effort Normal;Unlabored   Expansion/Accessory Muscles/Retractions no retractions;no use of accessory muscles   All Lung Fields Breath Sounds clear;diminished   Rhythm/Pattern, Respiratory pattern regular;unlabored   Cough Frequency infrequent   Cough Type nonproductive   PRE-TX-O2   Device (Oxygen Therapy) nasal cannula with humidification   Flow (L/min) 2   SpO2 100 %   Pulse Oximetry Type Continuous   $ Pulse Oximetry - Multiple Charge Pulse Oximetry - Multiple   Pulse 61   Resp 20   Aerosol Therapy   $ Aerosol Therapy Charges Aerosol Treatment   Daily Review of Necessity (SVN) completed   Respiratory Treatment Status (SVN) given   Treatment Route (SVN) mask;oxygen   Patient Position (SVN) HOB elevated   Post Treatment Assessment (SVN) breath sounds unchanged   Signs of Intolerance (SVN) none   Breath Sounds Post-Respiratory Treatment   Throughout All Fields Post-Treatment All Fields   Throughout All Fields Post-Treatment no change   Post-treatment Heart Rate (beats/min) 63   Post-treatment Resp Rate (breaths/min) 17   Education   $ Education Bronchodilator;15 min   Tobacco Cessation Intervention   Do you use any type of tobacco product? Unable to Assess   Respiratory Evaluation   $ Care Plan Tech Time 15 min   $ Eval/Re-eval Charges Evaluation   Evaluation For New Orders   Admitting Diagnosis AMS   Cardiac Diagnosis HTN   Pulmonary Diagnosis COPD   Current Surgeries n/a   Home Oxygen   Has Home Oxygen? No   Home Aerosol, MDI, DPI, and Other Treatments/Therapies   Home Respiratory Therapy Per Patient/Review of Chart Yes   MDI Home Meds/Freq Albuterol prn, Pulmicort bid   Oxygen Care Plan   Oxygen Care Plan Per Protocol   SPO2 Goal (%) 92% non-cardiac   Bronchodilator Care Plan   Bronchodilator Care Plan Aerosol   Aerosol Meds w/ frequency Albuterol - Ipratropium (DUO-NEB) 0.5mg-3mg(2.5ml) 3ml Nebulizer Solution2.5mg Q  4Hr   Atelectasis Care Plan   Rationale No Rational Found   Airway Clearance Care Plan   Rationale No rationale found

## 2024-04-10 NOTE — NURSING
Nurses Note -- 4 Eyes      4/10/2024   0701 AM      Skin assessed during: Daily Assessment      [x] No Altered Skin Integrity Present    [x]Prevention Measures Documented      [] Yes- Altered Skin Integrity Present or Discovered   [] LDA Added if Not in Epic (Describe Wound)   [] New Altered Skin Integrity was Present on Admit and Documented in LDA   [] Wound Image Taken    Wound Care Consulted? No    Attending Nurse:  Imelda Gonzalez RN/Staff Member:  Brenna GARCIA

## 2024-04-10 NOTE — SUBJECTIVE & OBJECTIVE
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  ABG shows severe CO2 retention, patient is intubated and moved to ICU.  Chest x-ray clear.  Patient is afebrile.  UDS initially negative repeat positive for opiates and benzos.  Patient extubated on 4/9.       Seen and examined in the multidisciplinary rounds, family at bedside, patient is awake alert, AAO x3, patient is crying, patient is complaining of right shoulder pain, patient now admits that he using street drugs on and off.  Chest x-ray shows some infiltrates.           Review of Systems   Constitutional:  Negative for activity change and fever.   HENT:  Negative for ear discharge, facial swelling and sore throat.    Eyes:  Negative for photophobia, pain and visual disturbance.   Respiratory:  Negative for apnea, cough and shortness of breath.    Cardiovascular:  Negative for chest pain and leg swelling.   Gastrointestinal:  Negative for abdominal pain and blood in stool.   Endocrine: Negative for cold intolerance and heat intolerance.   Musculoskeletal:  Negative for back pain and gait problem.        Right shoulder pain   Skin:  Negative for pallor and rash.   Neurological:  Negative for speech difficulty and headaches.   Psychiatric/Behavioral:  Negative for confusion, hallucinations and suicidal ideas.    All other systems reviewed and are negative.    Objective:     Vital Signs (Most Recent):  Temp: 98 °F (36.7 °C) (04/10/24 0400)  Pulse: 84 (04/10/24 0712)  Resp: 18 (04/10/24 0712)  BP: (!) 154/88 (04/10/24 0600)  SpO2: 95 % (04/10/24 0712) Vital Signs (24h Range):  Temp:  [97 °F (36.1 °C)-98.2 °F (36.8 °C)] 98 °F (36.7 °C)  Pulse:  [56-96] 84  Resp:  [16-48] 18  SpO2:  [85 %-100 %] 95 %  BP: (154-212)/() 154/88     Weight: 66.4 kg (146 lb 6.2 oz)  Body mass  index is 23.63 kg/m².    Intake/Output Summary (Last 24 hours) at 4/10/2024 1018  Last data filed at 4/10/2024 0601  Gross per 24 hour   Intake 3104.54 ml   Output 2110 ml   Net 994.54 ml           Physical Exam  Vitals and nursing note reviewed.   Constitutional:       General: He is not in acute distress.     Appearance: He is not diaphoretic.   HENT:      Mouth/Throat:      Pharynx: No oropharyngeal exudate.   Eyes:      General:         Right eye: No discharge.         Left eye: No discharge.   Neck:      Thyroid: No thyromegaly.      Vascular: No JVD.      Trachea: No tracheal deviation.   Cardiovascular:      Heart sounds: No murmur heard.     No friction rub. No gallop.   Pulmonary:      Effort: No respiratory distress.      Breath sounds: No wheezing or rales.   Abdominal:      General: There is no distension.      Palpations: Abdomen is soft.      Tenderness: There is no abdominal tenderness.   Musculoskeletal:         General: No tenderness or deformity.   Skin:     Capillary Refill: Capillary refill takes 2 to 3 seconds.      Findings: No erythema or rash.   Neurological:      Motor: No abnormal muscle tone.      Deep Tendon Reflexes: Reflexes normal.             Significant Labs: All pertinent labs within the past 24 hours have been reviewed.  BMP:   Recent Labs   Lab 04/10/24  0302   *      K 3.7      CO2 28   BUN 24*   CREATININE 0.8   CALCIUM 8.6*   MG 1.9       CBC:   Recent Labs   Lab 04/09/24  0527 04/09/24  0829 04/10/24  0302   WBC  --  6.47 12.90*   HGB  --  11.8* 11.5*   HCT 36 36.9* 34.9*   PLT  --  141* 145*       Troponin:   Recent Labs   Lab 04/08/24 2022 04/09/24  0211 04/09/24  0829   TROPONINIHS 28.0* 22.4* 22.5*         Significant Imaging: I have reviewed all pertinent imaging results/findings within the past 24 hours.  I have reviewed and interpreted all pertinent imaging results/findings within the past 24 hours.    CTA Head and Neck (xpd)    Addendum Date:  4/8/2024    Presumably reactive partially imaged right hilar lymphadenopathy which appears similar to prior exams.  Clinical correlation needed.  If indicated this can be further characterized with contrast-enhanced CT of the chest in the non emergent setting. Electronically signed by: Delvin Wilson Date:    04/08/2024 Time:    08:38    Result Date: 4/8/2024  EXAMINATION: CTA HEAD AND NECK (XPD) CLINICAL HISTORY: stroke like symptoms; TECHNIQUE: Non contrast low dose axial images were obtained through the head. CT angiogram was performed from the level of the ignacia to the top of the head following the IV administration of 100mL of Omnipaque 350.   Sagittal and coronal reconstructions and maximum intensity projection reconstructions were performed. Arterial stenosis percentages are based on NASCET measurement criteria. COMPARISON: Concurrently performed head CT 04/07/2024.  CTA of the head and neck 03/26/2024. FINDINGS: CTA HEAD: Vasculature: Mild atherosclerotic changes of the bilateral internal carotid arteries without evidence of any hemodynamically significant stenosis.  No hemodynamically significant stenosis or proximal large vessel occlusion.  No aneurysm identified.  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 Variant anatomy: Hypoplastic left P1 PCA in the setting of a prominent left-sided posterior communicating artery.  Dominant distal left vertebral artery. Brain: Limited supplemental post-contrast CT imaging of the head demonstrates no abnormal parenchymal enhancement, midline shift or mass effect, or space-occupying extra-axial fluid collection.  No CT evidence of an acute major vascular territory infarct again noting chronic small vessel ischemic changes including multiple small lacunar type infarcts described on the concurrently performed noncontrast head CT. Ventricles/Sulci:  The ventricles and sulci are appropriate in size for age. Osseous Structures: The osseous structures are unremarkable in appearance. Other: Scattered opacification of the paranasal sinuses as well as fluid within the pharyngeal airway in the setting of respiratory/enteric support tubes mastoid air cells are clear.  No acute bony process identified.  Chronic defect at the level of the left lamina papyracea. CTA NECK: Aorta: Conventional branching pattern. The great vessel origins are patent without flow limiting stenosis. Vertebral Arteries: The origins of the vertebral arteries are patent.  No flow limiting stenosis, occlusion, or dissection. Right Carotid: No flow limiting stenosis, occlusion, or dissection of the common carotid or internal carotid arteries.  Mild plaque of the proximal ICA.  Right internal carotid artery: Less than 50 % stenosis by and NASCET.  The internal carotid artery is medialized at the C2-C3 level. Left Carotid: No flow limiting stenosis, occlusion, or dissection of the common carotid or internal carotid arteries. No significant plaque of the proximal ICA. Right internal carotid artery: 0 % stenosis by and NASCET. Extravascular Anatomy: Emphysematous changes of the visualized upper lung zones.  Nonspecific prominence of a right hilar lymph node which is grossly unchanged compared to prior exams, nonspecific but presumably reactive.     1. No intracranial proximal large vessel occlusion or hemodynamically significant stenosis.  No change from prior CTA. 2. Limited postcontrast CT imaging demonstrates no acute process. 3. 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 4. No hemodynamically significant arterial stenosis within the neck. The preliminary and final reports are concordant. Electronically signed by: Delvin Wilson Date:    04/08/2024 Time:    08:10    X-Ray Chest 1 View    Result Date: 4/8/2024  EXAMINATION: XR CHEST 1 VIEW CLINICAL HISTORY: Encounter  for other preprocedural examination TECHNIQUE: Single frontal view of the chest was performed. COMPARISON: 04/07/2024 FINDINGS: The endotracheal tube has been placed and has its tip 3 cm above the ignacia.  Nasogastric tube has been placed and passes into the stomach and out of field of view.  The cardiomediastinal silhouette is normal limits.  The lungs are well expanded.  There is now slight patchy density again evident at the right lung base.  No pleural effusion.     Support devices in good position.  Probable mild right basilar infiltrate. Electronically signed by: Jozef Adams MD Date:    04/08/2024 Time:    08:37    X-Ray Chest AP Portable    Result Date: 4/8/2024  EXAMINATION: XR CHEST AP PORTABLE CLINICAL HISTORY: Chest Pain; TECHNIQUE: Single frontal view of the chest was performed. COMPARISON: 03/28/2024 FINDINGS: The cardiomediastinal silhouette is within normal limits.  The lungs are well expanded without consolidation or pleural effusion.  No infiltrate evident on this exam.     No acute process.  Previous right basilar predominant infiltrate no longer evident. Electronically signed by: Jozef Adams MD Date:    04/08/2024 Time:    08:34    X-ray chest AP portable    Result Date: 4/8/2024  EXAMINATION: XR CHEST AP PORTABLE CLINICAL HISTORY: hypoxemia; COMPARISON: 04/07/2024 FINDINGS: Cardiac silhouette size is within normal limits.  Endotracheal tube and enteric tube are in stable position. No airspace consolidation or pleural effusion.  No pneumothorax.  No acute osseous abnormality.     No acute pulmonary process. ET tube and enteric tube are in stable position. Electronically signed by: Patrick Mace Date:    04/08/2024 Time:    07:37    CT Head Without Contrast    Result Date: 4/8/2024  EXAMINATION: CT HEAD WITHOUT CONTRAST CLINICAL HISTORY: Mental status change, unknown cause; TECHNIQUE: Low dose axial CT images obtained throughout the head without intravenous contrast. Sagittal and  coronal reconstructions were performed. COMPARISON: CT/CTA 03/26/2024.  MRI brain 12/04/2023. FINDINGS: Brain: There is no evidence of a mass, edema, midline shift, or intracranial hemorrhage. No extra-axial fluid collection.  Grossly similar findings of chronic small vessel ischemia throughout the cerebral hemispheric white matter as well as chronic lacunar type infarcts involving the bilateral basal ganglia, right frontal corona radiata, and alf.  No CT evidence of an acute major vascular territorial infarct. Ventricles: The ventricles, sulci, and cisterns are within normal limits. Skull: The osseous structures are unremarkable in appearance. Extracranial soft tissues: Limited imaging is within normal limits. Other: Chronic injury to the left lamina papyracea, unchanged.  Mild mucosal thickening within the visualized paranasal sinuses.  Mastoid air cells are clear.     1. No acute intracranial CT findings or change from prior exams. The preliminary and final reports are concordant. Electronically signed by: Delvin Wilson Date:    04/08/2024 Time:    07:20    X-Ray Chest 1 View    Result Date: 3/28/2024  EXAMINATION: XR CHEST 1 VIEW CLINICAL HISTORY: Right pleuritic chest pain; TECHNIQUE: Single frontal view of the chest was performed. COMPARISON: 03/26/2024 FINDINGS: Faint interstitial disease in the lung bases.  Normal size heart.  No pleural effusion or pneumothorax.     Basilar opacities are less confluent. Electronically signed by: Iván Coronado Date:    03/28/2024 Time:    13:19    CTA Head and Neck (xpd)    Result Date: 3/26/2024  EXAMINATION: CTA HEAD AND NECK (XPD) CLINICAL HISTORY: Neuro deficit, acute, stroke suspected; TECHNIQUE: Non contrast low dose axial images were obtained through the head. CT angiogram was performed from the level of the ignacia to the top of the head following the IV administration of 75mL of Omnipaque 350.   Sagittal and coronal reconstructions and maximum intensity projection  reconstructions were performed. Arterial stenosis percentages are based on NASCET measurement criteria. COMPARISON: 12/03/2023, MRI brain 12/04/2023 FINDINGS: Brain: No evidence of acute intracranial hemorrhage. The ventricles and sulci are appropriate in size and configuration for the patient's age without hydrocephalus. Stable small chronic lacunar infarct in the right frontal corona radiata, as well as bilateral basal ganglia and right paramedian alf.  There is no significant mass effect, midline shift, or extra-axial fluid collection. Remainder of the gray-white matter differentiation is within normal limits without evidence of an acute major vascular territory infarct. No abnormal intracranial enhancement. Chronic deformity of the medial left orbital wall.  Partial opacification of the right sphenoid sinus.  No acute calvarial fracture. Extracranial: Aorta and great vessels: Left-sided aortic arch with normal configuration.   No evidence of stenosis of the origins of the great vessels from the arch. Subclavian arteries: The subclavian arteries are without hemodynamically significant stenosis or occlusion. Right carotid: The right common carotid artery is without significant stenosis. Mild calcific atherosclerotic plaque at the carotid bifurcation without evidence of a hemodynamically significant stenosis..  The right internal carotid artery is tortuous without significant stenosis, occlusion, or dissection. Left carotid: The left common carotid artery is without significant stenosis. Mild calcific atherosclerotic plaque at the carotid bifurcation without evidence of a hemodynamically significant stenosis.  The left internal carotid artery is tortuous without significant stenosis, occlusion, or dissection. Extracranial vertebral arteries:  The vertebral arteries are without significant stenosis, occlusion, or dissection to the skull base. Intracranial: Anterior circulation: Moderate calcific atherosclerosis of  bilateral carotid artery siphons.  No areas of significant atherosclerotic narrowing or filling defects are identified.  The middle cerebral arteries are normal.  The anterior cerebral arteries are normal. Posterior circulation: The vertebral arteries are normal. The basilar artery is normal. 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  Posterior cerebral arteries otherwise grossly normal. No evidence of aneurysm or arteriovenous malformation. Dural venous sinuses are patent. Other: The visualized portions of the lung apices demonstrated moderate centrilobular emphysematous changes.  Respiratory motion limits evaluation of the pulmonary parenchyma.  No focal consolidation, pleural effusion or pneumothorax. There are degenerative spine changes. No concerning osseous lesions identified.     1. No evidence of acute intracranial abnormality or significant interval detrimental change as compared to the prior exam.  Further evaluation as warranted clinically. 2. Chronic small vessel ischemic changes again demonstrated, including chronic lacunar infarcts involving the right frontal corona radiata, bilateral basal ganglia and alf. 3. No high-grade stenosis, large vessel occlusion, dissection or aneurysm in the head and neck vasculature. Electronically signed by: Delvin Vera Date:    03/26/2024 Time:    18:25    X-Ray Ribs 2 View Right    Result Date: 3/26/2024  EXAMINATION: XR RIBS 2 VIEW RIGHT CLINICAL HISTORY: Unspecified fall, initial encounter TECHNIQUE: Two views of the right ribs were performed. COMPARISON: 03/26/2024 FINDINGS: No displaced fracture or traumatic malalignment.  Hazy interstitial opacities right lung base better characterized on dated chest radiograph.  Mild degenerative change right AC joint. Electronically signed by: Iván Coronado Date:    03/26/2024 Time:    16:55    X-Ray Chest AP Portable    Result Date:  3/26/2024  EXAMINATION: XR CHEST AP PORTABLE CLINICAL HISTORY: Pleurodynia TECHNIQUE: Single frontal view of the chest was performed. COMPARISON: 12/03/2023 and 09/26/2023 FINDINGS: There is reticulonodular disease in the lower lung zones with some tram-tracking.  Upper lung zones are clear.  Unchanged heart size.  No pleural effusion or pneumothorax.     Unchanged extent of reticulonodular disease in the lung bases.  This is better characterized on prior CT.  Some differential considerations include includes atypical infection, pneumonitis, aspiration, airways disease. Electronically signed by: Iván Coronado Date:    03/26/2024 Time:    16:49  - pulls last radiology orders

## 2024-04-10 NOTE — ASSESSMENT & PLAN NOTE
Patient apparently has severe COPD, continue heavy smoker, on top of that, patient taking tramadol, baclofen, Lyrica from what I see, family denies OD.  Patient has significant pinpoint pupils for my physical examination.  I suspect the patient may have narcotic overdose on top of severe COPD.    Intubated on 4/7, extubated on 4/9    Doing much better  Okay to move to medical-surgical floor  Discontinue IV steroids, started on p.o. steroids with taper  We will get right shoulder x-ray    Chest x-ray shows right middle lobe infiltrates, start on empiric IV Rocephin and azithromycin

## 2024-04-10 NOTE — CARE UPDATE
04/10/24 0712   Patient Assessment/Suction   Level of Consciousness (AVPU) alert   Respiratory Effort Normal;Unlabored   Expansion/Accessory Muscles/Retractions no use of accessory muscles   All Lung Fields Breath Sounds clear;diminished   Rhythm/Pattern, Respiratory unlabored   Cough Frequency infrequent   Cough Type nonproductive   PRE-TX-O2   Device (Oxygen Therapy) room air   SpO2 95 %   Pulse Oximetry Type Continuous   $ Pulse Oximetry - Multiple Charge Pulse Oximetry - Multiple   Pulse 84   Resp 18   Aerosol Therapy   $ Aerosol Therapy Charges Aerosol Treatment   Daily Review of Necessity (SVN) completed   Respiratory Treatment Status (SVN) given   Treatment Route (SVN) mask;oxygen   Patient Position (SVN) HOB elevated   Post Treatment Assessment (SVN) breath sounds unchanged   Signs of Intolerance (SVN) none   Breath Sounds Post-Respiratory Treatment   Throughout All Fields Post-Treatment All Fields   Throughout All Fields Post-Treatment no change   Post-treatment Heart Rate (beats/min) 69   Post-treatment Resp Rate (breaths/min) 17   Education   $ Education Bronchodilator;15 min

## 2024-04-10 NOTE — PROGRESS NOTES
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   All in one place.        Several of them went fishing today and they drove to the [place].     Hernan ( the patient) , stayed in the Van while the others went out fishing.   He left for a few hours and went home and then came back .   They saw him come back and park and it was about 30 minutes at that point until they returned to the Van where they found him to be unresponsive or not waking up.         The brother says he was breathing.  But wouldn't open eyes or move or respond.  So they drove him to their houses and they were going to call 911.   The mother said no she will drive him to the ER. Which was about 4-5 miles away.     They took him to [facility] ER where the ER physician tells me was not responsive and sats were low in 80s.      First ABG was done but VENOUS showed barely low pH and high PC02     They gave him IV narcan and he responded and started slurring words and moving arms around.     They decided to intubate him for air way protection and to improve ventilation .        Next ABG showed normal pH and PC02 was still high .        His labs and CXR were normal there     His drug tox screen was NEGATIVE also         The Brother said he was not sick in any way before  this happened .       I talked to the brother further in the ICU here asking about drugs or pain meds.   He said he has chronic pain from stroke , on his right side and right arm mainly.   He was given something for pain but it doesn't help when pain is bad.  So he would take pain meds that he could get off the street or from other people he knew .   But the brother said he can't be sure of that and didn't know if he took anything today or not .         They shipped him over to us and we admitted him to our ICU           Overview/Hospital Course:  No notes on file    Interval History:   58-year-old male with history of severe COPD, active tobacco abuse 2 packs a day for 30 years, history of CVA secondary to ICH with residual left-sided weakness, diabetes presenting here after found unresponsiveness.  ABG shows severe CO2 retention, patient is intubated and moved to ICU.  Chest x-ray clear.  Patient is afebrile.  UDS initially negative repeat positive for opiates and benzos.  Patient extubated on 4/9.       Seen and examined in the multidisciplinary rounds, family at bedside, patient is awake alert, AAO x3, patient is crying, patient is complaining of right shoulder pain, patient now admits that he using street drugs on and off.  Chest x-ray shows some infiltrates.           Review of Systems   Constitutional:  Negative for activity change and fever.   HENT:  Negative for ear discharge, facial swelling and sore throat.    Eyes:  Negative for photophobia, pain and visual disturbance.   Respiratory:  Negative for apnea, cough and shortness of breath.    Cardiovascular:  Negative for chest pain and leg swelling.   Gastrointestinal:  Negative for abdominal pain and blood in stool.   Endocrine: Negative for cold intolerance and heat intolerance.   Musculoskeletal:  Negative for back pain and gait problem.        Right shoulder pain   Skin:  Negative for pallor and rash.   Neurological:  Negative for speech difficulty and  headaches.   Psychiatric/Behavioral:  Negative for confusion, hallucinations and suicidal ideas.    All other systems reviewed and are negative.    Objective:     Vital Signs (Most Recent):  Temp: 98 °F (36.7 °C) (04/10/24 0400)  Pulse: 84 (04/10/24 0712)  Resp: 18 (04/10/24 0712)  BP: (!) 154/88 (04/10/24 0600)  SpO2: 95 % (04/10/24 0712) Vital Signs (24h Range):  Temp:  [97 °F (36.1 °C)-98.2 °F (36.8 °C)] 98 °F (36.7 °C)  Pulse:  [56-96] 84  Resp:  [16-48] 18  SpO2:  [85 %-100 %] 95 %  BP: (154-212)/() 154/88     Weight: 66.4 kg (146 lb 6.2 oz)  Body mass index is 23.63 kg/m².    Intake/Output Summary (Last 24 hours) at 4/10/2024 1018  Last data filed at 4/10/2024 0601  Gross per 24 hour   Intake 3104.54 ml   Output 2110 ml   Net 994.54 ml           Physical Exam  Vitals and nursing note reviewed.   Constitutional:       General: He is not in acute distress.     Appearance: He is not diaphoretic.   HENT:      Mouth/Throat:      Pharynx: No oropharyngeal exudate.   Eyes:      General:         Right eye: No discharge.         Left eye: No discharge.   Neck:      Thyroid: No thyromegaly.      Vascular: No JVD.      Trachea: No tracheal deviation.   Cardiovascular:      Heart sounds: No murmur heard.     No friction rub. No gallop.   Pulmonary:      Effort: No respiratory distress.      Breath sounds: No wheezing or rales.   Abdominal:      General: There is no distension.      Palpations: Abdomen is soft.      Tenderness: There is no abdominal tenderness.   Musculoskeletal:         General: No tenderness or deformity.   Skin:     Capillary Refill: Capillary refill takes 2 to 3 seconds.      Findings: No erythema or rash.   Neurological:      Motor: No abnormal muscle tone.      Deep Tendon Reflexes: Reflexes normal.             Significant Labs: All pertinent labs within the past 24 hours have been reviewed.  BMP:   Recent Labs   Lab 04/10/24  0302   *      K 3.7      CO2 28   BUN 24*    CREATININE 0.8   CALCIUM 8.6*   MG 1.9       CBC:   Recent Labs   Lab 04/09/24  0527 04/09/24  0829 04/10/24  0302   WBC  --  6.47 12.90*   HGB  --  11.8* 11.5*   HCT 36 36.9* 34.9*   PLT  --  141* 145*       Troponin:   Recent Labs   Lab 04/08/24 2022 04/09/24  0211 04/09/24  0829   TROPONINIHS 28.0* 22.4* 22.5*         Significant Imaging: I have reviewed all pertinent imaging results/findings within the past 24 hours.  I have reviewed and interpreted all pertinent imaging results/findings within the past 24 hours.    CTA Head and Neck (xpd)    Addendum Date: 4/8/2024    Presumably reactive partially imaged right hilar lymphadenopathy which appears similar to prior exams.  Clinical correlation needed.  If indicated this can be further characterized with contrast-enhanced CT of the chest in the non emergent setting. Electronically signed by: Delvin Wilson Date:    04/08/2024 Time:    08:38    Result Date: 4/8/2024  EXAMINATION: CTA HEAD AND NECK (XPD) CLINICAL HISTORY: stroke like symptoms; TECHNIQUE: Non contrast low dose axial images were obtained through the head. CT angiogram was performed from the level of the ignacia to the top of the head following the IV administration of 100mL of Omnipaque 350.   Sagittal and coronal reconstructions and maximum intensity projection reconstructions were performed. Arterial stenosis percentages are based on NASCET measurement criteria. COMPARISON: Concurrently performed head CT 04/07/2024.  CTA of the head and neck 03/26/2024. FINDINGS: CTA HEAD: Vasculature: Mild atherosclerotic changes of the bilateral internal carotid arteries without evidence of any hemodynamically significant stenosis.  No hemodynamically significant stenosis or proximal large vessel occlusion.  No aneurysm identified.  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 Variant  anatomy: Hypoplastic left P1 PCA in the setting of a prominent left-sided posterior communicating artery.  Dominant distal left vertebral artery. Brain: Limited supplemental post-contrast CT imaging of the head demonstrates no abnormal parenchymal enhancement, midline shift or mass effect, or space-occupying extra-axial fluid collection.  No CT evidence of an acute major vascular territory infarct again noting chronic small vessel ischemic changes including multiple small lacunar type infarcts described on the concurrently performed noncontrast head CT. Ventricles/Sulci: The ventricles and sulci are appropriate in size for age. Osseous Structures: The osseous structures are unremarkable in appearance. Other: Scattered opacification of the paranasal sinuses as well as fluid within the pharyngeal airway in the setting of respiratory/enteric support tubes mastoid air cells are clear.  No acute bony process identified.  Chronic defect at the level of the left lamina papyracea. CTA NECK: Aorta: Conventional branching pattern. The great vessel origins are patent without flow limiting stenosis. Vertebral Arteries: The origins of the vertebral arteries are patent.  No flow limiting stenosis, occlusion, or dissection. Right Carotid: No flow limiting stenosis, occlusion, or dissection of the common carotid or internal carotid arteries.  Mild plaque of the proximal ICA.  Right internal carotid artery: Less than 50 % stenosis by and NASCET.  The internal carotid artery is medialized at the C2-C3 level. Left Carotid: No flow limiting stenosis, occlusion, or dissection of the common carotid or internal carotid arteries. No significant plaque of the proximal ICA. Right internal carotid artery: 0 % stenosis by and NASCET. Extravascular Anatomy: Emphysematous changes of the visualized upper lung zones.  Nonspecific prominence of a right hilar lymph node which is grossly unchanged compared to prior exams, nonspecific but presumably  reactive.     1. No intracranial proximal large vessel occlusion or hemodynamically significant stenosis.  No change from prior CTA. 2. Limited postcontrast CT imaging demonstrates no acute process. 3. 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 4. No hemodynamically significant arterial stenosis within the neck. The preliminary and final reports are concordant. Electronically signed by: Delvin Wilson Date:    04/08/2024 Time:    08:10    X-Ray Chest 1 View    Result Date: 4/8/2024  EXAMINATION: XR CHEST 1 VIEW CLINICAL HISTORY: Encounter for other preprocedural examination TECHNIQUE: Single frontal view of the chest was performed. COMPARISON: 04/07/2024 FINDINGS: The endotracheal tube has been placed and has its tip 3 cm above the ignacia.  Nasogastric tube has been placed and passes into the stomach and out of field of view.  The cardiomediastinal silhouette is normal limits.  The lungs are well expanded.  There is now slight patchy density again evident at the right lung base.  No pleural effusion.     Support devices in good position.  Probable mild right basilar infiltrate. Electronically signed by: Jozef Adams MD Date:    04/08/2024 Time:    08:37    X-Ray Chest AP Portable    Result Date: 4/8/2024  EXAMINATION: XR CHEST AP PORTABLE CLINICAL HISTORY: Chest Pain; TECHNIQUE: Single frontal view of the chest was performed. COMPARISON: 03/28/2024 FINDINGS: The cardiomediastinal silhouette is within normal limits.  The lungs are well expanded without consolidation or pleural effusion.  No infiltrate evident on this exam.     No acute process.  Previous right basilar predominant infiltrate no longer evident. Electronically signed by: Jozef Adams MD Date:    04/08/2024 Time:    08:34    X-ray chest AP portable    Result Date: 4/8/2024  EXAMINATION: XR CHEST AP PORTABLE CLINICAL HISTORY: hypoxemia; COMPARISON: 04/07/2024 FINDINGS: Cardiac silhouette size is within normal  limits.  Endotracheal tube and enteric tube are in stable position. No airspace consolidation or pleural effusion.  No pneumothorax.  No acute osseous abnormality.     No acute pulmonary process. ET tube and enteric tube are in stable position. Electronically signed by: Patrick Mace Date:    04/08/2024 Time:    07:37    CT Head Without Contrast    Result Date: 4/8/2024  EXAMINATION: CT HEAD WITHOUT CONTRAST CLINICAL HISTORY: Mental status change, unknown cause; TECHNIQUE: Low dose axial CT images obtained throughout the head without intravenous contrast. Sagittal and coronal reconstructions were performed. COMPARISON: CT/CTA 03/26/2024.  MRI brain 12/04/2023. FINDINGS: Brain: There is no evidence of a mass, edema, midline shift, or intracranial hemorrhage. No extra-axial fluid collection.  Grossly similar findings of chronic small vessel ischemia throughout the cerebral hemispheric white matter as well as chronic lacunar type infarcts involving the bilateral basal ganglia, right frontal corona radiata, and alf.  No CT evidence of an acute major vascular territorial infarct. Ventricles: The ventricles, sulci, and cisterns are within normal limits. Skull: The osseous structures are unremarkable in appearance. Extracranial soft tissues: Limited imaging is within normal limits. Other: Chronic injury to the left lamina papyracea, unchanged.  Mild mucosal thickening within the visualized paranasal sinuses.  Mastoid air cells are clear.     1. No acute intracranial CT findings or change from prior exams. The preliminary and final reports are concordant. Electronically signed by: Delvin Wilson Date:    04/08/2024 Time:    07:20    X-Ray Chest 1 View    Result Date: 3/28/2024  EXAMINATION: XR CHEST 1 VIEW CLINICAL HISTORY: Right pleuritic chest pain; TECHNIQUE: Single frontal view of the chest was performed. COMPARISON: 03/26/2024 FINDINGS: Faint interstitial disease in the lung bases.  Normal size heart.  No pleural effusion  or pneumothorax.     Basilar opacities are less confluent. Electronically signed by: Iván Coronado Date:    03/28/2024 Time:    13:19    CTA Head and Neck (xpd)    Result Date: 3/26/2024  EXAMINATION: CTA HEAD AND NECK (XPD) CLINICAL HISTORY: Neuro deficit, acute, stroke suspected; TECHNIQUE: Non contrast low dose axial images were obtained through the head. CT angiogram was performed from the level of the ignacia to the top of the head following the IV administration of 75mL of Omnipaque 350.   Sagittal and coronal reconstructions and maximum intensity projection reconstructions were performed. Arterial stenosis percentages are based on NASCET measurement criteria. COMPARISON: 12/03/2023, MRI brain 12/04/2023 FINDINGS: Brain: No evidence of acute intracranial hemorrhage. The ventricles and sulci are appropriate in size and configuration for the patient's age without hydrocephalus. Stable small chronic lacunar infarct in the right frontal corona radiata, as well as bilateral basal ganglia and right paramedian alf.  There is no significant mass effect, midline shift, or extra-axial fluid collection. Remainder of the gray-white matter differentiation is within normal limits without evidence of an acute major vascular territory infarct. No abnormal intracranial enhancement. Chronic deformity of the medial left orbital wall.  Partial opacification of the right sphenoid sinus.  No acute calvarial fracture. Extracranial: Aorta and great vessels: Left-sided aortic arch with normal configuration.   No evidence of stenosis of the origins of the great vessels from the arch. Subclavian arteries: The subclavian arteries are without hemodynamically significant stenosis or occlusion. Right carotid: The right common carotid artery is without significant stenosis. Mild calcific atherosclerotic plaque at the carotid bifurcation without evidence of a hemodynamically significant stenosis..  The right internal carotid artery is  tortuous without significant stenosis, occlusion, or dissection. Left carotid: The left common carotid artery is without significant stenosis. Mild calcific atherosclerotic plaque at the carotid bifurcation without evidence of a hemodynamically significant stenosis.  The left internal carotid artery is tortuous without significant stenosis, occlusion, or dissection. Extracranial vertebral arteries:  The vertebral arteries are without significant stenosis, occlusion, or dissection to the skull base. Intracranial: Anterior circulation: Moderate calcific atherosclerosis of bilateral carotid artery siphons.  No areas of significant atherosclerotic narrowing or filling defects are identified.  The middle cerebral arteries are normal.  The anterior cerebral arteries are normal. Posterior circulation: The vertebral arteries are normal. The basilar artery is normal. 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  Posterior cerebral arteries otherwise grossly normal. No evidence of aneurysm or arteriovenous malformation. Dural venous sinuses are patent. Other: The visualized portions of the lung apices demonstrated moderate centrilobular emphysematous changes.  Respiratory motion limits evaluation of the pulmonary parenchyma.  No focal consolidation, pleural effusion or pneumothorax. There are degenerative spine changes. No concerning osseous lesions identified.     1. No evidence of acute intracranial abnormality or significant interval detrimental change as compared to the prior exam.  Further evaluation as warranted clinically. 2. Chronic small vessel ischemic changes again demonstrated, including chronic lacunar infarcts involving the right frontal corona radiata, bilateral basal ganglia and alf. 3. No high-grade stenosis, large vessel occlusion, dissection or aneurysm in the head and neck vasculature. Electronically signed by: Delvin  Vera Date:    03/26/2024 Time:    18:25    X-Ray Ribs 2 View Right    Result Date: 3/26/2024  EXAMINATION: XR RIBS 2 VIEW RIGHT CLINICAL HISTORY: Unspecified fall, initial encounter TECHNIQUE: Two views of the right ribs were performed. COMPARISON: 03/26/2024 FINDINGS: No displaced fracture or traumatic malalignment.  Hazy interstitial opacities right lung base better characterized on dated chest radiograph.  Mild degenerative change right AC joint. Electronically signed by: Iván Coronado Date:    03/26/2024 Time:    16:55    X-Ray Chest AP Portable    Result Date: 3/26/2024  EXAMINATION: XR CHEST AP PORTABLE CLINICAL HISTORY: Pleurodynia TECHNIQUE: Single frontal view of the chest was performed. COMPARISON: 12/03/2023 and 09/26/2023 FINDINGS: There is reticulonodular disease in the lower lung zones with some tram-tracking.  Upper lung zones are clear.  Unchanged heart size.  No pleural effusion or pneumothorax.     Unchanged extent of reticulonodular disease in the lung bases.  This is better characterized on prior CT.  Some differential considerations include includes atypical infection, pneumonitis, aspiration, airways disease. Electronically signed by: Iván Coronado Date:    03/26/2024 Time:    16:49  - pulls last radiology orders      Assessment/Plan:      * Acute on chronic respiratory failure with hypoxia and hypercapnia  Patient apparently has severe COPD, continue heavy smoker, on top of that, patient taking tramadol, baclofen, Lyrica from what I see, family denies OD.  Patient has significant pinpoint pupils for my physical examination.  I suspect the patient may have narcotic overdose on top of severe COPD.    Intubated on 4/7, extubated on 4/9    Doing much better  Okay to move to medical-surgical floor  Discontinue IV steroids, started on p.o. steroids with taper  We will get right shoulder x-ray    Chest x-ray shows right middle lobe infiltrates, start on empiric IV Rocephin and  "azithromycin    Acute encephalopathy  Patient has severe COPD, and also patient using some street drugs, patient has pinpoint pupils upon admission.   Secondary to CO2 retention.      COPD (chronic obstructive pulmonary disease)  Continue duo nebulizers and IV steroids    CVA (cerebral vascular accident)  Patient has history of CVA with residual left-sided weakness secondary to previous ICH.  Not CT head is negative for acute changes.  Continue aspirin  Hold Plavix now  Neurologist following, pending MRI brain    Tobacco dependency  We will do counseling once patient is extubated    DM (diabetes mellitus), type 2  Patient's FSGs are controlled on current medication regimen.  Last A1c reviewed-   Lab Results   Component Value Date    LABA1C 9.8 (H) 07/06/2016    HGBA1C 6.1 03/26/2024     Most recent fingerstick glucose reviewed-   No results for input(s): "POCTGLUCOSE" in the last 24 hours.    Current correctional scale  Medium  Maintain anti-hyperglycemic dose as follows-   Antihyperglycemics (From admission, onward)      None          Hold Oral hypoglycemics while patient is in the hospital.      VTE Risk Mitigation (From admission, onward)           Ordered     enoxaparin injection 40 mg  Daily        Note to Pharmacy: Ht: 5' 6" (1.676 m)  Wt: 66.4 kg (146 lb 6.2 oz)  Estimated Creatinine Clearance: 90.8 mL/min (based on SCr of 0.8 mg/dL).  Body mass index is 23.63 kg/m².    04/08/24 0245     IP VTE HIGH RISK PATIENT  Once         04/08/24 0245     Place sequential compression device  Until discontinued         04/08/24 0245                    Discharge Planning   ZACARIAS: 4/13/2024     Code Status: Full Code   Is the patient medically ready for discharge?:     Reason for patient still in hospital (select all that apply): Patient trending condition and Treatment  Discharge Plan A: Home with family, Home Health            Alissa Jackson MD  Department of Hospital Medicine   [facility]    "

## 2024-04-10 NOTE — PLAN OF CARE
Problem: Adult Inpatient Plan of Care  Goal: Plan of Care Review  Outcome: Ongoing, Progressing   AA&O. Pt calmer today and cooperative. Intermittent periods of restlessness/frustration and c/o right sided pain. Emotional about ongoing chronic pain on right side. Pain meds adjusted. Diet advanced to mechanical soft. Voids per urinal. Bed alarm set. Safety maintained.

## 2024-04-10 NOTE — PLAN OF CARE
"    MIC[facility] care plan note    Dx: <principal problem not specified>    Shift Events: Patient's agitation improved and is more calm. Restraints removed. Attempted swallow evaluation but patient failed.    Goals of Care: Increase ADLs    Neuro: Follows Commands and Moves All Extremities    Vital Signs: BP (!) 188/88   Pulse 90   Temp 98 °F (36.7 °C) (Axillary)   Resp (!) 27   Ht 5' 6" (1.676 m)   Wt 66.4 kg (146 lb 6.2 oz)   SpO2 98%   BMI 23.63 kg/m²     Respiratory: Nasal Cannula    GI: proton pump inhibitor per orders no change in bowel habits    Thrombus: DVT prophylaxis. Pulmonary toilet including IS. Early ambulation.    Diet: NPO    Gtts: NS @ 75cc.hr     Urine Output: Urinary Catheter see charting  Labs/Accuchecks: Reviewed .    Skin: C/D/I         Plan of care reviewed with patient, Hernan Badillo , verbalized understanding. Patient progressing toward goals of care. NAEON. Safety measures in place and maintained throughout shift.  Brother at bedside. Mentation improved throughout the night.   "

## 2024-04-10 NOTE — NURSING
Nurses Note -- 4 Eyes      04/09/2023 2115      Skin assessed during: Daily Assessment      [x] No Altered Skin Integrity Present    []Prevention Measures Documented      [] Yes- Altered Skin Integrity Present or Discovered   [] LDA Added if Not in Epic (Describe Wound)   [] New Altered Skin Integrity was Present on Admit and Documented in LDA   [] Wound Image Taken    Wound Care Consulted? No    Attending Nurse:  Raisa Gonzalez RN/Staff Member:  RADHA Ferrara

## 2024-04-11 ENCOUNTER — TELEPHONE (OUTPATIENT)
Dept: FAMILY MEDICINE | Facility: CLINIC | Age: 59
End: 2024-04-11
Payer: MEDICARE

## 2024-04-11 VITALS
BODY MASS INDEX: 23.53 KG/M2 | WEIGHT: 146.38 LBS | OXYGEN SATURATION: 93 % | DIASTOLIC BLOOD PRESSURE: 94 MMHG | TEMPERATURE: 99 F | SYSTOLIC BLOOD PRESSURE: 184 MMHG | HEIGHT: 66 IN | HEART RATE: 93 BPM | RESPIRATION RATE: 20 BRPM

## 2024-04-11 LAB
ANION GAP SERPL CALC-SCNC: 3 MMOL/L (ref 8–16)
BASOPHILS # BLD AUTO: 0 K/UL (ref 0–0.2)
BASOPHILS NFR BLD: 0 % (ref 0–1.9)
BUN SERPL-MCNC: 19 MG/DL (ref 6–20)
CALCIUM SERPL-MCNC: 8.3 MG/DL (ref 8.7–10.5)
CHLORIDE SERPL-SCNC: 108 MMOL/L (ref 95–110)
CO2 SERPL-SCNC: 29 MMOL/L (ref 23–29)
CREAT SERPL-MCNC: 0.8 MG/DL (ref 0.5–1.4)
DIFFERENTIAL METHOD BLD: ABNORMAL
EOSINOPHIL # BLD AUTO: 0 K/UL (ref 0–0.5)
EOSINOPHIL NFR BLD: 0 % (ref 0–8)
ERYTHROCYTE [DISTWIDTH] IN BLOOD BY AUTOMATED COUNT: 13.9 % (ref 11.5–14.5)
EST. GFR  (NO RACE VARIABLE): >60 ML/MIN/1.73 M^2
GLUCOSE SERPL-MCNC: 90 MG/DL (ref 70–110)
HCT VFR BLD AUTO: 30.2 % (ref 40–54)
HGB BLD-MCNC: 10 G/DL (ref 14–18)
IMM GRANULOCYTES # BLD AUTO: 0.05 K/UL (ref 0–0.04)
IMM GRANULOCYTES NFR BLD AUTO: 0.5 % (ref 0–0.5)
LYMPHOCYTES # BLD AUTO: 1.8 K/UL (ref 1–4.8)
LYMPHOCYTES NFR BLD: 16.7 % (ref 18–48)
MAGNESIUM SERPL-MCNC: 1.7 MG/DL (ref 1.6–2.6)
MCH RBC QN AUTO: 30 PG (ref 27–31)
MCHC RBC AUTO-ENTMCNC: 33.1 G/DL (ref 32–36)
MCV RBC AUTO: 91 FL (ref 82–98)
MONOCYTES # BLD AUTO: 0.5 K/UL (ref 0.3–1)
MONOCYTES NFR BLD: 4.8 % (ref 4–15)
NEUTROPHILS # BLD AUTO: 8.6 K/UL (ref 1.8–7.7)
NEUTROPHILS NFR BLD: 78 % (ref 38–73)
NRBC BLD-RTO: 0 /100 WBC
PHOSPHATE SERPL-MCNC: 2.2 MG/DL (ref 2.7–4.5)
PLATELET # BLD AUTO: 144 K/UL (ref 150–450)
PMV BLD AUTO: 10.4 FL (ref 9.2–12.9)
POTASSIUM SERPL-SCNC: 3.1 MMOL/L (ref 3.5–5.1)
RBC # BLD AUTO: 3.33 M/UL (ref 4.6–6.2)
SODIUM SERPL-SCNC: 140 MMOL/L (ref 136–145)
WBC # BLD AUTO: 10.98 K/UL (ref 3.9–12.7)

## 2024-04-11 PROCEDURE — 99233 SBSQ HOSP IP/OBS HIGH 50: CPT | Mod: ,,, | Performed by: INTERNAL MEDICINE

## 2024-04-11 PROCEDURE — 63600175 PHARM REV CODE 636 W HCPCS: Performed by: HOSPITALIST

## 2024-04-11 PROCEDURE — 85025 COMPLETE CBC W/AUTO DIFF WBC: CPT | Performed by: HOSPITALIST

## 2024-04-11 PROCEDURE — 94640 AIRWAY INHALATION TREATMENT: CPT

## 2024-04-11 PROCEDURE — 84100 ASSAY OF PHOSPHORUS: CPT | Performed by: INTERNAL MEDICINE

## 2024-04-11 PROCEDURE — 25000003 PHARM REV CODE 250: Performed by: INTERNAL MEDICINE

## 2024-04-11 PROCEDURE — 25000242 PHARM REV CODE 250 ALT 637 W/ HCPCS: Performed by: HOSPITALIST

## 2024-04-11 PROCEDURE — 83735 ASSAY OF MAGNESIUM: CPT | Performed by: INTERNAL MEDICINE

## 2024-04-11 PROCEDURE — 25000003 PHARM REV CODE 250: Performed by: HOSPITALIST

## 2024-04-11 PROCEDURE — 80048 BASIC METABOLIC PNL TOTAL CA: CPT | Performed by: HOSPITALIST

## 2024-04-11 RX ORDER — AMOXICILLIN AND CLAVULANATE POTASSIUM 875; 125 MG/1; MG/1
1 TABLET, FILM COATED ORAL EVERY 12 HOURS
Qty: 10 TABLET | Refills: 0 | Status: SHIPPED | OUTPATIENT
Start: 2024-04-11 | End: 2024-04-16

## 2024-04-11 RX ORDER — SODIUM,POTASSIUM PHOSPHATES 280-250MG
2 POWDER IN PACKET (EA) ORAL
Status: DISCONTINUED | OUTPATIENT
Start: 2024-04-11 | End: 2024-04-11 | Stop reason: HOSPADM

## 2024-04-11 RX ORDER — LANOLIN ALCOHOL/MO/W.PET/CERES
800 CREAM (GRAM) TOPICAL
Status: DISCONTINUED | OUTPATIENT
Start: 2024-04-11 | End: 2024-04-11 | Stop reason: HOSPADM

## 2024-04-11 RX ORDER — IBUPROFEN 200 MG
1 TABLET ORAL DAILY
Qty: 30 PATCH | Refills: 0 | Status: ON HOLD | OUTPATIENT
Start: 2024-04-11

## 2024-04-11 RX ORDER — METHYLPREDNISOLONE 4 MG/1
TABLET ORAL
Qty: 21 EACH | Refills: 0 | Status: SHIPPED | OUTPATIENT
Start: 2024-04-11 | End: 2024-05-02

## 2024-04-11 RX ORDER — FLUTICASONE FUROATE, UMECLIDINIUM BROMIDE AND VILANTEROL TRIFENATATE 200; 62.5; 25 UG/1; UG/1; UG/1
1 POWDER RESPIRATORY (INHALATION) DAILY
Qty: 30 EACH | Refills: 0 | Status: SHIPPED | OUTPATIENT
Start: 2024-04-11 | End: 2024-05-11

## 2024-04-11 RX ADMIN — ESCITALOPRAM OXALATE 10 MG: 10 TABLET ORAL at 09:04

## 2024-04-11 RX ADMIN — IPRATROPIUM BROMIDE AND ALBUTEROL SULFATE 3 ML: 2.5; .5 SOLUTION RESPIRATORY (INHALATION) at 03:04

## 2024-04-11 RX ADMIN — HYDROCODONE BITARTRATE AND ACETAMINOPHEN 1 TABLET: 7.5; 325 TABLET ORAL at 01:04

## 2024-04-11 RX ADMIN — CHLORHEXIDINE GLUCONATE 15 ML: 1.2 RINSE ORAL at 09:04

## 2024-04-11 RX ADMIN — IPRATROPIUM BROMIDE AND ALBUTEROL SULFATE 3 ML: 2.5; .5 SOLUTION RESPIRATORY (INHALATION) at 07:04

## 2024-04-11 RX ADMIN — MUPIROCIN 1 G: 20 OINTMENT TOPICAL at 09:04

## 2024-04-11 RX ADMIN — POTASSIUM BICARBONATE 35 MEQ: 391 TABLET, EFFERVESCENT ORAL at 05:04

## 2024-04-11 RX ADMIN — CEFTRIAXONE SODIUM 1 G: 1 INJECTION, POWDER, FOR SOLUTION INTRAMUSCULAR; INTRAVENOUS at 08:04

## 2024-04-11 RX ADMIN — FAMOTIDINE 20 MG: 10 INJECTION INTRAVENOUS at 09:04

## 2024-04-11 RX ADMIN — ASPIRIN 81 MG: 81 TABLET, COATED ORAL at 09:04

## 2024-04-11 RX ADMIN — CLOPIDOGREL BISULFATE 75 MG: 75 TABLET, FILM COATED ORAL at 09:04

## 2024-04-11 RX ADMIN — PREDNISONE 40 MG: 20 TABLET ORAL at 09:04

## 2024-04-11 RX ADMIN — Medication 800 MG: at 05:04

## 2024-04-11 NOTE — PHYSICIAN QUERY
PT Name: Hernan Badillo  MR #: [id number]    DOCUMENTATION CLARIFICATION      CDS/: Diana Anderson Pe               Contact information:  [phone]     This form is a permanent document in the medical record.  Query Date: April 11, 2024    By submitting this query, we are merely seeking further clarification of documentation. Please utilize your independent clinical judgment when addressing the question(s) below.    The Medical Record contains the following:   Indicators   Supporting Clinical Findings Location in Medical Record    HERRERA, SOB, Productive Cough, Wheezing,   Use of Accessory Muscles, etc. Patient admitted with Acute on likely chronic H&H Respiratory failure   Severe COPD  Per Progress notes     Smoker/History of Smoking Patient is a current smoker 60.0 ttl pk-yrs Per Progress notes     Medication IV methylprednisolone >4/11 changed to PO  Per MAR        Provider, please specify the acuity of the COPD diagnosis associated with above clinical findings.    [  X] COPD with Acute Exacerbation   [   ] Other Respiratory Diagnosis (please specify):___________     Please document in your progress notes daily for the duration of treatment, until resolved, and include in your discharge summary.

## 2024-04-11 NOTE — DISCHARGE SUMMARY
[facility] Medicine  Discharge Summary      Patient Name: Hernan Badillo  MRN: [id number]  GEOVANNI: [id number]  Patient Class: IP- Inpatient  Admission Date: 4/7/2024  Hospital Length of Stay: 4 days  Discharge Date and Time: 4/11/2024 10:57 AM  Attending Physician: No att. 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   All in one place.        Several of them went fishing today and they drove to the [place].     Hernan ( the patient) , stayed in the Van while the others went out fishing.   He left for a few hours and went home and then came back .   They saw him come back and park and it was about 30 minutes at that point until they returned to the Van where they found him to be unresponsive or not waking up.         The brother says he was breathing.  But wouldn't open eyes or move or respond.  So they drove him to their houses and they were going to call 911.   The mother said no she will drive him to the ER. Which was about 4-5 miles away.     They took him to [facility] ER where the ER physician tells me was not responsive and sats were low in 80s.      First ABG was done but VENOUS showed barely low pH and high PC02     They gave him IV narcan and he responded and started slurring words and moving arms around.     They decided to intubate him for air way protection and to improve ventilation .        Next ABG showed normal pH and PC02 was still high .        His labs and CXR were normal there     His drug tox screen was  NEGATIVE also         The Brother said he was not sick in any way before this happened .       I talked to the brother further in the ICU here asking about drugs or pain meds.   He said he has chronic pain from stroke , on his right side and right arm mainly.   He was given something for pain but it doesn't help when pain is bad.  So he would take pain meds that he could get off the street or from other people he knew .   But the brother said he can't be sure of that and didn't know if he took anything today or not .         They shipped him over to us and we admitted him to our ICU           * No surgery found *      Hospital Course:   58-year-old male with history of severe COPD, active tobacco abuse 2 packs a day for 30 years, history of CVA secondary to ICH with residual left-sided weakness, diabetes presenting here after found unresponsiveness. ABG shows severe CO2 retention, patient is intubated and moved to ICU. Chest x-ray clear. Patient is afebrile. UDS initially negative repeat positive for opiates and benzos.   Patient was evaluated by cardiologist and pulmonologist and neurologist.  Patient was successfully extubated in 2 days, and later patient admitted that he has been taking some street drugs.  Most likely patient had acute encephalopathy secondary to CO2 retention secondary to opiate overdose and severe COPD.  Patient was monitored 1 more day in ICU now cleared for discharge.        Goals of Care Treatment Preferences:  Code Status: Full Code      Consults:   Consults (From admission, onward)          Status Ordering Provider     Inpatient consult to Neurology  Once        Provider:  (Not yet assigned)    Completed BHANU BENNETT     Inpatient consult to Cardiology  Once        Provider:  Sal Martins MD    Completed BHANU BENNETT     Inpatient consult to Registered Dietitian/Nutritionist  Once        Provider:  (Not yet assigned)    Completed BHANU BENNETT     Pulmonology  Once        Provider:   "Eliza Rodriguez MD    Completed MEGAN YOUSSEF            Neuro  Acute encephalopathy  Patient has severe COPD, and also patient using some street drugs, patient has pinpoint pupils upon admission.   Secondary to CO2 retention.      CVA (cerebral vascular accident)  Patient has history of CVA with residual right-sided weakness secondary to previous ICH.  Not CT head is negative for acute changes.  Continue aspirin  Hold Plavix now  Neurologist following, pending MRI brain    Pulmonary  * Acute on chronic respiratory failure with hypoxia and hypercapnia  Patient apparently has severe COPD, continue heavy smoker, on top of that, patient taking tramadol, baclofen, Lyrica from what I see, family denies OD.  Patient has significant pinpoint pupils for my physical examination.  I suspect the patient may have narcotic overdose on top of severe COPD.    Intubated on 4/7, extubated on 4/9    Doing much better  Okay to move to medical-surgical floor  Discontinue IV steroids, started on p.o. steroids with taper  We will get right shoulder x-ray    Chest x-ray shows right middle lobe infiltrates, start on empiric IV Rocephin and azithromycin    COPD (chronic obstructive pulmonary disease)  Continue duo nebulizers and IV steroids    Endocrine  DM (diabetes mellitus), type 2  Patient's FSGs are controlled on current medication regimen.  Last A1c reviewed-   Lab Results   Component Value Date    LABA1C 9.8 (H) 07/06/2016    HGBA1C 6.1 03/26/2024     Most recent fingerstick glucose reviewed-   No results for input(s): "POCTGLUCOSE" in the last 24 hours.    Current correctional scale  Medium  Maintain anti-hyperglycemic dose as follows-   Antihyperglycemics (From admission, onward)      None          Hold Oral hypoglycemics while patient is in the hospital.    Other  Tobacco dependency  We will do counseling once patient is extubated      Final Active Diagnoses:    Diagnosis Date Noted POA    PRINCIPAL PROBLEM:  Acute on chronic " respiratory failure with hypoxia and hypercapnia [J96.21, J96.22] 03/26/2024 Yes    CVA (cerebral vascular accident) [I63.9] 04/08/2024 Yes    COPD (chronic obstructive pulmonary disease) [J44.9] 04/08/2024 Yes    Acute encephalopathy [G93.40] 04/08/2024 Yes    Tobacco dependency [F17.200] 05/30/2020 Yes    DM (diabetes mellitus), type 2 [E11.9] 01/22/2013 Yes      Problems Resolved During this Admission:       Discharged Condition: stable    Disposition: Home-Health Care [facility]    Follow Up:   Follow-up Information       Iván Beckett MD Follow up.    Specialty: Pulmonary Disease  Contact information:  [address]  [id number]  [place] [place] [address]  [phone]               Luba Blas NP Follow up.    Specialty: Family Medicine  Contact information:  [address] [address]  [phone]               [facility] Follow up.    Specialties: Home Health Services, Home Therapy Services, Home Living Aide Services  Why: resumption of care scheduled for Saturday  Contact information:  [address] [address]  [phone]              - [facility] Follow up.    Why: telephone number [phone] if additional needs arise post hospital discharge                         Patient Instructions:      SUBSEQUENT HOME HEALTH ORDERS   Order Comments: Resume home health as before     Order Specific Question Answer Comments   What Home Health Agency is the patient currently using? [facility]        Significant Diagnostic Studies: Labs: CMP   Recent Labs   Lab 04/09/24  1605 04/10/24  0302 04/11/24  0423   NA  --  141 140   K 4.1 3.7 3.1*   CL  --  107 108   CO2  --  28 29   GLU  --  281* 90   BUN  --  24* 19   CREATININE  --  0.8 0.8   CALCIUM  --  8.6* 8.3*   PROT  --  6.1  --    ALBUMIN  --  3.3*  --    BILITOT  --  0.7  --    ALKPHOS  --  43*  --    AST  --  7*  --    ALT  --  4*  --    ANIONGAP  --  6* 3*     and CBC   Recent Labs   Lab 04/10/24  0302 04/11/24  0423   WBC 12.90* 10.98   HGB 11.5* 10.0*   HCT 34.9* 30.2*   * 144*     X-Ray Chest AP Portable    Result Date: 4/11/2024  EXAMINATION: XR CHEST AP PORTABLE CLINICAL HISTORY: resp fail; COMPARISON: 04/10/2024 FINDINGS: Cardiac silhouette size is within normal limits.  Lungs are clear with no large pleural effusion or pneumothorax evident.  No acute osseous abnormality.     No acute pulmonary process. Electronically signed by: Patrick Mace Date:    04/11/2024 Time:    07:54    X-ray Shoulder 2 or More Views Right    Result Date: 4/10/2024  EXAMINATION: XR SHOULDER COMPLETE 2 OR MORE VIEWS RIGHT CLINICAL HISTORY: Right shoulder pain COMPARISON: None. FINDINGS: Three views are negative for fracture or dislocation.  No osseous destructive lesion is identified.  Mild acromioclavicular arthritic change with undersurface spurring is noted.  Soft tissues are unremarkable.     1. Mild arthritic changes at the acromioclavicular joint, otherwise normal exam. Electronically signed by: Eren Simon Date:    04/10/2024 Time:    11:10    X-Ray Chest AP Portable    Result Date: 4/10/2024  EXAMINATION: XR CHEST AP PORTABLE CLINICAL HISTORY: resp fail; FINDINGS: Portable chest with comparison chest x-ray 04/09/2024.  Normal cardiomediastinal silhouette.There is mild hazy opacification of the right lung base.  Left lung is clear.  Interval removal of nasogastric tube and endotracheal tube.  No pneumothorax.  Pulmonary vasculature is normal. No acute osseous abnormality.     Mild hazy opacification of the right lung base, could reflect atelectasis or infiltrate.  Atelectasis is favored. Electronically signed by: Yaniv Ponce Date:    04/10/2024 Time:    07:00    X-Ray Chest 1 View    Result Date: 4/9/2024  EXAMINATION: XR CHEST 1 VIEW CLINICAL HISTORY: Respiratory failure. FINDINGS: Two portable chest radiographs at 04:48 hours compared to 04/08/2024 show ET and NG  tubes, unchanged in position.  The cardiomediastinal silhouette and pulmonary vasculature are within normal limits. The lungs are normally expanded, with no consolidation, large pleural effusion, evidence of pulmonary edema, or pneumothorax.     No significant interval change, or evidence of acute cardiopulmonary disease. Electronically signed by: Ravi Meza Date:    04/09/2024 Time:    06:35    Echo    Result Date: 4/8/2024    Left Ventricle: The left ventricle is normal in size. Moderately increased wall thickness. There is moderate concentric hypertrophy. Normal wall motion. There is low normal systolic function with a visually estimated ejection fraction of 50 - 55%. There is normal diastolic function.   Right Ventricle: Normal right ventricular cavity size. Wall thickness is normal. Right ventricle wall motion  is normal. Systolic function is normal.   IVC/SVC: Normal venous pressure at 3 mmHg.     CTA Head and Neck (xpd)    Addendum Date: 4/8/2024    Presumably reactive partially imaged right hilar lymphadenopathy which appears similar to prior exams.  Clinical correlation needed.  If indicated this can be further characterized with contrast-enhanced CT of the chest in the non emergent setting. Electronically signed by: Delvin Wilson Date:    04/08/2024 Time:    08:38    Result Date: 4/8/2024  EXAMINATION: CTA HEAD AND NECK (XPD) CLINICAL HISTORY: stroke like symptoms; TECHNIQUE: Non contrast low dose axial images were obtained through the head. CT angiogram was performed from the level of the ignacia to the top of the head following the IV administration of 100mL of Omnipaque 350.   Sagittal and coronal reconstructions and maximum intensity projection reconstructions were performed. Arterial stenosis percentages are based on NASCET measurement criteria. COMPARISON: Concurrently performed head CT 04/07/2024.  CTA of the head and neck 03/26/2024. FINDINGS: CTA HEAD: Vasculature: Mild atherosclerotic changes of the  bilateral internal carotid arteries without evidence of any hemodynamically significant stenosis.  No hemodynamically significant stenosis or proximal large vessel occlusion.  No aneurysm identified.  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 Variant anatomy: Hypoplastic left P1 PCA in the setting of a prominent left-sided posterior communicating artery.  Dominant distal left vertebral artery. Brain: Limited supplemental post-contrast CT imaging of the head demonstrates no abnormal parenchymal enhancement, midline shift or mass effect, or space-occupying extra-axial fluid collection.  No CT evidence of an acute major vascular territory infarct again noting chronic small vessel ischemic changes including multiple small lacunar type infarcts described on the concurrently performed noncontrast head CT. Ventricles/Sulci: The ventricles and sulci are appropriate in size for age. Osseous Structures: The osseous structures are unremarkable in appearance. Other: Scattered opacification of the paranasal sinuses as well as fluid within the pharyngeal airway in the setting of respiratory/enteric support tubes mastoid air cells are clear.  No acute bony process identified.  Chronic defect at the level of the left lamina papyracea. CTA NECK: Aorta: Conventional branching pattern. The great vessel origins are patent without flow limiting stenosis. Vertebral Arteries: The origins of the vertebral arteries are patent.  No flow limiting stenosis, occlusion, or dissection. Right Carotid: No flow limiting stenosis, occlusion, or dissection of the common carotid or internal carotid arteries.  Mild plaque of the proximal ICA.  Right internal carotid artery: Less than 50 % stenosis by and NASCET.  The internal carotid artery is medialized at the C2-C3 level. Left Carotid: No flow limiting stenosis, occlusion,  or dissection of the common carotid or internal carotid arteries. No significant plaque of the proximal ICA. Right internal carotid artery: 0 % stenosis by and NASCET. Extravascular Anatomy: Emphysematous changes of the visualized upper lung zones.  Nonspecific prominence of a right hilar lymph node which is grossly unchanged compared to prior exams, nonspecific but presumably reactive.     1. No intracranial proximal large vessel occlusion or hemodynamically significant stenosis.  No change from prior CTA. 2. Limited postcontrast CT imaging demonstrates no acute process. 3. 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 4. No hemodynamically significant arterial stenosis within the neck. The preliminary and final reports are concordant. Electronically signed by: Delvin Wilson Date:    04/08/2024 Time:    08:10    X-Ray Chest 1 View    Result Date: 4/8/2024  EXAMINATION: XR CHEST 1 VIEW CLINICAL HISTORY: Encounter for other preprocedural examination TECHNIQUE: Single frontal view of the chest was performed. COMPARISON: 04/07/2024 FINDINGS: The endotracheal tube has been placed and has its tip 3 cm above the ignacia.  Nasogastric tube has been placed and passes into the stomach and out of field of view.  The cardiomediastinal silhouette is normal limits.  The lungs are well expanded.  There is now slight patchy density again evident at the right lung base.  No pleural effusion.     Support devices in good position.  Probable mild right basilar infiltrate. Electronically signed by: Jozef Adams MD Date:    04/08/2024 Time:    08:37    X-Ray Chest AP Portable    Result Date: 4/8/2024  EXAMINATION: XR CHEST AP PORTABLE CLINICAL HISTORY: Chest Pain; TECHNIQUE: Single frontal view of the chest was performed. COMPARISON: 03/28/2024 FINDINGS: The cardiomediastinal silhouette is within normal limits.  The lungs are well expanded without consolidation or pleural effusion.  No infiltrate  evident on this exam.     No acute process.  Previous right basilar predominant infiltrate no longer evident. Electronically signed by: Jozef Adams MD Date:    04/08/2024 Time:    08:34    X-ray chest AP portable    Result Date: 4/8/2024  EXAMINATION: XR CHEST AP PORTABLE CLINICAL HISTORY: hypoxemia; COMPARISON: 04/07/2024 FINDINGS: Cardiac silhouette size is within normal limits.  Endotracheal tube and enteric tube are in stable position. No airspace consolidation or pleural effusion.  No pneumothorax.  No acute osseous abnormality.     No acute pulmonary process. ET tube and enteric tube are in stable position. Electronically signed by: Patrick Mace Date:    04/08/2024 Time:    07:37    CT Head Without Contrast    Result Date: 4/8/2024  EXAMINATION: CT HEAD WITHOUT CONTRAST CLINICAL HISTORY: Mental status change, unknown cause; TECHNIQUE: Low dose axial CT images obtained throughout the head without intravenous contrast. Sagittal and coronal reconstructions were performed. COMPARISON: CT/CTA 03/26/2024.  MRI brain 12/04/2023. FINDINGS: Brain: There is no evidence of a mass, edema, midline shift, or intracranial hemorrhage. No extra-axial fluid collection.  Grossly similar findings of chronic small vessel ischemia throughout the cerebral hemispheric white matter as well as chronic lacunar type infarcts involving the bilateral basal ganglia, right frontal corona radiata, and alf.  No CT evidence of an acute major vascular territorial infarct. Ventricles: The ventricles, sulci, and cisterns are within normal limits. Skull: The osseous structures are unremarkable in appearance. Extracranial soft tissues: Limited imaging is within normal limits. Other: Chronic injury to the left lamina papyracea, unchanged.  Mild mucosal thickening within the visualized paranasal sinuses.  Mastoid air cells are clear.     1. No acute intracranial CT findings or change from prior exams. The preliminary and final reports are  concordant. Electronically signed by: Delvin Wilson Date:    04/08/2024 Time:    07:20    X-Ray Chest 1 View    Result Date: 3/28/2024  EXAMINATION: XR CHEST 1 VIEW CLINICAL HISTORY: Right pleuritic chest pain; TECHNIQUE: Single frontal view of the chest was performed. COMPARISON: 03/26/2024 FINDINGS: Faint interstitial disease in the lung bases.  Normal size heart.  No pleural effusion or pneumothorax.     Basilar opacities are less confluent. Electronically signed by: Iván Coronado Date:    03/28/2024 Time:    13:19    CTA Head and Neck (xpd)    Result Date: 3/26/2024  EXAMINATION: CTA HEAD AND NECK (XPD) CLINICAL HISTORY: Neuro deficit, acute, stroke suspected; TECHNIQUE: Non contrast low dose axial images were obtained through the head. CT angiogram was performed from the level of the ignacia to the top of the head following the IV administration of 75mL of Omnipaque 350.   Sagittal and coronal reconstructions and maximum intensity projection reconstructions were performed. Arterial stenosis percentages are based on NASCET measurement criteria. COMPARISON: 12/03/2023, MRI brain 12/04/2023 FINDINGS: Brain: No evidence of acute intracranial hemorrhage. The ventricles and sulci are appropriate in size and configuration for the patient's age without hydrocephalus. Stable small chronic lacunar infarct in the right frontal corona radiata, as well as bilateral basal ganglia and right paramedian alf.  There is no significant mass effect, midline shift, or extra-axial fluid collection. Remainder of the gray-white matter differentiation is within normal limits without evidence of an acute major vascular territory infarct. No abnormal intracranial enhancement. Chronic deformity of the medial left orbital wall.  Partial opacification of the right sphenoid sinus.  No acute calvarial fracture. Extracranial: Aorta and great vessels: Left-sided aortic arch with normal configuration.   No evidence of stenosis of the origins of  the great vessels from the arch. Subclavian arteries: The subclavian arteries are without hemodynamically significant stenosis or occlusion. Right carotid: The right common carotid artery is without significant stenosis. Mild calcific atherosclerotic plaque at the carotid bifurcation without evidence of a hemodynamically significant stenosis..  The right internal carotid artery is tortuous without significant stenosis, occlusion, or dissection. Left carotid: The left common carotid artery is without significant stenosis. Mild calcific atherosclerotic plaque at the carotid bifurcation without evidence of a hemodynamically significant stenosis.  The left internal carotid artery is tortuous without significant stenosis, occlusion, or dissection. Extracranial vertebral arteries:  The vertebral arteries are without significant stenosis, occlusion, or dissection to the skull base. Intracranial: Anterior circulation: Moderate calcific atherosclerosis of bilateral carotid artery siphons.  No areas of significant atherosclerotic narrowing or filling defects are identified.  The middle cerebral arteries are normal.  The anterior cerebral arteries are normal. Posterior circulation: The vertebral arteries are normal. The basilar artery is normal. 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  Posterior cerebral arteries otherwise grossly normal. No evidence of aneurysm or arteriovenous malformation. Dural venous sinuses are patent. Other: The visualized portions of the lung apices demonstrated moderate centrilobular emphysematous changes.  Respiratory motion limits evaluation of the pulmonary parenchyma.  No focal consolidation, pleural effusion or pneumothorax. There are degenerative spine changes. No concerning osseous lesions identified.     1. No evidence of acute intracranial abnormality or significant interval detrimental change as  compared to the prior exam.  Further evaluation as warranted clinically. 2. Chronic small vessel ischemic changes again demonstrated, including chronic lacunar infarcts involving the right frontal corona radiata, bilateral basal ganglia and alf. 3. No high-grade stenosis, large vessel occlusion, dissection or aneurysm in the head and neck vasculature. Electronically signed by: Delvin Vera Date:    03/26/2024 Time:    18:25    X-Ray Ribs 2 View Right    Result Date: 3/26/2024  EXAMINATION: XR RIBS 2 VIEW RIGHT CLINICAL HISTORY: Unspecified fall, initial encounter TECHNIQUE: Two views of the right ribs were performed. COMPARISON: 03/26/2024 FINDINGS: No displaced fracture or traumatic malalignment.  Hazy interstitial opacities right lung base better characterized on dated chest radiograph.  Mild degenerative change right AC joint. Electronically signed by: Iván Coronado Date:    03/26/2024 Time:    16:55    X-Ray Chest AP Portable    Result Date: 3/26/2024  EXAMINATION: XR CHEST AP PORTABLE CLINICAL HISTORY: Pleurodynia TECHNIQUE: Single frontal view of the chest was performed. COMPARISON: 12/03/2023 and 09/26/2023 FINDINGS: There is reticulonodular disease in the lower lung zones with some tram-tracking.  Upper lung zones are clear.  Unchanged heart size.  No pleural effusion or pneumothorax.     Unchanged extent of reticulonodular disease in the lung bases.  This is better characterized on prior CT.  Some differential considerations include includes atypical infection, pneumonitis, aspiration, airways disease. Electronically signed by: Iván Coronado Date:    03/26/2024 Time:    16:49  - pulls last radiology orders    Pending Diagnostic Studies:       None           Medications:  Reconciled Home Medications:      Medication List        START taking these medications      amLODIPine 10 MG tablet  Commonly known as: NORVASC  Take 1 tablet (10 mg total) by mouth once daily.     amoxicillin-clavulanate 875-125mg  875-125 mg per tablet  Commonly known as: AUGMENTIN  Take 1 tablet by mouth every 12 (twelve) hours. for 5 days     methylPREDNISolone 4 mg tablet  Commonly known as: MEDROL DOSEPACK  use as directed     nicotine 21 mg/24 hr  Commonly known as: NICODERM CQ  Place 1 patch onto the skin once daily.     TRELEGY ELLIPTA 200-62.5-25 mcg inhaler  Generic drug: fluticasone-umeclidin-vilanter  Inhale 1 puff into the lungs once daily.            CONTINUE taking these medications      acetaminophen 325 MG tablet  Commonly known as: TYLENOL  Take 650 mg by mouth 3 (three) times daily as needed for Pain.     albuterol 90 mcg/actuation inhaler  Commonly known as: PROVENTIL HFA  Inhale 2 puffs into the lungs every 6 (six) hours as needed for Wheezing. Rescue     aspirin 81 MG EC tablet  Commonly known as: ECOTRIN  Take 1 tablet (81 mg total) by mouth once daily.     blood sugar diagnostic, disc Strp  1 each by Misc.(Non-Drug; Combo Route) route 4 (four) times daily.     blood-glucose meter kit  Use as instructed     lancets Misc  Commonly known as: LANCETS,THIN  1 each by Misc.(Non-Drug; Combo Route) route 4 (four) times daily.     lisinopriL 10 MG tablet  Take 1 tablet (10 mg total) by mouth once daily.     metFORMIN 500 MG ER 24hr tablet  Commonly known as: GLUCOPHAGE-XR  Take 1 tablet (500 mg total) by mouth daily with breakfast.            STOP taking these medications      baclofen 10 MG tablet  Commonly known as: LIORESAL     clopidogreL 75 mg tablet  Commonly known as: PLAVIX     fluticasone-salmeterol 500-50 mcg/dose 500-50 mcg/dose Dsdv diskus inhaler  Commonly known as: ADVAIR DISKUS     pantoprazole 40 MG tablet  Commonly known as: PROTONIX     pregabalin 100 MG capsule  Commonly known as: LYRICA     PULMICORT 0.5 mg/2 mL nebulizer solution  Generic drug: budesonide              Indwelling Lines/Drains at time of discharge:   Lines/Drains/Airways       None                   Time spent on the discharge of patient: 35  minutes      Alissa Jackson MD  Department of Hospital Medicine  Formerly [facility]

## 2024-04-11 NOTE — NURSING
Nurses Note -- 4 Eyes      4/10/2024   11:00 PM      Skin assessed during: Daily Assessment      [x] No Altered Skin Integrity Present    []Prevention Measures Documented      [] Yes- Altered Skin Integrity Present or Discovered   [] LDA Added if Not in Epic (Describe Wound)   [] New Altered Skin Integrity was Present on Admit and Documented in LDA   [] Wound Image Taken    Wound Care Consulted? No    Attending Nurse:  Raisa Gonzalez RN/Staff Member:  RADHA Rosa

## 2024-04-11 NOTE — PLAN OF CARE
[employer] message sent to Dr. Beckett and CORDELIA Blas, NP's clinics to notify of hospital discharge on today - clinics to contact patient with apt information       04/11/24 1007   Post-Acute Status   Post-Acute Authorization Other   Other Status Awaiting f/u Appts

## 2024-04-11 NOTE — ASSESSMENT & PLAN NOTE
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  Not CT head is negative for acute changes.  Continue aspirin  Hold Plavix now  Neurologist following, pending MRI brain

## 2024-04-11 NOTE — PHYSICIAN QUERY
PT Name: Hernan Badillo  MR #: [id number]     DOCUMENTATION CLARIFICATION      CDS/: Diana Anderson Pe               Contact information: [phone]     This form is a permanent document in the medical record.     Query Date: April 11, 2024    Dear Provider,  By submitting this query, we are merely seeking further clarification of documentation.  Please utilize your independent clinical judgment when addressing the question(s) below.     The Medical Record contains the following:    Supporting Clinical Findings Location in Medical Record   CVA (cerebral vascular accident)  Patient has history of CVA with residual left-sided weakness secondary to previous ICH.  Not CT head is negative for acute changes.  Continue aspirin  Hold Plavix now  Neurologist following, pending MRI brain  4/10 progress note    Etiology of encephalopathy could likely be secondary hypoxic respiratory failure. Likely Opiate use causing encephalopathy and hypoxia   CT head was negative for acute pathology admission.  CT angiogram head and neck showed no large vessel occlusion or high-grade stenosis.  Small right sylvian fissure AVM was noted.          No further neuro workup is needed at this time.  Patient back to his baseline.  Educated patient about not to use street side drugs/ pain medicine.  4/10 Neurology consult note      Please clarify if the Acute CVA diagnosis has been:    [  ] Ruled In   [  X] Ruled Out   [  ] Other/Clarification of findings (please specify): _______________     Please document in your progress notes daily for the duration of treatment, until resolved, and include in your discharge summary.

## 2024-04-11 NOTE — HOSPITAL COURSE
58-year-old male with history of severe COPD, active tobacco abuse 2 packs a day for 30 years, history of CVA secondary to ICH with residual left-sided weakness, diabetes presenting here after found unresponsiveness. ABG shows severe CO2 retention, patient is intubated and moved to ICU. Chest x-ray clear. Patient is afebrile. UDS initially negative repeat positive for opiates and benzos.   Patient was evaluated by cardiologist and pulmonologist and neurologist.  Patient was successfully extubated in 2 days, and later patient admitted that he has been taking some street drugs.  Most likely patient had acute encephalopathy secondary to CO2 retention secondary to opiate overdose and severe COPD.  Patient was monitored 1 more day in ICU now cleared for discharge.

## 2024-04-11 NOTE — TELEPHONE ENCOUNTER
Hospital follow up appointment scheduled for the date of 4-19-24. Patient agreed to appointment date, time, and location.   Reviewed appointment details with patient prior to end of call to ensure accuracy in scheduling.

## 2024-04-11 NOTE — PHYSICIAN QUERY
PT Name: Hernan Badillo  MR #: [id number]     Documentation Clarification      CDS/: Diana Anderson Pe               Contact information: [phone]     This form is a permanent document in the medical record.     Query Date: April 11, 2024    By submitting this query, we are merely seeking further clarification of documentation. Please utilize your independent clinical judgment when addressing the question(s) below.    The Medical Record reflects the following:    Supporting Clinical Findings Location in Medical Record   The medical record contains conflicting documentation:      Noted history of a CVA in December 2023 with right side residual weakness     Patient has history of CVA with residual left-sided weakness secondary to previous ICH.          4/10 Progress note    4/9 neuro exam -Strength:  5/5 in LUE / 4/5 in RUE / LLE 5/5 / RLE 4+/5  4/9 Neurology consult progress note                                                                            Provider, please clarify the above conflicting documentation:     [ X  ] Patient with a history of a CVA in December 2023 with Right side residual weakness    [   ]  Patient with a history of a CVA in December 2023 with Left side residual weakness    [   ] Other (please specify): ____________

## 2024-04-11 NOTE — PROGRESS NOTES
Pulmonary/Critical Care  Progress Note      Patient name: Hernan Badillo  MRN: [id number]  Date: 04/11/2024    Admit Date: 4/7/2024  Consult Requested By: Alissa Jackson MD    Reason for Consult: respiratory failure    HPI:    4/8/2024 - Pt found unresponsive but breathing and brought to ER for evaluation.  On arrival he had decreased sats and vBG showed elevated paCO2.  By report he was given narcan with some response but was intubated for airway protection.  Moved to ICU and has required sedation.  SBT tried this AM but ABG  was not adequate and sTV were small by report.  Case d/w nursing and family at bedside.  Pt is not responsive at the time of my exam.  Chart has been reviewed.    4/9/2024 - Stable overnight, did SBT early this AM with good parameters and ABG and will proceed with extubation this AM.  No new issues reported.  He did get agitated some when sedation was decreased for SBT.  Hgb has decreased some but no bleeding reported.  Procalcitonin was very low.  Troponin only minimally elevated and trending down.  CXR looks OK, EKG reviewed and cardiology following.  ECHO noted    4/10/2024 - Stable overnight, no new issues reported, stable extubated.  He does remember taking a pain medicine prior to his event.  He has chronic right shoulder pain.  Respiratory status is good.  CXR reviewed.  Shoulder xray reviewed    4/11/2024 - Stable overnight, no new issues reported, on RA and no respiratory complaints.  He remains concerned about his chronic pain issues.  Pt tells me that he has quit smoking.     Review of Systems    Review of Systems   Unable to perform ROS: Intubated   Constitutional:  Positive for weight loss.   Neurological:  Positive for speech change (some slurring at baseline) and weakness (L side (by family report though chart says right), prior CVA).        Altered mental status       Past Medical History    Past Medical History:   Diagnosis Date    COPD (chronic obstructive pulmonary disease)      Diabetes mellitus, type 2     Hypertension        Past Surgical History    Past Surgical History:   Procedure Laterality Date    DENTAL SURGERY      ESOPHAGOGASTRODUODENOSCOPY N/A 6/1/2020    Procedure: EGD (ESOPHAGOGASTRODUODENOSCOPY);  Surgeon: Terrell May MD;  Location: [facility];  Service: Endoscopy;  Laterality: N/A;    HERNIA REPAIR      left inguinal    incision and drainiage         Medications (scheduled):      albuterol-ipratropium  3 mL Nebulization Q4H    aspirin  81 mg Oral Daily    cefTRIAXone (Rocephin) IV (PEDS and ADULTS)  1 g Intravenous Q24H    chlorhexidine  15 mL Mouth/Throat BID    clopidogreL  75 mg Oral Daily    enoxparin  40 mg Subcutaneous Daily    EScitalopram oxalate  10 mg Per OG tube Daily    famotidine (PF)  20 mg Intravenous Q12H    mupirocin   Nasal BID    predniSONE  40 mg Oral Daily       Medications (infusions):           Medications (prn):     acetaminophen, albuterol sulfate, calcium gluconate IVPB, calcium gluconate IVPB, calcium gluconate IVPB, haloperidol lactate, hydrALAZINE, HYDROcodone-acetaminophen, magnesium oxide, magnesium oxide, magnesium sulfate IVPB, magnesium sulfate IVPB, ondansetron, potassium bicarbonate, potassium bicarbonate, potassium bicarbonate, potassium, sodium phosphates, potassium, sodium phosphates, potassium, sodium phosphates, sodium chloride 0.9%    Family History:   Family History   Problem Relation Age of Onset    Heart attack Father 80    Heart disease Father     Drug abuse Father     Cancer Maternal Grandmother         colon       Social History: Tobacco:   Social History     Tobacco Use   Smoking Status Every Day    Current packs/day: 2.00    Average packs/day: 2.0 packs/day for 30.0 years (60.0 ttl pk-yrs)    Types: Cigarettes   Smokeless Tobacco Never                                EtOH:   Social History     Substance and Sexual Activity   Alcohol Use Yes    Alcohol/week: 1.0 standard drink of alcohol    Types: 1 Shots of liquor per  "week    Comment: 1/2 pint 2 x per week - quit approximately 5 months ago                                Drugs:   Social History     Substance and Sexual Activity   Drug Use No       Physical Exam    Vital signs:  Temp:  [98 °F (36.7 °C)-99.3 °F (37.4 °C)]   Pulse:  []   Resp:  [15-51]   BP: (114-184)/(63-94)   SpO2:  [87 %-100 %]     Intake/Output:   Intake/Output Summary (Last 24 hours) at 4/11/2024 0925  Last data filed at 4/10/2024 2301  Gross per 24 hour   Intake 1461.25 ml   Output 100 ml   Net 1361.25 ml          BMI: Estimated body mass index is 23.63 kg/m² as calculated from the following:    Height as of this encounter: 5' 6" (1.676 m).    Weight as of this encounter: 66.4 kg (146 lb 6.2 oz).    Physical Exam  Vitals and nursing note reviewed.   Constitutional:       General: He is not in acute distress.     Appearance: Normal appearance. He is not ill-appearing, toxic-appearing or diaphoretic.      Comments: Thin   HENT:      Head: Normocephalic and atraumatic.      Right Ear: External ear normal.      Left Ear: External ear normal.      Nose: Nose normal.      Mouth/Throat:      Mouth: Mucous membranes are moist.      Pharynx: Oropharynx is clear. No oropharyngeal exudate.   Eyes:      General: No scleral icterus.        Right eye: No discharge.         Left eye: No discharge.      Pupils: Pupils are equal, round, and reactive to light.      Comments: Conjunctiva muddy   Neck:      Vascular: No carotid bruit.      Comments: No elevated JVD  Cardiovascular:      Rate and Rhythm: Normal rate and regular rhythm.      Pulses: Normal pulses.      Heart sounds: Normal heart sounds. No murmur heard.     No friction rub. No gallop.   Pulmonary:      Effort: Pulmonary effort is normal. No respiratory distress.      Breath sounds: No stridor. Rhonchi present. No wheezing or rales.   Chest:      Chest wall: No tenderness.   Abdominal:      General: There is no distension.      Tenderness: There is no abdominal " "tenderness. There is no guarding.      Comments: Hypoactive/rare BS   Musculoskeletal:         General: No swelling. Normal range of motion.      Cervical back: Normal range of motion and neck supple. No rigidity or tenderness.      Right lower leg: No edema.      Left lower leg: No edema.   Lymphadenopathy:      Cervical: No cervical adenopathy.   Skin:     General: Skin is warm and dry.      Capillary Refill: Capillary refill takes 2 to 3 seconds.      Coloration: Skin is not jaundiced.      Findings: No bruising.   Neurological:      General: No focal deficit present.      Mental Status: Mental status is at baseline.      Cranial Nerves: No cranial nerve deficit.      Sensory: No sensory deficit.      Motor: No weakness.   Psychiatric:      Comments: Gets agitated at times         Laboratory    Recent Labs   Lab 04/11/24 0423   WBC 10.98   RBC 3.33*   HGB 10.0*   HCT 30.2*   *   MCV 91   MCH 30.0   MCHC 33.1         Recent Labs   Lab 04/11/24 0423   CALCIUM 8.3*      K 3.1*   CO2 29      BUN 19   CREATININE 0.8         No results for input(s): "PT", "INR", "APTT" in the last 24 hours.      No results for input(s): "CPK", "CPKMB", "TROPONINI", "MB" in the last 24 hours.      Additional labs: reviewed    Microbiology:       Microbiology Results (last 7 days)       Procedure Component Value Units Date/Time    Blood culture [[id number]] Collected: 04/10/24 0820    Order Status: Completed Specimen: Blood Updated: 04/10/24 1517     Blood Culture, Routine No Growth to date    Blood culture [[id number]] Collected: 04/10/24 0853    Order Status: Completed Specimen: Blood Updated: 04/10/24 1517     Blood Culture, Routine No Growth to date    Narrative:      Collection has been rescheduled by JT4 at 04/10/2024 08:25 Reason:   Nurses with patient at the moment  Collection has been rescheduled by JT4 at 04/10/2024 08:25 Reason:   Nurses with patient at the moment    Culture, Respiratory with Gram Stain " [[id number]] Collected: 04/08/24 0742    Order Status: Completed Specimen: Respiratory from Endotracheal Aspirate Updated: 04/10/24 0800     Respiratory Culture Reduced normal respiratory willie     Gram Stain (Respiratory) <10 epithelial cells per low power field.     Gram Stain (Respiratory) Few WBC's     Gram Stain (Respiratory) Rare Gram positive cocci    Culture, Respiratory with Gram Stain [[id number]]     Order Status: No result Specimen: Respiratory             Radiology    X-Ray Chest AP Portable  Narrative: EXAMINATION:  XR CHEST AP PORTABLE    CLINICAL HISTORY:  resp fail;    COMPARISON:  04/10/2024    FINDINGS:  Cardiac silhouette size is within normal limits.  Lungs are clear with no large pleural effusion or pneumothorax evident.  No acute osseous abnormality.  Impression: No acute pulmonary process.    Electronically signed by: Patrick Mace  Date:    04/11/2024  Time:    07:54        Additional Studies    reviewed    Ventilator Information                  Recent Labs     04/09/24  0527   PH 7.359   PCO2 43.2   PO2 85   HCO3 24.4   POCSATURATED 96   BE -1           Impression    Active Hospital Problems    Diagnosis  POA    *Acute on chronic respiratory failure with hypoxia and hypercapnia [J96.21, J96.22]  Yes    CVA (cerebral vascular accident) [I63.9]  Yes    COPD (chronic obstructive pulmonary disease) [J44.9]  Yes    Acute encephalopathy [G93.40]  Yes    Tobacco dependency [F17.200]  Yes    DM (diabetes mellitus), type 2 [E11.9]  Yes      Resolved Hospital Problems   No resolved problems to display.       Plan    Stable extubated  Increase activity as able  Neurology following  Cardiology following  Consider stopping antibiotics  Continue steroids as he has some wheezing this AM  At DC he should be started on ICS/LABA/LAMA (TRELEGY or BREZTRI) and prn RAJENDRA  OK to transfer out of ICU  When DC he should follow up with me si we can address/assess his COPD and cigraette use    Thank you for this  consult.  I will follow with you while the patient is hospitalized.            Iván Beckett MD  [facility] Pulmonary/Critical Care

## 2024-04-11 NOTE — TELEPHONE ENCOUNTER
Lyssa Davalos RN  [facility] Staff     ----- Message from Lyssa Davalos RN sent at 4/11/2024 10:03 AM CDT -----  Good Morning    Patient discharging home today. Can you please schedule close hospital follow up appointment? Please contact patient with appointment information    Thank you  Lyssa Davalos RN CM

## 2024-04-11 NOTE — PLAN OF CARE
Per Dennis with [facility] home health, RIDGE scheduled for Saturday - ridge orders sent via [employer]     04/11/24 1010   Post-Acute Status   Post-Acute Authorization [facility]   Home [facility] Status Set-up Complete/Auth obtained

## 2024-04-11 NOTE — NURSING
Discharge instructions given to pt and pts mother, pt and mother v/u.  Understand to   medications.

## 2024-04-11 NOTE — PLAN OF CARE
Patient progressing toward goals. Patient still c/o right shoulder pain. Educated on pain control and on seeing a orthopedic surgeon outpatient. Patient voiced understanding and request telephone numbers for orthopedics.     Problem: Adult Inpatient Plan of Care  Goal: Plan of Care Review  Outcome: Ongoing, Progressing  Goal: Patient-Specific Goal (Individualized)  Outcome: Ongoing, Progressing  Goal: Absence of Hospital-Acquired Illness or Injury  Outcome: Ongoing, Progressing  Goal: Optimal Comfort and Wellbeing  Outcome: Ongoing, Progressing  Goal: Readiness for Transition of Care  Outcome: Ongoing, Progressing     Problem: Infection  Goal: Absence of Infection Signs and Symptoms  Outcome: Ongoing, Progressing     Problem: Diabetes Comorbidity  Goal: Blood Glucose Level Within Targeted Range  Outcome: Ongoing, Progressing     Problem: Fall Injury Risk  Goal: Absence of Fall and Fall-Related Injury  Outcome: Ongoing, Progressing     Problem: Skin Injury Risk Increased  Goal: Skin Health and Integrity  Outcome: Ongoing, Progressing     Problem: Restraint, Nonbehavioral (Nonviolent)  Goal: Absence of Harm or Injury  Outcome: Ongoing, Progressing

## 2024-04-12 LAB
OHS QRS DURATION: 88 MS
OHS QRS DURATION: 92 MS
OHS QTC CALCULATION: 444 MS
OHS QTC CALCULATION: 478 MS

## 2024-04-15 ENCOUNTER — TELEPHONE (OUTPATIENT)
Dept: PAIN MEDICINE | Facility: CLINIC | Age: 59
End: 2024-04-15
Payer: MEDICARE

## 2024-04-15 ENCOUNTER — TELEPHONE (OUTPATIENT)
Dept: FAMILY MEDICINE | Facility: CLINIC | Age: 59
End: 2024-04-15
Payer: MEDICARE

## 2024-04-15 ENCOUNTER — TELEPHONE (OUTPATIENT)
Dept: PULMONOLOGY | Facility: CLINIC | Age: 59
End: 2024-04-15

## 2024-04-15 DIAGNOSIS — I15.2 HYPERTENSION ASSOCIATED WITH DIABETES: ICD-10-CM

## 2024-04-15 DIAGNOSIS — E11.59 HYPERTENSION ASSOCIATED WITH DIABETES: ICD-10-CM

## 2024-04-15 LAB
BACTERIA BLD CULT: NORMAL
BACTERIA BLD CULT: NORMAL

## 2024-04-15 NOTE — TELEPHONE ENCOUNTER
Spoke to pt mother who is req referral for dry needling due to pain.     Pt is scheduled for hospital follow up on  4-19-24

## 2024-04-15 NOTE — TELEPHONE ENCOUNTER
----- Message from Chloe Reyez sent at 4/15/2024  9:12 AM CDT -----  Regarding: neurological pain  Contact: arlin  Type:  Needs Medical Advice    Who Called: Arlin  Symptoms (please be specific): pain   Would the patient rather [phone]    Additional Information: sts she would like to speak to someone regarding his pain that he has been having since  his stroke--please advise

## 2024-04-15 NOTE — TELEPHONE ENCOUNTER
I called this pts wife to advise we are interventional pain and that we may not be able to treat his S/p  CVA pain. Should he see a neurologist? PMR? Im not sure where to ask PCP to send him? I didn't want to book an appt without unless you though you could help him. Please advise. Thank you.

## 2024-04-15 NOTE — TELEPHONE ENCOUNTER
----- Message from Iván Beckett MD sent at 4/15/2024  3:18 PM CDT -----  Regarding: RE: [facility]ops Follow Up  2-4 weeks as long as he is stable  ----- Message -----  From: Joey El  Sent: 4/15/2024   9:58 AM CDT  To: Iván Beckett MD  Subject: Hops Follow Up                                   The patient has discharged.  He was seen [facility] for acute resp failure. Please advise how soon you would like to see him in the office for a follow up. Thanks in advance for your assistance.

## 2024-04-15 NOTE — TELEPHONE ENCOUNTER
----- Message from Lorelei Espino sent at 4/15/2024  8:56 AM CDT -----  Type: Needs Medical Advice  Who Called:  pt mother, Arlin  Symptoms (please be specific):  said she need to speak to the office today--said pt is in pain and she trying to find something to relieve the pain--said she would like to please speak to the dr today--please call and advise  Best Call Back Number: [phone]  Additional Information: thank you

## 2024-04-16 NOTE — TELEPHONE ENCOUNTER
----- Message from Shalini Almanza sent at 4/16/2024  1:18 PM CDT -----  Contact: home health  Type:  Needs Medical Advice    Who Called: vanna from [facility]    Pharmacy name and phone #:    [facility] - [place] - [address]  [address]  [place] [address]  Phone: [phone] Fax: [phone]    Would the patient rather a call back or a response via [facility]? Call back    Best Call Back Number:  vanna  [phone]    Additional Information: pt was discharged and was supposed to be on amlodipine but it was not called in by the hospital.  Home health is asking if the doctor will call in the rx.   Please advise.  Thank you.

## 2024-04-16 NOTE — TELEPHONE ENCOUNTER
Spoke to pharmacist and confirmed Amlodipine has refills remaining at pharmacy. Spoke to Rajni with HH informed and verbalizes understanding. Rajni advised she will notify family

## 2024-04-19 ENCOUNTER — OFFICE VISIT (OUTPATIENT)
Dept: FAMILY MEDICINE | Facility: CLINIC | Age: 59
End: 2024-04-19
Payer: MEDICARE

## 2024-04-19 ENCOUNTER — LAB VISIT (OUTPATIENT)
Dept: LAB | Facility: HOSPITAL | Age: 59
End: 2024-04-19
Attending: FAMILY MEDICINE
Payer: MEDICARE

## 2024-04-19 VITALS
WEIGHT: 140.88 LBS | SYSTOLIC BLOOD PRESSURE: 128 MMHG | BODY MASS INDEX: 22.64 KG/M2 | HEIGHT: 66 IN | OXYGEN SATURATION: 96 % | HEART RATE: 85 BPM | RESPIRATION RATE: 18 BRPM | DIASTOLIC BLOOD PRESSURE: 74 MMHG

## 2024-04-19 DIAGNOSIS — J96.21 ACUTE ON CHRONIC RESPIRATORY FAILURE WITH HYPOXIA AND HYPERCAPNIA: ICD-10-CM

## 2024-04-19 DIAGNOSIS — R53.1 RIGHT SIDED WEAKNESS: ICD-10-CM

## 2024-04-19 DIAGNOSIS — I63.9 CEREBROVASCULAR ACCIDENT (CVA), UNSPECIFIED MECHANISM: ICD-10-CM

## 2024-04-19 DIAGNOSIS — I15.2 HYPERTENSION ASSOCIATED WITH DIABETES: ICD-10-CM

## 2024-04-19 DIAGNOSIS — Z09 HOSPITAL DISCHARGE FOLLOW-UP: ICD-10-CM

## 2024-04-19 DIAGNOSIS — E11.59 HYPERTENSION ASSOCIATED WITH DIABETES: ICD-10-CM

## 2024-04-19 DIAGNOSIS — D64.9 ANEMIA, UNSPECIFIED TYPE: ICD-10-CM

## 2024-04-19 DIAGNOSIS — J96.22 ACUTE ON CHRONIC RESPIRATORY FAILURE WITH HYPOXIA AND HYPERCAPNIA: ICD-10-CM

## 2024-04-19 DIAGNOSIS — E11.65 TYPE 2 DIABETES MELLITUS WITH HYPERGLYCEMIA, WITHOUT LONG-TERM CURRENT USE OF INSULIN: ICD-10-CM

## 2024-04-19 DIAGNOSIS — R05.8 COUGH PRODUCTIVE OF PURULENT SPUTUM: ICD-10-CM

## 2024-04-19 DIAGNOSIS — G89.4 CHRONIC PAIN SYNDROME: ICD-10-CM

## 2024-04-19 DIAGNOSIS — Z09 HOSPITAL DISCHARGE FOLLOW-UP: Primary | ICD-10-CM

## 2024-04-19 LAB
ALBUMIN SERPL BCP-MCNC: 3 G/DL (ref 3.5–5.2)
ALP SERPL-CCNC: 52 U/L (ref 55–135)
ALT SERPL W/O P-5'-P-CCNC: 7 U/L (ref 10–44)
ANION GAP SERPL CALC-SCNC: 8 MMOL/L (ref 8–16)
AST SERPL-CCNC: 8 U/L (ref 10–40)
BASOPHILS # BLD AUTO: 0.01 K/UL (ref 0–0.2)
BASOPHILS NFR BLD: 0.2 % (ref 0–1.9)
BILIRUB SERPL-MCNC: 0.7 MG/DL (ref 0.1–1)
BUN SERPL-MCNC: 9 MG/DL (ref 6–20)
CALCIUM SERPL-MCNC: 9 MG/DL (ref 8.7–10.5)
CHLORIDE SERPL-SCNC: 103 MMOL/L (ref 95–110)
CO2 SERPL-SCNC: 29 MMOL/L (ref 23–29)
CREAT SERPL-MCNC: 0.8 MG/DL (ref 0.5–1.4)
DIFFERENTIAL METHOD BLD: ABNORMAL
EOSINOPHIL # BLD AUTO: 0 K/UL (ref 0–0.5)
EOSINOPHIL NFR BLD: 0.6 % (ref 0–8)
ERYTHROCYTE [DISTWIDTH] IN BLOOD BY AUTOMATED COUNT: 13.5 % (ref 11.5–14.5)
EST. GFR  (NO RACE VARIABLE): >60 ML/MIN/1.73 M^2
GLUCOSE SERPL-MCNC: 159 MG/DL (ref 70–110)
HCT VFR BLD AUTO: 34.4 % (ref 40–54)
HGB BLD-MCNC: 11.1 G/DL (ref 14–18)
IMM GRANULOCYTES # BLD AUTO: 0.01 K/UL (ref 0–0.04)
IMM GRANULOCYTES NFR BLD AUTO: 0.2 % (ref 0–0.5)
IRON SERPL-MCNC: 35 UG/DL (ref 45–160)
LYMPHOCYTES # BLD AUTO: 1 K/UL (ref 1–4.8)
LYMPHOCYTES NFR BLD: 19.9 % (ref 18–48)
MAGNESIUM SERPL-MCNC: 1.5 MG/DL (ref 1.6–2.6)
MCH RBC QN AUTO: 30.7 PG (ref 27–31)
MCHC RBC AUTO-ENTMCNC: 32.3 G/DL (ref 32–36)
MCV RBC AUTO: 95 FL (ref 82–98)
MONOCYTES # BLD AUTO: 0.4 K/UL (ref 0.3–1)
MONOCYTES NFR BLD: 7.3 % (ref 4–15)
NEUTROPHILS # BLD AUTO: 3.5 K/UL (ref 1.8–7.7)
NEUTROPHILS NFR BLD: 71.8 % (ref 38–73)
NRBC BLD-RTO: 0 /100 WBC
PHOSPHATE SERPL-MCNC: 3.1 MG/DL (ref 2.7–4.5)
PLATELET # BLD AUTO: 164 K/UL (ref 150–450)
PMV BLD AUTO: 11.5 FL (ref 9.2–12.9)
POTASSIUM SERPL-SCNC: 3.6 MMOL/L (ref 3.5–5.1)
PROT SERPL-MCNC: 5.9 G/DL (ref 6–8.4)
RBC # BLD AUTO: 3.61 M/UL (ref 4.6–6.2)
SATURATED IRON: 13 % (ref 20–50)
SODIUM SERPL-SCNC: 140 MMOL/L (ref 136–145)
TOTAL IRON BINDING CAPACITY: 265 UG/DL (ref 250–450)
TRANSFERRIN SERPL-MCNC: 179 MG/DL (ref 200–375)
WBC # BLD AUTO: 4.92 K/UL (ref 3.9–12.7)

## 2024-04-19 PROCEDURE — 3078F DIAST BP <80 MM HG: CPT | Mod: CPTII,S$GLB,, | Performed by: FAMILY MEDICINE

## 2024-04-19 PROCEDURE — 4010F ACE/ARB THERAPY RXD/TAKEN: CPT | Mod: CPTII,S$GLB,, | Performed by: FAMILY MEDICINE

## 2024-04-19 PROCEDURE — 83735 ASSAY OF MAGNESIUM: CPT | Performed by: FAMILY MEDICINE

## 2024-04-19 PROCEDURE — 3044F HG A1C LEVEL LT 7.0%: CPT | Mod: CPTII,S$GLB,, | Performed by: FAMILY MEDICINE

## 2024-04-19 PROCEDURE — 80053 COMPREHEN METABOLIC PANEL: CPT | Performed by: FAMILY MEDICINE

## 2024-04-19 PROCEDURE — 99999 PR PBB SHADOW E&M-EST. PATIENT-LVL V: CPT | Mod: PBBFAC,,, | Performed by: FAMILY MEDICINE

## 2024-04-19 PROCEDURE — 99214 OFFICE O/P EST MOD 30 MIN: CPT | Mod: S$GLB,,, | Performed by: FAMILY MEDICINE

## 2024-04-19 PROCEDURE — 83540 ASSAY OF IRON: CPT | Performed by: FAMILY MEDICINE

## 2024-04-19 PROCEDURE — 1160F RVW MEDS BY RX/DR IN RCRD: CPT | Mod: CPTII,S$GLB,, | Performed by: FAMILY MEDICINE

## 2024-04-19 PROCEDURE — 1159F MED LIST DOCD IN RCRD: CPT | Mod: CPTII,S$GLB,, | Performed by: FAMILY MEDICINE

## 2024-04-19 PROCEDURE — 85025 COMPLETE CBC W/AUTO DIFF WBC: CPT | Performed by: FAMILY MEDICINE

## 2024-04-19 PROCEDURE — 36415 COLL VENOUS BLD VENIPUNCTURE: CPT | Mod: PO | Performed by: FAMILY MEDICINE

## 2024-04-19 PROCEDURE — 3074F SYST BP LT 130 MM HG: CPT | Mod: CPTII,S$GLB,, | Performed by: FAMILY MEDICINE

## 2024-04-19 PROCEDURE — 84100 ASSAY OF PHOSPHORUS: CPT | Performed by: FAMILY MEDICINE

## 2024-04-19 PROCEDURE — 1111F DSCHRG MED/CURRENT MED MERGE: CPT | Mod: CPTII,S$GLB,, | Performed by: FAMILY MEDICINE

## 2024-04-19 RX ORDER — OXYCODONE HYDROCHLORIDE 5 MG/1
5 TABLET ORAL EVERY 6 HOURS PRN
Qty: 40 TABLET | Refills: 0 | Status: SHIPPED | OUTPATIENT
Start: 2024-04-19 | End: 2024-04-29

## 2024-04-19 NOTE — PROGRESS NOTES
"Subjective:       Patient ID: Hernan Badillo is a 58 y.o. male.    Chief Complaint: Hospital Follow Up    Transitional Care Note    Family and/or Caretaker present at visit?  Yes.  Diagnostic tests reviewed/disposition: No diagnosic tests pending after this hospitalization.  Disease/illness education: acute on chronic respiratory failure  Home health/community services discussion/referrals: Patient has home health established at [facility] .   Establishment or re-establishment of referral orders for community resources: No other necessary community resources.   Discussion with other health care providers: No discussion with other health care providers necessary.        This patient is new to me.  Hernan Badillo presents to the clinic today for hospital discharge follow up. Patient reports he is not feeling well and is very short with responses in office today. Patient presents with his mother today who reports he is constantly in pain. Patient states he wants something to get rid of the pain today. Patient states he "just wants to end the pain", "dig a hole and put me in it". Patient reports he is in pain all of the time and requesting dry needle therapy, marijuana therapy, palliative care, or other alternatives to help with pain.  Patient denies suicidal or homicidal ideation.    saw patient with me today and discussed palliative care in detail with patient.    also discussed following up with pulmonology with patient but he was not interested in this. His only concern and request was for pain medication.   Patient and mother educated on plan of care, verbalized understanding.         Discharge note on 4/11/2024  HPI:   Hernan Badillo is a 58 year old male with a previous medical history of COPD, DMII, HTN, CVA in December 2023 with right side residual weakness      Smoker , still smokes  Non drinker        My history is from the ER Physician at [facility] and then from the patient's brother at bedside "      The story is , the patient , lives with his brother and their sons and their mother.   All in one place.         Several of them went fishing today and they [place] to [place].     Hernan ( the patient) , stayed in the Van while the others went out fishing.   He left for a few hours and went home and then came back .   They saw him come back and park and it was about 30 minutes at that point until they returned to the Van where they found him to be unresponsive or not waking up.          The brother says he was breathing.  But wouldn't open eyes or move or respond.  So they drove him to their houses and they were going to call 911.   The mother said no she will drive him to the ER. Which was about 4-5 miles away.      They took him to [facility] ER where the ER physician tells me was not responsive and sats were low in 80s.       First ABG was done but VENOUS showed barely low pH and high PC02      They gave him IV narcan and he responded and started slurring words and moving arms around.      They decided to intubate him for air way protection and to improve ventilation .         Next ABG showed normal pH and PC02 was still high .          His labs and CXR were normal there      His drug tox screen was NEGATIVE also            The Brother said he was not sick in any way before this happened .         I talked to the brother further in the ICU here asking about drugs or pain meds.   He said he has chronic pain from stroke , on his right side and right arm mainly.   He was given something for pain but it doesn't help when pain is bad.  So he would take pain meds that he could get off the street or from other people he knew .   But the brother said he can't be sure of that and didn't know if he took anything today or not .           They shipped him over to us and we admitted him to our ICU               * No surgery found *       Hospital Course:   58-year-old male with history of severe COPD, active tobacco abuse 2  packs a day for 30 years, history of CVA secondary to ICH with residual left-sided weakness, diabetes presenting here after found unresponsiveness. ABG shows severe CO2 retention, patient is intubated and moved to ICU. Chest x-ray clear. Patient is afebrile. UDS initially negative repeat positive for opiates and benzos.   Patient was evaluated by cardiologist and pulmonologist and neurologist.  Patient was successfully extubated in 2 days, and later patient admitted that he has been taking some street drugs.  Most likely patient had acute encephalopathy secondary to CO2 retention secondary to opiate overdose and severe COPD.  Patient was monitored 1 more day in ICU now cleared for discharge.      Review of Systems   Constitutional:  Positive for fatigue. Negative for activity change, appetite change, chills, diaphoresis and fever.   Eyes:  Negative for pain, discharge, redness and itching.   Respiratory:  Positive for shortness of breath. Negative for apnea, cough, chest tightness and wheezing.    Cardiovascular:  Negative for chest pain and leg swelling.   Genitourinary:  Negative for difficulty urinating, dysuria, flank pain and frequency.   Musculoskeletal:         Generalized left side pain   Skin:  Negative for color change, rash and wound.   Neurological:  Negative for dizziness.       Patient Active Problem List   Diagnosis    DM (diabetes mellitus), type 2    Primary hypertension    BMI 26.0-26.9,adult    Tobacco dependency    History of alcoholism    Noncompliance    Generalized abdominal pain    Left-sided nontraumatic intracerebral hemorrhage of brainstem    Hearing difficulty of both ears    Imbalance    Right leg weakness    Impaired instrumental activities of daily living (IADL)    Coordination impairment    Right sided weakness    Spasticity    Wheezing    Intermittent claudication    COPD exacerbation    Acute on chronic respiratory failure with hypoxia and hypercapnia    Fall    Elevated troponin  level    Nontraumatic intracerebral hemorrhage, unspecified    HLD (hyperlipidemia)    MDD (major depressive disorder)    GERD (gastroesophageal reflux disease)    Anemia    Acute respiratory failure with hypercapnia    CVA (cerebral vascular accident)    COPD (chronic obstructive pulmonary disease)    Acute encephalopathy       Objective:      Physical Exam  Vitals reviewed.   Constitutional:       General: He is not in acute distress.     Appearance: Normal appearance. He is well-developed.   HENT:      Head: Normocephalic.      Nose: Nose normal.   Eyes:      Conjunctiva/sclera: Conjunctivae normal.      Pupils: Pupils are equal, round, and reactive to light.   Cardiovascular:      Rate and Rhythm: Normal rate and regular rhythm.      Heart sounds: Normal heart sounds.   Pulmonary:      Effort: Pulmonary effort is normal. No respiratory distress.      Breath sounds: Examination of the right-lower field reveals wheezing. Examination of the left-lower field reveals wheezing. Wheezing present.   Musculoskeletal:      Cervical back: Normal range of motion and neck supple.   Skin:     General: Skin is warm and dry.      Findings: No rash.   Neurological:      Mental Status: He is alert and oriented to person, place, and time.   Psychiatric:         Mood and Affect: Affect is blunt.         Behavior: Behavior is agitated.         Lab Results   Component Value Date    WBC 10.98 04/11/2024    HGB 10.0 (L) 04/11/2024    HCT 30.2 (L) 04/11/2024     (L) 04/11/2024    CHOL 126 03/26/2024    TRIG 66 03/26/2024    HDL 38 (L) 03/26/2024    ALT 4 (L) 04/10/2024    AST 7 (L) 04/10/2024     04/11/2024    K 3.1 (L) 04/11/2024     04/11/2024    CREATININE 0.8 04/11/2024    BUN 19 04/11/2024    CO2 29 04/11/2024    TSH 0.706 04/07/2024    INR 0.9 04/08/2024    HGBA1C 6.1 03/26/2024     The ASCVD Risk score (Elvis DK, et al., 2019) failed to calculate for the following reasons:    The patient has a prior MI or  "stroke diagnosis  Visit Vitals  /74 (BP Location: Right arm, Patient Position: Sitting, BP Method: Medium (Manual))   Pulse 85   Resp 18   Ht 5' 6" (1.676 m)   Wt 63.9 kg (140 lb 14 oz)   SpO2 96%   BMI 22.74 kg/m²      Assessment:       1. Hospital discharge follow-up    2. Hypertension associated with diabetes    3. Right sided weakness    4. Chronic pain syndrome    5. Cough productive of purulent sputum    6. Type 2 diabetes mellitus with hyperglycemia, without long-term current use of insulin    7. Acute on chronic respiratory failure with hypoxia and hypercapnia    8. Cerebrovascular accident (CVA), unspecified mechanism    9. Anemia, unspecified type        Plan:       Hernan was seen today for hospital follow up.    Diagnoses and all orders for this visit:    Hospital discharge follow-up  -     Comprehensive Metabolic Panel; Future  -     CBC Auto Differential; Future  -     Magnesium; Future  -     Iron and TIBC; Future  -     PHOSPHORUS; Future  Labs today    Hypertension associated with diabetes  Stable  Continue medications as prescribed.  Follow up with PCP    Right sided weakness / Cerebrovascular accident (CVA), unspecified mechanism  -     Ambulatory referral/consult to HOME Palliative Care; Future  -     Ambulatory referral/consult to Physical/Occupational Therapy; Future  Continue medications as prescribed.  Follow up with PCP     Chronic pain syndrome  -     Ambulatory referral/consult to Physical/Occupational Therapy; Future  -     oxyCODONE (ROXICODONE) 5 MG immediate release tablet; Take 1 tablet (5 mg total) by mouth every 6 (six) hours as needed for Pain (Pain and SOB).  Prescription drug monitoring program has been reviewed and is consistent with patient's prescription history. There is no evidence of early fills or obtaining controlled rx's from multiple providers.    Continue medications as prescribed.  Follow up with PCP     Cough productive of purulent sputum / Acute on chronic " respiratory failure with hypoxia and hypercapnia  -     Ambulatory referral/consult to HOME Palliative Care; Future  -     oxyCODONE (ROXICODONE) 5 MG immediate release tablet; Take 1 tablet (5 mg total) by mouth every 6 (six) hours as needed for Pain (Pain and SOB).  Continue medications as prescribed.  Follow up with PCP and pulmonology as discussed.     Type 2 diabetes mellitus with hyperglycemia, without long-term current use of insulin  -     Ambulatory referral/consult to HOME Palliative Care; Future  Continue medications as prescribed.  Follow up with PCP     Anemia, unspecified type  -     CBC Auto Differential; Future  -     Iron and TIBC; Future  Labs today       Follow up in about 1 month (around 5/19/2024), or if symptoms worsen or fail to improve.      Future Appointments       Date Provider Specialty Appt Notes    4/19/2024  Lab .    5/13/2024 Quinn Aguilar MD Pain Medicine ok per md S/p CVA               Tests to Keep You Healthy    Eye Exam: ORDERED BUT NOT SCHEDULED  Colon Cancer Screening: ORDERED  Last Blood Pressure <= 139/89 (4/19/2024): Yes  Last HbA1c < 8 (03/26/2024): Yes

## 2024-04-19 NOTE — PROGRESS NOTES
Seen patient during visit with LAURY Blas.  Patient has been accompanied by his mother.  Often complains of pain in left arm and back.  He is status post hospital discharge for acute hypercapnic respiratory failure with hypoxia.    Discussed goals of care including palliative care management going forward, given patient's existing comorbid conditions, recurrent hospitalizations as well as chronic pain.  Patient's mother is in agreement with palliative care referral as well as consultation to alternative pain management service.    Will briefly provide oxycodone therapy to assist with comfort as agreed during visit, until seen by palliative care.  Further discussed that patient should be considered for DNR/DNI status

## 2024-04-23 ENCOUNTER — DOCUMENT SCAN (OUTPATIENT)
Dept: HOME HEALTH SERVICES | Facility: HOSPITAL | Age: 59
End: 2024-04-23
Payer: MEDICARE

## 2024-05-02 ENCOUNTER — DOCUMENT SCAN (OUTPATIENT)
Dept: HOME HEALTH SERVICES | Facility: HOSPITAL | Age: 59
End: 2024-05-02
Payer: MEDICARE

## 2024-05-05 ENCOUNTER — EXTERNAL HOME HEALTH (OUTPATIENT)
Dept: HOME HEALTH SERVICES | Facility: HOSPITAL | Age: 59
End: 2024-05-05
Payer: MEDICARE

## 2024-05-06 ENCOUNTER — DOCUMENT SCAN (OUTPATIENT)
Dept: HOME HEALTH SERVICES | Facility: HOSPITAL | Age: 59
End: 2024-05-06
Payer: MEDICARE

## 2024-05-08 ENCOUNTER — OFFICE VISIT (OUTPATIENT)
Dept: PULMONOLOGY | Facility: CLINIC | Age: 59
End: 2024-05-08
Payer: MEDICARE

## 2024-05-08 VITALS
OXYGEN SATURATION: 89 % | HEART RATE: 82 BPM | WEIGHT: 134.69 LBS | BODY MASS INDEX: 21.74 KG/M2 | SYSTOLIC BLOOD PRESSURE: 132 MMHG | DIASTOLIC BLOOD PRESSURE: 80 MMHG

## 2024-05-08 DIAGNOSIS — F17.200 TOBACCO DEPENDENCY: ICD-10-CM

## 2024-05-08 DIAGNOSIS — J44.9 CHRONIC OBSTRUCTIVE PULMONARY DISEASE, UNSPECIFIED COPD TYPE: Primary | ICD-10-CM

## 2024-05-08 PROCEDURE — 3008F BODY MASS INDEX DOCD: CPT | Mod: CPTII,S$GLB,, | Performed by: INTERNAL MEDICINE

## 2024-05-08 PROCEDURE — 3075F SYST BP GE 130 - 139MM HG: CPT | Mod: CPTII,S$GLB,, | Performed by: INTERNAL MEDICINE

## 2024-05-08 PROCEDURE — 4010F ACE/ARB THERAPY RXD/TAKEN: CPT | Mod: CPTII,S$GLB,, | Performed by: INTERNAL MEDICINE

## 2024-05-08 PROCEDURE — 99214 OFFICE O/P EST MOD 30 MIN: CPT | Mod: S$GLB,,, | Performed by: INTERNAL MEDICINE

## 2024-05-08 PROCEDURE — 3044F HG A1C LEVEL LT 7.0%: CPT | Mod: CPTII,S$GLB,, | Performed by: INTERNAL MEDICINE

## 2024-05-08 PROCEDURE — 1159F MED LIST DOCD IN RCRD: CPT | Mod: CPTII,S$GLB,, | Performed by: INTERNAL MEDICINE

## 2024-05-08 PROCEDURE — 3079F DIAST BP 80-89 MM HG: CPT | Mod: CPTII,S$GLB,, | Performed by: INTERNAL MEDICINE

## 2024-05-08 PROCEDURE — 1111F DSCHRG MED/CURRENT MED MERGE: CPT | Mod: CPTII,S$GLB,, | Performed by: INTERNAL MEDICINE

## 2024-05-08 NOTE — PROGRESS NOTES
New Office Visit/Consultation Note *    Patient Name: Hernan Badillo  MRN: [id number]  : 1965      Reason for visit: COPD, cigarette use    HPI:     2024 - Here for follow up from the hospital - had admission for respiratory failure felt to be due to narcotics.  Now here to evalaute for COPD.  He has O2 at home (3 LPM), on TRELEGY and ALBUTEROL.  He smokes 1.5 PPD and has done so for 40 years.  He would like to look into a portable concentrator if possible.    Past Medical History    Past Medical History:   Diagnosis Date    COPD (chronic obstructive pulmonary disease)     Diabetes mellitus, type 2     Hypertension        Past Surgical History    Past Surgical History:   Procedure Laterality Date    DENTAL SURGERY      ESOPHAGOGASTRODUODENOSCOPY N/A 2020    Procedure: EGD (ESOPHAGOGASTRODUODENOSCOPY);  Surgeon: Terrell May MD;  Location: [facility];  Service: Endoscopy;  Laterality: N/A;    HERNIA REPAIR      left inguinal    incision and drainiage         Medications      Current Outpatient Medications:     acetaminophen (TYLENOL) 325 MG tablet, Take 650 mg by mouth 3 (three) times daily as needed for Pain., Disp: , Rfl:     albuterol (PROVENTIL HFA) 90 mcg/actuation inhaler, Inhale 2 puffs into the lungs every 6 (six) hours as needed for Wheezing. Rescue, Disp: 18 g, Rfl: 0    amLODIPine (NORVASC) 10 MG tablet, Take 1 tablet (10 mg total) by mouth once daily., Disp: 90 tablet, Rfl: 3    aspirin (ECOTRIN) 81 MG EC tablet, Take 1 tablet (81 mg total) by mouth once daily., Disp: 360 tablet, Rfl: 0    blood sugar diagnostic, disc Strp, 1 each by Misc.(Non-Drug; Combo Route) route 4 (four) times daily., Disp: 200 strip, Rfl: 0    blood-glucose meter kit, Use as instructed, Disp: 1 each, Rfl: 0    fluticasone-umeclidin-vilanter (TRELEGY ELLIPTA) 200-62.5-25 mcg inhaler, Inhale 1 puff into the lungs once daily., Disp: 30 each, Rfl: 0    lancets (LANCETS,THIN) Misc, 1 each by Misc.(Non-Drug; Combo Route)  route 4 (four) times daily., Disp: 200 each, Rfl: 0    lisinopriL 10 MG tablet, Take 1 tablet (10 mg total) by mouth once daily., Disp: 90 tablet, Rfl: 3    metFORMIN (GLUCOPHAGE-XR) 500 MG ER 24hr tablet, Take 1 tablet (500 mg total) by mouth daily with breakfast., Disp: 90 tablet, Rfl: 3    nicotine (NICODERM CQ) 21 mg/24 hr, Place 1 patch onto the skin once daily., Disp: 30 patch, Rfl: 0    Allergies    Review of patient's allergies indicates:  No Known Allergies    SocHx    Social History     Tobacco Use   Smoking Status Every Day    Current packs/day: 2.00    Average packs/day: 2.0 packs/day for 30.0 years (60.0 ttl pk-yrs)    Types: Cigarettes   Smokeless Tobacco Never       Social History     Substance and Sexual Activity   Alcohol Use Yes    Alcohol/week: 1.0 standard drink of alcohol    Types: 1 Shots of liquor per week    Comment: 1/2 pint 2 x per week - quit approximately 5 months ago         FMHx    Family History   Problem Relation Name Age of Onset    Heart attack Father  80    Heart disease Father      Drug abuse Father      Cancer Maternal Grandmother          colon         Review of Systems  Review of Systems   Constitutional:  Positive for malaise/fatigue. Negative for chills, diaphoresis, fever and weight loss.   HENT:  Negative for congestion.    Eyes:  Negative for pain.   Respiratory:  Positive for cough and shortness of breath. Negative for hemoptysis, sputum production, wheezing and stridor.    Cardiovascular:  Negative for chest pain, palpitations, orthopnea, claudication, leg swelling and PND.   Gastrointestinal:  Negative for abdominal pain, constipation, diarrhea, heartburn, nausea and vomiting.   Genitourinary:  Negative for dysuria, frequency and urgency.   Musculoskeletal:  Positive for back pain and joint pain. Negative for falls and myalgias.   Neurological:  Negative for sensory change, focal weakness and weakness.        + CVA   Psychiatric/Behavioral:  Positive for substance abuse  (pain). Negative for depression. The patient is not nervous/anxious.        Physical Exam    Vitals:    05/08/24 1354   BP: 132/80   BP Location: Left arm   Patient Position: Sitting   BP Method: Medium (Manual)   Pulse: 82   SpO2: (!) 89%   Weight: 61.1 kg (134 lb 11.2 oz)       Physical Exam  Vitals and nursing note reviewed.   Constitutional:       General: He is not in acute distress.     Appearance: Normal appearance. He is well-developed. He is ill-appearing. He is not diaphoretic.   HENT:      Head: Normocephalic and atraumatic.      Right Ear: External ear normal.      Left Ear: External ear normal.      Nose: Nose normal.   Eyes:      Pupils: Pupils are equal, round, and reactive to light.   Neck:      Thyroid: No thyromegaly.      Vascular: No JVD.      Trachea: No tracheal deviation.   Cardiovascular:      Rate and Rhythm: Normal rate and regular rhythm.      Heart sounds: Normal heart sounds. No murmur heard.     No friction rub. No gallop.   Pulmonary:      Effort: Pulmonary effort is normal. No respiratory distress.      Breath sounds: Normal breath sounds. No stridor. No wheezing or rales.   Chest:      Chest wall: No tenderness.   Abdominal:      General: Bowel sounds are normal. There is no distension.      Palpations: Abdomen is soft.      Tenderness: There is no abdominal tenderness. There is no guarding.   Musculoskeletal:         General: No tenderness. Normal range of motion.      Cervical back: Normal range of motion and neck supple.      Right lower leg: No edema.      Left lower leg: No edema.   Lymphadenopathy:      Cervical: No cervical adenopathy.   Skin:     Capillary Refill: Capillary refill takes less than 2 seconds.   Neurological:      General: No focal deficit present.      Mental Status: He is alert and oriented to person, place, and time. Mental status is at baseline.      Cranial Nerves: No cranial nerve deficit.      Motor: No weakness.      Gait: Gait normal.      Deep Tendon  Reflexes: Reflexes are normal and symmetric.   Psychiatric:         Mood and Affect: Mood normal.         Behavior: Behavior normal.         Thought Content: Thought content normal.         Judgment: Judgment normal.         Labs    Lab Results   Component Value Date    WBC 4.92 04/19/2024    HGB 11.1 (L) 04/19/2024    HCT 34.4 (L) 04/19/2024     04/19/2024       Sodium   Date Value Ref Range Status   04/19/2024 140 136 - 145 mmol/L Final     Potassium   Date Value Ref Range Status   04/19/2024 3.6 3.5 - 5.1 mmol/L Final     Chloride   Date Value Ref Range Status   04/19/2024 103 95 - 110 mmol/L Final     CO2   Date Value Ref Range Status   04/19/2024 29 23 - 29 mmol/L Final     Glucose   Date Value Ref Range Status   04/19/2024 159 (H) 70 - 110 mg/dL Final     BUN   Date Value Ref Range Status   04/19/2024 9 6 - 20 mg/dL Final     Creatinine   Date Value Ref Range Status   04/19/2024 0.8 0.5 - 1.4 mg/dL Final     Calcium   Date Value Ref Range Status   04/19/2024 9.0 8.7 - 10.5 mg/dL Final     Total Protein   Date Value Ref Range Status   04/19/2024 5.9 (L) 6.0 - 8.4 g/dL Final     Albumin   Date Value Ref Range Status   04/19/2024 3.0 (L) 3.5 - 5.2 g/dL Final     Total Bilirubin   Date Value Ref Range Status   04/19/2024 0.7 0.1 - 1.0 mg/dL Final     Comment:     For infants and newborns, interpretation of results should be based  on gestational age, weight and in agreement with clinical  observations.    Premature Infant recommended reference ranges:  Up to 24 hours.............<8.0 mg/dL  Up to 48 hours............<12.0 mg/dL  3-5 days..................<15.0 mg/dL  6-29 days.................<15.0 mg/dL       Alkaline Phosphatase   Date Value Ref Range Status   04/19/2024 52 (L) 55 - 135 U/L Final     AST   Date Value Ref Range Status   04/19/2024 8 (L) 10 - 40 U/L Final     ALT   Date Value Ref Range Status   04/19/2024 7 (L) 10 - 44 U/L Final     Anion Gap   Date Value Ref Range Status   04/19/2024 8 8 -  16 mmol/L Final       Xrays        Impression/Plan    Problem List Items Addressed This Visit          Pulmonary    COPD (chronic obstructive pulmonary disease) - Primary     Continue TRELEGY and ALBUTEROL  Check PF and walk test  He would like a portable concentrator if needed  RTC 1 month            Other    Tobacco dependency     We discussed this and he will try to decrease smoking to 1 PPD by next visit                  Iván Beckett MD

## 2024-05-08 NOTE — ASSESSMENT & PLAN NOTE
Continue TRELEGY and ALBUTEROL  Check PF and walk test  He would like a portable concentrator if needed  RTC 1 month

## 2024-05-13 ENCOUNTER — OFFICE VISIT (OUTPATIENT)
Dept: PAIN MEDICINE | Facility: CLINIC | Age: 59
End: 2024-05-13
Payer: MEDICARE

## 2024-05-13 VITALS — BODY MASS INDEX: 22.5 KG/M2 | WEIGHT: 140 LBS | HEIGHT: 66 IN

## 2024-05-13 DIAGNOSIS — I63.9 LEFT-SIDED CEREBROVASCULAR ACCIDENT (CVA): ICD-10-CM

## 2024-05-13 DIAGNOSIS — G89.0 CENTRAL PAIN SYNDROME: Primary | ICD-10-CM

## 2024-05-13 PROCEDURE — 1159F MED LIST DOCD IN RCRD: CPT | Mod: CPTII,S$GLB,, | Performed by: ANESTHESIOLOGY

## 2024-05-13 PROCEDURE — 99214 OFFICE O/P EST MOD 30 MIN: CPT | Mod: S$GLB,,, | Performed by: ANESTHESIOLOGY

## 2024-05-13 PROCEDURE — 3044F HG A1C LEVEL LT 7.0%: CPT | Mod: CPTII,S$GLB,, | Performed by: ANESTHESIOLOGY

## 2024-05-13 PROCEDURE — 4010F ACE/ARB THERAPY RXD/TAKEN: CPT | Mod: CPTII,S$GLB,, | Performed by: ANESTHESIOLOGY

## 2024-05-13 PROCEDURE — 3008F BODY MASS INDEX DOCD: CPT | Mod: CPTII,S$GLB,, | Performed by: ANESTHESIOLOGY

## 2024-05-13 PROCEDURE — 99999 PR PBB SHADOW E&M-EST. PATIENT-LVL III: CPT | Mod: PBBFAC,,, | Performed by: ANESTHESIOLOGY

## 2024-05-13 RX ORDER — PREGABALIN 100 MG/1
100 CAPSULE ORAL 2 TIMES DAILY
Qty: 60 CAPSULE | Refills: 0 | Status: ON HOLD | OUTPATIENT
Start: 2024-05-13 | End: 2024-11-11

## 2024-05-13 NOTE — PROGRESS NOTES
This note was completed with dictation software and grammatical errors may exist.    Referring Physician: No ref. provider found    PCP: Toan Banks DO      CC:  Right-sided body pain    HPI:   Hernan Badillo is a 58 y.o. male referred to us for right-sided body pain.  Patient with history CVA in 2022.  Since then he has had weakness over his right arm and lying.  He also has constant burning, throbbing pain over his right arm and leg as well.  He has been seen my colleague, Dr. Maddox in the past.  He is taking Lyrica with some benefits.  He has undergone physical therapy with some improvement in his strength.  He takes acetaminophen with benefits.  He has since moved to [place] with his mother.  He denies any worsening weakness.  No bowel bladder changes      ROS:  CONSTITUTIONAL: No fevers, chills, night sweats, wt. loss, appetite changes  SKIN: no rashes or itching  ENT: No headaches, head trauma, vision changes, or eye pain  LYMPH NODES: None noticed   CV: No chest pain, palpitations.   RESP: No shortness of breath, dyspnea on exertion, cough, wheezing, or hemoptysis  GI: No nausea, emesis, diarrhea, constipation, melena, hematochezia, pain.    : No dysuria, hematuria, urgency, or frequency   HEME: No easy bruising, bleeding problems  PSYCHIATRIC: No depression, anxiety, psychosis, hallucinations.  NEURO: No seizures, memory loss, dizziness or difficulty sleeping  MSK: +HPI      Past Medical History:   Diagnosis Date    COPD (chronic obstructive pulmonary disease)     Diabetes mellitus, type 2     Hypertension      Past Surgical History:   Procedure Laterality Date    DENTAL SURGERY      ESOPHAGOGASTRODUODENOSCOPY N/A 6/1/2020    Procedure: EGD (ESOPHAGOGASTRODUODENOSCOPY);  Surgeon: Terrell May MD;  Location: [facility];  Service: Endoscopy;  Laterality: N/A;    HERNIA REPAIR      left inguinal    incision and drainiage       Family History   Problem Relation Name Age of Onset    Heart attack  Father  80    Heart disease Father      Drug abuse Father      Cancer Maternal Grandmother          colon     Social History     Socioeconomic History    Marital status: Single    Number of children: 1   Occupational History    Occupation: demolition   Tobacco Use    Smoking status: Every Day     Current packs/day: 2.00     Average packs/day: 2.0 packs/day for 30.0 years (60.0 ttl pk-yrs)     Types: Cigarettes    Smokeless tobacco: Never   Substance and Sexual Activity    Alcohol use: Yes     Alcohol/week: 1.0 standard drink of alcohol     Types: 1 Shots of liquor per week     Comment: 1/2 pint 2 x per week - quit approximately 5 months ago    Drug use: No    Sexual activity: Yes     Partners: Female     Birth control/protection: None     Comment: No history of STDs.   Social History Narrative    The patient does not exercise regularly ().    Rates diet as fair.    He is satisfied with weight.             Social Determinants of Health     Financial Resource Strain: Patient Unable To Answer (4/7/2024)    Overall Financial Resource Strain (CARDIA)     Difficulty of Paying Living Expenses: Patient unable to answer   Food Insecurity: Patient Unable To Answer (4/7/2024)    Hunger Vital Sign     Worried About Running Out of Food in the Last Year: Patient unable to answer     Ran Out of Food in the Last Year: Patient unable to answer   Transportation Needs: Patient Unable To Answer (4/7/2024)    PRAPARE - Transportation     Lack of Transportation (Medical): Patient unable to answer     Lack of Transportation (Non-Medical): Patient unable to answer   Stress: Patient Unable To Answer (4/7/2024)    [employer] - Occupational Stress Questionnaire     Feeling of Stress : Patient unable to answer   Housing Stability: Patient Unable To Answer (4/7/2024)    Housing Stability Vital Sign     Unable to Pay for Housing in the Last Year: Patient unable to answer     Unstable Housing in the Last Year:  "Patient unable to answer         Medications/Allergies: See med card    Vitals:    05/13/24 0931   Weight: 63.5 kg (140 lb)   Height: 5' 6" (1.676 m)   PainSc:   9   PainLoc: Arm         Physical exam:    GENERAL: A and O x3, the patient appears well groomed and is in no acute distress.  Skin: No rashes or obvious lesions  HEENT: normocephalic, atraumatic  CARDIOVASCULAR:  Palpable peripheral pulses  LUNGS: easy work of breathing  ABDOMEN: soft, nontender   UPPER EXTREMITIES: Normal alignment, normal range of motion, no atrophy, no skin changes,  hair growth and nail growth normal and equal bilaterally. No swelling, no tenderness.    LOWER EXTREMITIES:  Normal alignment, normal range of motion, no atrophy, no skin changes,  hair growth and nail growth normal and equal bilaterally. No swelling, no tenderness.  CERVICAL SPINE:  Cervical spine: ROM is full in flexion, extension and lateral rotation without increased pain.  Spurling's maneuver causes no neck pain to either side.  Myofascial exam: No Tenderness to palpation across cervical paraspinous region bilaterally.    LUMBAR SPINE  Lumbar spine: ROM is full with flexion extension and oblique extension with no increased pain.    Jagdeep's test causes no increased pain on either side.    Supine straight leg raise is negative bilaterally.    Internal and external rotation of the hip causes no increased pain on either side.  Myofascial exam: No tenderness to palpation across lumbar paraspinous muscles.    MENTAL STATUS: normal orientation, speech, language, and fund of knowledge for social situation.  Emotional state appropriate.    MOTOR: Tone and bulk: normal bilateral upper and lower Strength: decrease on right    SENSATION: Light touch and pinprick intact bilaterally  REFLEXES: normal, symmetric, nonbrisk.  Toes down, no clonus. No hoffmans.  GAIT: uses walker for assistance      Imaging:  N/A    Assessment:  Patient presents with   1. Central pain syndrome    2. " Left-sided cerebrovascular accident (CVA)          Plan:  I have stressed the importance of physical activity and exercise to improve overall health  Reviewed pertinent imaging and records with patient  Restart Lyrica 100 mg b.i.d. for anti neuropathic adjunct.  He does not desire formal physical therapy currently.  Follow-up in 1 month.  Thank you for referring this interesting patient, and I look forward to continuing to collaborate in his care.

## 2024-06-16 ENCOUNTER — HOSPITAL ENCOUNTER (INPATIENT)
Facility: HOSPITAL | Age: 59
LOS: 4 days | Discharge: HOME HEALTH | DRG: 917 | End: 2024-06-20
Attending: EMERGENCY MEDICINE | Admitting: HOSPITALIST
Payer: MEDICARE

## 2024-06-16 DIAGNOSIS — I63.9 CEREBROVASCULAR ACCIDENT (CVA), UNSPECIFIED MECHANISM: ICD-10-CM

## 2024-06-16 DIAGNOSIS — G92.9 ENCEPHALOPATHY, TOXIC: ICD-10-CM

## 2024-06-16 DIAGNOSIS — R79.89 ELEVATED TROPONIN LEVEL: ICD-10-CM

## 2024-06-16 DIAGNOSIS — I62.9 INTRACRANIAL HEMORRHAGE: ICD-10-CM

## 2024-06-16 DIAGNOSIS — I21.4 NSTEMI (NON-ST ELEVATED MYOCARDIAL INFARCTION): ICD-10-CM

## 2024-06-16 DIAGNOSIS — R07.9 CHEST PAIN: ICD-10-CM

## 2024-06-16 DIAGNOSIS — R41.82 ALTERED MENTAL STATUS, UNSPECIFIED ALTERED MENTAL STATUS TYPE: ICD-10-CM

## 2024-06-16 DIAGNOSIS — R41.89 UNRESPONSIVE: Primary | ICD-10-CM

## 2024-06-16 DIAGNOSIS — R79.89 ELEVATED TROPONIN: ICD-10-CM

## 2024-06-16 PROBLEM — Z86.73 HISTORY OF STROKE: Status: ACTIVE | Noted: 2024-06-16

## 2024-06-16 LAB
ALBUMIN SERPL BCP-MCNC: 3.8 G/DL (ref 3.5–5.2)
ALLENS TEST: ABNORMAL
ALP SERPL-CCNC: 69 U/L (ref 55–135)
ALT SERPL W/O P-5'-P-CCNC: 4 U/L (ref 10–44)
AMPHET+METHAMPHET UR QL: NEGATIVE
ANION GAP SERPL CALC-SCNC: 6 MMOL/L (ref 8–16)
AST SERPL-CCNC: 11 U/L (ref 10–40)
BARBITURATES UR QL SCN>200 NG/ML: NEGATIVE
BASOPHILS # BLD AUTO: 0.01 K/UL (ref 0–0.2)
BASOPHILS NFR BLD: 0.1 % (ref 0–1.9)
BENZODIAZ UR QL SCN>200 NG/ML: ABNORMAL
BILIRUB SERPL-MCNC: 0.7 MG/DL (ref 0.1–1)
BILIRUB UR QL STRIP: NEGATIVE
BNP SERPL-MCNC: 93 PG/ML (ref 0–99)
BUN SERPL-MCNC: 9 MG/DL (ref 6–20)
BZE UR QL SCN: NEGATIVE
CALCIUM SERPL-MCNC: 10.1 MG/DL (ref 8.7–10.5)
CANNABINOIDS UR QL SCN: NEGATIVE
CHLORIDE SERPL-SCNC: 97 MMOL/L (ref 95–110)
CLARITY UR: CLEAR
CO2 SERPL-SCNC: 37 MMOL/L (ref 23–29)
COLOR UR: YELLOW
CREAT SERPL-MCNC: 0.7 MG/DL (ref 0.5–1.4)
CREAT SERPL-MCNC: 0.8 MG/DL (ref 0.5–1.4)
CREAT UR-MCNC: 35.4 MG/DL (ref 23–375)
DELSYS: ABNORMAL
DIFFERENTIAL METHOD BLD: ABNORMAL
EOSINOPHIL # BLD AUTO: 0 K/UL (ref 0–0.5)
EOSINOPHIL NFR BLD: 0.1 % (ref 0–8)
ERYTHROCYTE [DISTWIDTH] IN BLOOD BY AUTOMATED COUNT: 14 % (ref 11.5–14.5)
ERYTHROCYTE [SEDIMENTATION RATE] IN BLOOD BY WESTERGREN METHOD: 24 MM/H
EST. GFR  (NO RACE VARIABLE): >60 ML/MIN/1.73 M^2
FIO2: 50
GLUCOSE SERPL-MCNC: 206 MG/DL (ref 70–110)
GLUCOSE SERPL-MCNC: 209 MG/DL (ref 70–110)
GLUCOSE SERPL-MCNC: 235 MG/DL (ref 70–110)
GLUCOSE UR QL STRIP: NEGATIVE
HCO3 UR-SCNC: 34.8 MMOL/L (ref 24–28)
HCT VFR BLD AUTO: 40.4 % (ref 40–54)
HCT VFR BLD CALC: 34 %PCV (ref 36–54)
HGB BLD-MCNC: 12.4 G/DL (ref 14–18)
HGB UR QL STRIP: ABNORMAL
IMM GRANULOCYTES # BLD AUTO: 0.03 K/UL (ref 0–0.04)
IMM GRANULOCYTES NFR BLD AUTO: 0.3 % (ref 0–0.5)
KETONES UR QL STRIP: NEGATIVE
LDH SERPL L TO P-CCNC: 2.44 MMOL/L (ref 0.5–2.2)
LEUKOCYTE ESTERASE UR QL STRIP: NEGATIVE
LYMPHOCYTES # BLD AUTO: 0.9 K/UL (ref 1–4.8)
LYMPHOCYTES NFR BLD: 8.5 % (ref 18–48)
MAGNESIUM SERPL-MCNC: 1.6 MG/DL (ref 1.6–2.6)
MCH RBC QN AUTO: 30.1 PG (ref 27–31)
MCHC RBC AUTO-ENTMCNC: 30.7 G/DL (ref 32–36)
MCV RBC AUTO: 98 FL (ref 82–98)
MICROSCOPIC COMMENT: NORMAL
MIN VOL: 11.2
MODE: ABNORMAL
MONOCYTES # BLD AUTO: 0.6 K/UL (ref 0.3–1)
MONOCYTES NFR BLD: 5.6 % (ref 4–15)
NEUTROPHILS # BLD AUTO: 9 K/UL (ref 1.8–7.7)
NEUTROPHILS NFR BLD: 85.4 % (ref 38–73)
NITRITE UR QL STRIP: NEGATIVE
NRBC BLD-RTO: 0 /100 WBC
OPIATES UR QL SCN: ABNORMAL
PCO2 BLDA: 55.5 MMHG (ref 35–45)
PCP UR QL SCN>25 NG/ML: NEGATIVE
PEEP: 5
PH SMN: 7.41 [PH] (ref 7.35–7.45)
PH UR STRIP: 6 [PH] (ref 5–8)
PIP: 33
PLATELET # BLD AUTO: 193 K/UL (ref 150–450)
PMV BLD AUTO: 10.6 FL (ref 9.2–12.9)
PO2 BLDA: 78 MMHG (ref 80–100)
POC BE: 10 MMOL/L
POC IONIZED CALCIUM: 1.21 MMOL/L (ref 1.06–1.42)
POC SATURATED O2: 95 % (ref 95–100)
POC TCO2: 36 MMOL/L (ref 23–27)
POTASSIUM BLD-SCNC: 3.7 MMOL/L (ref 3.5–5.1)
POTASSIUM SERPL-SCNC: 4.5 MMOL/L (ref 3.5–5.1)
PROCALCITONIN SERPL IA-MCNC: 0.21 NG/ML (ref 0–0.5)
PROT SERPL-MCNC: 7.7 G/DL (ref 6–8.4)
PROT UR QL STRIP: NEGATIVE
RBC # BLD AUTO: 4.12 M/UL (ref 4.6–6.2)
RBC #/AREA URNS HPF: 4 /HPF (ref 0–4)
SAMPLE: ABNORMAL
SAMPLE: ABNORMAL
SAMPLE: NORMAL
SITE: ABNORMAL
SODIUM BLD-SCNC: 135 MMOL/L (ref 136–145)
SODIUM SERPL-SCNC: 140 MMOL/L (ref 136–145)
SP GR UR STRIP: 1 (ref 1–1.03)
SP02: 98
SQUAMOUS #/AREA URNS HPF: 1 /HPF
TOXICOLOGY INFORMATION: ABNORMAL
TROPONIN I SERPL HS-MCNC: 133.7 PG/ML (ref 0–14.9)
URN SPEC COLLECT METH UR: ABNORMAL
UROBILINOGEN UR STRIP-ACNC: NEGATIVE EU/DL
VT: 450
WBC # BLD AUTO: 10.48 K/UL (ref 3.9–12.7)
WBC #/AREA URNS HPF: 1 /HPF (ref 0–5)

## 2024-06-16 PROCEDURE — 99291 CRITICAL CARE FIRST HOUR: CPT

## 2024-06-16 PROCEDURE — 36415 COLL VENOUS BLD VENIPUNCTURE: CPT | Performed by: EMERGENCY MEDICINE

## 2024-06-16 PROCEDURE — 20000000 HC ICU ROOM

## 2024-06-16 PROCEDURE — 82565 ASSAY OF CREATININE: CPT

## 2024-06-16 PROCEDURE — 25000003 PHARM REV CODE 250: Performed by: EMERGENCY MEDICINE

## 2024-06-16 PROCEDURE — 82962 GLUCOSE BLOOD TEST: CPT

## 2024-06-16 PROCEDURE — 94002 VENT MGMT INPAT INIT DAY: CPT

## 2024-06-16 PROCEDURE — 25000003 PHARM REV CODE 250: Performed by: HOSPITALIST

## 2024-06-16 PROCEDURE — 84145 PROCALCITONIN (PCT): CPT | Performed by: EMERGENCY MEDICINE

## 2024-06-16 PROCEDURE — 83880 ASSAY OF NATRIURETIC PEPTIDE: CPT | Performed by: EMERGENCY MEDICINE

## 2024-06-16 PROCEDURE — 63600175 PHARM REV CODE 636 W HCPCS: Performed by: EMERGENCY MEDICINE

## 2024-06-16 PROCEDURE — 31500 INSERT EMERGENCY AIRWAY: CPT

## 2024-06-16 PROCEDURE — 99900031 HC PATIENT EDUCATION (STAT)

## 2024-06-16 PROCEDURE — 99900035 HC TECH TIME PER 15 MIN (STAT)

## 2024-06-16 PROCEDURE — 85025 COMPLETE CBC W/AUTO DIFF WBC: CPT | Performed by: EMERGENCY MEDICINE

## 2024-06-16 PROCEDURE — 0BH18EZ INSERTION OF ENDOTRACHEAL AIRWAY INTO TRACHEA, VIA NATURAL OR ARTIFICIAL OPENING ENDOSCOPIC: ICD-10-PCS | Performed by: EMERGENCY MEDICINE

## 2024-06-16 PROCEDURE — 80307 DRUG TEST PRSMV CHEM ANLYZR: CPT | Performed by: EMERGENCY MEDICINE

## 2024-06-16 PROCEDURE — 81001 URINALYSIS AUTO W/SCOPE: CPT | Performed by: EMERGENCY MEDICINE

## 2024-06-16 PROCEDURE — 83735 ASSAY OF MAGNESIUM: CPT | Performed by: EMERGENCY MEDICINE

## 2024-06-16 PROCEDURE — 82803 BLOOD GASES ANY COMBINATION: CPT

## 2024-06-16 PROCEDURE — 93010 ELECTROCARDIOGRAM REPORT: CPT | Mod: ,,, | Performed by: INTERNAL MEDICINE

## 2024-06-16 PROCEDURE — 87040 BLOOD CULTURE FOR BACTERIA: CPT | Performed by: EMERGENCY MEDICINE

## 2024-06-16 PROCEDURE — 93005 ELECTROCARDIOGRAM TRACING: CPT | Performed by: INTERNAL MEDICINE

## 2024-06-16 PROCEDURE — 25500020 PHARM REV CODE 255: Performed by: EMERGENCY MEDICINE

## 2024-06-16 PROCEDURE — 96365 THER/PROPH/DIAG IV INF INIT: CPT

## 2024-06-16 PROCEDURE — 5A1935Z RESPIRATORY VENTILATION, LESS THAN 24 CONSECUTIVE HOURS: ICD-10-PCS | Performed by: INTERNAL MEDICINE

## 2024-06-16 PROCEDURE — 36600 WITHDRAWAL OF ARTERIAL BLOOD: CPT

## 2024-06-16 PROCEDURE — 84484 ASSAY OF TROPONIN QUANT: CPT | Performed by: EMERGENCY MEDICINE

## 2024-06-16 PROCEDURE — 51702 INSERT TEMP BLADDER CATH: CPT

## 2024-06-16 PROCEDURE — 80053 COMPREHEN METABOLIC PANEL: CPT | Performed by: EMERGENCY MEDICINE

## 2024-06-16 RX ORDER — NALOXONE HCL 0.4 MG/ML
0.02 VIAL (ML) INJECTION
Status: DISCONTINUED | OUTPATIENT
Start: 2024-06-16 | End: 2024-06-20 | Stop reason: HOSPADM

## 2024-06-16 RX ORDER — HYDROCODONE BITARTRATE AND ACETAMINOPHEN 5; 325 MG/1; MG/1
1 TABLET ORAL EVERY 6 HOURS PRN
Status: DISCONTINUED | OUTPATIENT
Start: 2024-06-16 | End: 2024-06-20 | Stop reason: HOSPADM

## 2024-06-16 RX ORDER — IBUPROFEN 200 MG
24 TABLET ORAL
Status: DISCONTINUED | OUTPATIENT
Start: 2024-06-16 | End: 2024-06-20 | Stop reason: HOSPADM

## 2024-06-16 RX ORDER — LISINOPRIL 2.5 MG/1
10 TABLET ORAL DAILY
Status: CANCELLED | OUTPATIENT
Start: 2024-06-17

## 2024-06-16 RX ORDER — SUCCINYLCHOLINE CHLORIDE 20 MG/ML INJECTION SOLUTION
100 SOLUTION
Status: COMPLETED | OUTPATIENT
Start: 2024-06-16 | End: 2024-06-16

## 2024-06-16 RX ORDER — TALC
6 POWDER (GRAM) TOPICAL NIGHTLY PRN
Status: DISCONTINUED | OUTPATIENT
Start: 2024-06-16 | End: 2024-06-20 | Stop reason: HOSPADM

## 2024-06-16 RX ORDER — ACETAMINOPHEN 325 MG/1
650 TABLET ORAL EVERY 4 HOURS PRN
Status: DISCONTINUED | OUTPATIENT
Start: 2024-06-16 | End: 2024-06-20 | Stop reason: HOSPADM

## 2024-06-16 RX ORDER — HEPARIN SODIUM 5000 [USP'U]/ML
5000 INJECTION, SOLUTION INTRAVENOUS; SUBCUTANEOUS EVERY 8 HOURS
Status: DISCONTINUED | OUTPATIENT
Start: 2024-06-17 | End: 2024-06-17

## 2024-06-16 RX ORDER — LANOLIN ALCOHOL/MO/W.PET/CERES
800 CREAM (GRAM) TOPICAL
Status: DISCONTINUED | OUTPATIENT
Start: 2024-06-16 | End: 2024-06-20 | Stop reason: HOSPADM

## 2024-06-16 RX ORDER — ASPIRIN 81 MG/1
81 TABLET ORAL DAILY
Status: CANCELLED | OUTPATIENT
Start: 2024-06-17

## 2024-06-16 RX ORDER — PROPOFOL 10 MG/ML
0-50 INJECTION, EMULSION INTRAVENOUS CONTINUOUS
Status: DISCONTINUED | OUTPATIENT
Start: 2024-06-16 | End: 2024-06-17

## 2024-06-16 RX ORDER — SODIUM CHLORIDE 9 MG/ML
INJECTION, SOLUTION INTRAVENOUS CONTINUOUS
Status: DISCONTINUED | OUTPATIENT
Start: 2024-06-17 | End: 2024-06-20 | Stop reason: HOSPADM

## 2024-06-16 RX ORDER — MUPIROCIN 20 MG/G
OINTMENT TOPICAL 2 TIMES DAILY
Status: DISCONTINUED | OUTPATIENT
Start: 2024-06-17 | End: 2024-06-20 | Stop reason: HOSPADM

## 2024-06-16 RX ORDER — INSULIN ASPART 100 [IU]/ML
0-10 INJECTION, SOLUTION INTRAVENOUS; SUBCUTANEOUS EVERY 6 HOURS PRN
Status: DISCONTINUED | OUTPATIENT
Start: 2024-06-16 | End: 2024-06-20 | Stop reason: HOSPADM

## 2024-06-16 RX ORDER — ONDANSETRON HYDROCHLORIDE 2 MG/ML
4 INJECTION, SOLUTION INTRAVENOUS EVERY 6 HOURS PRN
Status: DISCONTINUED | OUTPATIENT
Start: 2024-06-16 | End: 2024-06-20 | Stop reason: HOSPADM

## 2024-06-16 RX ORDER — ETOMIDATE 2 MG/ML
20 INJECTION INTRAVENOUS
Status: COMPLETED | OUTPATIENT
Start: 2024-06-16 | End: 2024-06-16

## 2024-06-16 RX ORDER — SODIUM,POTASSIUM PHOSPHATES 280-250MG
2 POWDER IN PACKET (EA) ORAL
Status: DISCONTINUED | OUTPATIENT
Start: 2024-06-16 | End: 2024-06-20 | Stop reason: HOSPADM

## 2024-06-16 RX ORDER — ACETAMINOPHEN 325 MG/1
650 TABLET ORAL EVERY 8 HOURS PRN
Status: DISCONTINUED | OUTPATIENT
Start: 2024-06-16 | End: 2024-06-20 | Stop reason: HOSPADM

## 2024-06-16 RX ORDER — ALUMINUM HYDROXIDE, MAGNESIUM HYDROXIDE, AND SIMETHICONE 1200; 120; 1200 MG/30ML; MG/30ML; MG/30ML
30 SUSPENSION ORAL 4 TIMES DAILY PRN
Status: DISCONTINUED | OUTPATIENT
Start: 2024-06-16 | End: 2024-06-20 | Stop reason: HOSPADM

## 2024-06-16 RX ORDER — GLUCAGON 1 MG
1 KIT INJECTION
Status: DISCONTINUED | OUTPATIENT
Start: 2024-06-16 | End: 2024-06-20 | Stop reason: HOSPADM

## 2024-06-16 RX ORDER — AMLODIPINE BESYLATE 5 MG/1
10 TABLET ORAL DAILY
Status: CANCELLED | OUTPATIENT
Start: 2024-06-17

## 2024-06-16 RX ORDER — SODIUM CHLORIDE 0.9 % (FLUSH) 0.9 %
10 SYRINGE (ML) INJECTION EVERY 12 HOURS PRN
Status: DISCONTINUED | OUTPATIENT
Start: 2024-06-16 | End: 2024-06-20 | Stop reason: HOSPADM

## 2024-06-16 RX ORDER — GLUCAGON 1 MG
1 KIT INJECTION
Status: DISCONTINUED | OUTPATIENT
Start: 2024-06-16 | End: 2024-06-16

## 2024-06-16 RX ORDER — IBUPROFEN 200 MG
16 TABLET ORAL
Status: DISCONTINUED | OUTPATIENT
Start: 2024-06-16 | End: 2024-06-20 | Stop reason: HOSPADM

## 2024-06-16 RX ORDER — HYDRALAZINE HYDROCHLORIDE 20 MG/ML
10 INJECTION INTRAMUSCULAR; INTRAVENOUS EVERY 4 HOURS PRN
Status: DISCONTINUED | OUTPATIENT
Start: 2024-06-17 | End: 2024-06-17

## 2024-06-16 RX ADMIN — Medication 100 MG: at 09:06

## 2024-06-16 RX ADMIN — SODIUM CHLORIDE: 9 INJECTION, SOLUTION INTRAVENOUS at 11:06

## 2024-06-16 RX ADMIN — IOHEXOL 100 ML: 350 INJECTION, SOLUTION INTRAVENOUS at 10:06

## 2024-06-16 RX ADMIN — MEROPENEM 2 G: 1 INJECTION, POWDER, FOR SOLUTION INTRAVENOUS at 11:06

## 2024-06-16 RX ADMIN — PROPOFOL 10 MCG/KG/MIN: 10 INJECTION, EMULSION INTRAVENOUS at 09:06

## 2024-06-16 RX ADMIN — ETOMIDATE 20 MG: 2 INJECTION, SOLUTION INTRAVENOUS at 09:06

## 2024-06-17 PROBLEM — J96.91 RESPIRATORY FAILURE, UNSPECIFIED WITH HYPOXIA: Status: ACTIVE | Noted: 2024-06-17

## 2024-06-17 PROBLEM — I21.4 NSTEMI (NON-ST ELEVATED MYOCARDIAL INFARCTION): Status: ACTIVE | Noted: 2024-06-17

## 2024-06-17 PROBLEM — I61.9: Status: ACTIVE | Noted: 2024-06-17

## 2024-06-17 LAB
ALBUMIN SERPL BCP-MCNC: 3.5 G/DL (ref 3.5–5.2)
ALLENS TEST: ABNORMAL
ALLENS TEST: ABNORMAL
ALP SERPL-CCNC: 60 U/L (ref 55–135)
ALT SERPL W/O P-5'-P-CCNC: 5 U/L (ref 10–44)
ANION GAP SERPL CALC-SCNC: 4 MMOL/L (ref 8–16)
AST SERPL-CCNC: 14 U/L (ref 10–40)
BACTERIA #/AREA URNS HPF: ABNORMAL /HPF
BASOPHILS # BLD AUTO: 0.01 K/UL (ref 0–0.2)
BASOPHILS NFR BLD: 0.1 % (ref 0–1.9)
BILIRUB SERPL-MCNC: 0.7 MG/DL (ref 0.1–1)
BILIRUB UR QL STRIP: NEGATIVE
BUN SERPL-MCNC: 10 MG/DL (ref 6–20)
CALCIUM SERPL-MCNC: 9.3 MG/DL (ref 8.7–10.5)
CHLORIDE SERPL-SCNC: 99 MMOL/L (ref 95–110)
CHOLEST SERPL-MCNC: 109 MG/DL (ref 120–199)
CHOLEST/HDLC SERPL: 3.1 {RATIO} (ref 2–5)
CLARITY UR: CLEAR
CO2 SERPL-SCNC: 39 MMOL/L (ref 23–29)
COLOR UR: YELLOW
CREAT SERPL-MCNC: 0.7 MG/DL (ref 0.5–1.4)
DELSYS: ABNORMAL
DELSYS: ABNORMAL
DIFFERENTIAL METHOD BLD: ABNORMAL
EOSINOPHIL # BLD AUTO: 0 K/UL (ref 0–0.5)
EOSINOPHIL NFR BLD: 0 % (ref 0–8)
ERYTHROCYTE [DISTWIDTH] IN BLOOD BY AUTOMATED COUNT: 14.3 % (ref 11.5–14.5)
ERYTHROCYTE [SEDIMENTATION RATE] IN BLOOD BY WESTERGREN METHOD: 24 MM/H
EST. GFR  (NO RACE VARIABLE): >60 ML/MIN/1.73 M^2
ESTIMATED AVG GLUCOSE: 140 MG/DL (ref 68–131)
FIO2: 40
FIO2: 60
GLUCOSE SERPL-MCNC: 141 MG/DL (ref 70–110)
GLUCOSE SERPL-MCNC: 146 MG/DL (ref 70–110)
GLUCOSE UR QL STRIP: NEGATIVE
HBA1C MFR BLD: 6.5 % (ref 4.5–6.2)
HCO3 UR-SCNC: 33.4 MMOL/L (ref 24–28)
HCO3 UR-SCNC: 38.2 MMOL/L (ref 24–28)
HCT VFR BLD AUTO: 36.8 % (ref 40–54)
HCT VFR BLD CALC: 35 %PCV (ref 36–54)
HDLC SERPL-MCNC: 35 MG/DL (ref 40–75)
HDLC SERPL: 32.1 % (ref 20–50)
HGB BLD-MCNC: 11.5 G/DL (ref 14–18)
HGB UR QL STRIP: ABNORMAL
HYALINE CASTS #/AREA URNS LPF: 0 /LPF
IMM GRANULOCYTES # BLD AUTO: 0.03 K/UL (ref 0–0.04)
IMM GRANULOCYTES NFR BLD AUTO: 0.4 % (ref 0–0.5)
KETONES UR QL STRIP: ABNORMAL
LACTATE SERPL-SCNC: 1.5 MMOL/L (ref 0.5–1.9)
LACTATE SERPL-SCNC: 1.9 MMOL/L (ref 0.5–1.9)
LACTATE SERPL-SCNC: 2 MMOL/L (ref 0.5–1.9)
LACTATE SERPL-SCNC: 2.2 MMOL/L (ref 0.5–1.9)
LDLC SERPL CALC-MCNC: 56.8 MG/DL (ref 63–159)
LEUKOCYTE ESTERASE UR QL STRIP: ABNORMAL
LYMPHOCYTES # BLD AUTO: 0.8 K/UL (ref 1–4.8)
LYMPHOCYTES NFR BLD: 9.4 % (ref 18–48)
MAGNESIUM SERPL-MCNC: 1.6 MG/DL (ref 1.6–2.6)
MCH RBC QN AUTO: 30.2 PG (ref 27–31)
MCHC RBC AUTO-ENTMCNC: 31.3 G/DL (ref 32–36)
MCV RBC AUTO: 97 FL (ref 82–98)
MICROSCOPIC COMMENT: ABNORMAL
MODE: ABNORMAL
MODE: ABNORMAL
MONOCYTES # BLD AUTO: 0.5 K/UL (ref 0.3–1)
MONOCYTES NFR BLD: 6.3 % (ref 4–15)
NEUTROPHILS # BLD AUTO: 6.8 K/UL (ref 1.8–7.7)
NEUTROPHILS NFR BLD: 83.8 % (ref 38–73)
NITRITE UR QL STRIP: NEGATIVE
NONHDLC SERPL-MCNC: 74 MG/DL
NRBC BLD-RTO: 0 /100 WBC
PCO2 BLDA: 48.5 MMHG (ref 35–45)
PCO2 BLDA: 60.2 MMHG (ref 35–45)
PEEP: 5
PEEP: 5
PH SMN: 7.41 [PH] (ref 7.35–7.45)
PH SMN: 7.45 [PH] (ref 7.35–7.45)
PH UR STRIP: 7 [PH] (ref 5–8)
PLATELET # BLD AUTO: 180 K/UL (ref 150–450)
PMV BLD AUTO: 11.7 FL (ref 9.2–12.9)
PO2 BLDA: 24 MMHG (ref 40–60)
PO2 BLDA: 74 MMHG (ref 80–100)
POC BE: 14 MMOL/L
POC BE: 9 MMOL/L
POC IONIZED CALCIUM: 1.17 MMOL/L (ref 1.06–1.42)
POC SATURATED O2: 41 % (ref 95–100)
POC SATURATED O2: 95 % (ref 95–100)
POC TCO2: 35 MMOL/L (ref 23–27)
POC TCO2: 40 MMOL/L (ref 24–29)
POTASSIUM BLD-SCNC: 3.9 MMOL/L (ref 3.5–5.1)
POTASSIUM SERPL-SCNC: 4.9 MMOL/L (ref 3.5–5.1)
PROT SERPL-MCNC: 7.1 G/DL (ref 6–8.4)
PROT UR QL STRIP: ABNORMAL
PS: 10
RBC # BLD AUTO: 3.81 M/UL (ref 4.6–6.2)
RBC #/AREA URNS HPF: >100 /HPF (ref 0–4)
SAMPLE: ABNORMAL
SAMPLE: ABNORMAL
SITE: ABNORMAL
SITE: ABNORMAL
SODIUM BLD-SCNC: 138 MMOL/L (ref 136–145)
SODIUM SERPL-SCNC: 142 MMOL/L (ref 136–145)
SP GR UR STRIP: >1.03 (ref 1–1.03)
SP02: 95
SPONT RATE: 25
TRIGL SERPL-MCNC: 86 MG/DL (ref 30–150)
TROPONIN I SERPL HS-MCNC: 152.8 PG/ML (ref 0–14.9)
TROPONIN I SERPL HS-MCNC: 179.4 PG/ML (ref 0–14.9)
TROPONIN I SERPL HS-MCNC: 179.4 PG/ML (ref 0–14.9)
TROPONIN I SERPL HS-MCNC: 181.4 PG/ML (ref 0–14.9)
TROPONIN I SERPL HS-MCNC: 309.9 PG/ML (ref 0–14.9)
URN SPEC COLLECT METH UR: ABNORMAL
UROBILINOGEN UR STRIP-ACNC: ABNORMAL EU/DL
VT: 450
WBC # BLD AUTO: 8.15 K/UL (ref 3.9–12.7)
WBC #/AREA URNS HPF: 14 /HPF (ref 0–5)

## 2024-06-17 PROCEDURE — 36415 COLL VENOUS BLD VENIPUNCTURE: CPT | Performed by: HOSPITALIST

## 2024-06-17 PROCEDURE — 83605 ASSAY OF LACTIC ACID: CPT | Mod: 91 | Performed by: INTERNAL MEDICINE

## 2024-06-17 PROCEDURE — 99900031 HC PATIENT EDUCATION (STAT)

## 2024-06-17 PROCEDURE — 85025 COMPLETE CBC W/AUTO DIFF WBC: CPT | Performed by: HOSPITALIST

## 2024-06-17 PROCEDURE — 87040 BLOOD CULTURE FOR BACTERIA: CPT | Mod: 59 | Performed by: INTERNAL MEDICINE

## 2024-06-17 PROCEDURE — 99291 CRITICAL CARE FIRST HOUR: CPT | Mod: ,,, | Performed by: INTERNAL MEDICINE

## 2024-06-17 PROCEDURE — 85014 HEMATOCRIT: CPT

## 2024-06-17 PROCEDURE — 36600 WITHDRAWAL OF ARTERIAL BLOOD: CPT

## 2024-06-17 PROCEDURE — 83036 HEMOGLOBIN GLYCOSYLATED A1C: CPT | Performed by: HOSPITALIST

## 2024-06-17 PROCEDURE — 87070 CULTURE OTHR SPECIMN AEROBIC: CPT | Performed by: HOSPITALIST

## 2024-06-17 PROCEDURE — 84484 ASSAY OF TROPONIN QUANT: CPT | Performed by: EMERGENCY MEDICINE

## 2024-06-17 PROCEDURE — 81001 URINALYSIS AUTO W/SCOPE: CPT | Performed by: INTERNAL MEDICINE

## 2024-06-17 PROCEDURE — 63600175 PHARM REV CODE 636 W HCPCS: Mod: JZ,JG | Performed by: EMERGENCY MEDICINE

## 2024-06-17 PROCEDURE — 87186 SC STD MICRODIL/AGAR DIL: CPT | Performed by: HOSPITALIST

## 2024-06-17 PROCEDURE — 94761 N-INVAS EAR/PLS OXIMETRY MLT: CPT

## 2024-06-17 PROCEDURE — 25000003 PHARM REV CODE 250: Performed by: HOSPITALIST

## 2024-06-17 PROCEDURE — 36415 COLL VENOUS BLD VENIPUNCTURE: CPT | Performed by: INTERNAL MEDICINE

## 2024-06-17 PROCEDURE — 25000242 PHARM REV CODE 250 ALT 637 W/ HCPCS: Performed by: INTERNAL MEDICINE

## 2024-06-17 PROCEDURE — 20000000 HC ICU ROOM

## 2024-06-17 PROCEDURE — 99900035 HC TECH TIME PER 15 MIN (STAT)

## 2024-06-17 PROCEDURE — 87205 SMEAR GRAM STAIN: CPT | Performed by: HOSPITALIST

## 2024-06-17 PROCEDURE — 84295 ASSAY OF SERUM SODIUM: CPT

## 2024-06-17 PROCEDURE — 94003 VENT MGMT INPAT SUBQ DAY: CPT

## 2024-06-17 PROCEDURE — 83605 ASSAY OF LACTIC ACID: CPT | Mod: 91 | Performed by: EMERGENCY MEDICINE

## 2024-06-17 PROCEDURE — 82962 GLUCOSE BLOOD TEST: CPT

## 2024-06-17 PROCEDURE — 87086 URINE CULTURE/COLONY COUNT: CPT | Performed by: INTERNAL MEDICINE

## 2024-06-17 PROCEDURE — 82803 BLOOD GASES ANY COMBINATION: CPT

## 2024-06-17 PROCEDURE — 27100171 HC OXYGEN HIGH FLOW UP TO 24 HOURS

## 2024-06-17 PROCEDURE — 94799 UNLISTED PULMONARY SVC/PX: CPT

## 2024-06-17 PROCEDURE — 63600175 PHARM REV CODE 636 W HCPCS: Performed by: INTERNAL MEDICINE

## 2024-06-17 PROCEDURE — 36415 COLL VENOUS BLD VENIPUNCTURE: CPT | Performed by: EMERGENCY MEDICINE

## 2024-06-17 PROCEDURE — 25000003 PHARM REV CODE 250: Performed by: EMERGENCY MEDICINE

## 2024-06-17 PROCEDURE — 83605 ASSAY OF LACTIC ACID: CPT | Performed by: HOSPITALIST

## 2024-06-17 PROCEDURE — 99900026 HC AIRWAY MAINTENANCE (STAT)

## 2024-06-17 PROCEDURE — 25000242 PHARM REV CODE 250 ALT 637 W/ HCPCS

## 2024-06-17 PROCEDURE — 25000003 PHARM REV CODE 250: Performed by: INTERNAL MEDICINE

## 2024-06-17 PROCEDURE — 82330 ASSAY OF CALCIUM: CPT

## 2024-06-17 PROCEDURE — 84132 ASSAY OF SERUM POTASSIUM: CPT

## 2024-06-17 PROCEDURE — 83735 ASSAY OF MAGNESIUM: CPT | Performed by: HOSPITALIST

## 2024-06-17 PROCEDURE — 80061 LIPID PANEL: CPT | Performed by: HOSPITALIST

## 2024-06-17 PROCEDURE — 84484 ASSAY OF TROPONIN QUANT: CPT | Mod: 91 | Performed by: HOSPITALIST

## 2024-06-17 PROCEDURE — 99223 1ST HOSP IP/OBS HIGH 75: CPT | Mod: ,,, | Performed by: INTERNAL MEDICINE

## 2024-06-17 PROCEDURE — 94640 AIRWAY INHALATION TREATMENT: CPT

## 2024-06-17 PROCEDURE — 80053 COMPREHEN METABOLIC PANEL: CPT | Performed by: HOSPITALIST

## 2024-06-17 PROCEDURE — 63600175 PHARM REV CODE 636 W HCPCS: Performed by: HOSPITALIST

## 2024-06-17 RX ORDER — HYDRALAZINE HYDROCHLORIDE 20 MG/ML
10 INJECTION INTRAMUSCULAR; INTRAVENOUS
Status: DISCONTINUED | OUTPATIENT
Start: 2024-06-17 | End: 2024-06-20 | Stop reason: HOSPADM

## 2024-06-17 RX ORDER — IPRATROPIUM BROMIDE AND ALBUTEROL SULFATE 2.5; .5 MG/3ML; MG/3ML
SOLUTION RESPIRATORY (INHALATION)
Status: COMPLETED
Start: 2024-06-17 | End: 2024-06-17

## 2024-06-17 RX ORDER — MAGNESIUM SULFATE HEPTAHYDRATE 40 MG/ML
2 INJECTION, SOLUTION INTRAVENOUS
Status: DISCONTINUED | OUTPATIENT
Start: 2024-06-17 | End: 2024-06-18

## 2024-06-17 RX ORDER — MAGNESIUM SULFATE HEPTAHYDRATE 40 MG/ML
4 INJECTION, SOLUTION INTRAVENOUS
Status: DISCONTINUED | OUTPATIENT
Start: 2024-06-17 | End: 2024-06-18

## 2024-06-17 RX ORDER — LIDOCAINE HYDROCHLORIDE 20 MG/ML
10 INJECTION, SOLUTION EPIDURAL; INFILTRATION; INTRACAUDAL; PERINEURAL ONCE
Status: DISCONTINUED | OUTPATIENT
Start: 2024-06-17 | End: 2024-06-20 | Stop reason: HOSPADM

## 2024-06-17 RX ORDER — ALBUTEROL SULFATE 0.83 MG/ML
2.5 SOLUTION RESPIRATORY (INHALATION) EVERY 6 HOURS PRN
COMMUNITY

## 2024-06-17 RX ORDER — IPRATROPIUM BROMIDE AND ALBUTEROL SULFATE 2.5; .5 MG/3ML; MG/3ML
3 SOLUTION RESPIRATORY (INHALATION)
Status: DISCONTINUED | OUTPATIENT
Start: 2024-06-17 | End: 2024-06-20 | Stop reason: HOSPADM

## 2024-06-17 RX ADMIN — IPRATROPIUM BROMIDE AND ALBUTEROL SULFATE 3 ML: .5; 3 SOLUTION RESPIRATORY (INHALATION) at 07:06

## 2024-06-17 RX ADMIN — PROPOFOL 40 MCG/KG/MIN: 10 INJECTION, EMULSION INTRAVENOUS at 01:06

## 2024-06-17 RX ADMIN — SODIUM CHLORIDE: 9 INJECTION, SOLUTION INTRAVENOUS at 11:06

## 2024-06-17 RX ADMIN — CEFTRIAXONE SODIUM 1 G: 1 INJECTION, POWDER, FOR SOLUTION INTRAMUSCULAR; INTRAVENOUS at 11:06

## 2024-06-17 RX ADMIN — PREGABALIN 100 MG: 75 CAPSULE ORAL at 11:06

## 2024-06-17 RX ADMIN — IPRATROPIUM BROMIDE AND ALBUTEROL SULFATE 3 ML: .5; 3 SOLUTION RESPIRATORY (INHALATION) at 11:06

## 2024-06-17 RX ADMIN — HYDRALAZINE HYDROCHLORIDE 10 MG: 20 INJECTION INTRAMUSCULAR; INTRAVENOUS at 10:06

## 2024-06-17 RX ADMIN — MUPIROCIN 1 G: 20 OINTMENT TOPICAL at 09:06

## 2024-06-17 RX ADMIN — ACETAMINOPHEN 650 MG: 325 TABLET ORAL at 11:06

## 2024-06-17 RX ADMIN — VANCOMYCIN HYDROCHLORIDE 1250 MG: 1.25 INJECTION, POWDER, LYOPHILIZED, FOR SOLUTION INTRAVENOUS at 01:06

## 2024-06-17 RX ADMIN — HYDRALAZINE HYDROCHLORIDE 10 MG: 20 INJECTION INTRAMUSCULAR; INTRAVENOUS at 04:06

## 2024-06-17 RX ADMIN — MUPIROCIN 1 G: 20 OINTMENT TOPICAL at 10:06

## 2024-06-17 RX ADMIN — MAGNESIUM SULFATE HEPTAHYDRATE 2 G: 40 INJECTION, SOLUTION INTRAVENOUS at 05:06

## 2024-06-17 RX ADMIN — AZITHROMYCIN DIHYDRATE 500 MG: 500 INJECTION, POWDER, LYOPHILIZED, FOR SOLUTION INTRAVENOUS at 12:06

## 2024-06-17 NOTE — PROGRESS NOTES
[facility]  Progress Note    Patient Name: Hernan Badillo  MRN: [id number]  Patient Class: IP- Inpatient   Admission Date: 6/16/2024  Length of Stay: 1 days  Attending Physician: Evens Richter MD  Primary Care Provider: Toan Banks DO        Subjective:     Principal Problem:Encephalopathy, toxic        HPI:  History is taken from medical records.  Patient currently intubated.  It is reported that family found the patient to be very somnolent and almost unresponsive.  EMS was alerted and gave the patient Narcan.  Apparently this helped to woken up the patient but the patient was still very drowsy.  In the ER another round of Narcan was given but the patient continued to become altered and subsequently was intubated for airway management.  Family denies any history of drug use but urinary drug screen was positive for opioids and benzodiazepine.  Patient's pupils still pinpoint.  Stable on the ventilator.  Hospitalist is asked to admit this patient for suspected drug overdose.  Family not in the room.    Overview/Hospital Course:  No notes on file    Interval History:  Patient getting spontaneous breathing trial.  Patient's brother is present at bedside.  Patient moving all extremities.    Review of Systems   Unable to perform ROS: Intubated (patient is unresponsive)     Objective:     Vital Signs (Most Recent):  Temp: 97.2 °F (36.2 °C) (06/17/24 0430)  Pulse: 95 (06/17/24 0600)  Resp: (!) 24 (06/17/24 0600)  BP: 134/75 (06/17/24 0741)  SpO2: 96 % (06/17/24 0600) Vital Signs (24h Range):  Temp:  [96.3 °F (35.7 °C)-97.4 °F (36.3 °C)] 97.2 °F (36.2 °C)  Pulse:  [] 95  Resp:  [16-30] 24  SpO2:  [83 %-100 %] 96 %  BP: (104-213)/() 134/75     Weight: 64.3 kg (141 lb 12.1 oz)  Body mass index is 22.88 kg/m².  No intake or output data in the 24 hours ending 06/17/24 0759      Physical Exam  Constitutional:       Appearance: Normal appearance.   HENT:      Head: Normocephalic  and atraumatic.      Nose: Nose normal.   Eyes:      Pupils: Pupils are equal, round, and reactive to light.   Cardiovascular:      Rate and Rhythm: Normal rate and regular rhythm.   Pulmonary:      Effort: Pulmonary effort is normal.   Abdominal:      Palpations: Abdomen is soft.   Musculoskeletal:      Cervical back: Neck supple.   Skin:     General: Skin is warm.   Neurological:      Mental Status: He is alert.      Comments: Intubated and sedated             Significant Labs: All pertinent labs within the past 24 hours have been reviewed.  CBC:   Recent Labs   Lab 06/16/24 2130 06/16/24  2304 06/17/24  0339 06/17/24  0432   WBC 10.48  --  8.15  --    HGB 12.4*  --  11.5*  --    HCT 40.4 34* 36.8* 35*     --  180  --      CMP:   Recent Labs   Lab 06/16/24 2130 06/17/24 0339    142   K 4.5 4.9   CL 97 99   CO2 37* 39*   * 146*   BUN 9 10   CREATININE 0.8 0.7   CALCIUM 10.1 9.3   PROT 7.7 7.1   ALBUMIN 3.8 3.5   BILITOT 0.7 0.7   ALKPHOS 69 60   AST 11 14   ALT 4* 5*   ANIONGAP 6* 4*     Lactic Acid:   Recent Labs   Lab 06/17/24  0355   LACTATE 1.9     Troponin:   Recent Labs   Lab 06/16/24 2130 06/17/24  0038 06/17/24  0549   TROPONINIHS 133.7* 181.4* 309.9*     TSH:   Recent Labs   Lab 04/07/24  1925   TSH 0.706     Urine Studies:   Recent Labs   Lab 06/16/24 2142   COLORU Yellow   APPEARANCEUA Clear   PHUR 6.0   SPECGRAV 1.005   PROTEINUA Negative   GLUCUA Negative   KETONESU Negative   BILIRUBINUA Negative   OCCULTUA 1+*   NITRITE Negative   UROBILINOGEN Negative   LEUKOCYTESUR Negative   RBCUA 4   WBCUA 1   SQUAMEPITHEL 1     Microbiology Results (last 7 days)       Procedure Component Value Units Date/Time    Blood culture x two cultures. Draw prior to antibiotics. [[id number]] Collected: 06/16/24 2209    Order Status: Completed Specimen: Blood Updated: 06/17/24 0517     Blood Culture, Routine No Growth to date    Narrative:      Aerobic and anaerobic    Blood culture x two cultures.  Draw prior to antibiotics. [[id number]] Collected: 06/16/24 2209    Order Status: Completed Specimen: Blood Updated: 06/17/24 0517     Blood Culture, Routine No Growth to date    Narrative:      Aerobic and anaerobic    Culture, Respiratory with Gram Stain [[id number]] Collected: 06/17/24 0057    Order Status: Completed Specimen: Respiratory from Sputum, Expectorated Updated: 06/17/24 0242     Gram Stain (Respiratory) <10 epithelial cells per low power field.     Gram Stain (Respiratory) Many WBC's     Gram Stain (Respiratory) Few Gram positive cocci     Gram Stain (Respiratory) Few Gram negative rods          Significant Imaging:   ECHO (4/8/24):    Left Ventricle: The left ventricle is normal in size. Moderately increased wall thickness. There is moderate concentric hypertrophy. Normal wall motion. There is low normal systolic function with a visually estimated ejection fraction of 50 - 55%. There is normal diastolic function.    Right Ventricle: Normal right ventricular cavity size. Wall thickness is normal. Right ventricle wall motion  is normal. Systolic function is normal.    IVC/SVC: Normal venous pressure at 3 mmHg.     CXR:  There is an endotracheal tube with the tip located approximately 5 cm proximal to the ignacia. There is a nasogastric tube coursing into the abdomen. The lungs are well expanded. Pulmonary parenchyma is unchanged from prior. The pleural spaces are clear. Cardiac silhouette is unremarkable. Osseous structures are intact.     CTA Head and Neck:  Small subtle 4-mm hyperdensity along the anterior aspect of the right sylvian fissure.  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  Consider short-term repeat follow-up head CT to ensure stability.  No evidence of large vessel occlusion at the level of the  [id number].  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      CT Head:  Small subtle 4-mm hyperdensity along the anterior aspect of the right sylvian fissure.  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  Consider short-term repeat follow-up head CT to ensure stability.  No evidence of large vessel occlusion at the level of the [id number].  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    CT Chest abdomen and pelvis without contrast:  Multiple pulmonary nodules are noted throughout the lungs, more prominent involving the lower lobes, right greater than left, there are also mild patchy pulmonary opacities, these findings may relate to infectious/inflammatory etiology, however clinical and historical correlation is otherwise needed.  Prominent peribronchial thickening at the lower lobes bilaterally, with areas of bronchial opacity that may relate to mucous plugging, secretions or aspiration.  Mild pericardial effusion.  Air within the urinary bladder can be seen with infection however likely relates to Ann catheter, clinical correlation otherwise needed.  Layering density of the gallbladder likely relates to cholelithiasis, there is no pericholecystic inflammatory change.    CXR: No significant interval change.     Repeat CT Head: No significant interval change.       Assessment/Plan:      * Encephalopathy, toxic  Suspect drug overdose.  One dose of empiric antibiotics given  Post Narcan therapy with some responsiveness.  CT of the head is negative.  Supportive care with mechanical ventilation  Hopefully patient will be extubated today.  Chest x-ray chest x-ray in the morning  Continue neuro  checks.    Respiratory failure, unspecified with hypoxia, due to toxic/metabolic encephalopathy  Patient is on mechanical ventilation, acting extubation soon.  Currently receiving spontaneous breathing trial.  Follow Pulmonary recommendations.  Supplemental oxygen was provided and noted- Vent Mode: Spont  Oxygen Concentration (%):  [40-60] 40  Resp Rate Total:  [18 br/min-42 br/min] 42 br/min  Vt Set:  [450 mL] 450 mL  PEEP/CPAP:  [5 cmH20] 5 cmH20  Pressure Support:  [5 cmH20-10 cmH20] 5 cmH20  Mean Airway Pressure:  [7.1 cmH20-15 cmH20] 7.1 cmH20    Patient has been started on intravenous ceftriaxone and azithromycin for possible pneumonitis.  Discussed with Dr. Newton.    Bleeding in brain, 4 mm, right sylvian fissure  Hold antiplatelet therapy.  Neurosurgery following.  Keep head end of bed elevated at 30°.  Repeat CT head-stable  Maintain systolic blood pressure under 150 mmHg  Continue neuro checks q.1 hour.  Follow Neurology recommendations.      NSTEMI (non-ST elevated myocardial infarction)  Tele monitoring.  Obtain 2D echocardiogram.  EKG reviewed.  Consult cardiologist.  Unable to initiate antiplatelet therapy or anticoagulation therapy due to cerebral bleeding.  Trend serum troponins.      History of stroke    Continue aspirin    COPD (chronic obstructive pulmonary disease)  No sign of exacerbation   No steroids  Breathing treatments p.r.n.      Primary hypertension  Continue amlodipine 10 mg daily  Continue lisinopril 10 mg daily  P.r.n. hydralazine    DM (diabetes mellitus), type 2  Hold oral medication   Insulin sliding scale   A1c: 6.5    Discussed with patient's brother at bedside, answered all questions.  Discussed with  regarding discharge planning.  VTE Risk Mitigation (From admission, onward)           Ordered     IP VTE HIGH RISK PATIENT  Once         06/16/24 2246     Place sequential compression device  Until discontinued         06/16/24 2246                    Discharge Planning    ZACARIAS:  TBD    Code Status: Full Code   Is the patient medically ready for discharge?:     Reason for patient still in hospital (select all that apply): Patient trending condition and Consult recommendations               Critical care time spent on the evaluation and treatment of severe organ dysfunction, review of pertinent labs and imaging studies, discussions with consulting providers and discussions with patient/family: 35 minutes.      Evens Richter MD  Department of Hospital Medicine   [facility]

## 2024-06-17 NOTE — H&P
Replaced by [facility] - Emergency Dept  Hospital Medicine  History & Physical    Patient Name: Hernan Badillo  MRN: [id number]  Patient Class: IP- Inpatient  Admission Date: 6/16/2024  Attending Physician: Viki Mccann MD  Primary Care Provider: Toan Banks DO         Patient information was obtained from patient and ER records.     Subjective:     Principal Problem:Encephalopathy, toxic    Chief Complaint:   Chief Complaint   Patient presents with    Drug Overdose     Pt arrived by ems, Pt found by family unresponsive, ems reports decreased rr, pin point pupils, 4 mg narcan given, spo2 and rr increased.         HPI: History is taken from medical records.  Patient currently intubated.  It is reported that family found the patient to be very somnolent and almost unresponsive.  EMS was alerted and gave the patient Narcan.  Apparently this helped to woken up the patient but the patient was still very drowsy.  In the ER another round of Narcan was given but the patient continued to become altered and subsequently was intubated for airway management.  Family denies any history of drug use but urinary drug screen was positive for opioids and benzodiazepine.  Patient's pupils still pinpoint.  Stable on the ventilator.  Hospitalist is asked to admit this patient for suspected drug overdose.  Family not in the room.    Past Medical History:   Diagnosis Date    COPD (chronic obstructive pulmonary disease)     Diabetes mellitus, type 2     Hypertension        Past Surgical History:   Procedure Laterality Date    DENTAL SURGERY      ESOPHAGOGASTRODUODENOSCOPY N/A 6/1/2020    Procedure: EGD (ESOPHAGOGASTRODUODENOSCOPY);  Surgeon: Terrell May MD;  Location: [facility];  Service: Endoscopy;  Laterality: N/A;    HERNIA REPAIR      left inguinal    incision and drainiage         Review of patient's allergies indicates:  No Known Allergies    No current facility-administered medications on file prior to encounter.      Current Outpatient Medications on File Prior to Encounter   Medication Sig    acetaminophen (TYLENOL) 325 MG tablet Take 650 mg by mouth 3 (three) times daily as needed for Pain.    albuterol (PROVENTIL HFA) 90 mcg/actuation inhaler Inhale 2 puffs into the lungs every 6 (six) hours as needed for Wheezing. Rescue    amLODIPine (NORVASC) 10 MG tablet Take 1 tablet (10 mg total) by mouth once daily.    aspirin (ECOTRIN) 81 MG EC tablet Take 1 tablet (81 mg total) by mouth once daily.    blood sugar diagnostic, disc Strp 1 each by Misc.(Non-Drug; Combo Route) route 4 (four) times daily.    blood-glucose meter kit Use as instructed    lancets (LANCETS,THIN) Misc 1 each by Misc.(Non-Drug; Combo Route) route 4 (four) times daily.    lisinopriL 10 MG tablet Take 1 tablet (10 mg total) by mouth once daily.    metFORMIN (GLUCOPHAGE-XR) 500 MG ER 24hr tablet Take 1 tablet (500 mg total) by mouth daily with breakfast.    nicotine (NICODERM CQ) 21 mg/24 hr Place 1 patch onto the skin once daily.    pregabalin (LYRICA) 100 MG capsule Take 1 capsule (100 mg total) by mouth 2 (two) times daily.     Family History       Problem Relation (Age of Onset)    Cancer Maternal Grandmother    Drug abuse Father    Heart attack Father (80)    Heart disease Father          Tobacco Use    Smoking status: Every Day     Current packs/day: 2.00     Average packs/day: 2.0 packs/day for 30.0 years (60.0 ttl pk-yrs)     Types: Cigarettes    Smokeless tobacco: Never   Substance and Sexual Activity    Alcohol use: Yes     Alcohol/week: 1.0 standard drink of alcohol     Types: 1 Shots of liquor per week     Comment: 1/2 pint 2 x per week - quit approximately 5 months ago    Drug use: No    Sexual activity: Yes     Partners: Female     Birth control/protection: None     Comment: No history of STDs.     Review of Systems   Reason unable to perform ROS: patient is unresponsive.     Objective:     Vital Signs (Most Recent):  Temp: 96.8 °F (36 °C)  (06/16/24 2145)  Pulse: 105 (06/16/24 2205)  Resp: (!) 30 (06/16/24 2205)  BP: (!) 165/94 (06/16/24 2205)  SpO2: 100 % (06/16/24 2205) Vital Signs (24h Range):  Temp:  [96.3 °F (35.7 °C)-96.8 °F (36 °C)] 96.8 °F (36 °C)  Pulse:  [102-112] 105  Resp:  [16-30] 30  SpO2:  [83 %-100 %] 100 %  BP: (165-213)/() 165/94     Weight: 59 kg (130 lb)  Body mass index is 20.98 kg/m².     Physical Exam  Constitutional:       Appearance: Normal appearance.   HENT:      Head: Normocephalic and atraumatic.      Nose: Nose normal.   Eyes:      Pupils: Pupils are equal, round, and reactive to light.   Cardiovascular:      Rate and Rhythm: Normal rate and regular rhythm.   Pulmonary:      Effort: Pulmonary effort is normal.   Abdominal:      Palpations: Abdomen is soft.   Musculoskeletal:      Cervical back: Neck supple.   Skin:     General: Skin is warm.   Neurological:      General: No focal deficit present.      Mental Status: He is alert.   Psychiatric:         Behavior: Behavior normal.              CRANIAL NERVES     CN III, IV, VI   Pupils are equal, round, and reactive to light.       Significant Labs: All pertinent labs within the past 24 hours have been reviewed.  Recent Lab Results  (Last 5 results in the past 24 hours)        06/16/24  2231 06/16/24  2211   06/16/24 2142   06/16/24 2130 06/16/24 2114        Benzodiazepines     Presumptive Positive           Phencyclidine     Negative           Albumin       3.8         ALP       69         ALT       4         Amphetamines, Urine     Negative           Anion Gap       6         Appearance, UA     Clear           AST       11         Barbituates, Urine     Negative           Baso #       0.01         Basophil %       0.1         Bilirubin (UA)     Negative           BILIRUBIN TOTAL       0.7  Comment: For infants and newborns, interpretation of results should be based  on gestational age, weight and in agreement with clinical  observations.    Premature Infant  recommended reference ranges:  Up to 24 hours.............<8.0 mg/dL  Up to 48 hours............<12.0 mg/dL  3-5 days..................<15.0 mg/dL  6-29 days.................<15.0 mg/dL           BNP       93  Comment: Values of less than 100 pg/ml are consistent with non-CHF populations.         BUN       9         Calcium       10.1         Chloride       97         CO2       37         Cocaine, Urine     Negative           Color, UA     Yellow           Creatinine       0.8         Urine Creatinine     35.4  Comment: The random urine reference ranges provided were established   for 24 hour urine collections.  No reference ranges exist for  random urine specimens.  Correlate clinically.             Differential Method       Automated         eGFR       >60.0         Eos #       0.0         Eos %       0.1         Glucose       235         Glucose, UA     Negative           Gran # (ANC)       9.0         Gran %       85.4         Hematocrit       40.4         Hemoglobin       12.4         Immature Grans (Abs)       0.03  Comment: Mild elevation in immature granulocytes is non specific and   can be seen in a variety of conditions including stress response,   acute inflammation, trauma and pregnancy. Correlation with other   laboratory and clinical findings is essential.           Immature Granulocytes       0.3         Ketones, UA     Negative           Leukocyte Esterase, UA     Negative           Lymph #       0.9         Lymph %       8.5         Magnesium        1.6         MCH       30.1         MCHC       30.7         MCV       98         Microscopic Comment     SEE COMMENT  Comment: Other formed elements not mentioned in the report are not   present in the microscopic examination.              Mono #       0.6         Mono %       5.6         MPV       10.6         NITRITE UA     Negative           nRBC       0         Blood, UA     1+           Opiates, Urine     Presumptive Positive           pH, UA      6.0           Platelet Count       193         POC Creatinine 0.7               POC Glucose         209       POC Lactate   2.44             Potassium       4.5         PROTEIN TOTAL       7.7         Protein, UA     Negative  Comment: Recommend a 24 hour urine protein or a urine   protein/creatinine ratio if globulin induced proteinuria is  clinically suspected.             RBC       4.12         RBC, UA     4           RDW       14.0         Sample VENOUS   VENOUS             Sodium       140         Spec Grav UA     1.005           Specimen UA     Urine, Clean Catch           Squam Epithel, UA     1           Marijuana (THC) Metabolite     Negative           Toxicology Information     SEE COMMENT  Comment: This screen includes the following classes of drugs at the   listed cut-off:    Benzodiazepines                  200 ng/ml  Cocaine metabolite               300 ng/ml  Opiates                          300 ng/ml  Barbiturates                     200 ng/ml  Amphetamines                    1000 ng/ml  Marijuana metabs (THC)            50 ng/ml  Phencyclidine (PCP)               25 ng/ml    High concentrations of Methylenedioxymethamphetamine (MDMA aka  Ectasy) and other structurally similar compounds may cross-   react with the Amphetamine/Methamphetamine screening   immunoassay giving a false positive result.    Note: This exception list includes only more common   interferants in toxicology screen testing.  Because of many   cross-reactantspositive results on toxicology drug screens   should be confirmed whenever results do not correlate with   clinical presentation.    This report is intended for use in clinical monitoring and  management of patients. It is not intended for use in   employment related drug testing.    Because of any cross-reactants, positive results on toxicology  drug screens should be confirmed whenever results do not  correlate with clinical presentation.    Presumptive positive results are  unconfirmed and may be used   only for medical purposes.             UROBILINOGEN UA     Negative           WBC, UA     1           WBC       10.48                                Significant Imaging: I have reviewed all pertinent imaging results/findings within the past 24 hours.  Assessment/Plan:     * Encephalopathy, toxic  Suspect drug overdose versus possible parenchymal hemorrhage.  One dose of empiric antibiotics given  Post Narcan therapy with some responsiveness.  CT of the head shows a 4 mm hyperdensity along the anterior aspect of the right sylvian fissure.  Radiologist state that they can not exclude a parenchymal hemorrhage.  We will repeat head CT in the morning.  Neurosurgery consulted  Q.4 neuro check  Supportive care with mechanical ventilation  Will anticipate weaning the patient off of mechanical ventilation tomorrow morning.  Chest x-ray chest x-ray in the morning  ABG in the in the morning    History of stroke    Hold aspirin as there are equivocal finding for possible parenchymal hemorrhage  Head CT in the a.m.    COPD (chronic obstructive pulmonary disease)  No sign of exacerbation   No steroids  Breathing treatments p.r.n.      Primary hypertension  Continue amlodipine 10 mg daily  Continue lisinopril 10 mg daily  P.r.n. hydralazine    DM (diabetes mellitus), type 2  Hold oral medication   Insulin sliding scale   A1c      VTE Risk Mitigation (From admission, onward)           Ordered     heparin (porcine) injection 5,000 Units  Every 8 hours         06/16/24 2246     IP VTE HIGH RISK PATIENT  Once         06/16/24 2246     Place sequential compression device  Until discontinued         06/16/24 2246                                    Viki Mccann MD  Department of Hospital Medicine  [facility] - Emergency Dept

## 2024-06-17 NOTE — PROGRESS NOTES
[facility] Medicine  Progress Note    Patient Name: Hernan Badillo  MRN: [id number]  Patient Class: IP- Inpatient   Admission Date: 6/16/2024  Length of Stay: 1 days  Attending Physician: Evens Richter MD  Primary Care Provider: Toan Banks DO        Subjective:     Principal Problem:Encephalopathy, toxic        HPI:  History is taken from medical records.  Patient currently intubated.  It is reported that family found the patient to be very somnolent and almost unresponsive.  EMS was alerted and gave the patient Narcan.  Apparently this helped to woken up the patient but the patient was still very drowsy.  In the ER another round of Narcan was given but the patient continued to become altered and subsequently was intubated for airway management.  Family denies any history of drug use but urinary drug screen was positive for opioids and benzodiazepine.  Patient's pupils still pinpoint.  Stable on the ventilator.  Hospitalist is asked to admit this patient for suspected drug overdose.  Family not in the room.    Overview/Hospital Course:  No notes on file    Interval History:     Review of Systems   Unable to perform ROS: Intubated (patient is unresponsive)     Objective:     Vital Signs (Most Recent):  Temp: 97.2 °F (36.2 °C) (06/17/24 0430)  Pulse: 95 (06/17/24 0600)  Resp: (!) 24 (06/17/24 0600)  BP: 134/75 (06/17/24 0741)  SpO2: 96 % (06/17/24 0600) Vital Signs (24h Range):  Temp:  [96.3 °F (35.7 °C)-97.4 °F (36.3 °C)] 97.2 °F (36.2 °C)  Pulse:  [] 95  Resp:  [16-30] 24  SpO2:  [83 %-100 %] 96 %  BP: (104-213)/() 134/75     Weight: 64.3 kg (141 lb 12.1 oz)  Body mass index is 22.88 kg/m².  No intake or output data in the 24 hours ending 06/17/24 0759      Physical Exam  Constitutional:       Appearance: Normal appearance.   HENT:      Head: Normocephalic and atraumatic.      Nose: Nose normal.   Eyes:      Pupils: Pupils are equal, round, and reactive to light.    Cardiovascular:      Rate and Rhythm: Normal rate and regular rhythm.   Pulmonary:      Effort: Pulmonary effort is normal.   Abdominal:      Palpations: Abdomen is soft.   Musculoskeletal:      Cervical back: Neck supple.   Skin:     General: Skin is warm.   Neurological:      Mental Status: He is alert.      Comments: Intubated and sedated             Significant Labs: All pertinent labs within the past 24 hours have been reviewed.  CBC:   Recent Labs   Lab 06/16/24 2130 06/16/24  2304 06/17/24  0339 06/17/24  0432   WBC 10.48  --  8.15  --    HGB 12.4*  --  11.5*  --    HCT 40.4 34* 36.8* 35*     --  180  --      CMP:   Recent Labs   Lab 06/16/24 2130 06/17/24  0339    142   K 4.5 4.9   CL 97 99   CO2 37* 39*   * 146*   BUN 9 10   CREATININE 0.8 0.7   CALCIUM 10.1 9.3   PROT 7.7 7.1   ALBUMIN 3.8 3.5   BILITOT 0.7 0.7   ALKPHOS 69 60   AST 11 14   ALT 4* 5*   ANIONGAP 6* 4*     Lactic Acid:   Recent Labs   Lab 06/17/24  0355   LACTATE 1.9     Troponin:   Recent Labs   Lab 06/16/24  2130 06/17/24  0038 06/17/24  0549   TROPONINIHS 133.7* 181.4* 309.9*     TSH:   Recent Labs   Lab 04/07/24  1925   TSH 0.706     Urine Studies:   Recent Labs   Lab 06/16/24 2142   COLORU Yellow   APPEARANCEUA Clear   PHUR 6.0   SPECGRAV 1.005   PROTEINUA Negative   GLUCUA Negative   KETONESU Negative   BILIRUBINUA Negative   OCCULTUA 1+*   NITRITE Negative   UROBILINOGEN Negative   LEUKOCYTESUR Negative   RBCUA 4   WBCUA 1   SQUAMEPITHEL 1     Microbiology Results (last 7 days)       Procedure Component Value Units Date/Time    Blood culture x two cultures. Draw prior to antibiotics. [[id number]] Collected: 06/16/24 2209    Order Status: Completed Specimen: Blood Updated: 06/17/24 0517     Blood Culture, Routine No Growth to date    Narrative:      Aerobic and anaerobic    Blood culture x two cultures. Draw prior to antibiotics. [[id number]] Collected: 06/16/24 2209    Order Status: Completed Specimen: Blood  Updated: 06/17/24 0517     Blood Culture, Routine No Growth to date    Narrative:      Aerobic and anaerobic    Culture, Respiratory with Gram Stain [[id number]] Collected: 06/17/24 0057    Order Status: Completed Specimen: Respiratory from Sputum, Expectorated Updated: 06/17/24 0242     Gram Stain (Respiratory) <10 epithelial cells per low power field.     Gram Stain (Respiratory) Many WBC's     Gram Stain (Respiratory) Few Gram positive cocci     Gram Stain (Respiratory) Few Gram negative rods          Significant Imaging:   ECHO (4/8/24):    Left Ventricle: The left ventricle is normal in size. Moderately increased wall thickness. There is moderate concentric hypertrophy. Normal wall motion. There is low normal systolic function with a visually estimated ejection fraction of 50 - 55%. There is normal diastolic function.    Right Ventricle: Normal right ventricular cavity size. Wall thickness is normal. Right ventricle wall motion  is normal. Systolic function is normal.    IVC/SVC: Normal venous pressure at 3 mmHg.     CXR:  There is an endotracheal tube with the tip located approximately 5 cm proximal to the ignacia. There is a nasogastric tube coursing into the abdomen. The lungs are well expanded. Pulmonary parenchyma is unchanged from prior. The pleural spaces are clear. Cardiac silhouette is unremarkable. Osseous structures are intact.     CTA Head and Neck:  Small subtle 4-mm hyperdensity along the anterior aspect of the right sylvian fissure.  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  Consider short-term repeat follow-up head CT to ensure stability.  No evidence of large vessel occlusion at the level of the [id number].  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      CT Head:  Small subtle 4-mm hyperdensity along the anterior aspect of the right sylvian fissure.  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  Consider short-term repeat follow-up head CT to ensure stability.  No evidence of large vessel occlusion at the level of the [id number].  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    CT Chest abdomen and pelvis without contrast:  Multiple pulmonary nodules are noted throughout the lungs, more prominent involving the lower lobes, right greater than left, there are also mild patchy pulmonary opacities, these findings may relate to infectious/inflammatory etiology, however clinical and historical correlation is otherwise needed.  Prominent peribronchial thickening at the lower lobes bilaterally, with areas of bronchial opacity that may relate to mucous plugging, secretions or aspiration.  Mild pericardial effusion.  Air within the urinary bladder can be seen with infection however likely relates to Ann catheter, clinical correlation otherwise needed.  Layering density of the gallbladder likely relates to cholelithiasis, there is no pericholecystic inflammatory change.    CXR: No significant interval change.     Repeat CT Head: No significant interval change.       Assessment/Plan:      * Encephalopathy, toxic  Suspect drug overdose.  One dose of empiric antibiotics given  Post Narcan therapy with some responsiveness.  CT of the head is negative.  Supportive care with mechanical ventilation  Will anticipate weaning the patient off of mechanical ventilation tomorrow morning.  Chest x-ray chest x-ray in the morning  ABG in the in the morning    History of stroke    Continue aspirin    COPD (chronic obstructive  pulmonary disease)  No sign of exacerbation   No steroids  Breathing treatments p.r.n.      Primary hypertension  Continue amlodipine 10 mg daily  Continue lisinopril 10 mg daily  P.r.n. hydralazine    DM (diabetes mellitus), type 2  Hold oral medication   Insulin sliding scale   A1c: 6.5      VTE Risk Mitigation (From admission, onward)           Ordered     IP VTE HIGH RISK PATIENT  Once         06/16/24 2246     Place sequential compression device  Until discontinued         06/16/24 2246                    Discharge Planning   ZACARIAS:      Code Status: Full Code   Is the patient medically ready for discharge?:     Reason for patient still in hospital (select all that apply): {HMREASONPATIENTINHOSP:09108}               Critical care time spent on the evaluation and treatment of severe organ dysfunction, review of pertinent labs and imaging studies, discussions with consulting providers and discussions with patient/family: *** minutes.      Evens Richter MD  Department of Hospital Medicine   [facility]

## 2024-06-17 NOTE — CARE UPDATE
06/17/24 0059   Patient Assessment/Suction   Level of Consciousness (AVPU) responds to pain   Respiratory Effort Normal   Expansion/Accessory Muscles/Retractions no use of accessory muscles   Rhythm/Pattern, Respiratory assisted mechanically   Cough Frequency with stimulation   Cough Type assisted   Suction Method tracheal   Suction Pressure (mmHg) -120 mmHg   $ Suction Charges Inline Suction Procedure Stat Charge   Secretions Amount large   Secretions Color yellow   Secretions Characteristics thick   Sputum Collection sample obtained per induction   $ Swab or suction? Suction   Aspirate Toleration JASPAL (no adverse reactions)   Sent to the lab? Yes   Skin Integrity   $ Wound Care Tech Time 15 min   Area Observed Left;Right;Upper lip;Lower lip;Corner lip   Skin Appearance without discoloration   PRE-TX-O2   Device (Oxygen Therapy) ventilator   $ Is the patient on High Flow Oxygen? Yes   Oxygen Concentration (%) 60   SpO2 100 %   Pulse Oximetry Type Continuous   $ Pulse Oximetry - Multiple Charge Pulse Oximetry - Multiple   Pulse 82   Resp (!) 24        Airway - Non-Surgical 06/16/24 2125 Endotracheal Tube   Placement Date/Time: 06/16/24 2125   Present Prior to Hospital Arrival?: No  Method of Intubation: Glidescope  Inserted by: MD  Staff/Resident Name(s): virgen  Airway Device: Endotracheal Tube  Mask Ventilation: Easy with oral airway  Blade: Glide...   Secured at 25 cm   Measured At Lips   Secured Location Right   Secured by Commercial tube alfaro   Bite Block right;secure and patent   Site Condition Cool;Dry   Status Intact;Secured;Patent   Site Assessment Clean;Dry;No bleeding;No drainage   Cuff Volume   (MLT)   Vent Select   Conventional Vent Y   Charged w/in last 24h YES   Preset Conventional Ventilator Settings   Vent ID [id number]   Vent Type    Ventilation Type VC   Vent Mode A/C   Humidity HME   Set Rate 24 BPM   Vt Set 450 mL   PEEP/CPAP 5 cmH20   Peak Flow 61 L/min   Peak End Inspiratory Pressure 22  cmH20   I-Trigger Type  V-TRIG   Trigger Sensitivity Flow/I-Trigger 3 L/min   Patient Ventilator Parameters   Resp Rate Total 26 br/min   Peak Airway Pressure 33 cmH20   Mean Airway Pressure 14 cmH20   Plateau Pressure 0 cmH20   Exhaled Vt 404 mL   Total Ve 7.98 L/m   I:E Ratio Measured 1:2.1   Auto PEEP 0 cmH20   Conventional Ventilator Alarms   Alarms On Y   Ve High Alarm 25 L/min   Ve Low Alarm 2.5 L/min   Vt High Alarm 1200 mL   Vt Low Alarm 200 mL   Resp Rate High Alarm 50 br/min   Apnea Rate 10   Apnea Volume (mL) 0 mL   Apnea Oxygen Concentration  100   Apnea Flow Rate (L/min) 61   T Apnea 20 sec(s)   Ready to Wean/Extubation Screen   FIO2<=50 (chart decimal) (!) 0.6   MV<16L (chart vol.) 7.98   PEEP <=8 (chart #) 5   Ready to Wean Parameters   F/VT Ratio<105 (RSBI) (!) 59.41   Vital Capacity   Vital Capacity (mL) 0   Education   $ Education Suction;Ventilator Oxygen;15 min   Respiratory Evaluation   $ Care Plan Tech Time 15 min

## 2024-06-17 NOTE — CONSULTS
[facility]  Department of Cardiology  Consult Note      PATIENT NAME: Hernan Badillo  MRN: [id number]  TODAY'S DATE: 06/17/2024  ADMIT DATE: 6/16/2024                          CONSULT REQUESTED BY: Evens Richter MD    SUBJECTIVE     PRINCIPAL PROBLEM: Encephalopathy, toxic      REASON FOR CONSULT:  NSTEMI    Patient was recently extubated today and is currently very lethargic.  History obtained from medical record as below    HPI:  Patient is 50-year-old  male with past medical history of COPD, DM, hypertension, who was found by family members nearly unresponsive last night.      Patient was given Narcan per EMS.  Drug screen was positive for opiates and benzos. Upon presentation in the ER he had pinpoint pupils and was not protecting his airway thus was intubated.      Neurosurgery was consulted for abnormal CT/CTA head and neck showing: punctate hyperdensity in the right sylvian fissure. No AVM or aneurysm seen. No hydrocephalus or midline shift.  No intervention required at this point    Review of patient's allergies indicates:  No Known Allergies    Past Medical History:   Diagnosis Date    COPD (chronic obstructive pulmonary disease)     Diabetes mellitus, type 2     Hypertension      Past Surgical History:   Procedure Laterality Date    DENTAL SURGERY      ESOPHAGOGASTRODUODENOSCOPY N/A 6/1/2020    Procedure: EGD (ESOPHAGOGASTRODUODENOSCOPY);  Surgeon: Terrell May MD;  Location: [facility];  Service: Endoscopy;  Laterality: N/A;    HERNIA REPAIR      left inguinal    incision and drainiage       Social History     Tobacco Use    Smoking status: Every Day     Current packs/day: 2.00     Average packs/day: 2.0 packs/day for 30.0 years (60.0 ttl pk-yrs)     Types: Cigarettes    Smokeless tobacco: Never   Substance Use Topics    Alcohol use: Yes     Alcohol/week: 1.0 standard drink of alcohol     Types: 1 Shots of liquor per week     Comment: 1/2 pint 2 x per week - quit  approximately 5 months ago    Drug use: No        REVIEW OF SYSTEMS  Unable to obtain    OBJECTIVE     VITAL SIGNS (Most Recent)  Temp: (!) 101.8 °F (38.8 °C) (06/17/24 1105)  Pulse: (!) 118 (06/17/24 1110)  Resp: (!) 28 (06/17/24 1110)  BP: (!) 173/82 (06/17/24 1100)  SpO2: 96 % (06/17/24 1110)    VENTILATION STATUS  Resp: (!) 28 (06/17/24 1110)  SpO2: 96 % (06/17/24 1110)  Vent Mode: Spont  Oxygen Concentration (%):  [40-60] 40  Resp Rate Total:  [18 br/min-42 br/min] 42 br/min  Vt Set:  [450 mL] 450 mL  PEEP/CPAP:  [5 cmH20] 5 cmH20  Pressure Support:  [5 cmH20-10 cmH20] 5 cmH20  Mean Airway Pressure:  [7.1 cmH20-15 cmH20] 7.1 cmH20        I & O (Last 24H):  Intake/Output Summary (Last 24 hours) at 6/17/2024 1127  Last data filed at 6/17/2024 1100  Gross per 24 hour   Intake 146.89 ml   Output 350 ml   Net -203.11 ml       WEIGHTS  Wt Readings from Last 3 Encounters:   06/17/24 0121 64.3 kg (141 lb 12.1 oz)   06/16/24 2115 59 kg (130 lb)   05/13/24 0931 63.5 kg (140 lb)   05/08/24 1354 61.1 kg (134 lb 11.2 oz)       PHYSICAL EXAM    GENERAL:  Middle-aged male who appears older than chronological age resting in bed, not responding to questions, mumbling in response   HEENT: Normocephalic.    NECK: No JVD.   CARDIAC: Regular rate and rhythm. S1 is normal.S2 is normal.No gallops, clicks or murmurs noted at this time.  CHEST ANATOMY: normal.   LUNGS:  Weak wet cough noted, scattered rhonchi anteriorly  ABDOMEN: Soft, nondistended .  Normal bowel sounds.    EXTREMITIES: No edema  CENTRAL NERVOUS SYSTEM:  Drowsy  SKIN:  Dry flaky and peeling    HOME MEDICATIONS:  No current facility-administered medications on file prior to encounter.     Current Outpatient Medications on File Prior to Encounter   Medication Sig Dispense Refill    acetaminophen (TYLENOL) 325 MG tablet Take 650 mg by mouth 3 (three) times daily as needed for Pain.      albuterol (PROVENTIL HFA) 90 mcg/actuation inhaler Inhale 2 puffs into the lungs  every 6 (six) hours as needed for Wheezing. Rescue 18 g 0    amLODIPine (NORVASC) 10 MG tablet Take 1 tablet (10 mg total) by mouth once daily. 90 tablet 3    aspirin (ECOTRIN) 81 MG EC tablet Take 1 tablet (81 mg total) by mouth once daily. 360 tablet 0    blood sugar diagnostic, disc Strp 1 each by Misc.(Non-Drug; Combo Route) route 4 (four) times daily. 200 strip 0    blood-glucose meter kit Use as instructed 1 each 0    lancets (LANCETS,THIN) Misc 1 each by Misc.(Non-Drug; Combo Route) route 4 (four) times daily. 200 each 0    lisinopriL 10 MG tablet Take 1 tablet (10 mg total) by mouth once daily. 90 tablet 3    metFORMIN (GLUCOPHAGE-XR) 500 MG ER 24hr tablet Take 1 tablet (500 mg total) by mouth daily with breakfast. 90 tablet 3    nicotine (NICODERM CQ) 21 mg/24 hr Place 1 patch onto the skin once daily. 30 patch 0    pregabalin (LYRICA) 100 MG capsule Take 1 capsule (100 mg total) by mouth 2 (two) times daily. 60 capsule 0       SCHEDULED MEDS:   albuterol-ipratropium  3 mL Nebulization Q6H WAKE    azithromycin  500 mg Intravenous Q24H    cefTRIAXone (Rocephin) IV (PEDS and ADULTS)  1 g Intravenous Q24H    LIDOcaine (PF) 20 mg/ml (2%)  10 mL Nebulization Once    mupirocin   Nasal BID    pregabalin  100 mg Oral BID       CONTINUOUS INFUSIONS:   sodium chloride 0.9%   Intravenous Continuous 50 mL/hr at 06/17/24 0112 Rate Change at 06/17/24 0112       PRN MEDS:  Current Facility-Administered Medications:     acetaminophen, 650 mg, Oral, Q8H PRN    acetaminophen, 650 mg, Oral, Q4H PRN    aluminum-magnesium hydroxide-simethicone, 30 mL, Oral, QID PRN    dextrose 50%, 12.5 g, Intravenous, PRN    glucagon (human recombinant), 1 mg, Intramuscular, PRN    glucose, 16 g, Oral, PRN    glucose, 24 g, Oral, PRN    hydrALAZINE, 10 mg, Intravenous, Q2H PRN    HYDROcodone-acetaminophen, 1 tablet, Oral, Q6H PRN    insulin aspart U-100, 0-10 Units, Subcutaneous, Q6H PRN    magnesium oxide, 800 mg, Oral, PRN    magnesium  "oxide, 800 mg, Oral, PRN    magnesium sulfate IVPB, 2 g, Intravenous, PRN    magnesium sulfate IVPB, 4 g, Intravenous, PRN    melatonin, 6 mg, Oral, Nightly PRN    naloxone, 0.02 mg, Intravenous, PRN    ondansetron, 4 mg, Intravenous, Q6H PRN    potassium bicarbonate, 35 mEq, Oral, PRN    potassium bicarbonate, 50 mEq, Oral, PRN    potassium bicarbonate, 60 mEq, Oral, PRN    potassium, sodium phosphates, 2 packet, Oral, PRN    potassium, sodium phosphates, 2 packet, Oral, PRN    potassium, sodium phosphates, 2 packet, Oral, PRN    sodium chloride 0.9%, 10 mL, Intravenous, Q12H PRN    LABS AND DIAGNOSTICS     CBC LAST 3 DAYS  Recent Labs   Lab 06/16/24 2130 06/16/24 2304 06/17/24  0339 06/17/24  0432   WBC 10.48  --  8.15  --    RBC 4.12*  --  3.81*  --    HGB 12.4*  --  11.5*  --    HCT 40.4 34* 36.8* 35*   MCV 98  --  97  --    MCH 30.1  --  30.2  --    MCHC 30.7*  --  31.3*  --    RDW 14.0  --  14.3  --      --  180  --    MPV 10.6  --  11.7  --    GRAN 85.4*  9.0*  --  83.8*  6.8  --    LYMPH 8.5*  0.9*  --  9.4*  0.8*  --    MONO 5.6  0.6  --  6.3  0.5  --    BASO 0.01  --  0.01  --    NRBC 0  --  0  --        COAGULATION LAST 3 DAYS  No results for input(s): "LABPT", "INR", "APTT" in the last 168 hours.    CHEMISTRY LAST 3 DAYS  Recent Labs   Lab 06/16/24 2130 06/16/24 2304 06/17/24  0339 06/17/24  0432 06/17/24  0945     --  142  --   --    K 4.5  --  4.9  --   --    CL 97  --  99  --   --    CO2 37*  --  39*  --   --    ANIONGAP 6*  --  4*  --   --    BUN 9  --  10  --   --    CREATININE 0.8  --  0.7  --   --    *  --  146*  --   --    CALCIUM 10.1  --  9.3  --   --    PH  --  7.406  --  7.411 7.446   MG 1.6  --  1.6  --   --    ALBUMIN 3.8  --  3.5  --   --    PROT 7.7  --  7.1  --   --    ALKPHOS 69  --  60  --   --    ALT 4*  --  5*  --   --    AST 11  --  14  --   --    BILITOT 0.7  --  0.7  --   --        CARDIAC PROFILE LAST 3 DAYS  Recent Labs   Lab 06/16/24  2130   BNP 93 "       ENDOCRINE LAST 3 DAYS  Recent Labs   Lab 06/16/24 2224   PROCAL 0.206       LAST ARTERIAL BLOOD GAS  ABG  Recent Labs   Lab 06/17/24  0945   PH 7.446   PO2 74*   PCO2 48.5*   HCO3 33.4*   BE 9*       LAST 7 DAYS MICROBIOLOGY   Microbiology Results (last 7 days)       Procedure Component Value Units Date/Time    Blood culture [[id number]]     Order Status: No result Specimen: Blood     Blood culture [[id number]]     Order Status: No result Specimen: Blood     Blood culture x two cultures. Draw prior to antibiotics. [[id number]] Collected: 06/16/24 2209    Order Status: Completed Specimen: Blood Updated: 06/17/24 0517     Blood Culture, Routine No Growth to date    Narrative:      Aerobic and anaerobic    Blood culture x two cultures. Draw prior to antibiotics. [[id number]] Collected: 06/16/24 2209    Order Status: Completed Specimen: Blood Updated: 06/17/24 0517     Blood Culture, Routine No Growth to date    Narrative:      Aerobic and anaerobic    Culture, Respiratory with Gram Stain [[id number]] Collected: 06/17/24 0057    Order Status: Completed Specimen: Respiratory from Sputum, Expectorated Updated: 06/17/24 0242     Gram Stain (Respiratory) <10 epithelial cells per low power field.     Gram Stain (Respiratory) Many WBC's     Gram Stain (Respiratory) Few Gram positive cocci     Gram Stain (Respiratory) Few Gram negative rods            MOST RECENT IMAGING  X-Ray Chest AP Portable  Narrative: EXAMINATION:  XR CHEST AP PORTABLE    CLINICAL HISTORY:  Respiratory failure; drug overdose    FINDINGS:  Portable chest radiograph at 04:20 hours compared 06/16/2024 21:27 hours shows ET and NG tubes unchanged in position, with the cardiomediastinal silhouette and pulmonary vasculature stable and within normal limits.    The lungs are well expanded, with no new pleural or parenchymal abnormality.  No pneumothorax.  Impression: No significant interval change.    Electronically signed by: Ravi  Susana  Date:    06/17/2024  Time:    07:54  CT Head Without Contrast  Narrative: CMS MANDATED QUALITY DATA - CT RADIATION - 436    All CT scans at this facility utilize dose modulation, iterative reconstruction, and/or weight based dosing when appropriate to reduce radiation dose to as low as reasonably achievable.    EXAMINATION:  CT HEAD WITHOUT CONTRAST    CLINICAL HISTORY:  Stroke, follow up;Stroke, hemorrhagic;.    COMPARISON:  06/16/2024    FINDINGS:  The previously described subtle area of increased density along the anterior margin of the right sylvian fissure remains unchanged.  There are no definite findings of acute hemorrhage or infarction.  Foci of apparent remote small vessel infarction are observed within the alf, stable.    No intra-axial mass or significant mass effect is observed.  There is no midline shift.  The ventricular system is appropriate in size and position.  There are no extra-axial collections.    No acute osseous abnormalities are identified.  Impression: No significant interval change.    Electronically signed by: Mary Burks  Date:    06/17/2024  Time:    07:29  CT Chest Abdomen Pelvis Without Contrast (XPD)  Narrative: EXAMINATION:  CT CHEST ABDOMEN PELVIS WITHOUT CONTRAST(XPD)    CLINICAL HISTORY:  Sepsis;    TECHNIQUE:  Low dose axial images, sagittal and coronal reformations were obtained from the thoracic inlet to the pubic symphysis intravenous contrast and oral contrast was not utilized, this diminishes the sensitivity of the examination.    COMPARISON:  CT examination of the chest September 26, 2023    FINDINGS:  An ET tube is noted, the distal tip above the ignacia.  Enteric tube extends to the level of the stomach.  Previously identified 1.5 cm ground-glass opacity at the right upper lobe is significantly diminished in size with only residual remaining parenchymal change.  There are multiple small pulmonary nodules throughout the lungs bilaterally, this is more prominent  involving the lower lobes, involves the right lower lobe to a greater extent, there are also mild patchy pulmonary opacities.  The appearance would suggest infectious/inflammatory etiology, clinical and historical correlation is otherwise needed.    There is prominent peribronchial thickening seen at the level of the lower lobes bilaterally as well as areas of bronchial opacity that may relate to areas of mucous plugging, secretions and or aspiration.    There is no evidence for pneumothorax there is no evidence for pleural effusion.    Small mediastinal lymph nodes are noted.  The thoracic aorta demonstrates atherosclerotic change, coronary arterial atherosclerotic change noted.  There is mild pericardial effusion noted anteriorly.  Evaluation of the heart and great vessels is otherwise limited on this noncontrast examination.    The stomach demonstrates nonspecific appearance of fluid and air within the gastric lumen.  There is mild to moderate gallbladder distention, layering density likely represents cholelithiasis, there is no pericholecystic or peripancreatic inflammatory change and there is no evidence for abnormal pancreatic or biliary ductal dilatation.  There is no evidence for acute process of the spleen or adrenal glands.    Renal calcifications are likely vascular.  There are small calcifications along the expected course of the ureters however there is no evidence for hydroureter, or obstructive uropathy, these may represent phleboliths.  The arterial vascular structures including the aorta demonstrate atherosclerotic change, the aorta appears normal in caliber otherwise not optimally evaluated on this noncontrast examination.    There is a Ann catheter the urinary bladder, air density within the urinary bladder likely relates to the Ann catheter however can be seen with infection, clinical correlation is otherwise needed.  There is mild prominence of the prostate.    There is no evidence for small  bowel obstructive process.  The appendix is identified, it does not appear inflamed.  There is no evidence for inflammatory or obstructive process of the colon.  There is mild-to-moderate prominence of the rectum with stool without inflammatory appearance.  There is no free intraperitoneal air.    The osseous structures demonstrate chronic change.  Impression: Multiple pulmonary nodules are noted throughout the lungs, more prominent involving the lower lobes, right greater than left, there are also mild patchy pulmonary opacities, these findings may relate to infectious/inflammatory etiology, however clinical and historical correlation is otherwise needed.    Prominent peribronchial thickening at the lower lobes bilaterally, with areas of bronchial opacity that may relate to mucous plugging, secretions or aspiration.    Mild pericardial effusion.    Air within the urinary bladder can be seen with infection however likely relates to Ann catheter, clinical correlation otherwise needed.    Layering density of the gallbladder likely relates to cholelithiasis, there is no pericholecystic inflammatory change.    Additional findings as above.    This report was flagged in Epic as abnormal.    Electronically signed by: Archie Webber  Date:    06/16/2024  Time:    23:58      ECHOCARDIOGRAM RESULTS (last 5)  Results for orders placed during the hospital encounter of 04/07/24    Echo    Interpretation Summary    Left Ventricle: The left ventricle is normal in size. Moderately increased wall thickness. There is moderate concentric hypertrophy. Normal wall motion. There is low normal systolic function with a visually estimated ejection fraction of 50 - 55%. There is normal diastolic function.    Right Ventricle: Normal right ventricular cavity size. Wall thickness is normal. Right ventricle wall motion  is normal. Systolic function is normal.    IVC/SVC: Normal venous pressure at 3 mmHg.      Results for orders placed during  the hospital encounter of 12/03/23    Echo Saline Bubble? Yes    Interpretation Summary    Left Ventricle: The left ventricle is normal in size. Mildly increased wall thickness. There is mild concentric hypertrophy. Normal wall motion. There is normal systolic function. Ejection fraction by visual approximation is 60%. Grade I diastolic dysfunction.    Right Ventricle: Normal right ventricular cavity size. Wall thickness is normal. Right ventricle wall motion  is normal. Systolic function is normal.    IVC/SVC: Normal venous pressure at 3 mmHg.    There was agitated saline (bubble) used. Study is negative for shunt.      Results for orders placed during the hospital encounter of 11/22/22    Echo    Interpretation Summary  · The left ventricle is normal in size with concentric hypertrophy and mildly decreased systolic function.  · The estimated ejection fraction is 45%.  · Grade I left ventricular diastolic dysfunction.  · Normal right ventricular size with mildly reduced right ventricular systolic function.  · The quantitatively derived ejection fraction is 45%.  · Normal central venous pressure (3 mmHg).      CURRENT/PREVIOUS VISIT EKG  Results for orders placed or performed during the hospital encounter of 06/16/24   EKG 12-lead    Collection Time: 06/16/24  9:37 PM   Result Value Ref Range    QRS Duration 90 ms    [facility] QTC Calculation 469 ms    Narrative    Test Reason : R07.9,    Vent. Rate : 107 BPM     Atrial Rate : 107 BPM     P-R Int : 146 ms          QRS Dur : 090 ms      QT Int : 352 ms       P-R-T Axes : 069 080 023 degrees     QTc Int : 469 ms    Sinus tachycardia  T wave abnormality, consider inferior ischemia  Abnormal ECG  When compared with ECG of 08-APR-2024 09:24,  Vent. rate has increased BY  46 BPM  T wave inversion now evident in Inferior leads    Referred By: [email or web address]   SELF           Confirmed By:            ASSESSMENT/PLAN:     Active Hospital Problems    Diagnosis    *Encephalopathy, toxic     History of stroke    COPD (chronic obstructive pulmonary disease)    Primary hypertension    DM (diabetes mellitus), type 2       ASSESSMENT & PLAN:   Encephalopathy  ARF  Elevated troponin  ?Drug overdose  Hypertension  COPD  DM 2  History of stroke     RECOMMENDATIONS:  Continue to obtain serial HS troponin until downward trend  Echocardiogram done 04/08/2024 showed EF 50-55%  Obtain limited echo to evaluate EF  BNP normal and chest x-ray without acute cardiopulmonary findings  Patient will need an ischemic workup once recovered neurologically    Vivien Gottlieb NP  [facility]  Department of Cardiology  Date of Service: 06/17/2024        I have personally  examined the patient, I have reviewed the Nurse Practitioner's history and physical, assessment, and plan. I agree with the findings and plan.  Elevated troponin is likely secondary to demand ischemia.  Unable to obtain history from the patient as he was extubated today and lethargic.  Repeat limited echo to assess LV function  Not on antiplatelets secondary to CT head findings  Will follow     Dr. Светлана GEORGE  [facility]  Department of Cardiology  Date of Service: 06/17/2024

## 2024-06-17 NOTE — NURSING
Nurses Note -- 4 Eyes      6/17/2024   5:34 AM      Skin assessed during: Admit      [x] No Altered Skin Integrity Present    [x]Prevention Measures Documented      [] Yes- Altered Skin Integrity Present or Discovered   [] LDA Added if Not in Epic (Describe Wound)   [] New Altered Skin Integrity was Present on Admit and Documented in LDA   [] Wound Image Taken    Wound Care Consulted? No    Attending Nurse:  RADHA Hylton    Second RN/Staff Member:   RADHA Meehan

## 2024-06-17 NOTE — PLAN OF CARE
Patient found resting in bed this AM, on ventilator, sedated adequately, transported to CT scan for f/u CT of head first thing this AM, ultimately the scan showed no changes and neurosurgery signed off of case. Sedation was reduced so that patient was at RASS 0 and SBT performed, pulmonologist was informed of results of ABG on spont. And after his bedside assessment patient was extubated, patient rested in bed for the remainder of the shift and progressively improved in multiple measures including temp., fever, level of alertness. Although patient remained sleepy and somewhat lethargic his neuro status was unchanged throughout shift. Had family at bedside for most of the day and was able to interact with them while they were in room.

## 2024-06-17 NOTE — HPI
History is taken from medical records.  Patient currently intubated.  It is reported that family found the patient to be very somnolent and almost unresponsive.  EMS was alerted and gave the patient Narcan.  Apparently this helped to woken up the patient but the patient was still very drowsy.  In the ER another round of Narcan was given but the patient continued to become altered and subsequently was intubated for airway management.  Family denies any history of drug use but urinary drug screen was positive for opioids and benzodiazepine.  Patient's pupils still pinpoint.  Stable on the ventilator.  Hospitalist is asked to admit this patient for suspected drug overdose.  Family not in the room.

## 2024-06-17 NOTE — ASSESSMENT & PLAN NOTE
Patient is on mechanical ventilation, acting extubation soon.  Currently receiving spontaneous breathing trial.  Follow Pulmonary recommendations.  Supplemental oxygen was provided and noted- Vent Mode: Spont  Oxygen Concentration (%):  [40-60] 40  Resp Rate Total:  [18 br/min-42 br/min] 42 br/min  Vt Set:  [450 mL] 450 mL  PEEP/CPAP:  [5 cmH20] 5 cmH20  Pressure Support:  [5 cmH20-10 cmH20] 5 cmH20  Mean Airway Pressure:  [7.1 cmH20-15 cmH20] 7.1 cmH20    Patient has been started on intravenous ceftriaxone and azithromycin for possible pneumonitis.  Discussed with Dr. Newton.

## 2024-06-17 NOTE — ED PROVIDER NOTES
Encounter Date: 6/16/2024       History     Chief Complaint   Patient presents with    Drug Overdose     Pt arrived by ems, Pt found by family unresponsive, ems reports decreased rr, pin point pupils, 4 mg narcan given, spo2 and rr increased.      58-year-old male presents emergency department brought in by EMS in an unresponsive condition.  EMS and fire department got a phone call as patient was found to be unresponsive by mother at her home.  Patient was having a normal conversation on the phone around noon and that was the last time family talk to him.  Patient was seen by mother this morning.  Patient is still unresponsive.  Upon arrival of fire Department patient was agonal and unresponsive and had pinpoint pupils and patient received Narcan and patient did have increase in respiratory rate after that.  Patient is slightly more awake however still unresponsive with decreased level of consciousness and could not talk or follow commands.  Patient only responded to painful stimuli upon arrival.  Per mother patient had a previous history of stroke with right-sided deficit.  Patient takes aspirin daily.  Mother denied that patient takes any drugs and has no history of drug use per mother.  Patient has history of diabetes and hypertension.  Patient still unresponsive and only opens his eyes and responds to painful stimuli and grunts and given patient's mental status decided that patient would need his airway secured and to be intubated and treat with mechanical ventilation while workup is in progress      Review of patient's allergies indicates:  No Known Allergies  Past Medical History:   Diagnosis Date    COPD (chronic obstructive pulmonary disease)     Diabetes mellitus, type 2     Hypertension      Past Surgical History:   Procedure Laterality Date    DENTAL SURGERY      ESOPHAGOGASTRODUODENOSCOPY N/A 6/1/2020    Procedure: EGD (ESOPHAGOGASTRODUODENOSCOPY);  Surgeon: Terrell May MD;  Location: [facility];   Service: Endoscopy;  Laterality: N/A;    HERNIA REPAIR      left inguinal    incision and drainiage       Family History   Problem Relation Name Age of Onset    Heart attack Father  80    Heart disease Father      Drug abuse Father      Cancer Maternal Grandmother          colon     Social History     Tobacco Use    Smoking status: Every Day     Current packs/day: 2.00     Average packs/day: 2.0 packs/day for 30.0 years (60.0 ttl pk-yrs)     Types: Cigarettes    Smokeless tobacco: Never   Substance Use Topics    Alcohol use: Yes     Alcohol/week: 1.0 standard drink of alcohol     Types: 1 Shots of liquor per week     Comment: 1/2 pint 2 x per week - quit approximately 5 months ago    Drug use: No     Review of Systems   Unable to perform ROS: Acuity of condition       Physical Exam     Initial Vitals   BP Pulse Resp Temp SpO2   06/16/24 2122 06/16/24 2115 06/16/24 2115 06/16/24 2139 06/16/24 2115   (!) 213/117 110 16 96.3 °F (35.7 °C) (!) 83 %      MAP       --                Physical Exam    Nursing note and vitals reviewed.  Constitutional: He appears well-developed and well-nourished.   HENT:   Head: Normocephalic and atraumatic.   Bilateral pinpoint pupils   Eyes: Conjunctivae are normal. Right eye exhibits no discharge. Left eye exhibits no discharge.   Neck: Neck supple. No thyromegaly present. No tracheal deviation present.   Normal range of motion.  Cardiovascular:  Normal rate and regular rhythm.           Pulmonary/Chest: Breath sounds normal. No respiratory distress. He has no wheezes.   Abdominal: Abdomen is soft. There is no abdominal tenderness.   Actively vomiting as I walked into the room   Musculoskeletal:         General: No edema.      Cervical back: Normal range of motion and neck supple.     Neurological:   Unresponsive with decreased movements diffusely.  No focal deficits noted inpatient response to painful stimuli and withdraws   Skin: Skin is warm. Capillary refill takes less than 2 seconds.  No pallor.   Psychiatric:   Unresponsive         ED Course   Critical Care    Date/Time: 6/16/2024 10:15 PM    Performed by: Alison Ag MD  Authorized by: Alison Ag MD  Direct patient critical care time: 20 minutes  Ordering / reviewing critical care time: 5 minutes  Documentation critical care time: 5 minutes  Total critical care time (exclusive of procedural time) : 30 minutes      Intubation    Date/Time: 6/16/2024 10:16 PM  Location procedure was performed: [facility] EMERGENCY DEPARTMENT    Performed by: Alison Ag MD  Authorized by: Alison Ag MD  Indications: airway protection  Intubation method: video-assisted  Patient status: paralyzed (RSI)  Preoxygenation: BVM  Sedatives: etomidate  Paralytic: succinylcholine  Laryngoscope size: Glide 4  Tube size: 7.5 mm  Tube type: cuffed  Number of attempts: 1  Cricoid pressure: yes  Cords visualized: yes  Post-procedure assessment: chest rise and CO2 detector  Tube secured with: ETT alfaro  Chest x-ray interpreted by me and radiologist.  Chest x-ray findings: endotracheal tube too high  Tube repositioned: tube repositioned successfully  Patient tolerance: Patient tolerated the procedure well with no immediate complications  Complications: No        Labs Reviewed   CBC W/ AUTO DIFFERENTIAL - Abnormal; Notable for the following components:       Result Value    RBC 4.12 (*)     Hemoglobin 12.4 (*)     MCHC 30.7 (*)     Gran # (ANC) 9.0 (*)     Lymph # 0.9 (*)     Gran % 85.4 (*)     Lymph % 8.5 (*)     All other components within normal limits   COMPREHENSIVE METABOLIC PANEL - Abnormal; Notable for the following components:    CO2 37 (*)     Glucose 235 (*)     ALT 4 (*)     Anion Gap 6 (*)     All other components within normal limits   TROPONIN I HIGH SENSITIVITY - Abnormal; Notable for the following components:    Troponin I High Sensitivity 133.7 (*)     All other components within normal limits   DRUG SCREEN PANEL, URINE EMERGENCY - Abnormal; Notable for the  following components:    Benzodiazepines Presumptive Positive (*)     Opiate Scrn, Ur Presumptive Positive (*)     All other components within normal limits    Narrative:     Specimen Source->Urine   URINALYSIS, REFLEX TO URINE CULTURE - Abnormal; Notable for the following components:    Occult Blood UA 1+ (*)     All other components within normal limits    Narrative:     Specimen Source->Urine   POCT GLUCOSE - Abnormal; Notable for the following components:    POC Glucose 209 (*)     All other components within normal limits   ISTAT LACTATE - Abnormal; Notable for the following components:    POC Lactate 2.44 (*)     All other components within normal limits   ISTAT PROCEDURE - Abnormal; Notable for the following components:    POC PCO2 55.5 (*)     POC PO2 78 (*)     POC HCO3 34.8 (*)     POC BE 10 (*)     POC Glucose 206 (*)     POC Sodium 135 (*)     POC TCO2 36 (*)     POC Hematocrit 34 (*)     All other components within normal limits   CULTURE, BLOOD   CULTURE, BLOOD   MAGNESIUM   B-TYPE NATRIURETIC PEPTIDE   PROCALCITONIN   DRUG SCREEN PANEL, URINE EMERGENCY   URINALYSIS MICROSCOPIC    Narrative:     Specimen Source->Urine   TROPONIN I HIGH SENSITIVITY   HEMOGLOBIN A1C   ISTAT CREATININE   POCT GLUCOSE MONITORING CONTINUOUS   POCT LACTATE   POCT CREATININE   POCT GLUCOSE MONITORING CONTINUOUS     EKG Readings: (Independently Interpreted)   Initial Reading: No STEMI. Rhythm: Normal Sinus Rhythm. Ectopy: No Ectopy. Conduction: Normal.       Imaging Results               CT Chest Abdomen Pelvis Without Contrast (XPD) (Final result)  Result time 06/16/24 23:58:59      Final result by Archie Webber MD (06/16/24 23:58:59)                   Impression:      Multiple pulmonary nodules are noted throughout the lungs, more prominent involving the lower lobes, right greater than left, there are also mild patchy pulmonary opacities, these findings may relate to infectious/inflammatory etiology, however clinical  and historical correlation is otherwise needed.    Prominent peribronchial thickening at the lower lobes bilaterally, with areas of bronchial opacity that may relate to mucous plugging, secretions or aspiration.    Mild pericardial effusion.    Air within the urinary bladder can be seen with infection however likely relates to Ann catheter, clinical correlation otherwise needed.    Layering density of the gallbladder likely relates to cholelithiasis, there is no pericholecystic inflammatory change.    Additional findings as above.    This report was flagged in Epic as abnormal.      Electronically signed by: Archie Webber  Date:    06/16/2024  Time:    23:58               Narrative:    EXAMINATION:  CT CHEST ABDOMEN PELVIS WITHOUT CONTRAST(XPD)    CLINICAL HISTORY:  Sepsis;    TECHNIQUE:  Low dose axial images, sagittal and coronal reformations were obtained from the thoracic inlet to the pubic symphysis intravenous contrast and oral contrast was not utilized, this diminishes the sensitivity of the examination.    COMPARISON:  CT examination of the chest September 26, 2023    FINDINGS:  An ET tube is noted, the distal tip above the ignacia.  Enteric tube extends to the level of the stomach.  Previously identified 1.5 cm ground-glass opacity at the right upper lobe is significantly diminished in size with only residual remaining parenchymal change.  There are multiple small pulmonary nodules throughout the lungs bilaterally, this is more prominent involving the lower lobes, involves the right lower lobe to a greater extent, there are also mild patchy pulmonary opacities.  The appearance would suggest infectious/inflammatory etiology, clinical and historical correlation is otherwise needed.    There is prominent peribronchial thickening seen at the level of the lower lobes bilaterally as well as areas of bronchial opacity that may relate to areas of mucous plugging, secretions and or aspiration.    There is no  evidence for pneumothorax there is no evidence for pleural effusion.    Small mediastinal lymph nodes are noted.  The thoracic aorta demonstrates atherosclerotic change, coronary arterial atherosclerotic change noted.  There is mild pericardial effusion noted anteriorly.  Evaluation of the heart and great vessels is otherwise limited on this noncontrast examination.    The stomach demonstrates nonspecific appearance of fluid and air within the gastric lumen.  There is mild to moderate gallbladder distention, layering density likely represents cholelithiasis, there is no pericholecystic or peripancreatic inflammatory change and there is no evidence for abnormal pancreatic or biliary ductal dilatation.  There is no evidence for acute process of the spleen or adrenal glands.    Renal calcifications are likely vascular.  There are small calcifications along the expected course of the ureters however there is no evidence for hydroureter, or obstructive uropathy, these may represent phleboliths.  The arterial vascular structures including the aorta demonstrate atherosclerotic change, the aorta appears normal in caliber otherwise not optimally evaluated on this noncontrast examination.    There is a Ann catheter the urinary bladder, air density within the urinary bladder likely relates to the Ann catheter however can be seen with infection, clinical correlation is otherwise needed.  There is mild prominence of the prostate.    There is no evidence for small bowel obstructive process.  The appendix is identified, it does not appear inflamed.  There is no evidence for inflammatory or obstructive process of the colon.  There is mild-to-moderate prominence of the rectum with stool without inflammatory appearance.  There is no free intraperitoneal air.    The osseous structures demonstrate chronic change.                                        CT Head Without Contrast (Final result)  Result time 06/16/24 23:18:29      Final  result by Iván Nance MD (06/16/24 23:18:29)                   Impression:      Small subtle 4-mm hyperdensity along the anterior aspect of the right sylvian fissure.  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  Consider short-term repeat follow-up head CT to ensure stability.    No evidence of large vessel occlusion at the level of the [id number].    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    Additional findings similar to prior examination.    This report was flagged in Epic as abnormal.      Electronically signed by: Iván Nance MD  Date:    06/16/2024  Time:    23:18               Narrative:    EXAMINATION:  CT HEAD WITHOUT CONTRAST; CTA HEAD AND NECK (XPD)    CLINICAL HISTORY:  Mental status change, unknown cause;; Stroke/TIA, determine embolic source;    TECHNIQUE:  Non contrast low dose axial images were obtained through the head. CT angiogram was performed from the level of the ignacia to the top of the head following the IV administration of 100mL of Omnipaque 350.   Sagittal and coronal reconstructions and maximum intensity projection reconstructions were performed. Arterial stenosis percentages are based on NASCET measurement criteria.    COMPARISON:  CT head, CTA head neck 04/07/2024    FINDINGS:  Intracranial Compartment:    There is generalized cerebral volume loss with compensatory sulcal widening and ventricular enlargement.  There is patchy periventricular white matter hypoattenuation suggesting sequelae of chronic microvascular ischemic change.  There are scattered small focal hypodensities within the bilateral basal ganglia suggesting remote lacunar type infarcts.  There is a previously demonstrated remote pontine infarct.  There is a  subtle 0.4 cm hyperdensity within the region of the anterior right sylvian fissure (series 3, image 29).  This is favored to reflect previously demonstrated small arteriovenous malformation however small focus of acute parenchymal hemorrhage would be difficult to exclude.  There is no midline shift or hydrocephalus.  Basal cisterns are patent.    There are secretions within the posterior nasopharynx in this intubated patient.  No acute displaced calvarial fracture identified.    Aorta: Normal 3 vessel arch with mild atherosclerosis.    Extracranial carotid circulation: Bilateral common carotid arteries are patent with mild atherosclerosis along their course.  There is moderate atherosclerosis of the right carotid bifurcation with less than 50% stenosis, similar to prior examination.  There is mild atherosclerosis of the left carotid bifurcation without evidence of hemodynamically significant stenosis.  There is medialization of the right ICA.    Extracranial vertebral circulation: The proximal right vertebral artery is not well evaluated secondary to streak artifact from dense contrast bolus.  The visualized portion of the right vertebral artery appears patent without evidence of high-grade stenosis or lesion.  The left vertebral artery is patent without evidence high-grade stenosis or occlusion.    Intracranial Arteries: There is moderate atherosclerotic calcification involving the cavernous segments of the bilateral ICAs.  Bilateral MCAs and ACAs appear patent without evidence of large vessel occlusion or stenosis.    There is redemonstration of a small arteriovenous malformation in the region of the right sylvian fissure with feeding vessels via the right MCA, similar to prior examination.    The left vertebral artery is dominant.  The distal vertebral arteries and basilar artery appear patent.  The bilateral PCAs appear patent.  There is a left-sided posterior communicating artery present.    Venous structures  (limited evaluation): Normal.    Non-Vascular Structures of the Neck/Thoracic Inlet (limited evaluation): The bilateral parotid and submandibular glands appear within normal limits.  There is an endotracheal tube in place with tip terminating above the ignacia.  There is an esophagogastric tube present.  There are layering secretions within the posterior nasopharynx and oropharynx.  The distal trachea appears patent with layering secretions.  There is no pneumothorax or large confluent airspace consolidation throughout the visualized lungs.  There is redemonstration of partially visualized right hilar lymph node enlargement similar to prior examination.  There are emphysematous changes of lungs without large confluent airspace consolidation or pneumothorax.  There are degenerative change of the visualized cervical spine.                                        CTA Head and Neck (xpd) (Final result)  Result time 06/16/24 23:18:29      Final result by Iván Nance MD (06/16/24 23:18:29)                   Impression:      Small subtle 4-mm hyperdensity along the anterior aspect of the right sylvian fissure.  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  Consider short-term repeat follow-up head CT to ensure stability.    No evidence of large vessel occlusion at the level of the [id number].    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    Additional findings similar to prior examination.    This report was flagged in Epic as abnormal.      Electronically signed by: Iván Nance MD  Date:    06/16/2024  Time:    23:18               Narrative:    EXAMINATION:  CT HEAD WITHOUT CONTRAST; CTA HEAD AND NECK (XPD)    CLINICAL HISTORY:  Mental status change, unknown cause;;  Stroke/TIA, determine embolic source;    TECHNIQUE:  Non contrast low dose axial images were obtained through the head. CT angiogram was performed from the level of the ignacia to the top of the head following the IV administration of 100mL of Omnipaque 350.   Sagittal and coronal reconstructions and maximum intensity projection reconstructions were performed. Arterial stenosis percentages are based on NASCET measurement criteria.    COMPARISON:  CT head, CTA head neck 04/07/2024    FINDINGS:  Intracranial Compartment:    There is generalized cerebral volume loss with compensatory sulcal widening and ventricular enlargement.  There is patchy periventricular white matter hypoattenuation suggesting sequelae of chronic microvascular ischemic change.  There are scattered small focal hypodensities within the bilateral basal ganglia suggesting remote lacunar type infarcts.  There is a previously demonstrated remote pontine infarct.  There is a subtle 0.4 cm hyperdensity within the region of the anterior right sylvian fissure (series 3, image 29).  This is favored to reflect previously demonstrated small arteriovenous malformation however small focus of acute parenchymal hemorrhage would be difficult to exclude.  There is no midline shift or hydrocephalus.  Basal cisterns are patent.    There are secretions within the posterior nasopharynx in this intubated patient.  No acute displaced calvarial fracture identified.    Aorta: Normal 3 vessel arch with mild atherosclerosis.    Extracranial carotid circulation: Bilateral common carotid arteries are patent with mild atherosclerosis along their course.  There is moderate atherosclerosis of the right carotid bifurcation with less than 50% stenosis, similar to prior examination.  There is mild atherosclerosis of the left carotid bifurcation without evidence of hemodynamically significant stenosis.  There is medialization of the right ICA.    Extracranial vertebral circulation:  The proximal right vertebral artery is not well evaluated secondary to streak artifact from dense contrast bolus.  The visualized portion of the right vertebral artery appears patent without evidence of high-grade stenosis or lesion.  The left vertebral artery is patent without evidence high-grade stenosis or occlusion.    Intracranial Arteries: There is moderate atherosclerotic calcification involving the cavernous segments of the bilateral ICAs.  Bilateral MCAs and ACAs appear patent without evidence of large vessel occlusion or stenosis.    There is redemonstration of a small arteriovenous malformation in the region of the right sylvian fissure with feeding vessels via the right MCA, similar to prior examination.    The left vertebral artery is dominant.  The distal vertebral arteries and basilar artery appear patent.  The bilateral PCAs appear patent.  There is a left-sided posterior communicating artery present.    Venous structures (limited evaluation): Normal.    Non-Vascular Structures of the Neck/Thoracic Inlet (limited evaluation): The bilateral parotid and submandibular glands appear within normal limits.  There is an endotracheal tube in place with tip terminating above the ignacia.  There is an esophagogastric tube present.  There are layering secretions within the posterior nasopharynx and oropharynx.  The distal trachea appears patent with layering secretions.  There is no pneumothorax or large confluent airspace consolidation throughout the visualized lungs.  There is redemonstration of partially visualized right hilar lymph node enlargement similar to prior examination.  There are emphysematous changes of lungs without large confluent airspace consolidation or pneumothorax.  There are degenerative change of the visualized cervical spine.                                       X-Ray Chest AP Portable (Final result)  Result time 06/16/24 21:45:37      Final result by Dipak Rubin DO (06/16/24  21:45:37)                   Impression:      Endotracheal tube as above.      Electronically signed by: Dipak Rubin  Date:    06/16/2024  Time:    21:45               Narrative:    EXAMINATION:  XR CHEST AP PORTABLE    CLINICAL HISTORY:  Chest Pain;    TECHNIQUE:  Single frontal view of the chest was performed.    COMPARISON:  04/11/2024.    FINDINGS:  There is an endotracheal tube with the tip located approximately 5 cm proximal to the ignacia.  There is a nasogastric tube coursing into the abdomen.  The lungs are well expanded.  Pulmonary parenchyma is unchanged from prior.  The pleural spaces are clear.  Cardiac silhouette is unremarkable.  Osseous structures are intact.                                    X-Rays:   Independently Interpreted Readings:   Other Readings:  Chest x-ray shows ET tube slightly high-riding and repositioned     Medications   propofol (DIPRIVAN) 10 mg/mL infusion (40 mcg/kg/min × 59 kg Intravenous Rate/Dose Change 6/16/24 2702)   vancomycin - pharmacy to dose (has no administration in time range)   vancomycin 1.25 g in dextrose 5% 250 mL IVPB (ready to mix) (has no administration in time range)   sodium chloride 0.9% flush 10 mL (has no administration in time range)   melatonin tablet 6 mg (has no administration in time range)   ondansetron injection 4 mg (has no administration in time range)   acetaminophen tablet 650 mg (has no administration in time range)   aluminum-magnesium hydroxide-simethicone 200-200-20 mg/5 mL suspension 30 mL (has no administration in time range)   acetaminophen tablet 650 mg (has no administration in time range)   HYDROcodone-acetaminophen 5-325 mg per tablet 1 tablet (has no administration in time range)   naloxone 0.4 mg/mL injection 0.02 mg (has no administration in time range)   potassium bicarbonate disintegrating tablet 50 mEq (has no administration in time range)   potassium bicarbonate disintegrating tablet 35 mEq (has no administration in time range)    potassium bicarbonate disintegrating tablet 60 mEq (has no administration in time range)   magnesium oxide tablet 800 mg (has no administration in time range)   magnesium oxide tablet 800 mg (has no administration in time range)   potassium, sodium phosphates 280-160-250 mg packet 2 packet (has no administration in time range)   potassium, sodium phosphates 280-160-250 mg packet 2 packet (has no administration in time range)   potassium, sodium phosphates 280-160-250 mg packet 2 packet (has no administration in time range)   glucose chewable tablet 16 g (has no administration in time range)   glucose chewable tablet 24 g (has no administration in time range)   0.9%  NaCl infusion ( Intravenous New Bag 6/16/24 2301)   dextrose 50% injection 12.5 g (has no administration in time range)   glucagon (human recombinant) injection 1 mg (has no administration in time range)   insulin aspart U-100 pen 0-10 Units (has no administration in time range)   mupirocin 2 % ointment (has no administration in time range)   hydrALAZINE injection 10 mg (has no administration in time range)   etomidate injection 20 mg (20 mg Intravenous Given by Provider 6/16/24 2130)   succinylcholine chloride 200 mg/10 mL (20 mg/mL) IV Syringe 100 mg (100 mg Intravenous Given by Provider 6/16/24 2130)   meropenem 1 g in sodium chloride 0.9 % 100 mL IVPB (ready to mix system) (2 g Intravenous New Bag 6/16/24 2300)   iohexoL (OMNIPAQUE 350) injection 100 mL (100 mLs Intravenous Given 6/16/24 4623)     Medical Decision Making  58-year-old male presents emergency department in unresponsive state.  Patient had previous history of stroke.  Patient hypertensive.  There was suspicion for possible drug overdose.  Narcan several doses given and patient did have some improvement in patient had increased respiratory drive after that.  However patient still extremely sleepy and given decreased level of consciousness to protect airway intubated and placed on  ventilator and will continue close monitoring.  CT head and chest and abdomen and CTA for further evaluation patient's presentation.  Hospital medicine consult for evaluation for admission and further management.  Differential diagnosis includes drug overdose versus encephalopathy and other causes of unresponsive condition from stroke consider however this is more likely secondary to patient being encephalopathic from hypoxia versus drug overdose.  Patient was found in this state initially by EMS and fire department and was agonal at that time and patient did have some improvement with treatment.  Hospital medicine consult for evaluation for admission and hazards of possibility of infection as well broad-spectrum antibiotics started however unlikely to be infectious in etiology.    Amount and/or Complexity of Data Reviewed  Labs: ordered. Decision-making details documented in ED Course.  Radiology: ordered. Decision-making details documented in ED Course.  ECG/medicine tests: ordered and independent interpretation performed. Decision-making details documented in ED Course.  Discussion of management or test interpretation with external provider(s): CT scan of head did show possibility of intracranial hemorrhage.  Patient also has drug screen positive for opiates and benzodiazepines.  Hospital Medicine and Neurosurgery consulted for evaluation and further management.  Admitted to ICU.    Risk  Prescription drug management.  Decision regarding hospitalization.                                      Clinical Impression:  Final diagnoses:  [R41.89] Unresponsive (Primary)  [I62.9] Intracranial hemorrhage  [R41.82] Altered mental status, unspecified altered mental status type          ED Disposition Condition    Admit Critical                Alison Ag MD  06/16/24 2221       Alison gA MD  06/17/24 0036

## 2024-06-17 NOTE — PLAN OF CARE
[facility]  Initial Discharge Assessment     Spoke with patient's mother Mariann for initial assessment. Per Mariann (MPOA) pt lives alone but on the same property as she. Mariann reports that patient needs assistance with bathing d/t previous CVA that causes right sided weakness but he is able to do other ADLs independently. She states he does use home o2 (couldn't remember supplier) at 3L via [place], denies other DME. Verified demographics on face sheet. Mariann reports that she drives patient to appointments and will pick him up at discharge if needed. CM following for d/c needs pending clinical coarse.     Primary Care Provider: Toan Banks DO    Admission Diagnosis: Unresponsive [R41.89]    Admission Date: 6/16/2024  Expected Discharge Date:     Transition of Care Barriers: (P) None    Payor: [employer] MED[employer]ARE ADVANTAGE / Plan: [employer] MCARE ADV / Product Type: Medicare Advantage /     Extended Emergency Contact Information  Primary Emergency Contact: Mariann Chou  Address: [address] [place] [address] [id number] [place] [address]  Home Phone: [phone]  Mobile Phone: [phone]  Relation: Mother  Preferred language: English   needed? No  Secondary Emergency Contact: CATHERINE ALVARADO  Mobile Phone: [phone]  Relation: Brother  Preferred language: English   needed? No    Discharge Plan A: (P) Home with family  Discharge Plan B: (P) Home with family      [facility] - [place], [place] - [address]  [address]  [facility] [address]  Phone: [phone] Fax: [phone]      Initial Assessment (most recent)       Adult Discharge Assessment - 06/17/24 1438          Discharge Assessment    Assessment Type Discharge Planning Assessment (P)      Confirmed/corrected address, phone number and insurance Yes (P)      Confirmed Demographics Correct on Facesheet (P)      Source of Information family (P)      If unable to respond/provide information  was family/caregiver contacted? Yes (P)      Contact Name/Number Mariann Chou [phone] (P)      When was your last doctors appointment? 04/22/24 (P)      Does patient/caregiver understand observation status Yes (P)      Communicated ZACARIAS with patient/caregiver Yes (P)      Reason For Admission encephalopathy (P)      People in Home alone (P)    pt lives on his mother's property in his own small home.    Do you expect to return to your current living situation? -- (P)    TBD depending on clinical coarse.    Do you have help at home or someone to help you manage your care at home? No (P)      Prior to hospitilization cognitive status: Alert/Oriented (P)      Current cognitive status: Coma/Sedated/Intubated (P)      Walking or Climbing Stairs Difficulty no (P)      Dressing/Bathing Difficulty yes (P)      Dressing/Bathing bathing difficulty, assistance 1 person (P)      Dressing/Bathing Management pt's mother helps him with bathing. (P)      Do you have any problems with: Errands/Grocery (P)      Home Accessibility not wheelchair accessible;stairs to enter home (P)      Number of Stairs, Main Entrance four (P)      Home Layout Able to live on 1st floor (P)      Equipment Currently Used at Home oxygen (P)      Readmission within 30 days? No (P)      Patient currently being followed by outpatient case management? No (P)      Do you currently have service(s) that help you manage your care at home? No (P)      Do you take prescription medications? Yes (P)      Do you have prescription coverage? Yes (P)      Coverage [employer] advantage (P)      Do you have any problems affording any of your prescribed medications? No (P)      Is the patient taking medications as prescribed? yes (P)      Who is going to help you get home at discharge? pt's mother mariann will pick him up at discharge if he goes home. (P)      How do you get to doctors appointments? family or friend will provide (P)      Are you on dialysis? No (P)       Do you take coumadin? No (P)      Discharge Plan A Home with family (P)      Discharge Plan B Home with family (P)      DME Needed Upon Discharge  none (P)      Discharge Plan discussed with: Spouse/sig other (P)      Transition of Care Barriers None (P)

## 2024-06-17 NOTE — ASSESSMENT & PLAN NOTE
Hold antiplatelet therapy.  Neurosurgery following.  Keep head end of bed elevated at 30°.  Repeat CT head-stable  Maintain systolic blood pressure under 150 mmHg  Continue neuro checks q.1 hour.  Follow Neurology recommendations.

## 2024-06-17 NOTE — ASSESSMENT & PLAN NOTE
Suspect drug overdose.  One dose of empiric antibiotics given  Post Narcan therapy with some responsiveness.  CT of the head is negative.  Supportive care with mechanical ventilation  Will anticipate weaning the patient off of mechanical ventilation tomorrow morning.  Chest x-ray chest x-ray in the morning  ABG in the in the morning

## 2024-06-17 NOTE — RESPIRATORY THERAPY
06/16/24 2250   Patient Assessment/Suction   Respiratory Effort Unlabored   Skin Integrity   $ Wound Care Tech Time 15 min   Area Observed Upper lip;Lower lip;Corner lip   Skin Appearance without discoloration   PRE-TX-O2   Device (Oxygen Therapy) ventilator   Oxygen Concentration (%) 50        Airway - Non-Surgical 06/16/24 2125 Endotracheal Tube   Placement Date/Time: 06/16/24 2125   Present Prior to Hospital Arrival?: No  Method of Intubation: Glidescope  Inserted by: MD  Staff/Resident Name(s): virgen  Airway Device: Endotracheal Tube  Mask Ventilation: Easy with oral airway  Blade: Glide...   Secured Location Right   Secured by Commercial tube alfaro   Bite Block right;secure and patent   Status Intact;Secured;Patent   Airway Safety   Is Ambu Bag and Mask with Patient? Yes, Adult Ambu Bag and Mask   Suction set is at the bedside? Yes   Respiratory Interventions   Airway/Ventilation Management airway patency maintained   Vent Select   Conventional Vent Y   Charged w/in last 24h NO   Preset Conventional Ventilator Settings   Vent ID [id number]   Vent Type    Ventilation Type VC   Vent Mode A/C   Humidity HME   Set Rate 24 BPM   Vt Set 450 mL   PEEP/CPAP 5 cmH20   Patient Ventilator Parameters   Resp Rate Total 24 br/min   Peak Airway Pressure 37 cmH20   Mean Airway Pressure 13 cmH20   Exhaled Vt 468 mL   Total Ve 11.3 L/m   Conventional Ventilator Alarms   Alarms On Y   Ve High Alarm 23 L/min   Ve Low Alarm 2 L/min   Vt High Alarm 1200 mL   Vt Low Alarm 200 mL   Resp Rate High Alarm 40 br/min   Press High Alarm 40 cmH2O   Ready to Wean/Extubation Screen   FIO2<=50 (chart decimal) 0.5   MV<16L (chart vol.) 11.3   PEEP <=8 (chart #) 5   Education   $ Education Suction;Ventilator Oxygen;15 min

## 2024-06-17 NOTE — PHARMACY MED REC
"Admission Medication History     The home medication history was taken by Demi Farris.    You may go to "Admission" then "Reconcile Home Medications" tabs to review and/or act upon these items.     The home medication list has been updated by the Pharmacy department.   Please read ALL comments highlighted in yellow.   Please address this information as you see fit.    Feel free to contact us if you have any questions or require assistance.        Medications listed below were obtained from: Patient/family and Analytic software- [employer]  No current facility-administered medications on file prior to encounter.     Current Outpatient Medications on File Prior to Encounter   Medication Sig Dispense Refill    acetaminophen (TYLENOL) 500 mg Cap Take 500 mg by mouth 3 (three) times daily as needed for Pain.      albuterol (PROVENTIL HFA) 90 mcg/actuation inhaler Inhale 2 puffs into the lungs every 6 (six) hours as needed for Wheezing. Rescue 18 g 0    albuterol (PROVENTIL) 2.5 mg /3 mL (0.083 %) nebulizer solution Take 2.5 mg by nebulization every 6 (six) hours as needed for Wheezing. Rescue      amLODIPine (NORVASC) 10 MG tablet Take 1 tablet (10 mg total) by mouth once daily. 90 tablet 3    aspirin (ECOTRIN) 81 MG EC tablet Take 1 tablet (81 mg total) by mouth once daily. 360 tablet 0    metFORMIN (GLUCOPHAGE-XR) 500 MG ER 24hr tablet Take 1 tablet (500 mg total) by mouth daily with breakfast. 90 tablet 3    pregabalin (LYRICA) 100 MG capsule Take 1 capsule (100 mg total) by mouth 2 (two) times daily. 60 capsule 0    blood sugar diagnostic, disc Strp 1 each by Misc.(Non-Drug; Combo Route) route 4 (four) times daily. 200 strip 0    blood-glucose meter kit Use as instructed 1 each 0    lancets (LANCETS,THIN) Misc 1 each by Misc.(Non-Drug; Combo Route) route 4 (four) times daily. 200 each 0    lisinopriL 10 MG tablet Take 1 tablet (10 mg total) by mouth once daily. (Patient not taking: Reported on 6/17/2024) " 90 tablet 3    nicotine (NICODERM CQ) 21 mg/24 hr Place 1 patch onto the skin once daily. (Patient not taking: Reported on 6/17/2024) 30 patch 0       Potential issues to be addressed PRIOR TO DISCHARGE  Patient reported not taking the following medications: (Lisinopril & Nicotine Patch). These medications remain on the home medication list. Please address accordingly.     Demi Farris  EXT 1924                  .

## 2024-06-17 NOTE — SUBJECTIVE & OBJECTIVE
Past Medical History:   Diagnosis Date    COPD (chronic obstructive pulmonary disease)     Diabetes mellitus, type 2     Hypertension        Past Surgical History:   Procedure Laterality Date    DENTAL SURGERY      ESOPHAGOGASTRODUODENOSCOPY N/A 6/1/2020    Procedure: EGD (ESOPHAGOGASTRODUODENOSCOPY);  Surgeon: Terrell May MD;  Location: [facility];  Service: Endoscopy;  Laterality: N/A;    HERNIA REPAIR      left inguinal    incision and drainiage         Review of patient's allergies indicates:  No Known Allergies    No current facility-administered medications on file prior to encounter.     Current Outpatient Medications on File Prior to Encounter   Medication Sig    acetaminophen (TYLENOL) 325 MG tablet Take 650 mg by mouth 3 (three) times daily as needed for Pain.    albuterol (PROVENTIL HFA) 90 mcg/actuation inhaler Inhale 2 puffs into the lungs every 6 (six) hours as needed for Wheezing. Rescue    amLODIPine (NORVASC) 10 MG tablet Take 1 tablet (10 mg total) by mouth once daily.    aspirin (ECOTRIN) 81 MG EC tablet Take 1 tablet (81 mg total) by mouth once daily.    blood sugar diagnostic, disc Strp 1 each by Misc.(Non-Drug; Combo Route) route 4 (four) times daily.    blood-glucose meter kit Use as instructed    lancets (LANCETS,THIN) Misc 1 each by Misc.(Non-Drug; Combo Route) route 4 (four) times daily.    lisinopriL 10 MG tablet Take 1 tablet (10 mg total) by mouth once daily.    metFORMIN (GLUCOPHAGE-XR) 500 MG ER 24hr tablet Take 1 tablet (500 mg total) by mouth daily with breakfast.    nicotine (NICODERM CQ) 21 mg/24 hr Place 1 patch onto the skin once daily.    pregabalin (LYRICA) 100 MG capsule Take 1 capsule (100 mg total) by mouth 2 (two) times daily.     Family History       Problem Relation (Age of Onset)    Cancer Maternal Grandmother    Drug abuse Father    Heart attack Father (80)    Heart disease Father          Tobacco Use    Smoking status: Every Day     Current packs/day: 2.00      Average packs/day: 2.0 packs/day for 30.0 years (60.0 ttl pk-yrs)     Types: Cigarettes    Smokeless tobacco: Never   Substance and Sexual Activity    Alcohol use: Yes     Alcohol/week: 1.0 standard drink of alcohol     Types: 1 Shots of liquor per week     Comment: 1/2 pint 2 x per week - quit approximately 5 months ago    Drug use: No    Sexual activity: Yes     Partners: Female     Birth control/protection: None     Comment: No history of STDs.     Review of Systems   Reason unable to perform ROS: patient is unresponsive.     Objective:     Vital Signs (Most Recent):  Temp: 96.8 °F (36 °C) (06/16/24 2145)  Pulse: 105 (06/16/24 2205)  Resp: (!) 30 (06/16/24 2205)  BP: (!) 165/94 (06/16/24 2205)  SpO2: 100 % (06/16/24 2205) Vital Signs (24h Range):  Temp:  [96.3 °F (35.7 °C)-96.8 °F (36 °C)] 96.8 °F (36 °C)  Pulse:  [102-112] 105  Resp:  [16-30] 30  SpO2:  [83 %-100 %] 100 %  BP: (165-213)/() 165/94     Weight: 59 kg (130 lb)  Body mass index is 20.98 kg/m².     Physical Exam  Constitutional:       Appearance: Normal appearance.   HENT:      Head: Normocephalic and atraumatic.      Nose: Nose normal.   Eyes:      Pupils: Pupils are equal, round, and reactive to light.   Cardiovascular:      Rate and Rhythm: Normal rate and regular rhythm.   Pulmonary:      Effort: Pulmonary effort is normal.   Abdominal:      Palpations: Abdomen is soft.   Musculoskeletal:      Cervical back: Neck supple.   Skin:     General: Skin is warm.   Neurological:      General: No focal deficit present.      Mental Status: He is alert.   Psychiatric:         Behavior: Behavior normal.              CRANIAL NERVES     CN III, IV, VI   Pupils are equal, round, and reactive to light.       Significant Labs: All pertinent labs within the past 24 hours have been reviewed.  Recent Lab Results  (Last 5 results in the past 24 hours)        06/16/24  2231   06/16/24  2211   06/16/24 2142   06/16/24 2130 06/16/24  2114        Benzodiazepines      Presumptive Positive           Phencyclidine     Negative           Albumin       3.8         ALP       69         ALT       4         Amphetamines, Urine     Negative           Anion Gap       6         Appearance, UA     Clear           AST       11         Barbituates, Urine     Negative           Baso #       0.01         Basophil %       0.1         Bilirubin (UA)     Negative           BILIRUBIN TOTAL       0.7  Comment: For infants and newborns, interpretation of results should be based  on gestational age, weight and in agreement with clinical  observations.    Premature Infant recommended reference ranges:  Up to 24 hours.............<8.0 mg/dL  Up to 48 hours............<12.0 mg/dL  3-5 days..................<15.0 mg/dL  6-29 days.................<15.0 mg/dL           BNP       93  Comment: Values of less than 100 pg/ml are consistent with non-CHF populations.         BUN       9         Calcium       10.1         Chloride       97         CO2       37         Cocaine, Urine     Negative           Color, UA     Yellow           Creatinine       0.8         Urine Creatinine     35.4  Comment: The random urine reference ranges provided were established   for 24 hour urine collections.  No reference ranges exist for  random urine specimens.  Correlate clinically.             Differential Method       Automated         eGFR       >60.0         Eos #       0.0         Eos %       0.1         Glucose       235         Glucose, UA     Negative           Gran # (ANC)       9.0         Gran %       85.4         Hematocrit       40.4         Hemoglobin       12.4         Immature Grans (Abs)       0.03  Comment: Mild elevation in immature granulocytes is non specific and   can be seen in a variety of conditions including stress response,   acute inflammation, trauma and pregnancy. Correlation with other   laboratory and clinical findings is essential.           Immature Granulocytes       0.3         Ketones, UA      Negative           Leukocyte Esterase, UA     Negative           Lymph #       0.9         Lymph %       8.5         Magnesium        1.6         MCH       30.1         MCHC       30.7         MCV       98         Microscopic Comment     SEE COMMENT  Comment: Other formed elements not mentioned in the report are not   present in the microscopic examination.              Mono #       0.6         Mono %       5.6         MPV       10.6         NITRITE UA     Negative           nRBC       0         Blood, UA     1+           Opiates, Urine     Presumptive Positive           pH, UA     6.0           Platelet Count       193         POC Creatinine 0.7               POC Glucose         209       POC Lactate   2.44             Potassium       4.5         PROTEIN TOTAL       7.7         Protein, UA     Negative  Comment: Recommend a 24 hour urine protein or a urine   protein/creatinine ratio if globulin induced proteinuria is  clinically suspected.             RBC       4.12         RBC, UA     4           RDW       14.0         Sample VENOUS   VENOUS             Sodium       140         Spec Grav UA     1.005           Specimen UA     Urine, Clean Catch           Squam Epithel, UA     1           Marijuana (THC) Metabolite     Negative           Toxicology Information     SEE COMMENT  Comment: This screen includes the following classes of drugs at the   listed cut-off:    Benzodiazepines                  200 ng/ml  Cocaine metabolite               300 ng/ml  Opiates                          300 ng/ml  Barbiturates                     200 ng/ml  Amphetamines                    1000 ng/ml  Marijuana metabs (THC)            50 ng/ml  Phencyclidine (PCP)               25 ng/ml    High concentrations of Methylenedioxymethamphetamine (MDMA aka  Ectasy) and other structurally similar compounds may cross-   react with the Amphetamine/Methamphetamine screening   immunoassay giving a false positive result.    Note: This  exception list includes only more common   interferants in toxicology screen testing.  Because of many   cross-reactantspositive results on toxicology drug screens   should be confirmed whenever results do not correlate with   clinical presentation.    This report is intended for use in clinical monitoring and  management of patients. It is not intended for use in   employment related drug testing.    Because of any cross-reactants, positive results on toxicology  drug screens should be confirmed whenever results do not  correlate with clinical presentation.    Presumptive positive results are unconfirmed and may be used   only for medical purposes.             UROBILINOGEN UA     Negative           WBC, UA     1           WBC       10.48                                Significant Imaging: I have reviewed all pertinent imaging results/findings within the past 24 hours.

## 2024-06-17 NOTE — PLAN OF CARE
06/17/24 0741   Patient Assessment/Suction   Level of Consciousness (AVPU) responds to voice   Respiratory Effort Normal;Unlabored   Expansion/Accessory Muscles/Retractions no retractions;no use of accessory muscles   All Lung Fields Breath Sounds diminished   Rhythm/Pattern, Respiratory assisted mechanically;unlabored;pattern regular;depth regular   PRE-TX-O2   Device (Oxygen Therapy) ventilator   $ Is the patient on High Flow Oxygen? Yes   Oxygen Concentration (%) 50   Pulse Oximetry Type Continuous   $ Pulse Oximetry - Multiple Charge Pulse Oximetry - Multiple   /75        Airway - Non-Surgical 06/16/24 2125 Endotracheal Tube   Placement Date/Time: 06/16/24 2125   Present Prior to Hospital Arrival?: No  Method of Intubation: Glidescope  Inserted by: MD  Staff/Resident Name(s): virgen  Airway Device: Endotracheal Tube  Mask Ventilation: Easy with oral airway  Blade: Glide...   Secured at 25 cm   Measured At Lips   Secured Location Center   Secured by Commercial tube alfaro   Position of ETT in xRay In good position   Bite Block secure and patent   Site Condition Cool;Dry   Status Intact;Secured;Patent   Vent Select   Conventional Vent Y   Charged w/in last 24h YES   Preset Conventional Ventilator Settings   Vent ID [id number]   Vent Type    Ventilation Type VC   Vent Mode A/C   Humidity HME   Set Rate 24 BPM   Vt Set 450 mL   PEEP/CPAP 5 cmH20   Peak Flow 61 L/min   Peak End Inspiratory Pressure 16 cmH20   I-Trigger Type  V-TRIG   Trigger Sensitivity Flow/I-Trigger 3 L/min   Patient Ventilator Parameters   Resp Rate Total 24 br/min   Peak Airway Pressure 26 cmH20   Mean Airway Pressure 11 cmH20   Plateau Pressure 0 cmH20   Exhaled Vt 533 mL   Total Ve 11.2 L/m   I:E Ratio Measured 1:2.1   Auto PEEP 0 cmH20   Conventional Ventilator Alarms   Alarms On Y   Ve High Alarm 25 L/min   Ve Low Alarm 2.5 L/min   Resp Rate High Alarm 50 br/min   Apnea Rate 10   Apnea Volume (mL) 0 mL   Apnea Oxygen Concentration   100   Apnea Flow Rate (L/min) 61   T Apnea 20 sec(s)   Ready to Wean/Extubation Screen   FIO2<=50 (chart decimal) 0.5   MV<16L (chart vol.) 11.2   PEEP <=8 (chart #) 5

## 2024-06-17 NOTE — CONSULTS
"Pulmonary/Critical Care Consult      PATIENT NAME: Hernan Badillo  MRN: [id number]  TODAY'S DATE: 2024  ADMIT DATE: 2024  AGE: 58 y.o. : 1965    CONSULT REQUESTED BY: Evens Richter MD    REASON FOR CONSULT:   Ventilator management  Intubated for airway protection after suspected opiate overdose    HISTORY OF PRESENT ILLNESS   Hernan Badillo is a 58 y.o. male with a PMH of COPD, DM2, and HTN on whom we have been consulted for ventilator management.    The patient was found by family members "almost unresponsive" last night. In the ED, he had pinpoint pupils, and he did not appear to be protecting his airway, so he was intubated. He had a compensated respiratory acidosis. His drug screen was positive for opiates and benzodiazepines.     This morning, the patient is thus far doing well on SAT/SBT, and he will likely be extubated shortly.    He has previously seen Dr. Beckett oupatient after a prior admission for respiratory failure due to narcotic overdose. Dr. Beckett notes that he was on 3 L O2, Trelegy, and albuterol HFA at that time, and he was still smoking 1.5 PPD.    REVIEW OF SYSTEMS  Unable to obtain due to intubation.    ALLERGIES  Review of patient's allergies indicates:  No Known Allergies    INPATIENT SCHEDULED MEDICATIONS   mupirocin   Nasal BID      sodium chloride 0.9%   Intravenous Continuous 50 mL/hr at 24 0112 Rate Change at 24 0112    propofoL  0-50 mcg/kg/min Intravenous Continuous   Stopped at 24 1005       MEDICAL AND SURGICAL HISTORY  Past Medical History:   Diagnosis Date    COPD (chronic obstructive pulmonary disease)     Diabetes mellitus, type 2     Hypertension      Past Surgical History:   Procedure Laterality Date    DENTAL SURGERY      ESOPHAGOGASTRODUODENOSCOPY N/A 2020    Procedure: EGD (ESOPHAGOGASTRODUODENOSCOPY);  Surgeon: Terrell May MD;  Location: [facility];  Service: Endoscopy;  Laterality: N/A;    HERNIA REPAIR      left inguinal    " "incision and drainiage         ALCOHOL, TOBACCO AND DRUG USE  Social History     Tobacco Use   Smoking Status Every Day    Current packs/day: 2.00    Average packs/day: 2.0 packs/day for 30.0 years (60.0 ttl pk-yrs)    Types: Cigarettes   Smokeless Tobacco Never     Social History     Substance and Sexual Activity   Alcohol Use Yes    Alcohol/week: 1.0 standard drink of alcohol    Types: 1 Shots of liquor per week    Comment: 1/2 pint 2 x per week - quit approximately 5 months ago     Social History     Substance and Sexual Activity   Drug Use No       FAMILY HISTORY  Family History   Problem Relation Name Age of Onset    Heart attack Father  80    Heart disease Father      Drug abuse Father      Cancer Maternal Grandmother          colon       VITAL SIGNS (MOST RECENT)  Temp: 99.4 °F (37.4 °C) (06/17/24 0730)  Pulse: (!) 114 (06/17/24 1006)  Resp: (!) 33 (06/17/24 1006)  BP: (!) 177/82 (06/17/24 1000)  SpO2: (!) 94 % (06/17/24 1006)    INTAKE AND OUTPUT (LAST 24 HOURS):  Intake/Output Summary (Last 24 hours) at 6/17/2024 1020  Last data filed at 6/17/2024 0945  Gross per 24 hour   Intake --   Output 105 ml   Net -105 ml       WEIGHT  Wt Readings from Last 1 Encounters:   06/17/24 64.3 kg (141 lb 12.1 oz)       PHYSICAL EXAM  GENERAL: Uncomfortable, fidgeting in bed on ventilator off sedation. Thin.  HEENT: Extraocular movements intact. Pharynx moist.  NECK: Supple. No JVD or hepatojugular reflux.  HEART: Regular rate and normal rhythm. No murmur or gallop auscultated.  LUNGS: Clear to auscultation and percussion. Lung excursion symmetrical.  ABDOMEN: Soft, non-tender, non-distended, no masses palpated.  EXTREMITIES: Normal muscle tone and joint movement, no cyanosis or clubbing.   LYMPHATICS: No adenopathy palpated, no edema.  SKIN: Dry, intact, no lesions.   NEURO: No gross deficit. Following commands, moves all extremities.    ACUTE PHASE REACTANT (LAST 24 HOURS)  No results for input(s): "FERRITIN", "CRP", " ""LDH", "DDIMER" in the last 24 hours.    CBC LAST (LAST 24 HOURS)  Recent Labs   Lab 06/17/24 0339 06/17/24 0432   WBC 8.15  --    RBC 3.81*  --    HGB 11.5*  --    HCT 36.8* 35*   MCV 97  --    MCH 30.2  --    MCHC 31.3*  --    RDW 14.3  --      --    MPV 11.7  --    GRAN 83.8*  6.8  --    LYMPH 9.4*  0.8*  --    MONO 6.3  0.5  --    BASO 0.01  --    NRBC 0  --        CHEMISTRY LAST (LAST 24 HOURS)  Recent Labs   Lab 06/17/24 0339 06/17/24 0432 06/17/24  0945     --   --    K 4.9  --   --    CL 99  --   --    CO2 39*  --   --    ANIONGAP 4*  --   --    BUN 10  --   --    CREATININE 0.7  --   --    *  --   --    CALCIUM 9.3  --   --    PH  --    < > 7.446   MG 1.6  --   --    ALBUMIN 3.5  --   --    PROT 7.1  --   --    ALKPHOS 60  --   --    ALT 5*  --   --    AST 14  --   --    BILITOT 0.7  --   --     < > = values in this interval not displayed.       COAGULATION LAST (LAST 24 HOURS)  No results for input(s): "LABPT", "INR", "APTT" in the last 24 hours.    CARDIAC PROFILE (LAST 24 HOURS)  Recent Labs   Lab 06/16/24  2130   BNP 93       LAST 7 DAYS MICROBIOLOGY   Microbiology Results (last 7 days)       Procedure Component Value Units Date/Time    Blood culture x two cultures. Draw prior to antibiotics. [[id number]] Collected: 06/16/24 2209    Order Status: Completed Specimen: Blood Updated: 06/17/24 0517     Blood Culture, Routine No Growth to date    Narrative:      Aerobic and anaerobic    Blood culture x two cultures. Draw prior to antibiotics. [[id number]] Collected: 06/16/24 2209    Order Status: Completed Specimen: Blood Updated: 06/17/24 0517     Blood Culture, Routine No Growth to date    Narrative:      Aerobic and anaerobic    Culture, Respiratory with Gram Stain [[id number]] Collected: 06/17/24 0057    Order Status: Completed Specimen: Respiratory from Sputum, Expectorated Updated: 06/17/24 0242     Gram Stain (Respiratory) <10 epithelial cells per low power field.     Gram " Stain (Respiratory) Many WBC's     Gram Stain (Respiratory) Few Gram positive cocci     Gram Stain (Respiratory) Few Gram negative rods            MOST RECENT IMAGING  X-Ray Chest AP Portable  Narrative: EXAMINATION:  XR CHEST AP PORTABLE    CLINICAL HISTORY:  Respiratory failure; drug overdose    FINDINGS:  Portable chest radiograph at 04:20 hours compared 06/16/2024 21:27 hours shows ET and NG tubes unchanged in position, with the cardiomediastinal silhouette and pulmonary vasculature stable and within normal limits.    The lungs are well expanded, with no new pleural or parenchymal abnormality.  No pneumothorax.  Impression: No significant interval change.    Electronically signed by: Ravi Meza  Date:    06/17/2024  Time:    07:54  CT Head Without Contrast  Narrative: CMS MANDATED QUALITY DATA - CT RADIATION - 436    All CT scans at this facility utilize dose modulation, iterative reconstruction, and/or weight based dosing when appropriate to reduce radiation dose to as low as reasonably achievable.    EXAMINATION:  CT HEAD WITHOUT CONTRAST    CLINICAL HISTORY:  Stroke, follow up;Stroke, hemorrhagic;.    COMPARISON:  06/16/2024    FINDINGS:  The previously described subtle area of increased density along the anterior margin of the right sylvian fissure remains unchanged.  There are no definite findings of acute hemorrhage or infarction.  Foci of apparent remote small vessel infarction are observed within the alf, stable.    No intra-axial mass or significant mass effect is observed.  There is no midline shift.  The ventricular system is appropriate in size and position.  There are no extra-axial collections.    No acute osseous abnormalities are identified.  Impression: No significant interval change.    Electronically signed by: Mary Burks  Date:    06/17/2024  Time:    07:29  CT Chest Abdomen Pelvis Without Contrast (XPD)  Narrative: EXAMINATION:  CT CHEST ABDOMEN PELVIS WITHOUT CONTRAST(XPD)    CLINICAL  HISTORY:  Sepsis;    TECHNIQUE:  Low dose axial images, sagittal and coronal reformations were obtained from the thoracic inlet to the pubic symphysis intravenous contrast and oral contrast was not utilized, this diminishes the sensitivity of the examination.    COMPARISON:  CT examination of the chest September 26, 2023    FINDINGS:  An ET tube is noted, the distal tip above the ignacia.  Enteric tube extends to the level of the stomach.  Previously identified 1.5 cm ground-glass opacity at the right upper lobe is significantly diminished in size with only residual remaining parenchymal change.  There are multiple small pulmonary nodules throughout the lungs bilaterally, this is more prominent involving the lower lobes, involves the right lower lobe to a greater extent, there are also mild patchy pulmonary opacities.  The appearance would suggest infectious/inflammatory etiology, clinical and historical correlation is otherwise needed.    There is prominent peribronchial thickening seen at the level of the lower lobes bilaterally as well as areas of bronchial opacity that may relate to areas of mucous plugging, secretions and or aspiration.    There is no evidence for pneumothorax there is no evidence for pleural effusion.    Small mediastinal lymph nodes are noted.  The thoracic aorta demonstrates atherosclerotic change, coronary arterial atherosclerotic change noted.  There is mild pericardial effusion noted anteriorly.  Evaluation of the heart and great vessels is otherwise limited on this noncontrast examination.    The stomach demonstrates nonspecific appearance of fluid and air within the gastric lumen.  There is mild to moderate gallbladder distention, layering density likely represents cholelithiasis, there is no pericholecystic or peripancreatic inflammatory change and there is no evidence for abnormal pancreatic or biliary ductal dilatation.  There is no evidence for acute process of the spleen or adrenal  glands.    Renal calcifications are likely vascular.  There are small calcifications along the expected course of the ureters however there is no evidence for hydroureter, or obstructive uropathy, these may represent phleboliths.  The arterial vascular structures including the aorta demonstrate atherosclerotic change, the aorta appears normal in caliber otherwise not optimally evaluated on this noncontrast examination.    There is a Ann catheter the urinary bladder, air density within the urinary bladder likely relates to the Ann catheter however can be seen with infection, clinical correlation is otherwise needed.  There is mild prominence of the prostate.    There is no evidence for small bowel obstructive process.  The appendix is identified, it does not appear inflamed.  There is no evidence for inflammatory or obstructive process of the colon.  There is mild-to-moderate prominence of the rectum with stool without inflammatory appearance.  There is no free intraperitoneal air.    The osseous structures demonstrate chronic change.  Impression: Multiple pulmonary nodules are noted throughout the lungs, more prominent involving the lower lobes, right greater than left, there are also mild patchy pulmonary opacities, these findings may relate to infectious/inflammatory etiology, however clinical and historical correlation is otherwise needed.    Prominent peribronchial thickening at the lower lobes bilaterally, with areas of bronchial opacity that may relate to mucous plugging, secretions or aspiration.    Mild pericardial effusion.    Air within the urinary bladder can be seen with infection however likely relates to Ann catheter, clinical correlation otherwise needed.    Layering density of the gallbladder likely relates to cholelithiasis, there is no pericholecystic inflammatory change.    Additional findings as above.    This report was flagged in Epic as abnormal.    Electronically signed by: Archie  Lawanda  Date:    06/16/2024  Time:    23:58      CURRENT VISIT EKG  Results for orders placed or performed during the hospital encounter of 06/16/24   EKG 12-lead   Result Value Ref Range    QRS Duration 90 ms    [facility] QTC Calculation 469 ms    Narrative    Test Reason : R07.9,    Vent. Rate : 107 BPM     Atrial Rate : 107 BPM     P-R Int : 146 ms          QRS Dur : 090 ms      QT Int : 352 ms       P-R-T Axes : 069 080 023 degrees     QTc Int : 469 ms    Sinus tachycardia  T wave abnormality, consider inferior ischemia  Abnormal ECG  When compared with ECG of 08-APR-2024 09:24,  Vent. rate has increased BY  46 BPM  T wave inversion now evident in Inferior leads    Referred By: [email or web address]   SELF           Confirmed By:        ECHOCARDIOGRAM RESULTS  Results for orders placed during the hospital encounter of 04/07/24    Echo    Interpretation Summary    Left Ventricle: The left ventricle is normal in size. Moderately increased wall thickness. There is moderate concentric hypertrophy. Normal wall motion. There is low normal systolic function with a visually estimated ejection fraction of 50 - 55%. There is normal diastolic function.    Right Ventricle: Normal right ventricular cavity size. Wall thickness is normal. Right ventricle wall motion  is normal. Systolic function is normal.    IVC/SVC: Normal venous pressure at 3 mmHg.        RESPIRATORY SUPPORT  Vent Mode: Spont  Oxygen Concentration (%):  [40-60] 40  Resp Rate Total:  [18 br/min-35 br/min] 35 br/min  Vt Set:  [450 mL] 450 mL  PEEP/CPAP:  [5 cmH20] 5 cmH20  Pressure Support:  [5 cmH20-10 cmH20] 5 cmH20  Mean Airway Pressure:  [7.1 cmH20-15 cmH20] 7.1 cmH20           LAST ARTERIAL BLOOD GAS  ABG  Recent Labs   Lab 06/17/24  0945   PH 7.446   PO2 74*   PCO2 48.5*   HCO3 33.4*   BE 9*     CT CAP 6/16/24: Subtle Tree-in-bud opacities at bilateral bases, R>L that may reflect atypical infection or aspiration pneumonitis.    ASSESSMENT/PLAN:   Hernan Badillo is a 58 y.o.  male with a PMH significant for COPD, DM2, and HTN on whom we have been consulted for ventilator management.    #Acute encephalopathy likely secondary to opiate and benzodiazepine overdose  #Intubation for airway protection  #Compensated respiratory acidosis secondary to opiate overdose vs. COPD w/ chronic hypercapnia  - SAT/SBT now  - no opiates or benzos  - scheduled duonebs  - will need long-acting inhalers upon discharge, e.g. 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  - appreciate neurosurgery recs    #NSTEMI, likely demand ischemia  - trend trop    #Pain of right side, possibly neuropathic  - develop opiate-minimizing pain regimen  - needs outpatient referral to pain   - Lyrica (pregabalin) restarted    DVT prophylaxis: SCDs  Stress ulcer prophylaxis: n/a  Code status: FULL  Disposition: Admitted to ICU    Upon my evaluation, this patient had a high probability of imminent or life-threatening deterioration, which required my direct attention, intervention, and personal management.    I have personally provided at least 35 minutes of critical care time exclusive of time spent on separately billable procedures. Over 50% of the time of this encounter was spent in direct care at the bedside. Time includes review of laboratory data, radiology results, discussion with consultants, and monitoring for potential decompensation. Interventions were performed as documented above.    Bull Newton MD  [facility]  Department of Pulmonary and Critical Care Medicine  Date of Service: 06/17/2024  10:20 AM

## 2024-06-17 NOTE — PLAN OF CARE
HPI:  History is taken from medical records. Patient currently intubated. It is reported that family found the patient to be very somnolent and almost unresponsive. EMS was alerted and gave the patient Narcan. Apparently this helped to woken up the patient but the patient was still very drowsy. In the ER another round of Narcan was given but the patient continued to become altered and subsequently was intubated for airway management. Family denies any history of drug use but urinary drug screen was positive for opioids and benzodiazepine. Patient's pupils still pinpoint. Stable on the ventilator. Hospitalist is asked to admit this patient for suspected drug overdose. Family not in the room.     NSGY consulted for punctate hyperdensity in the right sylvian fissure    CT/CTA head and neck: punctate hyperdensity in the right sylvian fissure.  No AVM or aneurysm seen.  No hydrocephalus or midline shift.    Continue ICU care for suspected drug overdose  Q 1 hr neuro checks  SBP less than 150  HOB 30-40  Fu repeat CT head 6 hours from prior  Further care per ICU team

## 2024-06-17 NOTE — SUBJECTIVE & OBJECTIVE
Interval History:  Patient getting spontaneous breathing trial.  Patient's brother is present at bedside.  Patient moving all extremities.    Review of Systems   Unable to perform ROS: Intubated (patient is unresponsive)     Objective:     Vital Signs (Most Recent):  Temp: 97.2 °F (36.2 °C) (06/17/24 0430)  Pulse: 95 (06/17/24 0600)  Resp: (!) 24 (06/17/24 0600)  BP: 134/75 (06/17/24 0741)  SpO2: 96 % (06/17/24 0600) Vital Signs (24h Range):  Temp:  [96.3 °F (35.7 °C)-97.4 °F (36.3 °C)] 97.2 °F (36.2 °C)  Pulse:  [] 95  Resp:  [16-30] 24  SpO2:  [83 %-100 %] 96 %  BP: (104-213)/() 134/75     Weight: 64.3 kg (141 lb 12.1 oz)  Body mass index is 22.88 kg/m².  No intake or output data in the 24 hours ending 06/17/24 0759      Physical Exam  Constitutional:       Appearance: Normal appearance.   HENT:      Head: Normocephalic and atraumatic.      Nose: Nose normal.   Eyes:      Pupils: Pupils are equal, round, and reactive to light.   Cardiovascular:      Rate and Rhythm: Normal rate and regular rhythm.   Pulmonary:      Effort: Pulmonary effort is normal.   Abdominal:      Palpations: Abdomen is soft.   Musculoskeletal:      Cervical back: Neck supple.   Skin:     General: Skin is warm.   Neurological:      Mental Status: He is alert.      Comments: Intubated and sedated             Significant Labs: All pertinent labs within the past 24 hours have been reviewed.  CBC:   Recent Labs   Lab 06/16/24 2130 06/16/24 2304 06/17/24 0339 06/17/24 0432   WBC 10.48  --  8.15  --    HGB 12.4*  --  11.5*  --    HCT 40.4 34* 36.8* 35*     --  180  --      CMP:   Recent Labs   Lab 06/16/24 2130 06/17/24  0339    142   K 4.5 4.9   CL 97 99   CO2 37* 39*   * 146*   BUN 9 10   CREATININE 0.8 0.7   CALCIUM 10.1 9.3   PROT 7.7 7.1   ALBUMIN 3.8 3.5   BILITOT 0.7 0.7   ALKPHOS 69 60   AST 11 14   ALT 4* 5*   ANIONGAP 6* 4*     Lactic Acid:   Recent Labs   Lab 06/17/24  0355   LACTATE 1.9     Troponin:    Recent Labs   Lab 06/16/24  2130 06/17/24  0038 06/17/24  0549   TROPONINIHS 133.7* 181.4* 309.9*     TSH:   Recent Labs   Lab 04/07/24  1925   TSH 0.706     Urine Studies:   Recent Labs   Lab 06/16/24 2142   COLORU Yellow   APPEARANCEUA Clear   PHUR 6.0   SPECGRAV 1.005   PROTEINUA Negative   GLUCUA Negative   KETONESU Negative   BILIRUBINUA Negative   OCCULTUA 1+*   NITRITE Negative   UROBILINOGEN Negative   LEUKOCYTESUR Negative   RBCUA 4   WBCUA 1   SQUAMEPITHEL 1     Microbiology Results (last 7 days)       Procedure Component Value Units Date/Time    Blood culture x two cultures. Draw prior to antibiotics. [[id number]] Collected: 06/16/24 2209    Order Status: Completed Specimen: Blood Updated: 06/17/24 0517     Blood Culture, Routine No Growth to date    Narrative:      Aerobic and anaerobic    Blood culture x two cultures. Draw prior to antibiotics. [[id number]] Collected: 06/16/24 2209    Order Status: Completed Specimen: Blood Updated: 06/17/24 0517     Blood Culture, Routine No Growth to date    Narrative:      Aerobic and anaerobic    Culture, Respiratory with Gram Stain [[id number]] Collected: 06/17/24 0057    Order Status: Completed Specimen: Respiratory from Sputum, Expectorated Updated: 06/17/24 0242     Gram Stain (Respiratory) <10 epithelial cells per low power field.     Gram Stain (Respiratory) Many WBC's     Gram Stain (Respiratory) Few Gram positive cocci     Gram Stain (Respiratory) Few Gram negative rods          Significant Imaging:   ECHO (4/8/24):    Left Ventricle: The left ventricle is normal in size. Moderately increased wall thickness. There is moderate concentric hypertrophy. Normal wall motion. There is low normal systolic function with a visually estimated ejection fraction of 50 - 55%. There is normal diastolic function.    Right Ventricle: Normal right ventricular cavity size. Wall thickness is normal. Right ventricle wall motion  is normal. Systolic function is normal.     IVC/SVC: Normal venous pressure at 3 mmHg.     CXR:  There is an endotracheal tube with the tip located approximately 5 cm proximal to the ignacia. There is a nasogastric tube coursing into the abdomen. The lungs are well expanded. Pulmonary parenchyma is unchanged from prior. The pleural spaces are clear. Cardiac silhouette is unremarkable. Osseous structures are intact.     CTA Head and Neck:  Small subtle 4-mm hyperdensity along the anterior aspect of the right sylvian fissure.  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  Consider short-term repeat follow-up head CT to ensure stability.  No evidence of large vessel occlusion at the level of the [id number]-[id number].  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      CT Head:  Small subtle 4-mm hyperdensity along the anterior aspect of the right sylvian fissure.  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  Consider short-term repeat follow-up head CT to ensure stability.  No evidence of large vessel occlusion at the level of the [id number]-[id number].  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    CT Chest abdomen and pelvis without contrast:  Multiple pulmonary nodules are noted throughout the lungs, more prominent involving the lower lobes, right greater than left, there are also mild patchy pulmonary opacities, these findings may relate to infectious/inflammatory etiology, however clinical and historical correlation is otherwise needed.  Prominent peribronchial thickening at the lower lobes  bilaterally, with areas of bronchial opacity that may relate to mucous plugging, secretions or aspiration.  Mild pericardial effusion.  Air within the urinary bladder can be seen with infection however likely relates to Ann catheter, clinical correlation otherwise needed.  Layering density of the gallbladder likely relates to cholelithiasis, there is no pericholecystic inflammatory change.    CXR: No significant interval change.     Repeat CT Head: No significant interval change.

## 2024-06-17 NOTE — ASSESSMENT & PLAN NOTE
Tele monitoring.  Obtain 2D echocardiogram.  EKG reviewed.  Consult cardiologist.  Unable to initiate antiplatelet therapy or anticoagulation therapy due to cerebral bleeding.  Trend serum troponins.

## 2024-06-17 NOTE — PLAN OF CARE
Repeat CT head shows less conspicuous appearance of prior seen punctate hyperdensity along right anterior sylvian fissure.  No definitive hemorrhage.  NSGY will sign off.  Further care per ICU team.

## 2024-06-17 NOTE — ASSESSMENT & PLAN NOTE
Suspect drug overdose.  One dose of empiric antibiotics given  Post Narcan therapy with some responsiveness.  CT of the head is negative.  Supportive care with mechanical ventilation  Hopefully patient will be extubated today.  Chest x-ray chest x-ray in the morning  Continue neuro checks.

## 2024-06-18 ENCOUNTER — CLINICAL SUPPORT (OUTPATIENT)
Dept: CARDIOLOGY | Facility: HOSPITAL | Age: 59
End: 2024-06-18
Attending: EMERGENCY MEDICINE
Payer: MEDICARE

## 2024-06-18 VITALS — WEIGHT: 141.75 LBS | BODY MASS INDEX: 22.78 KG/M2 | HEIGHT: 66 IN

## 2024-06-18 LAB
ALBUMIN SERPL BCP-MCNC: 2.9 G/DL (ref 3.5–5.2)
ALP SERPL-CCNC: 47 U/L (ref 55–135)
ALT SERPL W/O P-5'-P-CCNC: <3 U/L (ref 10–44)
ANION GAP SERPL CALC-SCNC: 4 MMOL/L (ref 8–16)
AST SERPL-CCNC: 9 U/L (ref 10–40)
BASOPHILS # BLD AUTO: 0.02 K/UL (ref 0–0.2)
BASOPHILS NFR BLD: 0.2 % (ref 0–1.9)
BILIRUB SERPL-MCNC: 0.7 MG/DL (ref 0.1–1)
BUN SERPL-MCNC: 10 MG/DL (ref 6–20)
CALCIUM SERPL-MCNC: 8.4 MG/DL (ref 8.7–10.5)
CHLORIDE SERPL-SCNC: 103 MMOL/L (ref 95–110)
CO2 SERPL-SCNC: 33 MMOL/L (ref 23–29)
CREAT SERPL-MCNC: 0.6 MG/DL (ref 0.5–1.4)
DIFFERENTIAL METHOD BLD: ABNORMAL
EOSINOPHIL # BLD AUTO: 0 K/UL (ref 0–0.5)
EOSINOPHIL NFR BLD: 0.1 % (ref 0–8)
ERYTHROCYTE [DISTWIDTH] IN BLOOD BY AUTOMATED COUNT: 14.5 % (ref 11.5–14.5)
EST. GFR  (NO RACE VARIABLE): >60 ML/MIN/1.73 M^2
GLUCOSE SERPL-MCNC: 105 MG/DL (ref 70–110)
GLUCOSE SERPL-MCNC: 138 MG/DL (ref 70–110)
HCT VFR BLD AUTO: 31.5 % (ref 40–54)
HGB BLD-MCNC: 10 G/DL (ref 14–18)
IMM GRANULOCYTES # BLD AUTO: 0.08 K/UL (ref 0–0.04)
IMM GRANULOCYTES NFR BLD AUTO: 0.6 % (ref 0–0.5)
LYMPHOCYTES # BLD AUTO: 0.9 K/UL (ref 1–4.8)
LYMPHOCYTES NFR BLD: 6.8 % (ref 18–48)
MAGNESIUM SERPL-MCNC: 1.7 MG/DL (ref 1.6–2.6)
MCH RBC QN AUTO: 30.3 PG (ref 27–31)
MCHC RBC AUTO-ENTMCNC: 31.7 G/DL (ref 32–36)
MCV RBC AUTO: 96 FL (ref 82–98)
MONOCYTES # BLD AUTO: 1 K/UL (ref 0.3–1)
MONOCYTES NFR BLD: 7.1 % (ref 4–15)
NEUTROPHILS # BLD AUTO: 11.4 K/UL (ref 1.8–7.7)
NEUTROPHILS NFR BLD: 85.2 % (ref 38–73)
NRBC BLD-RTO: 0 /100 WBC
PLATELET # BLD AUTO: 168 K/UL (ref 150–450)
PMV BLD AUTO: 10.7 FL (ref 9.2–12.9)
POTASSIUM SERPL-SCNC: 3.6 MMOL/L (ref 3.5–5.1)
PROT SERPL-MCNC: 5.7 G/DL (ref 6–8.4)
RBC # BLD AUTO: 3.3 M/UL (ref 4.6–6.2)
SODIUM SERPL-SCNC: 140 MMOL/L (ref 136–145)
TROPONIN I SERPL HS-MCNC: 119.4 PG/ML (ref 0–14.9)
TROPONIN I SERPL HS-MCNC: 143.9 PG/ML (ref 0–14.9)
WBC # BLD AUTO: 13.33 K/UL (ref 3.9–12.7)

## 2024-06-18 PROCEDURE — 93308 TTE F-UP OR LMTD: CPT | Mod: 26,,, | Performed by: INTERNAL MEDICINE

## 2024-06-18 PROCEDURE — 94761 N-INVAS EAR/PLS OXIMETRY MLT: CPT

## 2024-06-18 PROCEDURE — 85025 COMPLETE CBC W/AUTO DIFF WBC: CPT | Performed by: HOSPITALIST

## 2024-06-18 PROCEDURE — 63600175 PHARM REV CODE 636 W HCPCS: Performed by: INTERNAL MEDICINE

## 2024-06-18 PROCEDURE — 25000242 PHARM REV CODE 250 ALT 637 W/ HCPCS: Performed by: INTERNAL MEDICINE

## 2024-06-18 PROCEDURE — 84484 ASSAY OF TROPONIN QUANT: CPT | Performed by: HOSPITALIST

## 2024-06-18 PROCEDURE — 25000003 PHARM REV CODE 250: Performed by: HOSPITALIST

## 2024-06-18 PROCEDURE — 94640 AIRWAY INHALATION TREATMENT: CPT

## 2024-06-18 PROCEDURE — 93308 TTE F-UP OR LMTD: CPT

## 2024-06-18 PROCEDURE — 97530 THERAPEUTIC ACTIVITIES: CPT

## 2024-06-18 PROCEDURE — 84484 ASSAY OF TROPONIN QUANT: CPT | Mod: 91 | Performed by: HOSPITALIST

## 2024-06-18 PROCEDURE — 99900031 HC PATIENT EDUCATION (STAT)

## 2024-06-18 PROCEDURE — 27000221 HC OXYGEN, UP TO 24 HOURS

## 2024-06-18 PROCEDURE — 80053 COMPREHEN METABOLIC PANEL: CPT | Performed by: HOSPITALIST

## 2024-06-18 PROCEDURE — 83735 ASSAY OF MAGNESIUM: CPT | Performed by: HOSPITALIST

## 2024-06-18 PROCEDURE — 36415 COLL VENOUS BLD VENIPUNCTURE: CPT | Performed by: HOSPITALIST

## 2024-06-18 PROCEDURE — 20000000 HC ICU ROOM

## 2024-06-18 PROCEDURE — 97162 PT EVAL MOD COMPLEX 30 MIN: CPT

## 2024-06-18 PROCEDURE — 25000003 PHARM REV CODE 250: Performed by: INTERNAL MEDICINE

## 2024-06-18 PROCEDURE — 99233 SBSQ HOSP IP/OBS HIGH 50: CPT | Mod: ,,, | Performed by: INTERNAL MEDICINE

## 2024-06-18 RX ORDER — MAGNESIUM SULFATE HEPTAHYDRATE 40 MG/ML
4 INJECTION, SOLUTION INTRAVENOUS
Status: DISCONTINUED | OUTPATIENT
Start: 2024-06-18 | End: 2024-06-20 | Stop reason: HOSPADM

## 2024-06-18 RX ORDER — CALCIUM GLUCONATE 20 MG/ML
1 INJECTION, SOLUTION INTRAVENOUS
Status: DISCONTINUED | OUTPATIENT
Start: 2024-06-18 | End: 2024-06-20 | Stop reason: HOSPADM

## 2024-06-18 RX ORDER — POTASSIUM CHLORIDE 7.45 MG/ML
60 INJECTION INTRAVENOUS
Status: DISCONTINUED | OUTPATIENT
Start: 2024-06-18 | End: 2024-06-20 | Stop reason: HOSPADM

## 2024-06-18 RX ORDER — POTASSIUM CHLORIDE 7.45 MG/ML
40 INJECTION INTRAVENOUS
Status: DISCONTINUED | OUTPATIENT
Start: 2024-06-18 | End: 2024-06-20 | Stop reason: HOSPADM

## 2024-06-18 RX ORDER — MAGNESIUM SULFATE HEPTAHYDRATE 40 MG/ML
2 INJECTION, SOLUTION INTRAVENOUS
Status: DISCONTINUED | OUTPATIENT
Start: 2024-06-18 | End: 2024-06-20 | Stop reason: HOSPADM

## 2024-06-18 RX ORDER — CALCIUM GLUCONATE 20 MG/ML
3 INJECTION, SOLUTION INTRAVENOUS
Status: DISCONTINUED | OUTPATIENT
Start: 2024-06-18 | End: 2024-06-20 | Stop reason: HOSPADM

## 2024-06-18 RX ORDER — POTASSIUM CHLORIDE 7.45 MG/ML
80 INJECTION INTRAVENOUS
Status: DISCONTINUED | OUTPATIENT
Start: 2024-06-18 | End: 2024-06-20 | Stop reason: HOSPADM

## 2024-06-18 RX ORDER — CALCIUM GLUCONATE 20 MG/ML
2 INJECTION, SOLUTION INTRAVENOUS
Status: DISCONTINUED | OUTPATIENT
Start: 2024-06-18 | End: 2024-06-20 | Stop reason: HOSPADM

## 2024-06-18 RX ORDER — LEVOFLOXACIN 750 MG/1
750 TABLET ORAL DAILY
Status: DISCONTINUED | OUTPATIENT
Start: 2024-06-18 | End: 2024-06-20 | Stop reason: HOSPADM

## 2024-06-18 RX ADMIN — IPRATROPIUM BROMIDE AND ALBUTEROL SULFATE 3 ML: .5; 3 SOLUTION RESPIRATORY (INHALATION) at 01:06

## 2024-06-18 RX ADMIN — IPRATROPIUM BROMIDE AND ALBUTEROL SULFATE 3 ML: .5; 3 SOLUTION RESPIRATORY (INHALATION) at 07:06

## 2024-06-18 RX ADMIN — SODIUM CHLORIDE: 9 INJECTION, SOLUTION INTRAVENOUS at 08:06

## 2024-06-18 RX ADMIN — LEVOFLOXACIN 750 MG: 750 TABLET, FILM COATED ORAL at 11:06

## 2024-06-18 RX ADMIN — HYDROCODONE BITARTRATE AND ACETAMINOPHEN 1 TABLET: 5; 325 TABLET ORAL at 03:06

## 2024-06-18 RX ADMIN — POTASSIUM CHLORIDE 10 MEQ: 7.46 INJECTION, SOLUTION INTRAVENOUS at 07:06

## 2024-06-18 RX ADMIN — SODIUM CHLORIDE: 9 INJECTION, SOLUTION INTRAVENOUS at 10:06

## 2024-06-18 RX ADMIN — PREGABALIN 100 MG: 75 CAPSULE ORAL at 08:06

## 2024-06-18 RX ADMIN — ACETAMINOPHEN 650 MG: 325 TABLET ORAL at 02:06

## 2024-06-18 RX ADMIN — POTASSIUM CHLORIDE 10 MEQ: 7.46 INJECTION, SOLUTION INTRAVENOUS at 04:06

## 2024-06-18 RX ADMIN — PREGABALIN 100 MG: 75 CAPSULE ORAL at 09:06

## 2024-06-18 RX ADMIN — MUPIROCIN 1 G: 20 OINTMENT TOPICAL at 08:06

## 2024-06-18 RX ADMIN — POTASSIUM CHLORIDE 10 MEQ: 7.46 INJECTION, SOLUTION INTRAVENOUS at 08:06

## 2024-06-18 RX ADMIN — POTASSIUM CHLORIDE 10 MEQ: 7.46 INJECTION, SOLUTION INTRAVENOUS at 05:06

## 2024-06-18 RX ADMIN — HYDROCODONE BITARTRATE AND ACETAMINOPHEN 1 TABLET: 5; 325 TABLET ORAL at 09:06

## 2024-06-18 NOTE — CARE UPDATE
06/17/24 1952   Patient Assessment/Suction   Level of Consciousness (AVPU) alert   Respiratory Effort Normal;Unlabored   Expansion/Accessory Muscles/Retractions no use of accessory muscles   All Lung Fields Breath Sounds rhonchi   Cough Frequency with stimulation   Cough Type weak   Suction Method oral   Secretions Amount small   Secretions Color white   PRE-TX-O2   Device (Oxygen Therapy) high flow nasal cannula   $ Is the patient on Low Flow Oxygen? Yes   Flow (L/min) (Oxygen Therapy) 4   SpO2 (!) 94 %   Pulse Oximetry Type Continuous   $ Pulse Oximetry - Multiple Charge Pulse Oximetry - Multiple   Pulse 99   Resp 20   Positioning Right side;HOB elevated 30 degrees   Positioning   Head of Bed (HOB) Positioning HOB at 30 degrees   Positioning/Transfer Devices pillows;in use   Aerosol Therapy   $ Aerosol Therapy Charges Aerosol Treatment   Daily Review of Necessity (SVN) completed   Respiratory Treatment Status (SVN) given   Treatment Route (SVN) mask;oxygen   Patient Position HOB elevated   Post Treatment Assessment (SVN) increased aeration   Signs of Intolerance (SVN) none   Education   $ Education Bronchodilator;Other (see comment)

## 2024-06-18 NOTE — SUBJECTIVE & OBJECTIVE
Interval History:  Patient successfully extubated. Patient's mother is present at bedside.  Patient denies any focal subjective complaint.    Review of Systems   Unable to perform ROS: Intubated (patient is unresponsive)     Objective:     Vital Signs (Most Recent):  Temp: 98.3 °F (36.8 °C) (06/18/24 0715)  Pulse: 86 (06/18/24 0800)  Resp: (!) 49 (06/18/24 0800)  BP: (!) 140/62 (06/18/24 0800)  SpO2: 99 % (06/18/24 0800) Vital Signs (24h Range):  Temp:  [98.1 °F (36.7 °C)-101.8 °F (38.8 °C)] 98.3 °F (36.8 °C)  Pulse:  [] 86  Resp:  [16-52] 49  SpO2:  [91 %-100 %] 99 %  BP: (109-177)/(58-89) 140/62     Weight: 64.3 kg (141 lb 12.1 oz)  Body mass index is 22.88 kg/m².    Intake/Output Summary (Last 24 hours) at 6/18/2024 0856  Last data filed at 6/18/2024 0457  Gross per 24 hour   Intake 2787.3 ml   Output 725 ml   Net 2062.3 ml         Physical Exam  Constitutional:       Appearance: Normal appearance.   HENT:      Head: Normocephalic and atraumatic.      Nose: Nose normal.   Eyes:      Pupils: Pupils are equal, round, and reactive to light.   Cardiovascular:      Rate and Rhythm: Normal rate and regular rhythm.   Pulmonary:      Effort: Pulmonary effort is normal.   Abdominal:      Palpations: Abdomen is soft.   Musculoskeletal:      Cervical back: Neck supple.   Skin:     General: Skin is warm.   Neurological:      Mental Status: He is alert and oriented to person, place, and time.             Significant Labs: All pertinent labs within the past 24 hours have been reviewed.  CBC:   Recent Labs   Lab 06/16/24  2130 06/16/24  2304 06/17/24  0339 06/17/24  0432 06/18/24  0324   WBC 10.48  --  8.15  --  13.33*   HGB 12.4*  --  11.5*  --  10.0*   HCT 40.4   < > 36.8* 35* 31.5*     --  180  --  168    < > = values in this interval not displayed.     CMP:   Recent Labs   Lab 06/16/24  2130 06/17/24  0339 06/18/24  0324    142 140   K 4.5 4.9 3.6   CL 97 99 103   CO2 37* 39* 33*   * 146* 105   BUN  9 10 10   CREATININE 0.8 0.7 0.6   CALCIUM 10.1 9.3 8.4*   PROT 7.7 7.1 5.7*   ALBUMIN 3.8 3.5 2.9*   BILITOT 0.7 0.7 0.7   ALKPHOS 69 60 47*   AST 11 14 9*   ALT 4* 5* <3*   ANIONGAP 6* 4* 4*     Lactic Acid:   Recent Labs   Lab 06/17/24  0739 06/17/24  1825 06/17/24  2233   LACTATE 2.0* 2.2* 1.5     Troponin:   Recent Labs   Lab 06/17/24  1549 06/17/24  2231 06/18/24  0324   TROPONINIHS 179.4*  179.4* 152.8* 143.9*     TSH:   Recent Labs   Lab 04/07/24  1925   TSH 0.706     Urine Studies:   Recent Labs   Lab 06/16/24  2142 06/17/24  1422   COLORU Yellow Yellow   APPEARANCEUA Clear Clear   PHUR 6.0 7.0   SPECGRAV 1.005 >1.030*   PROTEINUA Negative 1+*   GLUCUA Negative Negative   KETONESU Negative 2+*   BILIRUBINUA Negative Negative   OCCULTUA 1+* 3+*   NITRITE Negative Negative   UROBILINOGEN Negative 4.0-6.0*   LEUKOCYTESUR Negative Trace*   RBCUA 4 >100*   WBCUA 1 14*   BACTERIA  --  Occasional   SQUAMEPITHEL 1  --    HYALINECASTS  --  0     Microbiology Results (last 7 days)       Procedure Component Value Units Date/Time    Culture, Respiratory with Gram Stain [[id number]]  (Abnormal) Collected: 06/17/24 0057    Order Status: Completed Specimen: Respiratory from Sputum, Expectorated Updated: 06/18/24 0746     Respiratory Culture PRESUMPTIVE PSEUDOMONAS SPECIES  Moderate  Identification and susceptibility pending       Gram Stain (Respiratory) <10 epithelial cells per low power field.     Gram Stain (Respiratory) Many WBC's     Gram Stain (Respiratory) Few Gram positive cocci     Gram Stain (Respiratory) Few Gram negative rods    Urine culture [[id number]] Collected: 06/17/24 1422    Order Status: Completed Specimen: Urine Updated: 06/18/24 0718     Urine Culture, Routine No growth to date    Narrative:      Specimen Source->Urine    Blood culture [[id number]] Collected: 06/17/24 1550    Order Status: Completed Specimen: Blood Updated: 06/17/24 2317     Blood Culture, Routine No Growth to date    Blood culture  [[id number]] Collected: 06/17/24 1550    Order Status: Completed Specimen: Blood Updated: 06/17/24 2317     Blood Culture, Routine No Growth to date    Blood culture x two cultures. Draw prior to antibiotics. [[id number]] Collected: 06/16/24 2209    Order Status: Completed Specimen: Blood Updated: 06/17/24 2232     Blood Culture, Routine No Growth to date      No Growth to date    Narrative:      Aerobic and anaerobic    Blood culture x two cultures. Draw prior to antibiotics. [[id number]] Collected: 06/16/24 2209    Order Status: Completed Specimen: Blood Updated: 06/17/24 2232     Blood Culture, Routine No Growth to date      No Growth to date    Narrative:      Aerobic and anaerobic          Significant Imaging:   ECHO (4/8/24):    Left Ventricle: The left ventricle is normal in size. Moderately increased wall thickness. There is moderate concentric hypertrophy. Normal wall motion. There is low normal systolic function with a visually estimated ejection fraction of 50 - 55%. There is normal diastolic function.    Right Ventricle: Normal right ventricular cavity size. Wall thickness is normal. Right ventricle wall motion  is normal. Systolic function is normal.    IVC/SVC: Normal venous pressure at 3 mmHg.     CXR:  There is an endotracheal tube with the tip located approximately 5 cm proximal to the ignacia. There is a nasogastric tube coursing into the abdomen. The lungs are well expanded. Pulmonary parenchyma is unchanged from prior. The pleural spaces are clear. Cardiac silhouette is unremarkable. Osseous structures are intact.     CTA Head and Neck:  Small subtle 4-mm hyperdensity along the anterior aspect of the right sylvian fissure.  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  Consider short-term repeat follow-up head CT to ensure stability.  No  evidence of large vessel occlusion at the level of the [id number].  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      CT Head:  Small subtle 4-mm hyperdensity along the anterior aspect of the right sylvian fissure.  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  Consider short-term repeat follow-up head CT to ensure stability.  No evidence of large vessel occlusion at the level of the [id number].  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    CT Chest abdomen and pelvis without contrast:  Multiple pulmonary nodules are noted throughout the lungs, more prominent involving the lower lobes, right greater than left, there are also mild patchy pulmonary opacities, these findings may relate to infectious/inflammatory etiology, however clinical and historical correlation is otherwise needed.  Prominent peribronchial thickening at the lower lobes bilaterally, with areas of bronchial opacity that may relate to mucous plugging, secretions or aspiration.  Mild pericardial effusion.  Air within the urinary bladder can be seen with infection however likely relates to Ann catheter, clinical correlation otherwise needed.  Layering density of the gallbladder likely relates to cholelithiasis, there is no pericholecystic inflammatory change.    CXR: No significant interval change.     Repeat CT Head: No significant interval change.

## 2024-06-18 NOTE — NURSING
VSS throughout shift. Worked with PT/OT. Diet started and pt tolerating well. Oxygen weaned as tolerated

## 2024-06-18 NOTE — PLAN OF CARE
06/18/24 0744   Patient Assessment/Suction   Level of Consciousness (AVPU) responds to voice   Respiratory Effort Normal;Unlabored   Expansion/Accessory Muscles/Retractions no use of accessory muscles;no retractions   All Lung Fields Breath Sounds coarse   Rhythm/Pattern, Respiratory unlabored;pattern regular;depth regular   PRE-TX-O2   Device (Oxygen Therapy) high flow nasal cannula   $ Is the patient on Low Flow Oxygen? Yes   Flow (L/min) (Oxygen Therapy) 4   SpO2 100 %   Pulse Oximetry Type Continuous   $ Pulse Oximetry - Multiple Charge Pulse Oximetry - Multiple   Pulse 86   Resp (!) 28   Aerosol Therapy   $ Aerosol Therapy Charges Aerosol Treatment   Daily Review of Necessity (SVN) completed   Respiratory Treatment Status (SVN) given   Treatment Route (SVN) mask;oxygen   Patient Position semi-Garcia's   Post Treatment Assessment (SVN) breath sounds unchanged   Signs of Intolerance (SVN) none   Education   $ Education Bronchodilator;15 min

## 2024-06-18 NOTE — PROGRESS NOTES
"Pulmonary/Critical Care Consult      PATIENT NAME: Hernan Badillo  MRN: [id number]  TODAY'S DATE: 2024  ADMIT DATE: 2024  AGE: 58 y.o. : 1965    CONSULT REQUESTED BY: Evens Richter MD    REASON FOR CONSULT:   Ventilator management  Intubated for airway protection after suspected opiate overdose    HISTORY OF PRESENT ILLNESS   Hernan Badillo is a 58 y.o. male with a PMH of COPD, DM2, and HTN on whom we have been consulted for ventilator management.    The patient was found by family members "almost unresponsive" last night. In the ED, he had pinpoint pupils, and he did not appear to be protecting his airway, so he was intubated. He had a compensated respiratory acidosis. His drug screen was positive for opiates and benzodiazepines.     This morning, the patient is thus far doing well on SAT/SBT, and he will likely be extubated shortly.    He has previously seen Dr. Beckett oupatient after a prior admission for respiratory failure due to narcotic overdose. Dr. Beckett notes that he was on 3 L O2, Trelegy, and albuterol HFA at that time, and he was still smoking 1.5 PPD.    24: Awake and alert. Saturating well on 2 L NC.    REVIEW OF SYSTEMS  Feeling better.    ALLERGIES  Review of patient's allergies indicates:  No Known Allergies    INPATIENT SCHEDULED MEDICATIONS   albuterol-ipratropium  3 mL Nebulization Q6H WAKE    azithromycin  500 mg Intravenous Q24H    cefTRIAXone (Rocephin) IV (PEDS and ADULTS)  1 g Intravenous Q24H    LIDOcaine (PF) 20 mg/ml (2%)  10 mL Nebulization Once    mupirocin   Nasal BID    pregabalin  100 mg Oral BID      sodium chloride 0.9%   Intravenous Continuous 100 mL/hr at 24 1009 New Bag at 24 1009       MEDICAL AND SURGICAL HISTORY  Past Medical History:   Diagnosis Date    COPD (chronic obstructive pulmonary disease)     Diabetes mellitus, type 2     Hypertension      Past Surgical History:   Procedure Laterality Date    DENTAL SURGERY      " ESOPHAGOGASTRODUODENOSCOPY N/A 6/1/2020    Procedure: EGD (ESOPHAGOGASTRODUODENOSCOPY);  Surgeon: Terrell May MD;  Location: [facility];  Service: Endoscopy;  Laterality: N/A;    HERNIA REPAIR      left inguinal    incision and drainiage         ALCOHOL, TOBACCO AND DRUG USE  Social History     Tobacco Use   Smoking Status Every Day    Current packs/day: 2.00    Average packs/day: 2.0 packs/day for 30.0 years (60.0 ttl pk-yrs)    Types: Cigarettes   Smokeless Tobacco Never     Social History     Substance and Sexual Activity   Alcohol Use Yes    Alcohol/week: 1.0 standard drink of alcohol    Types: 1 Shots of liquor per week    Comment: 1/2 pint 2 x per week - quit approximately 5 months ago     Social History     Substance and Sexual Activity   Drug Use No       FAMILY HISTORY  Family History   Problem Relation Name Age of Onset    Heart attack Father  80    Heart disease Father      Drug abuse Father      Cancer Maternal Grandmother          colon       VITAL SIGNS (MOST RECENT)  Temp: 98.3 °F (36.8 °C) (06/18/24 0715)  Pulse: 88 (06/18/24 1000)  Resp: (!) 24 (06/18/24 0900)  BP: 123/60 (06/18/24 1000)  SpO2: (!) 92 % (06/18/24 1000)    INTAKE AND OUTPUT (LAST 24 HOURS):  Intake/Output Summary (Last 24 hours) at 6/18/2024 1015  Last data filed at 6/18/2024 1007  Gross per 24 hour   Intake 3565.41 ml   Output 805 ml   Net 2760.41 ml       WEIGHT  Wt Readings from Last 1 Encounters:   06/17/24 64.3 kg (141 lb 12.1 oz)       PHYSICAL EXAM  GENERAL: NAD.  HEENT: Extraocular movements intact. Pharynx moist.  NECK: Supple. No JVD or hepatojugular reflux.  HEART: Regular rate and normal rhythm. No murmur or gallop auscultated.  LUNGS: Clear to auscultation and percussion. Lung excursion symmetrical.  ABDOMEN: Soft, non-tender, non-distended, no masses palpated.  EXTREMITIES: Normal muscle tone and joint movement, no cyanosis or clubbing.   LYMPHATICS: No adenopathy palpated, no edema.  SKIN: Dry, intact, no  "lesions.   NEURO: No gross deficit.     ACUTE PHASE REACTANT (LAST 24 HOURS)  No results for input(s): "FERRITIN", "CRP", "LDH", "DDIMER" in the last 24 hours.    CBC LAST (LAST 24 HOURS)  Recent Labs   Lab 06/18/24  0324   WBC 13.33*   RBC 3.30*   HGB 10.0*   HCT 31.5*   MCV 96   MCH 30.3   MCHC 31.7*   RDW 14.5      MPV 10.7   GRAN 85.2*  11.4*   LYMPH 6.8*  0.9*   MONO 7.1  1.0   BASO 0.02   NRBC 0       CHEMISTRY LAST (LAST 24 HOURS)  Recent Labs   Lab 06/18/24  0324      K 3.6      CO2 33*   ANIONGAP 4*   BUN 10   CREATININE 0.6      CALCIUM 8.4*   MG 1.7   ALBUMIN 2.9*   PROT 5.7*   ALKPHOS 47*   ALT <3*   AST 9*   BILITOT 0.7       COAGULATION LAST (LAST 24 HOURS)  No results for input(s): "LABPT", "INR", "APTT" in the last 24 hours.    CARDIAC PROFILE (LAST 24 HOURS)  Recent Labs   Lab 06/16/24  2130   BNP 93       LAST 7 DAYS MICROBIOLOGY   Microbiology Results (last 7 days)       Procedure Component Value Units Date/Time    Culture, Respiratory with Gram Stain [[id number]]  (Abnormal) Collected: 06/17/24 0057    Order Status: Completed Specimen: Respiratory from Sputum, Expectorated Updated: 06/18/24 0746     Respiratory Culture PRESUMPTIVE PSEUDOMONAS SPECIES  Moderate  Identification and susceptibility pending       Gram Stain (Respiratory) <10 epithelial cells per low power field.     Gram Stain (Respiratory) Many WBC's     Gram Stain (Respiratory) Few Gram positive cocci     Gram Stain (Respiratory) Few Gram negative rods    Urine culture [[id number]] Collected: 06/17/24 1422    Order Status: Completed Specimen: Urine Updated: 06/18/24 0718     Urine Culture, Routine No growth to date    Narrative:      Specimen Source->Urine    Blood culture [[id number]] Collected: 06/17/24 1550    Order Status: Completed Specimen: Blood Updated: 06/17/24 2317     Blood Culture, Routine No Growth to date    Blood culture [[id number]] Collected: 06/17/24 1550    Order Status: Completed " Specimen: Blood Updated: 06/17/24 2317     Blood Culture, Routine No Growth to date    Blood culture x two cultures. Draw prior to antibiotics. [[id number]] Collected: 06/16/24 2209    Order Status: Completed Specimen: Blood Updated: 06/17/24 2232     Blood Culture, Routine No Growth to date      No Growth to date    Narrative:      Aerobic and anaerobic    Blood culture x two cultures. Draw prior to antibiotics. [[id number]] Collected: 06/16/24 2209    Order Status: Completed Specimen: Blood Updated: 06/17/24 2232     Blood Culture, Routine No Growth to date      No Growth to date    Narrative:      Aerobic and anaerobic            MOST RECENT IMAGING  X-Ray Chest AP Portable  Narrative: EXAMINATION:  XR CHEST AP PORTABLE    CLINICAL HISTORY:  Respiratory failure; drug overdose    FINDINGS:  Portable chest radiograph at 04:20 hours compared 06/16/2024 21:27 hours shows ET and NG tubes unchanged in position, with the cardiomediastinal silhouette and pulmonary vasculature stable and within normal limits.    The lungs are well expanded, with no new pleural or parenchymal abnormality.  No pneumothorax.  Impression: No significant interval change.    Electronically signed by: Ravi Meza  Date:    06/17/2024  Time:    07:54  CT Head Without Contrast  Narrative: CMS MANDATED QUALITY DATA - CT RADIATION - 436    All CT scans at this facility utilize dose modulation, iterative reconstruction, and/or weight based dosing when appropriate to reduce radiation dose to as low as reasonably achievable.    EXAMINATION:  CT HEAD WITHOUT CONTRAST    CLINICAL HISTORY:  Stroke, follow up;Stroke, hemorrhagic;.    COMPARISON:  06/16/2024    FINDINGS:  The previously described subtle area of increased density along the anterior margin of the right sylvian fissure remains unchanged.  There are no definite findings of acute hemorrhage or infarction.  Foci of apparent remote small vessel infarction are observed within the laf,  stable.    No intra-axial mass or significant mass effect is observed.  There is no midline shift.  The ventricular system is appropriate in size and position.  There are no extra-axial collections.    No acute osseous abnormalities are identified.  Impression: No significant interval change.    Electronically signed by: Mary Burks  Date:    06/17/2024  Time:    07:29  CT Chest Abdomen Pelvis Without Contrast (XPD)  Narrative: EXAMINATION:  CT CHEST ABDOMEN PELVIS WITHOUT CONTRAST(XPD)    CLINICAL HISTORY:  Sepsis;    TECHNIQUE:  Low dose axial images, sagittal and coronal reformations were obtained from the thoracic inlet to the pubic symphysis intravenous contrast and oral contrast was not utilized, this diminishes the sensitivity of the examination.    COMPARISON:  CT examination of the chest September 26, 2023    FINDINGS:  An ET tube is noted, the distal tip above the ignacia.  Enteric tube extends to the level of the stomach.  Previously identified 1.5 cm ground-glass opacity at the right upper lobe is significantly diminished in size with only residual remaining parenchymal change.  There are multiple small pulmonary nodules throughout the lungs bilaterally, this is more prominent involving the lower lobes, involves the right lower lobe to a greater extent, there are also mild patchy pulmonary opacities.  The appearance would suggest infectious/inflammatory etiology, clinical and historical correlation is otherwise needed.    There is prominent peribronchial thickening seen at the level of the lower lobes bilaterally as well as areas of bronchial opacity that may relate to areas of mucous plugging, secretions and or aspiration.    There is no evidence for pneumothorax there is no evidence for pleural effusion.    Small mediastinal lymph nodes are noted.  The thoracic aorta demonstrates atherosclerotic change, coronary arterial atherosclerotic change noted.  There is mild pericardial effusion noted anteriorly.   Evaluation of the heart and great vessels is otherwise limited on this noncontrast examination.    The stomach demonstrates nonspecific appearance of fluid and air within the gastric lumen.  There is mild to moderate gallbladder distention, layering density likely represents cholelithiasis, there is no pericholecystic or peripancreatic inflammatory change and there is no evidence for abnormal pancreatic or biliary ductal dilatation.  There is no evidence for acute process of the spleen or adrenal glands.    Renal calcifications are likely vascular.  There are small calcifications along the expected course of the ureters however there is no evidence for hydroureter, or obstructive uropathy, these may represent phleboliths.  The arterial vascular structures including the aorta demonstrate atherosclerotic change, the aorta appears normal in caliber otherwise not optimally evaluated on this noncontrast examination.    There is a Ann catheter the urinary bladder, air density within the urinary bladder likely relates to the Ann catheter however can be seen with infection, clinical correlation is otherwise needed.  There is mild prominence of the prostate.    There is no evidence for small bowel obstructive process.  The appendix is identified, it does not appear inflamed.  There is no evidence for inflammatory or obstructive process of the colon.  There is mild-to-moderate prominence of the rectum with stool without inflammatory appearance.  There is no free intraperitoneal air.    The osseous structures demonstrate chronic change.  Impression: Multiple pulmonary nodules are noted throughout the lungs, more prominent involving the lower lobes, right greater than left, there are also mild patchy pulmonary opacities, these findings may relate to infectious/inflammatory etiology, however clinical and historical correlation is otherwise needed.    Prominent peribronchial thickening at the lower lobes bilaterally, with  areas of bronchial opacity that may relate to mucous plugging, secretions or aspiration.    Mild pericardial effusion.    Air within the urinary bladder can be seen with infection however likely relates to Ann catheter, clinical correlation otherwise needed.    Layering density of the gallbladder likely relates to cholelithiasis, there is no pericholecystic inflammatory change.    Additional findings as above.    This report was flagged in Epic as abnormal.    Electronically signed by: Archie Webber  Date:    06/16/2024  Time:    23:58      CURRENT VISIT EKG  Results for orders placed or performed during the hospital encounter of 06/16/24   EKG 12-lead   Result Value Ref Range    QRS Duration 90 ms    [facility] QTC Calculation 469 ms    Narrative    Test Reason : R07.9,    Vent. Rate : 107 BPM     Atrial Rate : 107 BPM     P-R Int : 146 ms          QRS Dur : 090 ms      QT Int : 352 ms       P-R-T Axes : 069 080 023 degrees     QTc Int : 469 ms    Sinus tachycardia  T wave abnormality, consider inferior ischemia  Abnormal ECG  When compared with ECG of 08-APR-2024 09:24,  Vent. rate has increased BY  46 BPM  T wave inversion now evident in Inferior leads    Referred By: [email or web address]   SELF           Confirmed By:        ECHOCARDIOGRAM RESULTS  Results for orders placed during the hospital encounter of 04/07/24    Echo    Interpretation Summary    Left Ventricle: The left ventricle is normal in size. Moderately increased wall thickness. There is moderate concentric hypertrophy. Normal wall motion. There is low normal systolic function with a visually estimated ejection fraction of 50 - 55%. There is normal diastolic function.    Right Ventricle: Normal right ventricular cavity size. Wall thickness is normal. Right ventricle wall motion  is normal. Systolic function is normal.    IVC/SVC: Normal venous pressure at 3 mmHg.        RESPIRATORY SUPPORT  Oxygen Concentration (%):  [40] 40  Resp Rate Total:  [32 br/min-42 br/min]  42 br/min           LAST ARTERIAL BLOOD GAS  ABG  Recent Labs   Lab 06/17/24  0945   PH 7.446   PO2 74*   PCO2 48.5*   HCO3 33.4*   BE 9*     CT CAP 6/16/24: Subtle Tree-in-bud opacities at bilateral bases, R>L that may reflect atypical infection or aspiration pneumonitis.    ASSESSMENT/PLAN:   Hernan Badillo is a 58 y.o. male with a PMH significant for COPD, DM2, and HTN on whom we have been consulted for ventilator management.    #Acute encephalopathy likely secondary to opiate and benzodiazepine overdose  #Intubation for airway protection  #Compensated respiratory acidosis secondary to opiate overdose vs. COPD w/ chronic hypercapnia  - no opiates or benzos  - scheduled duonebs  - will need long-acting inhalers upon discharge, e.g. Trelegy daily, and rescue inhaler  - needs to quit smoking    #Sepsis with possible pulmonary source  #CAP w/ Pseudomonas  UA dirty.  blood cultures x 2: NGTD  - f/u urine cutlure  - Abx switched to levofloxacin to complete 5-7 days    #Intracerebral AV malformation vs. hemorrhage  Not deemed by neurosurgery to require acute management.  - appreciate neurosurgery recs    #NSTEMI, likely demand ischemia  - appreciate cardiology recs  - plan for outpatient ischemic workup    #Pain of right side, possibly neuropathic  - develop opiate-minimizing pain regimen  - needs outpatient referral to pain   - Lyrica (pregabalin) restarted    DVT prophylaxis: SCDs  Stress ulcer prophylaxis: n/a  Code status: FULL  Disposition: transfer to med/surg    Pulmonary & Critical Care Medicine will sign off at this time.   Please call with any further questions or concerns.    Discussed with hospitalist. I have reviewed the patient's most recent CBC and serum chemistry, as well as the most recent chest x-ray and/or chest CT. My interpretation of the chest imaging is as above. The patient is at high risk of morbidity or death and should remain inpatient for the time being.      Bull Newton,  MD  [facility]  Department of Pulmonary and Critical Care Medicine  Date of Service: 06/18/2024  10:20 AM

## 2024-06-18 NOTE — PT/OT/SLP EVAL
Physical Therapy Evaluation    Patient Name:  Hernan Badillo   MRN:  [id number]    Recommendations:     Discharge Recommendations: Moderate Intensity Therapy   Discharge Equipment Recommendations:TBD  Barriers to discharge:  impaired mobility, decreased safety awareness, weakness, pain    Assessment:     Hernan Badillo is a 58 y.o. male admitted with a medical diagnosis of Encephalopathy, toxic.  He presents with the following impairments/functional limitations: weakness, impaired endurance, impaired self care skills, impaired functional mobility, gait instability, impaired balance, impaired cognition, decreased upper extremity function, decreased lower extremity function, decreased safety awareness, pain, impaired cardiopulmonary response to activity .    Pt presented with HOB slightly elevated with 2 L 02 in place with sa02 of 96% dropping to 89% with sitting at EOB.. He was alert and oriented x3, not to time. His confusion was masked by humor. He has decreased insight, poor safety awareness, impulsivity.  He complained of pain to R shoulder down to his leg. He t/f'ed supine to sit with Mod A and to stand with Min /Mod with RW . He had R knee buckling  with side stepping with RW. He reported having a previous stroke that affected  his R side. He had a brief moment of  altered level of responsiveness followed by an emotional  outburst of crying  with pt attempting to  get out of bed.  His nurse was  called, and pt reported that he was in pain. Nurse  attempted to calm pt. Pt was calmer after nurse  informed  pt that he would get pain medication. Pt would benefit from continued PT.    Rehab Prognosis: Good and Fair; patient would benefit from acute skilled PT services to address these deficits and reach maximum level of function.    Recent Surgery: * No surgery found *      Plan:     During this hospitalization, patient to be seen 5 x/week to address the identified rehab impairments via gait training, therapeutic activities,  "therapeutic exercises and progress toward the following goals:    Plan of Care Expires:  07/18/24    Subjective     Chief Complaint: R sided  pain  Patient/Family Comments/goals: Did not express  Pain/Comfort:  Pain Rating 1:  (Did not rate)  Location - Side 1: Right  Location - Orientation 1:  ("My whole right side" UE/LE)  Pain Addressed 1: Nurse notified, Distraction, Reposition    Patients cultural, spiritual, Oriental orthodox conflicts given the current situation:      Living Environment:  Pt lives in a mobile home with his son  Prior to admission, patients level of function was independent .  Equipment used at home:  None .  DME owned (not currently used): none.  Upon discharge, patient will have assistance from facility/his son.    Objective:     Communicated with nurse prior to session.  Patient found HOB elevated with jay catheter, bed alarm, blood pressure cuff, telemetry, oxygen, pulse ox (continuous)  upon PT entry to room.    General Precautions: Standard, fall  Orthopedic Precautions:    Braces:    Respiratory Status: Nasal cannula, flow 2 L/min    Exams:  Cognitive Exam:  Patient is oriented to Person, Place, and Situation  LUE Strength: WFL  RLE ROM: WFL  RLE Strength: 3/5  LLE ROM: WFL  LLE Strength: 4/5  Pt had difficulty following directions for MMT    Functional Mobility:  Bed Mobility:     Supine to Sit: moderate assistance  Sit to Supine: minimum assistance with pt manually advancing R LE into bed  Transfers:     Sit to Stand:  moderate assistance with rolling walker  Gait: Took unsteady side steps with RW and Min/ Mod A  with R knee buckling  Balance: unsupported sitting balance ,  fair standing balance      AM-PAC 6 CLICK MOBILITY  Total Score:        Treatment & Education:  Pt was educated on safety, use of call button, PT POC/DC recvommendation    Patient left HOB elevated with all lines intact, call button in reach, bed alarm on, and his nurse present.    GOALS:   Multidisciplinary Problems  "      Physical Therapy Goals          Problem: Physical Therapy    Goal Priority Disciplines Outcome Goal Variances Interventions   Physical Therapy Goal     PT, PT/OT      Description: Goals to be met by: 2024     Patient will increase functional independence with mobility by performin. Supine to sit with Stand-by Assistance  2. Sit to stand transfer with Stand-by Assistance  3. Gait  x 150  feet with Contact Guard Assistance using Rolling Walker.                          History:     Past Medical History:   Diagnosis Date    COPD (chronic obstructive pulmonary disease)     Diabetes mellitus, type 2     Hypertension        Past Surgical History:   Procedure Laterality Date    DENTAL SURGERY      ESOPHAGOGASTRODUODENOSCOPY N/A 2020    Procedure: EGD (ESOPHAGOGASTRODUODENOSCOPY);  Surgeon: Terrell May MD;  Location: [facility];  Service: Endoscopy;  Laterality: N/A;    HERNIA REPAIR      left inguinal    incision and drainiage         Time Tracking:     PT Received On: 24  PT Start Time: 1448     PT Stop Time: 1520  PT Total Time (min): 32 min     Billable Minutes: Evaluation 8 minutes  and Therapeutic Activity 24 minutes      2024

## 2024-06-18 NOTE — HOSPITAL COURSE
Patient admitted to intensive care unit where patient was maintained on mechanical ventilation.  Subsequent CT scan of the head report stable findings and no further bleeding or especially of bleeding noted.  Pulmonary critical care medicine closely followed the patient and maintain patient on intravenous antibiotic therapy for possible pneumonia.  Subsequently patient was successfully extubated.  No focal neuro deficits noted.  Sputum cultures grew Pseudomonas species, sensitive to levaquin.  Also patient noted to have elevated serum troponins for which Cardiology team was consulted.  Cardiology recommended future need for ischemic workup per evaluation of coronary artery disease as outpt.  Patient worked with PT and OT.  Patient adamantly refused skilled nursing facility placement.   assisting patient with outpatient follow-up with pain management doctor. Deemed stable to be d/c.

## 2024-06-18 NOTE — PROGRESS NOTES
[facility] Medicine  Progress Note    Patient Name: Hernan Badillo  MRN: [id number]  Patient Class: IP- Inpatient   Admission Date: 6/16/2024  Length of Stay: 2 days  Attending Physician: Evens Richter MD  Primary Care Provider: Toan Banks DO        Subjective:     Principal Problem:Encephalopathy, toxic        HPI:  History is taken from medical records.  Patient currently intubated.  It is reported that family found the patient to be very somnolent and almost unresponsive.  EMS was alerted and gave the patient Narcan.  Apparently this helped to woken up the patient but the patient was still very drowsy.  In the ER another round of Narcan was given but the patient continued to become altered and subsequently was intubated for airway management.  Family denies any history of drug use but urinary drug screen was positive for opioids and benzodiazepine.  Patient's pupils still pinpoint.  Stable on the ventilator.  Hospitalist is asked to admit this patient for suspected drug overdose.  Family not in the room.    Overview/Hospital Course:  No notes on file    Interval History:  Patient getting spontaneous breathing trial.  Patient's brother is present at bedside.  Patient moving all extremities. Tmax 101.8F. Sp Cx growing Pseudomonas sp.    Review of Systems   Denies all complaints    Objective:     Vital Signs (Most Recent):  Temp: 98.3 °F (36.8 °C) (06/18/24 0715)  Pulse: 86 (06/18/24 0800)  Resp: (!) 49 (06/18/24 0800)  BP: (!) 140/62 (06/18/24 0800)  SpO2: 99 % (06/18/24 0800) Vital Signs (24h Range):  Temp:  [98.1 °F (36.7 °C)-101.8 °F (38.8 °C)] 98.3 °F (36.8 °C)  Pulse:  [] 86  Resp:  [16-52] 49  SpO2:  [91 %-100 %] 99 %  BP: (109-177)/(58-89) 140/62     Weight: 64.3 kg (141 lb 12.1 oz)  Body mass index is 22.88 kg/m².    Intake/Output Summary (Last 24 hours) at 6/18/2024 0850  Last data filed at 6/18/2024 0457  Gross per 24 hour   Intake 2787.3 ml   Output 725 ml   Net 2062.3  ml         Physical Exam  Constitutional:       Appearance: Normal appearance.   HENT:      Head: Normocephalic and atraumatic.      Nose: Nose normal.   Eyes:      Pupils: Pupils are equal, round, and reactive to light.   Cardiovascular:      Rate and Rhythm: Normal rate and regular rhythm.   Pulmonary:      Effort: Pulmonary effort is normal.   Abdominal:      Palpations: Abdomen is soft.   Musculoskeletal:      Cervical back: Neck supple.   Skin:     General: Skin is warm.   Neurological:      Mental Status: He is alert.      Comments: Intubated and sedated             Significant Labs: All pertinent labs within the past 24 hours have been reviewed.  CBC:   Recent Labs   Lab 06/16/24 2130 06/16/24  2304 06/17/24  0339 06/17/24  0432 06/18/24  0324   WBC 10.48  --  8.15  --  13.33*   HGB 12.4*  --  11.5*  --  10.0*   HCT 40.4   < > 36.8* 35* 31.5*     --  180  --  168    < > = values in this interval not displayed.     CMP:   Recent Labs   Lab 06/16/24 2130 06/17/24 0339 06/18/24  0324    142 140   K 4.5 4.9 3.6   CL 97 99 103   CO2 37* 39* 33*   * 146* 105   BUN 9 10 10   CREATININE 0.8 0.7 0.6   CALCIUM 10.1 9.3 8.4*   PROT 7.7 7.1 5.7*   ALBUMIN 3.8 3.5 2.9*   BILITOT 0.7 0.7 0.7   ALKPHOS 69 60 47*   AST 11 14 9*   ALT 4* 5* <3*   ANIONGAP 6* 4* 4*     Lactic Acid:   Recent Labs   Lab 06/17/24  0739 06/17/24  1825 06/17/24  2233   LACTATE 2.0* 2.2* 1.5     Troponin:   Recent Labs   Lab 06/17/24  1549 06/17/24  2231 06/18/24  0324   TROPONINIHS 179.4*  179.4* 152.8* 143.9*     TSH:   Recent Labs   Lab 04/07/24  1925   TSH 0.706     Urine Studies:   Recent Labs   Lab 06/16/24  2142 06/17/24  1422   COLORU Yellow Yellow   APPEARANCEUA Clear Clear   PHUR 6.0 7.0   SPECGRAV 1.005 >1.030*   PROTEINUA Negative 1+*   GLUCUA Negative Negative   KETONESU Negative 2+*   BILIRUBINUA Negative Negative   OCCULTUA 1+* 3+*   NITRITE Negative Negative   UROBILINOGEN Negative 4.0-6.0*   LEUKOCYTESUR  Negative Trace*   RBCUA 4 >100*   WBCUA 1 14*   BACTERIA  --  Occasional   SQUAMEPITHEL 1  --    HYALINECASTS  --  0     Microbiology Results (last 7 days)       Procedure Component Value Units Date/Time    Culture, Respiratory with Gram Stain [[id number]]  (Abnormal) Collected: 06/17/24 0057    Order Status: Completed Specimen: Respiratory from Sputum, Expectorated Updated: 06/18/24 0746     Respiratory Culture PRESUMPTIVE PSEUDOMONAS SPECIES  Moderate  Identification and susceptibility pending       Gram Stain (Respiratory) <10 epithelial cells per low power field.     Gram Stain (Respiratory) Many WBC's     Gram Stain (Respiratory) Few Gram positive cocci     Gram Stain (Respiratory) Few Gram negative rods    Urine culture [[id number]] Collected: 06/17/24 1422    Order Status: Completed Specimen: Urine Updated: 06/18/24 0718     Urine Culture, Routine No growth to date    Narrative:      Specimen Source->Urine    Blood culture [[id number]] Collected: 06/17/24 1550    Order Status: Completed Specimen: Blood Updated: 06/17/24 2317     Blood Culture, Routine No Growth to date    Blood culture [[id number]] Collected: 06/17/24 1550    Order Status: Completed Specimen: Blood Updated: 06/17/24 2317     Blood Culture, Routine No Growth to date    Blood culture x two cultures. Draw prior to antibiotics. [[id number]] Collected: 06/16/24 2209    Order Status: Completed Specimen: Blood Updated: 06/17/24 2232     Blood Culture, Routine No Growth to date      No Growth to date    Narrative:      Aerobic and anaerobic    Blood culture x two cultures. Draw prior to antibiotics. [[id number]] Collected: 06/16/24 2209    Order Status: Completed Specimen: Blood Updated: 06/17/24 2232     Blood Culture, Routine No Growth to date      No Growth to date    Narrative:      Aerobic and anaerobic          Significant Imaging:   ECHO (4/8/24):    Left Ventricle: The left ventricle is normal in size. Moderately increased wall thickness.  There is moderate concentric hypertrophy. Normal wall motion. There is low normal systolic function with a visually estimated ejection fraction of 50 - 55%. There is normal diastolic function.    Right Ventricle: Normal right ventricular cavity size. Wall thickness is normal. Right ventricle wall motion  is normal. Systolic function is normal.    IVC/SVC: Normal venous pressure at 3 mmHg.     CXR:  There is an endotracheal tube with the tip located approximately 5 cm proximal to the ignacia. There is a nasogastric tube coursing into the abdomen. The lungs are well expanded. Pulmonary parenchyma is unchanged from prior. The pleural spaces are clear. Cardiac silhouette is unremarkable. Osseous structures are intact.     CTA Head and Neck:  Small subtle 4-mm hyperdensity along the anterior aspect of the right sylvian fissure.  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  Consider short-term repeat follow-up head CT to ensure stability.  No evidence of large vessel occlusion at the level of the [id number].  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      CT Head:  Small subtle 4-mm hyperdensity along the anterior aspect of the right sylvian fissure.  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  Consider short-term repeat follow-up head CT to ensure stability.  No evidence of large vessel occlusion at the level of the [id number].  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    CT Chest abdomen and pelvis without contrast:  Multiple  pulmonary nodules are noted throughout the lungs, more prominent involving the lower lobes, right greater than left, there are also mild patchy pulmonary opacities, these findings may relate to infectious/inflammatory etiology, however clinical and historical correlation is otherwise needed.  Prominent peribronchial thickening at the lower lobes bilaterally, with areas of bronchial opacity that may relate to mucous plugging, secretions or aspiration.  Mild pericardial effusion.  Air within the urinary bladder can be seen with infection however likely relates to Ann catheter, clinical correlation otherwise needed.  Layering density of the gallbladder likely relates to cholelithiasis, there is no pericholecystic inflammatory change.    CXR: No significant interval change.     Repeat CT Head: No significant interval change.       Assessment/Plan:      * Encephalopathy, toxic  Suspect drug overdose.  One dose of empiric antibiotics given  Post Narcan therapy with some responsiveness.  CT of the head is negative.  Supportive care with mechanical ventilation  Hopefully patient will be extubated today.  Chest x-ray chest x-ray in the morning  Continue neuro checks.    Respiratory failure, unspecified with hypoxia, due to toxic/metabolic encephalopathy  Sepsis with possible pulmonary source - Pseudomonas sp  Patient intubated for airway protection  Patient is on mechanical ventilation, acting extubation soon.  Currently receiving spontaneous breathing trial.  Follow Pulmonary recommendations.  Supplemental oxygen was provided and noted- Vent Mode: Spont  Oxygen Concentration (%):  [40-60] 40  Resp Rate Total:  [18 br/min-42 br/min] 42 br/min  Vt Set:  [450 mL] 450 mL  PEEP/CPAP:  [5 cmH20] 5 cmH20  Pressure Support:  [5 cmH20-10 cmH20] 5 cmH20  Mean Airway Pressure:  [7.1 cmH20-15 cmH20] 7.1 cmH20    Patient has been started on intravenous ceftriaxone and azithromycin for possible pneumonitis.  Discussed with   Lamar.    Bleeding in brain, 4 mm, right sylvian fissure  Cerebral Hemorrahage  Hold antiplatelet therapy.  Neurosurgery following.  Keep head end of bed elevated at 30°.  Repeat CT head-stable  Maintain systolic blood pressure under 150 mmHg  Continue neuro checks q.1 hour.  Follow Neurology recommendations.      NSTEMI (non-ST elevated myocardial infarction) likely due to demand ischemia and sepsis  Tele monitoring.  Obtain 2D echocardiogram.  EKG reviewed.  Consult cardiologist.  Unable to initiate antiplatelet therapy or anticoagulation therapy due to cerebral bleeding.  Trend serum troponins.      History of stroke    Continue aspirin    COPD (chronic obstructive pulmonary disease)  No sign of exacerbation   No steroids  Breathing treatments p.r.n.      Primary hypertension  Continue amlodipine 10 mg daily  Continue lisinopril 10 mg daily  P.r.n. hydralazine    DM (diabetes mellitus), type 2  Hold oral medication   Insulin sliding scale   A1c: 6.5    D/W patient's mother.  VTE Risk Mitigation (From admission, onward)           Ordered     IP VTE HIGH RISK PATIENT  Once         06/16/24 2246     Place sequential compression device  Until discontinued         06/16/24 2246                    Discharge Planning   ZACARIAS:  6/20/24    Code Status: Full Code   Is the patient medically ready for discharge?:     Reason for patient still in hospital (select all that apply): Patient trending condition and Consult recommendations  Discharge Plan A: Home with family            Critical care time spent on the evaluation and treatment of severe organ dysfunction, review of pertinent labs and imaging studies, discussions with consulting providers and discussions with patient/family: 35 minutes.      Evens Richter MD  Department of Hospital Medicine   [facility]

## 2024-06-18 NOTE — ASSESSMENT & PLAN NOTE
S/p extubation.  Follow Pulmonary recommendations.  Supplemental oxygen was provided and noted-      Patient has been started on intravenous ceftriaxone and azithromycin for possible pneumonitis.  Discussed with Dr. Newton.

## 2024-06-19 LAB
ALBUMIN SERPL BCP-MCNC: 2.6 G/DL (ref 3.5–5.2)
ALP SERPL-CCNC: 43 U/L (ref 55–135)
ALT SERPL W/O P-5'-P-CCNC: <3 U/L (ref 10–44)
ANION GAP SERPL CALC-SCNC: 4 MMOL/L (ref 8–16)
APICAL FOUR CHAMBER EJECTION FRACTION: 47 %
AST SERPL-CCNC: 7 U/L (ref 10–40)
BACTERIA SPEC AEROBE CULT: ABNORMAL
BASOPHILS # BLD AUTO: 0.01 K/UL (ref 0–0.2)
BASOPHILS NFR BLD: 0.1 % (ref 0–1.9)
BILIRUB SERPL-MCNC: 0.7 MG/DL (ref 0.1–1)
BSA FOR ECHO PROCEDURE: 1.73 M2
BUN SERPL-MCNC: 11 MG/DL (ref 6–20)
CALCIUM SERPL-MCNC: 8.3 MG/DL (ref 8.7–10.5)
CHLORIDE SERPL-SCNC: 107 MMOL/L (ref 95–110)
CO2 SERPL-SCNC: 30 MMOL/L (ref 23–29)
CREAT SERPL-MCNC: 0.5 MG/DL (ref 0.5–1.4)
CV ECHO LV RWT: 0.56 CM
DIFFERENTIAL METHOD BLD: ABNORMAL
ECHO LV POSTERIOR WALL: 1.2 CM (ref 0.6–1.1)
EOSINOPHIL # BLD AUTO: 0 K/UL (ref 0–0.5)
EOSINOPHIL NFR BLD: 0.3 % (ref 0–8)
ERYTHROCYTE [DISTWIDTH] IN BLOOD BY AUTOMATED COUNT: 14.5 % (ref 11.5–14.5)
EST. GFR  (NO RACE VARIABLE): >60 ML/MIN/1.73 M^2
FRACTIONAL SHORTENING: 41 % (ref 28–44)
GLUCOSE SERPL-MCNC: 159 MG/DL (ref 70–110)
GLUCOSE SERPL-MCNC: 187 MG/DL (ref 70–110)
GLUCOSE SERPL-MCNC: 89 MG/DL (ref 70–110)
GRAM STN SPEC: ABNORMAL
HCT VFR BLD AUTO: 25.9 % (ref 40–54)
HGB BLD-MCNC: 8.1 G/DL (ref 14–18)
IMM GRANULOCYTES # BLD AUTO: 0.07 K/UL (ref 0–0.04)
IMM GRANULOCYTES NFR BLD AUTO: 0.7 % (ref 0–0.5)
INTERVENTRICULAR SEPTUM: 1.32 CM (ref 0.6–1.1)
IVC DIAMETER: 1.87 CM
LEFT INTERNAL DIMENSION IN SYSTOLE: 2.49 CM (ref 2.1–4)
LEFT VENTRICLE DIASTOLIC VOLUME INDEX: 46.71 ML/M2
LEFT VENTRICLE DIASTOLIC VOLUME: 80.8 ML
LEFT VENTRICLE END DIASTOLIC VOLUME APICAL 4 CHAMBER: 120 ML
LEFT VENTRICLE MASS INDEX: 113 G/M2
LEFT VENTRICLE SYSTOLIC VOLUME INDEX: 12.8 ML/M2
LEFT VENTRICLE SYSTOLIC VOLUME: 22.1 ML
LEFT VENTRICULAR INTERNAL DIMENSION IN DIASTOLE: 4.25 CM (ref 3.5–6)
LEFT VENTRICULAR MASS: 194.89 G
LVED V (TEICH): 80.8 ML
LVES V (TEICH): 22.1 ML
LYMPHOCYTES # BLD AUTO: 1 K/UL (ref 1–4.8)
LYMPHOCYTES NFR BLD: 10.3 % (ref 18–48)
MAGNESIUM SERPL-MCNC: 1.6 MG/DL (ref 1.6–2.6)
MCH RBC QN AUTO: 29.9 PG (ref 27–31)
MCHC RBC AUTO-ENTMCNC: 31.3 G/DL (ref 32–36)
MCV RBC AUTO: 96 FL (ref 82–98)
MONOCYTES # BLD AUTO: 0.8 K/UL (ref 0.3–1)
MONOCYTES NFR BLD: 7.8 % (ref 4–15)
NEUTROPHILS # BLD AUTO: 8.1 K/UL (ref 1.8–7.7)
NEUTROPHILS NFR BLD: 80.8 % (ref 38–73)
NRBC BLD-RTO: 0 /100 WBC
PLATELET # BLD AUTO: 162 K/UL (ref 150–450)
PMV BLD AUTO: 10.4 FL (ref 9.2–12.9)
POTASSIUM SERPL-SCNC: 3.8 MMOL/L (ref 3.5–5.1)
PROT SERPL-MCNC: 5.1 G/DL (ref 6–8.4)
RBC # BLD AUTO: 2.71 M/UL (ref 4.6–6.2)
SODIUM SERPL-SCNC: 141 MMOL/L (ref 136–145)
WBC # BLD AUTO: 10.01 K/UL (ref 3.9–12.7)
Z-SCORE OF LEFT VENTRICULAR DIMENSION IN END DIASTOLE: -1.2
Z-SCORE OF LEFT VENTRICULAR DIMENSION IN END SYSTOLE: -1.39

## 2024-06-19 PROCEDURE — 80053 COMPREHEN METABOLIC PANEL: CPT | Performed by: HOSPITALIST

## 2024-06-19 PROCEDURE — 36415 COLL VENOUS BLD VENIPUNCTURE: CPT | Performed by: HOSPITALIST

## 2024-06-19 PROCEDURE — 25000003 PHARM REV CODE 250: Performed by: HOSPITALIST

## 2024-06-19 PROCEDURE — 25000003 PHARM REV CODE 250: Performed by: INTERNAL MEDICINE

## 2024-06-19 PROCEDURE — 25000242 PHARM REV CODE 250 ALT 637 W/ HCPCS: Performed by: INTERNAL MEDICINE

## 2024-06-19 PROCEDURE — 82962 GLUCOSE BLOOD TEST: CPT

## 2024-06-19 PROCEDURE — 83735 ASSAY OF MAGNESIUM: CPT | Performed by: HOSPITALIST

## 2024-06-19 PROCEDURE — 63600175 PHARM REV CODE 636 W HCPCS: Performed by: INTERNAL MEDICINE

## 2024-06-19 PROCEDURE — 12000002 HC ACUTE/MED SURGE SEMI-PRIVATE ROOM

## 2024-06-19 PROCEDURE — 94761 N-INVAS EAR/PLS OXIMETRY MLT: CPT

## 2024-06-19 PROCEDURE — 94640 AIRWAY INHALATION TREATMENT: CPT

## 2024-06-19 PROCEDURE — 99900031 HC PATIENT EDUCATION (STAT)

## 2024-06-19 PROCEDURE — 27000221 HC OXYGEN, UP TO 24 HOURS

## 2024-06-19 PROCEDURE — 97166 OT EVAL MOD COMPLEX 45 MIN: CPT

## 2024-06-19 PROCEDURE — 85025 COMPLETE CBC W/AUTO DIFF WBC: CPT | Performed by: HOSPITALIST

## 2024-06-19 PROCEDURE — 97535 SELF CARE MNGMENT TRAINING: CPT

## 2024-06-19 PROCEDURE — 97116 GAIT TRAINING THERAPY: CPT

## 2024-06-19 PROCEDURE — 99232 SBSQ HOSP IP/OBS MODERATE 35: CPT | Mod: ,,, | Performed by: INTERNAL MEDICINE

## 2024-06-19 RX ORDER — PANTOPRAZOLE SODIUM 40 MG/1
40 TABLET, DELAYED RELEASE ORAL DAILY
Status: DISCONTINUED | OUTPATIENT
Start: 2024-06-19 | End: 2024-06-20 | Stop reason: HOSPADM

## 2024-06-19 RX ADMIN — MUPIROCIN 1 G: 20 OINTMENT TOPICAL at 09:06

## 2024-06-19 RX ADMIN — LEVOFLOXACIN 750 MG: 750 TABLET, FILM COATED ORAL at 09:06

## 2024-06-19 RX ADMIN — HYDROCODONE BITARTRATE AND ACETAMINOPHEN 1 TABLET: 5; 325 TABLET ORAL at 09:06

## 2024-06-19 RX ADMIN — PANTOPRAZOLE SODIUM 40 MG: 40 TABLET, DELAYED RELEASE ORAL at 09:06

## 2024-06-19 RX ADMIN — PREGABALIN 100 MG: 75 CAPSULE ORAL at 09:06

## 2024-06-19 RX ADMIN — MAGNESIUM SULFATE 2 G: 2 INJECTION INTRAVENOUS at 04:06

## 2024-06-19 RX ADMIN — SODIUM CHLORIDE: 9 INJECTION, SOLUTION INTRAVENOUS at 03:06

## 2024-06-19 RX ADMIN — MUPIROCIN 1 G: 20 OINTMENT TOPICAL at 08:06

## 2024-06-19 RX ADMIN — IPRATROPIUM BROMIDE AND ALBUTEROL SULFATE 3 ML: .5; 3 SOLUTION RESPIRATORY (INHALATION) at 07:06

## 2024-06-19 RX ADMIN — SODIUM CHLORIDE: 9 INJECTION, SOLUTION INTRAVENOUS at 05:06

## 2024-06-19 RX ADMIN — PREGABALIN 100 MG: 75 CAPSULE ORAL at 08:06

## 2024-06-19 RX ADMIN — IPRATROPIUM BROMIDE AND ALBUTEROL SULFATE 3 ML: .5; 3 SOLUTION RESPIRATORY (INHALATION) at 01:06

## 2024-06-19 RX ADMIN — POTASSIUM BICARBONATE 50 MEQ: 977.5 TABLET, EFFERVESCENT ORAL at 04:06

## 2024-06-19 NOTE — PLAN OF CARE
Problem: Physical Therapy  Goal: Physical Therapy Goal  Description: Goals to be met by: 2024     Patient will increase functional independence with mobility by performin. Supine to sit with Stand-by Assistance  2. Sit to stand transfer with Stand-by Assistance  3. Gait  x 150  feet with Stand-by Assistance using LRAD     Outcome: Progressing

## 2024-06-19 NOTE — PROGRESS NOTES
[facility] Medicine  Progress Note    Patient Name: Hernan Badillo  MRN: [id number]  Patient Class: IP- Inpatient   Admission Date: 6/16/2024  Length of Stay: 3 days  Attending Physician: Evens Richter MD  Primary Care Provider: Toan Banks DO        Subjective:     Principal Problem:Encephalopathy, toxic        HPI:  History is taken from medical records.  Patient currently intubated.  It is reported that family found the patient to be very somnolent and almost unresponsive.  EMS was alerted and gave the patient Narcan.  Apparently this helped to woken up the patient but the patient was still very drowsy.  In the ER another round of Narcan was given but the patient continued to become altered and subsequently was intubated for airway management.  Family denies any history of drug use but urinary drug screen was positive for opioids and benzodiazepine.  Patient's pupils still pinpoint.  Stable on the ventilator.  Hospitalist is asked to admit this patient for suspected drug overdose.  Family not in the room.    Overview/Hospital Course:  Patient admitted to intensive care unit where patient was maintained on mechanical ventilation.  Subsequent CT scan of the head report stable findings and no further bleeding or especially of bleeding noted.  Pulmonary critical care medicine closely followed the patient and maintain patient on intravenous antibiotic therapy for possible pneumonia.  Subsequently patient was successfully extubated.  No focal neuro deficits noted.  Sputum cultures grew Pseudomonas species.  Also patient noted to have elevated serum troponins for which Cardiology team was consulted.  Cardiology recommended future need for ischemic workup per evaluation of coronary artery disease.    Interval History:  Patient adamantly refusing skilled nursing facility.  Patient is mother present at bedside who feel frustrated.  Patient denies any shortness of breath although he is requiring  3-4 L of supplemental oxygen via nasal cannula.  Sputum culture finalized to Pseudomonas species sensitive to quinolones.  Patient is on Levaquin.  Currently afebrile.  Patient transferred to medicine floor.    Review of Systems   Constitutional:  Positive for fatigue.   Respiratory:  Positive for cough and shortness of breath.    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    Intake/Output Summary (Last 24 hours) at 6/19/2024 0821  Last data filed at 6/19/2024 0632  Gross per 24 hour   Intake 3016.67 ml   Output 530 ml   Net 2486.67 ml         Physical Exam  Constitutional:       Appearance: Normal appearance.   HENT:      Head: Normocephalic and atraumatic.      Nose: Nose normal.   Eyes:      Pupils: Pupils are equal, round, and reactive to light.   Cardiovascular:      Rate and Rhythm: Normal rate and regular rhythm.   Pulmonary:      Effort: Pulmonary effort is normal.   Abdominal:      Palpations: Abdomen is soft.   Musculoskeletal:      Cervical back: Neck supple.   Skin:     General: Skin is warm.   Neurological:      Mental Status: He is alert and oriented to person, place, and time.             Significant Labs: All pertinent labs within the past 24 hours have been reviewed.  CBC:   Recent Labs   Lab 06/18/24  0324 06/19/24  0343   WBC 13.33* 10.01   HGB 10.0* 8.1*   HCT 31.5* 25.9*    162     CMP:   Recent Labs   Lab 06/18/24  0324 06/19/24  0343    141   K 3.6 3.8    107   CO2 33* 30*    89   BUN 10 11    CREATININE 0.6 0.5   CALCIUM 8.4* 8.3*   PROT 5.7* 5.1*   ALBUMIN 2.9* 2.6*   BILITOT 0.7 0.7   ALKPHOS 47* 43*   AST 9* 7*   ALT <3* <3*   ANIONGAP 4* 4*     Lactic Acid:   Recent Labs   Lab 06/17/24  1825 06/17/24  2233   LACTATE 2.2* 1.5     Troponin:   Recent Labs   Lab 06/17/24  2231 06/18/24  0324 06/18/24  0837   TROPONINIHS 152.8* 143.9* 119.4*     TSH:   Recent Labs   Lab 04/07/24  1925   TSH 0.706     Urine Studies:   Recent Labs   Lab 06/17/24  1422   COLORU Yellow   APPEARANCEUA Clear   PHUR 7.0   SPECGRAV >1.030*   PROTEINUA 1+*   GLUCUA Negative   KETONESU 2+*   BILIRUBINUA Negative   OCCULTUA 3+*   NITRITE Negative   UROBILINOGEN 4.0-6.0*   LEUKOCYTESUR Trace*   RBCUA >100*   WBCUA 14*   BACTERIA Occasional   HYALINECASTS 0     Microbiology Results (last 7 days)       Procedure Component Value Units Date/Time    Urine culture [[id number]] Collected: 06/17/24 1422    Order Status: Completed Specimen: Urine Updated: 06/19/24 0732     Urine Culture, Routine No growth to date    Narrative:      Specimen Source->Urine    Culture, Respiratory with Gram Stain [[id number]]  (Abnormal)  (Susceptibility) Collected: 06/17/24 0057    Order Status: Completed Specimen: Respiratory from Sputum, Expectorated Updated: 06/19/24 0723     Respiratory Culture PSEUDOMONAS AERUGINOSA  Moderate       Gram Stain (Respiratory) <10 epithelial cells per low power field.     Gram Stain (Respiratory) Many WBC's     Gram Stain (Respiratory) Few Gram positive cocci     Gram Stain (Respiratory) Few Gram negative rods    Blood culture x two cultures. Draw prior to antibiotics. [[id number]] Collected: 06/16/24 2209    Order Status: Completed Specimen: Blood Updated: 06/18/24 2232     Blood Culture, Routine No Growth to date      No Growth to date      No Growth to date    Narrative:      Aerobic and anaerobic    Blood culture x two cultures. Draw prior to antibiotics. [[id number]] Collected: 06/16/24 2209    Order Status: Completed  Specimen: Blood Updated: 06/18/24 2232     Blood Culture, Routine No Growth to date      No Growth to date      No Growth to date    Narrative:      Aerobic and anaerobic    Blood culture [[id number]] Collected: 06/17/24 1550    Order Status: Completed Specimen: Blood Updated: 06/18/24 1632     Blood Culture, Routine No Growth to date      No Growth to date    Blood culture [[id number]] Collected: 06/17/24 1550    Order Status: Completed Specimen: Blood Updated: 06/18/24 1632     Blood Culture, Routine No Growth to date      No Growth to date          Significant Imaging:   ECHO (4/8/24):    Left Ventricle: The left ventricle is normal in size. Moderately increased wall thickness. There is moderate concentric hypertrophy. Normal wall motion. There is low normal systolic function with a visually estimated ejection fraction of 50 - 55%. There is normal diastolic function.    Right Ventricle: Normal right ventricular cavity size. Wall thickness is normal. Right ventricle wall motion  is normal. Systolic function is normal.    IVC/SVC: Normal venous pressure at 3 mmHg.     CXR:  There is an endotracheal tube with the tip located approximately 5 cm proximal to the ignacia. There is a nasogastric tube coursing into the abdomen. The lungs are well expanded. Pulmonary parenchyma is unchanged from prior. The pleural spaces are clear. Cardiac silhouette is unremarkable. Osseous structures are intact.     CTA Head and Neck:  Small subtle 4-mm hyperdensity along the anterior aspect of the right sylvian fissure.  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  Consider short-term repeat follow-up head CT to ensure stability.  No evidence of large vessel occlusion at the level of the [id number].  Redemonstration of small vascular malformation in the region of the  right sylvian fissure, similar to prior examination.      CT Head:  Small subtle 4-mm hyperdensity along the anterior aspect of the right sylvian fissure.  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  Consider short-term repeat follow-up head CT to ensure stability.  No evidence of large vessel occlusion at the level of the [id number].  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    CT Chest abdomen and pelvis without contrast:  Multiple pulmonary nodules are noted throughout the lungs, more prominent involving the lower lobes, right greater than left, there are also mild patchy pulmonary opacities, these findings may relate to infectious/inflammatory etiology, however clinical and historical correlation is otherwise needed.  Prominent peribronchial thickening at the lower lobes bilaterally, with areas of bronchial opacity that may relate to mucous plugging, secretions or aspiration.  Mild pericardial effusion.  Air within the urinary bladder can be seen with infection however likely relates to Ann catheter, clinical correlation otherwise needed.  Layering density of the gallbladder likely relates to cholelithiasis, there is no pericholecystic inflammatory change.    CXR: No significant interval change.     Repeat CT Head: No significant interval change.       Assessment/Plan:      * Encephalopathy, toxic  Suspect drug overdose.  One dose of empiric antibiotics given  Post Narcan therapy with some responsiveness.  CT of the head is negative.  Now at baseline.    Respiratory failure, unspecified with hypoxia, due to toxic/metabolic encephalopathy  S/p extubation.  Follow Pulmonary recommendations.  Supplemental oxygen was provided and noted-      Patient has been started on  intravenous ceftriaxone and azithromycin for possible pneumonitis.  Discussed with Dr. Newton.    Bleeding in brain, 4 mm, right sylvian fissure  Hold antiplatelet therapy.  Neurosurgery following.  Keep head end of bed elevated at 30°.  Repeat CT head-stable  Maintain systolic blood pressure under 150 mmHg  Continue neuro checks q.1 hour.  Follow Neurology recommendations.      NSTEMI (non-ST elevated myocardial infarction)  Tele monitoring.  Follow echo results.  EKG reviewed.  Consult cardiologist.  Unable to initiate antiplatelet therapy or anticoagulation therapy due to cerebral bleeding.  Trend serum troponins.      History of stroke    Continue aspirin    COPD (chronic obstructive pulmonary disease)  No sign of exacerbation   No steroids  Breathing treatments p.r.n.  Sputum culture positive for Pseudomonas species.  Continue Levaquin antibiotic therapy for now      Primary hypertension  Continue amlodipine 10 mg daily  Continue lisinopril 10 mg daily  P.r.n. hydralazine    DM (diabetes mellitus), type 2  Hold oral medication   Insulin sliding scale   A1c: 6.5    Patient to be transferred to medicine telemetry service.  Discussed with patient's mother and .   assisting patient with follow-up with pain management specialist.  VTE Risk Mitigation (From admission, onward)           Ordered     IP VTE HIGH RISK PATIENT  Once         06/16/24 2246     Place sequential compression device  Until discontinued         06/16/24 2246                    Discharge Planning   ZACARIAS: 6/21/2024     Code Status: Full Code   Is the patient medically ready for discharge?:     Reason for patient still in hospital (select all that apply): Patient trending condition and Consult recommendations  Discharge Plan A: Home with family            Critical care time spent on the evaluation and treatment of severe organ dysfunction, review of pertinent labs and imaging studies, discussions with consulting providers and  discussions with patient/family: 33 minutes.      Evens Richter MD  Department of Hospital Medicine   [facility]

## 2024-06-19 NOTE — PT/OT/SLP EVAL
Occupational Therapy   Evaluation    Name: Hernan Badillo  MRN: [id number]  Admitting Diagnosis: Encephalopathy, toxic  Recent Surgery: * No surgery found *      Recommendations:     Discharge Recommendations: Moderate Intensity Therapy  Discharge Equipment Recommendations:  to be determined by next level of care  Barriers to discharge:  Decreased caregiver support    Assessment:     Hernan Badillo is a 58 y.o. male with a medical diagnosis of Encephalopathy, toxic.  Pt was agreeable to OT this AM. Performance deficits affecting function: weakness, impaired endurance, impaired self care skills, impaired functional mobility, gait instability, impaired balance, decreased coordination, decreased upper extremity function, decreased lower extremity function, decreased safety awareness, decreased ROM, impaired coordination, impaired cardiopulmonary response to activity.      Pt demonstrated impulsivity and lack of safety awareness throughout session as evidenced by trying to stand up and walk out of the room quickly to demonstrate his ability to walk.       Rehab Prognosis: Good; patient would benefit from acute skilled OT services to address these deficits and reach maximum level of function.       Plan:     Patient to be seen 5 x/week to address the above listed problems via self-care/home management, therapeutic activities, therapeutic exercises  Plan of Care Expires: 07/19/24  Plan of Care Reviewed with: patient, mother    Subjective     Chief Complaint: none stated  Patient/Family Comments/goals: none stated    Occupational Profile:  Living Environment: Pt lives with his young son in a trailer. Bathroom includes a WIS with a showerchair and grab bars  Previous level of function: Independent in mobility and ADLs; Pt reports his son puts on his socks/shoes for him.   Roles and Routines: cooking, cleaning, childcare   Equipment Used at Home: oxygen  Assistance upon Discharge: limited    Pain/Comfort:  Pain Rating 1:  0/10    Objective:     Communicated with: nurse prior to session.  Patient found sitting edge of bed with blood pressure cuff, telemetry, peripheral IV, pulse ox (continuous), oxygen upon OT entry to room.    General Precautions: Standard, fall  Orthopedic Precautions: N/A  Braces: N/A  Respiratory Status: Nasal cannula, flow 3 L/min    Occupational Performance:    Bed Mobility:    Patient completed Supine to Sit with supervision from nursing as observed by OT prior to entering     Functional Mobility/Transfers:  Patient completed Sit <> Stand Transfer with contact guard assistance  with  rolling walker.  Functional Mobility: Pt completed side steps with Min A to the left while standing EOB. Pt using bed as support behind legs due to R side weakness secondary to prior CVA.     Activities of Daily Living:  Feeding:  set up assist to eat breakfast while seated EOB.   Grooming: contact guard assistance standing EOB for oral care on elevated tray table with no AD.   Lower Body Dressing: total assistance to don/doff socks while seated EOB.     Cognitive/Visual Perceptual:  Cognitive/Psychosocial Skills:     -       Follows Commands/attention:Follows two-step commands  -       Communication: clear/fluent  -       Memory: No Deficits noted  -       Safety awareness/insight to disability: impaired   -       Mood/Affect/Coping skills/emotional control: Appropriate to situation, Cooperative, and Pleasant    Physical Exam:  Balance:    -       Sitting balance SBA; standing balance CGA  Upper Extremity Range of Motion:     -       Right Upper Extremity: WFL  -       Left Upper Extremity: WFL  Upper Extremity Strength:    -       Right Upper Extremity: WFL except weaker than LUE secondary to prior CVA  -       Left Upper Extremity: WFL   Strength:    -       Right Upper Extremity: WFL except weaker than LUE secondary to prior CVA  -       Left Upper Extremity: WFL  Fine Motor Coordination:    -       Intact  Gross motor  coordination:   WFL    [facility] 6 Click ADL:  [facility] Total Score: 19    Treatment & Education:  Pt educated on role of OT/POC, importance of OOB/EOB activity, use of call bell, and safety during ADLs, transfers, and functional mobility.       Patient left sitting edge of bed with all lines intact, call button in reach, and mother present    GOALS:   Multidisciplinary Problems       Occupational Therapy Goals          Problem: Occupational Therapy    Goal Priority Disciplines Outcome Interventions   Occupational Therapy Goal     OT, PT/OT     Description: Goals to be met by: 7/19/24     Patient will increase functional independence with ADLs by performing:    UE Dressing with Supervision.  LE Dressing with Minimal Assistance and with AD as needed.  Grooming while standing with Supervision.  Toileting from toilet with Supervision for hygiene and clothing management.   Toilet transfer to toilet with Stand-by Assistance.                         History:     Past Medical History:   Diagnosis Date    COPD (chronic obstructive pulmonary disease)     Diabetes mellitus, type 2     Hypertension          Past Surgical History:   Procedure Laterality Date    DENTAL SURGERY      ESOPHAGOGASTRODUODENOSCOPY N/A 6/1/2020    Procedure: EGD (ESOPHAGOGASTRODUODENOSCOPY);  Surgeon: Terrell May MD;  Location: [facility];  Service: Endoscopy;  Laterality: N/A;    HERNIA REPAIR      left inguinal    incision and drainiage         Time Tracking:     OT Date of Treatment: 06/19/24  OT Start Time: 1024  OT Stop Time: 1058  OT Total Time (min): 34 min    Billable Minutes:Evaluation 10  Self Care/Home Management 24 6/19/2024

## 2024-06-19 NOTE — PROGRESS NOTES
[facility]  Department of Cardiology  Consult Note      PATIENT NAME: Hernan Badillo  MRN: [id number]  TODAY'S DATE: 06/19/2024  ADMIT DATE: 6/16/2024                          CONSULT REQUESTED BY: Evens Richter MD    SUBJECTIVE     PRINCIPAL PROBLEM: Encephalopathy, toxic      REASON FOR CONSULT:  NSTEMI    6/19/24    Resting in bed. NAD.  VSS.  Denies cp or complaints.      6/17  Patient was recently extubated today and is currently very lethargic.  History obtained from medical record as below    HPI:  Patient is 50-year-old  male with past medical history of COPD, DM, hypertension, who was found by family members nearly unresponsive last night.      Patient was given Narcan per EMS.  Drug screen was positive for opiates and benzos. Upon presentation in the ER he had pinpoint pupils and was not protecting his airway thus was intubated.      Neurosurgery was consulted for abnormal CT/CTA head and neck showing: punctate hyperdensity in the right sylvian fissure. No AVM or aneurysm seen. No hydrocephalus or midline shift.  No intervention required at this point    Review of patient's allergies indicates:  No Known Allergies    Past Medical History:   Diagnosis Date    COPD (chronic obstructive pulmonary disease)     Diabetes mellitus, type 2     Hypertension      Past Surgical History:   Procedure Laterality Date    DENTAL SURGERY      ESOPHAGOGASTRODUODENOSCOPY N/A 6/1/2020    Procedure: EGD (ESOPHAGOGASTRODUODENOSCOPY);  Surgeon: Terrell May MD;  Location: [facility];  Service: Endoscopy;  Laterality: N/A;    HERNIA REPAIR      left inguinal    incision and drainiage       Social History     Tobacco Use    Smoking status: Every Day     Current packs/day: 2.00     Average packs/day: 2.0 packs/day for 30.0 years (60.0 ttl pk-yrs)     Types: Cigarettes    Smokeless tobacco: Never   Substance Use Topics    Alcohol use: Yes     Alcohol/week: 1.0 standard drink of alcohol     Types: 1  Shots of liquor per week     Comment: 1/2 pint 2 x per week - quit approximately 5 months ago    Drug use: No        REVIEW OF SYSTEMS  Unable to obtain    OBJECTIVE     VITAL SIGNS (Most Recent)  Temp: 97.6 °F (36.4 °C) (06/19/24 1130)  Pulse: 79 (06/19/24 1400)  Resp: (!) 24 (06/19/24 1400)  BP: 112/65 (06/19/24 1400)  SpO2: 97 % (06/19/24 1400)    VENTILATION STATUS  Resp: (!) 24 (06/19/24 1400)  SpO2: 97 % (06/19/24 1400)           I & O (Last 24H):  Intake/Output Summary (Last 24 hours) at 6/19/2024 1552  Last data filed at 6/19/2024 0632  Gross per 24 hour   Intake 1591.67 ml   Output 250 ml   Net 1341.67 ml       WEIGHTS  Wt Readings from Last 3 Encounters:   06/17/24 0121 64.3 kg (141 lb 12.1 oz)   06/16/24 2115 59 kg (130 lb)   06/18/24 0850 64.3 kg (141 lb 12.1 oz)   05/13/24 0931 63.5 kg (140 lb)       PHYSICAL EXAM    GENERAL:  Middle-aged male who appears older than chronological age resting in bed, not responding to questions, mumbling in response   HEENT: Normocephalic.    NECK: No JVD.   CARDIAC: Regular rate and rhythm. S1 is normal.S2 is normal.No gallops, clicks or murmurs noted at this time.  CHEST ANATOMY: normal.   LUNGS: scattered rhonchi anteriorly  ABDOMEN: Soft, nondistended .  Normal bowel sounds.    EXTREMITIES: No edema  CENTRAL NERVOUS SYSTEM:  Drowsy  SKIN:  Dry flaky and peeling    HOME MEDICATIONS:  No current facility-administered medications on file prior to encounter.     Current Outpatient Medications on File Prior to Encounter   Medication Sig Dispense Refill    acetaminophen (TYLENOL) 500 mg Cap Take 500 mg by mouth 3 (three) times daily as needed for Pain.      albuterol (PROVENTIL HFA) 90 mcg/actuation inhaler Inhale 2 puffs into the lungs every 6 (six) hours as needed for Wheezing. Rescue 18 g 0    albuterol (PROVENTIL) 2.5 mg /3 mL (0.083 %) nebulizer solution Take 2.5 mg by nebulization every 6 (six) hours as needed for Wheezing. Rescue      amLODIPine (NORVASC) 10 MG  tablet Take 1 tablet (10 mg total) by mouth once daily. 90 tablet 3    aspirin (ECOTRIN) 81 MG EC tablet Take 1 tablet (81 mg total) by mouth once daily. 360 tablet 0    metFORMIN (GLUCOPHAGE-XR) 500 MG ER 24hr tablet Take 1 tablet (500 mg total) by mouth daily with breakfast. 90 tablet 3    pregabalin (LYRICA) 100 MG capsule Take 1 capsule (100 mg total) by mouth 2 (two) times daily. 60 capsule 0    blood sugar diagnostic, disc Strp 1 each by Misc.(Non-Drug; Combo Route) route 4 (four) times daily. 200 strip 0    blood-glucose meter kit Use as instructed 1 each 0    lancets (LANCETS,THIN) Misc 1 each by Misc.(Non-Drug; Combo Route) route 4 (four) times daily. 200 each 0    lisinopriL 10 MG tablet Take 1 tablet (10 mg total) by mouth once daily. (Patient not taking: Reported on 6/17/2024) 90 tablet 3    nicotine (NICODERM CQ) 21 mg/24 hr Place 1 patch onto the skin once daily. (Patient not taking: Reported on 6/17/2024) 30 patch 0       SCHEDULED MEDS:   albuterol-ipratropium  3 mL Nebulization Q6H WAKE    levoFLOXacin  750 mg Oral Daily    LIDOcaine (PF) 20 mg/ml (2%)  10 mL Nebulization Once    mupirocin   Nasal BID    pantoprazole  40 mg Oral Daily    pregabalin  100 mg Oral BID       CONTINUOUS INFUSIONS:   sodium chloride 0.9%   Intravenous Continuous 100 mL/hr at 06/19/24 1540 New Bag at 06/19/24 1540       PRN MEDS:  Current Facility-Administered Medications:     acetaminophen, 650 mg, Oral, Q8H PRN    acetaminophen, 650 mg, Oral, Q4H PRN    aluminum-magnesium hydroxide-simethicone, 30 mL, Oral, QID PRN    calcium gluconate IVPB, 1 g, Intravenous, PRN    calcium gluconate IVPB, 2 g, Intravenous, PRN    calcium gluconate IVPB, 3 g, Intravenous, PRN    dextrose 50%, 12.5 g, Intravenous, PRN    glucagon (human recombinant), 1 mg, Intramuscular, PRN    glucose, 16 g, Oral, PRN    glucose, 24 g, Oral, PRN    hydrALAZINE, 10 mg, Intravenous, Q2H PRN    HYDROcodone-acetaminophen, 1 tablet, Oral, Q6H PRN    insulin  "aspart U-100, 0-10 Units, Subcutaneous, Q6H PRN    magnesium oxide, 800 mg, Oral, PRN    magnesium oxide, 800 mg, Oral, PRN    magnesium sulfate IVPB, 2 g, Intravenous, PRN    magnesium sulfate IVPB, 4 g, Intravenous, PRN    melatonin, 6 mg, Oral, Nightly PRN    naloxone, 0.02 mg, Intravenous, PRN    ondansetron, 4 mg, Intravenous, Q6H PRN    potassium bicarbonate, 35 mEq, Oral, PRN    potassium bicarbonate, 50 mEq, Oral, PRN    potassium bicarbonate, 60 mEq, Oral, PRN    potassium chloride, 40 mEq, Intravenous, PRN **AND** potassium chloride, 60 mEq, Intravenous, PRN **AND** potassium chloride, 80 mEq, Intravenous, PRN    potassium, sodium phosphates, 2 packet, Oral, PRN    potassium, sodium phosphates, 2 packet, Oral, PRN    potassium, sodium phosphates, 2 packet, Oral, PRN    sodium chloride 0.9%, 10 mL, Intravenous, Q12H PRN    LABS AND DIAGNOSTICS     CBC LAST 3 DAYS  Recent Labs   Lab 06/17/24  0339 06/17/24  0432 06/18/24  0324 06/19/24  0343   WBC 8.15  --  13.33* 10.01   RBC 3.81*  --  3.30* 2.71*   HGB 11.5*  --  10.0* 8.1*   HCT 36.8* 35* 31.5* 25.9*   MCV 97  --  96 96   MCH 30.2  --  30.3 29.9   MCHC 31.3*  --  31.7* 31.3*   RDW 14.3  --  14.5 14.5     --  168 162   MPV 11.7  --  10.7 10.4   GRAN 83.8*  6.8  --  85.2*  11.4* 80.8*  8.1*   LYMPH 9.4*  0.8*  --  6.8*  0.9* 10.3*  1.0   MONO 6.3  0.5  --  7.1  1.0 7.8  0.8   BASO 0.01  --  0.02 0.01   NRBC 0  --  0 0       COAGULATION LAST 3 DAYS  No results for input(s): "LABPT", "INR", "APTT" in the last 168 hours.    CHEMISTRY LAST 3 DAYS  Recent Labs   Lab 06/16/24  2304 06/17/24  0339 06/17/24  0432 06/17/24  0945 06/18/24  0324 06/19/24  0343   NA  --  142  --   --  140 141   K  --  4.9  --   --  3.6 3.8   CL  --  99  --   --  103 107   CO2  --  39*  --   --  33* 30*   ANIONGAP  --  4*  --   --  4* 4*   BUN  --  10  --   --  10 11   CREATININE  --  0.7  --   --  0.6 0.5   GLU  --  146*  --   --  105 89   CALCIUM  --  9.3  --   --  " 8.4* 8.3*   PH 7.406  --  7.411 7.446  --   --    MG  --  1.6  --   --  1.7 1.6   ALBUMIN  --  3.5  --   --  2.9* 2.6*   PROT  --  7.1  --   --  5.7* 5.1*   ALKPHOS  --  60  --   --  47* 43*   ALT  --  5*  --   --  <3* <3*   AST  --  14  --   --  9* 7*   BILITOT  --  0.7  --   --  0.7 0.7       CARDIAC PROFILE LAST 3 DAYS  Recent Labs   Lab 06/16/24  2130   BNP 93       ENDOCRINE LAST 3 DAYS  Recent Labs   Lab 06/16/24  2224   PROCAL 0.206       LAST ARTERIAL BLOOD GAS  ABG  Recent Labs   Lab 06/17/24  0945   PH 7.446   PO2 74*   PCO2 48.5*   HCO3 33.4*   BE 9*       LAST 7 DAYS MICROBIOLOGY   Microbiology Results (last 7 days)       Procedure Component Value Units Date/Time    Urine culture [[id number]] Collected: 06/17/24 1422    Order Status: Completed Specimen: Urine Updated: 06/19/24 0732     Urine Culture, Routine No growth to date    Narrative:      Specimen Source->Urine    Culture, Respiratory with Gram Stain [[id number]]  (Abnormal)  (Susceptibility) Collected: 06/17/24 0057    Order Status: Completed Specimen: Respiratory from Sputum, Expectorated Updated: 06/19/24 0723     Respiratory Culture PSEUDOMONAS AERUGINOSA  Moderate       Gram Stain (Respiratory) <10 epithelial cells per low power field.     Gram Stain (Respiratory) Many WBC's     Gram Stain (Respiratory) Few Gram positive cocci     Gram Stain (Respiratory) Few Gram negative rods    Blood culture x two cultures. Draw prior to antibiotics. [[id number]] Collected: 06/16/24 2209    Order Status: Completed Specimen: Blood Updated: 06/18/24 2232     Blood Culture, Routine No Growth to date      No Growth to date      No Growth to date    Narrative:      Aerobic and anaerobic    Blood culture x two cultures. Draw prior to antibiotics. [[id number]] Collected: 06/16/24 2209    Order Status: Completed Specimen: Blood Updated: 06/18/24 2232     Blood Culture, Routine No Growth to date      No Growth to date      No Growth to date    Narrative:       Aerobic and anaerobic    Blood culture [[id number]] Collected: 06/17/24 1550    Order Status: Completed Specimen: Blood Updated: 06/18/24 1632     Blood Culture, Routine No Growth to date      No Growth to date    Blood culture [[id number]] Collected: 06/17/24 1550    Order Status: Completed Specimen: Blood Updated: 06/18/24 1632     Blood Culture, Routine No Growth to date      No Growth to date            MOST RECENT IMAGING  Echo    Left Ventricle: The left ventricle is normal in size. There is mild   concentric hypertrophy. There is normal systolic function with a visually   estimated ejection fraction of 55 - 60%.    Limited Echo.      ECHOCARDIOGRAM RESULTS (last 5)  Results for orders placed during the hospital encounter of 04/07/24    Echo    Interpretation Summary    Left Ventricle: The left ventricle is normal in size. Moderately increased wall thickness. There is moderate concentric hypertrophy. Normal wall motion. There is low normal systolic function with a visually estimated ejection fraction of 50 - 55%. There is normal diastolic function.    Right Ventricle: Normal right ventricular cavity size. Wall thickness is normal. Right ventricle wall motion  is normal. Systolic function is normal.    IVC/SVC: Normal venous pressure at 3 mmHg.      Results for orders placed during the hospital encounter of 12/03/23    Echo Saline Bubble? Yes    Interpretation Summary    Left Ventricle: The left ventricle is normal in size. Mildly increased wall thickness. There is mild concentric hypertrophy. Normal wall motion. There is normal systolic function. Ejection fraction by visual approximation is 60%. Grade I diastolic dysfunction.    Right Ventricle: Normal right ventricular cavity size. Wall thickness is normal. Right ventricle wall motion  is normal. Systolic function is normal.    IVC/SVC: Normal venous pressure at 3 mmHg.    There was agitated saline (bubble) used. Study is negative for shunt.      Results for  orders placed during the hospital encounter of 11/22/22    Echo    Interpretation Summary  · The left ventricle is normal in size with concentric hypertrophy and mildly decreased systolic function.  · The estimated ejection fraction is 45%.  · Grade I left ventricular diastolic dysfunction.  · Normal right ventricular size with mildly reduced right ventricular systolic function.  · The quantitatively derived ejection fraction is 45%.  · Normal central venous pressure (3 mmHg).      CURRENT/PREVIOUS VISIT EKG  Results for orders placed or performed during the hospital encounter of 06/16/24   EKG 12-lead    Collection Time: 06/16/24  9:37 PM   Result Value Ref Range    QRS Duration 90 ms    [facility] QTC Calculation 469 ms    Narrative    Test Reason : R07.9,    Vent. Rate : 107 BPM     Atrial Rate : 107 BPM     P-R Int : 146 ms          QRS Dur : 090 ms      QT Int : 352 ms       P-R-T Axes : 069 080 023 degrees     QTc Int : 469 ms    Sinus tachycardia  T wave abnormality, consider inferior ischemia  Abnormal ECG  When compared with ECG of 08-APR-2024 09:24,  Vent. rate has increased BY  46 BPM  T wave inversion now evident in Inferior leads    Referred By: [email or web address]   SELF           Confirmed By:            ASSESSMENT/PLAN:     Active Hospital Problems    Diagnosis    *Encephalopathy, toxic    NSTEMI (non-ST elevated myocardial infarction)    Bleeding in brain, 4 mm, right sylvian fissure    Respiratory failure, unspecified with hypoxia, due to toxic/metabolic encephalopathy    History of stroke    COPD (chronic obstructive pulmonary disease)    Primary hypertension    DM (diabetes mellitus), type 2       ASSESSMENT & PLAN:   Encephalopathy  ARF  Elevated troponin  ?Drug overdose  Hypertension  COPD  DM 2  History of stroke     RECOMMENDATIONS:    Denies CP.  Cannot have antiplatelet/anticoagulation due to cerebral bleed.  Recommend aspirin when cleared by neurosurgery.  Troponin downtrended.     ECHO is stable this  admission.  EF 55-60%.      Can follow up outpatient with cardiology for stress testing outpatient.  No further recommendations at this time.  We will sign off.     Lauryn Meyer NP  [facility]  Department of Cardiology  Date of Service: 06/19/2024      I have personally interviewed and examined the patient, I have reviewed the Nurse Practitioner's history and physical, assessment, and plan. I have personally evaluated the patient at bedside and agree with the findings and made appropriate changes as necessary in recommendations.    Patria Sotomayor MD  Department of Cardiology  [facility]  6/19/24

## 2024-06-19 NOTE — PROGRESS NOTES
[facility]  Progress Note    Patient Name: Hernan Badillo  MRN: [id number]  Patient Class: IP- Inpatient   Admission Date: 6/16/2024  Length of Stay: 3 days  Attending Physician: Evens Richter MD  Primary Care Provider: Toan Banks DO        Subjective:     Principal Problem:Encephalopathy, toxic        HPI:  History is taken from medical records.  Patient currently intubated.  It is reported that family found the patient to be very somnolent and almost unresponsive.  EMS was alerted and gave the patient Narcan.  Apparently this helped to woken up the patient but the patient was still very drowsy.  In the ER another round of Narcan was given but the patient continued to become altered and subsequently was intubated for airway management.  Family denies any history of drug use but urinary drug screen was positive for opioids and benzodiazepine.  Patient's pupils still pinpoint.  Stable on the ventilator.  Hospitalist is asked to admit this patient for suspected drug overdose.  Family not in the room.    Overview/Hospital Course:  Patient admitted to intensive care unit where patient was maintained on mechanical ventilation.  Subsequent CT scan of the head report stable findings and no further bleeding or especially of bleeding noted.  Pulmonary critical care medicine closely followed the patient and maintain patient on intravenous antibiotic therapy for possible pneumonia.  Subsequently patient was successfully extubated.  No focal neuro deficits noted.  Sputum cultures grew Pseudomonas species.  Also patient noted to have elevated serum troponins for which Cardiology team was consulted.  Cardiology recommended future need for ischemic workup per evaluation of coronary artery disease.    Interval History:  Patient successfully extubated. Patient's mother is present at bedside.  Patient denies any focal subjective complaint.    Review of Systems   Unable to perform ROS: Intubated  (patient is unresponsive)     Objective:     Vital Signs (Most Recent):  Temp: 98.2 °F (36.8 °C) (06/19/24 0745)  Pulse: 72 (06/19/24 0759)  Resp: 20 (06/19/24 0759)  BP: (!) 155/82 (06/19/24 0759)  SpO2: 99 % (06/19/24 0759) Vital Signs (24h Range):  Temp:  [97.6 °F (36.4 °C)-98.3 °F (36.8 °C)] 98.2 °F (36.8 °C)  Pulse:  [72-97] 72  Resp:  [3-37] 20  SpO2:  [88 %-100 %] 99 %  BP: (101-160)/(56-82) 155/82     Weight: 64.3 kg (141 lb 12.1 oz)  Body mass index is 22.88 kg/m².    Intake/Output Summary (Last 24 hours) at 6/19/2024 0821  Last data filed at 6/19/2024 0632  Gross per 24 hour   Intake 3016.67 ml   Output 530 ml   Net 2486.67 ml         Physical Exam  Constitutional:       Appearance: Normal appearance.   HENT:      Head: Normocephalic and atraumatic.      Nose: Nose normal.   Eyes:      Pupils: Pupils are equal, round, and reactive to light.   Cardiovascular:      Rate and Rhythm: Normal rate and regular rhythm.   Pulmonary:      Effort: Pulmonary effort is normal.   Abdominal:      Palpations: Abdomen is soft.   Musculoskeletal:      Cervical back: Neck supple.   Skin:     General: Skin is warm.   Neurological:      Mental Status: He is alert and oriented to person, place, and time.             Significant Labs: All pertinent labs within the past 24 hours have been reviewed.  CBC:   Recent Labs   Lab 06/18/24  0324 06/19/24  0343   WBC 13.33* 10.01   HGB 10.0* 8.1*   HCT 31.5* 25.9*    162     CMP:   Recent Labs   Lab 06/18/24  0324 06/19/24  0343    141   K 3.6 3.8    107   CO2 33* 30*    89   BUN 10 11   CREATININE 0.6 0.5   CALCIUM 8.4* 8.3*   PROT 5.7* 5.1*   ALBUMIN 2.9* 2.6*   BILITOT 0.7 0.7   ALKPHOS 47* 43*   AST 9* 7*   ALT <3* <3*   ANIONGAP 4* 4*     Lactic Acid:   Recent Labs   Lab 06/17/24  1825 06/17/24 2233   LACTATE 2.2* 1.5     Troponin:   Recent Labs   Lab 06/17/24  2231 06/18/24  0324 06/18/24  0837   TROPONINIHS 152.8* 143.9* 119.4*     TSH:   Recent Labs    Lab 04/07/24  1925   TSH 0.706     Urine Studies:   Recent Labs   Lab 06/17/24  1422   COLORU Yellow   APPEARANCEUA Clear   PHUR 7.0   SPECGRAV >1.030*   PROTEINUA 1+*   GLUCUA Negative   KETONESU 2+*   BILIRUBINUA Negative   OCCULTUA 3+*   NITRITE Negative   UROBILINOGEN 4.0-6.0*   LEUKOCYTESUR Trace*   RBCUA >100*   WBCUA 14*   BACTERIA Occasional   HYALINECASTS 0     Microbiology Results (last 7 days)       Procedure Component Value Units Date/Time    Urine culture [[id number]] Collected: 06/17/24 1422    Order Status: Completed Specimen: Urine Updated: 06/19/24 0732     Urine Culture, Routine No growth to date    Narrative:      Specimen Source->Urine    Culture, Respiratory with Gram Stain [[id number]]  (Abnormal)  (Susceptibility) Collected: 06/17/24 0057    Order Status: Completed Specimen: Respiratory from Sputum, Expectorated Updated: 06/19/24 0723     Respiratory Culture PSEUDOMONAS AERUGINOSA  Moderate       Gram Stain (Respiratory) <10 epithelial cells per low power field.     Gram Stain (Respiratory) Many WBC's     Gram Stain (Respiratory) Few Gram positive cocci     Gram Stain (Respiratory) Few Gram negative rods    Blood culture x two cultures. Draw prior to antibiotics. [[id number]] Collected: 06/16/24 2209    Order Status: Completed Specimen: Blood Updated: 06/18/24 2232     Blood Culture, Routine No Growth to date      No Growth to date      No Growth to date    Narrative:      Aerobic and anaerobic    Blood culture x two cultures. Draw prior to antibiotics. [[id number]] Collected: 06/16/24 2209    Order Status: Completed Specimen: Blood Updated: 06/18/24 2232     Blood Culture, Routine No Growth to date      No Growth to date      No Growth to date    Narrative:      Aerobic and anaerobic    Blood culture [[id number]] Collected: 06/17/24 1550    Order Status: Completed Specimen: Blood Updated: 06/18/24 1632     Blood Culture, Routine No Growth to date      No Growth to date    Blood culture  [[id number]] Collected: 06/17/24 1550    Order Status: Completed Specimen: Blood Updated: 06/18/24 1632     Blood Culture, Routine No Growth to date      No Growth to date          Significant Imaging:   ECHO (4/8/24):    Left Ventricle: The left ventricle is normal in size. Moderately increased wall thickness. There is moderate concentric hypertrophy. Normal wall motion. There is low normal systolic function with a visually estimated ejection fraction of 50 - 55%. There is normal diastolic function.    Right Ventricle: Normal right ventricular cavity size. Wall thickness is normal. Right ventricle wall motion  is normal. Systolic function is normal.    IVC/SVC: Normal venous pressure at 3 mmHg.     CXR:  There is an endotracheal tube with the tip located approximately 5 cm proximal to the ignacia. There is a nasogastric tube coursing into the abdomen. The lungs are well expanded. Pulmonary parenchyma is unchanged from prior. The pleural spaces are clear. Cardiac silhouette is unremarkable. Osseous structures are intact.     CTA Head and Neck:  Small subtle 4-mm hyperdensity along the anterior aspect of the right sylvian fissure.  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  Consider short-term repeat follow-up head CT to ensure stability.  No evidence of large vessel occlusion at the level of the [id number]-oj-Heiuja.  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      CT Head:  Small subtle 4-mm hyperdensity along the anterior aspect of the right sylvian fissure.  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  Consider  short-term repeat follow-up head CT to ensure stability.  No evidence of large vessel occlusion at the level of the wxlkgu-[id number]-Jyjiyv.  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    CT Chest abdomen and pelvis without contrast:  Multiple pulmonary nodules are noted throughout the lungs, more prominent involving the lower lobes, right greater than left, there are also mild patchy pulmonary opacities, these findings may relate to infectious/inflammatory etiology, however clinical and historical correlation is otherwise needed.  Prominent peribronchial thickening at the lower lobes bilaterally, with areas of bronchial opacity that may relate to mucous plugging, secretions or aspiration.  Mild pericardial effusion.  Air within the urinary bladder can be seen with infection however likely relates to Ann catheter, clinical correlation otherwise needed.  Layering density of the gallbladder likely relates to cholelithiasis, there is no pericholecystic inflammatory change.    CXR: No significant interval change.     Repeat CT Head: No significant interval change.       Assessment/Plan:      * Encephalopathy, toxic  Suspect drug overdose.  One dose of empiric antibiotics given  Post Narcan therapy with some responsiveness.  CT of the head is negative.  Supportive care with mechanical ventilation  Hopefully patient will be extubated today.  Chest x-ray chest x-ray in the morning  Continue neuro checks.    Respiratory failure, unspecified with hypoxia, due to toxic/metabolic encephalopathy  S/p extubation.  Follow Pulmonary recommendations.  Supplemental oxygen was provided and noted-      Patient has been started on intravenous ceftriaxone and azithromycin for possible pneumonitis.  Discussed with Dr. Newton.    Bleeding in brain, 4 mm, right sylvian fissure  Hold antiplatelet therapy.  Neurosurgery following.  Keep head end of bed elevated at  30°.  Repeat CT head-stable  Maintain systolic blood pressure under 150 mmHg  Continue neuro checks q.1 hour.  Follow Neurology recommendations.      NSTEMI (non-ST elevated myocardial infarction)  Tele monitoring.  Obtain 2D echocardiogram.  EKG reviewed.  Consult cardiologist.  Unable to initiate antiplatelet therapy or anticoagulation therapy due to cerebral bleeding.  Trend serum troponins.      History of stroke    Continue aspirin    COPD (chronic obstructive pulmonary disease)  No sign of exacerbation   No steroids  Breathing treatments p.r.n.      Primary hypertension  Continue amlodipine 10 mg daily  Continue lisinopril 10 mg daily  P.r.n. hydralazine    DM (diabetes mellitus), type 2  Hold oral medication   Insulin sliding scale   A1c: 6.5      VTE Risk Mitigation (From admission, onward)           Ordered     IP VTE HIGH RISK PATIENT  Once         06/16/24 2246     Place sequential compression device  Until discontinued         06/16/24 2246                    Discharge Planning   ZACARIAS: 6/21/2024     Code Status: Full Code   Is the patient medically ready for discharge?:     Reason for patient still in hospital (select all that apply): {HMREASONPATIENTINHOSP:66592}  Discharge Plan A: Home with family            Critical care time spent on the evaluation and treatment of severe organ dysfunction, review of pertinent labs and imaging studies, discussions with consulting providers and discussions with patient/family: *** minutes.      Evens Richter MD  Department of Hospital Medicine   [facility]

## 2024-06-19 NOTE — PLAN OF CARE
Problem: Infection  Goal: Absence of Infection Signs and Symptoms  Outcome: Progressing     Problem: Adult Inpatient Plan of Care  Goal: Plan of Care Review  Outcome: Progressing  Goal: Patient-Specific Goal (Individualized)  Outcome: Progressing  Goal: Absence of Hospital-Acquired Illness or Injury  Outcome: Progressing  Goal: Optimal Comfort and Wellbeing  Outcome: Progressing  Goal: Readiness for Transition of Care  Outcome: Progressing     Problem: Diabetes Comorbidity  Goal: Blood Glucose Level Within Targeted Range  Outcome: Progressing     Problem: Skin Injury Risk Increased  Goal: Skin Health and Integrity  Outcome: Progressing     Problem: Fall Injury Risk  Goal: Absence of Fall and Fall-Related Injury  Outcome: Progressing

## 2024-06-19 NOTE — SUBJECTIVE & OBJECTIVE
Interval History:  Patient adamantly refusing skilled nursing facility.  Patient is mother present at bedside who feel frustrated.  Patient denies any shortness of breath although he is requiring 3-4 L of supplemental oxygen via nasal cannula.  Sputum culture finalized to Pseudomonas species sensitive to quinolones.  Patient is on Levaquin.  Currently afebrile.  Patient transferred to medicine floor.    Review of Systems   Constitutional:  Positive for fatigue.   Respiratory:  Positive for cough and shortness of breath.    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    Intake/Output Summary (Last 24 hours) at 6/19/2024 0821  Last data filed at 6/19/2024 0632  Gross per 24 hour   Intake 3016.67 ml   Output 530 ml   Net 2486.67 ml         Physical Exam  Constitutional:       Appearance: Normal appearance.   HENT:      Head: Normocephalic and atraumatic.      Nose: Nose normal.   Eyes:      Pupils: Pupils are equal, round, and reactive to light.   Cardiovascular:      Rate and Rhythm: Normal rate and regular rhythm.   Pulmonary:      Effort: Pulmonary effort is normal.   Abdominal:      Palpations: Abdomen is soft.   Musculoskeletal:      Cervical back: Neck supple.   Skin:     General: Skin is warm.   Neurological:      Mental Status: He is alert and oriented to person, place, and time.             Significant Labs: All pertinent labs within the past 24 hours have been reviewed.  CBC:   Recent Labs   Lab 06/18/24  0324 06/19/24  0343   WBC  Per JASWANT Roy: Yes ok to delay f/u with MD until August, however please make pt aware that she is overdue for Bilateral mammo ( was due 7/2022) she should have these done asap.       Per Epic review: patient is currently scheduled for screening mammogram on 2/21/23.      10:20 AM:  Spoke with patient and advised of recommendations per JASWANT Roy.  Encouraged patient to keep upcoming mammogram appointment.  Patient verbalized understanding and has no other questions or concerns at this time.   13.33* 10.01   HGB 10.0* 8.1*   HCT 31.5* 25.9*    162     CMP:   Recent Labs   Lab 06/18/24  0324 06/19/24  0343    141   K 3.6 3.8    107   CO2 33* 30*    89   BUN 10 11   CREATININE 0.6 0.5   CALCIUM 8.4* 8.3*   PROT 5.7* 5.1*   ALBUMIN 2.9* 2.6*   BILITOT 0.7 0.7   ALKPHOS 47* 43*   AST 9* 7*   ALT <3* <3*   ANIONGAP 4* 4*     Lactic Acid:   Recent Labs   Lab 06/17/24  1825 06/17/24  2233   LACTATE 2.2* 1.5     Troponin:   Recent Labs   Lab 06/17/24  2231 06/18/24 0324 06/18/24  0837   TROPONINIHS 152.8* 143.9* 119.4*     TSH:   Recent Labs   Lab 04/07/24  1925   TSH 0.706     Urine Studies:   Recent Labs   Lab 06/17/24  1422   COLORU Yellow   APPEARANCEUA Clear   PHUR 7.0   SPECGRAV >1.030*   PROTEINUA 1+*   GLUCUA Negative   KETONESU 2+*   BILIRUBINUA Negative   OCCULTUA 3+*   NITRITE Negative   UROBILINOGEN 4.0-6.0*   LEUKOCYTESUR Trace*   RBCUA >100*   WBCUA 14*   BACTERIA Occasional   HYALINECASTS 0     Microbiology Results (last 7 days)       Procedure Component Value Units Date/Time    Urine culture [[id number]] Collected: 06/17/24 1422    Order Status: Completed Specimen: Urine Updated: 06/19/24 0732     Urine Culture, Routine No growth to date    Narrative:      Specimen Source->Urine    Culture, Respiratory with Gram Stain [[id number]]  (Abnormal)  (Susceptibility) Collected: 06/17/24 0057    Order Status: Completed Specimen: Respiratory from Sputum, Expectorated Updated: 06/19/24 0723     Respiratory Culture PSEUDOMONAS AERUGINOSA  Moderate       Gram Stain (Respiratory) <10 epithelial cells per low power field.     Gram Stain (Respiratory) Many WBC's     Gram Stain (Respiratory) Few Gram positive cocci     Gram Stain (Respiratory) Few Gram negative rods    Blood culture x two cultures. Draw prior to antibiotics. [[id number]] Collected: 06/16/24 2209    Order Status: Completed Specimen: Blood Updated: 06/18/24 3431     Blood Culture, Routine No Growth to date      No Growth  to date      No Growth to date    Narrative:      Aerobic and anaerobic    Blood culture x two cultures. Draw prior to antibiotics. [[id number]] Collected: 06/16/24 2209    Order Status: Completed Specimen: Blood Updated: 06/18/24 2232     Blood Culture, Routine No Growth to date      No Growth to date      No Growth to date    Narrative:      Aerobic and anaerobic    Blood culture [[id number]] Collected: 06/17/24 1550    Order Status: Completed Specimen: Blood Updated: 06/18/24 1632     Blood Culture, Routine No Growth to date      No Growth to date    Blood culture [[id number]] Collected: 06/17/24 1550    Order Status: Completed Specimen: Blood Updated: 06/18/24 1632     Blood Culture, Routine No Growth to date      No Growth to date          Significant Imaging:   ECHO (4/8/24):    Left Ventricle: The left ventricle is normal in size. Moderately increased wall thickness. There is moderate concentric hypertrophy. Normal wall motion. There is low normal systolic function with a visually estimated ejection fraction of 50 - 55%. There is normal diastolic function.    Right Ventricle: Normal right ventricular cavity size. Wall thickness is normal. Right ventricle wall motion  is normal. Systolic function is normal.    IVC/SVC: Normal venous pressure at 3 mmHg.     CXR:  There is an endotracheal tube with the tip located approximately 5 cm proximal to the ignacia. There is a nasogastric tube coursing into the abdomen. The lungs are well expanded. Pulmonary parenchyma is unchanged from prior. The pleural spaces are clear. Cardiac silhouette is unremarkable. Osseous structures are intact.     CTA Head and Neck:  Small subtle 4-mm hyperdensity along the anterior aspect of the right sylvian fissure.  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  Consider short-term  repeat follow-up head CT to ensure stability.  No evidence of large vessel occlusion at the level of the [id number].  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      CT Head:  Small subtle 4-mm hyperdensity along the anterior aspect of the right sylvian fissure.  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  Consider short-term repeat follow-up head CT to ensure stability.  No evidence of large vessel occlusion at the level of the [id number]-[id number].  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    CT Chest abdomen and pelvis without contrast:  Multiple pulmonary nodules are noted throughout the lungs, more prominent involving the lower lobes, right greater than left, there are also mild patchy pulmonary opacities, these findings may relate to infectious/inflammatory etiology, however clinical and historical correlation is otherwise needed.  Prominent peribronchial thickening at the lower lobes bilaterally, with areas of bronchial opacity that may relate to mucous plugging, secretions or aspiration.  Mild pericardial effusion.  Air within the urinary bladder can be seen with infection however likely relates to Ann catheter, clinical correlation otherwise needed.  Layering density of the gallbladder likely relates to cholelithiasis, there is no pericholecystic inflammatory change.    CXR: No significant interval change.     Repeat CT Head: No significant interval change.

## 2024-06-19 NOTE — PT/OT/SLP PROGRESS
Physical Therapy Treatment    Patient Name:  Hernan Badillo   MRN:  [id number]    Recommendations:     Discharge Recommendations: Low Intensity Therapy  Discharge Equipment Recommendations: none  Barriers to discharge: None    Assessment:     Hernan Badillo is a 58 y.o. male admitted with a medical diagnosis of Encephalopathy, toxic.  He presents with the following impairments/functional limitations: weakness, impaired endurance, impaired self care skills, impaired functional mobility, gait instability, impaired balance, decreased upper extremity function, decreased lower extremity function, decreased safety awareness, impaired cardiopulmonary response to activity. Patient sitting EOB with mother present and is agreeable to participation with PT treatment. He does not remember working with PT yesterday and knee buckling. He requires SBA for sit to stand transfer with RW. RN obtained O2 tank for ambulation. He ambulated 100' with RW and 100' with no AD with CGA and VC to slow down with poor safety awareness and 3L. He returned to sitting EOB with all needs met. He states mobility is close to baseline and based on improvement from yesterday PT D/C recommendation changed from moderate intensity to low intensity.     Rehab Prognosis: Fair; patient would benefit from acute skilled PT services to address these deficits and reach maximum level of function.    Recent Surgery: * No surgery found *      Plan:     During this hospitalization, patient to be seen 5 x/week to address the identified rehab impairments via gait training, therapeutic activities, therapeutic exercises and progress toward the following goals:    Plan of Care Expires:  07/18/24    Subjective   States he had a stroke 3 years ago, but has only fallen twice and attributes to drinking   Chief Complaint: does not like the mattress topper - removed by RN   Patient/Family Comments/goals: wants to go outside   Pain/Comfort:  Pain Rating 1: 0/10      Objective:      Communicated with RADHA Garsia prior to session.  Patient found sitting edge of bed with blood pressure cuff, oxygen, peripheral IV, pulse ox (continuous), telemetry upon PT entry to room.     General Precautions: Standard, fall, respiratory  Orthopedic Precautions: N/A  Braces: N/A  Respiratory Status: Nasal cannula, flow 3 L/min     Functional Mobility:  Transfers:     Sit to Stand:  stand by assistance with rolling walker  Gait: 100' with RW and 100' with no AD with CGA and VC to slow down with poor safety awareness and 3L      AM-PAC 6 CLICK MOBILITY  Turning over in bed (including adjusting bedclothes, sheets and blankets)?: 4  Sitting down on and standing up from a chair with arms (e.g., wheelchair, bedside commode, etc.): 4  Moving from lying on back to sitting on the side of the bed?: 4  Moving to and from a bed to a chair (including a wheelchair)?: 3  Need to walk in hospital room?: 4  Climbing 3-5 steps with a railing?: 2  Basic Mobility Total Score: 21       Treatment & Education:  Patient was educated on the importance of OOB activity and functional mobility to negate negative effects of prolonged bed rest during hospitalization, safe transfers and ambulation, and D/C planning     Patient left sitting edge of bed with all lines intact, call button in reach, and mother present..    GOALS:   Multidisciplinary Problems       Physical Therapy Goals          Problem: Physical Therapy    Goal Priority Disciplines Outcome Goal Variances Interventions   Physical Therapy Goal     PT, PT/OT Progressing     Description: Goals to be met by: 2024     Patient will increase functional independence with mobility by performin. Supine to sit with Stand-by Assistance  2. Sit to stand transfer with Stand-by Assistance  3. Gait  x 150  feet with Stand-by Assistance using LRAD                          Time Tracking:     PT Received On: 24  PT Start Time: 1123     PT Stop Time: 1147  PT Total Time (min): 24  min     Billable Minutes: Gait Training 24    Treatment Type: Treatment  PT/PTA: PT     Number of PTA visits since last PT visit: 0     06/19/2024

## 2024-06-19 NOTE — ASSESSMENT & PLAN NOTE
Suspect drug overdose.  One dose of empiric antibiotics given  Post Narcan therapy with some responsiveness.  CT of the head is negative.  Now at baseline.

## 2024-06-19 NOTE — PLAN OF CARE
DARIAN met with Pt to get Pt preference on HH. Per Pt he has no preference Pt choice signed pending HH order      06/19/24 6004   Post-Acute Status   Post-Acute Authorization Home Health   Discharge Plan   Discharge Plan A Home Health   Discharge Plan B [facility]

## 2024-06-19 NOTE — CARE UPDATE
06/18/24 1952   Patient Assessment/Suction   Level of Consciousness (AVPU) alert   Respiratory Effort Normal;Unlabored   Expansion/Accessory Muscles/Retractions no use of accessory muscles   All Lung Fields Breath Sounds diminished;clear   Rhythm/Pattern, Respiratory no shortness of breath reported   Cough Frequency no cough   PRE-TX-O2   Device (Oxygen Therapy) nasal cannula   $ Is the patient on Low Flow Oxygen? Yes   Flow (L/min) (Oxygen Therapy) 2   Pulse Oximetry Type Continuous   $ Pulse Oximetry - Multiple Charge Pulse Oximetry - Multiple   Resp 18   Positioning HOB elevated 30 degrees   Positioning   Body Position position changed independently   Head of Bed (HOB) Positioning HOB at 30 degrees   Positioning/Transfer Devices pillows;in use   Education   $ Education Bronchodilator;15 min

## 2024-06-19 NOTE — ASSESSMENT & PLAN NOTE
Tele monitoring.  Follow echo results.  EKG reviewed.  Consult cardiologist.  Unable to initiate antiplatelet therapy or anticoagulation therapy due to cerebral bleeding.  Trend serum troponins.

## 2024-06-19 NOTE — PLAN OF CARE
No events overnight, vss, nad, poc reviewed, bed alarm in use, call light within reach.     Problem: Adult Inpatient Plan of Care  Goal: Plan of Care Review  Outcome: Progressing  Goal: Patient-Specific Goal (Individualized)  Outcome: Progressing  Goal: Absence of Hospital-Acquired Illness or Injury  Outcome: Progressing  Goal: Optimal Comfort and Wellbeing  Outcome: Progressing  Goal: Readiness for Transition of Care  Outcome: Progressing

## 2024-06-19 NOTE — CARE UPDATE
06/19/24 0759   Patient Assessment/Suction   Level of Consciousness (AVPU) alert   Respiratory Effort Unlabored;Normal   Expansion/Accessory Muscles/Retractions no use of accessory muscles;no retractions;expansion symmetric   All Lung Fields Breath Sounds clear;diminished   Rhythm/Pattern, Respiratory unlabored;pattern regular;depth regular;chest wiggle adequate;no shortness of breath reported   Cough Frequency infrequent   Cough Type good;nonproductive   PRE-TX-O2   Device (Oxygen Therapy) high flow nasal cannula   $ Is the patient on Low Flow Oxygen? Yes   Flow (L/min) (Oxygen Therapy) 3   SpO2 99 %   Pulse Oximetry Type Continuous   $ Pulse Oximetry - Multiple Charge Pulse Oximetry - Multiple   Pulse 72   Resp 20   Aerosol Therapy   $ Aerosol Therapy Charges Aerosol Treatment   Daily Review of Necessity (SVN) completed   Respiratory Treatment Status (SVN) given   Treatment Route (SVN) oxygen;mask   Patient Position HOB elevated   Post Treatment Assessment (SVN) increased aeration   Signs of Intolerance (SVN) none   Breath Sounds Post-Respiratory Treatment   Throughout All Fields Post-Treatment All Fields   Throughout All Fields Post-Treatment aeration increased   Post-treatment Heart Rate (beats/min) 75   Post-treatment Resp Rate (breaths/min) 19   Education   $ Education Bronchodilator;15 min

## 2024-06-19 NOTE — ASSESSMENT & PLAN NOTE
No sign of exacerbation   No steroids  Breathing treatments p.r.n.  Sputum culture positive for Pseudomonas species.  Continue Levaquin antibiotic therapy for now

## 2024-06-19 NOTE — PLAN OF CARE
PT recommended moderate intensity therapy, discussed therapy options with pt and his mother, pt adamantly refused SNF but is agreeable to home health at discharge.        06/19/24 1209   Post-Acute Status   Post-Acute Authorization Placement   Post-Acute Placement Status Patient declined/refused

## 2024-06-20 VITALS
HEIGHT: 66 IN | RESPIRATION RATE: 18 BRPM | WEIGHT: 141.75 LBS | OXYGEN SATURATION: 96 % | HEART RATE: 74 BPM | SYSTOLIC BLOOD PRESSURE: 135 MMHG | TEMPERATURE: 98 F | DIASTOLIC BLOOD PRESSURE: 66 MMHG | BODY MASS INDEX: 22.78 KG/M2

## 2024-06-20 LAB
ALBUMIN SERPL BCP-MCNC: 3 G/DL (ref 3.5–5.2)
ALP SERPL-CCNC: 50 U/L (ref 55–135)
ALT SERPL W/O P-5'-P-CCNC: 5 U/L (ref 10–44)
ANION GAP SERPL CALC-SCNC: 4 MMOL/L (ref 8–16)
AST SERPL-CCNC: 11 U/L (ref 10–40)
BACTERIA UR CULT: NO GROWTH
BASOPHILS # BLD AUTO: 0.01 K/UL (ref 0–0.2)
BASOPHILS NFR BLD: 0.1 % (ref 0–1.9)
BILIRUB SERPL-MCNC: 0.7 MG/DL (ref 0.1–1)
BUN SERPL-MCNC: 7 MG/DL (ref 6–20)
CALCIUM SERPL-MCNC: 8.6 MG/DL (ref 8.7–10.5)
CHLORIDE SERPL-SCNC: 106 MMOL/L (ref 95–110)
CO2 SERPL-SCNC: 32 MMOL/L (ref 23–29)
CREAT SERPL-MCNC: 0.6 MG/DL (ref 0.5–1.4)
DIFFERENTIAL METHOD BLD: ABNORMAL
EOSINOPHIL # BLD AUTO: 0.1 K/UL (ref 0–0.5)
EOSINOPHIL NFR BLD: 1.7 % (ref 0–8)
ERYTHROCYTE [DISTWIDTH] IN BLOOD BY AUTOMATED COUNT: 14.5 % (ref 11.5–14.5)
EST. GFR  (NO RACE VARIABLE): >60 ML/MIN/1.73 M^2
GLUCOSE SERPL-MCNC: 105 MG/DL (ref 70–110)
GLUCOSE SERPL-MCNC: 109 MG/DL (ref 70–110)
GLUCOSE SERPL-MCNC: 87 MG/DL (ref 70–110)
GLUCOSE SERPL-MCNC: 93 MG/DL (ref 70–110)
HCT VFR BLD AUTO: 27.5 % (ref 40–54)
HGB BLD-MCNC: 8.6 G/DL (ref 14–18)
IMM GRANULOCYTES # BLD AUTO: 0.05 K/UL (ref 0–0.04)
IMM GRANULOCYTES NFR BLD AUTO: 0.7 % (ref 0–0.5)
LYMPHOCYTES # BLD AUTO: 1.4 K/UL (ref 1–4.8)
LYMPHOCYTES NFR BLD: 19.1 % (ref 18–48)
MAGNESIUM SERPL-MCNC: 1.7 MG/DL (ref 1.6–2.6)
MCH RBC QN AUTO: 30.2 PG (ref 27–31)
MCHC RBC AUTO-ENTMCNC: 31.3 G/DL (ref 32–36)
MCV RBC AUTO: 97 FL (ref 82–98)
MONOCYTES # BLD AUTO: 0.4 K/UL (ref 0.3–1)
MONOCYTES NFR BLD: 5 % (ref 4–15)
NEUTROPHILS # BLD AUTO: 5.2 K/UL (ref 1.8–7.7)
NEUTROPHILS NFR BLD: 73.4 % (ref 38–73)
NRBC BLD-RTO: 0 /100 WBC
PLATELET # BLD AUTO: 200 K/UL (ref 150–450)
PMV BLD AUTO: 10.2 FL (ref 9.2–12.9)
POTASSIUM SERPL-SCNC: 3.6 MMOL/L (ref 3.5–5.1)
PROT SERPL-MCNC: 6.3 G/DL (ref 6–8.4)
RBC # BLD AUTO: 2.85 M/UL (ref 4.6–6.2)
SODIUM SERPL-SCNC: 142 MMOL/L (ref 136–145)
WBC # BLD AUTO: 7.05 K/UL (ref 3.9–12.7)

## 2024-06-20 PROCEDURE — 94618 PULMONARY STRESS TESTING: CPT

## 2024-06-20 PROCEDURE — 97110 THERAPEUTIC EXERCISES: CPT | Mod: CQ

## 2024-06-20 PROCEDURE — 36415 COLL VENOUS BLD VENIPUNCTURE: CPT | Performed by: HOSPITALIST

## 2024-06-20 PROCEDURE — 25000003 PHARM REV CODE 250: Performed by: HOSPITALIST

## 2024-06-20 PROCEDURE — 85025 COMPLETE CBC W/AUTO DIFF WBC: CPT | Performed by: HOSPITALIST

## 2024-06-20 PROCEDURE — 97116 GAIT TRAINING THERAPY: CPT | Mod: CQ

## 2024-06-20 PROCEDURE — 99900031 HC PATIENT EDUCATION (STAT)

## 2024-06-20 PROCEDURE — 94761 N-INVAS EAR/PLS OXIMETRY MLT: CPT

## 2024-06-20 PROCEDURE — 25000003 PHARM REV CODE 250: Performed by: INTERNAL MEDICINE

## 2024-06-20 PROCEDURE — 80053 COMPREHEN METABOLIC PANEL: CPT | Performed by: HOSPITALIST

## 2024-06-20 PROCEDURE — 94640 AIRWAY INHALATION TREATMENT: CPT

## 2024-06-20 PROCEDURE — 27000221 HC OXYGEN, UP TO 24 HOURS

## 2024-06-20 PROCEDURE — 25000242 PHARM REV CODE 250 ALT 637 W/ HCPCS: Performed by: INTERNAL MEDICINE

## 2024-06-20 PROCEDURE — 83735 ASSAY OF MAGNESIUM: CPT | Performed by: HOSPITALIST

## 2024-06-20 PROCEDURE — 99900035 HC TECH TIME PER 15 MIN (STAT)

## 2024-06-20 RX ORDER — HYDROCODONE BITARTRATE AND ACETAMINOPHEN 5; 325 MG/1; MG/1
1 TABLET ORAL EVERY 12 HOURS PRN
Qty: 10 TABLET | Refills: 0 | Status: SHIPPED | OUTPATIENT
Start: 2024-06-20

## 2024-06-20 RX ORDER — LEVOFLOXACIN 750 MG/1
750 TABLET ORAL DAILY
Qty: 10 TABLET | Refills: 0 | Status: SHIPPED | OUTPATIENT
Start: 2024-06-21

## 2024-06-20 RX ADMIN — IPRATROPIUM BROMIDE AND ALBUTEROL SULFATE 3 ML: .5; 3 SOLUTION RESPIRATORY (INHALATION) at 01:06

## 2024-06-20 RX ADMIN — SODIUM CHLORIDE: 9 INJECTION, SOLUTION INTRAVENOUS at 05:06

## 2024-06-20 RX ADMIN — PREGABALIN 100 MG: 75 CAPSULE ORAL at 08:06

## 2024-06-20 RX ADMIN — MUPIROCIN 1 G: 20 OINTMENT TOPICAL at 08:06

## 2024-06-20 RX ADMIN — HYDROCODONE BITARTRATE AND ACETAMINOPHEN 1 TABLET: 5; 325 TABLET ORAL at 04:06

## 2024-06-20 RX ADMIN — PANTOPRAZOLE SODIUM 40 MG: 40 TABLET, DELAYED RELEASE ORAL at 05:06

## 2024-06-20 RX ADMIN — LEVOFLOXACIN 750 MG: 750 TABLET, FILM COATED ORAL at 08:06

## 2024-06-20 RX ADMIN — ACETAMINOPHEN 650 MG: 325 TABLET ORAL at 07:06

## 2024-06-20 NOTE — NURSING
Nurses Note -- 4 Eyes      6/19/2024   11:52 PM      Skin assessed during: Transfer      [x] No Altered Skin Integrity Present    [x]Prevention Measures Documented      [] Yes- Altered Skin Integrity Present or Discovered   [] LDA Added if Not in Epic (Describe Wound)   [] New Altered Skin Integrity was Present on Admit and Documented in LDA   [] Wound Image Taken    Wound Care Consulted? No    Attending Nurse:  Rivka Gonzalez RN/Staff Member:  [id number]

## 2024-06-20 NOTE — PROGRESS NOTES
Transfer report called to 1200 RN,  pt placed on portable tele monitor 8585, placed on portable oxygen 3LNC. Pt transferred to room 1202 in wheelchair with personal belongings and family. Pt assisted to bathroom and back to bed. Pt is ambulatory with standby assistance but does not understand why he needs stand by assistance for wobbly gait. Pt handed off to Ilene in room.    Pt wanting to take shower immediately.  Pt aaoX3, VSS, NADN. Safety intact: call light within reach, bed alarm set, personal items within reach, side rails upx2. Room free of clutter.       Nurses Note -- 4 Eyes      6/19/2024   7:58 PM      Skin assessed during: Q Shift Change      [x] No Altered Skin Integrity Present    [x]Prevention Measures Documented      [] Yes- Altered Skin Integrity Present or Discovered   [] LDA Added if Not in Epic (Describe Wound)   [] New Altered Skin Integrity was Present on Admit and Documented in LDA   [] Wound Image Taken    Wound Care Consulted? No    Attending Nurse:  Patience     Second RN/Staff Member:  Sun

## 2024-06-20 NOTE — PLAN OF CARE
Patient to transfer to home health service with [facility] , verified SOC date is 6/23/24.  Patient to transport home in private vehicle with family.    Discharge orders and chart reviewed with no further post-acute discharge needs identified at this time.  At this time, patient is cleared for discharge from Case Management standpoint.        06/20/24 1500   Final Note   Assessment Type Final Discharge Note   Anticipated Discharge Disposition [facility]   Hospital Resources/Appts/Education Provided   (Patient to coordinate with home health for scheduling post hospital follow up appointment with their PCP.)   Post-Acute Status   Post-Acute Authorization Home Health   Home Health Status Set-up Complete/Auth obtained   Coverage [facility] medicare advantage   Discharge Delays None known at this time

## 2024-06-20 NOTE — CARE UPDATE
06/19/24 1906   Patient Assessment/Suction   Level of Consciousness (AVPU) alert   Respiratory Effort Normal;Unlabored   Expansion/Accessory Muscles/Retractions no retractions;no use of accessory muscles   All Lung Fields Breath Sounds Anterior:;Lateral:   BARON Breath Sounds coarse   LLL Breath Sounds coarse   RUL Breath Sounds coarse   RML Breath Sounds coarse   RLL Breath Sounds coarse   Rhythm/Pattern, Respiratory pattern regular;unlabored   Cough Frequency infrequent   Cough Type good;congested;nonproductive   PRE-TX-O2   Device (Oxygen Therapy) high flow nasal cannula w/device   $ Is the patient on Low Flow Oxygen? Yes   Flow (L/min) (Oxygen Therapy) 3   SpO2 100 %   Pulse Oximetry Type Continuous   $ Pulse Oximetry - Multiple Charge Pulse Oximetry - Multiple   Pulse 73   Resp 18   Aerosol Therapy   $ Aerosol Therapy Charges Aerosol Treatment   Daily Review of Necessity (SVN) completed   Respiratory Treatment Status (SVN) given   Treatment Route (SVN) mask;oxygen   Patient Position HOB elevated   Post Treatment Assessment (SVN) increased aeration   Signs of Intolerance (SVN) none   Education   $ Education Bronchodilator;15 min

## 2024-06-20 NOTE — PLAN OF CARE
Problem: Infection  Goal: Absence of Infection Signs and Symptoms  Outcome: Met     Problem: Adult Inpatient Plan of Care  Goal: Plan of Care Review  Outcome: Met  Goal: Patient-Specific Goal (Individualized)  Outcome: Met  Goal: Absence of Hospital-Acquired Illness or Injury  Outcome: Met  Goal: Optimal Comfort and Wellbeing  Outcome: Met  Goal: Readiness for Transition of Care  Outcome: Met     Problem: Diabetes Comorbidity  Goal: Blood Glucose Level Within Targeted Range  Outcome: Met     Problem: Skin Injury Risk Increased  Goal: Skin Health and Integrity  Outcome: Met     Problem: Fall Injury Risk  Goal: Absence of Fall and Fall-Related Injury  Outcome: Met

## 2024-06-20 NOTE — PT/OT/SLP PROGRESS
Occupational Therapy      Patient Name:  Hernan Badillo   MRN:  [id number]    Patient not seen today; pt unavailable when attempted in AM; prepping for d/c in PM.      6/20/2024

## 2024-06-20 NOTE — CARE UPDATE
06/20/24 1352   Patient Assessment/Suction   Level of Consciousness (AVPU) alert   Respiratory Effort Normal;Unlabored   Expansion/Accessory Muscles/Retractions no use of accessory muscles;no retractions;expansion symmetric   All Lung Fields Breath Sounds diminished   Rhythm/Pattern, Respiratory unlabored;pattern regular;depth regular;chest wiggle adequate;no shortness of breath reported   Cough Frequency infrequent   Cough Type good   PRE-TX-O2   Device (Oxygen Therapy) nasal cannula   $ Is the patient on Low Flow Oxygen? Yes   Flow (L/min) (Oxygen Therapy) 3   SpO2 96 %   Pulse Oximetry Type Continuous   $ Pulse Oximetry - Multiple Charge Pulse Oximetry - Multiple   Pulse 74   Resp 18   Aerosol Therapy   $ Aerosol Therapy Charges Aerosol Treatment   Daily Review of Necessity (SVN) completed   Respiratory Treatment Status (SVN) given   Treatment Route (SVN) mask;oxygen   Patient Position HOB elevated   Post Treatment Assessment (SVN) increased aeration   Signs of Intolerance (SVN) none   Breath Sounds Post-Respiratory Treatment   Throughout All Fields Post-Treatment All Fields   Throughout All Fields Post-Treatment aeration increased   Post-treatment Heart Rate (beats/min) 76   Post-treatment Resp Rate (breaths/min) 18   Education   $ Education Bronchodilator;15 min

## 2024-06-20 NOTE — ASSESSMENT & PLAN NOTE
S/p extubation.  Follow Pulmonary recommendations.  Supplemental oxygen was provided and noted-      Patient has been started on intravenous ceftriaxone and azithromycin for possible pneumonitis.  Discussed with Dr. Newton.    D/c with levaquin according to sensitivities

## 2024-06-20 NOTE — PT/OT/SLP PROGRESS
Physical Therapy Treatment    Patient Name:  Hernan Badillo   MRN:  [id number]    Recommendations:     Discharge Recommendations: Low Intensity Therapy  Discharge Equipment Recommendations: none  Barriers to discharge:  Decreased functional endurance    Assessment:     Hernan Badillo is a 58 y.o. male admitted with a medical diagnosis of Encephalopathy, toxic.  He presents with the following impairments/functional limitations: weakness, impaired endurance, impaired self care skills, impaired functional mobility, gait instability, impaired balance, decreased upper extremity function, decreased lower extremity function, decreased safety awareness, impaired cardiopulmonary response to activity . Pt mindful of R sided weakness however has decreased safety awareness with mobility, requiring cues for a slower gait speed due to decrease balance. Pt abandoned RW long term through gait trial. Pt required 3L O2 with mobility today. R Le exercises performed eob with pt requiring cues for proper joint alignment and muscle recruitment.     Rehab Prognosis: Fair; patient would benefit from acute skilled PT services to address these deficits and reach maximum level of function.    Recent Surgery: * No surgery found *      Plan:     During this hospitalization, patient to be seen 5 x/week to address the identified rehab impairments via gait training, therapeutic activities, therapeutic exercises and progress toward the following goals:    Plan of Care Expires:  07/18/24    Subjective     Chief Complaint: R sided weakness  Patient/Family Comments/goals: Pt agreeable to PT.   Pain/Comfort:  Pain Rating 1: 0/10  Pain Rating Post-Intervention 1: 0/10      Objective:     Communicated with RN prior to session.  Patient found HOB elevated with oxygen, peripheral IV, telemetry upon PT entry to room.     General Precautions: Standard, fall, respiratory  Orthopedic Precautions: N/A  Braces: N/A  Respiratory Status: Nasal cannula, flow 3 L/min      Functional Mobility:  Bed Mobility:     Supine to Sit: supervision  Transfers:     Sit to Stand:  contact guard assistance with rolling walker  Gait: 100' with rw 100' without AD      AM-PAC 6 CLICK MOBILITY  Turning over in bed (including adjusting bedclothes, sheets and blankets)?: 4  Sitting down on and standing up from a chair with arms (e.g., wheelchair, bedside commode, etc.): 4  Moving from lying on back to sitting on the side of the bed?: 4  Moving to and from a bed to a chair (including a wheelchair)?: 3  Need to walk in hospital room?: 4  Climbing 3-5 steps with a railing?: 2  Basic Mobility Total Score: 21       Treatment & Education:  Pt was educated on the following: call light use, importance of OOB activity and functional mobility to negate the negative effects of prolonged bed rest during this hospitalization, safe transfers/ambulation and discharge planning recommendations/options.   Pt performed R LE there ex sitting x 20 reps with vc for proper execution and joint protection: ap, laq, marching.     Patient left HOB elevated with all lines intact, call button in reach, and family present..    GOALS:   Multidisciplinary Problems       Physical Therapy Goals          Problem: Physical Therapy    Goal Priority Disciplines Outcome Goal Variances Interventions   Physical Therapy Goal     PT, PT/OT Progressing     Description: Goals to be met by: 2024     Patient will increase functional independence with mobility by performin. Supine to sit with Stand-by Assistance  2. Sit to stand transfer with Stand-by Assistance  3. Gait  x 150  feet with Stand-by Assistance using LRAD                          Time Tracking:     PT Received On: 24  PT Start Time: 1006     PT Stop Time: 1038  PT Total Time (min): 32 min     Billable Minutes: Gait Training 20 and Therapeutic Exercise 12    Treatment Type: Treatment  PT/PTA: PTA     Number of PTA visits since last PT visit: 1     2024

## 2024-06-20 NOTE — DISCHARGE SUMMARY
Pending sale to [facility]  Discharge Summary      Patient Name: Hernan Badillo  MRN: [id number]  GEOVANNI: [id number]  Patient Class: IP- Inpatient  Admission Date: 6/16/2024  Hospital Length of Stay: 4 days  Discharge Date and Time: No discharge date for patient encounter.  Attending Physician: Deirdre Deal MD   Discharging Provider: Deirdre Deal MD  Primary Care Provider: Toan Banks DO    Primary Care Team: Networked reference to record PCT     HPI:   History is taken from medical records.  Patient currently intubated.  It is reported that family found the patient to be very somnolent and almost unresponsive.  EMS was alerted and gave the patient Narcan.  Apparently this helped to woken up the patient but the patient was still very drowsy.  In the ER another round of Narcan was given but the patient continued to become altered and subsequently was intubated for airway management.  Family denies any history of drug use but urinary drug screen was positive for opioids and benzodiazepine.  Patient's pupils still pinpoint.  Stable on the ventilator.  Hospitalist is asked to admit this patient for suspected drug overdose.  Family not in the room.    * No surgery found *      Hospital Course:   Patient admitted to intensive care unit where patient was maintained on mechanical ventilation.  Subsequent CT scan of the head report stable findings and no further bleeding or especially of bleeding noted.  Pulmonary critical care medicine closely followed the patient and maintain patient on intravenous antibiotic therapy for possible pneumonia.  Subsequently patient was successfully extubated.  No focal neuro deficits noted.  Sputum cultures grew Pseudomonas species, sensitive to levaquin.  Also patient noted to have elevated serum troponins for which Cardiology team was consulted.  Cardiology recommended future need for ischemic workup per evaluation of coronary artery disease as outpt.   Patient worked with PT and OT.  Patient adamantly refused skilled nursing facility placement.   assisting patient with outpatient follow-up with pain management doctor. Deemed stable to be d/c.      Goals of Care Treatment Preferences:  Code Status: Full Code      Consults:   Consults (From admission, onward)          Status Ordering Provider     Inpatient consult to Pulmonology  Once        Provider:  Bull Newton MD    Completed SULTAN, AQIB     Inpatient consult to Cardiology  Once        Provider:  Patria Sotomayor MD    Completed SULTAN, AQIB     Inpatient consult to Neurosurgery  Once        Provider:  (Not yet assigned)    Acknowledged PHYLLIS LIU            Neuro  * Encephalopathy, toxic  Suspect drug overdose.  One dose of empiric antibiotics given  Post Narcan therapy with some responsiveness.  CT of the head is negative.  Now at baseline.    Bleeding in brain, 4 mm, right sylvian fissure    Neurosurgery following.  Keep head end of bed elevated at 30°.  Repeat CT head-stable  Maintain systolic blood pressure under 150 mmHg  Continue neuro checks q.1 hour.  Follow Neurology recommendations.      History of stroke    Continue aspirin    Pulmonary  Respiratory failure, unspecified with hypoxia, due to toxic/metabolic encephalopathy  S/p extubation.  Follow Pulmonary recommendations.  Supplemental oxygen was provided and noted-      Patient has been started on intravenous ceftriaxone and azithromycin for possible pneumonitis.  Discussed with Dr. Newton.    D/c with levaquin according to sensitivities    COPD (chronic obstructive pulmonary disease)  No sign of exacerbation   No steroids  Breathing treatments p.r.n.  Sputum culture positive for Pseudomonas species.  Continue Levaquin antibiotic therapy for now      Cardiac/Vascular  NSTEMI (non-ST elevated myocardial infarction)  Tele monitoring.  echo results. EF 60%  EKG reviewed.    Consulted cardiologist.  Can f/u as outpt  Trended  serum troponins stable       Primary hypertension  Cont home meds  P.r.n. hydralazine    Endocrine  DM (diabetes mellitus), type 2    Insulin sliding scale   A1c: 6.5      Final Active Diagnoses:    Diagnosis Date Noted POA    PRINCIPAL PROBLEM:  Encephalopathy, toxic [G92.9] 06/16/2024 Yes    NSTEMI (non-ST elevated myocardial infarction) [I21.4] 06/17/2024 Yes    Bleeding in brain, 4 mm, right sylvian fissure [I61.9] 06/17/2024 Yes    Respiratory failure, unspecified with hypoxia, due to toxic/metabolic encephalopathy [J96.91] 06/17/2024 Yes    History of stroke [Z86.73] 06/16/2024 Not Applicable    COPD (chronic obstructive pulmonary disease) [J44.9] 04/08/2024 Yes    Primary hypertension [I10] 03/24/2014 Yes    DM (diabetes mellitus), type 2 [E11.9] 01/22/2013 Yes      Problems Resolved During this Admission:       Discharged Condition: stable    Disposition: Home or Self Care    Follow Up:   Follow-up Information       [place] [facility] Follow up in 3 day(s).    Specialty: Home Health Services  Contact information:  [address] [place] [address].  [address]  [place] [address]  [phone]                           Patient Instructions:      Ambulatory referral/consult to Pain Clinic   Standing Status: Future   Referral Priority: Routine Referral Type: Consultation   Referral Reason: Specialty Services Required   Requested Specialty: Pain Medicine   Number of Visits Requested: 1     Ambulatory referral/consult to Home Health   Referral Priority: Routine Referral Type: Home Health   Referral Reason: Specialty Services Required   Requested Specialty: Home Health Services   Number of Visits Requested: 1     Ambulatory referral/consult to Smoking Cessation Program   Standing Status: Future   Referral Priority: Routine Referral Type: Consultation   Referral Reason: Specialty Services Required   Requested Specialty: CTTS   Number of Visits Requested: 1     Diet Adult Regular     Notify your health care  provider if you experience any of the following:  temperature >100.4     Notify your health care provider if you experience any of the following:  persistent nausea and vomiting or diarrhea     Notify your health care provider if you experience any of the following:  severe uncontrolled pain     Activity as tolerated       Significant Diagnostic Studies: Labs: BMP:   Recent Labs   Lab 06/19/24  0343 06/20/24  0433   GLU 89 87    142   K 3.8 3.6    106   CO2 30* 32*   BUN 11 7   CREATININE 0.5 0.6   CALCIUM 8.3* 8.6*   MG 1.6 1.7    and CBC   Recent Labs   Lab 06/19/24  0343 06/20/24  0433   WBC 10.01 7.05   HGB 8.1* 8.6*   HCT 25.9* 27.5*    200       Pending Diagnostic Studies:       None           Medications:  Reconciled Home Medications:      Medication List        START taking these medications      HYDROcodone-acetaminophen 5-325 mg per tablet  Commonly known as: NORCO  Take 1 tablet by mouth every 12 (twelve) hours as needed for Pain.     levoFLOXacin 750 MG tablet  Commonly known as: LEVAQUIN  Take 1 tablet (750 mg total) by mouth once daily.  Start taking on: June 21, 2024            CONTINUE taking these medications      acetaminophen 500 mg Cap  Commonly known as: TYLENOL  Take 500 mg by mouth 3 (three) times daily as needed for Pain.     * albuterol 2.5 mg /3 mL (0.083 %) nebulizer solution  Commonly known as: PROVENTIL  Take 2.5 mg by nebulization every 6 (six) hours as needed for Wheezing. Rescue     * albuterol 90 mcg/actuation inhaler  Commonly known as: PROVENTIL HFA  Inhale 2 puffs into the lungs every 6 (six) hours as needed for Wheezing. Rescue     amLODIPine 10 MG tablet  Commonly known as: NORVASC  Take 1 tablet (10 mg total) by mouth once daily.     aspirin 81 MG EC tablet  Commonly known as: ECOTRIN  Take 1 tablet (81 mg total) by mouth once daily.     blood sugar diagnostic, disc Strp  1 each by Misc.(Non-Drug; Combo Route) route 4 (four) times daily.     blood-glucose  meter kit  Use as instructed     lancets Misc  Commonly known as: LANCETS,THIN  1 each by Misc.(Non-Drug; Combo Route) route 4 (four) times daily.     metFORMIN 500 MG ER 24hr tablet  Commonly known as: GLUCOPHAGE-XR  Take 1 tablet (500 mg total) by mouth daily with breakfast.     pregabalin 100 MG capsule  Commonly known as: LYRICA  Take 1 capsule (100 mg total) by mouth 2 (two) times daily.           * This list has 2 medication(s) that are the same as other medications prescribed for you. Read the directions carefully, and ask your doctor or other care provider to review them with you.                ASK your doctor about these medications      lisinopriL 10 MG tablet  Take 1 tablet (10 mg total) by mouth once daily.     nicotine 21 mg/24 hr  Commonly known as: NICODERM CQ  Place 1 patch onto the skin once daily.              Indwelling Lines/Drains at time of discharge:   Lines/Drains/Airways       None                   Time spent on the discharge of patient: >30 minutes         Deirdre Deal MD  Department of Hospital Medicine  [facility]

## 2024-06-20 NOTE — NURSING
Patient arrived to unit by stretcher mariaelena x 3 in stable condition accompanied by brother denies pain when asked skin intact oriented to staff and room voices no concerns at this time bed in lowest position call light in reach bed alarm on

## 2024-06-20 NOTE — ASSESSMENT & PLAN NOTE
Tele monitoring.  echo results. EF 60%  EKG reviewed.    Consulted cardiologist.  Can f/u as outpt  Trended serum troponins stable

## 2024-06-20 NOTE — ASSESSMENT & PLAN NOTE
No sign of exacerbation   No steroids  Breathing treatments p.r.n.  Sputum culture positive for Pseudomonas species.  Continue Levaquin antibiotic therapy for now     Premier protein shakes  Decrease carbs        Recommendations on weight loss:  - Drink 8 glasses of water a day  - Do not drink your calories ( no regular pop, juice, high calorie coffee drinks, limit alcohol)  - Eat high protein meals and snacks  - Don't deprive yourself of food you like, just limit amount and make smart choices  - Write down everything you eat for a few days- right away and think about why you are eating ( are you hungry, or are you eating because you are bored, tired, etc)- to get back on track or use Lose it Izaiah  - Do not eat late at night  - Exercise- aim for 30 minutes 5 times a week of cardiac activity. Also strength training with light weights is helpful  - Weight yourself once a week first thing in the morning    Food advice:    1. Cut down your sugar consumption  2. Cut down refined grains/ carbs  3. Eat a good amount of protein and natural fats ( lean meats/avocado)  4. Increase natural fiber ( fruits/veggies)    Use izaiah LOSE IT or MY FITNESS PAL    Also consider weight watchers    EL[employer] resource: dietI AM AT    Good books: The Aadn Diet Solution ( helps with tips to stay motivated)  The Obesity Code and The Complete Guide to Intermittent Fasting - both about fasting and by Dr. Sachi Ocasio:  Pooja Kamara- looks at labels.    EAT HIGH PROTEIN TO FILL YOU UP  AND FOODS HIGH IN FIBER  EAT LESS PROCESSED FOODS/ MORE CLEAN EATING- this makes those two ideas above easier  EXERCISE FOR MOOD/ SLEEP/ENERGY / YOUR HEART/ AND HELP WITH WEIGHT LOSS

## 2024-06-20 NOTE — ASSESSMENT & PLAN NOTE
Neurosurgery following.  Keep head end of bed elevated at 30°.  Repeat CT head-stable  Maintain systolic blood pressure under 150 mmHg  Continue neuro checks q.1 hour.  Follow Neurology recommendations.

## 2024-06-20 NOTE — PLAN OF CARE
SW met with Pt to obtain choice form. Pt signed choice form and selected [facility] as first choice. Referral sent via [employer].      06/20/24 1232   Post-Acute Status   Post-Acute Authorization Home Health   Post-Acute Placement Status Referrals Sent   Patient choice form signed by patient/caregiver List from CMS Compare   Discharge Plan   Discharge Plan A Home Health   Discharge Plan B Home Health

## 2024-06-20 NOTE — PLAN OF CARE
Pt accepted at [facility]  via [employer]. SOC on 6/23/2024 06/20/24 1328   Post-Acute Status   Post-Acute Authorization Home Health   Home Health Status Set-up Complete/Auth obtained   Patient choice form signed by patient/caregiver List from CMS Compare   Discharge Plan   Discharge Plan A Home Health   Discharge Plan B Home Health

## 2024-06-20 NOTE — CARE UPDATE
06/20/24 1006   Home Oxygen Qualification   $ Home O2 Qualification Tech time 15 minutes;Pulmonary Stress Test/6 min walk   Room Air SpO2 At Rest 95 %   Room Air SpO2 During Ambulation (!) 86 %   SpO2 During Ambulation on O2 92 %   Heart Rate on O2 62 bpm   Ambulation O2 LPM 2 LPM   SpO2 Post Ambulation 94 %   Post Ambulation Heart Rate 66 bpm   Post Ambulation O2 LPM 2 LPM

## 2024-06-21 LAB
BACTERIA BLD CULT: NORMAL
BACTERIA BLD CULT: NORMAL

## 2024-06-22 LAB
BACTERIA BLD CULT: NORMAL
BACTERIA BLD CULT: NORMAL
OHS QRS DURATION: 90 MS
OHS QTC CALCULATION: 469 MS

## 2024-06-24 ENCOUNTER — TELEPHONE (OUTPATIENT)
Dept: FAMILY MEDICINE | Facility: CLINIC | Age: 59
End: 2024-06-24
Payer: MEDICARE

## 2024-06-24 NOTE — TELEPHONE ENCOUNTER
----- Message from Nikko Baldwin sent at 6/24/2024  8:32 AM CDT -----  Type: Needs Medical Advice    Who Called:  Pt's motherhumza    Symptoms (please be specific):  pain    Best Call Back Number: [phone]    Additional Information: Pt's mother calling to speak with , very vague but states that the pt is unwell. Please call back to advise, Thanks!

## 2024-06-27 ENCOUNTER — OFFICE VISIT (OUTPATIENT)
Dept: FAMILY MEDICINE | Facility: CLINIC | Age: 59
End: 2024-06-27
Payer: MEDICARE

## 2024-06-27 VITALS
BODY MASS INDEX: 21.47 KG/M2 | DIASTOLIC BLOOD PRESSURE: 80 MMHG | HEIGHT: 66 IN | SYSTOLIC BLOOD PRESSURE: 122 MMHG | OXYGEN SATURATION: 92 % | RESPIRATION RATE: 16 BRPM | TEMPERATURE: 98 F | HEART RATE: 79 BPM | WEIGHT: 133.63 LBS

## 2024-06-27 DIAGNOSIS — J44.9 CHRONIC OBSTRUCTIVE PULMONARY DISEASE, UNSPECIFIED COPD TYPE: ICD-10-CM

## 2024-06-27 DIAGNOSIS — G89.4 CHRONIC PAIN SYNDROME: ICD-10-CM

## 2024-06-27 DIAGNOSIS — I21.4 NSTEMI (NON-ST ELEVATED MYOCARDIAL INFARCTION): ICD-10-CM

## 2024-06-27 DIAGNOSIS — E11.59 HYPERTENSION ASSOCIATED WITH DIABETES: ICD-10-CM

## 2024-06-27 DIAGNOSIS — I61.9 NONTRAUMATIC INTRACEREBRAL HEMORRHAGE, UNSPECIFIED CEREBRAL LOCATION, UNSPECIFIED LATERALITY: ICD-10-CM

## 2024-06-27 DIAGNOSIS — E11.65 TYPE 2 DIABETES MELLITUS WITH HYPERGLYCEMIA, WITHOUT LONG-TERM CURRENT USE OF INSULIN: ICD-10-CM

## 2024-06-27 DIAGNOSIS — Z86.73 HISTORY OF STROKE: ICD-10-CM

## 2024-06-27 DIAGNOSIS — I15.2 HYPERTENSION ASSOCIATED WITH DIABETES: ICD-10-CM

## 2024-06-27 DIAGNOSIS — Z09 HOSPITAL DISCHARGE FOLLOW-UP: Primary | ICD-10-CM

## 2024-06-27 DIAGNOSIS — J96.11 CHRONIC RESPIRATORY FAILURE WITH HYPOXIA: ICD-10-CM

## 2024-06-27 PROCEDURE — 99999 PR PBB SHADOW E&M-EST. PATIENT-LVL V: CPT | Mod: PBBFAC,,, | Performed by: FAMILY MEDICINE

## 2024-06-27 NOTE — PROGRESS NOTES
"Subjective:       Patient ID: Hernan Badillo is a 58 y.o. male.    Chief Complaint: Hospital Follow Up    Transitional Care Note    Family and/or Caretaker present at visit?  Yes.  Diagnostic tests reviewed/disposition: No diagnosic tests pending after this hospitalization.  Disease/illness education: unresponsive,   Home health/community services discussion/referrals: Patient has home health established at [facility] .   Establishment or re-establishment of referral orders for community resources: No other necessary community resources.   Discussion with other health care providers:   Quinn Badillo presents to the clinic today for hospital discharge follow up. Patient reports he is in pain on a scale of 1-10 its 100. Patient states the pain is "all over". Patient states he is here for pain medication refills. Patient educated that today is hospital discharge follow up to ensure he is doing better. Patient was previously referred to palliative care but never saw palliative care. Patient states he frequently does not use his home oxygen for a day or more as "I don't need it". Patient reports he did not bring home oxygen today as he "don't plan on being here long, I just need pain pills".   Patient is accompanied by his mother today who is in agreement that he needs hospice care instead of home health.   Patient educated on plan of care, verbalized understanding.         Discharge note on 6/20/24  HPI:   History is taken from medical records.  Patient currently intubated.  It is reported that family found the patient to be very somnolent and almost unresponsive.  EMS was alerted and gave the patient Narcan.  Apparently this helped to woken up the patient but the patient was still very drowsy.  In the ER another round of Narcan was given but the patient continued to become altered and subsequently was intubated for airway management.  Family denies any history of drug use but urinary drug screen was " positive for opioids and benzodiazepine.  Patient's pupils still pinpoint.  Stable on the ventilator.  Hospitalist is asked to admit this patient for suspected drug overdose.  Family not in the room.     * No surgery found *       Hospital Course:   Patient admitted to intensive care unit where patient was maintained on mechanical ventilation.  Subsequent CT scan of the head report stable findings and no further bleeding or especially of bleeding noted.  Pulmonary critical care medicine closely followed the patient and maintain patient on intravenous antibiotic therapy for possible pneumonia.  Subsequently patient was successfully extubated.  No focal neuro deficits noted.  Sputum cultures grew Pseudomonas species, sensitive to levaquin.  Also patient noted to have elevated serum troponins for which Cardiology team was consulted.  Cardiology recommended future need for ischemic workup per evaluation of coronary artery disease as outpt.  Patient worked with PT and OT.  Patient adamantly refused skilled nursing facility placement.   assisting patient with outpatient follow-up with pain management doctor. Deemed stable to be d/c.    Review of Systems   Constitutional:  Negative for activity change, appetite change, chills, diaphoresis and fever.   HENT:  Negative for congestion, ear pain, postnasal drip, sinus pressure, sneezing and sore throat.    Eyes:  Negative for pain, discharge, redness and itching.   Respiratory:  Positive for cough and shortness of breath. Negative for apnea, chest tightness and wheezing.    Cardiovascular:  Negative for chest pain and leg swelling.   Gastrointestinal:  Negative for abdominal distention, abdominal pain, constipation, diarrhea, nausea and vomiting.   Genitourinary:  Negative for difficulty urinating, dysuria, flank pain and frequency.   Musculoskeletal:  Positive for back pain.   Skin:  Negative for color change, rash and wound.   Neurological:  Negative for  dizziness.       Patient Active Problem List   Diagnosis    DM (diabetes mellitus), type 2    Primary hypertension    BMI 26.0-26.9,adult    Tobacco dependency    History of alcoholism    Noncompliance    Generalized abdominal pain    Left-sided nontraumatic intracerebral hemorrhage of brainstem    Hearing difficulty of both ears    Imbalance    Right leg weakness    Impaired instrumental activities of daily living (IADL)    Coordination impairment    Right sided weakness    Spasticity    Wheezing    Intermittent claudication    COPD exacerbation    Acute on chronic respiratory failure with hypoxia and hypercapnia    Fall    Elevated troponin level    Nontraumatic intracerebral hemorrhage, unspecified    HLD (hyperlipidemia)    MDD (major depressive disorder)    GERD (gastroesophageal reflux disease)    Anemia    Acute respiratory failure with hypercapnia    CVA (cerebral vascular accident)    COPD (chronic obstructive pulmonary disease)    Acute encephalopathy    Encephalopathy, toxic    History of stroke    NSTEMI (non-ST elevated myocardial infarction)    Bleeding in brain, 4 mm, right sylvian fissure    Respiratory failure, unspecified with hypoxia, due to toxic/metabolic encephalopathy       Objective:      Physical Exam  Vitals reviewed.   Constitutional:       General: He is not in acute distress.     Appearance: Normal appearance. He is well-developed.   HENT:      Head: Normocephalic.      Nose: Nose normal.   Eyes:      Conjunctiva/sclera: Conjunctivae normal.      Pupils: Pupils are equal, round, and reactive to light.   Cardiovascular:      Rate and Rhythm: Normal rate and regular rhythm.      Heart sounds: Normal heart sounds.   Pulmonary:      Effort: Pulmonary effort is normal. No respiratory distress.      Breath sounds: Normal breath sounds. Decreased air movement present.   Musculoskeletal:      Cervical back: Normal range of motion and neck supple.   Skin:     General: Skin is warm and dry.       "Findings: No rash.   Neurological:      Mental Status: He is alert and oriented to person, place, and time.   Psychiatric:         Mood and Affect: Mood normal.         Speech: Speech is slurred.         Behavior: Behavior normal.         Lab Results   Component Value Date    WBC 7.05 06/20/2024    HGB 8.6 (L) 06/20/2024    HCT 27.5 (L) 06/20/2024     06/20/2024    CHOL 109 (L) 06/17/2024    TRIG 86 06/17/2024    HDL 35 (L) 06/17/2024    ALT 5 (L) 06/20/2024    AST 11 06/20/2024     06/20/2024    K 3.6 06/20/2024     06/20/2024    CREATININE 0.6 06/20/2024    BUN 7 06/20/2024    CO2 32 (H) 06/20/2024    TSH 0.706 04/07/2024    INR 0.9 04/08/2024    HGBA1C 6.5 (H) 06/17/2024     The ASCVD Risk score (Elvis SHERMAN, et al., 2019) failed to calculate for the following reasons:    The patient has a prior MI or stroke diagnosis  Visit Vitals  /80 (BP Location: Right arm, Patient Position: Sitting, BP Method: Medium (Manual))   Pulse 79   Temp 97.8 °F (36.6 °C) (Oral)   Resp 16   Ht 5' 6" (1.676 m)   Wt 60.6 kg (133 lb 9.6 oz)   SpO2 (!) 92%   BMI 21.56 kg/m²      Assessment:       1. Hospital discharge follow-up    2. History of stroke    3. Nontraumatic intracerebral hemorrhage, unspecified cerebral location, unspecified laterality    4. Chronic respiratory failure with hypoxia    5. Type 2 diabetes mellitus with hyperglycemia, without long-term current use of insulin    6. Hypertension associated with diabetes    7. Chronic obstructive pulmonary disease, unspecified COPD type    8. NSTEMI (non-ST elevated myocardial infarction)    9. Chronic pain syndrome        Plan:       Hernan was seen today for hospital follow up.    Diagnoses and all orders for this visit:    Hospital discharge follow-up    History of stroke / NSTEMI (non-ST elevated myocardial infarction)  -     Ambulatory referral/consult to Hospice; Future  Continue medications as prescribed.  Follow up with PCP     Nontraumatic " intracerebral hemorrhage, unspecified cerebral location, unspecified laterality  Continue medications as prescribed.  Follow up with PCP     Chronic respiratory failure with hypoxia / Chronic obstructive pulmonary disease, unspecified COPD type  -     Ambulatory referral/consult to Hospice; Future  Continue medications as prescribed.  Follow up with PCP and pulm    Type 2 diabetes mellitus with hyperglycemia, without long-term current use of insulin  Stable  Continue medications as prescribed.  Follow up with PCP     Hypertension associated with diabetes  Stable  Continue medications as prescribed.  Follow up with PCP    Chronic pain syndrome  -     Ambulatory referral/consult to Hospice; Future  Patient educated he will not be getting anymore pain medication in family medicine and advised to go to palliative care or pain medicine.       No follow-ups on file.      Future Appointments       Date Provider Specialty Appt Notes    12/4/2024 Iván Beckett MD Pulmonology 6 mo f/u - minh/tracheomalacia-pr               Tests to Keep You Healthy    Eye Exam: ORDERED BUT NOT SCHEDULED  Colon Cancer Screening: ORDERED  Last Blood Pressure <= 139/89 (6/27/2024): Yes  Last HbA1c < 8 (06/17/2024): Yes  Tobacco Cessation: NO

## 2024-07-02 ENCOUNTER — TELEPHONE (OUTPATIENT)
Dept: FAMILY MEDICINE | Facility: CLINIC | Age: 59
End: 2024-07-02

## 2024-07-02 NOTE — TELEPHONE ENCOUNTER
----- Message from Sun Murillo sent at 7/2/2024  3:19 PM CDT -----  Regarding: Needs Medical Order Status  Contact: patient's motherMariann at [phone]  Type: Needs Medical Order Status  Who Called:  patient's Mariann odonnell at [phone]      Additional Information: Wanted to discuss if hospice was set up for patient. Please call and advise. Thank you

## 2024-07-08 PROBLEM — I63.9 CVA (CEREBRAL VASCULAR ACCIDENT): Status: RESOLVED | Noted: 2024-04-08 | Resolved: 2024-07-08

## 2024-07-08 PROBLEM — J96.02 ACUTE RESPIRATORY FAILURE WITH HYPERCAPNIA: Status: RESOLVED | Noted: 2024-04-08 | Resolved: 2024-07-08

## 2024-07-09 NOTE — PHYSICIAN QUERY
Please further clarify the respiratory condition   Acute and (on) Chronic Respiratory Failure with Hypoxia and hypercapnea  \

## 2024-07-15 PROBLEM — J96.22 ACUTE ON CHRONIC RESPIRATORY FAILURE WITH HYPOXIA AND HYPERCAPNIA: Status: RESOLVED | Noted: 2024-03-26 | Resolved: 2024-07-15

## 2024-07-15 PROBLEM — J96.21 ACUTE ON CHRONIC RESPIRATORY FAILURE WITH HYPOXIA AND HYPERCAPNIA: Status: RESOLVED | Noted: 2024-03-26 | Resolved: 2024-07-15

## 2024-07-16 ENCOUNTER — TELEPHONE (OUTPATIENT)
Dept: FAMILY MEDICINE | Facility: CLINIC | Age: 59
End: 2024-07-16
Payer: MEDICARE

## 2024-07-16 NOTE — TELEPHONE ENCOUNTER
Spoke to patient regarding  advise to assist in patient pain control. Referral faxed to [facility]. Verbalized understanding.

## 2024-07-23 ENCOUNTER — EXTERNAL HOME HEALTH (OUTPATIENT)
Dept: HOME HEALTH SERVICES | Facility: HOSPITAL | Age: 59
End: 2024-07-23
Payer: MEDICARE

## 2024-07-25 ENCOUNTER — DOCUMENT SCAN (OUTPATIENT)
Dept: HOME HEALTH SERVICES | Facility: HOSPITAL | Age: 59
End: 2024-07-25
Payer: MEDICARE

## 2024-09-16 PROBLEM — J96.91 RESPIRATORY FAILURE, UNSPECIFIED WITH HYPOXIA: Status: RESOLVED | Noted: 2024-06-17 | Resolved: 2024-09-16

## 2024-09-16 PROBLEM — I21.4 NSTEMI (NON-ST ELEVATED MYOCARDIAL INFARCTION): Status: RESOLVED | Noted: 2024-06-17 | Resolved: 2024-09-16

## 2024-11-25 ENCOUNTER — TELEPHONE (OUTPATIENT)
Dept: FAMILY MEDICINE | Facility: CLINIC | Age: 59
End: 2024-11-25
Payer: MEDICARE

## 2024-11-25 NOTE — TELEPHONE ENCOUNTER
"Spoke w/ Gloria. Advised that pt has not seen Dr Nicolas as an [facility] pt. Attempted to give her the info for [facility] and advise that he is director there. Before I could, she said "I was told to call there" and call disconnected. Called her back and proceeded to give the [facility] info  "

## 2024-11-25 NOTE — TELEPHONE ENCOUNTER
----- Message from Alvina sent at 2024 10:31 AM CST -----  Contact: Gloria from [facility]  Type:  Needs Medical Advice    Who Called:  Gloria from [facility]    Would the patient rather a call back or a response via [facility]?  Call back  Best Call Back Number:    [phone] - Gloria    Additional Information:   States she would like to speak with Dr Nicolas about signing death certificate - please call - thank you

## 2024-12-04 DIAGNOSIS — E11.9 TYPE 2 DIABETES MELLITUS WITHOUT COMPLICATION: ICD-10-CM

## 2024-12-04 DIAGNOSIS — E11.9 TYPE 2 DIABETES MELLITUS WITHOUT COMPLICATION, UNSPECIFIED WHETHER LONG TERM INSULIN USE: ICD-10-CM

## (undated) DEVICE — GAUZE SPONGE BULKEE 6X6.75IN

## (undated) DEVICE — SEE MEDLINE ITEM 146292

## (undated) DEVICE — STRAP OR TABLE 5IN X 72IN

## (undated) DEVICE — SUT MONOCRYL 4-0 PS-2

## (undated) DEVICE — SUT ETHILON 2-0 FS 18IN BLK

## (undated) DEVICE — DRAIN SINGLE ROUND 1/4 19FR

## (undated) DEVICE — ELECTRODE REM PLYHSV RETURN 9

## (undated) DEVICE — NDL SAFETY 21G X 1 1/2 ECLPSE

## (undated) DEVICE — TAPE MEDIPORE 4IN X 2YDS

## (undated) DEVICE — SUT 3-0 VICRYL / SH (J416)

## (undated) DEVICE — CLOSURE SKIN STERI STRIP 1/2X4

## (undated) DEVICE — GLOVE SURGEONS ULTRA TOUCH 6.5

## (undated) DEVICE — SLEEVE SCD EXPRESS CALF MEDIUM

## (undated) DEVICE — GLOVE SURG ULTRA TOUCH 7.5

## (undated) DEVICE — DRESSING TRANS 6X8 TEGADERM

## (undated) DEVICE — DRAPE LAP TIBURON 77X122IN

## (undated) DEVICE — PACK CUSTOM UNIV BASIN SLI